# Patient Record
Sex: FEMALE | Race: WHITE | NOT HISPANIC OR LATINO | Employment: UNEMPLOYED | URBAN - METROPOLITAN AREA
[De-identification: names, ages, dates, MRNs, and addresses within clinical notes are randomized per-mention and may not be internally consistent; named-entity substitution may affect disease eponyms.]

---

## 2017-03-03 PROBLEM — R40.4 UNRESPONSIVE EPISODE: Status: ACTIVE | Noted: 2017-03-03

## 2017-03-03 PROBLEM — R40.4 UNRESPONSIVE EPISODE: Status: RESOLVED | Noted: 2017-03-03 | Resolved: 2017-03-03

## 2023-07-16 DIAGNOSIS — J45.909 ASTHMA: ICD-10-CM

## 2023-07-16 RX ORDER — FLUTICASONE PROPIONATE AND SALMETEROL XINAFOATE 45; 21 UG/1; UG/1
AEROSOL, METERED RESPIRATORY (INHALATION)
OUTPATIENT
Start: 2023-07-16

## 2023-07-20 DIAGNOSIS — J45.20 INTERMITTENT ASTHMA WITHOUT COMPLICATION, UNSPECIFIED ASTHMA SEVERITY: ICD-10-CM

## 2023-07-24 RX ORDER — ALBUTEROL SULFATE 90 UG/1
AEROSOL, METERED RESPIRATORY (INHALATION)
Qty: 18 G | Refills: 2 | Status: SHIPPED | OUTPATIENT
Start: 2023-07-24 | End: 2023-09-18 | Stop reason: SDUPTHER

## 2023-07-28 DIAGNOSIS — E63.9 POOR NUTRITION: ICD-10-CM

## 2023-07-28 DIAGNOSIS — F31.2 BIPOLAR I DISORDER, MOST RECENT EPISODE MANIC, SEVERE WITH PSYCHOTIC FEATURES (HCC): Chronic | ICD-10-CM

## 2023-07-29 ENCOUNTER — HOSPITAL ENCOUNTER (EMERGENCY)
Facility: HOSPITAL | Age: 38
Discharge: HOME | End: 2023-07-29
Attending: EMERGENCY MEDICINE
Payer: COMMERCIAL

## 2023-07-29 ENCOUNTER — APPOINTMENT (EMERGENCY)
Dept: CT IMAGING | Facility: HOSPITAL | Age: 38
End: 2023-07-29
Payer: COMMERCIAL

## 2023-07-29 VITALS
RESPIRATION RATE: 16 BRPM | DIASTOLIC BLOOD PRESSURE: 54 MMHG | WEIGHT: 160.5 LBS | BODY MASS INDEX: 29.35 KG/M2 | SYSTOLIC BLOOD PRESSURE: 92 MMHG | TEMPERATURE: 98 F | OXYGEN SATURATION: 98 % | HEART RATE: 74 BPM

## 2023-07-29 DIAGNOSIS — R10.9 ABDOMINAL PAIN: Primary | ICD-10-CM

## 2023-07-29 DIAGNOSIS — K59.00 CONSTIPATION: ICD-10-CM

## 2023-07-29 LAB
ALBUMIN SERPL BCP-MCNC: 4.1 G/DL (ref 3.5–5)
ALP SERPL-CCNC: 50 U/L (ref 34–104)
ALT SERPL W P-5'-P-CCNC: 8 U/L (ref 7–52)
ANION GAP SERPL CALCULATED.3IONS-SCNC: 6 MMOL/L
AST SERPL W P-5'-P-CCNC: 17 U/L (ref 13–39)
BASOPHILS # BLD AUTO: 0.05 THOUSANDS/ÂΜL (ref 0–0.1)
BASOPHILS NFR BLD AUTO: 1 % (ref 0–1)
BILIRUB SERPL-MCNC: 0.22 MG/DL (ref 0.2–1)
BILIRUB UR QL STRIP: NEGATIVE
BUN SERPL-MCNC: 8 MG/DL (ref 5–25)
CALCIUM SERPL-MCNC: 8.8 MG/DL (ref 8.4–10.2)
CHLORIDE SERPL-SCNC: 106 MMOL/L (ref 96–108)
CLARITY UR: CLEAR
CO2 SERPL-SCNC: 24 MMOL/L (ref 21–32)
COLOR UR: YELLOW
CREAT SERPL-MCNC: 0.72 MG/DL (ref 0.6–1.3)
EOSINOPHIL # BLD AUTO: 0.36 THOUSAND/ÂΜL (ref 0–0.61)
EOSINOPHIL NFR BLD AUTO: 4 % (ref 0–6)
ERYTHROCYTE [DISTWIDTH] IN BLOOD BY AUTOMATED COUNT: 11.4 % (ref 11.6–15.1)
EXT PREGNANCY TEST URINE: NEGATIVE
EXT. CONTROL: NORMAL
GFR SERPL CREATININE-BSD FRML MDRD: 108 ML/MIN/1.73SQ M
GLUCOSE SERPL-MCNC: 90 MG/DL (ref 65–140)
GLUCOSE UR STRIP-MCNC: NEGATIVE MG/DL
HCT VFR BLD AUTO: 43.9 % (ref 34.8–46.1)
HGB BLD-MCNC: 14.5 G/DL (ref 11.5–15.4)
HGB UR QL STRIP.AUTO: NEGATIVE
IMM GRANULOCYTES # BLD AUTO: 0.02 THOUSAND/UL (ref 0–0.2)
IMM GRANULOCYTES NFR BLD AUTO: 0 % (ref 0–2)
KETONES UR STRIP-MCNC: NEGATIVE MG/DL
LEUKOCYTE ESTERASE UR QL STRIP: NEGATIVE
LIPASE SERPL-CCNC: 34 U/L (ref 11–82)
LYMPHOCYTES # BLD AUTO: 3.26 THOUSANDS/ÂΜL (ref 0.6–4.47)
LYMPHOCYTES NFR BLD AUTO: 35 % (ref 14–44)
MCH RBC QN AUTO: 30.8 PG (ref 26.8–34.3)
MCHC RBC AUTO-ENTMCNC: 33 G/DL (ref 31.4–37.4)
MCV RBC AUTO: 93 FL (ref 82–98)
MONOCYTES # BLD AUTO: 0.95 THOUSAND/ÂΜL (ref 0.17–1.22)
MONOCYTES NFR BLD AUTO: 10 % (ref 4–12)
NEUTROPHILS # BLD AUTO: 4.66 THOUSANDS/ÂΜL (ref 1.85–7.62)
NEUTS SEG NFR BLD AUTO: 50 % (ref 43–75)
NITRITE UR QL STRIP: NEGATIVE
NRBC BLD AUTO-RTO: 0 /100 WBCS
PH UR STRIP.AUTO: 5.5 [PH] (ref 4.5–8)
PLATELET # BLD AUTO: 189 THOUSANDS/UL (ref 149–390)
PMV BLD AUTO: 10.8 FL (ref 8.9–12.7)
POTASSIUM SERPL-SCNC: 4.2 MMOL/L (ref 3.5–5.3)
PROT SERPL-MCNC: 7 G/DL (ref 6.4–8.4)
PROT UR STRIP-MCNC: NEGATIVE MG/DL
RBC # BLD AUTO: 4.71 MILLION/UL (ref 3.81–5.12)
SODIUM SERPL-SCNC: 136 MMOL/L (ref 135–147)
SP GR UR STRIP.AUTO: 1.01 (ref 1–1.03)
UROBILINOGEN UR QL STRIP.AUTO: 0.2 E.U./DL
WBC # BLD AUTO: 9.3 THOUSAND/UL (ref 4.31–10.16)

## 2023-07-29 PROCEDURE — 80053 COMPREHEN METABOLIC PANEL: CPT | Performed by: EMERGENCY MEDICINE

## 2023-07-29 PROCEDURE — 81003 URINALYSIS AUTO W/O SCOPE: CPT

## 2023-07-29 PROCEDURE — 85025 COMPLETE CBC W/AUTO DIFF WBC: CPT | Performed by: EMERGENCY MEDICINE

## 2023-07-29 PROCEDURE — 81025 URINE PREGNANCY TEST: CPT

## 2023-07-29 PROCEDURE — 96374 THER/PROPH/DIAG INJ IV PUSH: CPT

## 2023-07-29 PROCEDURE — 83690 ASSAY OF LIPASE: CPT | Performed by: EMERGENCY MEDICINE

## 2023-07-29 PROCEDURE — 99284 EMERGENCY DEPT VISIT MOD MDM: CPT | Performed by: EMERGENCY MEDICINE

## 2023-07-29 PROCEDURE — 99284 EMERGENCY DEPT VISIT MOD MDM: CPT

## 2023-07-29 PROCEDURE — 36415 COLL VENOUS BLD VENIPUNCTURE: CPT | Performed by: EMERGENCY MEDICINE

## 2023-07-29 PROCEDURE — 96361 HYDRATE IV INFUSION ADD-ON: CPT

## 2023-07-29 RX ORDER — ONDANSETRON 2 MG/ML
4 INJECTION INTRAMUSCULAR; INTRAVENOUS ONCE
Status: COMPLETED | OUTPATIENT
Start: 2023-07-29 | End: 2023-07-29

## 2023-07-29 RX ORDER — FAMOTIDINE 20 MG/1
20 TABLET, FILM COATED ORAL ONCE
Status: DISCONTINUED | OUTPATIENT
Start: 2023-07-29 | End: 2023-07-30 | Stop reason: HOSPADM

## 2023-07-29 RX ADMIN — ONDANSETRON 4 MG: 2 INJECTION INTRAMUSCULAR; INTRAVENOUS at 22:00

## 2023-07-29 RX ADMIN — SODIUM CHLORIDE 1000 ML: 0.9 INJECTION, SOLUTION INTRAVENOUS at 21:05

## 2023-07-30 NOTE — ED NOTES
Provider made aware patient refused pepcid. Pt educated can not have toradol due to NSAID allergy and narcotics will make constipation worse. Pt and family member educated numerous times on plan of care.      Sean Lew RN  07/29/23 [facility], RN  07/29/23 [id number]       Sean Lew RN  07/30/23 [id number]

## 2023-07-30 NOTE — ED PROVIDER NOTES
Pt Name: Adria Garcia  MRN: [id number]  [address] 1985  Age/Sex: 30 y.o. female  Date of evaluation: 7/29/2023  PCP: Barbra Ritter MD    [address]    Chief Complaint   Patient presents with   • Abdominal Pain     Patient reports generalized abdominal pain for 2 weeks. Last BM 15 days ago. Complaining of feeling hot, sweats. Fever for 2 days. Tylenol at 1100   • Possible UTI     Burning with urination, radiation of burning into abdomen. HPI    Clark Baer presents to the Emergency Department complaining of abdominal pain and dysuria. She has known constipation and has not been able to have a bowel movement despite taking her bowel regimen. She has been having dysuria and is concerned that she may have a UTI. Also- she has been taken off her lithium and since that time she feels hot and tingling at times.         HPI      Past Medical and Surgical History    Past Medical History:   Diagnosis Date   • Abnormal Pap smear of cervix    • ADHD (attention deficit hyperactivity disorder)    • Ankle fracture    • Anxiety    • Arthritis     back   • Asthma    • Bipolar disorder ([address])    • Cellulitis     right side fac in 2014   • Chronic pain disorder    • Diverticulitis    • Flank pain 8/16/2016   • Hepatitis C    • Hip disease 2006    reports she had fluid removed from right hip and received treatment with antibiotic    • History of abnormal cervical Pap smear    • History of multiple miscarriages    • IBS (irritable bowel syndrome)    • Infantile idiopathic scoliosis    • Joint pain    • Kidney stone    • Lactose intolerance    • Low back pain    • Myofascial pain syndrome    • Poor dentition    • PTSD (post-traumatic stress disorder)    • Pyelonephritis affecting pregnancy    • Right hand paresthesia    • Right ovarian cyst    • Scoliosis    • Seizures ([address])    • Self-injurious behavior    • Sleep difficulties    • Slow transit constipation    • Substance abuse ([address])    • Suicide attempt [facility]        Past Surgical History:   Procedure Laterality Date   •  SECTION  2016   •  SECTION  2014   • HIP SURGERY     • ORTHOPEDIC SURGERY     • DE  DELIVERY ONLY N/A 2016    Procedure:  SECTION () REPEAT;  Surgeon: Wendy Zimmer MD;  Location: [facility];  Service: Obstetrics   • DE LIG/TRNSXJ FLP TUBE ABDL/VAG APPR UNI/BI Bilateral 2016    Procedure: LIGATION/COAGULATION TUBAL;  Surgeon: Wendy Zimmer MD;  Location: [facility];  Service: Obstetrics       Family History   Problem Relation Age of Onset   • Heart disease Maternal Aunt    • Cancer Maternal Aunt    • Diabetes Paternal Aunt    • No Known Problems Mother    • No Known Problems Father    • No Known Problems Sister        Social History     Tobacco Use   • Smoking status: Every Day     Packs/day: 0.25     Years: 15.00     Total pack years: 3.75     Types: Cigarettes     Passive exposure: Never   • Smokeless tobacco: Never   • Tobacco comments:     pt refused smoking cessation teaching. Vaping Use   • Vaping Use: Former   Substance Use Topics   • Alcohol use: Never   • Drug use: Not Currently     Types: Marijuana, Cocaine, "Crack" cocaine     Comment: on occasion          . Allergies    Allergies   Allergen Reactions   • Aspirin      Pt given enteric coated 81 mg, when ask pt denies any allergy, listed under allergies on paperwork provided by berny   • Chocolate - Food Allergy Itching   • Naproxen    • Toradol [Ketorolac Tromethamine] GI Bleeding   • Azithromycin Rash   • Latex Hives and Rash   • Neurontin [Gabapentin] Rash and GI Bleeding   • Ppd [Tuberculin Purified Protein Derivative] Rash       Home Medications    Prior to Admission medications    Medication Sig Start Date End Date Taking?  Authorizing Provider   Advair HFA [id number] MCG/ACT inhaler INHALE 2 PUFFS 2 (TWO) TIMES A DAY 23   Madonna Patel MD   Antacid Regular Strength 200-200-20 MG/5ML suspension TAKE 30 ML BY MOUTH EVERY 4 (FOUR) HOURS AS NEEDED FOR INDIGESTION OR HEARTBURN (DYSPEPSIA) 2/24/23   Vincent Mcfarland MD   benztropine (COGENTIN) 0.5 mg tablet Take 1 tablet (0.5 mg total) by mouth 2 (two) times a day 3/9/23   Dalia Kidd MD   benztropine (COGENTIN) 1 mg tablet TAKE 1 TABLET (1 MG TOTAL) BY MOUTH EVERY 4 (FOUR) HOURS AS NEEDED FOR TREMORS (MILD MUSCLE STIFFNESS, DYSTONIC REACTION, OR RIGIDITY IF ANTIPSYCHOTICS GIVEN IN THE PAST 24 HOURS) 6/18/23   Vincent Mcfarland MD   cetirizine (ZyrTEC) 10 mg tablet TAKE 1 TABLET (10 MG TOTAL) BY MOUTH DAILY 6/21/23   Vincent Mcfarland MD   dicyclomine (BENTYL) 20 mg tablet Take 1 tablet (20 mg total) by mouth every 6 (six) hours as-needed for abdominal cramping. 5/24/23   Carline Natarajan PA-C   diphenhydrAMINE (BENADRYL) 50 MG tablet Take 1 tablet (50 mg total) by mouth daily at bedtime  Patient not taking: Reported on 5/17/2023 3/9/23   Dalia Kidd MD   famotidine (PEPCID) 20 mg tablet Take 1 tablet (20 mg total) by mouth 2 (two) times a day as needed for indigestion or heartburn 5/17/23   Simeon Melo MD   hydrOXYzine HCL (ATARAX) 25 mg tablet TAKE 1 TABLET (25 MG TOTAL) BY MOUTH EVERY 6 (SIX) HOURS AS NEEDED FOR ITCHING (MILD ANXIETY) 6/18/23   Vincent Mcfarland MD   hyoscyamine (ANASPAZ,LEVSIN) 0.125 MG tablet Take 1 tablet (0.125 mg total) by mouth every 4 (four) hours as needed for cramping 5/17/23   Simeon Melo MD   lithium carbonate (LITHOBID) 300 mg CR tablet Take 4 tablets (1,200 mg total) by mouth daily at bedtime 3/9/23   Dalia Kidd MD   magnesium Oxide (MAG-OX) 400 mg TABS TAKE 1 TABLET (400 MG TOTAL) BY MOUTH 2 (TWO) TIMES A DAY 6/19/23   Vincent Mcfarland MD   nicotine (NICODERM CQ) 7 mg/24hr TD 24 hr patch Place 1 patch on the skin over 24 hours daily as needed (Minimal smoking hx - use if needed)  Patient not taking: Reported on 4/12/2023 3/9/23   Dalia Kidd MD   omeprazole ([address] [facility]) 40 MG capsule Take 1 capsule (40 mg total) by mouth daily 5/17/23   Malvin Bacon MD   paliperidone (INVEGA) 6 MG 24 hr tablet Take 2 tablets (12 mg total) by mouth daily at bedtime 3/9/23   Annetta Wynn MD   polyethylene glycol [facility]) 17 GM/SCOOP TAKE 17 G BY MOUTH DAILY 7/17/23   Jaime Barreto DO   propranolol (INDERAL) 40 mg tablet TAKE 1 TABLET (40 MG TOTAL) BY MOUTH 2 (TWO) TIMES A DAY AS NEEDED (AKATHISIA/RESTLESSNESS) 7/5/23   Antonio Hopper MD   tiZANidine (ZANAFLEX) 4 mg tablet TAKE 1 TABLET (4 MG TOTAL) BY MOUTH DAILY AT BEDTIME 6/19/23   Antonio Hopper MD   Ventolin  (90 Base) MCG/ACT inhaler INHALE 2 PUFFS EVERY 6 (SIX) HOURS AS NEEDED FOR WHEEZING OR SHORTNESS OF BREATH 7/24/23   Keshav Chang MD   cyclobenzaprine (FLEXERIL) 10 mg tablet Take 1 tablet (10 mg total) by mouth 2 (two) times a day as needed for muscle spasms 8/27/22 8/27/22  Ayo Workman MD           Review of Systems    Review of Systems   Constitutional: Negative for chills and fever. HENT: Negative for ear pain and sore throat. Eyes: Negative for pain and visual disturbance. Respiratory: Negative for cough and shortness of breath. Cardiovascular: Negative for chest pain and palpitations. Gastrointestinal: Positive for abdominal distention, abdominal pain, constipation, nausea and vomiting. Genitourinary: Positive for dysuria. Negative for hematuria. Musculoskeletal: Negative for arthralgias and back pain. Skin: Negative for color change and rash. Neurological: Negative for seizures and syncope. All other systems reviewed and are negative.           Physical Exam      ED Triage Vitals [07/29/23 2005]   Temperature Pulse Respirations Blood Pressure SpO2   98 °F (36.7 °C) 73 20 130/72 98 %      Temp Source Heart Rate Source Patient Position - Orthostatic VS BP Location FiO2 (%)   Oral Monitor Sitting Right arm --      Pain Score       10 - Worst Possible Pain Physical Exam  Vitals and nursing note reviewed. Constitutional:       General: She is not in acute distress. Appearance: She is well-developed. HENT:      Head: Normocephalic and atraumatic. Eyes:      Conjunctiva/sclera: Conjunctivae normal.   Cardiovascular:      Rate and Rhythm: Normal rate and regular rhythm. Heart sounds: No murmur heard. Pulmonary:      Effort: Pulmonary effort is normal. No respiratory distress. Breath sounds: Normal breath sounds. Abdominal:      Palpations: Abdomen is soft. Tenderness: There is no abdominal tenderness. Musculoskeletal:         General: No swelling. Cervical back: Neck supple. Skin:     General: Skin is warm and dry. Capillary Refill: Capillary refill takes less than 2 seconds. Neurological:      Mental Status: She is alert. Psychiatric:         Mood and Affect: Mood normal.       Assessment and Plan    Anurag Montelongo is a 30 y.o. female who presents with abdominal pain promarily. Physical examination remarkable for mild abdominal distension. Plan will be to perform diagnostic testing and treat symptomatically.       MDM    Diagnostic Results        Labs:    Results for orders placed or performed during the hospital encounter of 07/29/23   CBC and differential   Result Value Ref Range    WBC 9.30 4.31 - 10.16 Thousand/uL    RBC 4.71 3.81 - 5.12 Million/uL    Hemoglobin 14.5 11.5 - 15.4 g/dL    Hematocrit 43.9 34.8 - 46.1 %    MCV 93 82 - 98 fL    MCH 30.8 26.8 - 34.3 pg    MCHC 33.0 31.4 - 37.4 g/dL    RDW 11.4 (L) 11.6 - 15.1 %    MPV 10.8 8.9 - 12.7 fL    Platelets 794 171 - 675 Thousands/uL    nRBC 0 /100 WBCs    Neutrophils Relative 50 43 - 75 %    Immat GRANS % 0 0 - 2 %    Lymphocytes Relative 35 14 - 44 %    Monocytes Relative 10 4 - 12 %    Eosinophils Relative 4 0 - 6 %    Basophils Relative 1 0 - 1 %    Neutrophils Absolute 4.66 1.85 - 7.62 Thousands/µL    Immature Grans Absolute 0.02 0.00 - 0.20 Thousand/uL    Lymphocytes Absolute 3.26 0.60 - 4.47 Thousands/µL    Monocytes Absolute 0.95 0.17 - 1.22 Thousand/µL    Eosinophils Absolute 0.36 0.00 - 0.61 Thousand/µL    Basophils Absolute 0.05 0.00 - 0.10 Thousands/µL   Comprehensive metabolic panel   Result Value Ref Range    Sodium 136 135 - 147 mmol/L    Potassium 4.2 3.5 - 5.3 mmol/L    Chloride 106 96 - 108 mmol/L    CO2 24 21 - 32 mmol/L    ANION GAP 6 mmol/L    BUN 8 5 - 25 mg/dL    Creatinine 0.72 0.60 - 1.30 mg/dL    Glucose 90 65 - 140 mg/dL    Calcium 8.8 8.4 - 10.2 mg/dL    AST 17 13 - 39 U/L    ALT 8 7 - 52 U/L    Alkaline Phosphatase 50 34 - 104 U/L    Total Protein 7.0 6.4 - 8.4 g/dL    Albumin 4.1 3.5 - 5.0 g/dL    Total Bilirubin 0.22 0.20 - 1.00 mg/dL    eGFR 108 ml/min/1.73sq m   Lipase   Result Value Ref Range    Lipase 34 11 - 82 u/L   POCT pregnancy, urine   Result Value Ref Range    EXT Preg Test, Ur Negative     Control Valid    Urine Macroscopic, POC   Result Value Ref Range    Color, UA Yellow     Clarity, UA Clear     pH, UA 5.5 4.5 - 8.0    Leukocytes, UA Negative Negative    Nitrite, UA Negative Negative    Protein, UA Negative Negative mg/dl    Glucose, UA Negative Negative mg/dl    Ketones, UA Negative Negative mg/dl    Urobilinogen, UA 0.2 0.2, 1.0 E.U./dl E.U./dl    Bilirubin, UA Negative Negative    Occult Blood, UA Negative Negative    Specific Gravity, UA 1.010 1.003 - 1.030       All labs reviewed and utilized in the medical decision making process    Radiology:    CT abdomen pelvis with contrast    (Results Pending)       All radiology studies independently viewed by me and interpreted by the radiologist.    Procedure    Procedures      ED Course of Care and Re-Assessments    Patient declined CT and when I went to re-evaluate her she requested that her IV be taken out and she wanted to go home. She was frustrated that her fluids had not been running for some of the time.       Her labs were hemolyzed and needed to be re-drawn and this caused a delay in CT. She reports an allergy to NSAIDs, she had taken tylenol and I explained that stronger pain medications would cause worsening of her constipation. Medications   famotidine (PEPCID) tablet 20 mg (0 mg Oral Hold 7/29/23 2222)   sodium chloride 0.9 % bolus 1,000 mL (1,000 mL Intravenous New Bag 7/29/23 2105)   ondansetron (ZOFRAN) injection 4 mg (4 mg Intravenous Given 7/29/23 2200)           FINAL IMPRESSION    Final diagnoses:   Abdominal pain   Constipation         DISPOSITION/PLAN    Time reflects when diagnosis was documented in both MDM as applicable and the Disposition within this note     Time User Action Codes Description Comment    7/29/2023 10:41 PM Delfino Nageotte L Add [R10.9] Abdominal pain     7/29/2023 10:41 PM Delfino Nageotte Add [K59.00] Constipation       ED Disposition     ED Disposition   Left from Room after Provider Exam    Condition   --    Date/Time   Sat Jul 29, 2023 10:41 PM    Comment   --         Follow-up Information    None           PATIENT REFERRED TO:    No follow-up provider specified. DISCHARGE MEDICATIONS:    Patient's Medications   Discharge Prescriptions    No medications on file       No discharge procedures on file.          Jonathan Nina, [address] DO  07/29/23 [id number]

## 2023-07-30 NOTE — ED NOTES
Pt requesting pain medication. Pt reporting 10/10 pain. Dr. Ritu Guevara made aware.        Analy Borden RN  07/29/23 [id number]

## 2023-07-30 NOTE — ED NOTES
Late entry due to patient care - Family member at the bedside upset that IV fluids not running. IV flushed without difficulty and IV. Pt family member continually asking if IV fluids are running. Pt educated need to keep arm straight. Pt family member concerned about radiation related to CT scan. This RN attempted to educated family member on CT scan, but family member started talking on phone and ignoring RN. Pt family member then asked, "how many CC is the IV fluid order for." Family member made aware pt to received 1000ml of fluid. This RN started asking pt requesting pain medications for abd pain 10/10 Dr. Ritu Guevara made aware.      Analy Borden RN  07/29/23 [place], RN  07/29/23 [place], RN  07/29/23 Jose Alfredo, TORRIE  07/30/23 [id number]

## 2023-07-30 NOTE — ED NOTES
Provider exited room and reported pt wanting to leave. IV removed by Dr. Berenice Marquez, and pt ambulated out of dept without difficulty.       Willam Ferrera RN  07/29/23 1997

## 2023-07-31 RX ORDER — BENZTROPINE MESYLATE 0.5 MG/1
0.5 TABLET ORAL 2 TIMES DAILY
Qty: 60 TABLET | Refills: 1 | OUTPATIENT
Start: 2023-07-31

## 2023-07-31 NOTE — TELEPHONE ENCOUNTER
Adrianna Javier was treated by this writer in the hospital in March 2023. I have not seen her since then and she is no longer under my care.     Lisa Lopez MD

## 2023-08-01 RX ORDER — HYDROXYZINE HYDROCHLORIDE 25 MG/1
25 TABLET, FILM COATED ORAL EVERY 6 HOURS PRN
Qty: 10 TABLET | Refills: 0 | Status: SHIPPED | OUTPATIENT
Start: 2023-08-01

## 2023-08-01 RX ORDER — HYDROXYZINE 50 MG/1
100 TABLET, FILM COATED ORAL EVERY 6 HOURS PRN
Qty: 30 TABLET | Refills: 0 | OUTPATIENT
Start: 2023-08-01

## 2023-08-01 RX ORDER — BENZTROPINE MESYLATE 1 MG/1
1 TABLET ORAL EVERY 4 HOURS PRN
Qty: 30 TABLET | Refills: 0 | Status: SHIPPED | OUTPATIENT
Start: 2023-08-01

## 2023-08-06 DIAGNOSIS — M79.18 MYOFASCIAL PAIN SYNDROME: ICD-10-CM

## 2023-08-06 DIAGNOSIS — K21.9 GERD (GASTROESOPHAGEAL REFLUX DISEASE): ICD-10-CM

## 2023-08-08 RX ORDER — LANOLIN ALCOHOL/MO/W.PET/CERES
400 CREAM (GRAM) TOPICAL 2 TIMES DAILY
Qty: 60 TABLET | Refills: 1 | Status: SHIPPED | OUTPATIENT
Start: 2023-08-08

## 2023-08-08 RX ORDER — TIZANIDINE 4 MG/1
4 TABLET ORAL
Qty: 30 TABLET | Refills: 1 | Status: SHIPPED | OUTPATIENT
Start: 2023-08-08

## 2023-08-10 DIAGNOSIS — G43.009 MIGRAINE WITHOUT AURA AND WITHOUT STATUS MIGRAINOSUS, NOT INTRACTABLE: ICD-10-CM

## 2023-08-11 RX ORDER — PROPRANOLOL HYDROCHLORIDE 40 MG/1
40 TABLET ORAL 2 TIMES DAILY PRN
Qty: 90 TABLET | Refills: 1 | OUTPATIENT
Start: 2023-08-11

## 2023-08-20 DIAGNOSIS — J45.20 INTERMITTENT ASTHMA WITHOUT COMPLICATION, UNSPECIFIED ASTHMA SEVERITY: ICD-10-CM

## 2023-08-22 RX ORDER — ALBUTEROL SULFATE 90 UG/1
AEROSOL, METERED RESPIRATORY (INHALATION)
Qty: 18 G | Refills: 2 | OUTPATIENT
Start: 2023-08-22

## 2023-08-31 ENCOUNTER — HOSPITAL ENCOUNTER (EMERGENCY)
Facility: HOSPITAL | Age: 38
Discharge: HOME | End: 2023-08-31
Attending: EMERGENCY MEDICINE
Payer: COMMERCIAL

## 2023-08-31 VITALS
TEMPERATURE: 97.8 F | SYSTOLIC BLOOD PRESSURE: 138 MMHG | OXYGEN SATURATION: 100 % | WEIGHT: 158.73 LBS | DIASTOLIC BLOOD PRESSURE: 63 MMHG | RESPIRATION RATE: 16 BRPM | HEART RATE: 90 BPM | BODY MASS INDEX: 29.03 KG/M2

## 2023-08-31 DIAGNOSIS — L25.9 CONTACT DERMATITIS: Primary | ICD-10-CM

## 2023-08-31 PROCEDURE — 99284 EMERGENCY DEPT VISIT MOD MDM: CPT

## 2023-08-31 PROCEDURE — 99282 EMERGENCY DEPT VISIT SF MDM: CPT

## 2023-08-31 RX ORDER — PREDNISONE 20 MG/1
40 TABLET ORAL ONCE
Status: COMPLETED | OUTPATIENT
Start: 2023-08-31 | End: 2023-08-31

## 2023-08-31 RX ORDER — DIPHENHYDRAMINE HCL 25 MG
25 TABLET ORAL ONCE
Status: COMPLETED | OUTPATIENT
Start: 2023-08-31 | End: 2023-08-31

## 2023-08-31 RX ORDER — FAMOTIDINE 20 MG/1
20 TABLET, FILM COATED ORAL ONCE
Status: COMPLETED | OUTPATIENT
Start: 2023-08-31 | End: 2023-08-31

## 2023-08-31 RX ORDER — DIPHENHYDRAMINE HCL 25 MG
25 TABLET ORAL EVERY 6 HOURS
Qty: 20 TABLET | Refills: 0 | Status: SHIPPED | OUTPATIENT
Start: 2023-08-31

## 2023-08-31 RX ORDER — PREDNISONE 20 MG/1
40 TABLET ORAL DAILY
Qty: 8 TABLET | Refills: 0 | Status: SHIPPED | OUTPATIENT
Start: 2023-08-31 | End: 2023-09-04

## 2023-08-31 RX ADMIN — DIPHENHYDRAMINE HCL 25 MG: 25 TABLET, COATED ORAL at 11:52

## 2023-08-31 RX ADMIN — FAMOTIDINE 20 MG: 20 TABLET ORAL at 11:52

## 2023-08-31 RX ADMIN — PREDNISONE 40 MG: 20 TABLET ORAL at 11:52

## 2023-08-31 NOTE — ED PROVIDER NOTES
History  Chief Complaint   Patient presents with   • Rash     Rash along abdomen and back from wearing wrap for around waist. Reports rash is very itchy and feels like pins and needles. 30 YOF presents with rash to her abdomen and back from wearing a wait slimmer. Pt reports she started wearing it on Tuesday and developed a rash shortly after. Has not worn it since. Tried calamine lotion without relief. Prior to Admission Medications   Prescriptions Last Dose Informant Patient Reported? Taking? Advair HFA 45-21 MCG/ACT inhaler   No No   Sig: INHALE 2 PUFFS 2 (TWO) TIMES A DAY   Antacid Regular Strength 200-200-20 MG/5ML suspension   No No   Sig: TAKE 30 ML BY MOUTH EVERY 4 (FOUR) HOURS AS NEEDED FOR INDIGESTION OR HEARTBURN (DYSPEPSIA)   Ventolin  (90 Base) MCG/ACT inhaler   No No   Sig: INHALE 2 PUFFS EVERY 6 (SIX) HOURS AS NEEDED FOR WHEEZING OR SHORTNESS OF BREATH   benztropine (COGENTIN) 0.5 mg tablet   No No   Sig: Take 1 tablet (0.5 mg total) by mouth 2 (two) times a day   benztropine (COGENTIN) 1 mg tablet   No No   Sig: TAKE 1 TABLET (1 MG TOTAL) BY MOUTH EVERY 4 (FOUR) HOURS AS NEEDED FOR TREMORS (MILD MUSCLE STIFFNESS, DYSTONIC REACTION, OR RIGIDITY IF ANTIPSYCHOTICS GIVEN IN THE PAST 24 HOURS)   cetirizine (ZyrTEC) 10 mg tablet   No No   Sig: TAKE 1 TABLET (10 MG TOTAL) BY MOUTH DAILY   dicyclomine (BENTYL) 20 mg tablet   No No   Sig: Take 1 tablet (20 mg total) by mouth every 6 (six) hours as-needed for abdominal cramping.    famotidine (PEPCID) 20 mg tablet   No No   Sig: Take 1 tablet (20 mg total) by mouth 2 (two) times a day as needed for indigestion or heartburn   hydrOXYzine HCL (ATARAX) 25 mg tablet   No No   Sig: TAKE 1 TABLET (25 MG TOTAL) BY MOUTH EVERY 6 (SIX) HOURS AS NEEDED FOR ITCHING (MILD ANXIETY)   hyoscyamine (ANASPAZ,LEVSIN) 0.125 MG tablet   No No   Sig: Take 1 tablet (0.125 mg total) by mouth every 4 (four) hours as needed for cramping   lithium carbonate (LITHOBID) 300 mg CR tablet   No No   Sig: Take 4 tablets (1,200 mg total) by mouth daily at bedtime   magnesium Oxide (MAG-OX) 400 mg TABS   No No   Sig: TAKE 1 TABLET (400 MG TOTAL) BY MOUTH 2 (TWO) TIMES A DAY   nicotine (NICODERM CQ) 7 mg/24hr TD 24 hr patch   No No   Sig: Place 1 patch on the skin over 24 hours daily as needed (Minimal smoking hx - use if needed)   Patient not taking: Reported on 4/12/2023   omeprazole (PriLOSEC) 40 MG capsule   No No   Sig: Take 1 capsule (40 mg total) by mouth daily   paliperidone (INVEGA) 6 MG 24 hr tablet   No No   Sig: Take 2 tablets (12 mg total) by mouth daily at bedtime   polyethylene glycol (GLYCOLAX) 17 GM/SCOOP   No No   Sig: TAKE 17 G BY MOUTH DAILY   propranolol (INDERAL) 40 mg tablet   No No   Sig: TAKE 1 TABLET (40 MG TOTAL) BY MOUTH 2 (TWO) TIMES A DAY AS NEEDED (AKATHISIA/RESTLESSNESS)   tiZANidine (ZANAFLEX) 4 mg tablet   No No   Sig: TAKE 1 TABLET (4 MG TOTAL) BY MOUTH DAILY AT BEDTIME      Facility-Administered Medications: None       Past Medical History:   Diagnosis Date   • Abnormal Pap smear of cervix    • ADHD (attention deficit hyperactivity disorder)    • Ankle fracture    • Anxiety    • Arthritis     back   • Asthma    • Bipolar disorder (HCC)    • Cellulitis     right side fac in 2014   • Chronic pain disorder    • Diverticulitis    • Flank pain 8/16/2016   • Hepatitis C    • Hip disease 2006    reports she had fluid removed from right hip and received treatment with antibiotic    • History of abnormal cervical Pap smear    • History of multiple miscarriages    • IBS (irritable bowel syndrome)    • Infantile idiopathic scoliosis    • Joint pain    • Kidney stone    • Lactose intolerance    • Low back pain    • Myofascial pain syndrome    • Poor dentition    • PTSD (post-traumatic stress disorder)    • Pyelonephritis affecting pregnancy    • Right hand paresthesia    • Right ovarian cyst    • Scoliosis    • Seizures (HCC)    • Self-injurious behavior • Sleep difficulties    • Slow transit constipation    • Substance abuse ([address] )    • Suicide attempt [facility])        Past Surgical History:   Procedure Laterality Date   •  SECTION  2016   •  SECTION  2014   • HIP SURGERY     • ORTHOPEDIC SURGERY     • MS  DELIVERY ONLY N/A 2016    Procedure:  SECTION () REPEAT;  Surgeon: Edmund Majano MD;  Location: [facility];  Service: Obstetrics   • MS LIG/TRNSXJ FLP TUBE ABDL/VAG APPR UNI/BI Bilateral 2016    Procedure: LIGATION/COAGULATION TUBAL;  Surgeon: Edmund Majano MD;  Location: [facility];  Service: Obstetrics       Family History   Problem Relation Age of Onset   • Heart disease Maternal Aunt    • Cancer Maternal Aunt    • Diabetes Paternal Aunt    • No Known Problems Mother    • No Known Problems Father    • No Known Problems Sister      I have reviewed and agree with the history as documented. E-Cigarette/Vaping   • E-Cigarette Use Former User      E-Cigarette/Vaping Substances   • Nicotine No    • THC No    • CBD No    • Flavoring No    • Other No    • Unknown No      Social History     Tobacco Use   • Smoking status: Every Day     Packs/day: 0.25     Years: 15.00     Total pack years: 3.75     Types: Cigarettes     Passive exposure: Never   • Smokeless tobacco: Never   • Tobacco comments:     pt refused smoking cessation teaching. Vaping Use   • Vaping Use: Former   Substance Use Topics   • Alcohol use: Never   • Drug use: Not Currently     Types: Marijuana, Cocaine, "Crack" cocaine     Comment: on occasion        Review of Systems   Skin: Positive for rash. All other systems reviewed and are negative. Physical Exam  Physical Exam  Vitals and nursing note reviewed. Constitutional:       General: She is not in acute distress. Appearance: Normal appearance. She is well-developed. She is not ill-appearing. HENT:      Head: Normocephalic and atraumatic.    Eyes: Conjunctiva/sclera: Conjunctivae normal.   Cardiovascular:      Rate and Rhythm: Normal rate. Pulmonary:      Effort: Pulmonary effort is normal.   Musculoskeletal:         General: Normal range of motion. Cervical back: Normal range of motion and neck supple. Skin:     General: Skin is warm and dry. Findings: Rash present. Comments: Erythematous macular papular rash on abdomen. No drainage. Neurological:      Mental Status: She is alert. Psychiatric:         Mood and Affect: Mood normal.         Behavior: Behavior normal.         Vital Signs  ED Triage Vitals [08/31/23 1038]   Temperature Pulse Respirations Blood Pressure SpO2   97.8 °F (36.6 °C) 90 16 138/63 100 %      Temp Source Heart Rate Source Patient Position - Orthostatic VS BP Location FiO2 (%)   Oral Monitor Sitting Right arm --      Pain Score       No Pain           Vitals:    08/31/23 1038   BP: 138/63   Pulse: 90   Patient Position - Orthostatic VS: Sitting         Visual Acuity      ED Medications  Medications   predniSONE tablet 40 mg (40 mg Oral Given 8/31/23 1152)   diphenhydrAMINE (BENADRYL) tablet 25 mg (25 mg Oral Given 8/31/23 1152)   famotidine (PEPCID) tablet 20 mg (20 mg Oral Given 8/31/23 1152)       Diagnostic Studies  Results Reviewed     None                 No orders to display              Procedures  Procedures         ED Course                               SBIRT 20yo+    Flowsheet Row Most Recent Value   Initial Alcohol Screen: US AUDIT-C     1. How often do you have a drink containing alcohol? 0 Filed at: 08/31/2023 1143   2. How many drinks containing alcohol do you have on a typical day you are drinking? 0 Filed at: 08/31/2023 1143   3a. Male UNDER 65: How often do you have five or more drinks on one occasion? 0 Filed at: 08/31/2023 1143   3b. FEMALE Any Age, or MALE 65+: How often do you have 4 or more drinks on one occassion?  0 Filed at: 08/31/2023 1143   Audit-C Score 0 Filed at: 08/31/2023 1143 JAMEY: How many times in the past year have you. .. Used an illegal drug or used a prescription medication for non-medical reasons? Never Filed at: 08/31/2023 1143                    Medical Decision Making  30 YOF presents with rash to the abdomen and back after wearing new waist slimmer on Tuesday. On physical exam she does have a rash consistent with contact dermatitis. Will give prednisone, benadryl and pepcid here. Recommend few day course of prednisone in addition to benadryl. I have discussed the plan to discharge pt from ED. The patient was discharged in stable condition.  Patient ambulated off the department.  Extensive return to emergency department precautions were discussed.  Follow up with appropriate providers including primary care physician was discussed.  Patient and/or their  primary decision maker expressed understanding. Maggie Dewitt remained stable during entire emergency department stay. Contact dermatitis: acute illness or injury  Risk  OTC drugs. Prescription drug management. Disposition  Final diagnoses:   Contact dermatitis     Time reflects when diagnosis was documented in both MDM as applicable and the Disposition within this note     Time User Action Codes Description Comment    8/31/2023 11:40 AM Jackie Reno Add [L25.9] Contact dermatitis       ED Disposition     ED Disposition   Discharge    Condition   Stable    Date/Time   Thu Aug 31, 2023 11:40 AM    Comment   Ranjit Keating discharge to home/self care.                Follow-up Information    None         Discharge Medication List as of 8/31/2023 12:01 PM      START taking these medications    Details   diphenhydrAMINE (BENADRYL) 25 mg tablet Take 1 tablet (25 mg total) by mouth every 6 (six) hours, Starting Thu 8/31/2023, Normal      predniSONE 20 mg tablet Take 2 tablets (40 mg total) by mouth daily for 4 days, Starting Thu 8/31/2023, Until Mon 9/4/2023, Normal         CONTINUE these medications which have NOT CHANGED    Details   Advair HFA 45-21 MCG/ACT inhaler INHALE 2 PUFFS 2 (TWO) TIMES A DAY, Normal      Antacid Regular Strength 200-200-20 MG/5ML suspension TAKE 30 ML BY MOUTH EVERY 4 (FOUR) HOURS AS NEEDED FOR INDIGESTION OR HEARTBURN (DYSPEPSIA), Normal      !! benztropine (COGENTIN) 0.5 mg tablet Take 1 tablet (0.5 mg total) by mouth 2 (two) times a day, Starting Thu 3/9/2023, Normal      !! benztropine (COGENTIN) 1 mg tablet TAKE 1 TABLET (1 MG TOTAL) BY MOUTH EVERY 4 (FOUR) HOURS AS NEEDED FOR TREMORS (MILD MUSCLE STIFFNESS, DYSTONIC REACTION, OR RIGIDITY IF ANTIPSYCHOTICS GIVEN IN THE PAST 24 HOURS), Starting Tue 8/1/2023, Normal      cetirizine (ZyrTEC) 10 mg tablet TAKE 1 TABLET (10 MG TOTAL) BY MOUTH DAILY, Starting Wed 6/21/2023, Normal      dicyclomine (BENTYL) 20 mg tablet Take 1 tablet (20 mg total) by mouth every 6 (six) hours as-needed for abdominal cramping., Starting Wed 5/24/2023, Normal      famotidine (PEPCID) 20 mg tablet Take 1 tablet (20 mg total) by mouth 2 (two) times a day as needed for indigestion or heartburn, Starting Wed 5/17/2023, Normal      hydrOXYzine HCL (ATARAX) 25 mg tablet TAKE 1 TABLET (25 MG TOTAL) BY MOUTH EVERY 6 (SIX) HOURS AS NEEDED FOR ITCHING (MILD ANXIETY), Starting Tue 8/1/2023, Normal      hyoscyamine (ANASPAZ,LEVSIN) 0.125 MG tablet Take 1 tablet (0.125 mg total) by mouth every 4 (four) hours as needed for cramping, Starting Wed 5/17/2023, Normal      lithium carbonate (LITHOBID) 300 mg CR tablet Take 4 tablets (1,200 mg total) by mouth daily at bedtime, Starting Thu 3/9/2023, Normal      magnesium Oxide (MAG-OX) 400 mg TABS TAKE 1 TABLET (400 MG TOTAL) BY MOUTH 2 (TWO) TIMES A DAY, Starting Tue 8/8/2023, Normal      nicotine (NICODERM CQ) 7 mg/24hr TD 24 hr patch Place 1 patch on the skin over 24 hours daily as needed (Minimal smoking hx - use if needed), Starting Thu 3/9/2023, Normal      omeprazole (PriLOSEC) 40 MG capsule Take 1 capsule (40 mg total) by mouth daily, Starting Wed 5/17/2023, Normal      paliperidone (INVEGA) 6 MG 24 hr tablet Take 2 tablets (12 mg total) by mouth daily at bedtime, Starting Thu 3/9/2023, Normal      polyethylene glycol (GLYCOLAX) 17 GM/SCOOP TAKE 17 G BY MOUTH DAILY, Normal      propranolol (INDERAL) 40 mg tablet TAKE 1 TABLET (40 MG TOTAL) BY MOUTH 2 (TWO) TIMES A DAY AS NEEDED (AKATHISIA/RESTLESSNESS), Starting Wed 7/5/2023, Normal      tiZANidine (ZANAFLEX) 4 mg tablet TAKE 1 TABLET (4 MG TOTAL) BY MOUTH DAILY AT BEDTIME, Starting Tue 8/8/2023, Normal      Ventolin  (90 Base) MCG/ACT inhaler INHALE 2 PUFFS EVERY 6 (SIX) HOURS AS NEEDED FOR WHEEZING OR SHORTNESS OF BREATH, Normal       !! - Potential duplicate medications found. Please discuss with provider. No discharge procedures on file.     PDMP Review       Value Time User    PDMP Reviewed  Yes 12/27/2022  4:33 PM Dejon Mcguire DO          ED Provider  Electronically Signed by           Latasha aHwk PA-C  08/31/23 0744

## 2023-09-12 ENCOUNTER — TELEPHONE (OUTPATIENT)
Dept: GASTROENTEROLOGY | Facility: CLINIC | Age: 38
End: 2023-09-12

## 2023-09-12 NOTE — TELEPHONE ENCOUNTER
Spoke with patient to inform of 12/27 cancellation. Patient agreeable to 1/16/24 at [facility] office.

## 2023-09-18 ENCOUNTER — OFFICE VISIT (OUTPATIENT)
Dept: FAMILY MEDICINE CLINIC | Facility: CLINIC | Age: 38
End: 2023-09-18

## 2023-09-18 VITALS
SYSTOLIC BLOOD PRESSURE: 120 MMHG | HEART RATE: 98 BPM | BODY MASS INDEX: 29.26 KG/M2 | OXYGEN SATURATION: 99 % | WEIGHT: 159 LBS | DIASTOLIC BLOOD PRESSURE: 80 MMHG | TEMPERATURE: 98.4 F | HEIGHT: 62 IN | RESPIRATION RATE: 16 BRPM

## 2023-09-18 DIAGNOSIS — K59.00 CONSTIPATION, UNSPECIFIED CONSTIPATION TYPE: ICD-10-CM

## 2023-09-18 DIAGNOSIS — J45.909 ASTHMA: ICD-10-CM

## 2023-09-18 DIAGNOSIS — R10.30 LOWER ABDOMINAL PAIN: ICD-10-CM

## 2023-09-18 DIAGNOSIS — J45.20 INTERMITTENT ASTHMA WITHOUT COMPLICATION, UNSPECIFIED ASTHMA SEVERITY: ICD-10-CM

## 2023-09-18 DIAGNOSIS — G43.909 MIGRAINE HEADACHE: ICD-10-CM

## 2023-09-18 PROCEDURE — 99213 OFFICE O/P EST LOW 20 MIN: CPT | Performed by: FAMILY MEDICINE

## 2023-09-18 RX ORDER — CYCLOBENZAPRINE HCL 10 MG
10 TABLET ORAL 2 TIMES DAILY PRN
Qty: 20 TABLET | Refills: 0 | Status: SHIPPED | OUTPATIENT
Start: 2023-09-18

## 2023-09-18 RX ORDER — ALBUTEROL SULFATE 90 UG/1
2 AEROSOL, METERED RESPIRATORY (INHALATION) EVERY 4 HOURS PRN
Qty: 18 G | Refills: 2 | Status: SHIPPED | OUTPATIENT
Start: 2023-09-18

## 2023-09-18 RX ORDER — FLUTICASONE PROPIONATE AND SALMETEROL XINAFOATE 45; 21 UG/1; UG/1
2 AEROSOL, METERED RESPIRATORY (INHALATION) 2 TIMES DAILY
Qty: 12 G | Refills: 1 | Status: SHIPPED | OUTPATIENT
Start: 2023-09-18

## 2023-09-18 RX ORDER — TOPIRAMATE 15 MG/1
15 CAPSULE, COATED PELLETS ORAL 2 TIMES DAILY
Qty: 30 CAPSULE | Refills: 0 | Status: SHIPPED | OUTPATIENT
Start: 2023-09-18

## 2023-09-18 NOTE — ASSESSMENT & PLAN NOTE
Chronic problem. Symptoms are not linked to menstrual cycle as they happen sporadically throughout the month. Patient with unilateral, migrating headache. Symptoms are associated with photophobia, phonophobia, nausea. Unsuccessfully treated with over-the-counter NSAIDs and Acetaminophen. No reported focal neurologic deficits associated with migraine. Not currently on oral contraceptive pills.     - As no improvement with Propranolol, will prescribe Topamax and follow up in 1 month in office   - hydration as tolerated, sleep hygiene discussed   - headache diary recommended to keep track with HA, associated symptoms and possible triggers

## 2023-09-18 NOTE — ASSESSMENT & PLAN NOTE
Well controlled.    Home medication Advair and Albuterol.     - Continue home medication Advair 2 puffs BID and Albuterol 2 puffs q4h prn   - ED precautions discussed   - Smoking cessation counseling

## 2023-09-18 NOTE — PROGRESS NOTES
Name: Elaine Valdez      : 1985      MRN: [id number]  Encounter Provider: Helena Alexander MD  Encounter Date: 2023   Encounter department: [address] [facility]     1. Asthma  Assessment & Plan:  Well controlled. Home medication Advair and Albuterol.     - Continue home medication Advair 2 puffs BID and Albuterol 2 puffs q4h prn   - ED precautions discussed   - Smoking cessation counseling     Orders:  -     fluticasone-salmeterol (Advair HFA) 45-21 MCG/ACT inhaler; Inhale 2 puffs 2 (two) times a day Rinse mouth after use. 2. Migraine headache  -     topiramate (TOPAMAX) 15 mg capsule; Take 1 capsule (15 mg total) by mouth 2 (two) times a day  -     cyclobenzaprine (FLEXERIL) 10 mg tablet; Take 1 tablet (10 mg total) by mouth 2 (two) times a day as needed for muscle spasms    3. Intermittent asthma without complication, unspecified asthma severity  Assessment & Plan:  Well controlled. Home medication Advair and Albuterol.     - Continue home medication Advair 2 puffs BID and Albuterol 2 puffs q4h prn   - ED precautions discussed   - Smoking cessation counseling     Orders:  -     Ventolin  (90 Base) MCG/ACT inhaler; Inhale 2 puffs every 4 (four) hours as needed for wheezing    4. Lower abdominal pain    5. Constipation, unspecified constipation type         Subjective      This is a very pleasant 30 y.o. female who presents to the clinic for management of their chronic medical conditions. Patient's medical conditions are stable unless noted otherwise above. Patient has not had any recent hospitalizations, or medical emergencies since last visit. Patient has no further complaints other than what is mentioned in the ROS. Denies fever, chills, headache, blurry vision, soar throat, chest pain, SOB, wheezing, n/v/c/d, abdominal pain or urinary symptoms. Review of Systems   Constitutional: Negative for chills and fever. HENT: Negative for ear pain and sore throat. Eyes: Negative for pain and visual disturbance. Respiratory: Negative for cough and shortness of breath. Cardiovascular: Negative for chest pain and palpitations. Gastrointestinal: Negative for abdominal pain and vomiting. Genitourinary: Negative for dysuria and hematuria. Musculoskeletal: Negative for arthralgias and back pain. Skin: Negative for color change and rash. Neurological: Positive for headaches. Negative for seizures and syncope. All other systems reviewed and are negative. Current Outpatient Medications on File Prior to Visit   Medication Sig   • cetirizine (ZyrTEC) 10 mg tablet TAKE 1 TABLET (10 MG TOTAL) BY MOUTH DAILY   • dicyclomine (BENTYL) 20 mg tablet Take 1 tablet (20 mg total) by mouth every 6 (six) hours as-needed for abdominal cramping.    • famotidine (PEPCID) 20 mg tablet Take 1 tablet (20 mg total) by mouth 2 (two) times a day as needed for indigestion or heartburn   • magnesium Oxide (MAG-OX) 400 mg TABS TAKE 1 TABLET (400 MG TOTAL) BY MOUTH 2 (TWO) TIMES A DAY   • omeprazole (PriLOSEC) 40 MG capsule Take 1 capsule (40 mg total) by mouth daily   • paliperidone (INVEGA) 6 MG 24 hr tablet Take 2 tablets (12 mg total) by mouth daily at bedtime   • polyethylene glycol (GLYCOLAX) 17 GM/SCOOP TAKE 17 G BY MOUTH DAILY   • tiZANidine (ZANAFLEX) 4 mg tablet TAKE 1 TABLET (4 MG TOTAL) BY MOUTH DAILY AT BEDTIME   • [DISCONTINUED] cyclobenzaprine (FLEXERIL) 10 mg tablet Take 1 tablet (10 mg total) by mouth 2 (two) times a day as needed for muscle spasms   • [DISCONTINUED] Ventolin  (90 Base) MCG/ACT inhaler INHALE 2 PUFFS EVERY 6 (SIX) HOURS AS NEEDED FOR WHEEZING OR SHORTNESS OF BREATH   • [DISCONTINUED] Advair HFA 45-21 MCG/ACT inhaler INHALE 2 PUFFS 2 (TWO) TIMES A DAY   • [DISCONTINUED] Antacid Regular Strength 200-200-20 MG/5ML suspension TAKE 30 ML BY MOUTH EVERY 4 (FOUR) HOURS AS NEEDED FOR INDIGESTION OR HEARTBURN (DYSPEPSIA)   • [DISCONTINUED] benztropine (COGENTIN) 0.5 mg tablet Take 1 tablet (0.5 mg total) by mouth 2 (two) times a day   • [DISCONTINUED] benztropine (COGENTIN) 1 mg tablet TAKE 1 TABLET (1 MG TOTAL) BY MOUTH EVERY 4 (FOUR) HOURS AS NEEDED FOR TREMORS (MILD MUSCLE STIFFNESS, DYSTONIC REACTION, OR RIGIDITY IF ANTIPSYCHOTICS GIVEN IN THE PAST 24 HOURS)   • [DISCONTINUED] diphenhydrAMINE (BENADRYL) 25 mg tablet Take 1 tablet (25 mg total) by mouth every 6 (six) hours   • [DISCONTINUED] hydrOXYzine HCL (ATARAX) 25 mg tablet TAKE 1 TABLET (25 MG TOTAL) BY MOUTH EVERY 6 (SIX) HOURS AS NEEDED FOR ITCHING (MILD ANXIETY)   • [DISCONTINUED] hyoscyamine (ANASPAZ,LEVSIN) 0.125 MG tablet Take 1 tablet (0.125 mg total) by mouth every 4 (four) hours as needed for cramping   • [DISCONTINUED] lithium carbonate (LITHOBID) 300 mg CR tablet Take 4 tablets (1,200 mg total) by mouth daily at bedtime   • [DISCONTINUED] nicotine (NICODERM CQ) 7 mg/24hr TD 24 hr patch Place 1 patch on the skin over 24 hours daily as needed (Minimal smoking hx - use if needed) (Patient not taking: Reported on 4/12/2023)   • [DISCONTINUED] propranolol (INDERAL) 40 mg tablet TAKE 1 TABLET (40 MG TOTAL) BY MOUTH 2 (TWO) TIMES A DAY AS NEEDED (AKATHISIA/RESTLESSNESS)       Objective     /80 (BP Location: Right arm, Patient Position: Sitting, Cuff Size: Standard)   Pulse 98   Temp 98.4 °F (36.9 °C) (Temporal)   Resp 16   Ht 5' 2" (1.575 m)   Wt 72.1 kg (159 lb)   LMP 08/14/2023 (Approximate)   SpO2 99%   BMI 29.08 kg/m²     Physical Exam  Vitals and nursing note reviewed. Constitutional:       General: She is not in acute distress. Appearance: Normal appearance. She is not toxic-appearing. HENT:      Head: Normocephalic and atraumatic. Right Ear: External ear normal.      Left Ear: External ear normal.      Nose: Nose normal. No congestion or rhinorrhea.       Mouth/Throat:      Mouth: Mucous membranes are moist.      Pharynx: Oropharynx is clear. Eyes:      Extraocular Movements: Extraocular movements intact. Pupils: Pupils are equal, round, and reactive to light. Neck:      Vascular: No carotid bruit. Cardiovascular:      Rate and Rhythm: Normal rate and regular rhythm. Pulses: Normal pulses. Heart sounds: Normal heart sounds. No murmur heard. No gallop. Pulmonary:      Effort: Pulmonary effort is normal. No respiratory distress. Breath sounds: Normal breath sounds. No stridor. No wheezing. Abdominal:      General: Abdomen is flat. Bowel sounds are normal. There is no distension. Palpations: Abdomen is soft. Tenderness: There is no abdominal tenderness. Hernia: No hernia is present. Musculoskeletal:         General: Normal range of motion. Cervical back: Normal range of motion and neck supple. Right lower leg: No edema. Left lower leg: No edema. Lymphadenopathy:      Cervical: No cervical adenopathy. Skin:     General: Skin is warm. Coloration: Skin is not jaundiced. Findings: No erythema or rash. Neurological:      General: No focal deficit present. Mental Status: She is alert and oriented to person, place, and time.        Ai Watts MD

## 2023-10-02 DIAGNOSIS — R10.30 LOWER ABDOMINAL PAIN: ICD-10-CM

## 2023-10-02 DIAGNOSIS — K21.9 GASTROESOPHAGEAL REFLUX DISEASE WITHOUT ESOPHAGITIS: ICD-10-CM

## 2023-10-02 RX ORDER — OMEPRAZOLE 40 MG/1
40 CAPSULE, DELAYED RELEASE ORAL DAILY
Qty: 30 CAPSULE | Refills: 0 | Status: SHIPPED | OUTPATIENT
Start: 2023-10-02

## 2023-10-02 RX ORDER — DICYCLOMINE HCL 20 MG
20 TABLET ORAL EVERY 6 HOURS
Qty: 120 TABLET | Refills: 0 | Status: SHIPPED | OUTPATIENT
Start: 2023-10-02

## 2023-10-02 RX ORDER — FAMOTIDINE 20 MG/1
20 TABLET, FILM COATED ORAL 2 TIMES DAILY PRN
Qty: 60 TABLET | Refills: 0 | Status: SHIPPED | OUTPATIENT
Start: 2023-10-02

## 2023-10-05 ENCOUNTER — HOSPITAL ENCOUNTER (EMERGENCY)
Facility: HOSPITAL | Age: 38
Discharge: HOME | End: 2023-10-05
Attending: EMERGENCY MEDICINE
Payer: COMMERCIAL

## 2023-10-05 ENCOUNTER — HOSPITAL ENCOUNTER (EMERGENCY)
Facility: HOSPITAL | Age: 38
End: 2023-10-06
Attending: EMERGENCY MEDICINE
Payer: COMMERCIAL

## 2023-10-05 VITALS
RESPIRATION RATE: 18 BRPM | WEIGHT: 158.29 LBS | BODY MASS INDEX: 28.95 KG/M2 | HEART RATE: 112 BPM | OXYGEN SATURATION: 96 % | SYSTOLIC BLOOD PRESSURE: 141 MMHG | DIASTOLIC BLOOD PRESSURE: 93 MMHG | TEMPERATURE: 98.3 F

## 2023-10-05 DIAGNOSIS — F22 PARANOIA (HCC): Primary | ICD-10-CM

## 2023-10-05 DIAGNOSIS — R45.7 EMOTIONAL STRESS: Primary | ICD-10-CM

## 2023-10-05 LAB
ALBUMIN SERPL BCP-MCNC: 4.5 G/DL (ref 3.5–5)
ALP SERPL-CCNC: 64 U/L (ref 34–104)
ALT SERPL W P-5'-P-CCNC: 7 U/L (ref 7–52)
AMPHETAMINES SERPL QL SCN: NEGATIVE
ANION GAP SERPL CALCULATED.3IONS-SCNC: 10 MMOL/L
AST SERPL W P-5'-P-CCNC: 15 U/L (ref 13–39)
BARBITURATES UR QL: NEGATIVE
BASOPHILS # BLD AUTO: 0.04 THOUSANDS/ÂΜL (ref 0–0.1)
BASOPHILS NFR BLD AUTO: 0 % (ref 0–1)
BENZODIAZ UR QL: POSITIVE
BILIRUB SERPL-MCNC: 0.55 MG/DL (ref 0.2–1)
BUN SERPL-MCNC: 11 MG/DL (ref 5–25)
CALCIUM SERPL-MCNC: 9 MG/DL (ref 8.4–10.2)
CHLORIDE SERPL-SCNC: 106 MMOL/L (ref 96–108)
CO2 SERPL-SCNC: 20 MMOL/L (ref 21–32)
COCAINE UR QL: NEGATIVE
CREAT SERPL-MCNC: 0.78 MG/DL (ref 0.6–1.3)
EOSINOPHIL # BLD AUTO: 0.16 THOUSAND/ÂΜL (ref 0–0.61)
EOSINOPHIL NFR BLD AUTO: 1 % (ref 0–6)
ERYTHROCYTE [DISTWIDTH] IN BLOOD BY AUTOMATED COUNT: 12.2 % (ref 11.6–15.1)
ETHANOL SERPL-MCNC: <10 MG/DL
GFR SERPL CREATININE-BSD FRML MDRD: 98 ML/MIN/1.73SQ M
GLUCOSE SERPL-MCNC: 90 MG/DL (ref 65–140)
HCG SERPL QL: NEGATIVE
HCT VFR BLD AUTO: 42.7 % (ref 34.8–46.1)
HGB BLD-MCNC: 14.5 G/DL (ref 11.5–15.4)
IMM GRANULOCYTES # BLD AUTO: 0.09 THOUSAND/UL (ref 0–0.2)
IMM GRANULOCYTES NFR BLD AUTO: 1 % (ref 0–2)
LYMPHOCYTES # BLD AUTO: 2.86 THOUSANDS/ÂΜL (ref 0.6–4.47)
LYMPHOCYTES NFR BLD AUTO: 24 % (ref 14–44)
MAGNESIUM SERPL-MCNC: 1.9 MG/DL (ref 1.9–2.7)
MCH RBC QN AUTO: 31.3 PG (ref 26.8–34.3)
MCHC RBC AUTO-ENTMCNC: 34 G/DL (ref 31.4–37.4)
MCV RBC AUTO: 92 FL (ref 82–98)
METHADONE UR QL: NEGATIVE
MONOCYTES # BLD AUTO: 1.32 THOUSAND/ÂΜL (ref 0.17–1.22)
MONOCYTES NFR BLD AUTO: 11 % (ref 4–12)
NEUTROPHILS # BLD AUTO: 7.58 THOUSANDS/ÂΜL (ref 1.85–7.62)
NEUTS SEG NFR BLD AUTO: 63 % (ref 43–75)
NRBC BLD AUTO-RTO: 0 /100 WBCS
OPIATES UR QL SCN: NEGATIVE
OXYCODONE+OXYMORPHONE UR QL SCN: NEGATIVE
PCP UR QL: NEGATIVE
PLATELET # BLD AUTO: 194 THOUSANDS/UL (ref 149–390)
PMV BLD AUTO: 10.6 FL (ref 8.9–12.7)
POTASSIUM SERPL-SCNC: 3.2 MMOL/L (ref 3.5–5.3)
PROT SERPL-MCNC: 7 G/DL (ref 6.4–8.4)
RBC # BLD AUTO: 4.64 MILLION/UL (ref 3.81–5.12)
SODIUM SERPL-SCNC: 136 MMOL/L (ref 135–147)
THC UR QL: POSITIVE
WBC # BLD AUTO: 12.05 THOUSAND/UL (ref 4.31–10.16)

## 2023-10-05 PROCEDURE — 83735 ASSAY OF MAGNESIUM: CPT | Performed by: EMERGENCY MEDICINE

## 2023-10-05 PROCEDURE — 96372 THER/PROPH/DIAG INJ SC/IM: CPT

## 2023-10-05 PROCEDURE — 85025 COMPLETE CBC W/AUTO DIFF WBC: CPT | Performed by: EMERGENCY MEDICINE

## 2023-10-05 PROCEDURE — 84703 CHORIONIC GONADOTROPIN ASSAY: CPT | Performed by: EMERGENCY MEDICINE

## 2023-10-05 PROCEDURE — 99283 EMERGENCY DEPT VISIT LOW MDM: CPT

## 2023-10-05 PROCEDURE — 99284 EMERGENCY DEPT VISIT MOD MDM: CPT

## 2023-10-05 PROCEDURE — 36415 COLL VENOUS BLD VENIPUNCTURE: CPT | Performed by: EMERGENCY MEDICINE

## 2023-10-05 PROCEDURE — 99285 EMERGENCY DEPT VISIT HI MDM: CPT | Performed by: EMERGENCY MEDICINE

## 2023-10-05 PROCEDURE — 94640 AIRWAY INHALATION TREATMENT: CPT

## 2023-10-05 PROCEDURE — 80307 DRUG TEST PRSMV CHEM ANLYZR: CPT | Performed by: EMERGENCY MEDICINE

## 2023-10-05 PROCEDURE — 82077 ASSAY SPEC XCP UR&BREATH IA: CPT | Performed by: EMERGENCY MEDICINE

## 2023-10-05 PROCEDURE — 80053 COMPREHEN METABOLIC PANEL: CPT | Performed by: EMERGENCY MEDICINE

## 2023-10-05 RX ORDER — HYDROXYZINE HYDROCHLORIDE 25 MG/1
25 TABLET, FILM COATED ORAL ONCE
Status: COMPLETED | OUTPATIENT
Start: 2023-10-05 | End: 2023-10-05

## 2023-10-05 RX ORDER — LORAZEPAM 2 MG/ML
2 INJECTION INTRAMUSCULAR ONCE
Status: COMPLETED | OUTPATIENT
Start: 2023-10-05 | End: 2023-10-05

## 2023-10-05 RX ORDER — BENZTROPINE MESYLATE 1 MG/ML
2 INJECTION INTRAMUSCULAR; INTRAVENOUS ONCE
Status: COMPLETED | OUTPATIENT
Start: 2023-10-05 | End: 2023-10-05

## 2023-10-05 RX ORDER — IPRATROPIUM BROMIDE AND ALBUTEROL SULFATE 2.5; .5 MG/3ML; MG/3ML
3 SOLUTION RESPIRATORY (INHALATION)
Status: DISCONTINUED | OUTPATIENT
Start: 2023-10-05 | End: 2023-10-06 | Stop reason: HOSPADM

## 2023-10-05 RX ORDER — HALOPERIDOL 5 MG/ML
5 INJECTION INTRAMUSCULAR ONCE
Status: COMPLETED | OUTPATIENT
Start: 2023-10-05 | End: 2023-10-05

## 2023-10-05 RX ORDER — MIDAZOLAM HYDROCHLORIDE 2 MG/2ML
2 INJECTION, SOLUTION INTRAMUSCULAR; INTRAVENOUS ONCE
Status: COMPLETED | OUTPATIENT
Start: 2023-10-05 | End: 2023-10-05

## 2023-10-05 RX ORDER — HALOPERIDOL 5 MG/ML
10 INJECTION INTRAMUSCULAR ONCE
Status: COMPLETED | OUTPATIENT
Start: 2023-10-05 | End: 2023-10-05

## 2023-10-05 RX ORDER — LORAZEPAM 0.5 MG/1
0.5 TABLET ORAL ONCE
Status: DISCONTINUED | OUTPATIENT
Start: 2023-10-05 | End: 2023-10-05

## 2023-10-05 RX ADMIN — HYDROXYZINE HYDROCHLORIDE 25 MG: 25 TABLET ORAL at 18:17

## 2023-10-05 RX ADMIN — LORAZEPAM 2 MG: 2 INJECTION INTRAMUSCULAR; INTRAVENOUS at 21:35

## 2023-10-05 RX ADMIN — MIDAZOLAM 2 MG: 1 INJECTION INTRAMUSCULAR; INTRAVENOUS at 08:52

## 2023-10-05 RX ADMIN — HALOPERIDOL LACTATE 10 MG: 5 INJECTION, SOLUTION INTRAMUSCULAR at 08:27

## 2023-10-05 RX ADMIN — IPRATROPIUM BROMIDE AND ALBUTEROL SULFATE 3 ML: 2.5; .5 SOLUTION RESPIRATORY (INHALATION) at 08:33

## 2023-10-05 RX ADMIN — BENZTROPINE MESYLATE 2 MG: 1 INJECTION INTRAMUSCULAR; INTRAVENOUS at 08:27

## 2023-10-05 RX ADMIN — LORAZEPAM 2 MG: 2 INJECTION INTRAMUSCULAR; INTRAVENOUS at 08:27

## 2023-10-05 RX ADMIN — HALOPERIDOL LACTATE 5 MG: 5 INJECTION, SOLUTION INTRAMUSCULAR at 21:35

## 2023-10-05 NOTE — RESTRAINT FACE TO FACE
Restraint Face to Face   Mely Gonzalez 30 y.o. female MRN: [id number]  Unit/Bed#: ED 08 Encounter: [id number]      Physical Evaluation: Patient with wheezing, also paranoid thoughts and aggression   Purpose for Restraints/ Seclusion High risk for self harm, High risk for causing significant disruption of treatment environment , High risk for harm to others and high risk for flight  Patient's reaction to the intervention: non cooperative   Patient's medical condition: Patient with psychiatric disturbance with paranoia and delusions   Patient's Behavioral condition: Same as above   Restraints to be Continued

## 2023-10-05 NOTE — ED NOTES
Spoke to the pt  and (POA) he called requesting information regarding her care. Updated him that we are currently looking for a bed placement for her, added that placement does take a long time to arrange and that we will do our best to update him. Reminded him that he is not permitted to visit but is welcome to call for updates . During call he was cooperative.       Pedro Luis Riley RN  10/05/23 [address] [facility] Jourdan Lindquist  10/05/23 5036
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
## 2023-10-05 NOTE — ED NOTES
Assumed care of patient - patient sleeping comfortably in stretcher with no signs of distress at this time - 1:1 continues for patient safety      Cynthia Jain RN  10/05/23 1928
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
## 2023-10-05 NOTE — ED NOTES
Patient is awake, resting comfortably, 1:1 observation continues.       Emerita Alonzo  10/05/23 1848
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
## 2023-10-05 NOTE — ED NOTES
Pt requesting to come out of restraints. Pt agreed to remain calm and cooperative. L arm and R leg restraints removed. Pt given safety lunch tray.       Emerita Alonzo  10/05/23 8178

## 2023-10-05 NOTE — ED NOTES
[place]. Recipient #[id number]   [id number] [facility] managed by [facility].

## 2023-10-05 NOTE — ED NOTES
Pt was asked if she would like anything else for dinner, given crackers and a cola but did not want to eat. Pt also given warm blankets, and made comfortable. No other needs at this time.       Enedina Boone  10/05/23 9279
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
## 2023-10-05 NOTE — ED NOTES
Pt came out of room, and was staring at staff, stating that she does not want to stay here any longer and needs to leave. Pt could not be redirected, remained by nursing station, began responding to self, and began talking talking into her hand: " I need to report [facility], I am not safe at home. I don't want to be here". Pt punched wall and kept saying that some people make her blind.   Pt taken to room and placed on restraints       Laura Villarreal, RN  10/05/23 9347

## 2023-10-05 NOTE — ED NOTES
Per ED triage nurse, pts  is in waiting room, pt did not want  in room with her, and when triage nurse asked  to step out,  states "I have the right to know everything about her and I have the papers that state I have the right".      Stu Luke RN  10/05/23 0157

## 2023-10-05 NOTE — ED NOTES
Pt did not want dinner tray, threw her cup on the floor and stated "this food is garbage". Pt otherwise cooperative.          Araceli Hills  10/05/23 9399
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
## 2023-10-05 NOTE — ED NOTES
Went in to the room because pt was yelling about the bp cuff. When in the room, pt ripped off pulse ox, was yelling and swearing at this GN. Pt was calmly asked to stop yelling and encouraged to try and rest. Pt continued to throw herself around on the bed.       Thomasene Lighter  10/05/23 1915

## 2023-10-05 NOTE — ED NOTES
Patient remains with marginal cooperation and psychosis. She has been consistently paranoid, both toward care staff and toward her significant other. Significant other has presented to registration twice since arrival, insisting on seeing the patient, then insisting on seeing the physician. He stated he was going to contact his . Security was present to reiterate that he is not permitted to enter. He eventually left. Patient is not able amenable to a 201, very paranoid toward staff. A 302 was initiated by Adah Phoenix, MD, the ED attending. The petition was supported by Bria Barahona MD, the examining physician. The document was sent to [email or web address]. Mateo Pastor, confirmed it was received.

## 2023-10-06 VITALS
RESPIRATION RATE: 16 BRPM | BODY MASS INDEX: 28.9 KG/M2 | HEART RATE: 85 BPM | SYSTOLIC BLOOD PRESSURE: 111 MMHG | TEMPERATURE: 97.5 F | DIASTOLIC BLOOD PRESSURE: 75 MMHG | OXYGEN SATURATION: 100 % | WEIGHT: 158 LBS

## 2023-10-06 RX ORDER — TOPIRAMATE 25 MG/1
25 TABLET ORAL ONCE
Status: DISCONTINUED | OUTPATIENT
Start: 2023-10-06 | End: 2023-10-06 | Stop reason: HOSPADM

## 2023-10-06 RX ORDER — ACETAMINOPHEN 325 MG/1
975 TABLET ORAL ONCE
Status: COMPLETED | OUTPATIENT
Start: 2023-10-06 | End: 2023-10-06

## 2023-10-06 RX ORDER — LORAZEPAM 1 MG/1
2 TABLET ORAL ONCE
Status: COMPLETED | OUTPATIENT
Start: 2023-10-06 | End: 2023-10-06

## 2023-10-06 RX ORDER — LANOLIN ALCOHOL/MO/W.PET/CERES
400 CREAM (GRAM) TOPICAL ONCE
Status: DISCONTINUED | OUTPATIENT
Start: 2023-10-06 | End: 2023-10-06 | Stop reason: HOSPADM

## 2023-10-06 RX ADMIN — ACETAMINOPHEN 975 MG: 325 TABLET ORAL at 04:41

## 2023-10-06 RX ADMIN — LORAZEPAM 2 MG: 1 TABLET ORAL at 06:05

## 2023-10-06 NOTE — ED NOTES
Patient is accepted at [facility]. Patient is accepted by Dr. Scarlet Hoffman per Ary Nieto. Transportation is arranged with [facility]. Transportation is scheduled for TBD. Patient may go to the floor at D.

## 2023-10-06 NOTE — ED NOTES
Insurance Authorization for admission:   Phone call placed to [facility]. Phone number: [phone]. Spoke to [facility].     4 days approved. Level of care: Acute Inpatient Psych. Review on TBD. Authorization # [id number].

## 2023-10-06 NOTE — ED NOTES
Patient requesting medication for trouble breathing - Duo neb setup and given to patient - patient ripped neb tubing apart and threw on floor then charged towards door trying to leave. Security obtained patient and placed her back into bed - Patient restrained at this time.       Snow Mahan RN  10/05/23 0078

## 2023-10-06 NOTE — RESTRAINT FACE TO FACE
Restraint Face to Face   Abbey Valverde 30 y.o. female MRN: [id number]  Unit/Bed#: ED 08 Encounter: [id number]      Physical Evaluation patient awake and alert, normal respiratory effort, agitated  Purpose for Restraints/ Seclusion High risk for self harm, High risk for harm to others and high risk for flight  Patient's reaction to the intervention placed in restraints and given medication, patient calming down.   Patient's medical condition stable  Patient's Behavioral condition psychotic  Restraints to be Continued

## 2023-10-06 NOTE — ED NOTES
Report received from previous nurse; patient is currently sleeping; respirations are visible and non-labored; 1:1 observation continues     Laxmi Sherman, TORRIE  10/06/23 0457

## 2023-10-06 NOTE — ED NOTES
Pt will be transported to [facility] at 0800 via [address] [facility] admissions made aware of ETA.

## 2023-10-06 NOTE — EMTALA/ACUTE CARE TRANSFER
[facility] EMERGENCY DEPARTMENT  [address] [facility] [address]  [phone]  Dept: [address] TRANSFER CONSENT    NAME Hollie PADILLA 1985                              MRN [id number]    I have been informed of my rights regarding examination, treatment, and transfer   by Dr. Shreyas Smith MD    Benefits: Continuity of care    Risks: Potential for delay in receiving treatment      Consent for Transfer:  I acknowledge that my medical condition has been evaluated and explained to me by the emergency department physician or other qualified medical person and/or my attending physician, who has recommended that I be transferred to the service of  Accepting Physician: Dr. Neena Uribe at [address] [place] [address] [address] Name, [address] [place] [address] : [place], [place]. The above potential benefits of such transfer, the potential risks associated with such transfer, and the probable risks of not being transferred have been explained to me, and I fully understand them. The doctor has explained that, in my case, the benefits of transfer outweigh the risks. I agree to be transferred. I authorize the performance of emergency medical procedures and treatments upon me in both transit and upon arrival at the receiving facility. Additionally, I authorize the release of any and all medical records to the receiving facility and request they be transported with me, if possible. I understand that the safest mode of transportation during a medical emergency is an ambulance and that the Hospital advocates the use of this mode of transport. Risks of traveling to the receiving facility by car, including absence of medical control, life sustaining equipment, such as oxygen, and medical personnel has been explained to me and I fully understand them.     (JEMIMA CORRECT BOX BELOW)  [  ]  I consent to the stated transfer and to be transported by ambulance/helicopter. [  ]  I consent to the stated transfer, but refuse transportation by ambulance and accept full responsibility for my transportation by car. I understand the risks of non-ambulance transfers and I exonerate the Hospital and its staff from any deterioration in my condition that results from this refusal.    X___________________________________________    DATE  10/06/23  TIME________  Signature of patient or legally responsible individual signing on patient behalf           RELATIONSHIP TO PATIENT_________________________          Provider Certification    NAME Saintclair Olmsted                                         1985                              MRN [id number]    A medical screening exam was performed on the above named patient. Based on the examination:    Condition Necessitating Transfer The encounter diagnosis was Paranoia ([address]). Patient Condition: The patient has been stabilized such that within reasonable medical probability, no material deterioration of the patient condition or the condition of the unborn child(sin) is likely to result from the transfer    Reason for Transfer: No bed available at level of patient's needs    Transfer Requirements: [address] [place] [address]   · Space available and qualified personnel available for treatment as acknowledged by Loetta Holter.; [phone]  · Agreed to accept transfer and to provide appropriate medical treatment as acknowledged by       Dr. Bonnie Tejeda  · Appropriate medical records of the examination and treatment of the patient are provided at the time of transfer   [address] _______  · Transfer will be performed by qualified personnel from [facility]  and appropriate transfer equipment as required, including the use of necessary and appropriate life support measures.     Provider Certification: I have examined the patient and explained the following risks and benefits of being transferred/refusing transfer to the patient/family:  The patient is stable for psychiatric transfer because they are medically stable, and is protected from harming him/herself or others during transport      Based on these reasonable risks and benefits to the patient and/or the unborn child(sin), and based upon the information available at the time of the patient’s examination, I certify that the medical benefits reasonably to be expected from the provision of appropriate medical treatments at another medical facility outweigh the increasing risks, if any, to the individual’s medical condition, and in the case of labor to the unborn child, from effecting the transfer.     X____________________________________________ DATE 10/06/23        TIME_______      ORIGINAL - SEND TO MEDICAL RECORDS   COPY - SEND WITH PATIENT DURING TRANSFER

## 2023-10-06 NOTE — ED PROVIDER NOTES
Patient currently 302 hold. Patient tried to elope out of ED through ambulance doors, had to be restrained by security. Patient became aggressive, yelling at staff with inappropriate language. Patient restraints, will give medication for agitation.      Emili Gonzalez MD  10/05/23 7363

## 2023-10-06 NOTE — ED NOTES
Patient requesting to speak with her nurse; Patient reports to this nurse that she needs denture cream. Patient says she has top and bottom dentures. Denture cream and denture container given to patient with 1:1 staff member observing. Patient says "Whatever, I keep asking for things and I don't get them" When asked what else I could get for her at this time, patient does not reply.       Sally Bonilla, RN  10/06/23 9418

## 2023-10-11 DIAGNOSIS — R10.30 LOWER ABDOMINAL PAIN: ICD-10-CM

## 2023-10-11 DIAGNOSIS — K59.00 CONSTIPATION, UNSPECIFIED CONSTIPATION TYPE: ICD-10-CM

## 2023-10-11 RX ORDER — POLYETHYLENE GLYCOL 3350 17 G/17G
POWDER ORAL
Qty: 510 G | Refills: 1 | Status: SHIPPED | OUTPATIENT
Start: 2023-10-11

## 2023-10-21 DIAGNOSIS — J45.20 INTERMITTENT ASTHMA WITHOUT COMPLICATION, UNSPECIFIED ASTHMA SEVERITY: ICD-10-CM

## 2023-10-24 RX ORDER — ALBUTEROL SULFATE 90 UG/1
2 AEROSOL, METERED RESPIRATORY (INHALATION) EVERY 4 HOURS PRN
Qty: 18 G | Refills: 3 | Status: SHIPPED | OUTPATIENT
Start: 2023-10-24

## 2023-10-27 ENCOUNTER — HOSPITAL ENCOUNTER (EMERGENCY)
Facility: HOSPITAL | Age: 38
Discharge: HOME | End: 2023-10-27
Attending: EMERGENCY MEDICINE
Payer: COMMERCIAL

## 2023-10-27 VITALS
DIASTOLIC BLOOD PRESSURE: 80 MMHG | HEART RATE: 104 BPM | RESPIRATION RATE: 18 BRPM | OXYGEN SATURATION: 97 % | SYSTOLIC BLOOD PRESSURE: 149 MMHG | TEMPERATURE: 98.3 F

## 2023-10-27 DIAGNOSIS — G43.909 MIGRAINE: Primary | ICD-10-CM

## 2023-10-27 PROCEDURE — 99284 EMERGENCY DEPT VISIT MOD MDM: CPT | Performed by: EMERGENCY MEDICINE

## 2023-10-27 PROCEDURE — 96374 THER/PROPH/DIAG INJ IV PUSH: CPT

## 2023-10-27 PROCEDURE — 96375 TX/PRO/DX INJ NEW DRUG ADDON: CPT

## 2023-10-27 PROCEDURE — 93005 ELECTROCARDIOGRAM TRACING: CPT

## 2023-10-27 PROCEDURE — 96361 HYDRATE IV INFUSION ADD-ON: CPT

## 2023-10-27 PROCEDURE — 99283 EMERGENCY DEPT VISIT LOW MDM: CPT

## 2023-10-27 RX ORDER — METOCLOPRAMIDE HYDROCHLORIDE 5 MG/ML
10 INJECTION INTRAMUSCULAR; INTRAVENOUS EVERY 8 HOURS SCHEDULED
Status: DISCONTINUED | OUTPATIENT
Start: 2023-10-27 | End: 2023-10-28 | Stop reason: HOSPADM

## 2023-10-27 RX ORDER — MAGNESIUM SULFATE HEPTAHYDRATE 40 MG/ML
2 INJECTION, SOLUTION INTRAVENOUS
Status: DISCONTINUED | OUTPATIENT
Start: 2023-10-28 | End: 2023-10-28 | Stop reason: HOSPADM

## 2023-10-27 RX ORDER — DIPHENHYDRAMINE HYDROCHLORIDE 50 MG/ML
25 INJECTION INTRAMUSCULAR; INTRAVENOUS EVERY 8 HOURS SCHEDULED
Status: DISCONTINUED | OUTPATIENT
Start: 2023-10-27 | End: 2023-10-28 | Stop reason: HOSPADM

## 2023-10-27 RX ORDER — KETOROLAC TROMETHAMINE 30 MG/ML
30 INJECTION, SOLUTION INTRAMUSCULAR; INTRAVENOUS EVERY 8 HOURS SCHEDULED
Status: DISCONTINUED | OUTPATIENT
Start: 2023-10-27 | End: 2023-10-28 | Stop reason: HOSPADM

## 2023-10-27 RX ORDER — ACETAMINOPHEN 325 MG/1
650 TABLET ORAL ONCE
Status: COMPLETED | OUTPATIENT
Start: 2023-10-27 | End: 2023-10-27

## 2023-10-27 RX ADMIN — SODIUM CHLORIDE 1000 ML: 0.9 INJECTION, SOLUTION INTRAVENOUS at 23:01

## 2023-10-27 RX ADMIN — DIPHENHYDRAMINE HYDROCHLORIDE 25 MG: 50 INJECTION, SOLUTION INTRAMUSCULAR; INTRAVENOUS at 23:03

## 2023-10-27 RX ADMIN — METOCLOPRAMIDE HYDROCHLORIDE 10 MG: 5 INJECTION INTRAMUSCULAR; INTRAVENOUS at 23:04

## 2023-10-27 RX ADMIN — ACETAMINOPHEN 650 MG: 325 TABLET, FILM COATED ORAL at 23:22

## 2023-10-28 ENCOUNTER — HOSPITAL ENCOUNTER (EMERGENCY)
Facility: HOSPITAL | Age: 38
Discharge: HOME | End: 2023-10-28
Attending: EMERGENCY MEDICINE | Admitting: EMERGENCY MEDICINE
Payer: COMMERCIAL

## 2023-10-28 VITALS
WEIGHT: 167 LBS | BODY MASS INDEX: 30.54 KG/M2 | TEMPERATURE: 96.9 F | DIASTOLIC BLOOD PRESSURE: 64 MMHG | OXYGEN SATURATION: 97 % | SYSTOLIC BLOOD PRESSURE: 134 MMHG | HEART RATE: 109 BPM | RESPIRATION RATE: 18 BRPM

## 2023-10-28 DIAGNOSIS — F41.9 ANXIETY: Primary | ICD-10-CM

## 2023-10-28 LAB
AMPHETAMINES SERPL QL SCN: NEGATIVE
ATRIAL RATE: 108 BPM
BARBITURATES UR QL: NEGATIVE
BENZODIAZ UR QL: NEGATIVE
COCAINE UR QL: NEGATIVE
ETHANOL EXG-MCNC: 0 MG/DL
EXT PREGNANCY TEST URINE: NEGATIVE
EXT. CONTROL: NORMAL
METHADONE UR QL: NEGATIVE
OPIATES UR QL SCN: NEGATIVE
OXYCODONE+OXYMORPHONE UR QL SCN: NEGATIVE
P AXIS: 65 DEGREES
PCP UR QL: NEGATIVE
PR INTERVAL: 136 MS
QRS AXIS: 84 DEGREES
QRSD INTERVAL: 82 MS
QT INTERVAL: 340 MS
QTC INTERVAL: 455 MS
T WAVE AXIS: 58 DEGREES
THC UR QL: NEGATIVE
VENTRICULAR RATE: 108 BPM

## 2023-10-28 PROCEDURE — 82075 ASSAY OF BREATH ETHANOL: CPT | Performed by: EMERGENCY MEDICINE

## 2023-10-28 PROCEDURE — 80307 DRUG TEST PRSMV CHEM ANLYZR: CPT | Performed by: EMERGENCY MEDICINE

## 2023-10-28 PROCEDURE — 99283 EMERGENCY DEPT VISIT LOW MDM: CPT

## 2023-10-28 PROCEDURE — 93010 ELECTROCARDIOGRAM REPORT: CPT | Performed by: INTERNAL MEDICINE

## 2023-10-28 PROCEDURE — 81025 URINE PREGNANCY TEST: CPT | Performed by: EMERGENCY MEDICINE

## 2023-10-28 PROCEDURE — 99284 EMERGENCY DEPT VISIT MOD MDM: CPT | Performed by: EMERGENCY MEDICINE

## 2023-10-28 RX ORDER — DICYCLOMINE HCL 20 MG
20 TABLET ORAL ONCE
Status: COMPLETED | OUTPATIENT
Start: 2023-10-28 | End: 2023-10-28

## 2023-10-28 RX ADMIN — DICYCLOMINE HYDROCHLORIDE 20 MG: 20 TABLET ORAL at 09:15

## 2023-10-28 NOTE — ED PROVIDER NOTES
History  Chief Complaint   Patient presents with    Anxiety     Pt reports increased anxiety and PTSD flare since discharge from [address] [facility] earlier, no SI no HI     Patient is a 56-year-old female with significant psych history of bipolar disorder, ADHD, anxiety, PTSD, and psychosis presenting with anxiety. Patient states that she was at her group home when EMS was called. She reports that she was woke seeing a lot of familiar faces which was causing her pain ". Patient was calm and cooperative and talkative to the nurse and reported that she was struggling with anxiety/PTSD. Upon my evaluation, patient was less forward with giving information, but she strongly denies SI or HI. She just stated that she was having difficulty where she was living, but felt safe. Patient denies headache, lightheadedness, dizziness, chest pain, shortness of breath, abdominal pain, nausea, vomiting, numbness, tingling, or weakness. Prior to Admission Medications   Prescriptions Last Dose Informant Patient Reported? Taking? Ventolin  (90 Base) MCG/ACT inhaler   No No   Sig: INHALE 2 PUFFS EVERY 4 (FOUR) HOURS AS NEEDED FOR WHEEZING   cetirizine (ZyrTEC) 10 mg tablet   No No   Sig: TAKE 1 TABLET (10 MG TOTAL) BY MOUTH DAILY   cyclobenzaprine (FLEXERIL) 10 mg tablet   No No   Sig: Take 1 tablet (10 mg total) by mouth 2 (two) times a day as needed for muscle spasms   dicyclomine (BENTYL) 20 mg tablet   No No   Sig: Take 1 tablet (20 mg total) by mouth every 6 (six) hours as-needed for abdominal cramping.    famotidine (PEPCID) 20 mg tablet   No No   Sig: Take 1 tablet (20 mg total) by mouth 2 (two) times a day as needed for indigestion or heartburn   fluticasone-salmeterol (Advair HFA) 45-21 MCG/ACT inhaler   No No   Sig: Inhale 2 puffs 2 (two) times a day Rinse mouth after use.   magnesium Oxide (MAG-OX) 400 mg TABS   No No   Sig: TAKE 1 TABLET (400 MG TOTAL) BY MOUTH 2 (TWO) TIMES A DAY   omeprazole (PriLOSEC) 40 MG capsule   No No   Sig: Take 1 capsule (40 mg total) by mouth daily   paliperidone (INVEGA) 6 MG 24 hr tablet   No No   Sig: Take 2 tablets (12 mg total) by mouth daily at bedtime   polyethylene glycol (GLYCOLAX) 17 GM/SCOOP   No No   Sig: TAKE 17 G BY MOUTH DAILY 30   tiZANidine (ZANAFLEX) 4 mg tablet   No No   Sig: TAKE 1 TABLET (4 MG TOTAL) BY MOUTH DAILY AT BEDTIME   topiramate (TOPAMAX) 15 mg capsule   No No   Sig: Take 1 capsule (15 mg total) by mouth 2 (two) times a day      Facility-Administered Medications: None       Past Medical History:   Diagnosis Date    Abnormal Pap smear of cervix     ADHD (attention deficit hyperactivity disorder)     Ankle fracture     Anxiety     Arthritis     back    Asthma     Bipolar disorder (HCC)     Cellulitis     right side fac in     Chronic pain disorder     Depression     Diverticulitis     Flank pain 2016    Hallucination     Hepatitis C     Hip disease 2006    reports she had fluid removed from right hip and received treatment with antibiotic     History of abnormal cervical Pap smear     History of multiple miscarriages     IBS (irritable bowel syndrome)     Infantile idiopathic scoliosis     Joint pain     Kidney stone     Lactose intolerance     Low back pain     Myofascial pain syndrome     Poor dentition     Psychiatric illness     Psychosis ([address])     PTSD (post-traumatic stress disorder)     Pyelonephritis affecting pregnancy     Right hand paresthesia     Right ovarian cyst     Scoliosis     Seizures ([address])     Self-injurious behavior     Sleep difficulties     Slow transit constipation     Substance abuse ([address])     Suicide attempt [facility])        Past Surgical History:   Procedure Laterality Date     SECTION  2016     SECTION  2014    HIP SURGERY      ORTHOPEDIC SURGERY      [facility] N[facility]A [facility]    Procedure:  SECTION () REPEAT;  Surgeon: Victorino Osorio MD;  Location: [facility];  Service: Obstetrics    HI LIG/TRNSXJ FLP TUBE ABDL/VAG APPR UNI/BI Bilateral 11/29/2016    Procedure: LIGATION/COAGULATION TUBAL;  Surgeon: Tyesha Julien MD;  Location: [facility];  Service: Obstetrics       Family History   Problem Relation Age of Onset    Heart disease Maternal Aunt     Cancer Maternal Aunt     Diabetes Paternal Aunt     No Known Problems Mother     No Known Problems Father     No Known Problems Sister      I have reviewed and agree with the history as documented. E-Cigarette/Vaping    E-Cigarette Use Former User      E-Cigarette/Vaping Substances    Nicotine No     THC No     CBD No     Flavoring No     Other No     Unknown No      Social History     Tobacco Use    Smoking status: Every Day     Packs/day: 0.25     Years: 15.00     Total pack years: 3.75     Types: Cigarettes     Passive exposure: Never    Smokeless tobacco: Never    Tobacco comments:     pt refused smoking cessation teaching. Vaping Use    Vaping Use: Former   Substance Use Topics    Alcohol use: Never    Drug use: Not Currently     Types: Marijuana, Cocaine, "Crack" cocaine     Comment: on occasion         Review of Systems    Physical Exam  ED Triage Vitals   Temperature Pulse Respirations Blood Pressure SpO2   10/28/23 0817 10/28/23 0748 10/28/23 0748 10/28/23 0748 10/28/23 0748   (!) 96.9 °F (36.1 °C) (!) 109 18 134/64 97 %      Temp Source Heart Rate Source Patient Position - Orthostatic VS BP Location FiO2 (%)   10/28/23 0817 10/28/23 0748 10/28/23 0748 10/28/23 0748 --   Tympanic Monitor Sitting Left arm       Pain Score       --                    Orthostatic Vital Signs  Vitals:    10/28/23 0748   BP: 134/64   Pulse: (!) 109   Patient Position - Orthostatic VS: Sitting       Physical Exam  Constitutional:       Appearance: Normal appearance. HENT:      Head: Normocephalic and atraumatic.       Mouth/Throat:      Mouth: Mucous membranes are moist.   Eyes:      Conjunctiva/sclera: Conjunctivae normal.      Pupils: Pupils are equal, round, and reactive to light. Cardiovascular:      Rate and Rhythm: Tachycardia present. Pulmonary:      Effort: Pulmonary effort is normal. No respiratory distress. Musculoskeletal:         General: Normal range of motion. Skin:     General: Skin is warm and dry. Neurological:      General: No focal deficit present. Mental Status: She is alert and oriented to person, place, and time. Psychiatric:         Mood and Affect: Mood is anxious. Behavior: Behavior is cooperative. Comments: Patient was guarded         ED Medications  Medications   dicyclomine (BENTYL) tablet 20 mg (20 mg Oral Given 10/28/23 0915)       Diagnostic Studies  Results Reviewed       Procedure Component Value Units Date/Time    Rapid drug screen, urine [[id number]]  (Normal) Collected: 10/28/23 0758    Lab Status: Final result Specimen: Urine, Clean Catch Updated: 10/28/23 0907     Amph/Meth UR Negative     Barbiturate Ur Negative     Benzodiazepine Urine Negative     Cocaine Urine Negative     Methadone Urine Negative     Opiate Urine Negative     PCP Ur Negative     THC Urine Negative     Oxycodone Urine Negative    Narrative:      FOR MEDICAL PURPOSES ONLY. IF CONFIRMATION NEEDED PLEASE CONTACT THE LAB WITHIN 5 DAYS.     Drug Screen Cutoff Levels:  AMPHETAMINE/METHAMPHETAMINES  1000 ng/mL  BARBITURATES     200 ng/mL  BENZODIAZEPINES     200 ng/mL  COCAINE      300 ng/mL  METHADONE      300 ng/mL  OPIATES      300 ng/mL  PHENCYCLIDINE     25 ng/mL  THC       50 ng/mL  OXYCODONE      100 ng/mL    POCT pregnancy, urine [[id number]]  (Normal) Resulted: 10/28/23 0804    Lab Status: Final result Specimen: Urine Updated: 10/28/23 0804     EXT Preg Test, Ur Negative     Control Valid    POCT alcohol breath test [[id number]]  (Normal) Resulted: 10/28/23 0754    Lab Status: Final result Updated: 10/28/23 0754     EXTBreath Alcohol 0.000                   No orders to display         Procedures  Procedures      ED Course                                       Medical Decision Making  Patient is a 45-year-old female presenting with anxiety symptoms. Differential diagnosis includes anxiety attack, PTSD, depression, alcohol/substance intoxication. Breath alcohol, UDS, urine pregnancy ordered. Alcohol and UDS were negative. Patient requested some food, a shower, and Bentyl. Patient was given these items and felt better afterwards. Patient consistently denies SI or HI. Patient was cleared for discharge with return precautions and PCP follow-up. Amount and/or Complexity of Data Reviewed  Labs: ordered. Risk  Prescription drug management. Disposition  Final diagnoses:   Anxiety     Time reflects when diagnosis was documented in both MDM as applicable and the Disposition within this note       Time User Action Codes Description Comment    10/28/2023  9:19 AM Chloe Augustin [F41.9] Anxiety           ED Disposition       ED Disposition   Discharge    Condition   Stable    Date/Time   Sat Oct 28, 2023  9:19 AM    Comment   Ranjit Keating discharge to home/self care.                    Follow-up Information       Follow up With Specialties Details Why [address] [facility] Emergency Department Emergency Medicine   [address] [address]  [facility] Emergency Department, [address] [place] [address]            Discharge Medication List as of 10/28/2023  9:20 AM        CONTINUE these medications which have NOT CHANGED    Details   cetirizine (ZyrTEC) 10 mg tablet TAKE 1 TABLET (10 MG TOTAL) BY MOUTH DAILY, Starting Wed 6/21/2023, Normal      cyclobenzaprine (FLEXERIL) 10 mg tablet Take 1 tablet (10 mg total) by mouth 2 (two) times a day as needed for muscle spasms, Starting Mon 9/18/2023, Normal      dicyclomine (BENTYL) 20 mg tablet Take 1 tablet (20 mg total) by mouth every 6 (six) hours as-needed for abdominal cramping., Starting Mon 10/2/2023, Normal      famotidine (PEPCID) 20 mg tablet Take 1 tablet (20 mg total) by mouth 2 (two) times a day as needed for indigestion or heartburn, Starting Mon 10/2/2023, Normal      fluticasone-salmeterol (Advair HFA) 45-21 MCG/ACT inhaler Inhale 2 puffs 2 (two) times a day Rinse mouth after use., Starting Mon 9/18/2023, Normal      magnesium Oxide (MAG-OX) 400 mg TABS TAKE 1 TABLET (400 MG TOTAL) BY MOUTH 2 (TWO) TIMES A DAY, Starting Tue 8/8/2023, Normal      omeprazole (PriLOSEC) 40 MG capsule Take 1 capsule (40 mg total) by mouth daily, Starting Mon 10/2/2023, Normal      paliperidone (INVEGA) 6 MG 24 hr tablet Take 2 tablets (12 mg total) by mouth daily at bedtime, Starting u 3/9/2023, Normal      polyethylene glycol (GLYCOLAX) 17 GM/SCOOP TAKE 17 G BY MOUTH DAILY 30, Normal      tiZANidine (ZANAFLEX) 4 mg tablet TAKE 1 TABLET (4 MG TOTAL) BY MOUTH DAILY AT BEDTIME, Starting Tue 8/8/2023, Normal      topiramate (TOPAMAX) 15 mg capsule Take 1 capsule (15 mg total) by mouth 2 (two) times a day, Starting Mon 9/18/2023, Normal      Ventolin  (90 Base) MCG/ACT inhaler INHALE 2 PUFFS EVERY 4 (FOUR) HOURS AS NEEDED FOR WHEEZING, Starting Tue 10/24/2023, Normal           No discharge procedures on file. PDMP Review         Value Time User    PDMP Reviewed  Yes 12/27/2022  4:33 PM Daysi Beyer DO             ED Provider  Attending physically available and evaluated Nena Maldonado. I managed the patient along with the ED Attending.     Electronically Signed by           Mars Mota MD  10/28/23 1742

## 2023-10-28 NOTE — ED NOTES
Pt reports she is going to return to [address] [place] [address],[address], RN  10/28/23 4724

## 2023-10-28 NOTE — ED NOTES
Pt stating she would like to leave.   IV removed at this time      Deb Lamb, [address] [place] [address]  10/27/23 6975

## 2023-10-28 NOTE — ED NOTES
Pt requesting to shower, given towels and body wash escorted to [address] [facility] Jason, TOR[place]E  10/28/23 0509

## 2023-10-28 NOTE — ED ATTENDING ATTESTATION
10/28/2023  IJames, DO, saw and evaluated the patient. I have discussed the patient with the resident/non-physician practitioner and agree with the resident's/non-physician practitioner's findings, Plan of Care, and MDM as documented in the resident's/non-physician practitioner's note, except where noted. All available labs and Radiology studies were reviewed. I was present for key portions of any procedure(s) performed by the resident/non-physician practitioner and I was immediately available to provide assistance. At this point I agree with the current assessment done in the Emergency Department. I have conducted an independent evaluation of this patient a history and physical is as follows:    Patient is a 29-year-old female with history of bipolar, PTSD, IBS, brought in by EMS. EMS indicates that they were called from the patient's group [facility], because the patient was upset. The patient herself when I talk with her says she is not sure why the ambulance was called, she has no thoughts of harming herself, she is requesting a dose of her Bentyl which she normally takes. She denies any headache, denies chest pain or denies shortness of breath. She had been hospitalized for paranoia, discharged from [facility] recently. To my assessment she had talked with the ED resident who indicated that she was feeling anxious because she was seeing faces that scared her and reminded her of previous emotional trauma. General:  Patient is well-appearing  Head:  Atraumatic  Eyes:  Conjunctiva pink  ENT:  Mucous membranes are moist  Neck:  Supple  Cardiac:  S1-S2, without murmurs  Lungs:  Clear to auscultation bilaterally  Abdomen:  Soft, nontender, normal bowel sounds, no CVA tenderness, no tympany, no rigidity, no guarding  Extremities:  Normal range of motion  Neurologic:  Awake, fluent speech, normal comprehension. AAOx3.    Skin:  Pink warm and dry  Psychiatric:  Appearance: Dressed in paper scrubs; Speech: Pressured; Mood: Slightly unhappy; Affect: Mood congruent; Thought Process: Normal, goal-directed; Thought Content: Denies SI, denies HI, denies AVH          ED Course     Labs Reviewed   RAPID DRUG SCREEN, URINE - Normal       Result Value Ref Range Status    Amph/Meth UR Negative  Negative Final    Barbiturate Ur Negative  Negative Final    Benzodiazepine Urine Negative  Negative Final    Cocaine Urine Negative  Negative Final    Methadone Urine Negative  Negative Final    Opiate Urine Negative  Negative Final    PCP Ur Negative  Negative Final    THC Urine Negative  Negative Final    Oxycodone Urine Negative  Negative Final    Narrative:     FOR MEDICAL PURPOSES ONLY. IF CONFIRMATION NEEDED PLEASE CONTACT THE LAB WITHIN 5 DAYS. Drug Screen Cutoff Levels:  AMPHETAMINE/METHAMPHETAMINES  1000 ng/mL  BARBITURATES     200 ng/mL  BENZODIAZEPINES     200 ng/mL  COCAINE      300 ng/mL  METHADONE      300 ng/mL  OPIATES      300 ng/mL  PHENCYCLIDINE     25 ng/mL  THC       50 ng/mL  OXYCODONE      100 ng/mL   POCT ALCOHOL BREATH TEST - Normal    EXTBreath Alcohol 0.000   Final   POCT PREGNANCY, URINE - Normal    EXT Preg Test, Ur Negative   Final    Control Valid   Final       Patient overall well-appearing, may have had some mild mood disorder earlier, no SI or HI or indication for involuntary hospitalization. She is currently in a group home and is returned to a safe environment. MEDICAL DECISION MAKING CODING      COLLECTION AND INTERPRETATION OF DATA  Additional history obtained from: EMS  I reviewed prior external notes, including September 18, 2023 PCP office visit    I ordered each unique test  Tests reviewed personally by me:  Labs: See above      RISK  All of the patient's current prescription medications should be continued.       Social Determinants of Health:  Presentation to ED outside of business hours or on night shift      Critical Care Time  Procedures

## 2023-10-29 NOTE — ED ATTENDING ATTESTATION
10/27/2023  I, Tee Mcbride DO, saw and evaluated the patient. I have discussed the patient with the resident/non-physician practitioner and agree with the resident's/non-physician practitioner's findings, Plan of Care, and MDM as documented in the resident's/non-physician practitioner's note, except where noted. All available labs and Radiology studies were reviewed. I was present for key portions of any procedure(s) performed by the resident/non-physician practitioner and I was immediately available to provide assistance. At this point I agree with the current assessment done in the Emergency Department. I have conducted an independent evaluation of this patient a history and physical is as follows:    Chief Complaint   Patient presents with    Medical Problem     States she was released from [facility] today without medications. States she needs migraine and anxiety medication and benadryl because she can't get them till tomorrow. Abscess     States an abscess in her right underarm began while she was in the hospital. C/o burning and stinging. 53-year-old female just released from [facility] today presents for refills of her anxiety and migraine medication. Also complains of right axillary abscess.   Patient saw the resident but wanted to leave prior to my evaluation, did not examine the patient      ED Course         Critical Care Time  Procedures

## 2023-10-31 DIAGNOSIS — J45.909 ASTHMA: ICD-10-CM

## 2023-11-01 RX ORDER — FLUTICASONE PROPIONATE AND SALMETEROL XINAFOATE 45; 21 UG/1; UG/1
AEROSOL, METERED RESPIRATORY (INHALATION)
Qty: 2 G | Refills: 3 | Status: SHIPPED | OUTPATIENT
Start: 2023-11-01

## 2023-11-03 DIAGNOSIS — R10.30 LOWER ABDOMINAL PAIN: ICD-10-CM

## 2023-11-03 RX ORDER — DICYCLOMINE HCL 20 MG
20 TABLET ORAL EVERY 6 HOURS
Qty: 120 TABLET | Refills: 0 | Status: SHIPPED | OUTPATIENT
Start: 2023-11-03

## 2023-11-07 DIAGNOSIS — J45.909 ASTHMA: ICD-10-CM

## 2023-11-07 RX ORDER — FLUTICASONE PROPIONATE AND SALMETEROL XINAFOATE 45; 21 UG/1; UG/1
AEROSOL, METERED RESPIRATORY (INHALATION)
OUTPATIENT
Start: 2023-11-07

## 2023-12-27 DIAGNOSIS — J45.20 INTERMITTENT ASTHMA WITHOUT COMPLICATION, UNSPECIFIED ASTHMA SEVERITY: ICD-10-CM

## 2024-01-02 ENCOUNTER — OFFICE VISIT (OUTPATIENT)
Dept: LAB | Facility: HOSPITAL | Age: 39
End: 2024-01-02
Payer: COMMERCIAL

## 2024-01-02 ENCOUNTER — APPOINTMENT (OUTPATIENT)
Dept: LAB | Facility: HOSPITAL | Age: 39
End: 2024-01-02
Payer: COMMERCIAL

## 2024-01-02 DIAGNOSIS — Z79.899 ENCOUNTER FOR LONG-TERM (CURRENT) USE OF OTHER MEDICATIONS: ICD-10-CM

## 2024-01-02 LAB
ALBUMIN SERPL BCP-MCNC: 4 G/DL (ref 3.5–5)
ALP SERPL-CCNC: 64 U/L (ref 34–104)
ALT SERPL W P-5'-P-CCNC: 15 U/L (ref 7–52)
ANION GAP SERPL CALCULATED.3IONS-SCNC: 9 MMOL/L
AST SERPL W P-5'-P-CCNC: 17 U/L (ref 13–39)
ATRIAL RATE: 87 BPM
BASOPHILS # BLD AUTO: 0.05 THOUSANDS/ÂΜL (ref 0–0.1)
BASOPHILS NFR BLD AUTO: 1 % (ref 0–1)
BILIRUB SERPL-MCNC: 0.25 MG/DL (ref 0.2–1)
BUN SERPL-MCNC: 9 MG/DL (ref 5–25)
CALCIUM SERPL-MCNC: 9 MG/DL (ref 8.4–10.2)
CHLORIDE SERPL-SCNC: 109 MMOL/L (ref 96–108)
CO2 SERPL-SCNC: 19 MMOL/L (ref 21–32)
CREAT SERPL-MCNC: 0.68 MG/DL (ref 0.6–1.3)
EOSINOPHIL # BLD AUTO: 0.46 THOUSAND/ÂΜL (ref 0–0.61)
EOSINOPHIL NFR BLD AUTO: 5 % (ref 0–6)
ERYTHROCYTE [DISTWIDTH] IN BLOOD BY AUTOMATED COUNT: 12.4 % (ref 11.6–15.1)
GFR SERPL CREATININE-BSD FRML MDRD: 112 ML/MIN/1.73SQ M
GLUCOSE P FAST SERPL-MCNC: 72 MG/DL (ref 65–99)
HCT VFR BLD AUTO: 39.1 % (ref 34.8–46.1)
HGB BLD-MCNC: 12.5 G/DL (ref 11.5–15.4)
IMM GRANULOCYTES # BLD AUTO: 0.05 THOUSAND/UL (ref 0–0.2)
IMM GRANULOCYTES NFR BLD AUTO: 1 % (ref 0–2)
LITHIUM SERPL-SCNC: 1.2 MMOL/L (ref 0.6–1.2)
LYMPHOCYTES # BLD AUTO: 2.08 THOUSANDS/ÂΜL (ref 0.6–4.47)
LYMPHOCYTES NFR BLD AUTO: 22 % (ref 14–44)
MCH RBC QN AUTO: 31.1 PG (ref 26.8–34.3)
MCHC RBC AUTO-ENTMCNC: 32 G/DL (ref 31.4–37.4)
MCV RBC AUTO: 97 FL (ref 82–98)
MONOCYTES # BLD AUTO: 0.84 THOUSAND/ÂΜL (ref 0.17–1.22)
MONOCYTES NFR BLD AUTO: 9 % (ref 4–12)
NEUTROPHILS # BLD AUTO: 5.98 THOUSANDS/ÂΜL (ref 1.85–7.62)
NEUTS SEG NFR BLD AUTO: 62 % (ref 43–75)
NRBC BLD AUTO-RTO: 0 /100 WBCS
P AXIS: 49 DEGREES
PLATELET # BLD AUTO: 201 THOUSANDS/UL (ref 149–390)
PMV BLD AUTO: 10.6 FL (ref 8.9–12.7)
POTASSIUM SERPL-SCNC: 3.6 MMOL/L (ref 3.5–5.3)
PR INTERVAL: 136 MS
PROT SERPL-MCNC: 6.5 G/DL (ref 6.4–8.4)
QRS AXIS: 69 DEGREES
QRSD INTERVAL: 80 MS
QT INTERVAL: 378 MS
QTC INTERVAL: 454 MS
RBC # BLD AUTO: 4.02 MILLION/UL (ref 3.81–5.12)
SODIUM SERPL-SCNC: 137 MMOL/L (ref 135–147)
T WAVE AXIS: 33 DEGREES
TSH SERPL DL<=0.05 MIU/L-ACNC: 4.17 UIU/ML (ref 0.45–4.5)
VENTRICULAR RATE: 87 BPM
WBC # BLD AUTO: 9.46 THOUSAND/UL (ref 4.31–10.16)

## 2024-01-02 PROCEDURE — 36415 COLL VENOUS BLD VENIPUNCTURE: CPT

## 2024-01-02 PROCEDURE — 80053 COMPREHEN METABOLIC PANEL: CPT

## 2024-01-02 PROCEDURE — 80178 ASSAY OF LITHIUM: CPT

## 2024-01-02 PROCEDURE — 93005 ELECTROCARDIOGRAM TRACING: CPT

## 2024-01-02 PROCEDURE — 85025 COMPLETE CBC W/AUTO DIFF WBC: CPT

## 2024-01-02 PROCEDURE — 84443 ASSAY THYROID STIM HORMONE: CPT

## 2024-01-03 RX ORDER — ALBUTEROL SULFATE 90 UG/1
2 AEROSOL, METERED RESPIRATORY (INHALATION) EVERY 4 HOURS PRN
Qty: 18 G | Refills: 3 | Status: SHIPPED | OUTPATIENT
Start: 2024-01-03

## 2024-01-10 ENCOUNTER — TELEPHONE (OUTPATIENT)
Age: 39
End: 2024-01-10

## 2024-01-10 ENCOUNTER — NURSE TRIAGE (OUTPATIENT)
Age: 39
End: 2024-01-10

## 2024-01-10 DIAGNOSIS — R10.30 LOWER ABDOMINAL PAIN: ICD-10-CM

## 2024-01-10 NOTE — TELEPHONE ENCOUNTER
Patients GI provider:  Dr. Barnard    Number to return call: [phone]    Reason for call: Pt calling to ask if there is any interaction between the pepcid and prilosec she is on and new med prescribed. Transferred to Tomasa in triage for further assistance.    Scheduled procedure/appointment date if applicable: N/A

## 2024-01-10 NOTE — TELEPHONE ENCOUNTER
Reason for call:   [x] Refill   [] Prior Auth  [] Other:     Office:   [] PCP/Provider -   [x] Specialty/Provider - Gastro    Medication:   Dicyclomine 20mg tablet- take 1 tablet by mouth every 6 hours as needed for abdominal cramping    Pharmacy: [facility]    Does the patient have enough for 3 days?   [x] Yes   [] No - Send as HP to POD

## 2024-01-10 NOTE — TELEPHONE ENCOUNTER
"Pt. Is currently taking Prilosec and Pepcid and has been started on lithium, with a moderate drug interaction between lithium and Pepcid should pt. Continue taking Pepcid while on lithium therapy ,  unknown cardiac history that would increase her risk of arrhythmia, please advise        Reason for Disposition   Caller has URGENT medicine question about med that PCP or specialist prescribed and triager unable to answer question    Answer Assessment - Initial Assessment Questions  1. NAME of MEDICATION: \"What medicine are you calling about?\"       Pt. Is currently taking Prilosec and Pepcid and has been started on lithium, with a moderate drug interaction between lithium and Pepcid should pt. Continue taking Pepcid while on lithium therapy ,  unknown cardiac history that would increase her risk of arrhythmia, please advise    Protocols used: Medication Question Call-ADULT-[place]    "

## 2024-01-11 RX ORDER — DICYCLOMINE HCL 20 MG
20 TABLET ORAL EVERY 6 HOURS
Qty: 120 TABLET | Refills: 3 | Status: SHIPPED | OUTPATIENT
Start: 2024-01-11

## 2024-02-01 ENCOUNTER — APPOINTMENT (EMERGENCY)
Dept: CT IMAGING | Facility: HOSPITAL | Age: 39
End: 2024-02-01
Payer: COMMERCIAL

## 2024-02-01 ENCOUNTER — HOSPITAL ENCOUNTER (EMERGENCY)
Facility: HOSPITAL | Age: 39
Discharge: HOME | End: 2024-02-01
Attending: EMERGENCY MEDICINE
Payer: COMMERCIAL

## 2024-02-01 VITALS
DIASTOLIC BLOOD PRESSURE: 73 MMHG | RESPIRATION RATE: 17 BRPM | BODY MASS INDEX: 29.23 KG/M2 | WEIGHT: 159.83 LBS | OXYGEN SATURATION: 100 % | TEMPERATURE: 98.3 F | HEART RATE: 84 BPM | SYSTOLIC BLOOD PRESSURE: 104 MMHG

## 2024-02-01 DIAGNOSIS — N39.0 UTI (URINARY TRACT INFECTION): Primary | ICD-10-CM

## 2024-02-01 LAB
ALBUMIN SERPL BCP-MCNC: 4.2 G/DL (ref 3.5–5)
ALP SERPL-CCNC: 71 U/L (ref 34–104)
ALT SERPL W P-5'-P-CCNC: 4 U/L (ref 7–52)
AMORPH URATE CRY URNS QL MICRO: ABNORMAL
ANION GAP SERPL CALCULATED.3IONS-SCNC: 7 MMOL/L
AST SERPL W P-5'-P-CCNC: 11 U/L (ref 13–39)
BACTERIA UR QL AUTO: ABNORMAL /HPF
BASOPHILS # BLD AUTO: 0.07 THOUSANDS/ÂΜL (ref 0–0.1)
BASOPHILS NFR BLD AUTO: 1 % (ref 0–1)
BILIRUB SERPL-MCNC: 0.21 MG/DL (ref 0.2–1)
BILIRUB UR QL STRIP: NEGATIVE
BUN SERPL-MCNC: 9 MG/DL (ref 5–25)
CALCIUM SERPL-MCNC: 9.1 MG/DL (ref 8.4–10.2)
CHLORIDE SERPL-SCNC: 110 MMOL/L (ref 96–108)
CLARITY UR: ABNORMAL
CO2 SERPL-SCNC: 20 MMOL/L (ref 21–32)
COLOR UR: YELLOW
CREAT SERPL-MCNC: 0.79 MG/DL (ref 0.6–1.3)
EOSINOPHIL # BLD AUTO: 0.54 THOUSAND/ÂΜL (ref 0–0.61)
EOSINOPHIL NFR BLD AUTO: 4 % (ref 0–6)
ERYTHROCYTE [DISTWIDTH] IN BLOOD BY AUTOMATED COUNT: 12.6 % (ref 11.6–15.1)
EXT PREGNANCY TEST URINE: NEGATIVE
EXT. CONTROL: NORMAL
GFR SERPL CREATININE-BSD FRML MDRD: 96 ML/MIN/1.73SQ M
GLUCOSE SERPL-MCNC: 86 MG/DL (ref 65–140)
GLUCOSE UR STRIP-MCNC: NEGATIVE MG/DL
HCT VFR BLD AUTO: 42 % (ref 34.8–46.1)
HGB BLD-MCNC: 13.8 G/DL (ref 11.5–15.4)
HGB UR QL STRIP.AUTO: ABNORMAL
IMM GRANULOCYTES # BLD AUTO: 0.07 THOUSAND/UL (ref 0–0.2)
IMM GRANULOCYTES NFR BLD AUTO: 1 % (ref 0–2)
KETONES UR STRIP-MCNC: NEGATIVE MG/DL
LEUKOCYTE ESTERASE UR QL STRIP: ABNORMAL
LITHIUM SERPL-SCNC: 0.63 MMOL/L (ref 0.6–1.2)
LYMPHOCYTES # BLD AUTO: 3.51 THOUSANDS/ÂΜL (ref 0.6–4.47)
LYMPHOCYTES NFR BLD AUTO: 23 % (ref 14–44)
MCH RBC QN AUTO: 30.9 PG (ref 26.8–34.3)
MCHC RBC AUTO-ENTMCNC: 32.9 G/DL (ref 31.4–37.4)
MCV RBC AUTO: 94 FL (ref 82–98)
MONOCYTES # BLD AUTO: 1.28 THOUSAND/ÂΜL (ref 0.17–1.22)
MONOCYTES NFR BLD AUTO: 9 % (ref 4–12)
MUCOUS THREADS UR QL AUTO: ABNORMAL
NEUTROPHILS # BLD AUTO: 9.54 THOUSANDS/ÂΜL (ref 1.85–7.62)
NEUTS SEG NFR BLD AUTO: 62 % (ref 43–75)
NITRITE UR QL STRIP: NEGATIVE
NON-SQ EPI CELLS URNS QL MICRO: ABNORMAL /HPF
NRBC BLD AUTO-RTO: 0 /100 WBCS
PH UR STRIP.AUTO: 6 [PH] (ref 4.5–8)
PLATELET # BLD AUTO: 196 THOUSANDS/UL (ref 149–390)
PMV BLD AUTO: 10.9 FL (ref 8.9–12.7)
POTASSIUM SERPL-SCNC: 3.6 MMOL/L (ref 3.5–5.3)
PROT SERPL-MCNC: 7.1 G/DL (ref 6.4–8.4)
PROT UR STRIP-MCNC: ABNORMAL MG/DL
RBC # BLD AUTO: 4.46 MILLION/UL (ref 3.81–5.12)
RBC #/AREA URNS AUTO: ABNORMAL /HPF
SODIUM SERPL-SCNC: 137 MMOL/L (ref 135–147)
SP GR UR STRIP.AUTO: 1.02 (ref 1–1.03)
UROBILINOGEN UR QL STRIP.AUTO: 1 E.U./DL
WBC # BLD AUTO: 15.01 THOUSAND/UL (ref 4.31–10.16)
WBC #/AREA URNS AUTO: ABNORMAL /HPF
WBC CLUMPS # UR AUTO: PRESENT /UL

## 2024-02-01 PROCEDURE — 87086 URINE CULTURE/COLONY COUNT: CPT

## 2024-02-01 PROCEDURE — 87186 SC STD MICRODIL/AGAR DIL: CPT

## 2024-02-01 PROCEDURE — G1004 CDSM NDSC: HCPCS

## 2024-02-01 PROCEDURE — 80053 COMPREHEN METABOLIC PANEL: CPT | Performed by: EMERGENCY MEDICINE

## 2024-02-01 PROCEDURE — 36415 COLL VENOUS BLD VENIPUNCTURE: CPT | Performed by: EMERGENCY MEDICINE

## 2024-02-01 PROCEDURE — 85025 COMPLETE CBC W/AUTO DIFF WBC: CPT | Performed by: EMERGENCY MEDICINE

## 2024-02-01 PROCEDURE — 74176 CT ABD & PELVIS W/O CONTRAST: CPT

## 2024-02-01 PROCEDURE — 81001 URINALYSIS AUTO W/SCOPE: CPT

## 2024-02-01 PROCEDURE — 87077 CULTURE AEROBIC IDENTIFY: CPT

## 2024-02-01 PROCEDURE — 99284 EMERGENCY DEPT VISIT MOD MDM: CPT | Performed by: EMERGENCY MEDICINE

## 2024-02-01 PROCEDURE — 80178 ASSAY OF LITHIUM: CPT | Performed by: EMERGENCY MEDICINE

## 2024-02-01 PROCEDURE — 81025 URINE PREGNANCY TEST: CPT | Performed by: EMERGENCY MEDICINE

## 2024-02-01 PROCEDURE — 99283 EMERGENCY DEPT VISIT LOW MDM: CPT

## 2024-02-01 RX ORDER — CEPHALEXIN 500 MG/1
500 CAPSULE ORAL EVERY 6 HOURS SCHEDULED
Qty: 40 CAPSULE | Refills: 0 | Status: SHIPPED | OUTPATIENT
Start: 2024-02-01 | End: 2024-02-11

## 2024-02-01 RX ORDER — CEPHALEXIN 250 MG/1
500 CAPSULE ORAL ONCE
Status: COMPLETED | OUTPATIENT
Start: 2024-02-01 | End: 2024-02-01

## 2024-02-01 RX ORDER — PHENAZOPYRIDINE HYDROCHLORIDE 200 MG/1
200 TABLET, FILM COATED ORAL 3 TIMES DAILY
Qty: 6 TABLET | Refills: 0 | Status: SHIPPED | OUTPATIENT
Start: 2024-02-01

## 2024-02-01 RX ORDER — PHENAZOPYRIDINE HYDROCHLORIDE 100 MG/1
100 TABLET, FILM COATED ORAL ONCE
Status: COMPLETED | OUTPATIENT
Start: 2024-02-01 | End: 2024-02-01

## 2024-02-01 RX ORDER — CEFAZOLIN SODIUM 2 G/50ML
2000 SOLUTION INTRAVENOUS ONCE
Status: DISCONTINUED | OUTPATIENT
Start: 2024-02-01 | End: 2024-02-01

## 2024-02-01 RX ORDER — OXYCODONE HYDROCHLORIDE 10 MG/1
10 TABLET ORAL ONCE
Status: COMPLETED | OUTPATIENT
Start: 2024-02-01 | End: 2024-02-01

## 2024-02-01 RX ADMIN — PHENAZOPYRIDINE 100 MG: 100 TABLET ORAL at 21:38

## 2024-02-01 RX ADMIN — CEPHALEXIN 500 MG: 250 CAPSULE ORAL at 22:30

## 2024-02-01 RX ADMIN — OXYCODONE HYDROCHLORIDE 10 MG: 10 TABLET ORAL at 22:41

## 2024-02-02 NOTE — ED PROVIDER NOTES
History  Chief Complaint   Patient presents with    Back Pain    Painful Urination     Pt with back pain and painful urination.  Pt reports symptoms started 4 days ago.  Pt taking tylenol for the pain     Is a 38-year-old female with a history of pyelonephritis and kidney stones that presents for 4 days of dysuria, urinary frequency, suprapubic discomfort and now back pain.  Denies any fevers, chills, nausea or vomiting associated with this.  States that it does feel like her prior episode of kidney stones.  Took Tylenol with minimal relief.      History provided by:  Patient and spouse   used: No        Prior to Admission Medications   Prescriptions Last Dose Informant Patient Reported? Taking?   Advair HFA 45-21 MCG/ACT inhaler   No No   Sig: INHALE 2 PUFFS 2 (TWO) TIMES A DAY RINSE MOUTH AFTER USE.   Ventolin  (90 Base) MCG/ACT inhaler   No No   Sig: INHALE 2 PUFFS EVERY 4 (FOUR) HOURS AS NEEDED FOR WHEEZING   cetirizine (ZyrTEC) 10 mg tablet   No No   Sig: TAKE 1 TABLET (10 MG TOTAL) BY MOUTH DAILY   cyclobenzaprine (FLEXERIL) 10 mg tablet   No No   Sig: Take 1 tablet (10 mg total) by mouth 2 (two) times a day as needed for muscle spasms   dicyclomine (BENTYL) 20 mg tablet   No No   Sig: Take 1 tablet (20 mg total) by mouth every 6 (six) hours as-needed for abdominal cramping.   famotidine (PEPCID) 20 mg tablet   No No   Sig: Take 1 tablet (20 mg total) by mouth 2 (two) times a day as needed for indigestion or heartburn   magnesium Oxide (MAG-OX) 400 mg TABS   No No   Sig: TAKE 1 TABLET (400 MG TOTAL) BY MOUTH 2 (TWO) TIMES A DAY   omeprazole (PriLOSEC) 40 MG capsule   No No   Sig: Take 1 capsule (40 mg total) by mouth daily   paliperidone (INVEGA) 6 MG 24 hr tablet   No No   Sig: Take 2 tablets (12 mg total) by mouth daily at bedtime   polyethylene glycol (GLYCOLAX) 17 GM/SCOOP   No No   Sig: TAKE 17 G BY MOUTH DAILY 30   tiZANidine (ZANAFLEX) 4 mg tablet   No No   Sig: TAKE 1 TABLET  (4 MG TOTAL) BY MOUTH DAILY AT BEDTIME   topiramate (TOPAMAX) 15 mg capsule   No No   Sig: Take 1 capsule (15 mg total) by mouth 2 (two) times a day      Facility-Administered Medications: None       Past Medical History:   Diagnosis Date    Abnormal Pap smear of cervix     ADHD (attention deficit hyperactivity disorder)     Ankle fracture     Anxiety     Arthritis     back    Asthma     Bipolar disorder (HCC)     Cellulitis     right side fac in     Chronic pain disorder     Depression     Diverticulitis     Flank pain 2016    Hallucination     Hepatitis C     Hip disease 2006    reports she had fluid removed from right hip and received treatment with antibiotic     History of abnormal cervical Pap smear     History of multiple miscarriages     IBS (irritable bowel syndrome)     Infantile idiopathic scoliosis     Joint pain     Kidney stone     Lactose intolerance     Low back pain     Myofascial pain syndrome     Poor dentition     Psychiatric illness     Psychosis (HCC)     PTSD (post-traumatic stress disorder)     Pyelonephritis affecting pregnancy     Right hand paresthesia     Right ovarian cyst     Scoliosis     Seizures (HCC)     Self-injurious behavior     Sleep difficulties     Slow transit constipation     Substance abuse (HCC)     Suicide attempt (HCC)        Past Surgical History:   Procedure Laterality Date     SECTION  2016     SECTION  2014    HIP SURGERY      ORTHOPEDIC SURGERY      [place]  DELIVERY ONLY N/A 2016    Procedure:  SECTION () REPEAT;  Surgeon: Kurt Connor MD;  Location: [facility] ;  Service: Obstetrics    [place] LIG/TRNSXJ FLP TUBE ABDL/VAG APPR UNI/BI Bilateral 2016    Procedure: LIGATION/COAGULATION TUBAL;  Surgeon: Kurt Connor MD;  Location: [facility];  Service: Obstetrics       Family History   Problem Relation Age of Onset    Heart disease Maternal Aunt     Cancer Maternal Aunt     Diabetes Paternal  "Aunt     No Known Problems Mother     No Known Problems Father     No Known Problems Sister      I have reviewed and agree with the history as documented.    E-Cigarette/Vaping    E-Cigarette Use Former User      E-Cigarette/Vaping Substances    Nicotine No     THC No     CBD No     Flavoring No     Other No     Unknown No      Social History     Tobacco Use    Smoking status: Every Day     Current packs/day: 0.25     Average packs/day: 0.3 packs/day for 15.0 years (3.8 ttl pk-yrs)     Types: Cigarettes     Passive exposure: Never    Smokeless tobacco: Never    Tobacco comments:     pt refused smoking cessation teaching.    Vaping Use    Vaping status: Former   Substance Use Topics    Alcohol use: Never    Drug use: Not Currently     Types: Marijuana, Cocaine, \"Crack\" cocaine     Comment: on occasion        Review of Systems   Constitutional:  Negative for chills, diaphoresis and fever.   Respiratory:  Negative for cough, chest tightness and shortness of breath.    Cardiovascular:  Negative for chest pain.   Gastrointestinal:  Positive for abdominal pain. Negative for blood in stool, constipation, diarrhea, nausea and vomiting.   Genitourinary:  Positive for dysuria, flank pain, frequency and urgency. Negative for difficulty urinating.   Skin:  Negative for color change, pallor, rash and wound.   Allergic/Immunologic: Negative for immunocompromised state.   All other systems reviewed and are negative.      Physical Exam  Physical Exam  Vitals and nursing note reviewed.   Constitutional:       General: She is not in acute distress.     Appearance: She is well-developed. She is not ill-appearing, toxic-appearing or diaphoretic.   HENT:      Head: Normocephalic and atraumatic.      Right Ear: External ear normal.      Left Ear: External ear normal.      Nose: Nose normal. No congestion or rhinorrhea.   Eyes:      General: No scleral icterus.        Right eye: No discharge.         Left eye: No discharge.      " Conjunctiva/sclera: Conjunctivae normal.   Neck:      Trachea: No tracheal deviation.   Cardiovascular:      Rate and Rhythm: Normal rate.      Pulses: Normal pulses.   Pulmonary:      Effort: Pulmonary effort is normal. No tachypnea, accessory muscle usage or respiratory distress.      Breath sounds: No stridor.   Abdominal:      General: There is no distension.      Tenderness: There is no abdominal tenderness. There is no right CVA tenderness, left CVA tenderness, guarding or rebound. Negative signs include Herndon's sign and McBurney's sign.   Skin:     General: Skin is warm and dry.      Findings: No rash.   Neurological:      General: No focal deficit present.      Mental Status: She is alert. She is not disoriented.      Gait: Gait normal.   Psychiatric:         Speech: Speech normal.         Behavior: Behavior normal.         Vital Signs  ED Triage Vitals   Temperature Pulse Respirations Blood Pressure SpO2   02/01/24 2008 02/01/24 2008 02/01/24 2008 02/01/24 2008 02/01/24 2008   98.3 °F (36.8 °C) 96 16 122/67 99 %      Temp src Heart Rate Source Patient Position - Orthostatic VS BP Location FiO2 (%)   -- 02/01/24 2231 02/01/24 2231 02/01/24 2231 --    Monitor Lying Right arm       Pain Score       02/01/24 2008       10 - Worst Possible Pain           Vitals:    02/01/24 2008 02/01/24 2231   BP: 122/67 104/73   Pulse: 96 84   Patient Position - Orthostatic VS:  Lying         Visual Acuity      ED Medications  Medications   phenazopyridine (PYRIDIUM) tablet 100 mg (100 mg Oral Given 2/1/24 2138)   cephalexin (KEFLEX) capsule 500 mg (500 mg Oral Given 2/1/24 2230)   oxyCODONE (ROXICODONE) immediate release tablet 10 mg (10 mg Oral Given 2/1/24 2241)       Diagnostic Studies  Results Reviewed       Procedure Component Value Units Date/Time    Urine Microscopic [[id number]]  (Abnormal) Collected: 02/01/24 2133    Lab Status: Final result Specimen: Urine, Clean Catch Updated: 02/01/24 2213     RBC, UA 10-20 /hpf       WBC, UA Innumerable /hpf      Epithelial Cells Occasional /hpf      Bacteria, UA Innumerable /hpf      MUCUS THREADS Occasional     Amorphous Crystals, UA Occasional     WBC Clumps Present    Urine culture [[id number]] Collected: 02/01/24 2133    Lab Status: In process Specimen: Urine, Clean Catch Updated: 02/01/24 2213    Comprehensive metabolic panel [[id number]]  (Abnormal) Collected: 02/01/24 2136    Lab Status: Final result Specimen: Blood from Arm, Right Updated: 02/01/24 2206     Sodium 137 mmol/L      Potassium 3.6 mmol/L      Chloride 110 mmol/L      CO2 20 mmol/L      ANION GAP 7 mmol/L      BUN 9 mg/dL      Creatinine 0.79 mg/dL      Glucose 86 mg/dL      Calcium 9.1 mg/dL      AST 11 U/L      ALT 4 U/L      Alkaline Phosphatase 71 U/L      Total Protein 7.1 g/dL      Albumin 4.2 g/dL      Total Bilirubin 0.21 mg/dL      eGFR 96 ml/min/1.73sq m     Narrative:      National Kidney Disease Foundation guidelines for Chronic Kidney Disease (CKD):     Stage 1 with normal or high GFR (GFR > 90 mL/min/1.73 square meters)    Stage 2 Mild CKD (GFR = 60-89 mL/min/1.73 square meters)    Stage 3A Moderate CKD (GFR = 45-59 mL/min/1.73 square meters)    Stage 3B Moderate CKD (GFR = 30-44 mL/min/1.73 square meters)    Stage 4 Severe CKD (GFR = 15-29 mL/min/1.73 square meters)    Stage 5 End Stage CKD (GFR <15 mL/min/1.73 square meters)  Note: GFR calculation is accurate only with a steady state creatinine    Lithium level [[id number]]  (Normal) Collected: 02/01/24 2136    Lab Status: Final result Specimen: Blood from Arm, Right Updated: 02/01/24 2204     Lithium Lvl 0.63 mmol/L     CBC and differential [[id number]]  (Abnormal) Collected: 02/01/24 2136    Lab Status: Final result Specimen: Blood from Arm, Right Updated: 02/01/24 2146     WBC 15.01 Thousand/uL      RBC 4.46 Million/uL      Hemoglobin 13.8 g/dL      Hematocrit 42.0 %      MCV 94 fL      MCH 30.9 pg      MCHC 32.9 g/dL      RDW 12.6 %      MPV 10.9 fL       Platelets 196 Thousands/uL      nRBC 0 /100 WBCs      Neutrophils Relative 62 %      Immat GRANS % 1 %      Lymphocytes Relative 23 %      Monocytes Relative 9 %      Eosinophils Relative 4 %      Basophils Relative 1 %      Neutrophils Absolute 9.54 Thousands/µL      Immature Grans Absolute 0.07 Thousand/uL      Lymphocytes Absolute 3.51 Thousands/µL      Monocytes Absolute 1.28 Thousand/µL      Eosinophils Absolute 0.54 Thousand/µL      Basophils Absolute 0.07 Thousands/µL     POCT pregnancy, urine [[id number]]  (Normal) Resulted: 02/01/24 2138    Lab Status: Final result Updated: 02/01/24 2138     EXT Preg Test, Ur Negative     Control Valid    Urine Macroscopic, POC [[id number]]  (Abnormal) Collected: 02/01/24 2133    Lab Status: Final result Specimen: Urine Updated: 02/01/24 2135     Color, UA Yellow     Clarity, UA Cloudy     pH, UA 6.0     Leukocytes, UA Small     Nitrite, UA Negative     Protein, UA 30 (1+) mg/dl      Glucose, UA Negative mg/dl      Ketones, UA Negative mg/dl      Urobilinogen, UA 1.0 E.U./dl      Bilirubin, UA Negative     Occult Blood, UA Trace     Specific Gravity, UA 1.020    Narrative:      CLINITEK RESULT                   CT renal stone study abdomen pelvis without contrast   Final Result by Rosalinda Wilder MD (02/01 2249)      No evidence of stone or hydronephrosis         Workstation performed: [id number]                    Procedures  Procedures         ED Course  ED Course as of 02/01/24 2304   Thu Feb 01, 2024   2220 CBC and differential(!)  Leukocytosis.  Given her presentation, this is concerning for infection   2221 Urine Microscopic(!)  Does appear positive for infection   2221 Comprehensive metabolic panel(!)  No major electrolyte dysfunction                                             Medical Decision Making  Patient's symptoms are concerning for possible pyelonephritis.  Given her history of kidney stones however, I will obtain a CT scan for possible infected stone along with  checking labs and giving Pyridium for pain control.  Patient states that she gets GI bleeding with NSAIDs so I will avoid Toradol.  Took Tylenol just prior to arrival.    10:21 PM  Labs show a leukocytosis but otherwise normal.  Urinalysis does appear concerning for infection.  CT is pending but will start with Ancef at this time for underlying infection treatment.    10:40 PM  Patient does not have an IV.  She requested not to have 1.  Will switch Ancef to Keflex.  She is still having pain at this time.  Will give p.o. oxycodone.  She does have a history of substance abuse but she does not have any recent prescriptions on PDMP.    11:04 PM  CT is negative.  Will treat as a UTI with Keflex for the next 10 days and have her follow-up with her PCP if symptoms are persisting with return to ER precautions.    Amount and/or Complexity of Data Reviewed  Labs: ordered. Decision-making details documented in ED Course.  Radiology: ordered.    Risk  Prescription drug management.             Disposition  Final diagnoses:   UTI (urinary tract infection)     Time reflects when diagnosis was documented in both MDM as applicable and the Disposition within this note       Time User Action Codes Description Comment    2/1/2024 11:02 PM Kevin Vera Add [N39.0] UTI (urinary tract infection)           ED Disposition       ED Disposition   Discharge    Condition   Stable    Date/Time   Thu Feb 1, 2024 11:02 PM    Comment   Antionette Downing discharge to home/self care.                   Follow-up Information       Follow up With Specialties Details Why Contact Info Additional Information    Nadia Chanel MD Family Medicine Go in 1 week If symptoms persist, To schedule an appointment as soon as you can [address] [place] [id number]  [phone]       [facility] Emergency Medicine Go to  If symptoms worsen [address] [facility]  [id number]  [phone] [facility] Emergency Department, [address], [place], [address]            Patient's Medications   Discharge Prescriptions    CEPHALEXIN (KEFLEX) 500 MG CAPSULE    Take 1 capsule (500 mg total) by mouth every 6 (six) hours for 10 days       Start Date: 2/1/2024  End Date: 2/11/2024       Order Dose: 500 mg       Quantity: 40 capsule    Refills: 0    PHENAZOPYRIDINE (PYRIDIUM) 200 MG TABLET    Take 1 tablet (200 mg total) by mouth 3 (three) times a day       Start Date: 2/1/2024  End Date: --       Order Dose: 200 mg       Quantity: 6 tablet    Refills: 0       No discharge procedures on file.    PDMP Review         Value Time User    PDMP Reviewed  Yes 2/1/2024 10:40 PM Kevin Vera Jr., DO            ED Provider  Electronically Signed by             Kevin Vera Jr., DO  02/01/24 5528

## 2024-02-02 NOTE — ED NOTES
Pt discharged by Ed provider.  This RN not at the bedside.      Zahraa Montoya, RN  02/01/24 9115

## 2024-02-04 LAB — BACTERIA UR CULT: ABNORMAL

## 2024-03-14 DIAGNOSIS — J45.20 INTERMITTENT ASTHMA WITHOUT COMPLICATION, UNSPECIFIED ASTHMA SEVERITY: ICD-10-CM

## 2024-03-14 RX ORDER — CETIRIZINE HYDROCHLORIDE 10 MG/1
10 TABLET ORAL DAILY
Qty: 30 TABLET | Refills: 5 | Status: SHIPPED | OUTPATIENT
Start: 2024-03-14

## 2024-03-26 ENCOUNTER — APPOINTMENT (EMERGENCY)
Dept: RADIOLOGY | Facility: HOSPITAL | Age: 39
End: 2024-03-26
Payer: COMMERCIAL

## 2024-03-26 ENCOUNTER — HOSPITAL ENCOUNTER (EMERGENCY)
Facility: HOSPITAL | Age: 39
Discharge: HOME | End: 2024-03-26
Attending: EMERGENCY MEDICINE
Payer: COMMERCIAL

## 2024-03-26 VITALS
RESPIRATION RATE: 18 BRPM | HEART RATE: 88 BPM | OXYGEN SATURATION: 100 % | TEMPERATURE: 97.3 F | DIASTOLIC BLOOD PRESSURE: 74 MMHG | SYSTOLIC BLOOD PRESSURE: 143 MMHG

## 2024-03-26 DIAGNOSIS — G62.9 PERIPHERAL NEUROPATHY: ICD-10-CM

## 2024-03-26 DIAGNOSIS — R10.9 RIGHT FLANK PAIN: Primary | ICD-10-CM

## 2024-03-26 DIAGNOSIS — R30.0 DYSURIA: ICD-10-CM

## 2024-03-26 LAB
ANION GAP SERPL CALCULATED.3IONS-SCNC: 9 MMOL/L (ref 4–13)
BACTERIA UR QL AUTO: ABNORMAL /HPF
BASOPHILS # BLD AUTO: 0.03 THOUSANDS/ÂΜL (ref 0–0.1)
BASOPHILS NFR BLD AUTO: 0 % (ref 0–1)
BILIRUB UR QL STRIP: NEGATIVE
BUN SERPL-MCNC: 8 MG/DL (ref 5–25)
CALCIUM SERPL-MCNC: 8.9 MG/DL (ref 8.4–10.2)
CHLORIDE SERPL-SCNC: 110 MMOL/L (ref 96–108)
CLARITY UR: CLEAR
CO2 SERPL-SCNC: 18 MMOL/L (ref 21–32)
COLOR UR: YELLOW
CREAT SERPL-MCNC: 0.8 MG/DL (ref 0.6–1.3)
EOSINOPHIL # BLD AUTO: 0.07 THOUSAND/ÂΜL (ref 0–0.61)
EOSINOPHIL NFR BLD AUTO: 1 % (ref 0–6)
ERYTHROCYTE [DISTWIDTH] IN BLOOD BY AUTOMATED COUNT: 12 % (ref 11.6–15.1)
EXT PREGNANCY TEST URINE: NEGATIVE
EXT. CONTROL: NORMAL
GFR SERPL CREATININE-BSD FRML MDRD: 95 ML/MIN/1.73SQ M
GLUCOSE SERPL-MCNC: 93 MG/DL (ref 65–140)
GLUCOSE UR STRIP-MCNC: NEGATIVE MG/DL
HCT VFR BLD AUTO: 42.1 % (ref 34.8–46.1)
HGB BLD-MCNC: 13.9 G/DL (ref 11.5–15.4)
HGB UR QL STRIP.AUTO: ABNORMAL
IMM GRANULOCYTES # BLD AUTO: 0.05 THOUSAND/UL (ref 0–0.2)
IMM GRANULOCYTES NFR BLD AUTO: 1 % (ref 0–2)
KETONES UR STRIP-MCNC: NEGATIVE MG/DL
LEUKOCYTE ESTERASE UR QL STRIP: NEGATIVE
LYMPHOCYTES # BLD AUTO: 3.16 THOUSANDS/ÂΜL (ref 0.6–4.47)
LYMPHOCYTES NFR BLD AUTO: 30 % (ref 14–44)
MCH RBC QN AUTO: 30.8 PG (ref 26.8–34.3)
MCHC RBC AUTO-ENTMCNC: 33 G/DL (ref 31.4–37.4)
MCV RBC AUTO: 93 FL (ref 82–98)
MONOCYTES # BLD AUTO: 0.71 THOUSAND/ÂΜL (ref 0.17–1.22)
MONOCYTES NFR BLD AUTO: 7 % (ref 4–12)
MUCOUS THREADS UR QL AUTO: ABNORMAL
NEUTROPHILS # BLD AUTO: 6.46 THOUSANDS/ÂΜL (ref 1.85–7.62)
NEUTS SEG NFR BLD AUTO: 61 % (ref 43–75)
NITRITE UR QL STRIP: NEGATIVE
NON-SQ EPI CELLS URNS QL MICRO: ABNORMAL /HPF
NRBC BLD AUTO-RTO: 0 /100 WBCS
PH UR STRIP.AUTO: 6 [PH] (ref 4.5–8)
PLATELET # BLD AUTO: 195 THOUSANDS/UL (ref 149–390)
PMV BLD AUTO: 11.4 FL (ref 8.9–12.7)
POTASSIUM SERPL-SCNC: 3.4 MMOL/L (ref 3.5–5.3)
PROT UR STRIP-MCNC: NEGATIVE MG/DL
RBC # BLD AUTO: 4.52 MILLION/UL (ref 3.81–5.12)
RBC #/AREA URNS AUTO: ABNORMAL /HPF
SODIUM SERPL-SCNC: 137 MMOL/L (ref 135–147)
SP GR UR STRIP.AUTO: 1.02 (ref 1–1.03)
TSH SERPL DL<=0.05 MIU/L-ACNC: 1.19 UIU/ML (ref 0.45–4.5)
UROBILINOGEN UR QL STRIP.AUTO: 0.2 E.U./DL
WBC # BLD AUTO: 10.48 THOUSAND/UL (ref 4.31–10.16)
WBC #/AREA URNS AUTO: ABNORMAL /HPF

## 2024-03-26 PROCEDURE — 87591 N.GONORRHOEAE DNA AMP PROB: CPT

## 2024-03-26 PROCEDURE — 96374 THER/PROPH/DIAG INJ IV PUSH: CPT

## 2024-03-26 PROCEDURE — 87086 URINE CULTURE/COLONY COUNT: CPT

## 2024-03-26 PROCEDURE — 80048 BASIC METABOLIC PNL TOTAL CA: CPT

## 2024-03-26 PROCEDURE — 81025 URINE PREGNANCY TEST: CPT | Performed by: EMERGENCY MEDICINE

## 2024-03-26 PROCEDURE — 36415 COLL VENOUS BLD VENIPUNCTURE: CPT

## 2024-03-26 PROCEDURE — 87186 SC STD MICRODIL/AGAR DIL: CPT

## 2024-03-26 PROCEDURE — 84443 ASSAY THYROID STIM HORMONE: CPT

## 2024-03-26 PROCEDURE — 99284 EMERGENCY DEPT VISIT MOD MDM: CPT | Performed by: EMERGENCY MEDICINE

## 2024-03-26 PROCEDURE — 81001 URINALYSIS AUTO W/SCOPE: CPT

## 2024-03-26 PROCEDURE — 74176 CT ABD & PELVIS W/O CONTRAST: CPT

## 2024-03-26 PROCEDURE — 87491 CHLMYD TRACH DNA AMP PROBE: CPT

## 2024-03-26 PROCEDURE — 87077 CULTURE AEROBIC IDENTIFY: CPT

## 2024-03-26 PROCEDURE — 85025 COMPLETE CBC W/AUTO DIFF WBC: CPT

## 2024-03-26 PROCEDURE — 99284 EMERGENCY DEPT VISIT MOD MDM: CPT

## 2024-03-26 RX ORDER — HYDROMORPHONE HCL/PF 1 MG/ML
0.5 SYRINGE (ML) INJECTION ONCE
Status: COMPLETED | OUTPATIENT
Start: 2024-03-26 | End: 2024-03-26

## 2024-03-26 RX ORDER — KETOROLAC TROMETHAMINE 30 MG/ML
15 INJECTION, SOLUTION INTRAMUSCULAR; INTRAVENOUS ONCE
Status: DISCONTINUED | OUTPATIENT
Start: 2024-03-26 | End: 2024-03-26

## 2024-03-26 RX ADMIN — HYDROMORPHONE HYDROCHLORIDE 0.5 MG: 1 INJECTION, SOLUTION INTRAMUSCULAR; INTRAVENOUS; SUBCUTANEOUS at 22:03

## 2024-03-27 LAB
C TRACH DNA SPEC QL NAA+PROBE: NEGATIVE
N GONORRHOEA DNA SPEC QL NAA+PROBE: NEGATIVE
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
## 2024-03-27 NOTE — DISCHARGE INSTRUCTIONS
Schedule an appointment with neurology for follow up.    Your urine culture is pending -- if positive we will call you to start antibiotics.     If you develop new or worsening symptoms, please return to the Emergency Department for further evaluation.

## 2024-03-28 ENCOUNTER — HOSPITAL ENCOUNTER (EMERGENCY)
Facility: HOSPITAL | Age: 39
Discharge: HOME | End: 2024-03-28
Attending: EMERGENCY MEDICINE
Payer: COMMERCIAL

## 2024-03-28 ENCOUNTER — TELEPHONE (OUTPATIENT)
Dept: PSYCHIATRY | Facility: CLINIC | Age: 39
End: 2024-03-28

## 2024-03-28 ENCOUNTER — APPOINTMENT (EMERGENCY)
Dept: RADIOLOGY | Facility: HOSPITAL | Age: 39
End: 2024-03-28
Payer: COMMERCIAL

## 2024-03-28 VITALS
DIASTOLIC BLOOD PRESSURE: 71 MMHG | RESPIRATION RATE: 18 BRPM | HEART RATE: 108 BPM | SYSTOLIC BLOOD PRESSURE: 132 MMHG | TEMPERATURE: 98 F | OXYGEN SATURATION: 97 %

## 2024-03-28 DIAGNOSIS — N39.0 UTI (URINARY TRACT INFECTION): Primary | ICD-10-CM

## 2024-03-28 DIAGNOSIS — G62.9 PERIPHERAL NEUROPATHY: ICD-10-CM

## 2024-03-28 DIAGNOSIS — F31.2 BIPOLAR I DISORDER, MOST RECENT EPISODE MANIC, SEVERE WITH PSYCHOTIC FEATURES (HCC): Chronic | ICD-10-CM

## 2024-03-28 LAB
AMORPH URATE CRY URNS QL MICRO: ABNORMAL
ANA SER QL IA: NEGATIVE
ANION GAP SERPL CALCULATED.3IONS-SCNC: 8 MMOL/L (ref 4–13)
BACTERIA UR QL AUTO: ABNORMAL /HPF
BASOPHILS # BLD AUTO: 0.03 THOUSANDS/ÂΜL (ref 0–0.1)
BASOPHILS NFR BLD AUTO: 0 % (ref 0–1)
BILIRUB UR QL STRIP: NEGATIVE
BUN SERPL-MCNC: 9 MG/DL (ref 5–25)
CALCIUM SERPL-MCNC: 9 MG/DL (ref 8.4–10.2)
CHLORIDE SERPL-SCNC: 110 MMOL/L (ref 96–108)
CLARITY UR: ABNORMAL
CO2 SERPL-SCNC: 22 MMOL/L (ref 21–32)
COLOR UR: ABNORMAL
CREAT SERPL-MCNC: 0.75 MG/DL (ref 0.6–1.3)
EOSINOPHIL # BLD AUTO: 0.04 THOUSAND/ÂΜL (ref 0–0.61)
EOSINOPHIL NFR BLD AUTO: 1 % (ref 0–6)
ERYTHROCYTE [DISTWIDTH] IN BLOOD BY AUTOMATED COUNT: 11.9 % (ref 11.6–15.1)
ERYTHROCYTE [SEDIMENTATION RATE] IN BLOOD: 14 MM/HOUR (ref 0–19)
EST. AVERAGE GLUCOSE BLD GHB EST-MCNC: 105 MG/DL
EXT PREGNANCY TEST URINE: NEGATIVE
EXT. CONTROL: NORMAL
FOLATE SERPL-MCNC: 12.6 NG/ML
GFR SERPL CREATININE-BSD FRML MDRD: 102 ML/MIN/1.73SQ M
GLUCOSE SERPL-MCNC: 87 MG/DL (ref 65–140)
GLUCOSE UR STRIP-MCNC: NEGATIVE MG/DL
HBA1C MFR BLD: 5.3 %
HCT VFR BLD AUTO: 45.4 % (ref 34.8–46.1)
HGB BLD-MCNC: 14.9 G/DL (ref 11.5–15.4)
HGB UR QL STRIP.AUTO: ABNORMAL
IMM GRANULOCYTES # BLD AUTO: 0.05 THOUSAND/UL (ref 0–0.2)
IMM GRANULOCYTES NFR BLD AUTO: 1 % (ref 0–2)
KETONES UR STRIP-MCNC: ABNORMAL MG/DL
LEUKOCYTE ESTERASE UR QL STRIP: ABNORMAL
LYMPHOCYTES # BLD AUTO: 1.99 THOUSANDS/ÂΜL (ref 0.6–4.47)
LYMPHOCYTES NFR BLD AUTO: 25 % (ref 14–44)
MCH RBC QN AUTO: 30.8 PG (ref 26.8–34.3)
MCHC RBC AUTO-ENTMCNC: 32.8 G/DL (ref 31.4–37.4)
MCV RBC AUTO: 94 FL (ref 82–98)
MONOCYTES # BLD AUTO: 0.56 THOUSAND/ÂΜL (ref 0.17–1.22)
MONOCYTES NFR BLD AUTO: 7 % (ref 4–12)
MUCOUS THREADS UR QL AUTO: ABNORMAL
NEUTROPHILS # BLD AUTO: 5.3 THOUSANDS/ÂΜL (ref 1.85–7.62)
NEUTS SEG NFR BLD AUTO: 66 % (ref 43–75)
NITRITE UR QL STRIP: POSITIVE
NON-SQ EPI CELLS URNS QL MICRO: ABNORMAL /HPF
NRBC BLD AUTO-RTO: 0 /100 WBCS
PH UR STRIP.AUTO: 5.5 [PH]
PLATELET # BLD AUTO: 217 THOUSANDS/UL (ref 149–390)
PMV BLD AUTO: 11.3 FL (ref 8.9–12.7)
POTASSIUM SERPL-SCNC: 3.6 MMOL/L (ref 3.5–5.3)
PROT UR STRIP-MCNC: ABNORMAL MG/DL
RBC # BLD AUTO: 4.84 MILLION/UL (ref 3.81–5.12)
RBC #/AREA URNS AUTO: ABNORMAL /HPF
SODIUM SERPL-SCNC: 140 MMOL/L (ref 135–147)
SP GR UR STRIP.AUTO: 1.02 (ref 1–1.03)
TREPONEMA PALLIDUM IGG+IGM AB [PRESENCE] IN SERUM OR PLASMA BY IMMUNOASSAY: NORMAL
TSH SERPL DL<=0.05 MIU/L-ACNC: 0.82 UIU/ML (ref 0.45–4.5)
URATE CRY URNS QL MICRO: ABNORMAL /HPF
UROBILINOGEN UR STRIP-ACNC: <2 MG/DL
VIT B12 SERPL-MCNC: 217 PG/ML (ref 180–914)
WBC # BLD AUTO: 7.97 THOUSAND/UL (ref 4.31–10.16)
WBC #/AREA URNS AUTO: ABNORMAL /HPF

## 2024-03-28 PROCEDURE — 81025 URINE PREGNANCY TEST: CPT

## 2024-03-28 PROCEDURE — 84425 ASSAY OF VITAMIN B-1: CPT

## 2024-03-28 PROCEDURE — 76942 ECHO GUIDE FOR BIOPSY: CPT | Performed by: EMERGENCY MEDICINE

## 2024-03-28 PROCEDURE — 82746 ASSAY OF FOLIC ACID SERUM: CPT

## 2024-03-28 PROCEDURE — 99245 OFF/OP CONSLTJ NEW/EST HI 55: CPT | Performed by: STUDENT IN AN ORGANIZED HEALTH CARE EDUCATION/TRAINING PROGRAM

## 2024-03-28 PROCEDURE — 74177 CT ABD & PELVIS W/CONTRAST: CPT

## 2024-03-28 PROCEDURE — 99285 EMERGENCY DEPT VISIT HI MDM: CPT | Performed by: EMERGENCY MEDICINE

## 2024-03-28 PROCEDURE — 86780 TREPONEMA PALLIDUM: CPT

## 2024-03-28 PROCEDURE — 82607 VITAMIN B-12: CPT

## 2024-03-28 PROCEDURE — 96374 THER/PROPH/DIAG INJ IV PUSH: CPT

## 2024-03-28 PROCEDURE — 99244 OFF/OP CNSLTJ NEW/EST MOD 40: CPT | Performed by: PSYCHIATRY & NEUROLOGY

## 2024-03-28 PROCEDURE — 80048 BASIC METABOLIC PNL TOTAL CA: CPT

## 2024-03-28 PROCEDURE — 36415 COLL VENOUS BLD VENIPUNCTURE: CPT

## 2024-03-28 PROCEDURE — 83036 HEMOGLOBIN GLYCOSYLATED A1C: CPT

## 2024-03-28 PROCEDURE — 81001 URINALYSIS AUTO W/SCOPE: CPT

## 2024-03-28 PROCEDURE — 96375 TX/PRO/DX INJ NEW DRUG ADDON: CPT

## 2024-03-28 PROCEDURE — 85652 RBC SED RATE AUTOMATED: CPT

## 2024-03-28 PROCEDURE — 99285 EMERGENCY DEPT VISIT HI MDM: CPT

## 2024-03-28 PROCEDURE — 85025 COMPLETE CBC W/AUTO DIFF WBC: CPT

## 2024-03-28 PROCEDURE — 84443 ASSAY THYROID STIM HORMONE: CPT

## 2024-03-28 PROCEDURE — 86038 ANTINUCLEAR ANTIBODIES: CPT

## 2024-03-28 PROCEDURE — 93005 ELECTROCARDIOGRAM TRACING: CPT

## 2024-03-28 PROCEDURE — 36000 PLACE NEEDLE IN VEIN: CPT | Performed by: EMERGENCY MEDICINE

## 2024-03-28 PROCEDURE — 84166 PROTEIN E-PHORESIS/URINE/CSF: CPT

## 2024-03-28 RX ORDER — HYDROMORPHONE HCL IN WATER/PF 6 MG/30 ML
0.2 PATIENT CONTROLLED ANALGESIA SYRINGE INTRAVENOUS ONCE
Status: COMPLETED | OUTPATIENT
Start: 2024-03-28 | End: 2024-03-28

## 2024-03-28 RX ORDER — ONDANSETRON 2 MG/ML
4 INJECTION INTRAMUSCULAR; INTRAVENOUS ONCE
Status: COMPLETED | OUTPATIENT
Start: 2024-03-28 | End: 2024-03-28

## 2024-03-28 RX ORDER — CEPHALEXIN 500 MG/1
500 CAPSULE ORAL EVERY 6 HOURS SCHEDULED
Qty: 20 CAPSULE | Refills: 0 | Status: SHIPPED | OUTPATIENT
Start: 2024-03-28 | End: 2024-04-02

## 2024-03-28 RX ORDER — CEPHALEXIN 500 MG/1
500 CAPSULE ORAL ONCE
Status: COMPLETED | OUTPATIENT
Start: 2024-03-28 | End: 2024-03-28

## 2024-03-28 RX ADMIN — ONDANSETRON 4 MG: 2 INJECTION INTRAMUSCULAR; INTRAVENOUS at 16:05

## 2024-03-28 RX ADMIN — CEPHALEXIN 500 MG: 500 CAPSULE ORAL at 17:14

## 2024-03-28 RX ADMIN — IOHEXOL 100 ML: 350 INJECTION, SOLUTION INTRAVENOUS at 18:17

## 2024-03-28 RX ADMIN — HYDROMORPHONE HYDROCHLORIDE 0.2 MG: 0.2 INJECTION, SOLUTION INTRAMUSCULAR; INTRAVENOUS; SUBCUTANEOUS at 16:05

## 2024-03-28 NOTE — DISCHARGE INSTRUCTIONS
You have a urinary tract infection.  You should take the antibiotics as prescribed until the bottle is finished.  You should also make sure you follow-up as scheduled with your psychiatrist.  You should return to the emergency room if you develop fevers, if you have blood in your urine, or if you have severe pain that is uncontrolled by medication.

## 2024-03-28 NOTE — ED PROCEDURE NOTE
Procedure  US Guided Peripheral IV    Date/Time: 3/28/2024 6:02 PM    Performed by: Tal Powers DO  Authorized by: Tal Powers DO    Patient location:  ED  Performed by:  Resident  Indications:     Indications: difficulty obtaining IV access      Image availability:  Images available in PACS  Procedure details:     Patient evaluated for contraindications to access (i.e. fistula, thrombosis, etc): Yes      Standard clean technique used for ultrasound access: Yes      Location:  Left antecubital    Catheter size:  18 gauge    Number of attempts:  1    Successful placement: yes    Post-procedure details:     Post-procedure:  Dressing applied    Assessment: free fluid flow and no signs of infiltration      Post-procedure complications: none      Patient tolerance of procedure:  Tolerated well, no immediate complications                   Tal Powers DO  03/28/24 5756

## 2024-03-28 NOTE — TELEPHONE ENCOUNTER
Consult placed on [facility] queue at 1556 to be seen by an [facility] psychiatrist.

## 2024-03-28 NOTE — CONSULTS
NEUROLOGY RESIDENCY CONSULT NOTE     Name: Antionette Downing   Age & Sex: 38 y.o. female   MRN: [id number]  Unit/Bed#: ED 26   Encounter: [id number]  Length of Stay: 0    Patient has an outpatient follow up scheduled with Neurology.      ASSESSMENT & PLAN     Peripheral neuropathy  Assessment & Plan  38 year old female with a 6 month history of peripheral neuropathy in bilateral lower extremities. The patient reported the neuropathy has been getting worse. We discussed collecting more data in the form of labs and outpatient EMG. The patient has an upcoming follow up with her neurologist.     Assessment: Peripheral neuropathy.    Plan:  - Outpatient EMG 1 upper and 1 lower limb  - B12, Folate, Thiamine, HbA1c, Lyme total AB w/ reflex, SPEP, UPEP, ESR, TSH, ONEIL w/reflex ordered and pending  - Follow up outpatient with Neurology.        SUBJECTIVE     Reason for Consult / Principal Problem: peripheral neuropathy  Hx and PE limited by: nothing    HPI: Antionette Downing is a 38 y.o. female who presents with migraines and pain in the legs. The patient states she has been suffering from pain in the legs for 6 months. The patient has presented for similar symptoms recently but states it is becoming worse. The patient describes her lower extremity pain as sometimes burning, tingling, and stabbing. She states she has tried gabapentin but it caused her GI issues. The patient also states she has tried Cymbalta but was not able to tolerate it as it caused a mental breakdown. The patient has not tried Lyrica in the past.   The patient has had Neurology follow up in the past and reports she has an upcoming appointment in April. We discussed the work up for neuropathy and whether to initiate medications in the outpatient setting vs inpatient. We decided that we will gather more data in the form of labs and possibly an outpatient EMG for one upper and one lower extremity. The patient will also follow up with her  neurologist at her upcoming appointment. The patient will be started on the appropriate medications in the outpatient setting.      Consult to neurology  Consult performed by: Josh Martinez  Consult ordered by: Alysia Farias MD        Historical Information   Past Medical History:   Diagnosis Date    Abnormal Pap smear of cervix     ADHD (attention deficit hyperactivity disorder)     Ankle fracture     Anxiety     Arthritis     back    Asthma     Bipolar disorder (HCC)     Cellulitis     right side fac in     Chronic pain disorder     Depression     Diverticulitis     Flank pain 2016    Hallucination     Hepatitis C     Hip disease     reports she had fluid removed from right hip and received treatment with antibiotic     History of abnormal cervical Pap smear     History of multiple miscarriages     IBS (irritable bowel syndrome)     Infantile idiopathic scoliosis     Joint pain     Kidney stone     Lactose intolerance     Low back pain     Myofascial pain syndrome     Peripheral neuropathy 3/28/2024    Poor dentition     Psychiatric illness     Psychosis (HCC)     PTSD (post-traumatic stress disorder)     Pyelonephritis affecting pregnancy     Right hand paresthesia     Right ovarian cyst     Scoliosis     Seizures (HCC)     Self-injurious behavior     Sleep difficulties     Slow transit constipation     Substance abuse (HCC)     Suicide attempt (HCC)      Past Surgical History:   Procedure Laterality Date     SECTION  2016     SECTION  2014    HIP SURGERY      ORTHOPEDIC SURGERY      MI  DELIVERY ONLY N/A 2016    Procedure:  SECTION () REPEAT;  Surgeon: Kurt Connor MD;  Location: [facility] ;  Service: Obstetrics    MI LIG/TRNSXJ FLP TUBE ABDL/VAG APPR UNI/BI Bilateral 2016    Procedure: LIGATION/COAGULATION TUBAL;  Surgeon: Kurt Connor MD;  Location: [facility] ;  Service: Obstetrics     Social History   Social History  "    Substance and Sexual Activity   Alcohol Use Never     Social History     Substance and Sexual Activity   Drug Use Not Currently    Types: Marijuana, Cocaine, \"Crack\" cocaine    Comment: on occasion      E-Cigarette/Vaping    E-Cigarette Use Former User      E-Cigarette/Vaping Substances    Nicotine No     THC No     CBD No     Flavoring No     Other No     Unknown No      Social History     Tobacco Use   Smoking Status Every Day    Current packs/day: 0.25    Average packs/day: 0.3 packs/day for 15.0 years (3.8 ttl pk-yrs)    Types: Cigarettes    Passive exposure: Never   Smokeless Tobacco Never   Tobacco Comments    pt refused smoking cessation teaching.      Family History: non-contributory  Meds/Allergies   all current active meds have been reviewed  Allergies   Allergen Reactions    Aspirin      Pt given enteric coated 81 mg, when ask pt denies any allergy, listed under allergies on paperwork provided by berny Hernández - Food Allergy Itching    Naproxen     Toradol [Ketorolac Tromethamine] GI Bleeding    Azithromycin Rash    Latex Hives and Rash    Neurontin [Gabapentin] Rash and GI Bleeding    Ppd [Tuberculin Purified Protein Derivative] Rash       Review of previous medical records was completed.     OBJECTIVE     Patient ID: Antionette Downing is a 38 y.o. female.    Vitals:   Vitals:    24 1505   BP: 132/71   BP Location: Left arm   Pulse: (!) 108   Resp: 18   Temp: 98 °F (36.7 °C)   TempSrc: Oral   SpO2: 97%      There is no height or weight on file to calculate BMI.   No intake or output data in the 24 hours ending 24 1651    Temperature:   Temp (24hrs), Av °F (36.7 °C), Min:98 °F (36.7 °C), Max:98 °F (36.7 °C)    Temperature: 98 °F (36.7 °C)    Invasive Devices:   Invasive Devices       Peripheral Intravenous Line  Duration             Peripheral IV 24 Right Antecubital <1 day                    Physical Exam  Eyes:      Pupils: Pupils are equal, round, and reactive to " light.      Funduscopic exam:     Right eye: No papilledema.         Left eye: No papilledema.      Comments: Mild dysconjugate gaze without any visual field deficits   Neurological:      Mental Status: She is oriented to person, place, and time.      Cranial Nerves: Cranial nerves 2-12 are intact.      Motor: Motor strength is normal.     Coordination: Finger-Nose-Finger Test normal.      Deep Tendon Reflexes:      Reflex Scores:       Bicep reflexes are 2+ on the right side and 2+ on the left side.       Brachioradialis reflexes are 2+ on the right side and 2+ on the left side.       Patellar reflexes are 2+ on the right side and 2+ on the left side.       Achilles reflexes are 2+ on the right side and 2+ on the left side.       Neurologic Exam     Mental Status   Oriented to person, place, and time.     Cranial Nerves   Cranial nerves II through XII intact.     CN III, IV, VI   Pupils are equal, round, and reactive to light.    Motor Exam   Muscle bulk: normal  Overall muscle tone: normal    Strength   Strength 5/5 throughout.     Sensory Exam   Light touch normal.   Right arm pinprick: normal  Left arm pinprick: normal  Right leg pinprick: decreased from ankle  Left leg pinprick: decreased from ankle    Gait, Coordination, and Reflexes     Coordination   Finger to nose coordination: normal    Reflexes   Right brachioradialis: 2+  Left brachioradialis: 2+  Right biceps: 2+  Left biceps: 2+  Right patellar: 2+  Left patellar: 2+  Right achilles: 2+  Left achilles: 2+  Right plantar: normal  Left plantar: normal         LABORATORY DATA     Labs: I have personally reviewed pertinent reports.    Results from last 7 days   Lab Units 03/28/24  1606 03/26/24  2119   WBC Thousand/uL 7.97 10.48*   HEMOGLOBIN g/dL 14.9 13.9   HEMATOCRIT % 45.4 42.1   PLATELETS Thousands/uL 217 195   NEUTROS PCT % 66 61   MONOS PCT % 7 7   EOS PCT % 1 1      Results from last 7 days   Lab Units 03/26/24  2119   POTASSIUM mmol/L 3.4*    CHLORIDE mmol/L 110*   CO2 mmol/L 18*   BUN mg/dL 8   CREATININE mg/dL 0.80   CALCIUM mg/dL 8.9                            IMAGING & DIAGNOSTIC TESTING     Radiology Results: I have personally reviewed pertinent reports.    CT abdomen pelvis with contrast    (Results Pending)       Other Diagnostic Testing: I have personally reviewed pertinent reports.      ACTIVE MEDICATIONS     No current facility-administered medications for this encounter.       Prior to Admission medications    Medication Sig Start Date End Date Taking? Authorizing Provider   Advair HFA 45-21 MCG/ACT inhaler INHALE 2 PUFFS 2 (TWO) TIMES A DAY RINSE MOUTH AFTER USE. 11/1/23   Jake Kruse MD   cetirizine (ZyrTEC) 10 mg tablet Take 1 tablet (10 mg total) by mouth daily 3/14/24   Claire Hawthorne MD   cyclobenzaprine (FLEXERIL) 10 mg tablet Take 1 tablet (10 mg total) by mouth 2 (two) times a day as needed for muscle spasms 9/18/23   Jake Kruse MD   dicyclomine (BENTYL) 20 mg tablet Take 1 tablet (20 mg total) by mouth every 6 (six) hours as-needed for abdominal cramping. 1/11/24   Erika Barnard MD   famotidine (PEPCID) 20 mg tablet Take 1 tablet (20 mg total) by mouth 2 (two) times a day as needed for indigestion or heartburn 10/2/23   Erika Barnard MD   magnesium Oxide (MAG-OX) 400 mg TABS TAKE 1 TABLET (400 MG TOTAL) BY MOUTH 2 (TWO) TIMES A DAY 8/8/23   Mitesh Ochoa MD   omeprazole (PriLOSEC) 40 MG capsule Take 1 capsule (40 mg total) by mouth daily 10/2/23   Erika Barnard MD   paliperidone (INVEGA) 6 MG 24 hr tablet Take 2 tablets (12 mg total) by mouth daily at bedtime 3/9/23   Kenny Hines MD   phenazopyridine (PYRIDIUM) 200 mg tablet Take 1 tablet (200 mg total) by mouth 3 (three) times a day 2/1/24   Kevin Vera Jr.,    polyethylene glycol (GLYCOLAX) 17 GM/SCOOP TAKE 17 G BY MOUTH DAILY 30 10/11/23   Radha Barreto DO   tiZANidine (ZANAFLEX) 4 mg tablet TAKE 1 TABLET (4 MG  TOTAL) BY MOUTH DAILY AT BEDTIME 8/8/23   Mitesh Ochoa MD   topiramate (TOPAMAX) 15 mg capsule Take 1 capsule (15 mg total) by mouth 2 (two) times a day 9/18/23   Jake Kruse MD   Ventolin  (90 Base) MCG/ACT inhaler INHALE 2 PUFFS EVERY 4 (FOUR) HOURS AS NEEDED FOR WHEEZING 1/3/24   Jake Kruse MD       CODE STATUS & ADVANCED DIRECTIVES     Code Status: Prior  Advance Directive and Living Will:      Power of : Yes  POLST:        VTE Pharmacologic Prophylaxis:   VTE Mechanical Prophylaxis:     ======    I have discussed the patient's history, physical exam findings, assessment, and plan in detail with attending, Dr. Angulo    Thank you for allowing me to participate in the care of your patient, Antionette Downing.    Josh Martinez  [facility]'s Neurology Residency, PGY-2

## 2024-03-28 NOTE — ED PROVIDER NOTES
History  Chief Complaint   Patient presents with    Vomiting     Vomiting that started 2 days ago. Also has neuropathy pain in hands and feet. Was just here on Tuesday for same thing. Pt fell from pain yesterday     HPI  38-year-old female with history of bipolar 1 presents to the emergency room with concerns of worsening peripheral neuropathy as well as nausea and vomiting.  She states that she had 2 episodes of nonbilious, nonbloody vomiting last night and additional to this morning.  She also states that she feels her neuropathy is getting worse and is now in her hands as well to the point that she says she cannot tell with her feet whether water is hot or cold.  Additionally she continues to complain of the right flank/right abdominal pain that she had 2 days ago when she presented for abdominal pain.  At that time, she had an extensive workup including GC and Chlamydia testing, UA, TSH, BMP, CBC, urine pregnancy, CT renal stone study which was all unremarkable.  Patient denies fevers, chills, chest pain, shortness of breath, diarrhea, hematuria, decreased urine output.    Prior to Admission Medications   Prescriptions Last Dose Informant Patient Reported? Taking?   Advair HFA 45-21 MCG/ACT inhaler   No No   Sig: INHALE 2 PUFFS 2 (TWO) TIMES A DAY RINSE MOUTH AFTER USE.   Ventolin  (90 Base) MCG/ACT inhaler   No No   Sig: INHALE 2 PUFFS EVERY 4 (FOUR) HOURS AS NEEDED FOR WHEEZING   cetirizine (ZyrTEC) 10 mg tablet   No No   Sig: Take 1 tablet (10 mg total) by mouth daily   cyclobenzaprine (FLEXERIL) 10 mg tablet   No No   Sig: Take 1 tablet (10 mg total) by mouth 2 (two) times a day as needed for muscle spasms   dicyclomine (BENTYL) 20 mg tablet   No No   Sig: Take 1 tablet (20 mg total) by mouth every 6 (six) hours as-needed for abdominal cramping.   famotidine (PEPCID) 20 mg tablet   No No   Sig: Take 1 tablet (20 mg total) by mouth 2 (two) times a day as needed for indigestion or heartburn   magnesium  Oxide (MAG-OX) 400 mg TABS   No No   Sig: TAKE 1 TABLET (400 MG TOTAL) BY MOUTH 2 (TWO) TIMES A DAY   omeprazole (PriLOSEC) 40 MG capsule   No No   Sig: Take 1 capsule (40 mg total) by mouth daily   paliperidone (INVEGA) 6 MG 24 hr tablet   No No   Sig: Take 2 tablets (12 mg total) by mouth daily at bedtime   phenazopyridine (PYRIDIUM) 200 mg tablet   No No   Sig: Take 1 tablet (200 mg total) by mouth 3 (three) times a day   polyethylene glycol (GLYCOLAX) 17 GM/SCOOP   No No   Sig: TAKE 17 G BY MOUTH DAILY 30   tiZANidine (ZANAFLEX) 4 mg tablet   No No   Sig: TAKE 1 TABLET (4 MG TOTAL) BY MOUTH DAILY AT BEDTIME   topiramate (TOPAMAX) 15 mg capsule   No No   Sig: Take 1 capsule (15 mg total) by mouth 2 (two) times a day      Facility-Administered Medications: None       Past Medical History:   Diagnosis Date    Abnormal Pap smear of cervix     ADHD (attention deficit hyperactivity disorder)     Ankle fracture     Anxiety     Arthritis     back    Asthma     Bipolar disorder (HCC)     Cellulitis     right side fac in 2014    Chronic pain disorder     Depression     Diverticulitis     Flank pain 08/16/2016    Hallucination     Hepatitis C     Hip disease 2006    reports she had fluid removed from right hip and received treatment with antibiotic     History of abnormal cervical Pap smear     History of multiple miscarriages     IBS (irritable bowel syndrome)     Infantile idiopathic scoliosis     Joint pain     Kidney stone     Lactose intolerance     Low back pain     Myofascial pain syndrome     Peripheral neuropathy 3/28/2024    Poor dentition     Psychiatric illness     Psychosis (HCC)     PTSD (post-traumatic stress disorder)     Pyelonephritis affecting pregnancy     Right hand paresthesia     Right ovarian cyst     Scoliosis     Seizures (HCC)     Self-injurious behavior     Sleep difficulties     Slow transit constipation     Substance abuse (HCC)     Suicide attempt ([facility])        Past Surgical History:  "  Procedure Laterality Date     SECTION  2016     SECTION  2014    HIP SURGERY      ORTHOPEDIC SURGERY      MD  DELIVERY ONLY N/A 2016    Procedure:  SECTION () REPEAT;  Surgeon: Kurt Connor MD;  [facility]: [facility];  Service: Obstetrics    MD LIG/TRNSXJ FLP TUBE ABDL/VAG APPR UNI/BI Bilateral 2016    Procedure: LIGATION/COAGULATION TUBAL;  Surgeon: Kurt Connor MD;  Location: [facility];  Service: Obstetrics       Family History   Problem Relation Age of Onset    Heart disease Maternal Aunt     Cancer Maternal Aunt     Diabetes Paternal Aunt     No Known Problems Mother     No Known Problems Father     No Known Problems Sister      I have reviewed and agree with the history as documented.    E-Cigarette/Vaping    E-Cigarette Use Former User      E-Cigarette/Vaping Substances    Nicotine No     THC No     CBD No     Flavoring No     Other No     Unknown No      Social History     Tobacco Use    Smoking status: Every Day     Current packs/day: 0.25     Average packs/day: 0.3 packs/day for 15.0 years (3.8 ttl pk-yrs)     Types: Cigarettes     Passive exposure: Never    Smokeless tobacco: Never    Tobacco comments:     pt refused smoking cessation teaching.    Vaping Use    Vaping status: Former   Substance Use Topics    Alcohol use: Never    Drug use: Not Currently     Types: Marijuana, Cocaine, \"Crack\" cocaine     Comment: on occasion         Review of Systems  See HPI    Physical Exam  ED Triage Vitals   Temperature Pulse Respirations Blood Pressure SpO2   24 1505 24 1505 24 1505 24 1505 24 1505   98 °F (36.7 °C) (!) 108 18 132/71 97 %      Temp Source Heart Rate Source Patient Position - Orthostatic VS BP Location FiO2 (%)   24 1505 -- 24 1505 24 1505 --   Oral  Lying Left arm       Pain Score       24 1508       10 - Worst Possible Pain             Orthostatic Vital Signs  Vitals:    " 03/28/24 1505   BP: 132/71   Pulse: (!) 108   Patient Position - Orthostatic VS: Lying       Physical Exam  Constitutional:       Appearance: Normal appearance.   HENT:      Head: Normocephalic and atraumatic.      Mouth/Throat:      Mouth: Mucous membranes are moist.      Pharynx: Oropharynx is clear.   Eyes:      Extraocular Movements: Extraocular movements intact.      Conjunctiva/sclera: Conjunctivae normal.   Cardiovascular:      Rate and Rhythm: Normal rate and regular rhythm.      Pulses: Normal pulses.      Heart sounds: Normal heart sounds.   Pulmonary:      Effort: Pulmonary effort is normal.      Breath sounds: Normal breath sounds.   Abdominal:      General: There is no distension.      Palpations: Abdomen is soft.      Tenderness: There is abdominal tenderness (Left lower quadrant).   Musculoskeletal:      Cervical back: Normal range of motion and neck supple.      Right lower leg: No edema.      Left lower leg: No edema.   Skin:     General: Skin is warm and dry.      Capillary Refill: Capillary refill takes less than 2 seconds.   Neurological:      General: No focal deficit present.      Mental Status: She is alert and oriented to person, place, and time.   Psychiatric:         Mood and Affect: Mood normal.         Behavior: Behavior normal.         Thought Content: Thought content normal.         ED Medications  Medications   ondansetron (ZOFRAN) injection 4 mg (4 mg Intravenous Given 3/28/24 1605)   HYDROmorphone HCl (DILAUDID) injection 0.2 mg (0.2 mg Intravenous Given 3/28/24 1605)   cephalexin (KEFLEX) capsule 500 mg (500 mg Oral Given 3/28/24 1714)   iohexol (OMNIPAQUE) 350 MG/ML injection (MULTI-DOSE) 100 mL (100 mL Intravenous Given 3/28/24 1817)       Diagnostic Studies  Results Reviewed       Procedure Component Value Units Date/Time    TSH, 3rd generation with Free T4 reflex [[id number]] Collected: 03/28/24 1723    Lab Status: In process Specimen: Blood from Hand, Left Updated: 03/28/24 1936     Folate [[id number]] Collected: 03/28/24 1723    Lab Status: In process Specimen: Blood from Hand, Left Updated: 03/28/24 1936    Basic metabolic panel [[id number]]  (Abnormal) Collected: 03/28/24 1723    Lab Status: Final result Specimen: Blood from Hand, Left Updated: 03/28/24 1804     Sodium 140 mmol/L      Potassium 3.6 mmol/L      Chloride 110 mmol/L      CO2 22 mmol/L      ANION GAP 8 mmol/L      BUN 9 mg/dL      Creatinine 0.75 mg/dL      Glucose 87 mg/dL      Calcium 9.0 mg/dL      eGFR 102 ml/min/1.73sq m     Narrative:      National Kidney Disease Foundation guidelines for Chronic Kidney Disease (CKD):     Stage 1 with normal or high GFR (GFR > 90 mL/min/1.73 square meters)    Stage 2 Mild CKD (GFR = 60-89 mL/min/1.73 square meters)    Stage 3A Moderate CKD (GFR = 45-59 mL/min/1.73 square meters)    Stage 3B Moderate CKD (GFR = 30-44 mL/min/1.73 square meters)    Stage 4 Severe CKD (GFR = 15-29 mL/min/1.73 square meters)    Stage 5 End Stage CKD (GFR <15 mL/min/1.73 square meters)  Note: GFR calculation is accurate only with a steady state creatinine    Protein electrophoresis, urine [[id number]] Collected: 03/28/24 1606    Lab Status: In process Specimen: Urine, Other Updated: 03/28/24 1746    Urine Microscopic [[id number]]  (Abnormal) Collected: 03/28/24 1606    Lab Status: Final result Specimen: Urine, Other Updated: 03/28/24 1723     RBC, UA None Seen /hpf      WBC, UA 4-10 /hpf      Epithelial Cells Occasional /hpf      Bacteria, UA Occasional /hpf      MUCUS THREADS Innumerable     Amorphous Crystals, UA Moderate     Uric Acid Ángela, UA Occasional /hpf     Vitamin B12 [[id number]]  (Normal) Collected: 03/28/24 1606    Lab Status: Final result Specimen: Blood from Arm, Right Updated: 03/28/24 1707     Vitamin B-12 217 pg/mL     Sedimentation rate, automated [[id number]]  (Normal) Collected: 03/28/24 1606    Lab Status: Final result Specimen: Blood from Arm, Right Updated: 03/28/24 1706     Sed Rate 14  mm/hour     Vitamin B1, whole blood [[id number]] Collected: 03/28/24 1643    Lab Status: In process Specimen: Blood from Arm, Right Updated: 03/28/24 1647    ONEIL Screen w/ Reflex to Titer/Pattern [[id number]] Collected: 03/28/24 1643    Lab Status: In process Specimen: Blood from Arm, Right Updated: 03/28/24 1647    UA w Reflex to Microscopic w Reflex to Culture [[id number]]  (Abnormal) Collected: 03/28/24 1606    Lab Status: Final result Specimen: Urine, Other Updated: 03/28/24 1642     Color, UA Dark Yellow     Clarity, UA Hazy     Specific Gravity, UA 1.025     pH, UA 5.5     Leukocytes, UA Trace     Nitrite, UA Positive     Protein, UA 30 (1+) mg/dl      Glucose, UA Negative mg/dl      Ketones, UA 10 (1+) mg/dl      Urobilinogen, UA <2.0 mg/dl      Bilirubin, UA Negative     Occult Blood, UA Trace    POCT pregnancy, urine [[id number]]  (Normal) Resulted: 03/28/24 1633    Lab Status: Final result Updated: 03/28/24 1633     EXT Preg Test, Ur Negative     Control Valid    CBC and differential [[id number]] Collected: 03/28/24 1606    Lab Status: Final result Specimen: Blood from Arm, Right Updated: 03/28/24 1627     WBC 7.97 Thousand/uL      RBC 4.84 Million/uL      Hemoglobin 14.9 g/dL      Hematocrit 45.4 %      MCV 94 fL      MCH 30.8 pg      MCHC 32.8 g/dL      RDW 11.9 %      MPV 11.3 fL      Platelets 217 Thousands/uL      nRBC 0 /100 WBCs      Neutrophils Relative 66 %      Immature Grans % 1 %      Lymphocytes Relative 25 %      Monocytes Relative 7 %      Eosinophils Relative 1 %      Basophils Relative 0 %      Neutrophils Absolute 5.30 Thousands/µL      Absolute Immature Grans 0.05 Thousand/uL      Absolute Lymphocytes 1.99 Thousands/µL      Absolute Monocytes 0.56 Thousand/µL      Eosinophils Absolute 0.04 Thousand/µL      Basophils Absolute 0.03 Thousands/µL     Protein electrophoresis, serum [[id number]]     Lab Status: No result Specimen: Blood     Hemoglobin A1C [[id number]]     Lab Status: No result  Specimen: Blood     Lyme Total AB W Reflex to IGM/IGG [[id number]]     Lab Status: No result Specimen: Blood     RPR-Syphilis Screening (Total Syphilis IGG/IGM) [[id number]] Collected: 03/28/24 1606    Lab Status: In process Specimen: Blood from Arm, Right Updated: 03/28/24 1616                   CT abdomen pelvis with contrast   Final Result by Gerardo Rice DO (03/28 1916)   Small corpus luteum left ovary.         Workstation performed: [id number]         US bedside procedure   Final Result by Interface, Externalimages (03/28 1801)            Procedures  Procedures      ED Course  ED Course as of 03/28/24 1956   Thu Mar 28, 2024   1811 UA w Reflex to Microscopic w Reflex to Culture(!)  Positive for infection, will treat   1835 Vitamin B-12: 217   1952 Discussed case with psychiatry who did see patient and do not recommend any medication changes at this time and states that patient is appropriate for outpatient follow-up and patient does have sooner appointment.  I also discussed case with neurology who added on additional lab work and ordered an EMG and states that patient is also appropriate for outpatient follow-up.   1954 I discussed workup and diagnosis of UTI with patient and plan for antibiotics.  Patient is feeling somewhat better after medications.  I also discussed psychiatry and neurology recommendations. Patient voiced understanding of the plan and all questions were answered. Strict return precautions given. Patient is hemodynamically stable and safe for discharge at this time.                                       Medical Decision Making  38-year-old female with nausea and vomiting and persistent right-sided abdominal pain as well as worsening of peripheral neuropathy.  Denies fevers, chills, chest pain, shortness of breath, diarrhea, hematuria, decreased urine output. Patient with normal VS on presentation. Appears well and NAD. HEENT exam unremarkable with moist mucous membranes. Lungs CTA with  good air movement. Heart sounds normal with RRR. Abdominal exam with left lower quadrant tenderness but otherwise benign. Normal cap refill and equal pulses. No LE edema.  Concern for UTI, viral gastritis, diverticulitis, ruptured ovarian cyst, B12 deficiency, syphilis.  I will get CBC, BMP, B12 level, RPR, UA, CT abdomen pelvis with contrast.  I will also speak with neurology and psychiatry.    Amount and/or Complexity of Data Reviewed  Labs: ordered. Decision-making details documented in ED Course.  Radiology: ordered.    Risk  Prescription drug management.        Disposition  Final diagnoses:   Bipolar I disorder, most recent episode manic, severe with psychotic features (HCC)   Peripheral neuropathy   UTI (urinary tract infection)     Time reflects when diagnosis was documented in both MDM as applicable and the Disposition within this note       Time User Action Codes Description Comment    3/28/2024  3:34 PM Chris Velásquez Add [F31.2] Bipolar I disorder, most recent episode manic, severe with psychotic features (HCC)     3/28/2024  4:29 PM Alysia Farias Add [G62.9] Peripheral neuropathy     3/28/2024  5:01 PM Palladino, Annette Add [N39.0] UTI (urinary tract infection)     3/28/2024  7:34 PM Chris Velásquez Modify [F31.2] Bipolar I disorder, most recent episode manic, severe with psychotic features (HCC)     3/28/2024  7:34 PM Chris Velásquez Modify [N39.0] UTI (urinary tract infection)           ED Disposition       ED Disposition   Discharge    Condition   Stable    Date/Time   Thu Mar 28, 2024  7:34 PM    Comment   Antionette Downing discharge to home/self care.                   Follow-up Information    None         Patient's Medications   Discharge Prescriptions    CEPHALEXIN (KEFLEX) 500 MG CAPSULE    Take 1 capsule (500 mg total) by mouth every 6 (six) hours for 5 days       Start Date: 3/28/2024 End Date: 4/2/2024       Order Dose: 500 mg       Quantity: 20 capsule    Refills: 0      Outpatient Discharge Orders   EMG 1 Upper/1 Lower Neuropathy   Standing Status: Future Standing Exp. Date: 03/28/25       PDMP Review         Value Time User    PDMP Reviewed  Yes 2/1/2024 10:40 PM Kevin Vera Jr.,              ED Provider  Attending physically available and evaluated Antionette Downing. I managed the patient along with the ED Attending.    Electronically Signed by           Chris Velásquez DO  03/28/24 1957

## 2024-03-28 NOTE — ED ATTENDING ATTESTATION
3/28/2024  I, Alysia Farias MD, saw and evaluated the patient. I have discussed the patient with the resident/non-physician practitioner and agree with the resident's/non-physician practitioner's findings, Plan of Care, and MDM as documented in the resident's/non-physician practitioner's note, except where noted. All available labs and Radiology studies were reviewed.  I was present for key portions of any procedure(s) performed by the resident/non-physician practitioner and I was immediately available to provide assistance.       At this point I agree with the current assessment done in the Emergency Department.  I have conducted an independent evaluation of this patient a history and physical is as follows:  This is a 38-year-old woman who presents to the emergency department with worsening peripheral neuropathy as well as some nausea, vomiting, abdominal discomfort, back discomfort, and intermittent headaches.  Patient was seen 2 days ago for same.  Patient states that she has had 2 episodes of nonbloody nonbilious vomiting last night and this morning.  The patient has had neuropathy for some time, many months, but it is getting worse and now is involving her hands as well as her feet.  The patient states that when she takes a hot shower, she cannot feel the temperature of the water on her feet.  The patient has ill-defined abdominal pain that she also states has been chronic and longstanding.  The patient's neuropathy started after being stopped from her lithium from her bipolar disorder.  Patient has not had fevers, but does endorse chills.  She states her pain is mostly in her left lower abdomen, comes and goes.  Patient's review of systems is diffusely positive in 12 systems were reviewed.  On exam vital signs were reviewed.  Patient is awake, alert, interactive.  The patient's pupils are equally round reactive to light.  Oropharynx is clear with moist mucous membranes.  Neck is supple and nontender  with no adenopathy or JVD.  Heart is regular with no murmurs, rubs, or gallops.  Lungs are clear and equal with no wheezes, rales, or rhonchi.  Abdomen is soft and nontender with no masses, rebound, or guarding. There is no CVA tenderness.  The patient was completely exposed.  There is no skin breakdown.  There are no rashes or skin changes.  Extremities are warm and well perfused with good pulses. The patient has normal strength, sensation, and cranial nerves.MEDICAL DECISION MAKING    Number and Complexity of Problems  Differential diagnosis: Diverticulitis, intra-abdominal surgical process, doubt electrolyte abnormality, concern for peripheral neuropathy which may be secondary to syphilis, B12 deficiency,    Medical Decision Making Data  External documents reviewed: Patient's ED visit from 2 days ago reviewed, CT at that time which was Noncon did not show any evidence of renal stones or intra-abdominal pathology  My EKG interpretation: Sinus with no ischemia or ectopy  My CT interpretation: Negative  My X-ray interpretation:   My ultrasound interpretation:     CT abdomen pelvis with contrast   Final Result   Small corpus luteum left ovary.         Workstation performed: [id number]         US bedside procedure   Final Result          Labs Reviewed   UA W REFLEX TO MICROSCOPIC WITH REFLEX TO CULTURE - Abnormal       Result Value Ref Range Status    Color, UA Dark Yellow   Final    Clarity, UA Hazy   Final    Specific Gravity, UA 1.025  1.003 - 1.030 Final    pH, UA 5.5  4.5, 5.0, 5.5, 6.0, 6.5, 7.0, 7.5, 8.0 Final    Leukocytes, UA Trace (*) Negative Final    Nitrite, UA Positive (*) Negative Final    Protein, UA 30 (1+) (*) Negative mg/dl Final    Glucose, UA Negative  Negative mg/dl Final    Ketones, UA 10 (1+) (*) Negative mg/dl Final    Urobilinogen, UA <2.0  <2.0 mg/dl mg/dl Final    Bilirubin, UA Negative  Negative Final    Occult Blood, UA Trace (*) Negative Final   BASIC METABOLIC PANEL - Abnormal    Sodium  140  135 - 147 mmol/L Final    Potassium 3.6  3.5 - 5.3 mmol/L Final    Chloride 110 (*) 96 - 108 mmol/L Final    CO2 22  21 - 32 mmol/L Final    ANION GAP 8  4 - 13 mmol/L Final    BUN 9  5 - 25 mg/dL Final    Creatinine 0.75  0.60 - 1.30 mg/dL Final    Comment: Standardized to IDMS reference method    Glucose 87  65 - 140 mg/dL Final    Comment: If the patient is fasting, the ADA then defines impaired fasting glucose as > 100 mg/dL and diabetes as > or equal to 123 mg/dL.    Calcium 9.0  8.4 - 10.2 mg/dL Final    eGFR 102  ml/min/1.73sq m Final    Narrative:     National Kidney Disease Foundation guidelines for Chronic Kidney Disease (CKD):     Stage 1 with normal or high GFR (GFR > 90 mL/min/1.73 square meters)    Stage 2 Mild CKD (GFR = 60-89 mL/min/1.73 square meters)    Stage 3A Moderate CKD (GFR = 45-59 mL/min/1.73 square meters)    Stage 3B Moderate CKD (GFR = 30-44 mL/min/1.73 square meters)    Stage 4 Severe CKD (GFR = 15-29 mL/min/1.73 square meters)    Stage 5 End Stage CKD (GFR <15 mL/min/1.73 square meters)  Note: GFR calculation is accurate only with a steady state creatinine   URINE MICROSCOPIC - Abnormal    RBC, UA None Seen  None Seen, 1-2 /hpf Final    WBC, UA 4-10 (*) None Seen, 1-2 /hpf Final    Epithelial Cells Occasional  None Seen, Occasional /hpf Final    Bacteria, UA Occasional  None Seen, Occasional /hpf Final    MUCUS THREADS Innumerable (*) None Seen Final    Amorphous Crystals, UA Moderate   Final    Uric Acid Ángela, UA Occasional  /hpf Final   VITAMIN B12 - Normal    Vitamin B-12 217  180 - 914 pg/mL Final   SEDIMENTATION RATE - Normal    Sed Rate 14  0 - 19 mm/hour Final   POCT PREGNANCY, URINE - Normal    EXT Preg Test, Ur Negative   Final    Control Valid   Final   CBC AND DIFFERENTIAL    WBC 7.97  4.31 - 10.16 Thousand/uL Final    RBC 4.84  3.81 - 5.12 Million/uL Final    Hemoglobin 14.9  11.5 - 15.4 g/dL Final    Hematocrit 45.4  34.8 - 46.1 % Final    MCV 94  82 - 98 fL Final     MCH 30.8  26.8 - 34.3 pg Final    MCHC 32.8  31.4 - 37.4 g/dL Final    RDW 11.9  11.6 - 15.1 % Final    MPV 11.3  8.9 - 12.7 fL Final    Platelets 217  149 - 390 Thousands/uL Final    nRBC 0  /100 WBCs Final    Neutrophils Relative 66  43 - 75 % Final    Immature Grans % 1  0 - 2 % Final    Lymphocytes Relative 25  14 - 44 % Final    Monocytes Relative 7  4 - 12 % Final    Eosinophils Relative 1  0 - 6 % Final    Basophils Relative 0  0 - 1 % Final    Neutrophils Absolute 5.30  1.85 - 7.62 Thousands/µL Final    Absolute Immature Grans 0.05  0.00 - 0.20 Thousand/uL Final    Absolute Lymphocytes 1.99  0.60 - 4.47 Thousands/µL Final    Absolute Monocytes 0.56  0.17 - 1.22 Thousand/µL Final    Eosinophils Absolute 0.04  0.00 - 0.61 Thousand/µL Final    Basophils Absolute 0.03  0.00 - 0.10 Thousands/µL Final   VITAMIN B1, WHOLE BLOOD       Labs reviewed by me are significant for: Unremarkable    Clinical decision rules/scores are significant for:     Discussed case with:   Considered admission for:     Treatment and Disposition  ED course: Seen and examined, patient's prior CT reviewed.  Will plan to do contrasted to CT to rule out surgical disease.  Will additionally consult neurology regarding peripheral neuropathy diagnosis 1 lower abdominal pain, 2 back pain, 3 peripheral neuropathy,  Shared decision making:   Code status:     ED Course         Critical Care Time  Procedures

## 2024-03-28 NOTE — ASSESSMENT & PLAN NOTE
38 year old female with a 6 month history of peripheral neuropathy in bilateral lower extremities. The patient reported the neuropathy has been getting worse. We discussed collecting more data in the form of labs and outpatient EMG. The patient has an upcoming follow up with her neurologist.     Assessment: Peripheral neuropathy.    Plan:  - Outpatient EMG 1 upper and 1 lower limb  - B12, Folate, Thiamine, HbA1c, Lyme total AB w/ reflex, SPEP, UPEP, ESR, TSH, ONEIL w/reflex ordered and pending  - Follow up outpatient with Neurology.
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
## 2024-03-29 LAB
ALBUMIN UR ELPH-MCNC: 100 %
ALPHA1 GLOB MFR UR ELPH: 0 %
ALPHA2 GLOB MFR UR ELPH: 0 %
ATRIAL RATE: 86 BPM
B-GLOBULIN MFR UR ELPH: 0 %
BACTERIA UR CULT: ABNORMAL
BACTERIA UR CULT: ABNORMAL
GAMMA GLOB MFR UR ELPH: 0 %
P AXIS: 65 DEGREES
PR INTERVAL: 140 MS
PROT PATTERN UR ELPH-IMP: NORMAL
PROT UR-MCNC: 23 MG/DL
QRS AXIS: 82 DEGREES
QRSD INTERVAL: 82 MS
QT INTERVAL: 366 MS
QTC INTERVAL: 437 MS
T WAVE AXIS: 52 DEGREES
VENTRICULAR RATE: 86 BPM

## 2024-03-29 PROCEDURE — 84166 PROTEIN E-PHORESIS/URINE/CSF: CPT | Performed by: PATHOLOGY

## 2024-03-29 PROCEDURE — 93010 ELECTROCARDIOGRAM REPORT: CPT | Performed by: INTERNAL MEDICINE

## 2024-04-03 LAB — VIT B1 BLD-SCNC: 96.3 NMOL/L (ref 66.5–200)

## 2024-04-15 ENCOUNTER — OFFICE VISIT (OUTPATIENT)
Dept: NEUROLOGY | Facility: CLINIC | Age: 39
End: 2024-04-15
Payer: COMMERCIAL

## 2024-04-15 VITALS
DIASTOLIC BLOOD PRESSURE: 60 MMHG | HEART RATE: 94 BPM | WEIGHT: 145 LBS | HEIGHT: 62 IN | BODY MASS INDEX: 26.68 KG/M2 | SYSTOLIC BLOOD PRESSURE: 131 MMHG | TEMPERATURE: 98.2 F

## 2024-04-15 DIAGNOSIS — M54.2 CERVICALGIA: ICD-10-CM

## 2024-04-15 DIAGNOSIS — R20.2 PARESTHESIAS: ICD-10-CM

## 2024-04-15 DIAGNOSIS — K21.9 GERD (GASTROESOPHAGEAL REFLUX DISEASE): ICD-10-CM

## 2024-04-15 DIAGNOSIS — E53.8 B12 DEFICIENCY: ICD-10-CM

## 2024-04-15 DIAGNOSIS — E55.9 VITAMIN D DEFICIENCY: ICD-10-CM

## 2024-04-15 DIAGNOSIS — G43.909 MIGRAINE HEADACHE: ICD-10-CM

## 2024-04-15 DIAGNOSIS — F44.5 PSYCHOGENIC NONEPILEPTIC SEIZURE: Primary | ICD-10-CM

## 2024-04-15 PROCEDURE — 99215 OFFICE O/P EST HI 40 MIN: CPT | Performed by: NURSE PRACTITIONER

## 2024-04-15 RX ORDER — ARIPIPRAZOLE 10 MG/1
TABLET ORAL
Status: ON HOLD | COMMUNITY
Start: 2024-04-10

## 2024-04-15 RX ORDER — OXCARBAZEPINE 300 MG/1
TABLET, FILM COATED ORAL
Status: ON HOLD | COMMUNITY
Start: 2024-04-14

## 2024-04-15 RX ORDER — TIZANIDINE 2 MG/1
2 TABLET ORAL
Qty: 30 TABLET | Refills: 5 | Status: ON HOLD | OUTPATIENT
Start: 2024-04-15

## 2024-04-15 RX ORDER — RIBOFLAVIN (VITAMIN B2) 400 MG
400 TABLET ORAL DAILY
Qty: 90 TABLET | Refills: 3 | Status: ON HOLD | OUTPATIENT
Start: 2024-04-15

## 2024-04-15 RX ORDER — LANOLIN ALCOHOL/MO/W.PET/CERES
400 CREAM (GRAM) TOPICAL 2 TIMES DAILY
Qty: 60 TABLET | Refills: 1 | Status: SHIPPED | OUTPATIENT
Start: 2024-04-15 | End: 2024-04-22

## 2024-04-15 RX ORDER — HYDROXYZINE 50 MG/1
TABLET, FILM COATED ORAL
Status: ON HOLD | COMMUNITY
Start: 2024-04-10

## 2024-04-15 NOTE — PATIENT INSTRUCTIONS
- Let us know if there are any episodes concerning for seizures  - Start taking B12 500 mcg daily - sent to your pharmacy   - Low B12 can make headaches worse as well as cause numbness and tingling  - Have labs done - we are going to check vitamin D as well  - Have the EMG completed - this is the test for your nerve fibers (test for neuropathy) so we know how to best treat the pins and needles  - For headaches - start taking magnesium oxide 400 mg twice per day - if there are loose stools, take 400 mg just once per day at night time   - start taking vitamin B2 400 mg once per day  - Restart tizanidine 2 mg at night time (you were on 4 mg in the past) - in a few weeks, if you do not feel like to 2 mg is helpful we can go up to 4 mg HS.   - Avoid taking tylenol every day - should not be taken more than 3 times per week. If you are taking this daily, it is likely making your headaches worse  - Drink at least 64 oz of water per day  - Work on sleep  - Call the office with worsening headaches or pins and needles  - Follow up in 3-4 months with Dr Jimenez
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
## 2024-04-15 NOTE — PROGRESS NOTES
Review of Systems   Constitutional:  Negative for appetite change, fatigue and fever.   HENT: Negative.  Negative for hearing loss, tinnitus, trouble swallowing and voice change.    Eyes: Negative.  Negative for photophobia, pain and visual disturbance.   Respiratory: Negative.  Negative for shortness of breath.    Cardiovascular: Negative.  Negative for palpitations.   Gastrointestinal: Negative.  Negative for nausea and vomiting.   Endocrine: Negative.  Negative for cold intolerance.   Genitourinary: Negative.  Negative for dysuria, frequency and urgency.   Musculoskeletal:  Negative for back pain, gait problem, myalgias, neck pain and neck stiffness.   Skin: Negative.  Negative for rash.   Allergic/Immunologic: Negative.    Neurological:  Positive for weakness (Pins and needles both feet and hands) and headaches (Daily, severe). Negative for dizziness, tremors, seizures, syncope, facial asymmetry, speech difficulty, light-headedness and numbness.   Hematological: Negative.  Does not bruise/bleed easily.   Psychiatric/Behavioral: Negative.  Negative for confusion, hallucinations and sleep disturbance.    All other systems reviewed and are negative.

## 2024-04-16 NOTE — ASSESSMENT & PLAN NOTE
Recent ER visit and neurology evaluation for numbness of feet. She actually has numbness of bilateral hands and feet. The numbness in her hands is intermittent (topiramate may contribute but this is being prescribed by psychiatry). The numbness and pain in her feet is persistent. She has had chronic decreased sensation in right upper and lower extremities for many years.     Her B12 was significantly low when most recently checked. Recommended replacement with 500 mcg daily as this may be contributing to her headaches and paresthesias. Recent TSH, folate, and A1C were normal. Will check methylmalonic acid and SPEP which were not checked in the ER by neurology. They also placed an order for an EMG of one upper and one lower extremity despite being examined for bilateral lower extremity paresthesias. Since she is having symptoms in all extremities, recommended an EMG be completed in all extremities.  It is important to note there is not appreciated weakness in any extremity. DTRs are 1+ throughout. The sensory change distrubution in patchy but mostly peripheral in all extremities. Right sided symptoms have been present for many years, not acute, no need for MRI at this time.     If her symptoms worsen prior to EMG/follow up she will call the office. If she does have neuropathy based on EMG, could consider lyrica given she did not tolerate other options in the past and did follow with pain management in the past whom she is not interested in seeing in the future.
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
## 2024-04-20 DIAGNOSIS — K21.9 GERD (GASTROESOPHAGEAL REFLUX DISEASE): ICD-10-CM

## 2024-04-22 RX ORDER — LANOLIN ALCOHOL/MO/W.PET/CERES
400 CREAM (GRAM) TOPICAL 2 TIMES DAILY
Qty: 60 TABLET | Refills: 1 | Status: ON HOLD | OUTPATIENT
Start: 2024-04-22 | End: 2024-05-06

## 2024-04-23 ENCOUNTER — HOSPITAL ENCOUNTER (EMERGENCY)
Facility: HOSPITAL | Age: 39
Discharge: HOME | End: 2024-04-23
Attending: EMERGENCY MEDICINE
Payer: COMMERCIAL

## 2024-04-23 ENCOUNTER — APPOINTMENT (EMERGENCY)
Dept: CT IMAGING | Facility: HOSPITAL | Age: 39
End: 2024-04-23
Payer: COMMERCIAL

## 2024-04-23 VITALS
BODY MASS INDEX: 25.48 KG/M2 | OXYGEN SATURATION: 96 % | TEMPERATURE: 98.3 F | WEIGHT: 139.33 LBS | DIASTOLIC BLOOD PRESSURE: 68 MMHG | SYSTOLIC BLOOD PRESSURE: 122 MMHG | RESPIRATION RATE: 16 BRPM | HEART RATE: 80 BPM

## 2024-04-23 DIAGNOSIS — M54.2 NECK PAIN: ICD-10-CM
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
## 2024-04-23 DIAGNOSIS — Z20.2 POSSIBLE EXPOSURE TO STD: ICD-10-CM
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
## 2024-04-23 DIAGNOSIS — B37.9 CANDIDA INFECTION: ICD-10-CM

## 2024-04-23 LAB
ALBUMIN SERPL BCP-MCNC: 4.4 G/DL (ref 3.5–5)
ALP SERPL-CCNC: 74 U/L (ref 34–104)
ALT SERPL W P-5'-P-CCNC: 10 U/L (ref 7–52)
AMPHETAMINES SERPL QL SCN: NEGATIVE
ANION GAP SERPL CALCULATED.3IONS-SCNC: 9 MMOL/L (ref 4–13)
APAP SERPL-MCNC: <2 UG/ML (ref 10–20)
AST SERPL W P-5'-P-CCNC: 12 U/L (ref 13–39)
BACTERIA UR QL AUTO: ABNORMAL /HPF
BARBITURATES UR QL: NEGATIVE
BASOPHILS # BLD AUTO: 0.03 THOUSANDS/ÂΜL (ref 0–0.1)
BASOPHILS NFR BLD AUTO: 0 % (ref 0–1)
BENZODIAZ UR QL: NEGATIVE
BILIRUB SERPL-MCNC: 0.37 MG/DL (ref 0.2–1)
BILIRUB UR QL STRIP: ABNORMAL
BUN SERPL-MCNC: 4 MG/DL (ref 5–25)
CALCIUM SERPL-MCNC: 9.1 MG/DL (ref 8.4–10.2)
CHLORIDE SERPL-SCNC: 106 MMOL/L (ref 96–108)
CLARITY UR: ABNORMAL
CO2 SERPL-SCNC: 21 MMOL/L (ref 21–32)
COCAINE UR QL: NEGATIVE
COLOR UR: ABNORMAL
CREAT SERPL-MCNC: 0.75 MG/DL (ref 0.6–1.3)
EOSINOPHIL # BLD AUTO: 0.06 THOUSAND/ÂΜL (ref 0–0.61)
EOSINOPHIL NFR BLD AUTO: 1 % (ref 0–6)
ERYTHROCYTE [DISTWIDTH] IN BLOOD BY AUTOMATED COUNT: 12.2 % (ref 11.6–15.1)
ETHANOL SERPL-MCNC: <10 MG/DL
FENTANYL UR QL SCN: NEGATIVE
GFR SERPL CREATININE-BSD FRML MDRD: 102 ML/MIN/1.73SQ M
GLUCOSE SERPL-MCNC: 103 MG/DL (ref 65–140)
GLUCOSE UR STRIP-MCNC: NEGATIVE MG/DL
HCG SERPL QL: NEGATIVE
HCT VFR BLD AUTO: 39.9 % (ref 34.8–46.1)
HGB BLD-MCNC: 14.2 G/DL (ref 11.5–15.4)
HGB UR QL STRIP.AUTO: ABNORMAL
HYALINE CASTS #/AREA URNS LPF: ABNORMAL /LPF
HYDROCODONE UR QL SCN: NEGATIVE
IMM GRANULOCYTES # BLD AUTO: 0.02 THOUSAND/UL (ref 0–0.2)
IMM GRANULOCYTES NFR BLD AUTO: 0 % (ref 0–2)
KETONES UR STRIP-MCNC: ABNORMAL MG/DL
LEUKOCYTE ESTERASE UR QL STRIP: NEGATIVE
LYMPHOCYTES # BLD AUTO: 2.55 THOUSANDS/ÂΜL (ref 0.6–4.47)
LYMPHOCYTES NFR BLD AUTO: 33 % (ref 14–44)
MCH RBC QN AUTO: 31.1 PG (ref 26.8–34.3)
MCHC RBC AUTO-ENTMCNC: 35.6 G/DL (ref 31.4–37.4)
MCV RBC AUTO: 87 FL (ref 82–98)
METHADONE UR QL: NEGATIVE
MONOCYTES # BLD AUTO: 0.78 THOUSAND/ÂΜL (ref 0.17–1.22)
MONOCYTES NFR BLD AUTO: 10 % (ref 4–12)
MUCOUS THREADS UR QL AUTO: ABNORMAL
NEUTROPHILS # BLD AUTO: 4.32 THOUSANDS/ÂΜL (ref 1.85–7.62)
NEUTS SEG NFR BLD AUTO: 56 % (ref 43–75)
NITRITE UR QL STRIP: NEGATIVE
NON-SQ EPI CELLS URNS QL MICRO: ABNORMAL /HPF
NRBC BLD AUTO-RTO: 0 /100 WBCS
OPIATES UR QL SCN: NEGATIVE
OXYCODONE+OXYMORPHONE UR QL SCN: NEGATIVE
PCP UR QL: NEGATIVE
PH UR STRIP.AUTO: 6 [PH] (ref 4.5–8)
PLATELET # BLD AUTO: 214 THOUSANDS/UL (ref 149–390)
PMV BLD AUTO: 10.3 FL (ref 8.9–12.7)
POTASSIUM SERPL-SCNC: 3.1 MMOL/L (ref 3.5–5.3)
PROT SERPL-MCNC: 6.9 G/DL (ref 6.4–8.4)
PROT UR STRIP-MCNC: ABNORMAL MG/DL
RBC # BLD AUTO: 4.57 MILLION/UL (ref 3.81–5.12)
RBC #/AREA URNS AUTO: ABNORMAL /HPF
SALICYLATES SERPL-MCNC: <5 MG/DL (ref 3–20)
SODIUM SERPL-SCNC: 136 MMOL/L (ref 135–147)
SP GR UR STRIP.AUTO: >=1.03 (ref 1–1.03)
THC UR QL: NEGATIVE
UROBILINOGEN UR QL STRIP.AUTO: 0.2 E.U./DL
WBC # BLD AUTO: 7.76 THOUSAND/UL (ref 4.31–10.16)
WBC #/AREA URNS AUTO: ABNORMAL /HPF

## 2024-04-23 PROCEDURE — 80179 DRUG ASSAY SALICYLATE: CPT

## 2024-04-23 PROCEDURE — 87086 URINE CULTURE/COLONY COUNT: CPT

## 2024-04-23 PROCEDURE — 80053 COMPREHEN METABOLIC PANEL: CPT

## 2024-04-23 PROCEDURE — 87591 N.GONORRHOEAE DNA AMP PROB: CPT

## 2024-04-23 PROCEDURE — 81514 NFCT DS BV&VAGINITIS DNA ALG: CPT

## 2024-04-23 PROCEDURE — 80143 DRUG ASSAY ACETAMINOPHEN: CPT

## 2024-04-23 PROCEDURE — 36415 COLL VENOUS BLD VENIPUNCTURE: CPT

## 2024-04-23 PROCEDURE — 80307 DRUG TEST PRSMV CHEM ANLYZR: CPT

## 2024-04-23 PROCEDURE — 99284 EMERGENCY DEPT VISIT MOD MDM: CPT

## 2024-04-23 PROCEDURE — 84703 CHORIONIC GONADOTROPIN ASSAY: CPT

## 2024-04-23 PROCEDURE — 82077 ASSAY SPEC XCP UR&BREATH IA: CPT

## 2024-04-23 PROCEDURE — 72125 CT NECK SPINE W/O DYE: CPT

## 2024-04-23 PROCEDURE — 85025 COMPLETE CBC W/AUTO DIFF WBC: CPT

## 2024-04-23 PROCEDURE — 70450 CT HEAD/BRAIN W/O DYE: CPT

## 2024-04-23 PROCEDURE — 87491 CHLMYD TRACH DNA AMP PROBE: CPT

## 2024-04-23 PROCEDURE — 81001 URINALYSIS AUTO W/SCOPE: CPT

## 2024-04-24 LAB
BACTERIA UR CULT: NORMAL
C GLABRATA DNA VAG QL NAA+PROBE: NEGATIVE
C KRUSEI DNA VAG QL NAA+PROBE: NEGATIVE
C TRACH DNA SPEC QL NAA+PROBE: NEGATIVE
CANDIDA SP 6 PNL VAG NAA+PROBE: POSITIVE
N GONORRHOEA DNA SPEC QL NAA+PROBE: NEGATIVE
T VAGINALIS DNA VAG QL NAA+PROBE: NEGATIVE
VAGINOSIS/ITIS DNA PNL VAG PROBE+SIG AMP: NEGATIVE

## 2024-04-24 RX ORDER — FLUCONAZOLE 150 MG/1
150 TABLET ORAL ONCE
Qty: 1 TABLET | Refills: 0 | Status: SHIPPED | OUTPATIENT
Start: 2024-04-24 | End: 2024-04-24
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
## 2024-04-24 NOTE — ED NOTES
EMMA Quinteros requested [facility] to touch base with patient. Patient denies suicidal ideation, homicidal ideation and auditory/visual/tactile hallucinations. Patient reports she has an outpatient treatment team that she is active with. Patient has no psychiatric complaints at this time.    Kenny Garland  Crisis Intervention Specialist II  04/23/24
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
## 2024-04-26 ENCOUNTER — HOSPITAL ENCOUNTER (INPATIENT)
Facility: HOSPITAL | Age: 39
LOS: 11 days | Discharge: HOME | DRG: 753 | End: 2024-05-07
Attending: STUDENT IN AN ORGANIZED HEALTH CARE EDUCATION/TRAINING PROGRAM | Admitting: STUDENT IN AN ORGANIZED HEALTH CARE EDUCATION/TRAINING PROGRAM
Payer: COMMERCIAL

## 2024-04-26 ENCOUNTER — HOSPITAL ENCOUNTER (EMERGENCY)
Facility: HOSPITAL | Age: 39
End: 2024-04-26
Attending: EMERGENCY MEDICINE
Payer: COMMERCIAL

## 2024-04-26 VITALS
DIASTOLIC BLOOD PRESSURE: 62 MMHG | SYSTOLIC BLOOD PRESSURE: 120 MMHG | BODY MASS INDEX: 25.32 KG/M2 | HEART RATE: 100 BPM | WEIGHT: 138.45 LBS | RESPIRATION RATE: 22 BRPM | OXYGEN SATURATION: 100 % | TEMPERATURE: 98.3 F

## 2024-04-26 DIAGNOSIS — E87.6 HYPOKALEMIA: ICD-10-CM

## 2024-04-26 DIAGNOSIS — Z00.8 MEDICAL CLEARANCE FOR PSYCHIATRIC ADMISSION: ICD-10-CM

## 2024-04-26 DIAGNOSIS — J45.20 INTERMITTENT ASTHMA WITHOUT COMPLICATION, UNSPECIFIED ASTHMA SEVERITY: ICD-10-CM

## 2024-04-26 DIAGNOSIS — F31.2 BIPOLAR I DISORDER, MOST RECENT EPISODE MANIC, SEVERE WITH PSYCHOTIC FEATURES (HCC): Primary | Chronic | ICD-10-CM

## 2024-04-26 DIAGNOSIS — Z72.0 TOBACCO ABUSE: ICD-10-CM

## 2024-04-26 DIAGNOSIS — K21.9 GERD (GASTROESOPHAGEAL REFLUX DISEASE): ICD-10-CM

## 2024-04-26 DIAGNOSIS — Z76.89 ENCOUNTER FOR PSYCHIATRIC ASSESSMENT: Primary | ICD-10-CM

## 2024-04-26 DIAGNOSIS — Z87.42 HISTORY OF ABNORMAL CERVICAL PAP SMEAR: ICD-10-CM

## 2024-04-26 DIAGNOSIS — E53.8 B12 DEFICIENCY: ICD-10-CM

## 2024-04-26 DIAGNOSIS — M54.2 CERVICALGIA: ICD-10-CM

## 2024-04-26 DIAGNOSIS — J45.909 ASTHMA: ICD-10-CM

## 2024-04-26 DIAGNOSIS — G43.909 MIGRAINE HEADACHE: ICD-10-CM

## 2024-04-26 LAB
ALBUMIN SERPL BCP-MCNC: 4.6 G/DL (ref 3.5–5)
ALP SERPL-CCNC: 77 U/L (ref 34–104)
ALT SERPL W P-5'-P-CCNC: 11 U/L (ref 7–52)
ANION GAP SERPL CALCULATED.3IONS-SCNC: 12 MMOL/L (ref 4–13)
AST SERPL W P-5'-P-CCNC: 13 U/L (ref 13–39)
BASOPHILS # BLD AUTO: 0.03 THOUSANDS/ÂΜL (ref 0–0.1)
BASOPHILS NFR BLD AUTO: 0 % (ref 0–1)
BILIRUB SERPL-MCNC: 0.59 MG/DL (ref 0.2–1)
BUN SERPL-MCNC: 9 MG/DL (ref 5–25)
CALCIUM SERPL-MCNC: 9.3 MG/DL (ref 8.4–10.2)
CHLORIDE SERPL-SCNC: 106 MMOL/L (ref 96–108)
CO2 SERPL-SCNC: 22 MMOL/L (ref 21–32)
CREAT SERPL-MCNC: 0.79 MG/DL (ref 0.6–1.3)
EOSINOPHIL # BLD AUTO: 0.22 THOUSAND/ÂΜL (ref 0–0.61)
EOSINOPHIL NFR BLD AUTO: 3 % (ref 0–6)
ERYTHROCYTE [DISTWIDTH] IN BLOOD BY AUTOMATED COUNT: 12.2 % (ref 11.6–15.1)
ETHANOL SERPL-MCNC: <10 MG/DL
FLUAV RNA RESP QL NAA+PROBE: NEGATIVE
FLUBV RNA RESP QL NAA+PROBE: NEGATIVE
GFR SERPL CREATININE-BSD FRML MDRD: 96 ML/MIN/1.73SQ M
GLUCOSE SERPL-MCNC: 105 MG/DL (ref 65–140)
HCG SERPL QL: NEGATIVE
HCT VFR BLD AUTO: 42.5 % (ref 34.8–46.1)
HGB BLD-MCNC: 14.7 G/DL (ref 11.5–15.4)
IMM GRANULOCYTES # BLD AUTO: 0.01 THOUSAND/UL (ref 0–0.2)
IMM GRANULOCYTES NFR BLD AUTO: 0 % (ref 0–2)
LYMPHOCYTES # BLD AUTO: 2.15 THOUSANDS/ÂΜL (ref 0.6–4.47)
LYMPHOCYTES NFR BLD AUTO: 31 % (ref 14–44)
MCH RBC QN AUTO: 30.9 PG (ref 26.8–34.3)
MCHC RBC AUTO-ENTMCNC: 34.6 G/DL (ref 31.4–37.4)
MCV RBC AUTO: 90 FL (ref 82–98)
MONOCYTES # BLD AUTO: 0.84 THOUSAND/ÂΜL (ref 0.17–1.22)
MONOCYTES NFR BLD AUTO: 12 % (ref 4–12)
NEUTROPHILS # BLD AUTO: 3.78 THOUSANDS/ÂΜL (ref 1.85–7.62)
NEUTS SEG NFR BLD AUTO: 54 % (ref 43–75)
NRBC BLD AUTO-RTO: 0 /100 WBCS
PLATELET # BLD AUTO: 207 THOUSANDS/UL (ref 149–390)
PMV BLD AUTO: 10 FL (ref 8.9–12.7)
POTASSIUM SERPL-SCNC: 3 MMOL/L (ref 3.5–5.3)
PROT SERPL-MCNC: 7.1 G/DL (ref 6.4–8.4)
RBC # BLD AUTO: 4.75 MILLION/UL (ref 3.81–5.12)
RSV RNA RESP QL NAA+PROBE: NEGATIVE
SARS-COV-2 RNA RESP QL NAA+PROBE: NEGATIVE
SODIUM SERPL-SCNC: 140 MMOL/L (ref 135–147)
TSH SERPL DL<=0.05 MIU/L-ACNC: 0.56 UIU/ML (ref 0.45–4.5)
WBC # BLD AUTO: 7.03 THOUSAND/UL (ref 4.31–10.16)

## 2024-04-26 PROCEDURE — 96372 THER/PROPH/DIAG INJ SC/IM: CPT

## 2024-04-26 PROCEDURE — 82077 ASSAY SPEC XCP UR&BREATH IA: CPT | Performed by: PHYSICIAN ASSISTANT

## 2024-04-26 PROCEDURE — 84443 ASSAY THYROID STIM HORMONE: CPT | Performed by: PSYCHIATRY & NEUROLOGY

## 2024-04-26 PROCEDURE — 84703 CHORIONIC GONADOTROPIN ASSAY: CPT | Performed by: PHYSICIAN ASSISTANT

## 2024-04-26 PROCEDURE — 99291 CRITICAL CARE FIRST HOUR: CPT | Performed by: PHYSICIAN ASSISTANT

## 2024-04-26 PROCEDURE — 82306 VITAMIN D 25 HYDROXY: CPT | Performed by: PSYCHIATRY & NEUROLOGY

## 2024-04-26 PROCEDURE — 0241U HB NFCT DS VIR RESP RNA 4 TRGT: CPT | Performed by: PHYSICIAN ASSISTANT

## 2024-04-26 PROCEDURE — 80053 COMPREHEN METABOLIC PANEL: CPT | Performed by: PHYSICIAN ASSISTANT

## 2024-04-26 PROCEDURE — 85025 COMPLETE CBC W/AUTO DIFF WBC: CPT | Performed by: PHYSICIAN ASSISTANT

## 2024-04-26 PROCEDURE — 99284 EMERGENCY DEPT VISIT MOD MDM: CPT

## 2024-04-26 PROCEDURE — 36415 COLL VENOUS BLD VENIPUNCTURE: CPT | Performed by: PHYSICIAN ASSISTANT

## 2024-04-26 RX ORDER — AMOXICILLIN 250 MG
1 CAPSULE ORAL DAILY PRN
Status: DISCONTINUED | OUTPATIENT
Start: 2024-04-26 | End: 2024-05-07 | Stop reason: HOSPADM

## 2024-04-26 RX ORDER — ACETAMINOPHEN 325 MG/1
975 TABLET ORAL EVERY 6 HOURS PRN
Status: CANCELLED | OUTPATIENT
Start: 2024-04-26

## 2024-04-26 RX ORDER — OLANZAPINE 10 MG/2ML
10 INJECTION, POWDER, FOR SOLUTION INTRAMUSCULAR ONCE
Status: COMPLETED | OUTPATIENT
Start: 2024-04-26 | End: 2024-04-26

## 2024-04-26 RX ORDER — LORAZEPAM 2 MG/ML
1 INJECTION INTRAMUSCULAR ONCE
Status: DISCONTINUED | OUTPATIENT
Start: 2024-04-26 | End: 2024-04-26

## 2024-04-26 RX ORDER — HYDROXYZINE HYDROCHLORIDE 25 MG/1
25 TABLET, FILM COATED ORAL
Status: DISCONTINUED | OUTPATIENT
Start: 2024-04-26 | End: 2024-05-07 | Stop reason: HOSPADM

## 2024-04-26 RX ORDER — PROPRANOLOL HYDROCHLORIDE 20 MG/1
10 TABLET ORAL EVERY 8 HOURS PRN
Status: CANCELLED | OUTPATIENT
Start: 2024-04-26

## 2024-04-26 RX ORDER — OLANZAPINE 10 MG/2ML
10 INJECTION, POWDER, FOR SOLUTION INTRAMUSCULAR
Status: DISCONTINUED | OUTPATIENT
Start: 2024-04-26 | End: 2024-05-07 | Stop reason: HOSPADM

## 2024-04-26 RX ORDER — OLANZAPINE 10 MG/2ML
5 INJECTION, POWDER, FOR SOLUTION INTRAMUSCULAR
Status: DISCONTINUED | OUTPATIENT
Start: 2024-04-26 | End: 2024-05-07 | Stop reason: HOSPADM

## 2024-04-26 RX ORDER — POLYETHYLENE GLYCOL 3350 17 G/17G
17 POWDER, FOR SOLUTION ORAL DAILY PRN
Status: CANCELLED | OUTPATIENT
Start: 2024-04-26

## 2024-04-26 RX ORDER — OLANZAPINE 10 MG/2ML
10 INJECTION, POWDER, FOR SOLUTION INTRAMUSCULAR
Status: CANCELLED | OUTPATIENT
Start: 2024-04-26

## 2024-04-26 RX ORDER — BENZTROPINE MESYLATE 1 MG/1
1 TABLET ORAL
Status: DISCONTINUED | OUTPATIENT
Start: 2024-04-26 | End: 2024-05-07 | Stop reason: HOSPADM

## 2024-04-26 RX ORDER — ACETAMINOPHEN 325 MG/1
650 TABLET ORAL EVERY 4 HOURS PRN
Status: CANCELLED | OUTPATIENT
Start: 2024-04-26

## 2024-04-26 RX ORDER — PROPRANOLOL HYDROCHLORIDE 10 MG/1
10 TABLET ORAL EVERY 8 HOURS PRN
Status: DISCONTINUED | OUTPATIENT
Start: 2024-04-26 | End: 2024-05-07 | Stop reason: HOSPADM

## 2024-04-26 RX ORDER — BENZTROPINE MESYLATE 1 MG/ML
1 INJECTION INTRAMUSCULAR; INTRAVENOUS
Status: CANCELLED | OUTPATIENT
Start: 2024-04-26

## 2024-04-26 RX ORDER — BENZTROPINE MESYLATE 1 MG/1
1 TABLET ORAL
Status: CANCELLED | OUTPATIENT
Start: 2024-04-26

## 2024-04-26 RX ORDER — POLYETHYLENE GLYCOL 3350 17 G/17G
17 POWDER, FOR SOLUTION ORAL DAILY PRN
Status: DISCONTINUED | OUTPATIENT
Start: 2024-04-26 | End: 2024-05-07 | Stop reason: HOSPADM

## 2024-04-26 RX ORDER — HYDROXYZINE 50 MG/1
50 TABLET, FILM COATED ORAL
Status: DISCONTINUED | OUTPATIENT
Start: 2024-04-26 | End: 2024-05-07 | Stop reason: HOSPADM

## 2024-04-26 RX ORDER — DIPHENHYDRAMINE HYDROCHLORIDE 50 MG/ML
50 INJECTION INTRAMUSCULAR; INTRAVENOUS EVERY 6 HOURS PRN
Status: CANCELLED | OUTPATIENT
Start: 2024-04-26

## 2024-04-26 RX ORDER — HYDROXYZINE HYDROCHLORIDE 25 MG/1
50 TABLET, FILM COATED ORAL
Status: CANCELLED | OUTPATIENT
Start: 2024-04-26

## 2024-04-26 RX ORDER — OLANZAPINE 10 MG/2ML
5 INJECTION, POWDER, FOR SOLUTION INTRAMUSCULAR
Status: CANCELLED | OUTPATIENT
Start: 2024-04-26

## 2024-04-26 RX ORDER — BISACODYL 10 MG
10 SUPPOSITORY, RECTAL RECTAL DAILY PRN
Status: DISCONTINUED | OUTPATIENT
Start: 2024-04-26 | End: 2024-05-07 | Stop reason: HOSPADM

## 2024-04-26 RX ORDER — OLANZAPINE 5 MG/1
5 TABLET ORAL
Status: CANCELLED | OUTPATIENT
Start: 2024-04-26

## 2024-04-26 RX ORDER — ONDANSETRON 4 MG/1
4 TABLET, ORALLY DISINTEGRATING ORAL ONCE
Status: COMPLETED | OUTPATIENT
Start: 2024-04-26 | End: 2024-04-26

## 2024-04-26 RX ORDER — DIPHENHYDRAMINE HYDROCHLORIDE 50 MG/ML
50 INJECTION INTRAMUSCULAR; INTRAVENOUS EVERY 6 HOURS PRN
Status: DISCONTINUED | OUTPATIENT
Start: 2024-04-26 | End: 2024-05-07 | Stop reason: HOSPADM

## 2024-04-26 RX ORDER — HALOPERIDOL 5 MG/ML
5 INJECTION INTRAMUSCULAR ONCE
Status: DISCONTINUED | OUTPATIENT
Start: 2024-04-26 | End: 2024-04-26

## 2024-04-26 RX ORDER — OLANZAPINE 5 MG/1
2.5 TABLET ORAL
Status: CANCELLED | OUTPATIENT
Start: 2024-04-26

## 2024-04-26 RX ORDER — LORAZEPAM 2 MG/ML
1 INJECTION INTRAMUSCULAR ONCE
Status: COMPLETED | OUTPATIENT
Start: 2024-04-26 | End: 2024-04-26

## 2024-04-26 RX ORDER — OLANZAPINE 2.5 MG/1
2.5 TABLET, FILM COATED ORAL
Status: DISCONTINUED | OUTPATIENT
Start: 2024-04-26 | End: 2024-05-07 | Stop reason: HOSPADM

## 2024-04-26 RX ORDER — MAGNESIUM HYDROXIDE/ALUMINUM HYDROXICE/SIMETHICONE 120; 1200; 1200 MG/30ML; MG/30ML; MG/30ML
30 SUSPENSION ORAL EVERY 4 HOURS PRN
Status: DISCONTINUED | OUTPATIENT
Start: 2024-04-26 | End: 2024-05-07 | Stop reason: HOSPADM

## 2024-04-26 RX ORDER — ACETAMINOPHEN 325 MG/1
650 TABLET ORAL EVERY 6 HOURS PRN
Status: CANCELLED | OUTPATIENT
Start: 2024-04-26

## 2024-04-26 RX ORDER — OLANZAPINE 5 MG/1
10 TABLET ORAL
Status: CANCELLED | OUTPATIENT
Start: 2024-04-26

## 2024-04-26 RX ORDER — OLANZAPINE 5 MG/1
5 TABLET ORAL
Status: DISCONTINUED | OUTPATIENT
Start: 2024-04-26 | End: 2024-05-07 | Stop reason: HOSPADM

## 2024-04-26 RX ORDER — POTASSIUM CHLORIDE 20 MEQ/1
40 TABLET, EXTENDED RELEASE ORAL ONCE
Status: DISCONTINUED | OUTPATIENT
Start: 2024-04-26 | End: 2024-04-26 | Stop reason: HOSPADM

## 2024-04-26 RX ORDER — TRAZODONE HYDROCHLORIDE 50 MG/1
50 TABLET ORAL
Status: CANCELLED | OUTPATIENT
Start: 2024-04-26

## 2024-04-26 RX ORDER — LORAZEPAM 2 MG/ML
2 INJECTION INTRAMUSCULAR EVERY 6 HOURS PRN
Status: CANCELLED | OUTPATIENT
Start: 2024-04-26

## 2024-04-26 RX ORDER — LORAZEPAM 2 MG/ML
2 INJECTION INTRAMUSCULAR EVERY 6 HOURS PRN
Status: DISCONTINUED | OUTPATIENT
Start: 2024-04-26 | End: 2024-05-07 | Stop reason: HOSPADM

## 2024-04-26 RX ORDER — HYDROXYZINE HYDROCHLORIDE 25 MG/1
100 TABLET, FILM COATED ORAL
Status: CANCELLED | OUTPATIENT
Start: 2024-04-26

## 2024-04-26 RX ORDER — AMOXICILLIN 250 MG
1 CAPSULE ORAL DAILY PRN
Status: CANCELLED | OUTPATIENT
Start: 2024-04-26

## 2024-04-26 RX ORDER — MAGNESIUM HYDROXIDE/ALUMINUM HYDROXICE/SIMETHICONE 120; 1200; 1200 MG/30ML; MG/30ML; MG/30ML
30 SUSPENSION ORAL EVERY 4 HOURS PRN
Status: CANCELLED | OUTPATIENT
Start: 2024-04-26

## 2024-04-26 RX ORDER — BISACODYL 10 MG
10 SUPPOSITORY, RECTAL RECTAL DAILY PRN
Status: CANCELLED | OUTPATIENT
Start: 2024-04-26

## 2024-04-26 RX ORDER — TRAZODONE HYDROCHLORIDE 50 MG/1
50 TABLET ORAL
Status: DISCONTINUED | OUTPATIENT
Start: 2024-04-26 | End: 2024-05-07 | Stop reason: HOSPADM

## 2024-04-26 RX ORDER — ACETAMINOPHEN 325 MG/1
650 TABLET ORAL EVERY 6 HOURS PRN
Status: DISCONTINUED | OUTPATIENT
Start: 2024-04-26 | End: 2024-05-07 | Stop reason: HOSPADM

## 2024-04-26 RX ORDER — ACETAMINOPHEN 325 MG/1
650 TABLET ORAL EVERY 4 HOURS PRN
Status: DISCONTINUED | OUTPATIENT
Start: 2024-04-26 | End: 2024-05-07 | Stop reason: HOSPADM

## 2024-04-26 RX ORDER — HYDROXYZINE HYDROCHLORIDE 25 MG/1
25 TABLET, FILM COATED ORAL
Status: CANCELLED | OUTPATIENT
Start: 2024-04-26

## 2024-04-26 RX ORDER — OLANZAPINE 10 MG/1
10 TABLET ORAL
Status: DISCONTINUED | OUTPATIENT
Start: 2024-04-26 | End: 2024-05-07 | Stop reason: HOSPADM

## 2024-04-26 RX ORDER — BENZTROPINE MESYLATE 1 MG/ML
1 INJECTION INTRAMUSCULAR; INTRAVENOUS
Status: DISCONTINUED | OUTPATIENT
Start: 2024-04-26 | End: 2024-05-07 | Stop reason: HOSPADM

## 2024-04-26 RX ORDER — HYDROXYZINE 50 MG/1
100 TABLET, FILM COATED ORAL
Status: DISCONTINUED | OUTPATIENT
Start: 2024-04-26 | End: 2024-05-07 | Stop reason: HOSPADM

## 2024-04-26 RX ORDER — ACETAMINOPHEN 325 MG/1
975 TABLET ORAL EVERY 6 HOURS PRN
Status: DISCONTINUED | OUTPATIENT
Start: 2024-04-26 | End: 2024-05-07 | Stop reason: HOSPADM

## 2024-04-26 RX ADMIN — HYDROXYZINE HYDROCHLORIDE 100 MG: 50 TABLET, FILM COATED ORAL at 16:53

## 2024-04-26 RX ADMIN — OLANZAPINE 10 MG: 10 INJECTION, POWDER, FOR SOLUTION INTRAMUSCULAR at 13:30

## 2024-04-26 RX ADMIN — ONDANSETRON 4 MG: 4 TABLET, ORALLY DISINTEGRATING ORAL at 07:35

## 2024-04-26 RX ADMIN — OLANZAPINE 10 MG: 10 INJECTION, POWDER, FOR SOLUTION INTRAMUSCULAR at 09:37

## 2024-04-26 RX ADMIN — NICOTINE POLACRILEX 2 MG: 4 GUM, CHEWING BUCCAL at 17:29

## 2024-04-26 RX ADMIN — OLANZAPINE 10 MG: 10 TABLET, FILM COATED ORAL at 16:54

## 2024-04-26 RX ADMIN — LORAZEPAM 1 MG: 2 INJECTION INTRAMUSCULAR; INTRAVENOUS at 13:17

## 2024-04-26 NOTE — ED NOTES
Patient is accepted at [facility]  Patient is accepted by [facility]    Transportation is arranged with [facility]  Transportation is scheduled for TBD    Nurse report is to be called to [phone]   prior to patient transfer.

## 2024-04-26 NOTE — EMTALA/ACUTE CARE TRANSFER
Counts include 234 beds at the [facility] EMERGENCY DEPARTMENT  [address] [address]  Dept: [phone]      EMTALA TRANSFER CONSENT    NAME Antionette Downing                                         1985                              MRN [id number]    I have been informed of my rights regarding examination, treatment, and transfer   by Dr. Cindy Riddle DO    Benefits: Specialized equipment and/or services available at the receiving facility (Include comment)________________________ (Psychiatric Facility)    Risks: Potential for delay in receiving treatment, Potential deterioration of medical condition, Loss of IV, Increased discomfort during transfer, Possible worsening of condition or death during transfer      Consent for Transfer:  I acknowledge that my medical condition has been evaluated and explained to me by the emergency department physician or other qualified medical person and/or my attending physician, who has recommended that I be transferred to the service of  Accepting Physician: Antionette Galindo PA-C at Accepting Facility Name, City & State : [facility] ([place], [place]). The above potential benefits of such transfer, the potential risks associated with such transfer, and the probable risks of not being transferred have been explained to me, and I fully understand them.  The doctor has explained that, in my case, the benefits of transfer outweigh the risks.  I agree to be transferred.    I authorize the performance of emergency medical procedures and treatments upon me in both transit and upon arrival at the receiving facility.  Additionally, I authorize the release of any and all medical records to the receiving facility and request they be transported with me, if possible.  I understand that the safest mode of transportation during a medical emergency is an ambulance and that the Hospital advocates the use of this mode of transport. Risks of traveling to the receiving  facility by car, including absence of medical control, life sustaining equipment, such as oxygen, and medical personnel has been explained to me and I fully understand them.    (JEMIMA CORRECT BOX BELOW)  [  ]  I consent to the stated transfer and to be transported by ambulance/helicopter.  [  ]  I consent to the stated transfer, but refuse transportation by ambulance and accept full responsibility for my transportation by car.  I understand the risks of non-ambulance transfers and I exonerate the Hospital and its staff from any deterioration in my condition that results from this refusal.    X___________________________________________    DATE  24  TIME________  Signature of patient or legally responsible individual signing on patient behalf           RELATIONSHIP TO PATIENT_________________________          Provider Certification    NAME Antionette Castañeda Chang                                        [facility] 1985                              MRN [id number]    A medical screening exam was performed on the above named patient.  Based on the examination:    Condition Necessitating Transfer The primary encounter diagnosis was Encounter for psychiatric assessment. Diagnoses of Hypokalemia and Medical clearance for psychiatric admission were also pertinent to this visit.    Patient Condition: The patient has been stabilized such that within reasonable medical probability, no material deterioration of the patient condition or the condition of the unborn child(sin) is likely to result from the transfer    Reason for Transfer: Level of Care needed not available at this facility    Transfer Requirements: Facility [facility] ([facility])   Space available and qualified personnel available for treatment as acknowledged by stacie  Agreed to accept transfer and to provide appropriate medical treatment as acknowledged by       Antionette Galindo PA-C  Appropriate medical records of the examination and treatment of  the patient are provided at the time of transfer   STAFF INITIAL WHEN COMPLETED _______  Transfer will be performed by qualified personnel from [facility]  and appropriate transfer equipment as required, including the use of necessary and appropriate life support measures.    Provider Certification: I have examined the patient and explained the following risks and benefits of being transferred/refusing transfer to the patient/family:  Consent was not obtained as patient is committed to psychiatric facility and transfer is mandated      Based on these reasonable risks and benefits to the patient and/or the unborn child(sin), and based upon the information available at the time of the patient’s examination, I certify that the medical benefits reasonably to be expected from the provision of appropriate medical treatments at another medical facility outweigh the increasing risks, if any, to the individual’s medical condition, and in the case of labor to the unborn child, from effecting the transfer.    X____________________________________________ DATE 04/26/24        TIME_______      ORIGINAL - SEND TO MEDICAL RECORDS   COPY - SEND WITH PATIENT DURING TRANSFER
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
## 2024-04-26 NOTE — ED NOTES
VS deferred at this time due to patient sleeping. VS will be taken when patient is awake and alert. Respiration non-labored and pt showing no signs of distress. Provider made aware.     Sondra Blank RN  04/26/24 5157

## 2024-04-26 NOTE — ED NOTES
"Patient started screaming in the room stating \"I am not a whore, it was not me.\" This RN entered room and patient continued to shout. Patient then stood up on bed and continued shouting. Security called at this time.       Sondra Blank RN  04/26/24 8006    "
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
## 2024-04-26 NOTE — ED NOTES
"Patient called for nurse and this RN entered patient's room. Patient asked how long she will in the ED because she wants to go home. This RN explained provider is still figuring out the best plan of care for patient. This RN explained since patient does not feel safe at home and patient's multiple reports of not feeling okay, it may be best for the patient to receive treatment in the hospital. This RN explained process of voluntarily signing into hospital for treatment and explained to patient she would undergo the treatment at a different hospital. Patient explained again that she is feeling better and \"I do not have that disgusting taste in my mouth anymore.\" Pt reports not wanting to stay because \"everyone thinks I am a cum guzzler and a lesbian.\" This RN explained that no one said that to patient and staff is here to help the patient feel better. Pt began staring at wall and stopped talking to RN.     Sondra Blank RN  04/26/24 2329    "

## 2024-04-26 NOTE — ED NOTES
Patient ambulated to bathroom with assistance. Patient not able to provide urine sample at this time.     Sondra Blank RN  04/26/24 0997

## 2024-04-26 NOTE — ED NOTES
"This RN entered the patient's room to give patient soda. When walking into the room, patient reports \"I am looking at these 2 sickos in the corner.\" This RN asked what patient is seeing and patient reports \"One is a latex condom and the other one is [place].\" This RN asked patient to elaborate, but patient changed the subject of conversation before elaborating.     Sondra Blank RN  04/26/24 1001    "
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
## 2024-04-26 NOTE — NURSING NOTE
Bin  - [employer] black nydia leggings  - black puffer jacket  - black wired earbuds  - cell phone (with no case and scratched upon arrival)  - black Nike sneakers (with laces)      Bedside  - black jeans  - dark gray long sleeve  - burgundy v-neck long sleeve  - blue grippy socks  - black socks  - black bra      No wallet or ID upon arrival

## 2024-04-26 NOTE — ED NOTES
RN was in a different room when pt started to scream and yelled to get out of my room, stop bothering me. RN when to check on pt and notice that pt was very agitated screaming to the wall. RN contacted security, crisis and provider.      Cecilia Escobedo RN  04/26/24 6871

## 2024-04-26 NOTE — NURSING NOTE
"Patient pacing hallways visibly distressed with facial grimace. Informed RN, \"it feels like they're sticking needles in me\". @16:54 RN administered Zyprexa 10 mg PO for agitation and paranoia and Atarax 100 mg PO for severe anxiety.  "
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
## 2024-04-26 NOTE — ED NOTES
Insurance Authorization:   Phone call placed to [facility]  Phone number: [phone]     Spoke to: Nini Doe approved- 4  Level of care: Inpatient treatment  Review on 4/29/24  Authorization # Facility to call on arrival      EVS (Eligibility Verification System) called [phone]/Promise  Automated system indicates: [facility]
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
## 2024-04-27 PROBLEM — R51.9 HEADACHE: Status: ACTIVE | Noted: 2024-04-27

## 2024-04-27 PROBLEM — Z00.8 MEDICAL CLEARANCE FOR PSYCHIATRIC ADMISSION: Status: ACTIVE | Noted: 2023-06-22

## 2024-04-27 LAB
25(OH)D3 SERPL-MCNC: 18.2 NG/ML (ref 30–100)
AMPHETAMINES SERPL QL SCN: NEGATIVE
BARBITURATES UR QL: NEGATIVE
BENZODIAZ UR QL: NEGATIVE
CHOLEST SERPL-MCNC: 157 MG/DL
COCAINE UR QL: NEGATIVE
FENTANYL UR QL SCN: NEGATIVE
HDLC SERPL-MCNC: 41 MG/DL
HYDROCODONE UR QL SCN: NEGATIVE
LDLC SERPL CALC-MCNC: 90 MG/DL (ref 0–100)
METHADONE UR QL: NEGATIVE
NONHDLC SERPL-MCNC: 116 MG/DL
OPIATES UR QL SCN: NEGATIVE
OXYCODONE+OXYMORPHONE UR QL SCN: NEGATIVE
PCP UR QL: NEGATIVE
THC UR QL: NEGATIVE
TRIGL SERPL-MCNC: 129 MG/DL

## 2024-04-27 PROCEDURE — 80061 LIPID PANEL: CPT | Performed by: PSYCHIATRY & NEUROLOGY

## 2024-04-27 PROCEDURE — 99223 1ST HOSP IP/OBS HIGH 75: CPT | Performed by: PSYCHIATRY & NEUROLOGY

## 2024-04-27 PROCEDURE — 93005 ELECTROCARDIOGRAM TRACING: CPT

## 2024-04-27 PROCEDURE — 99221 1ST HOSP IP/OBS SF/LOW 40: CPT | Performed by: PHYSICIAN ASSISTANT

## 2024-04-27 PROCEDURE — 80307 DRUG TEST PRSMV CHEM ANLYZR: CPT | Performed by: PSYCHIATRY & NEUROLOGY

## 2024-04-27 RX ORDER — OLANZAPINE 10 MG/1
10 TABLET ORAL
Status: DISCONTINUED | OUTPATIENT
Start: 2024-04-27 | End: 2024-04-30

## 2024-04-27 RX ORDER — NICOTINE 21 MG/24HR
21 PATCH, TRANSDERMAL 24 HOURS TRANSDERMAL DAILY
Status: DISCONTINUED | OUTPATIENT
Start: 2024-04-27 | End: 2024-05-07 | Stop reason: HOSPADM

## 2024-04-27 RX ORDER — TIZANIDINE 2 MG/1
2 TABLET ORAL
Status: DISCONTINUED | OUTPATIENT
Start: 2024-04-27 | End: 2024-05-07 | Stop reason: HOSPADM

## 2024-04-27 RX ORDER — POTASSIUM CHLORIDE 20 MEQ/1
40 TABLET, EXTENDED RELEASE ORAL ONCE
Status: DISCONTINUED | OUTPATIENT
Start: 2024-04-27 | End: 2024-05-07 | Stop reason: HOSPADM

## 2024-04-27 RX ORDER — LORAZEPAM 1 MG/1
1 TABLET ORAL EVERY 6 HOURS PRN
Status: DISCONTINUED | OUTPATIENT
Start: 2024-04-27 | End: 2024-05-07 | Stop reason: HOSPADM

## 2024-04-27 RX ORDER — FLUTICASONE PROPIONATE AND SALMETEROL 250; 50 UG/1; UG/1
2 POWDER RESPIRATORY (INHALATION) EVERY 12 HOURS SCHEDULED
Status: DISCONTINUED | OUTPATIENT
Start: 2024-04-27 | End: 2024-04-27

## 2024-04-27 RX ORDER — BUDESONIDE AND FORMOTEROL FUMARATE DIHYDRATE 80; 4.5 UG/1; UG/1
2 AEROSOL RESPIRATORY (INHALATION) 2 TIMES DAILY
Status: DISCONTINUED | OUTPATIENT
Start: 2024-04-27 | End: 2024-05-07 | Stop reason: HOSPADM

## 2024-04-27 RX ORDER — LANOLIN ALCOHOL/MO/W.PET/CERES
400 CREAM (GRAM) TOPICAL 2 TIMES DAILY
Status: DISCONTINUED | OUTPATIENT
Start: 2024-04-27 | End: 2024-05-07 | Stop reason: HOSPADM

## 2024-04-27 RX ORDER — ALBUTEROL SULFATE 90 UG/1
2 AEROSOL, METERED RESPIRATORY (INHALATION) EVERY 4 HOURS PRN
Status: DISCONTINUED | OUTPATIENT
Start: 2024-04-27 | End: 2024-05-07 | Stop reason: HOSPADM

## 2024-04-27 RX ADMIN — NICOTINE POLACRILEX 2 MG: 4 GUM, CHEWING BUCCAL at 21:54

## 2024-04-27 RX ADMIN — LORAZEPAM 2 MG: 2 INJECTION INTRAMUSCULAR; INTRAVENOUS at 18:15

## 2024-04-27 RX ADMIN — TIZANIDINE 2 MG: 2 TABLET ORAL at 21:14

## 2024-04-27 RX ADMIN — LORAZEPAM 2 MG: 2 INJECTION INTRAMUSCULAR; INTRAVENOUS at 06:47

## 2024-04-27 RX ADMIN — Medication 400 MG: at 17:16

## 2024-04-27 RX ADMIN — NICOTINE POLACRILEX 2 MG: 4 GUM, CHEWING BUCCAL at 06:53

## 2024-04-27 NOTE — TREATMENT PLAN
TREATMENT PLAN REVIEW - Behavioral Health Antionette Downing 38 y.o. 1985 female MRN: [id number]    [facility] IP BEHAVIORAL HLTH Room / Bed: [facility] 204/[facility] 204-02 Encounter: [id number]          Admit Date/Time:  4/26/2024  4:13 PM    Treatment Team:   MD Joyce Mckeon LPN Precious Oparaugo Mackenzie Durner Victoria Bresnan, TORRIE Renee, TORRIE Douglas, TORRIE Carlisle  Phoebe Dina    Diagnosis: Principal Problem:    Bipolar I disorder, most recent episode manic, severe with psychotic features ([facility])  Active Problems:    Psychogenic nonepileptic seizure    Cocaine abuse in remission ([facility])    Cannabis abuse, in remission    Post-traumatic stress disorder, chronic      Patient Strengths/Assets: average or above intelligence, negotiates basic needs, patient is on a voluntary commitment    Patient Barriers/Limitations: difficulty adapting, limited support system, noncompliant with medication, noncompliant with treatment, resistance to treatment, uncooperative    Short Term Goals: decrease in paranoid thoughts, decrease in psychotic symptoms, decrease in level of agitation, ability to stay safe on the unit, ability to stay free of restraints, improvement in insight, improvement in reality testing, improvement in reasoning ability, increase in socialization with peers on the unit, acceptance of need for psychiatric treatment, acceptance of psychiatric medications    Long Term Goals: stabilization of mood, free of suicidal thoughts, free of homicidal thoughts, resolution of psychotic symptoms, acceptance of need for psychiatric follow up after discharge, appropriate interaction with peers, appropriate interaction with family, stable living arrangements upon discharge    Progress Towards Goals: attends to more to ADLs, reality testing remains poor, still has psychotic symptoms    Recommended Treatment: medication management,  patient medication education, group therapy, milieu therapy, continued Behavioral Health psychiatric evaluation/assessment process    Treatment Frequency: daily medication monitoring, group and milieu therapy daily, monitoring through interdisciplinary rounds, monitoring through weekly patient care conferences    Expected Discharge Date:  10 days    Discharge Plan: referral for outpatient medication management with a psychiatrist, referral for outpatient psychotherapy, referrals as indicated    Treatment Plan Created/Updated By: Ramírez Delaney DO

## 2024-04-27 NOTE — NURSING NOTE
"Refused Vitamin D, Potassium, and Nicotine Patch. Irritable and stated, \"I'm not taking anything from you guys\" ! Patient then hid underneath her blanket.  "
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
## 2024-04-27 NOTE — ASSESSMENT & PLAN NOTE
History limited as patient became increasingly agitated and then withdrawn    Admission labs reviewed, CBC, CMP, RPR, HbA1c, lipid panel, TSH, UA acceptable  Low potassium and low vitamin D noted.  Repletion ordered  Vitals stable   UDS negative  COVID-19 negative  No ECG for review  Medically stable for continued inpatient psychiatric treatment based on available results  Please contact [facility] with any questions or concerns
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
## 2024-04-27 NOTE — H&P
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 No acute intracranial abnormality. Workstation performed: [id number]     CT abdomen pelvis with contrast    Result Date: 3/28/2024  Narrative: CT ABDOMEN AND PELVIS WITH IV CONTRAST INDICATION: LLQ abdominal pain. COMPARISON: Recent prior exam dated March 26, 2024 TECHNIQUE: CT examination of the abdomen and pelvis was performed. Multiplanar 2D reformatted images were created from the source data. This examination, like all CT scans performed in the [facility], was performed utilizing techniques to minimize radiation dose exposure, including the use of iterative reconstruction and automated exposure control. Radiation dose length product (DLP) for this visit: 312.48 mGy-cm IV Contrast: 100 mL of iohexol (OMNIPAQUE) Enteric Contrast: Not administered. FINDINGS: ABDOMEN LOWER CHEST: No clinically significant abnormality in the visualized lower chest. LIVER/BILIARY TREE: Subcentimeter hypoattenuating lesion(s), too small to characterize but statistically likely benign, which do not require follow-up (ACR White Paper 2017). No suspicious mass. Normal hepatic contours. No biliary dilation.  GALLBLADDER: No calcified gallstones. No pericholecystic inflammatory change. SPLEEN: Unremarkable. PANCREAS: Unremarkable. ADRENAL GLANDS: Unremarkable. KIDNEYS/URETERS: Unremarkable. No hydronephrosis. STOMACH AND BOWEL: Limited evaluation of GI tract without oral contrast. Moderate hiatal hernia. Stomach partially collapsed. Small bowel average caliber. Colonic diverticulosis without evidence of colitis or diverticulitis. APPENDIX: No findings to suggest appendicitis. ABDOMINOPELVIC CAVITY: No ascites. No pneumoperitoneum. No lymphadenopathy. VESSELS: Unremarkable for patient's age. PELVIS REPRODUCTIVE ORGANS: Retroflexed uterus. Small left-sided corpus luteum measuring 1.9 cm. URINARY BLADDER: Unremarkable. ABDOMINAL WALL/INGUINAL REGIONS: Tiny fat-containing umbilical hernia. BONES: No acute fracture or suspicious osseous lesion.     Impression: Small corpus luteum left ovary. Workstation performed: [id number]     US bedside procedure    Result Date: 3/28/2024  Narrative: [id number]      Code Status: Level 1 - Full Code  Advance Directive and Living Will: <no information>    Inpatient Psychiatric Certification:     Certification: Based upon physical, mental and social evaluations, I certify that inpatient psychiatric services are medically necessary for this patient for a duration of  >2  midnights for the treatment of Bipolar I disorder, most recent episode manic, severe with psychotic features (HCC)  Available alternative community resources do not meet the patient's mental health care needs.  I further attest that an established written individualized plan of care has been implemented and is outlined in the patient's medical records.    Ramírez Delaney DO 04/27/24    This note was completed in part utilizing [employer] Software. Grammatical, translation, syntax errors, random word insertions, spelling mistakes, and incomplete sentences may be an occasional  consequence of this system secondary to software limitations with voice recognition, ambient noise, and hardware issues. If you have any questions or concerns about the content, text, or information contained within the body of this dictation, please contact the provider for clarification.     This note was not shared with the patient due to reasonable likelihood of causing patient harm

## 2024-04-27 NOTE — NURSING NOTE
Guarded, disorganized, and delusional. Withdrawn to room napping. Endorses ongoing anxiety and presents with irritable edge. Denies SI/HI/AVH but has delayed response when engaged in verbal conversation and appears to be RIS. RN assisted patient with bed linen change. RN also gave patient one sandwich and juice as patient did not consume lunch. Throughout shift, patient frequently spit into trash can and toilet. Did not make progress towards treatment goals as evidenced by absent group attendance and absent peer interaction. Plan of care ongoing. Denies unmet needs.
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
## 2024-04-27 NOTE — NURSING NOTE
Pt received IM Ativan this morning. Pt remained disorganized and irritable, yelling and demanding items following administration. Upon 1 hour reassessment, pt is resting in bed, appears to be asleep.

## 2024-04-27 NOTE — CONSULTS
formerly [facility]  Consult  Name: Antionette Downing 38 y.o. female I MRN: [id number]  Unit/Bed#: -02 I Date of Admission: 4/26/2024   Date of Service: 4/27/2024 I Hospital Day: 1    Inpatient consult for Medical Clearance for  patient  Consult performed by: Kenny Arnold PA-C  Consult ordered by: Antionette Galindo PA-C          Assessment/Plan   * Medical clearance for psychiatric admission  Assessment & Plan  History limited as patient became increasingly agitated and then withdrawn    Admission labs reviewed, CBC, CMP, RPR, HbA1c, lipid panel, TSH, UA acceptable  Low potassium and low vitamin D noted.  Repletion ordered  Vitals stable   UDS negative  COVID-19 negative  No ECG for review  Medically stable for continued inpatient psychiatric treatment based on available results  Please contact [facility] with any questions or concerns      Headache  Assessment & Plan  Chronic headaches, follows with neuro  Continue magnesium 400 mg twice daily and riboflavin  Tinazidine PRN    Post-traumatic stress disorder, chronic  Assessment & Plan  Management per psychiatry services    Asthma  Assessment & Plan  Continue Advair daily + Albuterol PRN    Smoker  Assessment & Plan  Nicotine patch provided    Psychogenic nonepileptic seizure  Assessment & Plan  Follows with neuro    Bipolar I disorder, most recent episode manic, severe with psychotic features (HCC)  Assessment & Plan  Agitated, yelling, then withdraws (on my exam)  Management per psychiatry           Recommendations for Discharge:  [facility] will continue to be available for any questions or concerns.  Follow up with PCP upon discharge.     Counseling / Coordination of Care Time: 30 minutes.  Greater than 50% of total time spent on patient counseling and coordination of care.    Collaboration of Care: Were Recommendations Directly Discussed with Primary Treatment Team? - Yes     History of Present Illness:    Antionette Downing  is a 38 y.o. female who is originally admitted to the psychiatric service due to manic symptoms, signs of acute psychosis, delusional thoughts, paranoid ideation, disorganized behavior, severe agitation, and aggressive behavior. We are consulted for medical clearance for psychiatric hospitalization and medical management.  Patient has a past medical history of psychogenic nonepileptic seizures, bipolar disorder, asthma, and headaches.  She follows with neurology outpatient.  She is prescribed Advair and albuterol for her asthma.  She is prescribed magnesium and riboflavin as well as tinazidine in PRN for headaches.  Prior to my evaluation, patient was becoming increasingly agitated with psychiatry, when I walked in afterwards, she was very withdrawn and therefore history was limited.  Reviewed labs, ECG, vital signs.  Medically stable for psychiatric admission.    Review of Systems:    Review of Systems   Constitutional:  Negative for appetite change, chills, fatigue and fever.   HENT:  Negative for ear pain, sore throat and trouble swallowing.    Eyes:  Negative for visual disturbance.   Respiratory:  Negative for cough, chest tightness, shortness of breath and wheezing.    Cardiovascular:  Negative for chest pain, palpitations and leg swelling.   Gastrointestinal:  Negative for abdominal distention, abdominal pain, diarrhea, nausea and vomiting.   Endocrine: Negative.    Genitourinary:  Negative for dysuria, flank pain and hematuria.   Musculoskeletal:  Negative for arthralgias, gait problem and myalgias.   Skin:  Negative for pallor.   Allergic/Immunologic: Negative for immunocompromised state.   Neurological:  Negative for dizziness, syncope, light-headedness, numbness and headaches.       Past Medical and Surgical History:     Past Medical History:   Diagnosis Date    Abnormal Pap smear of cervix     ADHD (attention deficit hyperactivity disorder)     Ankle fracture     Anxiety     Arthritis     back    Asthma      Bipolar disorder (HCC)     Cellulitis     right side fac in 2014    Chronic pain disorder     Depression     Diverticulitis     Flank pain 2016    Hallucination     Hepatitis C     Hip disease 2006    reports she had fluid removed from right hip and received treatment with antibiotic     History of abnormal cervical Pap smear     History of multiple miscarriages     IBS (irritable bowel syndrome)     Infantile idiopathic scoliosis     Joint pain     Kidney stone     Lactose intolerance     Low back pain     Myofascial pain syndrome     Peripheral neuropathy 3/28/2024    Poor dentition     Psychiatric illness     Psychosis (HCC)     PTSD (post-traumatic stress disorder)     Pyelonephritis affecting pregnancy     Right hand paresthesia     Right ovarian cyst     Scoliosis     Seizures (HCC)     Self-injurious behavior     Sleep difficulties     Slow transit constipation     Substance abuse (HCC)     Suicide attempt ([facility])        Past Surgical History:   Procedure Laterality Date     SECTION  2016     SECTION  2014    HIP SURGERY      ORTHOPEDIC SURGERY      ME  DELIVERY ONLY N/A 2016    Procedure:  SECTION () REPEAT;  Surgeon: Kurt Connor MD;  Location: [facility] ;  Service: Obstetrics    ME LIG/TRNSXJ FLP TUBE ABDL/VAG APPR UNI/BI Bilateral 2016    Procedure: LIGATION/COAGULATION TUBAL;  Surgeon: Kurt Connor MD;  Location: [facility] ;  Service: Obstetrics       Meds/Allergies:    all medications and allergies reviewed    Allergies:   Allergies   Allergen Reactions    Aspirin      Pt given enteric coated 81 mg, when ask pt denies any allergy, listed under allergies on paperwork provided by berny Hernández - Food Allergy Itching    Naproxen     Toradol [Ketorolac Tromethamine] GI Bleeding    Azithromycin Rash    Latex Hives and Rash    Neurontin [Gabapentin] Rash and GI Bleeding    Ppd [Tuberculin Purified Protein Derivative] Rash  "      Social History:     Marital Status: /Civil Union    Substance Use History:   Social History     Substance and Sexual Activity   Alcohol Use Never     Social History     Tobacco Use   Smoking Status Every Day    Current packs/day: 0.25    Average packs/day: 0.3 packs/day for 15.0 years (3.8 ttl pk-yrs)    Types: Cigarettes    Passive exposure: Never   Smokeless Tobacco Never   Tobacco Comments    pt refused smoking cessation teaching.      Social History     Substance and Sexual Activity   Drug Use Not Currently    Types: Marijuana, Cocaine, \"Crack\" cocaine    Comment: on occasion        Family History:    non-contributory    Physical Exam:     Vitals:   Blood Pressure: 118/62 (04/26/24 1631)  Pulse: 91 (04/26/24 1631)  Temperature: 98.4 °F (36.9 °C) (04/26/24 1631)  Temp Source: Tympanic (04/26/24 1631)  Respirations: 18 (04/26/24 1631)  Height: 5' 2\" (157.5 cm) (04/26/24 1631)  Weight - Scale: 60.6 kg (133 lb 9.6 oz) (04/26/24 1631)  SpO2: 100 % (04/26/24 1631)    Physical Exam  Constitutional:       General: She is not in acute distress.  HENT:      Head: Normocephalic and atraumatic.   Eyes:      Extraocular Movements: Extraocular movements intact.      Pupils: Pupils are equal, round, and reactive to light.   Cardiovascular:      Rate and Rhythm: Normal rate and regular rhythm.      Pulses: Normal pulses.      Heart sounds: Normal heart sounds. No murmur heard.     No friction rub. No gallop.   Pulmonary:      Effort: Pulmonary effort is normal. No respiratory distress.      Breath sounds: Normal breath sounds.   Abdominal:      General: Abdomen is flat. There is no distension.      Palpations: Abdomen is soft.      Tenderness: There is no abdominal tenderness.   Musculoskeletal:         General: Normal range of motion.      Cervical back: Normal range of motion.   Skin:     General: Skin is warm and dry.   Neurological:      General: No focal deficit present.      Mental Status: She is alert.      " Comments: CN II-XII intact  Ambulated without difficulty/ataxia   Psychiatric:         Mood and Affect: Affect is labile.         Behavior: Behavior is agitated and withdrawn.      Comments: Initially increasingly agitated, yelling.  Then became withdrawn.         Additional Data:     Lab Results:     Results from last 7 days   Lab Units 04/26/24  0745   WBC Thousand/uL 7.03   HEMOGLOBIN g/dL 14.7   HEMATOCRIT % 42.5   PLATELETS Thousands/uL 207   SEGS PCT % 54   LYMPHO PCT % 31   MONO PCT % 12   EOS PCT % 3     Results from last 7 days   Lab Units 04/26/24  0745   SODIUM mmol/L 140   POTASSIUM mmol/L 3.0*   CHLORIDE mmol/L 106   CO2 mmol/L 22   BUN mg/dL 9   CREATININE mg/dL 0.79   ANION GAP mmol/L 12   CALCIUM mg/dL 9.3   ALBUMIN g/dL 4.6   TOTAL BILIRUBIN mg/dL 0.59   ALK PHOS U/L 77   ALT U/L 11   AST U/L 13   GLUCOSE RANDOM mg/dL 105             Lab Results   Component Value Date/Time    HGBA1C 5.3 03/28/2024 04:06 PM    HGBA1C 4.7 02/02/2023 06:26 AM    HGBA1C 4.9 09/01/2021 01:21 PM               Imaging: I have personally reviewed pertinent reports.      No orders to display       EKG, Pathology, and Other Studies Reviewed on Admission:   EKG: see above    ** Please Note: This note has been constructed using a voice recognition system. **

## 2024-04-27 NOTE — ASSESSMENT & PLAN NOTE
Chronic headaches, follows with neuro  Continue magnesium 400 mg twice daily and riboflavin  Tinazidine PRN
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
## 2024-04-28 LAB
ATRIAL RATE: 89 BPM
P AXIS: 52 DEGREES
PR INTERVAL: 130 MS
QRS AXIS: 83 DEGREES
QRSD INTERVAL: 78 MS
QT INTERVAL: 368 MS
QTC INTERVAL: 447 MS
T WAVE AXIS: 63 DEGREES
VENTRICULAR RATE: 89 BPM

## 2024-04-28 PROCEDURE — 93010 ELECTROCARDIOGRAM REPORT: CPT | Performed by: INTERNAL MEDICINE

## 2024-04-28 PROCEDURE — 99232 SBSQ HOSP IP/OBS MODERATE 35: CPT | Performed by: PSYCHIATRY & NEUROLOGY

## 2024-04-28 RX ORDER — OXCARBAZEPINE 300 MG/1
300 TABLET, FILM COATED ORAL EVERY 12 HOURS SCHEDULED
Status: DISCONTINUED | OUTPATIENT
Start: 2024-04-28 | End: 2024-05-07 | Stop reason: HOSPADM

## 2024-04-28 RX ADMIN — OXCARBAZEPINE 300 MG: 300 TABLET, FILM COATED ORAL at 10:45

## 2024-04-28 RX ADMIN — Medication 400 MG: at 17:00

## 2024-04-28 RX ADMIN — CYANOCOBALAMIN TAB 500 MCG 500 MCG: 500 TAB at 08:42

## 2024-04-28 RX ADMIN — OLANZAPINE 10 MG: 10 TABLET, FILM COATED ORAL at 21:21

## 2024-04-28 RX ADMIN — NICOTINE POLACRILEX 2 MG: 4 GUM, CHEWING BUCCAL at 06:12

## 2024-04-28 RX ADMIN — Medication 1000 UNITS: at 08:42

## 2024-04-28 RX ADMIN — OXCARBAZEPINE 300 MG: 300 TABLET, FILM COATED ORAL at 21:21

## 2024-04-28 RX ADMIN — NICOTINE 21 MG: 21 PATCH, EXTENDED RELEASE TRANSDERMAL at 08:42

## 2024-04-28 RX ADMIN — OLANZAPINE 10 MG: 10 TABLET, FILM COATED ORAL at 07:28

## 2024-04-28 RX ADMIN — TIZANIDINE 2 MG: 2 TABLET ORAL at 21:21

## 2024-04-28 RX ADMIN — LORAZEPAM 1 MG: 1 TABLET ORAL at 07:28

## 2024-04-28 RX ADMIN — Medication 400 MG: at 08:42

## 2024-04-28 RX ADMIN — LORAZEPAM 1 MG: 1 TABLET ORAL at 16:59

## 2024-04-28 RX ADMIN — NICOTINE POLACRILEX 2 MG: 4 GUM, CHEWING BUCCAL at 19:20

## 2024-04-28 RX ADMIN — OLANZAPINE 5 MG: 5 TABLET, FILM COATED ORAL at 16:58
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
## 2024-04-28 NOTE — NURSING NOTE
"Belongings dropped off 4/28    1 purple bag   10 pairs socks   14 pairs underwear   9 T-shirts red, brown, grey, cream, solid white, white/grey, green, teal, solid white  1 black jacket (says I'm his queen)  1 camo jennifer pants   1 purple/black leggings  1 solid black leggings   1 pair jeans  1 pair navy sweat pants (white stripes)  1 light grey sweat pants white stripe  1 off white sweat pants   1 black with grey stripe leggings  1 black \"dave\" long sleeve   1 black/white stripe long sleeve \"dave shirt  1 red long sleeve \"dave\" shirt  1 black shirt with skull   1 yellow black T-shirt (batman)  1 looney tunes long sleeve grey T-shirt  4 sports bras (navy, red, white, pink)  1 pair grey sweat pants     BEDSIDE  1 solid teal t shirt  1 solid green shirt   2 socks solid (black)  5 underwear solid (1 black 2 white, 1 pink, 1 orange)  2 sports bra (pink, navy) shweta [employer] brand  1 light grey sweat pants (dark grey stripe on side)      "

## 2024-04-28 NOTE — PROGRESS NOTES
Psychiatric Progress Note - Department of Behavioral Health   nAtionette Downing 38 y.o. female MRN: [id number]  Unit/Bed#: [facility] 204-02 Encounter: [id number]      ASSESSMENT & PLAN     Principal Problem:    Bipolar I disorder, most recent episode manic, severe with psychotic features (HCC)  Active Problems:    Psychogenic nonepileptic seizure    Smoker    Asthma    Cocaine abuse in remission (HCC)    Cannabis abuse, in remission    Post-traumatic stress disorder, chronic    Medical clearance for psychiatric admission    Headache      Recommended Treatment:   Will restart Trileptal as per patient request, sodium within normal limits however potassium was previously mildly low, patient refused potassium supplement but has been eating somewhat  Continue Zyprexa 10 mg at bedtime for now as patient continues to deny any other medications.  Will order BMP for tomorrow morning to monitor potassium, continue to encourage patient to eat and stay hydrated.    Continue medical management by medical team.  Continue to assess for adverse medication side effects.  Continue with pharmacotherapy, and promote patient participation in groups, therapeutic milieu, and occupational therapy.  Encourage Antionette Downing to participate in nonverbal forms of therapy including journaling and art/music therapy.  Continue frequent safety checks every 7 minutes and vitals per unit protocol.    Discharge disposition:  Case discussed with treatment team. 201 commitment status.    SUBJECTIVE     All documentation including nursing notes, medication history to ensure medication adherence on the unit, labs, and vitals were reviewed during discussion with treatment team.  Over the past 24 hours, staff noted the patient has been only somewhat cooperative on the unit and not compliant with medications.  Nursing reported patient continues to appear to respond to internal stimuli, patient continues to spit into a trash can and toilet  "complaining of a bad taste in her mouth, did not go to groups, denied interact with peers, delayed in responses, and at 6 PM became agitated and demanding making threats, tearful, paranoid, delusional and received as needed Ativan 2 mg IM at 1815, refused bedtime Zyprexa 10 mg, in the morning was anxious and irritable and internally preoccupied and at 7:28 AM accepted Zyprexa 10 mg p.o. and Ativan 1 mg p.o. for agitation and anxiety which appeared effective.    Antionette was evaluated this morning for continuity of care and no acute distress noted throughout the evaluation. Today on exam, the patient was seen in her room with door open outside of nursing station and reports feeling \"okay.\"  Patient was demanding her close and discharge stating that her significant other is not picking up the phone, \"n----is just riding around town,\" she continues to use profanity, irritable edge, delusional and paranoid, reported \" it tastes like semen\" when asked why she was distressed and anxious.  Patient denied pain and denied symptoms upon direct inquiry but does appear to be responding to internal stimuli.  Patient reported she wants to restart her Trileptal only, stating \" I do not know why I was on Abilify\".  Patient was provided psychoeducation regarding Zyprexa again and encouraged to take it in the evening or request as needed medications from the nurse when needed. Today, Antionette denies suicidal and homicidal ideations and is lon for safety on the unit.    PSYCHIATRIC REVIEW OF SYSTEMS     Behavior over the last 24 hours: improved minimally  Sleep: improved  Appetite: improving  Medication side effects: No    REVIEW OF SYSTEMS     Review of systems: no complaints    OBJECTIVE     Vital Signs in Past 24 Hours:  Temp:  [97 °F (36.1 °C)-97.4 °F (36.3 °C)] 97.4 °F (36.3 °C)  HR:  [85-92] 92  Resp:  [18] 18  BP: (113-114)/(73-77) 113/77    Intake/Output in Past 24 hours:  No intake/output data recorded.  No " "intake/output data recorded.        Laboratory Results:  I have personally reviewed all pertinent laboratory/tests results.    Mental Status Evaluation:    Appearance:  age appropriate, casually dressed, marginal hygiene, looks stated age   Behavior:  agitated, angry, bizarre, demanding, guarded, slightly cooperative, no eye contact, psychomotor agitation, restless and fidgety, responds better to redirection   Speech:  pressured, hypertalkative, increased latency of response, loud   Mood:  \"Okay\"   Affect:  labile, increased in intensity, mood-incongruent   Language naming objects and repeating phrases   Thought Process:  disorganized, racing of thoughts, perseverative, concrete   Associations: tangential associations   Thought Content:  persecutory and somatic delusions, negative, paranoid thoughts, sexual preoccupation, negative thinking, ruminating thoughts   Perceptual Disturbances: Denies auditory or visual hallucinations, Appears to be responding to internal stimuli, and Appears to be internally preoccupied   Risk Potential: Suicidal ideation - None at present  Homicidal ideation - None at present  Potential for aggression - Yes, due to acute psychosis   Sensorium:  oriented to person, place, and time/date, still does not recall reason for admission   Memory/Cognition:  recent memory grossly intact, remote memory grossly intact   Consciousness:  alert and awake   Attention/Concentration: decreased concentration and decreased attention span   Intellect Average   Insight:  poor   Judgment: poor   Gait/Station: normal gait/station, normal balance   Motor Activity: no abnormal movements     Recommended Treatment:   See above for assessment and plan.    Behavioral Health Medications: all current active meds have been reviewed and continue current psychiatric medications.    Current Facility-Administered Medications   Medication Dose Route Frequency Provider Last Rate    acetaminophen  650 mg Oral Q6H PRN Antionette " Razia Stives, PA-C      acetaminophen  650 mg Oral Q4H PRN Antionette Razia Stives, PA-C      acetaminophen  975 mg Oral Q6H PRN Antionette Razia Stives, PA-C      albuterol  2 puff Inhalation Q4H PRN Kenny Arnold, PA-C      aluminum-magnesium hydroxide-simethicone  30 mL Oral Q4H PRN Antionette Randle Stives, PA-C      Artificial Tears  1 drop Both Eyes Q3H PRN Antionette Randle Stives, PA-C      benztropine  1 mg Intramuscular Q4H PRN Max 6/day Antionettekelley Randle Stives, PA-C      benztropine  1 mg Oral Q4H PRN Max 6/day Antionettekelley Randle Stives, PA-C      bisacodyl  10 mg Rectal Daily PRN Antionette Randle Stives, PA-C      budesonide-formoterol  2 puff Inhalation BID Kenny Arnold, EMMA      Cholecalciferol  1,000 Units Oral Daily Kenny Arnold, PA-C      cyanocobalamin  500 mcg Oral Daily Kenny Arnold, PA-C      hydrOXYzine HCL  50 mg Oral Q6H PRN Max 4/day Antionette Randle Stives, EMMA      Or    diphenhydrAMINE  50 mg Intramuscular Q6H PRN Antionette Randle Stives, PA-DOMINGA      hydrOXYzine HCL  100 mg Oral Q6H PRN Max 4/day Antionette Randle Stives, EMMA      Or    LORazepam  2 mg Intramuscular Q6H PRN Antionette Randle Stives, PA-C      hydrOXYzine HCL  25 mg Oral Q6H PRN Max 4/day Antionette Randle Stives, PA-C      LORazepam  1 mg Oral Q6H PRN Ramírez Delaney DO      magnesium Oxide  400 mg Oral BID Kenny Arnold, EMMA      nicotine  21 mg Transdermal Daily Kenny Arnold, EMMA      nicotine polacrilex  2 mg Oral Q2H PRN Antionette Randle Stives, PA-C      OLANZapine  10 mg Oral Q3H PRN Max 3/day Antionette Randle Stives, EMMA      Or    OLANZapine  10 mg Intramuscular Q3H PRN Max 3/day Antionette Randle Stives, PA-C      OLANZapine  5 mg Oral Q3H PRN Max 6/day Antionette Galindo PA-C      Or    OLANZapine  5 mg Intramuscular Q3H PRN Max 6/day Antionette Galindo PA-C      OLANZapine  10 mg Oral  Ramírez Delaney,       OLANZapine  2.5 mg Oral Q3H PRN Max 8/day Antionette Galindo PA-C      OXcarbazepine  300 mg Oral Q12H [facility] Ramírez Delaney,  DO      polyethylene glycol  17 g Oral Daily PRN Antionette Galindo PA-C      potassium chloride  40 mEq Oral Once Kenny Arnold PA-C      propranolol  10 mg Oral Q8H PRN Antionette Galindo PA-C      senna-docusate sodium  1 tablet Oral Daily PRN Antionette Galindo PA-C      tiZANidine  2 mg Oral HS Kenny Arnold PA-C      traZODone  50 mg Oral HS PRN Antionette Galindo PA-C         Risks, benefits and possible side effects of Medications:   Risks, benefits, and possible side effects of medications explained to patient and patient verbalizes understanding.      Inpatient Psychiatric Certification: Based upon physical, mental and social evaluations, I certify that inpatient psychiatric services are medically necessary for this patient for a duration of >2 midnights for the treatment of Bipolar I disorder, most recent episode manic, severe with psychotic features (HCC)  Available alternative community resources do not meet the patient's mental health care needs.  I further attest that an established written individualized plan of care has been implemented and is outlined in the patient's medical records.    Counseling/Coordination of Care    Total unit time spent today was greater than 15 minutes. Greater than 50% of total time was spent with the patient and/or patient's relatives and/or coordination of patient's care.   Patient's rights, confidentiality, exceptions to confidentiality, use of electronic medical record including appropriate staff access to medical record regarding behavioral health services and consent to treatment were reviewed.    Ramírez Delaney DO    This note was completed in part utilizing [employer]. Grammatical, translation, syntax errors, random word insertions, spelling mistakes, and incomplete sentences may be an occasional consequence of this system secondary to software limitations with voice recognition, ambient noise, and hardware issues.  If you have any questions or concerns about the content, text, or information contained within the body of this dictation, please contact the provider for clarification.     Note Share:   This note was not shared with the patient due to reasonable likelihood of causing patient harm.

## 2024-04-28 NOTE — NURSING NOTE
Patient refused bedtime dose Zyprexa 10 mg PO. Patient is calm and cooperative at present time, observed to be laying in bed with no s/sx distress noted. 7 minute rounding maintained.

## 2024-04-28 NOTE — TREATMENT TEAM
04/28/24 0752   Team Meeting   Meeting Type Daily Rounds   Team Members Present   Team Members Present Physician;Nurse   Physician Team Member Jeane Delaney   Nursing Team Member Sue Douglas   Patient/Family Present   Patient Present No   Patient's Family Present No     Daily Rounds: Pt denies SI.  Required control team d/t increased agitation.  Demanding snacks and clothes.  Posturing toward staff, spitting.  Received Ativan 2mg IM.    This morning pt continues to have irritable edge but improved from yesterday.  Requested meds for anxiety, received Ativan and zyprexa PO.

## 2024-04-28 NOTE — NURSING NOTE
Patient pacing halls visibly anxious as evidenced by facial grimace, muscle tension, and staring into RN station. Patient speaking with irritable tone and appears internally preoccupied. @07:28 RN administered Zyprexa 10 mg PO for agitation and Ativan 1 mg PO for severe anxiety (Loo= 29). Medication effective, patient appears calmer and is currently consuming breakfast.
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
## 2024-04-29 LAB
ANION GAP SERPL CALCULATED.3IONS-SCNC: 6 MMOL/L (ref 4–13)
BUN SERPL-MCNC: 11 MG/DL (ref 5–25)
CALCIUM SERPL-MCNC: 9 MG/DL (ref 8.4–10.2)
CHLORIDE SERPL-SCNC: 107 MMOL/L (ref 96–108)
CO2 SERPL-SCNC: 26 MMOL/L (ref 21–32)
CREAT SERPL-MCNC: 0.72 MG/DL (ref 0.6–1.3)
GFR SERPL CREATININE-BSD FRML MDRD: 108 ML/MIN/1.73SQ M
GLUCOSE P FAST SERPL-MCNC: 92 MG/DL (ref 65–99)
GLUCOSE SERPL-MCNC: 92 MG/DL (ref 65–140)
POTASSIUM SERPL-SCNC: 4.4 MMOL/L (ref 3.5–5.3)
SODIUM SERPL-SCNC: 139 MMOL/L (ref 135–147)

## 2024-04-29 PROCEDURE — 99232 SBSQ HOSP IP/OBS MODERATE 35: CPT | Performed by: STUDENT IN AN ORGANIZED HEALTH CARE EDUCATION/TRAINING PROGRAM

## 2024-04-29 PROCEDURE — 80048 BASIC METABOLIC PNL TOTAL CA: CPT | Performed by: PSYCHIATRY & NEUROLOGY

## 2024-04-29 RX ADMIN — TIZANIDINE 2 MG: 2 TABLET ORAL at 21:31

## 2024-04-29 RX ADMIN — LORAZEPAM 1 MG: 1 TABLET ORAL at 23:31

## 2024-04-29 RX ADMIN — Medication 1000 UNITS: at 08:06

## 2024-04-29 RX ADMIN — Medication 400 MG: at 17:38

## 2024-04-29 RX ADMIN — LORAZEPAM 1 MG: 1 TABLET ORAL at 06:42

## 2024-04-29 RX ADMIN — NICOTINE 21 MG: 21 PATCH, EXTENDED RELEASE TRANSDERMAL at 08:07

## 2024-04-29 RX ADMIN — Medication 400 MG: at 08:05

## 2024-04-29 RX ADMIN — ALBUTEROL SULFATE 2 PUFF: 90 AEROSOL, METERED RESPIRATORY (INHALATION) at 08:05

## 2024-04-29 RX ADMIN — OXCARBAZEPINE 300 MG: 300 TABLET, FILM COATED ORAL at 20:46

## 2024-04-29 RX ADMIN — ALBUTEROL SULFATE 2 PUFF: 90 AEROSOL, METERED RESPIRATORY (INHALATION) at 17:41

## 2024-04-29 RX ADMIN — OLANZAPINE 10 MG: 10 TABLET, FILM COATED ORAL at 20:46

## 2024-04-29 RX ADMIN — CYANOCOBALAMIN TAB 500 MCG 500 MCG: 500 TAB at 08:05

## 2024-04-29 RX ADMIN — OXCARBAZEPINE 300 MG: 300 TABLET, FILM COATED ORAL at 08:05

## 2024-04-29 RX ADMIN — BUDESONIDE AND FORMOTEROL FUMARATE DIHYDRATE 2 PUFF: 80; 4.5 AEROSOL RESPIRATORY (INHALATION) at 18:51

## 2024-04-29 RX ADMIN — LORAZEPAM 1 MG: 1 TABLET ORAL at 13:48

## 2024-04-29 NOTE — NURSING NOTE
"Meghan is frequently walking the halls and stopping at the nurses station for multiple requests. List of belongings dropped off yesterday was printed and reviewed with her at her request. She states, \"I really wasn't myself this weekend. I don't like to be pushed that far.\" She verbalizes hope that \"today will be better.\" Currently denies SI/HI and hallucinations. Encouraged to attend group. She declines stating, \" I have bad social anxiety and it makes me feel worse.\"   "

## 2024-04-29 NOTE — DISCHARGE INSTR - APPOINTMENTS
You have confirmed and will be discharged to address [address] [place] [id number].  You have confirmed and will be contacted at phone number [phone].  Your  while you were at [facility] was Greg TERRELL who can be reached at phone number [phone] and fax number [phone].    Marlyn, or Stephanie, our Behavioral Health Nurse Navigators, will be calling you after your discharge, on the phone number that you provided.  They will be available as an additional support, if needed.   If you wish to speak with one of them, you may contact Marlyn at [phone] or Stephanie at [phone].

## 2024-04-29 NOTE — NURSING NOTE
Meghan requested medication for increased anxiety. Loo 26. Ativan 1mg PO given. On f/u she is sleeping and appears comfortable.

## 2024-04-29 NOTE — NURSING NOTE
"Pt is withdrawn to room however approaches staff for needs. Flat affect and poor eye contact. Frequent requests for food. Pt was encouraged to go to group, however asked if she can stay in her room because she has \"social anxiety.\" Denies SI and HI. When asked about hallucinations she denies. Then, she was asked about sleep and pt said \"I can't sleep because people keep talking to me all night.\" Pt c/o drowsiness today d/t poor sleep. Pt counseled and encouraged to approach RN as needed.   "

## 2024-04-29 NOTE — PLAN OF CARE
Pt has been provided with information about opportunities for therapeutic programming    Problem: Ineffective Coping  Goal: Participates in unit activities  Description: Interventions:  - Provide therapeutic environment   - Provide required programming   - Redirect inappropriate behaviors   Outcome: Progressing
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
## 2024-04-29 NOTE — PROGRESS NOTES
04/29/24 0900   Team Meeting   Meeting Type Tx Team Meeting   Initial Conference Date 04/29/24   Next Conference Date 05/29/24   Team Members Present   Team Members Present Physician;Nurse;   Physician Team Member Dr. Michelle   Nursing Team Member Ramos   Care Management Team Member Abby   Patient/Family Present   Patient Present No  (Pt declined to meet with treatment team.)   Patient's Family Present No       Reviewed diagnosis of bipolar I disorder, most recent episode manic, severe with psychotic features.  Discussed short term goals of decrease in paranoid thoughts, decrease in psychotic symptoms, decrease in level of agitation, ability to stay safe on the unit, ability to stay free of restraints, improvement in insight, improvement in reality testing, improvement in reasoning ability, increase in socialization with peers on the unit, acceptance of need for psychiatric treatment, acceptance of psychiatric medications.  No discharge date set at this time.  All parties are in agreement and treatment plan was signed.

## 2024-04-29 NOTE — DISCHARGE INSTR - OTHER ORDERS
[facility] CRISIS INFORMATION     Warmline is a confidential 7 days/week telephone support service manned by trained mental health consumers.  Warmline operates daily but is not able to accept calls between 2AM-6AM.   Warmline provides support, a listening ear and can provide information about available services. Warmline specializes in the concerns of mental health consumers, their families and friends.  However, we are also here for anyone who has a mental health concern, is confused about or just doesn't know anything about mental health or where to get information. To reach [facility], call [phone] accepts calls between 6:00 AM to 10:00 AM and from 4:00 PM to 12:00 AM.     Text CONNECT to 626753 from anywhere in the [place], anytime, about any type of crisis.  A live, trained Crisis Counselor receives the text and lets you know that they are here to listen.  The volunteer Crisis Counselor will help you move from a hot moment to a cool moment.    [facility] Crisis Intervention - licensed telephone and mobile crisis services that provide mental health assessments to all age groups regardless of income or insurance.  Crisis Intervention operates 24-hour/7 days a week. [facility] Crisis Intervention assists consumer who are experiencing a mental health emergency and lack the resources to assist themselves.  Immediate intervention for suicidal and depressed individuals with home visits/outreach being top priority. Crisis can be contacted at [phone].     The National [place] on Mental Illness (CELESTE) offers various education & support groups for you & your family.  For more information visit their website at   http://www.celeste-lv.org/.  Dial 2-1-1 to get connected/get help.  Free, confidential information & referral available 24/7: Aging Services, Child & Youth Services, Counseling, Education/Training, Food/Shelter/Clothing, Health Services, Parenting, Substance Abuse, Support Groups, Volunteer  Opportunities, & much more.  Phone: 2-1-1 or 064-203-5534, Web: www.pl521ovws.org, Email: 211@[facility].org    [facility] Drug and Alcohol Administration Resources   (211) 538-3167  For additional information, contact the Department of Human Services Information and Referral Unit at  (536) 339-9438 between the hours of 8:30 a.m. and 4:30 p.m., Monday through Friday.    An assessment is the first step in the process for [facility] residents to get the assistance they need.  Any of the providers listed below are able to complete an assessment.  After contacting a provider, an appointment for the assessment is scheduled.  During the appointment an  will gather information to determine the level of treatment that is needed.  In addition, assistance will be provided in completing the necessary paperwork to access treatment including a liability determination, release(s) of information, and may also include an application to the Department of Public Welfare for Medical Assistance.  The assessment process may take up to 2 hours to complete.  Upon completion of the paperwork and assessment process, the  will determine the recommended level of care needed and provide assistance with the referral process.     American Organization - provides substance abuse assessment services for adults.  [address] [place] [address]  Phone: [phone] ext. [phone]  Fax: [phone]  Walk in hours Mondays - Fridays 8AM-3PM     [facility]) - provides screening and assessment, outpatient and intensive outpatient services for both adolescents and adults.  Day and evening services available.  [address] [place] [address]  Phone: [phone]  Fax: [phone]     [facility]. [facility] - is a dual diagnosis outpatient program that provides assessment / treatment recommendations, individual, group and  family therapy, intensive outpatient therapy, outpatient therapy, professional consultations, educational presentations and workshops, and urine screens for drugs of abuse.    [facility]  [address]  [place] [address]  Phone: [phone]  Fax: [phone]  Walk in hours Mondays 9AM-5PM and Tuesdays - Fridays 9AM-2PM     Treatment [employer] [facility] - provides intensive outpatient and outpatient treatment services to adolescents, adults and families experiencing drug and/or alcohol problems.  Treatment modalities include individual, group and family counseling.  Weekend DUI classes are available.  [employer] is a division of [employer], [facility].  [facility] [place] [address]  Phone: [phone]  Fax: [phone]  Walk in hours Mondays - Fridays 8:30AM-3:30PM      Primary Care Doctor Services    When your insurance becomes active Medical Assistance will send you paperwork to choose your primary care doctor.    If you should need services before that below are options that work with uninsured patients.     [facility] provides comprehensive well and sick primary care, OB/GYN, dental and pediatric health services to the medically underserved, including the uninsured and underinsured. [facility] provides services in a community-based setting with experienced and compassionate physicians and other providers. It supports the health and wellness goals of local residents and specializes in providing improved access, coordinated care and enhanced patient / family involvement.  [address]  [address]  [phone]  Monday - Friday: 8 am - 5 pm  We provide care in a variety of areas, including: routine physical exams, wellness visits for infants through adults, including children's immunizations. In addition, our services include blood tests and lab  studies; women's health care, including Pap smears; care and management of diabetes, asthma and high blood pressure.    [facility]  [facility]  [address]  [address]  [place] [address]  [phone]  Monday through Friday: 8 am - 5 pm  Saturday: 8 am - 1 pm  Health screenings  Preventive care  Care for acute illnesses and minor problems  Care of chronic illnesses  Physical examinations, including gynecological exams and Pap smears  Patient education  Immunizations  We provide care in a variety of areas, including: routine physical exams, wellness visits for infants through adults, including children's immunizations. In addition, our services include blood tests and lab studies; women's health care, including Pap smears; care and management of diabetes, asthma and high blood pressure.    [address]  [place]  [address]  [phone]  Monday - Friday: 8 am - 5 pm  Health screenings  Preventive care  Care for acute illnesses and minor problems  Care of chronic illnesses  Physical examinations  Patient education  Immunizations  We offer assistance in accessing various resources through our , Financial Counselor and Outpatient Care Manager. In addition, we have a Certified  on site to help facilitate optimal communication with patients, care and management of diabetes and other chronic illnesses, including transitioning from hospital to home through specialized office visits. We can perform many point-of-care tests at the time of your visit including, EKG's, Hemoglobin A1C, blood glucose fingerstick, diabetic eye exams, urine dip, spirometry and hearing/vision tests.    [facility]   LOCATION:  [facility]  (located inside [facility])  [address] [address]  PHONE:  [phone]  HOURS:  Monday: 8 am - 8 pm  Tuesday: 8 am - 8 pm  Wednesday: 8 am - 4:30 pm  Thursday: 8 am - 8 pm  Friday: 8 am - 6 pm  Saturday: Closed  Sunday: Closed    [facility]  LOCATION:  [address] [place] [address]  PHONE: [phone]  HOURS:  Monday: 8 am - 4:30 pm  Tuesday: 8 am - 4:30 pm  Wednesday: 8 am - 4:30 pm  Thursday: 8 am - 4:30 pm  Friday: 8 am - 4:30 pm  Saturday: Closed  Sunday: Closed    [facility]  (inside [facility])  [address] [place] [address]  PHONE: [phone]  HOURS:  Monday: 8 am - 8 pm  Tuesday: 8 am - 4:30 pm  Wednesday: 8 am - 4:30 pm  Thursday: 8 am - 8 pm  Friday: 8 am - 4:30 pm  Saturday: Closed  Sunday: Closed    [facility]  LOCATION:  [facility]  [address] [place] [address]  PHONE: [phone]  HOURS:  Please note that our hours have changed.  Monday: 8 am - 8 pm  Tuesday: 8 am - 8 pm  Wednesday: 8 am - 4:30 pm  Thursday: 8 am - 8 pm  Friday: 8 am - 6 pm  Saturday: Closed  Sunday: Closed    [place] MEDICINE RESOURCE GUIDE     Brooks is the main contact from [facility] and her direct number is [phone], her email contact is [email or web address].     [facility]  Season runs November 1, 2021 through April 30, 2022. From November 1 through November 30   shelter will only be operating when it is 32 degrees or below. Visit website for status update.  Winter shelter for single men and single women. Registration 7:00pm to 9:00pm (Only employed   guests with written proof of employment may enter station later). First come, first served. Lights   out at 10:30pm. Lights on at 5:45am. Meals will be served Monday through Friday for clients   staying there only. Clients are required to wear a mask with the exception of eating and sleeping.  Address:  [address] [place] [address]  [place]  [place] Ruiz [address]  Eligibility: Homeless single men and women 18 years and older who have no other shelter   options; Unable to serve families Must have no open criminal warrants or sexual offender status   found on background check through [employer],  and [address].  Hours: Monday through Sunday, 7:00pm to 7:00am  Phone: [phone]      [facility] 2-1-1:   Call: 211 or 326-591-1684 Website: http://www.[employer]/  This is a toll free, confidential; 24-hour-a-day service which connects you to a community  in your area who can help you find services and resources that are available to you locally and provide critical services that can improve and save lives.     National Suicide Prevention Hotline  1-293.647.9540    National de Prevencion del Suicidio  8-720-112-2831    [place] Drug & Alcohol Helpline  1-811.706.6238    Crisis Text Line is free, 24/7 support for those in crisis. Text HOME to 391554 from anywhere in the [place] to text with a trained Crisis Counselor
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
## 2024-04-29 NOTE — QUICK NOTE
Belongings dropped off:    Empty contact case  Opti-free contact solotion 10fl oz  Acuvue osays contact lenses R/L x2 sets
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
## 2024-04-30 PROCEDURE — 99232 SBSQ HOSP IP/OBS MODERATE 35: CPT | Performed by: STUDENT IN AN ORGANIZED HEALTH CARE EDUCATION/TRAINING PROGRAM

## 2024-04-30 RX ORDER — FLUCONAZOLE 100 MG/1
150 TABLET ORAL ONCE
Status: COMPLETED | OUTPATIENT
Start: 2024-04-30 | End: 2024-04-30

## 2024-04-30 RX ADMIN — TIZANIDINE 2 MG: 2 TABLET ORAL at 21:28

## 2024-04-30 RX ADMIN — OLANZAPINE 15 MG: 10 TABLET, FILM COATED ORAL at 21:28

## 2024-04-30 RX ADMIN — FLUCONAZOLE 150 MG: 100 TABLET ORAL at 13:17

## 2024-04-30 RX ADMIN — BUDESONIDE AND FORMOTEROL FUMARATE DIHYDRATE 2 PUFF: 80; 4.5 AEROSOL RESPIRATORY (INHALATION) at 08:10

## 2024-04-30 RX ADMIN — LORAZEPAM 1 MG: 1 TABLET ORAL at 07:06

## 2024-04-30 RX ADMIN — LORAZEPAM 1 MG: 1 TABLET ORAL at 13:20

## 2024-04-30 RX ADMIN — Medication 1000 UNITS: at 08:09

## 2024-04-30 RX ADMIN — Medication 400 MG: at 08:09

## 2024-04-30 RX ADMIN — NICOTINE 21 MG: 21 PATCH, EXTENDED RELEASE TRANSDERMAL at 08:10

## 2024-04-30 RX ADMIN — LORAZEPAM 1 MG: 1 TABLET ORAL at 22:44

## 2024-04-30 RX ADMIN — Medication 400 MG: at 17:22

## 2024-04-30 RX ADMIN — BUDESONIDE AND FORMOTEROL FUMARATE DIHYDRATE 2 PUFF: 80; 4.5 AEROSOL RESPIRATORY (INHALATION) at 17:22

## 2024-04-30 RX ADMIN — OXCARBAZEPINE 300 MG: 300 TABLET, FILM COATED ORAL at 21:28

## 2024-04-30 RX ADMIN — OXCARBAZEPINE 300 MG: 300 TABLET, FILM COATED ORAL at 08:09

## 2024-04-30 RX ADMIN — CYANOCOBALAMIN TAB 500 MCG 500 MCG: 500 TAB at 08:09

## 2024-04-30 NOTE — NURSING NOTE
"Pt is irritable during interaction. Pt focused on washing laundry that is in her locked bin. Pt was told by multiple staff that her laundry will be washed closer to discharge. Pt also focused on PRN ativan stating \"I need to sleep, it works for me.\" Pt denies SI/HI. Pt would not disclose a response for AVH to this writer. When asked pt responded with \"fuck off\"  "

## 2024-04-30 NOTE — PROGRESS NOTES
04/30/24 0830   Team Meeting   Meeting Type Daily Rounds   Team Members Present   Team Members Present Physician;Nurse;;Other (Discipline and Name)   Physician Team Member Dr. Gutierres / Dr. Michelle / EMMA Bro / [place] Student   Nursing Team Member Ramos / Christos   Care Management Team Member Abby / Amie / Magali / Murali   Other (Discipline and Name) Joyce (Group Facilitator) / Amelia (Pharmacist)   Patient/Family Present   Patient Present No   Patient's Family Present No       D/C: No discharge date.

## 2024-04-30 NOTE — PLAN OF CARE
Problem: SELF HARM/SUICIDALITY  Goal: Will have no self-injury during hospital stay  Description: INTERVENTIONS:  - Q 15 MINUTES: Routine safety checks  - Q WAKING SHIFT & PRN: Assess risk to determine if routine checks are adequate to maintain patient safety  - Encourage patient to participate actively in care by formulating a plan to combat response to suicidal ideation, identify supports and resources  Outcome: Progressing     Problem: DEPRESSION  Goal: Will be euthymic at discharge  Description: INTERVENTIONS:  - Administer medication as ordered  - Provide emotional support via 1:1 interaction with staff  - Encourage involvement in milieu/groups/activities  - Monitor for social isolation  Outcome: Progressing     Problem: DEAN  Goal: Will exhibit normal sleep and speech and no impulsivity  Description: INTERVENTIONS:  - Administer medication as ordered  - Set limits on impulsive behavior  - Make attempts to decrease external stimuli as possible  Outcome: Progressing     Problem: PSYCHOSIS  Goal: Will report no hallucinations or delusions  Description: Interventions:  - Administer medication as  ordered  - Every waking shifts and PRN assess for the presence of hallucinations and or delusions  - Assist with reality testing to support increasing orientation  - Assess if patient's hallucinations or delusions are encouraging self-harm or harm to others and intervene as appropriate  Outcome: Progressing     Problem: BEHAVIOR  Goal: Pt/Family maintain appropriate behavior and adhere to behavioral management agreement, if implemented  Description: INTERVENTIONS:  - Assess the family dynamic   - Encourage verbalization of thoughts and concerns in a socially appropriate manner  - Assess patient/family's coping skills and non-compliant behavior (including use of illegal substances).  - Utilize positive, consistent limit setting strategies supporting safety of patient, staff and others  - Initiate consult with Case  Management, Spiritual Care or other ancillary services as appropriate  - If a patient's/visitor's behavior jeopardizes the safety of the patient, staff, or others, refer to organization procedure.   - Notify Security of behavior or suspected illegal substances which indicate the need for search of the patient and/or belongings  - Encourage participation in the decision making process about a behavioral management agreement; implement if patient meets criteria  Outcome: Progressing     Problem: SLEEP DISTURBANCE  Goal: Will exhibit normal sleeping pattern  Description: Interventions:  -  Assess the patients sleep pattern, noting recent changes  - Administer medication as ordered  - Decrease environmental stimuli, including noise, as appropriate during the night  - Encourage the patient to actively participate in unit groups and or exercise during the day to enhance ability to achieve adequate sleep at night  - Assess the patient, in the morning, encouraging a description of sleep experience  Outcome: Progressing     Problem: SELF CARE DEFICIT  Goal: Return ADL status to a safe level of function  Description: INTERVENTIONS:  - Administer medication as ordered  - Assess ADL deficits and provide assistive devices as needed  - Obtain PT/OT consults as needed  - Assist and instruct patient to increase activity and self care as tolerated  Outcome: Progressing

## 2024-04-30 NOTE — NURSING NOTE
"Pt approached nurses station c/o severe anxiety d/t paranoia. Pt stated \"I feel like people are lurking again.\" Pt received PRN ativan at 2331. Ham = 26. Following up, pt appeared to be sleeping. PRN effective.  "
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
## 2024-04-30 NOTE — NURSING NOTE
F/U ativan 1 mg PO. Meghan requested ativan for increased anxiety. Loo 27. On reassessment she is sleeping and appears comfortable.

## 2024-04-30 NOTE — NURSING NOTE
"Meghan is irritable when approached. She is pacing the halls and withdrawn to her room. She is not seen attending groups or interacting with peers today. She is frequently at the nurses station asking for her room to be \"sanitized\" and different belongings. Denies SI/HI and hallucinations but appears preoccupied when walking in the halls. Encouraged to come to staff with questions or concerns.   "

## 2024-05-01 PROCEDURE — 99232 SBSQ HOSP IP/OBS MODERATE 35: CPT | Performed by: STUDENT IN AN ORGANIZED HEALTH CARE EDUCATION/TRAINING PROGRAM

## 2024-05-01 RX ORDER — BENZOCAINE/MENTHOL 6 MG-10 MG
LOZENGE MUCOUS MEMBRANE 4 TIMES DAILY PRN
Status: DISCONTINUED | OUTPATIENT
Start: 2024-05-01 | End: 2024-05-07 | Stop reason: HOSPADM

## 2024-05-01 RX ORDER — HYDROCORTISONE 25 MG/G
CREAM TOPICAL 4 TIMES DAILY PRN
Status: DISCONTINUED | OUTPATIENT
Start: 2024-05-01 | End: 2024-05-07 | Stop reason: HOSPADM

## 2024-05-01 RX ORDER — DICYCLOMINE HCL 20 MG
20 TABLET ORAL
Status: DISCONTINUED | OUTPATIENT
Start: 2024-05-01 | End: 2024-05-07 | Stop reason: HOSPADM

## 2024-05-01 RX ORDER — LORATADINE 10 MG/1
10 TABLET ORAL DAILY
Status: DISCONTINUED | OUTPATIENT
Start: 2024-05-01 | End: 2024-05-07 | Stop reason: HOSPADM

## 2024-05-01 RX ADMIN — Medication 400 MG: at 08:48

## 2024-05-01 RX ADMIN — DICYCLOMINE HYDROCHLORIDE 20 MG: 20 TABLET ORAL at 16:22

## 2024-05-01 RX ADMIN — HYDROXYZINE HYDROCHLORIDE 50 MG: 50 TABLET, FILM COATED ORAL at 06:21

## 2024-05-01 RX ADMIN — HYDROCORTISONE: 1 CREAM TOPICAL at 18:22

## 2024-05-01 RX ADMIN — DICYCLOMINE HYDROCHLORIDE 20 MG: 20 TABLET ORAL at 08:49

## 2024-05-01 RX ADMIN — Medication 400 MG: at 17:01

## 2024-05-01 RX ADMIN — DICYCLOMINE HYDROCHLORIDE 20 MG: 20 TABLET ORAL at 21:00

## 2024-05-01 RX ADMIN — DICYCLOMINE HYDROCHLORIDE 20 MG: 20 TABLET ORAL at 11:07

## 2024-05-01 RX ADMIN — OLANZAPINE 15 MG: 10 TABLET, FILM COATED ORAL at 20:56

## 2024-05-01 RX ADMIN — Medication 1000 UNITS: at 08:48

## 2024-05-01 RX ADMIN — TIZANIDINE 2 MG: 2 TABLET ORAL at 21:00

## 2024-05-01 RX ADMIN — LORATADINE 10 MG: 10 TABLET ORAL at 08:48

## 2024-05-01 RX ADMIN — CYANOCOBALAMIN TAB 500 MCG 500 MCG: 500 TAB at 08:49

## 2024-05-01 RX ADMIN — NICOTINE 21 MG: 21 PATCH, EXTENDED RELEASE TRANSDERMAL at 08:49

## 2024-05-01 RX ADMIN — OXCARBAZEPINE 300 MG: 300 TABLET, FILM COATED ORAL at 20:56

## 2024-05-01 RX ADMIN — BUDESONIDE AND FORMOTEROL FUMARATE DIHYDRATE 2 PUFF: 80; 4.5 AEROSOL RESPIRATORY (INHALATION) at 08:51

## 2024-05-01 RX ADMIN — BUDESONIDE AND FORMOTEROL FUMARATE DIHYDRATE 2 PUFF: 80; 4.5 AEROSOL RESPIRATORY (INHALATION) at 17:01

## 2024-05-01 RX ADMIN — HYDROCORTISONE: 25 CREAM TOPICAL at 13:35

## 2024-05-01 RX ADMIN — HYDROXYZINE HYDROCHLORIDE 100 MG: 50 TABLET, FILM COATED ORAL at 13:30

## 2024-05-01 RX ADMIN — OXCARBAZEPINE 300 MG: 300 TABLET, FILM COATED ORAL at 08:49
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
## 2024-05-01 NOTE — NURSING NOTE
Pt pacing in/out of room, appears visibly anxious. Pt requested medication and received PRN Atarax 100mg po at 1130, Loo: 25.

## 2024-05-01 NOTE — NURSING NOTE
Pt is awake, visible on unit, labile mood. Pt focused on exchanging clothing in storage bin. When asked to wait until appropriate time, pt verbalized frustration with yelling in halls. Pt denies SI, HI, AVH. Remains paranoid and focused on  tampering with things at home and blames  for pt not sleeping well prior to admission. Pt reports sleeping at night. Eyes appear puffy, pt started on Claritin this morning. Encouraged good hand hygiene.

## 2024-05-01 NOTE — NURSING NOTE
Pt received 4 cans of Sprite soda  1 bottle of sprite 2 candy bars pt drank 1 sprite and ate a candy bar, the soda in pt bin

## 2024-05-01 NOTE — NURSING NOTE
This writer was rounding unit pt came up to this writer and apologized to writer for there behavior over the weekend. This writer told pt it was  appreciated. This writer also said thank you pt did express she feels her medication is working.  This writer told pt's floor Nurse about the writers conversation with pt.

## 2024-05-01 NOTE — PROGRESS NOTES
05/01/24 0830   Team Meeting   Meeting Type Daily Rounds   Team Members Present   Team Members Present Physician;Nurse;;Other (Discipline and Name)   Physician Team Member Dr. Gutierres / Dr. Michelle / EMMA Bro / PA Student   Nursing Team Member Ramos / Stella   Care Management Team Member Abby / Amie / Magali   Other (Discipline and Name) Joyce (Group Facilitator)   Patient/Family Present   Patient Present No   Patient's Family Present No       D/C: No discharge date.

## 2024-05-01 NOTE — PLAN OF CARE
Problem: SELF HARM/SUICIDALITY  Goal: Will have no self-injury during hospital stay  Description: INTERVENTIONS:  - Q 15 MINUTES: Routine safety checks  - Q WAKING SHIFT & PRN: Assess risk to determine if routine checks are adequate to maintain patient safety  - Encourage patient to participate actively in care by formulating a plan to combat response to suicidal ideation, identify supports and resources  Outcome: Progressing     Problem: DEPRESSION  Goal: Will be euthymic at discharge  Description: INTERVENTIONS:  - Administer medication as ordered  - Provide emotional support via 1:1 interaction with staff  - Encourage involvement in milieu/groups/activities  - Monitor for social isolation  Outcome: Progressing     Problem: DEAN  Goal: Will exhibit normal sleep and speech and no impulsivity  Description: INTERVENTIONS:  - Administer medication as ordered  - Set limits on impulsive behavior  - Make attempts to decrease external stimuli as possible  Outcome: Progressing     Problem: PSYCHOSIS  Goal: Will report no hallucinations or delusions  Description: Interventions:  - Administer medication as  ordered  - Every waking shifts and PRN assess for the presence of hallucinations and or delusions  - Assist with reality testing to support increasing orientation  - Assess if patient's hallucinations or delusions are encouraging self-harm or harm to others and intervene as appropriate  Outcome: Progressing     Problem: BEHAVIOR  Goal: Pt/Family maintain appropriate behavior and adhere to behavioral management agreement, if implemented  Description: INTERVENTIONS:  - Assess the family dynamic   - Encourage verbalization of thoughts and concerns in a socially appropriate manner  - Assess patient/family's coping skills and non-compliant behavior (including use of illegal substances).  - Utilize positive, consistent limit setting strategies supporting safety of patient, staff and others  - Initiate consult with Case  Management, Spiritual Care or other ancillary services as appropriate  - If a patient's/visitor's behavior jeopardizes the safety of the patient, staff, or others, refer to organization procedure.   - Notify Security of behavior or suspected illegal substances which indicate the need for search of the patient and/or belongings  - Encourage participation in the decision making process about a behavioral management agreement; implement if patient meets criteria  Outcome: Progressing     Problem: ANXIETY  Goal: Will report anxiety at manageable levels  Description: INTERVENTIONS:  - Administer medication as ordered  - Teach and encourage coping skills  - Provide emotional support  - Assess patient/family for anxiety and ability to cope  Outcome: Progressing  Goal: By discharge: Patient will verbalize 2 strategies to deal with anxiety  Description: Interventions:  - Identify any obvious source/trigger to anxiety  - Staff will assist patient in applying identified coping technique/skills  - Encourage attendance of scheduled groups and activities  Outcome: Progressing     Problem: SLEEP DISTURBANCE  Goal: Will exhibit normal sleeping pattern  Description: Interventions:  -  Assess the patients sleep pattern, noting recent changes  - Administer medication as ordered  - Decrease environmental stimuli, including noise, as appropriate during the night  - Encourage the patient to actively participate in unit groups and or exercise during the day to enhance ability to achieve adequate sleep at night  - Assess the patient, in the morning, encouraging a description of sleep experience  Outcome: Progressing     Problem: SELF CARE DEFICIT  Goal: Return ADL status to a safe level of function  Description: INTERVENTIONS:  - Administer medication as ordered  - Assess ADL deficits and provide assistive devices as needed  - Obtain PT/OT consults as needed  - Assist and instruct patient to increase activity and self care as  tolerated  Outcome: Progressing     Problem: Ineffective Coping  Goal: Participates in unit activities  Description: Interventions:  - Provide therapeutic environment   - Provide required programming   - Redirect inappropriate behaviors   Outcome: Progressing

## 2024-05-01 NOTE — NURSING NOTE
After receiving PRN Atarax, pt showered and paced unit briefly. At approximately 1-hour, pt was observed resting in bed. Appears that medication has been effective for elevated anxiety.

## 2024-05-01 NOTE — NURSING NOTE
Pt approached this writer complaining of swelling in right wrist. Pt appears to have rash with mild swelling. Pt currently utilizing icepack.

## 2024-05-01 NOTE — NURSING NOTE
"Pt is OOB for dinner this evening and then returned to room. Pt reports feeling \"down\" today. Denies SI, HI, AVH. Appears internally preoccupied when awake. Pt denies questions or concerns at this time.   "

## 2024-05-02 PROCEDURE — 99232 SBSQ HOSP IP/OBS MODERATE 35: CPT | Performed by: STUDENT IN AN ORGANIZED HEALTH CARE EDUCATION/TRAINING PROGRAM

## 2024-05-02 RX ORDER — OLANZAPINE 10 MG/1
20 TABLET ORAL
Status: DISCONTINUED | OUTPATIENT
Start: 2024-05-02 | End: 2024-05-07 | Stop reason: HOSPADM

## 2024-05-02 RX ADMIN — TIZANIDINE 2 MG: 2 TABLET ORAL at 21:16

## 2024-05-02 RX ADMIN — Medication 400 MG: at 17:05

## 2024-05-02 RX ADMIN — DICYCLOMINE HYDROCHLORIDE 20 MG: 20 TABLET ORAL at 16:32

## 2024-05-02 RX ADMIN — OXCARBAZEPINE 300 MG: 300 TABLET, FILM COATED ORAL at 08:26

## 2024-05-02 RX ADMIN — LORATADINE 10 MG: 10 TABLET ORAL at 08:26

## 2024-05-02 RX ADMIN — BUDESONIDE AND FORMOTEROL FUMARATE DIHYDRATE 2 PUFF: 80; 4.5 AEROSOL RESPIRATORY (INHALATION) at 17:05

## 2024-05-02 RX ADMIN — DICYCLOMINE HYDROCHLORIDE 20 MG: 20 TABLET ORAL at 11:21

## 2024-05-02 RX ADMIN — DICYCLOMINE HYDROCHLORIDE 20 MG: 20 TABLET ORAL at 06:25

## 2024-05-02 RX ADMIN — OXCARBAZEPINE 300 MG: 300 TABLET, FILM COATED ORAL at 21:16

## 2024-05-02 RX ADMIN — DICYCLOMINE HYDROCHLORIDE 20 MG: 20 TABLET ORAL at 21:16

## 2024-05-02 RX ADMIN — NICOTINE 21 MG: 21 PATCH, EXTENDED RELEASE TRANSDERMAL at 08:27

## 2024-05-02 RX ADMIN — Medication 1000 UNITS: at 08:26

## 2024-05-02 RX ADMIN — BUDESONIDE AND FORMOTEROL FUMARATE DIHYDRATE 2 PUFF: 80; 4.5 AEROSOL RESPIRATORY (INHALATION) at 08:25

## 2024-05-02 RX ADMIN — LORAZEPAM 1 MG: 1 TABLET ORAL at 19:49

## 2024-05-02 RX ADMIN — CYANOCOBALAMIN TAB 500 MCG 500 MCG: 500 TAB at 08:26

## 2024-05-02 RX ADMIN — HYDROCORTISONE 1 APPLICATION: 25 CREAM TOPICAL at 11:21

## 2024-05-02 RX ADMIN — OLANZAPINE 20 MG: 10 TABLET, FILM COATED ORAL at 21:16

## 2024-05-02 RX ADMIN — Medication 400 MG: at 08:26

## 2024-05-02 NOTE — PROGRESS NOTES
"Progress Note - Behavioral Health     Antionette Downing 38 y.o. female MRN: [id number]   Unit/Bed#: U 204-02 Encounter: [id number]    Behavior over the last 24 hours: unchanged.     Antionette is a 38 year old female with bipolar 1 disorder presenting for psychiatric care. Today, she appears more tearful and sad, stating that she wants to \"sleep all the pain away\" and is looking to be discharged to home \"sooner rather than later\" due to upcoming events and a court date on May 14th. She says she feels frustrated and overwhelmed with all of her stressful thoughts lately. She has been able to sleep well, though has been sleeping a lot more than usual. Describes appetite as regular, but quickly devolves into illogical thought processes regarding gustatory hallucinations of \"semen in mouth.\" She admits to hearing voices recently at home - voices of her mother and other people from past relationships telling her to leave her  / get out of the house. She denies hearing these voices over the past few days. Remains somatic, now endorsing vague complaints of \"weird body sensations\" but able to elaborate. Her abdominal pain and allergies have improved since yesterday. She has been having normal bowel movements.  Thoughts remain disorganized and illogical, though she responds better to redirection.  She remains distractible, but again, responds when re-prompted. Denies SI/HI. Discussed possible medication adjustments including restarting lithium (had been on a dose of 1200 mg nightly in 2023) but patient is reluctant to restart due to side effects. Similar discussion regarding Depakote. Discussed considering Lamictal in the future, patient has not been on this in the past.     Sleep: hypersomnia  Appetite: normal  Medication side effects: Yes - fatigue    ROS: reports abdominal discomfort and less prominent today, all other systems are negative    Mental Status Evaluation:    Appearance:  age appropriate, casually " dressed, adequate grooming   Behavior:  cooperative, calm   Speech:  normal rate, soft, decreased volume, perseverative   Mood:  dysphoric   Affect:  tearful, mood-congruent   Thought Process:  normal rate of thoughts, disorganized, illogical, perseverative   Associations: loose associations, perseverative   Thought Content:  paranoid and somatic delusions, somatic and sexual preoccupation   Perceptual Disturbances: no visual hallucinations, denies auditory hallucinations when asked, appears preoccupied, tactile hallucinations   Risk Potential: Suicidal ideation - None at present, contracts for safety on the unit  Homicidal ideation - None  Potential for aggression - No   Sensorium:  oriented to person, place, and time/date   Memory:  recent and remote memory grossly intact   Consciousness:  alert and awake   Attention/Concentration: attention span and concentration appear shorter than expected for age   Insight:  limited   Judgment: poor   Gait/Station: normal gait/station   Motor Activity: no abnormal movements     Vital signs in last 24 hours:    Temp:  [97.6 °F (36.4 °C)-97.8 °F (36.6 °C)] 97.6 °F (36.4 °C)  HR:  [80] 80  Resp:  [16-18] 18  BP: (101-109)/(64-69) 109/69    Laboratory results: I have personally reviewed all pertinent laboratory/tests results    Results from the past 24 hours: No results found for this or any previous visit (from the past 24 hour(s)).    Progress Toward Goals: mood is stabilizing slowly, less irritable    Assessment/Plan   Principal Problem:    Bipolar I disorder, most recent episode manic, severe with psychotic features (HCC)  Active Problems:    Psychogenic nonepileptic seizure    Smoker    Asthma    Cocaine abuse in remission (HCC)    Cannabis abuse, in remission    Post-traumatic stress disorder, chronic    Medical clearance for psychiatric admission    Headache      Recommended Treatment:     Planned medication and treatment changes:    All current active medications have been  reviewed  Encourage group therapy, milieu therapy and occupational therapy  Behavioral Health checks every 7 minutes  Increase Zyprexa to 20 mg nightly for continued psychotic symptoms.    Discussed adjunctive treatment with a mood stabilizer (lithium vs VPA vs Lamictal); patient unwilling to consider at this time.    Current Facility-Administered Medications   Medication Dose Route Frequency Provider Last Rate    acetaminophen  650 mg Oral Q6H PRN Antionette Galindo, EMMA      acetaminophen  650 mg Oral Q4H PRN Antionette Galindo, EMMA      acetaminophen  975 mg Oral Q6H PRN Antionette Galindo, EMMA      albuterol  2 puff Inhalation Q4H PRN Kenny Arnold PA-C      aluminum-magnesium hydroxide-simethicone  30 mL Oral Q4H PRN Antionette Galindo PA-C      Artificial Tears  1 drop Both Eyes Q3H PRN Antionette Galindo PA-C      benztropine  1 mg Intramuscular Q4H PRN Max 6/day Antionette Galindo PA-C      benztropine  1 mg Oral Q4H PRN Max 6/day Antionette Galindo PA-C      bisacodyl  10 mg Rectal Daily PRN Antionette Galindo PA-C      budesonide-formoterol  2 puff Inhalation BID Kenny Arnold PA-C      Cholecalciferol  1,000 Units Oral Daily Kenny Arnold PA-C      cyanocobalamin  500 mcg Oral Daily Kenny Arnold PA-C      dicyclomine  20 mg Oral 4x Daily (AC & HS) Lowell Bro PA-C      hydrOXYzine HCL  50 mg Oral Q6H PRN Max 4/day Antionette Galindo PA-C      Or    diphenhydrAMINE  50 mg Intramuscular Q6H PRN Antionette Galindo PA-C      hydrocortisone   Topical 4x Daily PRN Kenny Arnold PA-C      hydrocortisone   Topical 4x Daily PRN Kenny Arnold PA-C      hydrOXYzine HCL  100 mg Oral Q6H PRN Max 4/day Antionette Galindo PA-C      Or    LORazepam  2 mg Intramuscular Q6H PRN Antionette Galindo PA-C      hydrOXYzine HCL  25 mg Oral Q6H PRN Max 4/day Antionette Galindo PA-C      loratadine  10 mg Oral Daily Lowell Bro PA-C      LORazepam  1 mg Oral  Q6H PRN Ramírez Rhett, DO      magnesium Oxide  400 mg Oral BID Kenny Arnold, EMMA      nicotine  21 mg Transdermal Daily Kenny Arnold, EMMA      nicotine polacrilex  2 mg Oral Q2H PRN Antionette Galindo, EMMA      OLANZapine  10 mg Oral Q3H PRN Max 3/day Antionette Randle Stiluanne, PA-DOMINGA      Or    OLANZapine  10 mg Intramuscular Q3H PRN Max 3/day Antionette Galindo, PA-C      OLANZapine  5 mg Oral Q3H PRN Max 6/day Antionette Galindo, PA-DOMINGA      Or    OLANZapine  5 mg Intramuscular Q3H PRN Max 6/day Antionette Galindo, EMMA      OLANZapine  2.5 mg Oral Q3H PRN Max 8/day Antionette Galindo, EMMA      OLANZapine  20 mg Oral HS Lowell Bro PA-C      OXcarbazepine  300 mg Oral Q12H OSIEL Ramírez Delaney, DO      polyethylene glycol  17 g Oral Daily PRN Antionette Galindo, EMMA      potassium chloride  40 mEq Oral Once Kenny Arnold PA-C      propranolol  10 mg Oral Q8H PRN Antionette Galindo, EMMA      senna-docusate sodium  1 tablet Oral Daily PRN Antionette Galindo, EMMA      tiZANidine  2 mg Oral HS Kenny Arnold, EMMA      traZODone  50 mg Oral HS PRN Antionette Galindo, EMMA       Risks / Benefits of Treatment:    Risks, benefits, and possible side effects of medications explained to patient and patient verbalizes understanding and agreement for treatment.    Counseling / Coordination of Care:    Patient's progress discussed with staff in treatment team meeting.  Patient's progress reviewed with case management staff.    Lowell Bro PA-C 05/02/24

## 2024-05-02 NOTE — CASE MANAGEMENT
CM met with Pt to ask if she was willing to sign an CALLIE for her . Pt agreeable to sign CALLIE for him. CM stated that they will give him a call.

## 2024-05-02 NOTE — NURSING NOTE
Pt intermittently walking the halls. Limit set in the morning with belongings and food. Pt more redirected and understanding throughout the day. No irritability noticed. Denies SI/HI or hallucination. Observed mumbling to self at times. Reports waking up from lucid dreams about being on the unit. Pt bizarre at times. Smiling during interaction. Hopeful to discharge next week.

## 2024-05-02 NOTE — QUICK NOTE
Belongings dropped off:    Sprite - 6 cans  Bag of Kit Patsy minis  Bag of butterfinger minis  Jenna snack size bars - 5 pack  Snickers fun size bars - 5 pack

## 2024-05-02 NOTE — PLAN OF CARE
Pt has attended 2 groups in the past 3 days    Problem: Ineffective Coping  Goal: Participates in unit activities  Description: Interventions:  - Provide therapeutic environment   - Provide required programming   - Redirect inappropriate behaviors   Outcome: Progressing

## 2024-05-02 NOTE — PROGRESS NOTES
05/02/24 0750   Team Meeting   Meeting Type Daily Rounds   Team Members Present   Team Members Present Physician;Nurse;;Other (Discipline and Name)   Physician Team Member Dr. Gutierres / Dr. Michelle / EMMA Bro / PA Student   Nursing Team Member Ramos / Stella   Care Management Team Member Abby / Amie / Magali   Other (Discipline and Name) Joyce (Group Facilitator)   Patient/Family Present   Patient Present No   Patient's Family Present No       D/C: No discharge date.

## 2024-05-02 NOTE — NURSING NOTE
This writer set limits with pt about clothing. Switched two outfits. Informed pt that will be the only time outfits will be exchanged today. Pt agreeable. Asking for sprite and chocolate. Reminded on limits. Pt returned to room. Withdrawn, resting in bed. Denies SI/HI. Mumbling to self at times but denies hallucinations.

## 2024-05-02 NOTE — PLAN OF CARE
Problem: SELF HARM/SUICIDALITY  Goal: Will have no self-injury during hospital stay  Description: INTERVENTIONS:  - Q 15 MINUTES: Routine safety checks  - Q WAKING SHIFT & PRN: Assess risk to determine if routine checks are adequate to maintain patient safety  - Encourage patient to participate actively in care by formulating a plan to combat response to suicidal ideation, identify supports and resources  Outcome: Progressing     Problem: DEPRESSION  Goal: Will be euthymic at discharge  Description: INTERVENTIONS:  - Administer medication as ordered  - Provide emotional support via 1:1 interaction with staff  - Encourage involvement in milieu/groups/activities  - Monitor for social isolation  Outcome: Progressing     Problem: DEAN  Goal: Will exhibit normal sleep and speech and no impulsivity  Description: INTERVENTIONS:  - Administer medication as ordered  - Set limits on impulsive behavior  - Make attempts to decrease external stimuli as possible  Outcome: Progressing     Problem: PSYCHOSIS  Goal: Will report no hallucinations or delusions  Description: Interventions:  - Administer medication as  ordered  - Every waking shifts and PRN assess for the presence of hallucinations and or delusions  - Assist with reality testing to support increasing orientation  - Assess if patient's hallucinations or delusions are encouraging self-harm or harm to others and intervene as appropriate  Outcome: Progressing     Problem: BEHAVIOR  Goal: Pt/Family maintain appropriate behavior and adhere to behavioral management agreement, if implemented  Description: INTERVENTIONS:  - Assess the family dynamic   - Encourage verbalization of thoughts and concerns in a socially appropriate manner  - Assess patient/family's coping skills and non-compliant behavior (including use of illegal substances).  - Utilize positive, consistent limit setting strategies supporting safety of patient, staff and others  - Initiate consult with Case  Management, Spiritual Care or other ancillary services as appropriate  - If a patient's/visitor's behavior jeopardizes the safety of the patient, staff, or others, refer to organization procedure.   - Notify Security of behavior or suspected illegal substances which indicate the need for search of the patient and/or belongings  - Encourage participation in the decision making process about a behavioral management agreement; implement if patient meets criteria  Outcome: Progressing     Problem: ANXIETY  Goal: Will report anxiety at manageable levels  Description: INTERVENTIONS:  - Administer medication as ordered  - Teach and encourage coping skills  - Provide emotional support  - Assess patient/family for anxiety and ability to cope  Outcome: Progressing  Goal: By discharge: Patient will verbalize 2 strategies to deal with anxiety  Description: Interventions:  - Identify any obvious source/trigger to anxiety  - Staff will assist patient in applying identified coping technique/skills  - Encourage attendance of scheduled groups and activities  Outcome: Progressing     Problem: SLEEP DISTURBANCE  Goal: Will exhibit normal sleeping pattern  Description: Interventions:  -  Assess the patients sleep pattern, noting recent changes  - Administer medication as ordered  - Decrease environmental stimuli, including noise, as appropriate during the night  - Encourage the patient to actively participate in unit groups and or exercise during the day to enhance ability to achieve adequate sleep at night  - Assess the patient, in the morning, encouraging a description of sleep experience  Outcome: Progressing     Problem: SELF CARE DEFICIT  Goal: Return ADL status to a safe level of function  Description: INTERVENTIONS:  - Administer medication as ordered  - Assess ADL deficits and provide assistive devices as needed  - Obtain PT/OT consults as needed  - Assist and instruct patient to increase activity and self care as  tolerated  Outcome: Progressing

## 2024-05-03 PROCEDURE — 99232 SBSQ HOSP IP/OBS MODERATE 35: CPT | Performed by: STUDENT IN AN ORGANIZED HEALTH CARE EDUCATION/TRAINING PROGRAM

## 2024-05-03 RX ADMIN — NICOTINE 21 MG: 21 PATCH, EXTENDED RELEASE TRANSDERMAL at 08:52

## 2024-05-03 RX ADMIN — TIZANIDINE 2 MG: 2 TABLET ORAL at 21:13

## 2024-05-03 RX ADMIN — DICYCLOMINE HYDROCHLORIDE 20 MG: 20 TABLET ORAL at 21:13

## 2024-05-03 RX ADMIN — OXCARBAZEPINE 300 MG: 300 TABLET, FILM COATED ORAL at 21:13

## 2024-05-03 RX ADMIN — CYANOCOBALAMIN TAB 500 MCG 500 MCG: 500 TAB at 08:51

## 2024-05-03 RX ADMIN — OXCARBAZEPINE 300 MG: 300 TABLET, FILM COATED ORAL at 08:50

## 2024-05-03 RX ADMIN — DICYCLOMINE HYDROCHLORIDE 20 MG: 20 TABLET ORAL at 11:28

## 2024-05-03 RX ADMIN — DICYCLOMINE HYDROCHLORIDE 20 MG: 20 TABLET ORAL at 16:36

## 2024-05-03 RX ADMIN — LORATADINE 10 MG: 10 TABLET ORAL at 08:51

## 2024-05-03 RX ADMIN — OLANZAPINE 20 MG: 10 TABLET, FILM COATED ORAL at 21:13

## 2024-05-03 RX ADMIN — Medication 1000 UNITS: at 08:50

## 2024-05-03 RX ADMIN — Medication 400 MG: at 08:50

## 2024-05-03 RX ADMIN — DICYCLOMINE HYDROCHLORIDE 20 MG: 20 TABLET ORAL at 06:12

## 2024-05-03 RX ADMIN — BUDESONIDE AND FORMOTEROL FUMARATE DIHYDRATE 2 PUFF: 80; 4.5 AEROSOL RESPIRATORY (INHALATION) at 17:31

## 2024-05-03 RX ADMIN — BUDESONIDE AND FORMOTEROL FUMARATE DIHYDRATE 2 PUFF: 80; 4.5 AEROSOL RESPIRATORY (INHALATION) at 08:52

## 2024-05-03 RX ADMIN — Medication 400 MG: at 17:31

## 2024-05-03 NOTE — NURSING NOTE
Pt has been withdrawn to self and room. Requested chocolate from personal stash. Pt reports feeling tired, was heard coughing but pt feels this is from being a smoker. Pt denies SI, HI, AVH. No observed responding, pt has been napping throughout the morning. Pt reports a fall down the stairs prior to admission and has pain to Right-sided ribs. Offered heat, ice, pain meds and pt declined.

## 2024-05-03 NOTE — NURSING NOTE
"Patient was heard banging feet against the side of her bed. When approached, patient said she \"Didn't want anyone talking about her.\" Patient seen posturing towards staff and other patients. Patient offered Ativan 1 mg PO for severe agitation (Broset=3). Upon follow up, patient seen resting in bed and reports \"Feeling much better\"; PRN appears effective.  "

## 2024-05-03 NOTE — CASE MANAGEMENT
CM called Pt's  Ap @[phone] to provide a status and discuss discharge planning. CM introduced self and discussed role. CM asked if he has spoken to the Pt and how he feels she is doing/sounds. He stated that they speak everyday and is sounding better now than she was before. He stated that the Pt was off her medication for a few days and was going through medication change which caused this episode. He stated that this usually happens when she has a medication change. He stated that he is very supportive of her due to her having a bad childhood and not having any family. He stated that when she is not well all she will do is yell and argue with him. He stated that she gets very paranoid as well. He stated that he has been in touch with [facility] about the Pt being inpatient. CM stated that they are looking at discharge sometime next week and will keep him posted and make sure to contact [facility] to schedule follow up.

## 2024-05-03 NOTE — NURSING NOTE
"Pt seclusive to self and room. Good mood this afternoon. Laughing and joking with staff. Continues to eat meals in room. Denies SI, HI, AVH. Talks about  and then brings up the \"bad taste\" in pt's mouth and \"the things my  was doing.\" Pt appears to remain with paranoid and delusional statements regarding .   "

## 2024-05-03 NOTE — PROGRESS NOTES
"Progress Note - Behavioral Health     Antionette Downing 38 y.o. female MRN: [id number]   Unit/Bed#: [facility] 204-02 Encounter: [id number]    Behavior over the last 24 hours: unchanged.     Antionette is a 38 year old female with bipolar 1 disorder with psychotic features presenting for psychiatric care. Today she appears more tired and withdrawn, stating \"I want to go home, there are too many people here, I don't like talking to people.\" She states that her mood is okay. She denies auditory, visual, or gustatory hallucinations today. She does report increased abdominal discomfort from a fall that she doesn't remember, says \"I fell from really high\" but a long time ago. She reports no adverse effects from increasing the Zyprexa last night to 20 mg. She slept well last night and denies any major events yesterday. Denies SI/HI.     Sleep: hypersomnia  Appetite: normal  Medication side effects: Yes - fatigue    ROS: reports abdominal discomfort, all other systems are negative    Mental Status Evaluation:    Appearance:  age appropriate, casually dressed, adequate grooming   Behavior:  cooperative, calm, guarded   Speech:  normal rate, scant, soft, decreased volume   Mood:  dysphoric   Affect:  constricted, mood-congruent   Thought Process:  normal rate of thoughts, illogical   Associations: loose associations, perseverative   Thought Content:  paranoid and somatic delusions, somatic preoccupation   Perceptual Disturbances: no visual hallucinations, denies auditory hallucinations when asked, appears preoccupied, tactile hallucinations   Risk Potential: Suicidal ideation - None at present  Homicidal ideation - None  Potential for aggression - No   Sensorium:  oriented to person, place, and time/date   Memory:  recent and remote memory grossly intact   Consciousness:  alert and awake   Attention/Concentration: attention span and concentration appear shorter than expected for age   Insight:  limited   Judgment: poor "   Gait/Station: normal gait/station   Motor Activity: no abnormal movements     Vital signs in last 24 hours:    Temp:  [97 °F (36.1 °C)-98 °F (36.7 °C)] 97 °F (36.1 °C)  HR:  [75-79] 75  Resp:  [16] 16  BP: (109-130)/(68-72) 109/68    Laboratory results: I have personally reviewed all pertinent laboratory/tests results    Results from the past 24 hours: No results found for this or any previous visit (from the past 24 hour(s)).    Progress Toward Goals: insight remains poor    Assessment/Plan   Principal Problem:    Bipolar I disorder, most recent episode manic, severe with psychotic features (HCC)  Active Problems:    Psychogenic nonepileptic seizure    Smoker    Asthma    Cocaine abuse in remission (HCC)    Cannabis abuse, in remission    Post-traumatic stress disorder, chronic    Medical clearance for psychiatric admission    Headache      Recommended Treatment:     Planned medication and treatment changes:    All current active medications have been reviewed  Encourage group therapy, milieu therapy and occupational therapy  Behavioral Health checks every 7 minutes    Continue Zyprexa 20 mg.  Overall, the patient has become less irritable and more cooperative with interviews.  However, her thoughts do remain disorganized with ruminations on delusional material involving her .  She had an episode of psychotic agitation yesterday which responded to PRN medications.    We have discussed possibly resuming a mood stabilizer such as lithium with the patient, though she remains very reluctant due to prior adverse effects.  Plan will be to reevaluate mental status after the weekend now that she is on an optimized dose of Zyprexa.  If she continues to demonstrate minimal improvement with ongoing clinical stigmata of psychosis and labile behaviors, then we will push for addition of a mood stabilizer, given her lengthy and complex psychiatric history involving multiple discrete manic episodes.    Current  Facility-Administered Medications   Medication Dose Route Frequency Provider Last Rate    acetaminophen  650 mg Oral Q6H PRN Antionette Galindo, EMMA      acetaminophen  650 mg Oral Q4H PRN Antionette Randle Stiluanne, EMMA      acetaminophen  975 mg Oral Q6H PRN Antionette Galindo, EMMA      albuterol  2 puff Inhalation Q4H PRN Kenny Arnold, EMMA      aluminum-magnesium hydroxide-simethicone  30 mL Oral Q4H PRN Antionette Galindo, EMMA      Artificial Tears  1 drop Both Eyes Q3H PRN Antionette Galindo, EMMA      benztropine  1 mg Intramuscular Q4H PRN Max 6/day Antionette Galindo, EMMA      benztropine  1 mg Oral Q4H PRN Max 6/day Antionette Galindo, EMMA      bisacodyl  10 mg Rectal Daily PRN Antionette Galindo, EMMA      budesonide-formoterol  2 puff Inhalation BID Kenny Arnold, EMMA      Cholecalciferol  1,000 Units Oral Daily Kenny Arnold, EMMA      cyanocobalamin  500 mcg Oral Daily Kenny Arnold, EMMA      dicyclomine  20 mg Oral 4x Daily (AC & HS) Lowell Bro PA-C      hydrOXYzine HCL  50 mg Oral Q6H PRN Max 4/day Antionette Galindo PA-C      Or    diphenhydrAMINE  50 mg Intramuscular Q6H PRN Antionette Galindo, EMMA      hydrocortisone   Topical 4x Daily PRN Kenny Arnold, EMMA      hydrocortisone   Topical 4x Daily PRN Kenny Arnold PA-C      hydrOXYzine HCL  100 mg Oral Q6H PRN Max 4/day Antionette Galindo PA-C      Or    LORazepam  2 mg Intramuscular Q6H PRN Antionette Galindo, EMMA      hydrOXYzine HCL  25 mg Oral Q6H PRN Max 4/day Antionette Galindo, EMMA      loratadine  10 mg Oral Daily Lowell Bro, EMMA      LORazepam  1 mg Oral Q6H PRN Ramírez Delaney DO      magnesium Oxide  400 mg Oral BID Kenny Arnold PA-C      nicotine  21 mg Transdermal Daily Kenny Arnold PA-C      nicotine polacrilex  2 mg Oral Q2H PRN Antionette Galindo PA-C      OLANZapine  10 mg Oral Q3H PRN Max 3/day Antionette Galindo PA-C      Or    OLANZapine  10 mg  Intramuscular Q3H PRN Max 3/day Antionette Galindo PA-C      OLANZapine  5 mg Oral Q3H PRN Max 6/day Antionette Galindo PA-C      Or    OLANZapine  5 mg Intramuscular Q3H PRN Max 6/day Antionette Galindo PA-C      OLANZapine  2.5 mg Oral Q3H PRN Max 8/day Antionette Galindo PA-C      OLANZapine  20 mg Oral HS Lowell Bro PA-C      OXcarbazepine  300 mg Oral Q12H OSIEL Delaney DO      polyethylene glycol  17 g Oral Daily PRN Antionette Galindo PA-C      potassium chloride  40 mEq Oral Once Kenny Arnold PA-C      propranolol  10 mg Oral Q8H PRN Antionette Galindo PA-C      senna-docusate sodium  1 tablet Oral Daily PRN Antionette Galindo PA-C      tiZANidine  2 mg Oral HS Kenny Arnold PA-C      traZODone  50 mg Oral HS PRN Antionette Galindo PA-C       Risks / Benefits of Treatment:    Risks, benefits, and possible side effects of medications explained to patient and patient verbalizes understanding and agreement for treatment.    Counseling / Coordination of Care:    Patient's progress discussed with staff in treatment team meeting.  Medications, treatment progress and treatment plan reviewed with patient.    Lowell Bro PA-C 05/03/24

## 2024-05-03 NOTE — QUICK NOTE
Spoke with patient's , Ap, over the phone. Provided clinical updates on patient's status. Ap says he has been speaking to the patient and feels she is improved. He feels she is at her behavioral baseline and would happily take her back home when treatment team feels she is stable enough for discharge.  is supportive, talking about desire to bring patient to follow ups and help her continue with medications. Explained the patient will likely discharge sometime next week, barring any behavioral setbacks over the weekend.

## 2024-05-03 NOTE — PROGRESS NOTES
05/03/24 0750   Team Meeting   Meeting Type Daily Rounds   Team Members Present   Team Members Present Physician;Nurse;;Other (Discipline and Name)   Physician Team Member Dr. Gutierres / Dr. Michelle / EMMA Bro / PA Student   Nursing Team Member Christos / Stella   Care Management Team Member Abby / Amie   Other (Discipline and Name) Joyce (Group Facilitator)   Patient/Family Present   Patient Present No   Patient's Family Present No       D/C: No discharge date.

## 2024-05-04 PROCEDURE — 99232 SBSQ HOSP IP/OBS MODERATE 35: CPT | Performed by: PSYCHIATRY & NEUROLOGY

## 2024-05-04 RX ADMIN — NICOTINE 21 MG: 21 PATCH, EXTENDED RELEASE TRANSDERMAL at 08:19

## 2024-05-04 RX ADMIN — DICYCLOMINE HYDROCHLORIDE 20 MG: 20 TABLET ORAL at 11:10

## 2024-05-04 RX ADMIN — BUDESONIDE AND FORMOTEROL FUMARATE DIHYDRATE 2 PUFF: 80; 4.5 AEROSOL RESPIRATORY (INHALATION) at 08:41

## 2024-05-04 RX ADMIN — Medication 400 MG: at 08:20

## 2024-05-04 RX ADMIN — DICYCLOMINE HYDROCHLORIDE 20 MG: 20 TABLET ORAL at 06:26

## 2024-05-04 RX ADMIN — OXCARBAZEPINE 300 MG: 300 TABLET, FILM COATED ORAL at 08:20

## 2024-05-04 RX ADMIN — DICYCLOMINE HYDROCHLORIDE 20 MG: 20 TABLET ORAL at 15:54

## 2024-05-04 RX ADMIN — CYANOCOBALAMIN TAB 500 MCG 500 MCG: 500 TAB at 08:20

## 2024-05-04 RX ADMIN — LORATADINE 10 MG: 10 TABLET ORAL at 08:20

## 2024-05-04 RX ADMIN — NICOTINE POLACRILEX 2 MG: 4 GUM, CHEWING BUCCAL at 09:49

## 2024-05-04 RX ADMIN — Medication 400 MG: at 17:46

## 2024-05-04 RX ADMIN — OLANZAPINE 20 MG: 10 TABLET, FILM COATED ORAL at 21:19

## 2024-05-04 RX ADMIN — DICYCLOMINE HYDROCHLORIDE 20 MG: 20 TABLET ORAL at 21:19

## 2024-05-04 RX ADMIN — BUDESONIDE AND FORMOTEROL FUMARATE DIHYDRATE 2 PUFF: 80; 4.5 AEROSOL RESPIRATORY (INHALATION) at 17:48

## 2024-05-04 RX ADMIN — TIZANIDINE 2 MG: 2 TABLET ORAL at 21:19

## 2024-05-04 RX ADMIN — Medication 1000 UNITS: at 08:20

## 2024-05-04 RX ADMIN — OXCARBAZEPINE 300 MG: 300 TABLET, FILM COATED ORAL at 21:19

## 2024-05-04 NOTE — NURSING NOTE
"Pt was calm and pleasant. She was in her room laying down upon approach. Denied current SI/H/AVH. Medication compliant. Pt did not attend groups and stated \" it's just not my thing, I don't like groups and being social.\" Eating and sleeping well. Two coping skills she utilizes are singing and drawling. Denied unmet needs at this time.  "

## 2024-05-04 NOTE — PROGRESS NOTES
"Progress Note - Behavioral Health   Antionette Downing 38 y.o. female MRN: [id number]  Unit/Bed#: [facility] 204-02 Encounter: [id number]    Assessment/Plan   Principal Problem:    Bipolar I disorder, most recent episode manic, severe with psychotic features (HCC)  Active Problems:    Psychogenic nonepileptic seizure    Smoker    Asthma    Cocaine abuse in remission (HCC)    Cannabis abuse, in remission    Post-traumatic stress disorder, chronic    Medical clearance for psychiatric admission    Headache      Continue medications as noted below.  Continue to promote patient participation in group therapy, milieu therapy, occupational therapy  Continue medical management by primary team.  Discharge disposition: Pending.  Tentative discharge symptoms next week    Subjective:  The patient was evaluated this morning for continuity of care and no acute distress noted throughout the evaluation. Over the past 24 hours staff noted that Antionette has been denies suicidal ideation, homicidal ideation.  Continue to make negative statements about her  when assessed by nursing staff.  Reportedly slept well overnight. Patient has been medication adherent.    Today on evaluation, Antionette appeared somewhat guarded, disinterested in conversation and somewhat withdrawn.  However, she was seen in the hallway interacting with other patients, and at one point singing with others.  On interaction, Antionette states that she feels \"fine\" in terms of her mood, and says that nothing is bothering her at this time.  Denies suicidal ideation, intent, plan.  Denies homicidal ideation, intent, plan.  Denies perceptual disturbances.  There is some paranoia.    Psychiatric Review of Systems:  Behavior over the last 24 hours:  unchanged  Sleep: slept better  Appetite: normal  Medication side effects: No   ROS: reports no complaints, all other systems are negative    Current Medications:  Current Facility-Administered Medications   Medication Dose " Route Frequency Provider Last Rate    acetaminophen  650 mg Oral Q6H PRN Antionette Randle Stiluanne, EMMA      acetaminophen  650 mg Oral Q4H PRN Antionette Randle Stives, EMMA      acetaminophen  975 mg Oral Q6H PRN Antionette Randle Stiluanne, EMMA      albuterol  2 puff Inhalation Q4H PRN Kenny Arnold, EMMA      aluminum-magnesium hydroxide-simethicone  30 mL Oral Q4H PRN Antionette Galindo, EMMA      Artificial Tears  1 drop Both Eyes Q3H PRN Antionette Galindo, EMMA      benztropine  1 mg Intramuscular Q4H PRN Max 6/day Antionette Randle Stiluanne, EMMA      benztropine  1 mg Oral Q4H PRN Max 6/day Antionette Galindo, EMMA      bisacodyl  10 mg Rectal Daily PRN Antionette Galindo, EMMA      budesonide-formoterol  2 puff Inhalation BID Kenny Arnold, EMMA      Cholecalciferol  1,000 Units Oral Daily Kenny Arnold, EMMA      cyanocobalamin  500 mcg Oral Daily Kenny Arnold PA-C      dicyclomine  20 mg Oral 4x Daily (AC & HS) Lowell Bro PA-C      hydrOXYzine HCL  50 mg Oral Q6H PRN Max 4/day Antionette Galindo PA-C      Or    diphenhydrAMINE  50 mg Intramuscular Q6H PRN Antionette Galindo PA-C      hydrocortisone   Topical 4x Daily PRN Kenny Arnold PA-C      hydrocortisone   Topical 4x Daily PRN Kenny Arnold PA-C      hydrOXYzine HCL  100 mg Oral Q6H PRN Max 4/day Antionette Galindo PA-C      Or    LORazepam  2 mg Intramuscular Q6H PRN Antionette Galindo PA-C      hydrOXYzine HCL  25 mg Oral Q6H PRN Max 4/day Antionette Galindo, EMMA      loratadine  10 mg Oral Daily Lowell Bro PA-C      LORazepam  1 mg Oral Q6H PRN Ramírez Delaney DO      magnesium Oxide  400 mg Oral BID Kenny Arnold, EMMA      nicotine  21 mg Transdermal Daily Kenny Arnold PA-C      nicotine polacrilex  2 mg Oral Q2H PRN Antionette Galindo PA-C      OLANZapine  10 mg Oral Q3H PRN Max 3/day Antionette Galindo PA-C      Or    OLANZapine  10 mg Intramuscular Q3H PRN Max 3/day Antionette Galindo,  PA-C      OLANZapine  5 mg Oral Q3H PRN Max 6/day Antionette Galindo, PA-C      Or    OLANZapine  5 mg Intramuscular Q3H PRN Max 6/day Antionette Galindo, PA-C      OLANZapine  2.5 mg Oral Q3H PRN Max 8/day Antionette Galindo, PA-C      OLANZapine  20 mg Oral HS Lowell Bro, PA-C      OXcarbazepine  300 mg Oral Q12H OSIEL Delaney DO      polyethylene glycol  17 g Oral Daily PRN Antionette Galindo, PA-C      potassium chloride  40 mEq Oral Once Kenny Arnold, PA-C      propranolol  10 mg Oral Q8H PRN Antionette Galindo, PA-C      senna-docusate sodium  1 tablet Oral Daily PRN Antionette Galindo, PA-C      tiZANidine  2 mg Oral HS Kenny Arnold, PA-C      traZODone  50 mg Oral HS PRN Antionette Galindo, PA-DOMINGA         Behavioral Health Medications: all current active meds have been reviewed and continue current psychiatric medications.    Vital signs in last 24 hours:  Temp:  [97.1 °F (36.2 °C)-97.3 °F (36.3 °C)] 97.3 °F (36.3 °C)  HR:  [74-92] 74  Resp:  [15-16] 15  BP: (103-110)/(56-69) 110/56    Laboratory results:  I have personally reviewed all pertinent laboratory/tests results.  Most Recent Labs:   Lab Results   Component Value Date    WBC 7.03 04/26/2024    RBC 4.75 04/26/2024    HGB 14.7 04/26/2024    HCT 42.5 04/26/2024     04/26/2024    RDW 12.2 04/26/2024    TOTANEUTABS 6.86 07/30/2016    NEUTROABS 3.78 04/26/2024    SODIUM 139 04/29/2024    K 4.4 04/29/2024     04/29/2024    CO2 26 04/29/2024    BUN 11 04/29/2024    CREATININE 0.72 04/29/2024    GLUC 92 04/29/2024    GLUF 92 04/29/2024    CALCIUM 9.0 04/29/2024    AST 13 04/26/2024    ALT 11 04/26/2024    ALKPHOS 77 04/26/2024    TP 7.1 04/26/2024    ALB 4.6 04/26/2024    TBILI 0.59 04/26/2024    CHOLESTEROL 157 04/27/2024    HDL 41 (L) 04/27/2024    TRIG 129 04/27/2024    LDLCALC 90 04/27/2024    NONHDLC 116 04/27/2024    VALPROICTOT <10.0 (L) 02/12/2023    LITHIUM 0.63 02/01/2024    AMMONIA 17 11/12/2019     "[id number] 0.558 04/26/2024    FREET4 1.24 02/01/2023    PREGUR NEGATIVE 10/15/2022    PREGSERUM Negative 04/26/2024    HCGQUANT <3 12/05/2019    RPR Non-Reactive 02/02/2023    HGBA1C 5.3 03/28/2024     03/28/2024     Mental Status Evaluation:    Appearance:  age appropriate, casually dressed   Behavior:  calm, guarded   Speech:  normal rate and volume   Mood:  Fine\"   Affect:  constricted, somewhat irritable   Thought Process:  goal directed   Associations: intact associations   Thought Content:  paranoia   Perceptual Disturbances: denies auditory or visual hallucinations when asked, but appears preoccupied   Risk Potential: Suicidal ideation - None at present  Homicidal ideation - None  Potential for aggression - No   Sensorium:  oriented to person, place, and time/date   Memory:  recent and remote memory grossly intact   Consciousness:  alert and awake   Attention/Concentration: attention span and concentration appear shorter than expected for age   Insight:  limited   Judgment: limited   Gait/Station: normal gait/station   Motor Activity: no abnormal movements     Progress Toward Goals: progressing. Patient is not at goal. They are not yet ready for discharge. The patient's condition currently requires active psychopharmacological medication management, interdisciplinary coordination with case management, and the utilization of adjunctive milieu and group therapy to augment psychopharmacological efficacy. The patient's risk of morbidity, and progression or decompensation of psychiatric disease, is higher without this current treatment.     Recommended Treatment:   See above for assessment and plan.     Risks, benefits and possible side effects of Medications:   Risks, benefits, and possible side effects of medications explained to patient and patient verbalizes understanding.      Treatment discussed with nursing staff.    This note has been constructed using a voice recognition system.  There may be " translation, syntax, or grammatical errors. If you have any questions, please contact the dictating provider.    Nuha Espino MD

## 2024-05-04 NOTE — TREATMENT TEAM
05/04/24 1000   Team Meeting   Meeting Type Daily Rounds   Team Members Present   Team Members Present Physician;Nurse   Physician Team Member Gala/Srinivas   Nursing Team Member Christos   Patient/Family Present   Patient Present No   Patient's Family Present No       AM rounds- denies SI/HI, continues to make negative statements about , slept well.

## 2024-05-04 NOTE — NURSING NOTE
"Pt hyperverbal and rambling. At nurses station with various request. Reporting back/kidney pain. Requesting muscle relaxer. Zanaflex ordered at bedtime. Pt encourage to speak with provider about dose times. Pt denies pain radiating and denies difficulty urinating. Pt changed attire four times before breakfast. Loud in the hallway with peers. Pt remains delusional. Referring to  frequently. States \"Is that BJ?\", when pt phone started ringing. Denies SI/HI. Pt able to express needs. Utilizing drawing as a coping skill.   "

## 2024-05-04 NOTE — NURSING NOTE
Pt loudly singing and laughing in the halls. Needs frequent redirection to lower voice and to slow down in the halls.

## 2024-05-05 PROCEDURE — 99232 SBSQ HOSP IP/OBS MODERATE 35: CPT | Performed by: PSYCHIATRY & NEUROLOGY

## 2024-05-05 RX ADMIN — Medication 1000 UNITS: at 08:16

## 2024-05-05 RX ADMIN — BUDESONIDE AND FORMOTEROL FUMARATE DIHYDRATE 2 PUFF: 80; 4.5 AEROSOL RESPIRATORY (INHALATION) at 19:03

## 2024-05-05 RX ADMIN — Medication 400 MG: at 08:16

## 2024-05-05 RX ADMIN — DICYCLOMINE HYDROCHLORIDE 20 MG: 20 TABLET ORAL at 13:01

## 2024-05-05 RX ADMIN — LORATADINE 10 MG: 10 TABLET ORAL at 08:16

## 2024-05-05 RX ADMIN — BUDESONIDE AND FORMOTEROL FUMARATE DIHYDRATE 2 PUFF: 80; 4.5 AEROSOL RESPIRATORY (INHALATION) at 08:18

## 2024-05-05 RX ADMIN — OXCARBAZEPINE 300 MG: 300 TABLET, FILM COATED ORAL at 08:16

## 2024-05-05 RX ADMIN — NICOTINE 21 MG: 21 PATCH, EXTENDED RELEASE TRANSDERMAL at 08:15

## 2024-05-05 RX ADMIN — DICYCLOMINE HYDROCHLORIDE 20 MG: 20 TABLET ORAL at 22:13

## 2024-05-05 RX ADMIN — CYANOCOBALAMIN TAB 500 MCG 500 MCG: 500 TAB at 08:16

## 2024-05-05 RX ADMIN — OXCARBAZEPINE 300 MG: 300 TABLET, FILM COATED ORAL at 22:13

## 2024-05-05 RX ADMIN — DICYCLOMINE HYDROCHLORIDE 20 MG: 20 TABLET ORAL at 06:38

## 2024-05-05 RX ADMIN — OLANZAPINE 20 MG: 10 TABLET, FILM COATED ORAL at 22:13

## 2024-05-05 RX ADMIN — TIZANIDINE 2 MG: 2 TABLET ORAL at 22:13

## 2024-05-05 RX ADMIN — DICYCLOMINE HYDROCHLORIDE 20 MG: 20 TABLET ORAL at 16:44

## 2024-05-05 NOTE — TREATMENT TEAM
05/05/24 1300   Team Meeting   Meeting Type Daily Rounds   Team Members Present   Team Members Present Physician;Nurse   Physician Team Member Gala/Srinivas   Nursing Team Member Christos   Patient/Family Present   Patient Present No   Patient's Family Present No       AM rounds- changing outfits frequently, singing and talking on unit loud. Disruptive in groups. Declined to speak to  on phone. Slept however woke early.

## 2024-05-05 NOTE — PROGRESS NOTES
Progress Note - Behavioral Health   Antionette Downing 38 y.o. female MRN: [id number]  Unit/Bed#: [facility] 204-02 Encounter: [id number]    Assessment/Plan   Principal Problem:    Bipolar I disorder, most recent episode manic, severe with psychotic features (HCC)  Active Problems:    Psychogenic nonepileptic seizure    Smoker    Asthma    Cocaine abuse in remission (HCC)    Cannabis abuse, in remission    Post-traumatic stress disorder, chronic    Medical clearance for psychiatric admission    Headache      Continue medications as noted below.  Continue to promote patient participation in group therapy, milieu therapy, occupational therapy  Continue medical management by primary team.  Discharge disposition:  pending.     Subjective:  The patient was evaluated this morning for continuity of care and no acute distress noted throughout the evaluation. Over the past 24 hours staff noted that Antionette has been calm, pleasant. Minimal group attendance per staff. Denied SI. Loud on unit, singing loudly. Needing redirection. Patient has been medication adherent.    Today on evaluation, Antionette states that she has been feeling on and off anxiety, but denies any acute issues at this time.  Increased rate and volume of speech during interaction, but mostly appropriate.  She states that she is unsure when she will be discharged, but hopeful symptoms next week.  She says that she has been in contact with her , and says that he thinks that she is doing a bit better.  Reports tolerating medications and denies any adverse effects at this time.  She denies suicidal ideation, homicidal ideation.  Denies perceptual disturbances.      Psychiatric Review of Systems:  Behavior over the last 24 hours:  unchanged  Sleep: normal  Appetite: normal  Medication side effects: No   ROS: reports no complaints, all other systems are negative    Current Medications:  Current Facility-Administered Medications   Medication Dose Route Frequency  Provider Last Rate    acetaminophen  650 mg Oral Q6H PRN Antionette Randle Stiluanne, EMMA      acetaminophen  650 mg Oral Q4H PRN Antionette Randle Stives, EMMA      acetaminophen  975 mg Oral Q6H PRN Antionette Randle Stiluanne, EMMA      albuterol  2 puff Inhalation Q4H PRN Kenny Arnold, EMMA      aluminum-magnesium hydroxide-simethicone  30 mL Oral Q4H PRN Antionette Galindo, EMMA      Artificial Tears  1 drop Both Eyes Q3H PRN Antionette Galindo, EMMA      benztropine  1 mg Intramuscular Q4H PRN Max 6/day Antionette Randle Stiluanne, EMMA      benztropine  1 mg Oral Q4H PRN Max 6/day Antionette Randle Stiluanne, EMMA      bisacodyl  10 mg Rectal Daily PRN Antionette Galindo, EMMA      budesonide-formoterol  2 puff Inhalation BID Kenny Arnold, EMMA      Cholecalciferol  1,000 Units Oral Daily Kenny Arnold, EMMA      cyanocobalamin  500 mcg Oral Daily Kenny Arnold PA-C      dicyclomine  20 mg Oral 4x Daily (AC & HS) Lowell Bro PA-C      hydrOXYzine HCL  50 mg Oral Q6H PRN Max 4/day Antionette Galindo PA-C      Or    diphenhydrAMINE  50 mg Intramuscular Q6H PRN Antionette Galindo, EMMA      hydrocortisone   Topical 4x Daily PRN Kenny Arnold PA-C      hydrocortisone   Topical 4x Daily PRN Kenny Arnold PA-C      hydrOXYzine HCL  100 mg Oral Q6H PRN Max 4/day Antionette Galindo PA-C      Or    LORazepam  2 mg Intramuscular Q6H PRN Antionette Galindo, EMMA      hydrOXYzine HCL  25 mg Oral Q6H PRN Max 4/day Antionette Galindo, EMMA      loratadine  10 mg Oral Daily Lowell Bro PA-C      LORazepam  1 mg Oral Q6H PRN Ramírez Delaney DO      magnesium Oxide  400 mg Oral BID eKnny ArnoldEMMA      nicotine  21 mg Transdermal Daily Kenny Arnold PA-C      nicotine polacrilex  2 mg Oral Q2H PRN Antionette Galindo PA-C      OLANZapine  10 mg Oral Q3H PRN Max 3/day Antionette Galindo PA-C      Or    OLANZapine  10 mg Intramuscular Q3H PRN Max 3/day Antionette Galindo PA-C       OLANZapine  5 mg Oral Q3H PRN Max 6/day Antionette Galindo PA-C      Or    OLANZapine  5 mg Intramuscular Q3H PRN Max 6/day Antionette Galindo PA-C      OLANZapine  2.5 mg Oral Q3H PRN Max 8/day Antionette Galindo PA-C      OLANZapine  20 mg Oral HS Lowell Bro PA-C      OXcarbazepine  300 mg Oral Q12H OSIEL Delaney DO      polyethylene glycol  17 g Oral Daily PRN Antionette Galindo PA-C      potassium chloride  40 mEq Oral Once Kenny Arnold PA-C      propranolol  10 mg Oral Q8H PRN Antionette Galindo PA-C      senna-docusate sodium  1 tablet Oral Daily PRN Antionette Galindo PA-C      tiZANidine  2 mg Oral HS Kenny Arnold, EMMA      traZODone  50 mg Oral HS PRN Antionette Galindo PA-C         Behavioral Health Medications: all current active meds have been reviewed and continue current psychiatric medications.    Vital signs in last 24 hours:  Temp:  [96.6 °F (35.9 °C)-97.3 °F (36.3 °C)] 96.6 °F (35.9 °C)  HR:  [74-80] 80  Resp:  [15-16] 16  BP: (110-118)/(56-73) 118/73    Laboratory results:  I have personally reviewed all pertinent laboratory/tests results.  Most Recent Labs:   Lab Results   Component Value Date    WBC 7.03 04/26/2024    RBC 4.75 04/26/2024    HGB 14.7 04/26/2024    HCT 42.5 04/26/2024     04/26/2024    RDW 12.2 04/26/2024    TOTANEUTABS 6.86 07/30/2016    NEUTROABS 3.78 04/26/2024    SODIUM 139 04/29/2024    K 4.4 04/29/2024     04/29/2024    CO2 26 04/29/2024    BUN 11 04/29/2024    CREATININE 0.72 04/29/2024    GLUC 92 04/29/2024    GLUF 92 04/29/2024    CALCIUM 9.0 04/29/2024    AST 13 04/26/2024    ALT 11 04/26/2024    ALKPHOS 77 04/26/2024    TP 7.1 04/26/2024    ALB 4.6 04/26/2024    TBILI 0.59 04/26/2024    CHOLESTEROL 157 04/27/2024    HDL 41 (L) 04/27/2024    TRIG 129 04/27/2024    LDLCALC 90 04/27/2024    NONHDLC 116 04/27/2024    VALPROICTOT <10.0 (L) 02/12/2023    LITHIUM 0.63 02/01/2024    AMMONIA 17 11/12/2019    QKJ1XYVHXKGY  "0.558 04/26/2024    FREET4 1.24 02/01/2023    PREGUR NEGATIVE 10/15/2022    PREGSERUM Negative 04/26/2024    HCGQUANT <3 12/05/2019    RPR Non-Reactive 02/02/2023    HGBA1C 5.3 03/28/2024     03/28/2024         Mental Status Evaluation:    Appearance:  age appropriate, casually dressed   Behavior:  cooperative, guarded   Speech:  increased rate, increased volume   Mood:  \"Alright\"   Affect:  Constricted   Thought Process:  goal directed   Associations: intact associations   Thought Content:  Improving paranoia   Perceptual Disturbances: denies auditory or visual hallucinations when asked, but appears preoccupied   Risk Potential: Suicidal ideation - None at present  Homicidal ideation - None at present  Potential for aggression - Not at present   Sensorium:  oriented to person, place, and time/date   Memory:  recent and remote memory grossly intact   Consciousness:  alert and awake   Attention/Concentration: attention span and concentration appear shorter than expected for age   Insight:  limited   Judgment: limited   Gait/Station: normal gait/station   Motor Activity: no abnormal movements     Progress Toward Goals: Slow progression. Patient is not at goal. They are not yet ready for discharge. The patient's condition currently requires active psychopharmacological medication management, interdisciplinary coordination with case management, and the utilization of adjunctive milieu and group therapy to augment psychopharmacological efficacy. The patient's risk of morbidity, and progression or decompensation of psychiatric disease, is higher without this current treatment.     Recommended Treatment:   See above for assessment and plan.     Risks, benefits and possible side effects of Medications:   Risks, benefits, and possible side effects of medications explained to patient and patient verbalizes understanding.      Treatment discussed with nursing staff.    This note has been constructed using a voice " recognition system.  There may be translation, syntax, or grammatical errors. If you have any questions, please contact the dictating provider.    Nuha Espino MD

## 2024-05-05 NOTE — NURSING NOTE
Pt presented with irritable edge. States her rooms smells due to roommate not showering. She requested spray for her room. Denied SI/HI/AVH and thoughts of self harm. Did not attend groups and was minimally interactive with peers. Medication compliant. Eating and sleeping well. Denied unmet needs at this time.

## 2024-05-05 NOTE — PLAN OF CARE
Problem: SELF HARM/SUICIDALITY  Goal: Will have no self-injury during hospital stay  Description: INTERVENTIONS:  - Q 15 MINUTES: Routine safety checks  - Q WAKING SHIFT & PRN: Assess risk to determine if routine checks are adequate to maintain patient safety  - Encourage patient to participate actively in care by formulating a plan to combat response to suicidal ideation, identify supports and resources  Outcome: Progressing     Problem: DEPRESSION  Goal: Will be euthymic at discharge  Description: INTERVENTIONS:  - Administer medication as ordered  - Provide emotional support via 1:1 interaction with staff  - Encourage involvement in milieu/groups/activities  - Monitor for social isolation  Outcome: Progressing     Problem: DEAN  Goal: Will exhibit normal sleep and speech and no impulsivity  Description: INTERVENTIONS:  - Administer medication as ordered  - Set limits on impulsive behavior  - Make attempts to decrease external stimuli as possible  Outcome: Progressing     Problem: PSYCHOSIS  Goal: Will report no hallucinations or delusions  Description: Interventions:  - Administer medication as  ordered  - Every waking shifts and PRN assess for the presence of hallucinations and or delusions  - Assist with reality testing to support increasing orientation  - Assess if patient's hallucinations or delusions are encouraging self-harm or harm to others and intervene as appropriate  Outcome: Progressing     Problem: BEHAVIOR  Goal: Pt/Family maintain appropriate behavior and adhere to behavioral management agreement, if implemented  Description: INTERVENTIONS:  - Assess the family dynamic   - Encourage verbalization of thoughts and concerns in a socially appropriate manner  - Assess patient/family's coping skills and non-compliant behavior (including use of illegal substances).  - Utilize positive, consistent limit setting strategies supporting safety of patient, staff and others  - Initiate consult with Case  Management, Spiritual Care or other ancillary services as appropriate  - If a patient's/visitor's behavior jeopardizes the safety of the patient, staff, or others, refer to organization procedure.   - Notify Security of behavior or suspected illegal substances which indicate the need for search of the patient and/or belongings  - Encourage participation in the decision making process about a behavioral management agreement; implement if patient meets criteria  Outcome: Progressing     Problem: ANXIETY  Goal: Will report anxiety at manageable levels  Description: INTERVENTIONS:  - Administer medication as ordered  - Teach and encourage coping skills  - Provide emotional support  - Assess patient/family for anxiety and ability to cope  Outcome: Progressing  Goal: By discharge: Patient will verbalize 2 strategies to deal with anxiety  Description: Interventions:  - Identify any obvious source/trigger to anxiety  - Staff will assist patient in applying identified coping technique/skills  - Encourage attendance of scheduled groups and activities  Outcome: Progressing     Problem: SLEEP DISTURBANCE  Goal: Will exhibit normal sleeping pattern  Description: Interventions:  -  Assess the patients sleep pattern, noting recent changes  - Administer medication as ordered  - Decrease environmental stimuli, including noise, as appropriate during the night  - Encourage the patient to actively participate in unit groups and or exercise during the day to enhance ability to achieve adequate sleep at night  - Assess the patient, in the morning, encouraging a description of sleep experience  Outcome: Progressing     Problem: SELF CARE DEFICIT  Goal: Return ADL status to a safe level of function  Description: INTERVENTIONS:  - Administer medication as ordered  - Assess ADL deficits and provide assistive devices as needed  - Obtain PT/OT consults as needed  - Assist and instruct patient to increase activity and self care as  tolerated  Outcome: Progressing     Problem: DISCHARGE PLANNING  Goal: Discharge to home or other facility with appropriate resources  Description: INTERVENTIONS:  - Identify barriers to discharge w/patient and caregiver  - Arrange for needed discharge resources and transportation as appropriate  - Identify discharge learning needs (meds, wound care, etc.)  - Arrange for interpretive services to assist at discharge as needed  - Refer to Case Management Department for coordinating discharge planning if the patient needs post-hospital services based on physician/advanced practitioner order or complex needs related to functional status, cognitive ability, or social support system  Outcome: Progressing

## 2024-05-05 NOTE — NURSING NOTE
"Pt walking the halls with female peer, needed frequent reminders to lower voice. Pt bizarre during interaction. States \"You know Ap went to the pet store yesterday and then my room smelt like animals\". Pt laughing and walking away. Denies SI/HI or hallucination. Pt asking this writer \"How many snakes does it take for the doctor to go upstairs?\". Pt states \"None, I solved the riddle years ago\". Pt laughing to self.   "

## 2024-05-06 PROBLEM — R51.9 HEADACHE: Status: RESOLVED | Noted: 2024-04-27 | Resolved: 2024-05-06

## 2024-05-06 PROBLEM — Z00.8 MEDICAL CLEARANCE FOR PSYCHIATRIC ADMISSION: Status: RESOLVED | Noted: 2023-06-22 | Resolved: 2024-05-06

## 2024-05-06 PROCEDURE — 99232 SBSQ HOSP IP/OBS MODERATE 35: CPT | Performed by: STUDENT IN AN ORGANIZED HEALTH CARE EDUCATION/TRAINING PROGRAM

## 2024-05-06 RX ADMIN — Medication 400 MG: at 17:19

## 2024-05-06 RX ADMIN — DICYCLOMINE HYDROCHLORIDE 20 MG: 20 TABLET ORAL at 06:15

## 2024-05-06 RX ADMIN — OLANZAPINE 20 MG: 10 TABLET, FILM COATED ORAL at 21:09

## 2024-05-06 RX ADMIN — DICYCLOMINE HYDROCHLORIDE 20 MG: 20 TABLET ORAL at 11:16

## 2024-05-06 RX ADMIN — DICYCLOMINE HYDROCHLORIDE 20 MG: 20 TABLET ORAL at 21:10

## 2024-05-06 RX ADMIN — TIZANIDINE 2 MG: 2 TABLET ORAL at 21:10

## 2024-05-06 RX ADMIN — NICOTINE 21 MG: 21 PATCH, EXTENDED RELEASE TRANSDERMAL at 08:09

## 2024-05-06 RX ADMIN — BUDESONIDE AND FORMOTEROL FUMARATE DIHYDRATE 2 PUFF: 80; 4.5 AEROSOL RESPIRATORY (INHALATION) at 17:19

## 2024-05-06 RX ADMIN — OXCARBAZEPINE 300 MG: 300 TABLET, FILM COATED ORAL at 21:10

## 2024-05-06 RX ADMIN — OXCARBAZEPINE 300 MG: 300 TABLET, FILM COATED ORAL at 08:07

## 2024-05-06 RX ADMIN — Medication 400 MG: at 08:07

## 2024-05-06 RX ADMIN — CYANOCOBALAMIN TAB 500 MCG 500 MCG: 500 TAB at 08:07

## 2024-05-06 RX ADMIN — Medication 1000 UNITS: at 08:07

## 2024-05-06 RX ADMIN — LORATADINE 10 MG: 10 TABLET ORAL at 08:07

## 2024-05-06 RX ADMIN — DICYCLOMINE HYDROCHLORIDE 20 MG: 20 TABLET ORAL at 16:41

## 2024-05-06 NOTE — PLAN OF CARE
Problem: DISCHARGE PLANNING  Goal: Discharge to home or other facility with appropriate resources  Description: INTERVENTIONS:  - Identify barriers to discharge w/patient and caregiver  - Arrange for needed discharge resources and transportation as appropriate  - Identify discharge learning needs (meds, wound care, etc.)  - Arrange for interpretive services to assist at discharge as needed  - Refer to Case Management Department for coordinating discharge planning if the patient needs post-hospital services based on physician/advanced practitioner order or complex needs related to functional status, cognitive ability, or social support system  Outcome: Adequate for Discharge  Note: Pt is scheduled to discharge on 5/7/2024 to return to her residence. Pt is connected with [facility] for [facility].

## 2024-05-06 NOTE — PROGRESS NOTES
05/06/24 0750   Team Meeting   Meeting Type Daily Rounds   Team Members Present   Team Members Present Physician;Nurse;;Other (Discipline and Name)   Physician Team Member Dr. Gutierres / Dr. Michelle / EMMA Bro / PA Student   Nursing Team Member Christos / Ramos   Care Management Team Member Abby / Amie / Murali   Other (Discipline and Name) Joyce (Group Facilitator)   Patient/Family Present   Patient Present No   Patient's Family Present No       D/C: Pt is scheduled to discharge tomorrow.

## 2024-05-06 NOTE — CASE MANAGEMENT
CM met with Pt to discuss discharge planning. CM stated that the Pt can discharge tomorrow and they will call her  to confirm he can get her. She stated that she spoke to him and he can get her. CM stated that they will contact [facility] to schedule. She stated that she believes he has done that already as well. CM asked if the Pt was still interested in ICM services. She stated no due to her previous ICM not being good and not helping much.     CM called Pt's  Ap @[phone] to confirm  time for tomorrow. He stated that he can be there at 10am. CM asked if he knew of the Pt's appointments with [facility]. He stated that he can go to their office and asking since he is right down the street. He stated he will call the CM back.     CM received a call from the Pt's  Ap. He stated that she is scheduled with [facility] for Wednesday 5/8/2024 @9:45am with the therapist and Thursday 5/9/2024 @1:45pm.
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
## 2024-05-06 NOTE — PROGRESS NOTES
Progress Note - Behavioral Health     Antionette Downing 38 y.o. female MRN: [id number]   Unit/Bed#: [facility] 204-02 Encounter: [id number]    Behavior over the last 24 hours: improving.     Antionette is a 38 year old female with bipolar I disorder presenting for psychiatric care. Today, she is eager to be discharged. She appears brighter in conversation and is more reality-based. Her thoughts are more organized today and she denies current auditory, gustatory, or tactile hallucinations. Still some perseveration about her and her 's interpersonal difficulties but paranoid delusions seem to have resolved. Reports that she felt overstimulated this weekend going to groups and socializing but overall no major events. She has been sleeping well and is no longer feeling fatigued. Her appetite is good. She denies SI/HI. She is looking forward to going home, seeing her kids every Tuesday, and going about her normal routine. Mentions that she occasionally works as a  in a [employer], enjoys listening to music. She feels safe about going home. She feels that her mental health has improved since first arriving here. She is agreeable to continuing her current medication regimen and following through with outpatient care. She sees her therapist virtually once a week and plans to resume this.     Sleep: normal  Appetite: normal  Medication side effects: No   ROS: no complaints, all other systems are negative    Mental Status Evaluation:    Appearance:  age appropriate, dressed appropriately, adequate grooming   Behavior:  normal, cooperative, calm   Speech:  normal rate and volume, clear, coherent   Mood:  improved, euthymic   Affect:  normal range and intensity, slightly brighter, mood-congruent   Thought Process:  logical, goal directed, improved   Associations: circumstantial associations   Thought Content:  mild paranoia but no overt delusional material   Perceptual Disturbances: denies auditory hallucinations when  asked, does not appear responding to internal stimuli, no visual hallucinations, tactile and gustatory hallucinations resolved   Risk Potential: Suicidal ideation - None  Homicidal ideation - None  Potential for aggression - No   Sensorium:  oriented to person, place, and time/date   Memory:  recent and remote memory grossly intact   Consciousness:  alert and awake   Attention/Concentration: attention span and concentration are improving   Insight:  fair and improving   Judgment: fair and improving   Gait/Station: normal gait/station   Motor Activity: no abnormal movements     Vital signs in last 24 hours:    Temp:  [97.3 °F (36.3 °C)-98.6 °F (37 °C)] 97.3 °F (36.3 °C)  HR:  [75-84] 75  Resp:  [16-17] 16  BP: (122)/(68-97) 122/97    Laboratory results: I have personally reviewed all pertinent laboratory/tests results    Results from the past 24 hours: No results found for this or any previous visit (from the past 24 hour(s)).    Progress Toward Goals: improving, mood is stabilizing, less disorganized, less paranoid    Assessment/Plan   Principal Problem:    Bipolar I disorder, most recent episode manic, severe with psychotic features (HCC)  Active Problems:    Psychogenic nonepileptic seizure    Smoker    Asthma    Cocaine abuse in remission (HCC)    Cannabis abuse, in remission    Post-traumatic stress disorder, chronic    Medical clearance for psychiatric admission    Headache      Recommended Treatment:     Planned medication and treatment changes:    All current active medications have been reviewed  Encourage group therapy, milieu therapy and occupational therapy  Behavioral Health checks every 7 minutes    Continue Zyprexa 20 mg and plan for discharge to home tomorrow. Patient agrees to continue medication regimen and follow through with outpatient care.     Current Facility-Administered Medications   Medication Dose Route Frequency Provider Last Rate    acetaminophen  650 mg Oral Q6H PRN Antionette Galindo,  EMMA      acetaminophen  650 mg Oral Q4H PRN Antionette Randle Stives, PA-DOMINGA      acetaminophen  975 mg Oral Q6H PRN Antionette Randle Stives, EMMA      albuterol  2 puff Inhalation Q4H PRN Kenny Arnold, EMMA      aluminum-magnesium hydroxide-simethicone  30 mL Oral Q4H PRN Antionette Randle Stiluanne, EMMA      Artificial Tears  1 drop Both Eyes Q3H PRN Antionette Randle Stiluanne, EMMA      benztropine  1 mg Intramuscular Q4H PRN Max 6/day Antionette Randle Stives, EMMA      benztropine  1 mg Oral Q4H PRN Max 6/day Antionette Randle Stives, EMMA      bisacodyl  10 mg Rectal Daily PRN Antionette Galindo, EMMA      budesonide-formoterol  2 puff Inhalation BID Kenny Arnold, EMMA      Cholecalciferol  1,000 Units Oral Daily Kenny Arnold, EMMA      cyanocobalamin  500 mcg Oral Daily Kenny Arnold, EMMA      dicyclomine  20 mg Oral 4x Daily (AC & HS) Lowell Bro, EMMA      hydrOXYzine HCL  50 mg Oral Q6H PRN Max 4/day Antionette Galindo PA-C      Or    diphenhydrAMINE  50 mg Intramuscular Q6H PRN Antionette Galindo, EMMA      hydrocortisone   Topical 4x Daily PRN Kenny Arnold, EMMA      hydrocortisone   Topical 4x Daily PRN Kenny Arnold, EMMA      hydrOXYzine HCL  100 mg Oral Q6H PRN Max 4/day Antionette Galindo, EMMA      Or    LORazepam  2 mg Intramuscular Q6H PRN Antionette Galindo, EMMA      hydrOXYzine HCL  25 mg Oral Q6H PRN Max 4/day Antionette Galindo, EMMA      loratadine  10 mg Oral Daily Lowell Bro, EMMA      LORazepam  1 mg Oral Q6H PRN Ramírez Delaney DO      magnesium Oxide  400 mg Oral BID Kenny Arnold PA-C      nicotine  21 mg Transdermal Daily Kenny Arnold, EMMA      nicotine polacrilex  2 mg Oral Q2H PRN Antionette Galindo PA-C      OLANZapine  10 mg Oral Q3H PRN Max 3/day Antionette Galindo PA-C      Or    OLANZapine  10 mg Intramuscular Q3H PRN Max 3/day Antionette Galindo PA-C      OLANZapine  5 mg Oral Q3H PRN Max 6/day Antionette Galindo PA-C      Or     OLANZapine  5 mg Intramuscular Q3H PRN Max 6/day Antionette Galindo PA-C      OLANZapine  2.5 mg Oral Q3H PRN Max 8/day Antionette Galindo PA-C      OLANZapine  20 mg Oral HS Lowell Bro PA-C      OXcarbazepine  300 mg Oral Q12H OSIEL Delaney DO      polyethylene glycol  17 g Oral Daily PRN Antionette Galindo PA-C      potassium chloride  40 mEq Oral Once Kenny Arnold PA-C      propranolol  10 mg Oral Q8H PRN Antionette Galindo PA-C      senna-docusate sodium  1 tablet Oral Daily PRN Antionette Galindo PA-C      tiZANidine  2 mg Oral HS Kenny Arnold PA-C      traZODone  50 mg Oral HS PRN Antionette Galindo PA-C       Risks / Benefits of Treatment:    Risks, benefits, and possible side effects of medications explained to patient and patient verbalizes understanding and agreement for treatment.    Counseling / Coordination of Care:    Patient's progress discussed with staff in treatment team meeting.  Medications, treatment progress and treatment plan reviewed with patient.    Lowell Bro PA-C 05/06/24

## 2024-05-06 NOTE — NURSING NOTE
"Pt calm on approach, flat affect. Reports her mood is \"good.\" Pt states, \"I am going home tomorrow.\" Pt completed hygiene and did her laundry. Denies SI, HI and hallucinations. As per report, she continues to ask odd questions at times this evening, such as \"is Symbicort recalled?\" However she is redirectable and pleasant overall.   "

## 2024-05-06 NOTE — NURSING NOTE
Pt  calling nurses station frequently.  reports CYS/police showed up to house looking for whereabout of pt.  requests for CYS not to be able to speak with pt and feels it would make her upset.

## 2024-05-06 NOTE — NURSING NOTE
"Pt walking the halls with peers and attending group. During medication pass, pt requesting to be called \"[place] the friendly ghost\". Pt bizarre at times. Asking maintenance to fix locks at pt's house. Was redirected to leave maintenance alone on the unit. Pt states \"I'll just ask Ap to do it\". Pt denies SI/HI or hallucination. Reports wanting to discharge. States \"I don't need to be here\". Encouraged to speak with provider.  "

## 2024-05-06 NOTE — BH TRANSITION RECORD
Contact Information: If you have any questions, concerns, pended studies, tests and/or procedures, or emergencies regarding your inpatient behavioral health visit. Please contact [place] [facility] [place] [phone] and ask to speak to a , nurse or physician. A contact is available 24 hours/ 7 days a week at this number.     Summary of Procedures Performed During your Stay:  Below is a list of major procedures performed during your hospital stay and a summary of results:  - Cardiac Procedures/Studies: EKG - NSR.    Pending Studies (From admission, onward)      None          Please follow up on the above pending studies with your PCP and/or referring provider.

## 2024-05-06 NOTE — PLAN OF CARE
Pt has attended 3 groups in the past 4 days    Problem: Ineffective Coping  Goal: Participates in unit activities  Description: Interventions:  - Provide therapeutic environment   - Provide required programming   - Redirect inappropriate behaviors   Outcome: Progressing

## 2024-05-07 ENCOUNTER — APPOINTMENT (OUTPATIENT)
Dept: LAB | Facility: HOSPITAL | Age: 39
End: 2024-05-07
Payer: COMMERCIAL

## 2024-05-07 VITALS
HEART RATE: 80 BPM | BODY MASS INDEX: 24.73 KG/M2 | WEIGHT: 134.38 LBS | DIASTOLIC BLOOD PRESSURE: 68 MMHG | HEIGHT: 62 IN | SYSTOLIC BLOOD PRESSURE: 136 MMHG | OXYGEN SATURATION: 100 % | RESPIRATION RATE: 18 BRPM | TEMPERATURE: 97.6 F

## 2024-05-07 DIAGNOSIS — R20.2 PARESTHESIAS: ICD-10-CM

## 2024-05-07 DIAGNOSIS — E55.9 VITAMIN D DEFICIENCY: ICD-10-CM

## 2024-05-07 DIAGNOSIS — E53.8 B12 DEFICIENCY: ICD-10-CM

## 2024-05-07 LAB — 25(OH)D3 SERPL-MCNC: 16.7 NG/ML (ref 30–100)

## 2024-05-07 PROCEDURE — 83918 ORGANIC ACIDS TOTAL QUANT: CPT

## 2024-05-07 PROCEDURE — 82306 VITAMIN D 25 HYDROXY: CPT

## 2024-05-07 PROCEDURE — 84165 PROTEIN E-PHORESIS SERUM: CPT

## 2024-05-07 PROCEDURE — 99239 HOSP IP/OBS DSCHRG MGMT >30: CPT | Performed by: PHYSICIAN ASSISTANT

## 2024-05-07 PROCEDURE — 36415 COLL VENOUS BLD VENIPUNCTURE: CPT

## 2024-05-07 RX ORDER — ALBUTEROL SULFATE 90 UG/1
2 AEROSOL, METERED RESPIRATORY (INHALATION) EVERY 4 HOURS PRN
Qty: 18 G | Refills: 0 | Status: SHIPPED | OUTPATIENT
Start: 2024-05-07

## 2024-05-07 RX ORDER — LANOLIN ALCOHOL/MO/W.PET/CERES
400 CREAM (GRAM) TOPICAL 2 TIMES DAILY
Qty: 60 TABLET | Refills: 0 | Status: SHIPPED | OUTPATIENT
Start: 2024-05-07 | End: 2024-06-06

## 2024-05-07 RX ORDER — LORATADINE 10 MG/1
10 TABLET ORAL DAILY
Qty: 30 TABLET | Refills: 0 | Status: SHIPPED | OUTPATIENT
Start: 2024-05-07 | End: 2024-06-06

## 2024-05-07 RX ORDER — BUDESONIDE AND FORMOTEROL FUMARATE DIHYDRATE 80; 4.5 UG/1; UG/1
2 AEROSOL RESPIRATORY (INHALATION) 2 TIMES DAILY
Qty: 10.2 G | Refills: 0 | Status: SHIPPED | OUTPATIENT
Start: 2024-05-07

## 2024-05-07 RX ORDER — OLANZAPINE 20 MG/1
20 TABLET ORAL
Qty: 30 TABLET | Refills: 0 | Status: SHIPPED | OUTPATIENT
Start: 2024-05-07 | End: 2024-06-06

## 2024-05-07 RX ORDER — TIZANIDINE 2 MG/1
2 TABLET ORAL
Qty: 30 TABLET | Refills: 0 | Status: SHIPPED | OUTPATIENT
Start: 2024-05-07 | End: 2024-06-06

## 2024-05-07 RX ORDER — DICYCLOMINE HCL 20 MG
20 TABLET ORAL
Qty: 120 TABLET | Refills: 0 | Status: SHIPPED | OUTPATIENT
Start: 2024-05-07 | End: 2024-06-06

## 2024-05-07 RX ORDER — OXCARBAZEPINE 300 MG/1
300 TABLET, FILM COATED ORAL EVERY 12 HOURS SCHEDULED
Qty: 60 TABLET | Refills: 0 | Status: SHIPPED | OUTPATIENT
Start: 2024-05-07 | End: 2024-06-06

## 2024-05-07 RX ADMIN — CYANOCOBALAMIN TAB 500 MCG 500 MCG: 500 TAB at 08:56

## 2024-05-07 RX ADMIN — Medication 400 MG: at 08:56

## 2024-05-07 RX ADMIN — Medication 1000 UNITS: at 08:56

## 2024-05-07 RX ADMIN — OXCARBAZEPINE 300 MG: 300 TABLET, FILM COATED ORAL at 08:56

## 2024-05-07 RX ADMIN — LORATADINE 10 MG: 10 TABLET ORAL at 08:56

## 2024-05-07 RX ADMIN — DICYCLOMINE HYDROCHLORIDE 20 MG: 20 TABLET ORAL at 06:59

## 2024-05-07 NOTE — PROGRESS NOTES
Pt declined relapse prevention plan; pt received information about resources for relapse prevention and management.

## 2024-05-07 NOTE — PROGRESS NOTES
05/07/24 0750   Team Meeting   Meeting Type Daily Rounds   Team Members Present   Team Members Present Physician;Nurse;;Other (Discipline and Name)   Physician Team Member Dr. Gutierres / Dr. Michelle / EMMA Bro / PA Student   Nursing Team Member Ramos   Care Management Team Member Abby / Amie / Murali / Magali   Other (Discipline and Name) Joyce (Group Facilitator) / Amelia (Pharmacist)   Patient/Family Present   Patient Present No   Patient's Family Present No       D/C: Pt is scheduled to discharge on today 5/7/2024 to return to her residence. Pt is connected with [facility] for [facility].

## 2024-05-07 NOTE — CASE MANAGEMENT
CM met with Pt to confirm discharge planning. She stated that her  will be getting her at 10am. CM stated that there a scheduled appointments with [facility] for her. Pt verbalized understanding and stated she is ready for discharge.

## 2024-05-07 NOTE — NURSING NOTE
Patient expressed readiness for discharge. AVS reviewed, no questions or concerns.Encouraged compliance with medications and OP appointments. Patient met with spouse in lobby for transport home.

## 2024-05-08 LAB
ALBUMIN SERPL ELPH-MCNC: 4.36 G/DL (ref 3.2–5.1)
ALBUMIN SERPL ELPH-MCNC: 65.1 % (ref 48–70)
ALPHA1 GLOB SERPL ELPH-MCNC: 0.25 G/DL (ref 0.15–0.47)
ALPHA1 GLOB SERPL ELPH-MCNC: 3.8 % (ref 1.8–7)
ALPHA2 GLOB SERPL ELPH-MCNC: 0.55 G/DL (ref 0.42–1.04)
ALPHA2 GLOB SERPL ELPH-MCNC: 8.2 % (ref 5.9–14.9)
BETA GLOB ABNORMAL SERPL ELPH-MCNC: 0.45 G/DL (ref 0.31–0.57)
BETA1 GLOB SERPL ELPH-MCNC: 6.7 % (ref 4.7–7.7)
BETA2 GLOB SERPL ELPH-MCNC: 5 % (ref 3.1–7.9)
BETA2+GAMMA GLOB SERPL ELPH-MCNC: 0.34 G/DL (ref 0.2–0.58)
GAMMA GLOB ABNORMAL SERPL ELPH-MCNC: 0.75 G/DL (ref 0.4–1.66)
GAMMA GLOB SERPL ELPH-MCNC: 11.2 % (ref 6.9–22.3)
IGG/ALB SER: 1.87 {RATIO} (ref 1.1–1.8)
PROT PATTERN SERPL ELPH-IMP: ABNORMAL
PROT SERPL-MCNC: 6.7 G/DL (ref 6.4–8.2)

## 2024-05-08 PROCEDURE — 84165 PROTEIN E-PHORESIS SERUM: CPT | Performed by: PATHOLOGY

## 2024-05-10 ENCOUNTER — HOSPITAL ENCOUNTER (OUTPATIENT)
Dept: NEUROLOGY | Facility: CLINIC | Age: 39
End: 2024-05-10
Payer: COMMERCIAL

## 2024-05-10 DIAGNOSIS — R20.2 PARESTHESIAS: ICD-10-CM

## 2024-05-10 PROCEDURE — 95911 NRV CNDJ TEST 9-10 STUDIES: CPT | Performed by: PSYCHIATRY & NEUROLOGY

## 2024-05-10 PROCEDURE — 95886 MUSC TEST DONE W/N TEST COMP: CPT | Performed by: PSYCHIATRY & NEUROLOGY

## 2024-05-13 LAB — METHYLMALONATE SERPL-SCNC: 277 NMOL/L (ref 0–378)

## 2024-05-14 ENCOUNTER — HOSPITAL ENCOUNTER (EMERGENCY)
Facility: HOSPITAL | Age: 39
Discharge: HOME | End: 2024-05-14
Attending: EMERGENCY MEDICINE | Admitting: EMERGENCY MEDICINE
Payer: COMMERCIAL

## 2024-05-14 ENCOUNTER — TELEPHONE (OUTPATIENT)
Dept: NEUROLOGY | Facility: CLINIC | Age: 39
End: 2024-05-14

## 2024-05-14 VITALS
WEIGHT: 151.01 LBS | HEART RATE: 98 BPM | SYSTOLIC BLOOD PRESSURE: 145 MMHG | OXYGEN SATURATION: 100 % | BODY MASS INDEX: 27.62 KG/M2 | DIASTOLIC BLOOD PRESSURE: 65 MMHG | TEMPERATURE: 98.8 F | RESPIRATION RATE: 17 BRPM

## 2024-05-14 DIAGNOSIS — M54.2 NECK PAIN: Primary | ICD-10-CM

## 2024-05-14 LAB
BILIRUB UR QL STRIP: NEGATIVE
CLARITY UR: CLEAR
COLOR UR: NORMAL
EXT PREGNANCY TEST URINE: NEGATIVE
EXT. CONTROL: NORMAL
GLUCOSE UR STRIP-MCNC: NEGATIVE MG/DL
HGB UR QL STRIP.AUTO: NEGATIVE
KETONES UR STRIP-MCNC: NEGATIVE MG/DL
LEUKOCYTE ESTERASE UR QL STRIP: NEGATIVE
NITRITE UR QL STRIP: NEGATIVE
PH UR STRIP.AUTO: 6.5 [PH]
PROT UR STRIP-MCNC: NEGATIVE MG/DL
SP GR UR STRIP.AUTO: 1.01 (ref 1–1.03)
UROBILINOGEN UR STRIP-ACNC: <2 MG/DL

## 2024-05-14 PROCEDURE — 99283 EMERGENCY DEPT VISIT LOW MDM: CPT

## 2024-05-14 PROCEDURE — 99284 EMERGENCY DEPT VISIT MOD MDM: CPT | Performed by: EMERGENCY MEDICINE

## 2024-05-14 PROCEDURE — 81025 URINE PREGNANCY TEST: CPT

## 2024-05-14 PROCEDURE — 81003 URINALYSIS AUTO W/O SCOPE: CPT

## 2024-05-14 NOTE — DISCHARGE INSTRUCTIONS
If you have ongoing neck pain then please see your primary doctor and schedule/discuss MRI.  It is okay to take tylenol in moderation for your pain.

## 2024-05-14 NOTE — ED ATTENDING ATTESTATION
5/14/2024  ICindy DO, saw and evaluated the patient. I have discussed the patient with the resident/non-physician practitioner and agree with the resident's/non-physician practitioner's findings, Plan of Care, and MDM as documented in the resident's/non-physician practitioner's note, except where noted. All available labs and Radiology studies were reviewed.  I was present for key portions of any procedure(s) performed by the resident/non-physician practitioner and I was immediately available to provide assistance.       At this point I agree with the current assessment done in the Emergency Department.  I have conducted an independent evaluation of this patient a history and physical is as follows:      A 38-year-old female with past medical history of ADHD, asthma, bipolar disorder, hepatitis C & PTSD; presents for reevaluation following a fall down steps.  Patient reports falling down an entire flight of steps a few weeks ago, she was initially seen in the ED undergoing imaging.  Patient states since then she has had persistent right knee and neck pain.  Neck pain does not radiate down the extremities.  Patient also denies weakness and paresthesias.  Pt otherwise denies fever, chills, chest pain, SOB, abd pain, N/V/D, peripheral edema and rashes.  Patient does report ongoing dysuria.       Review of Records  ED evaluation on 4/23 following fall down steps  CT Head and CT cervical spine negative  Inpatient behavioral health admission, 4/26-5/7 - medications adjusted    Physical Exam  General Appearance: alert and oriented, nad, non toxic appearing  Skin:  Warm, dry, intact  HEENT: atraumatic, normocephalic  Neck: Supple, trachea midline.  No midline cervical spine tenderness.  Right-sided cervical paraspinal muscle tenderness.  Cardiac: RRR; no murmurs, rub, gallops  Pulmonary: lungs CTAB; no wheezes, rales, rhonchi  Gastrointestinal: abdomen soft, nontender, nondistended; no guarding or rebound  tenderness; good bowel sounds, no mass or bruits  Extremities: Bilateral lower extremities nontender with full range of motion.  No pedal edema, 2+ pulses; no calf tenderness, no clubbing, no cyanosis  Neuro:  no focal motor or sensory deficits, CN 2-12 grossly intact  Psych:  Normal mood and affect, normal judgement and insight    Assessment and Plan:  1.) Neck pain s/p fall down steps a few weeks ago.  Pt denies radicular symptoms, she is neurologically intact.  Reviewed recent imaging.  Recommend continued symptomatic treatment, PCP follow up for MRI if symptoms persist  2.) Dysuria, will check urine for infection       ED Course         Critical Care Time  Procedures

## 2024-05-14 NOTE — TELEPHONE ENCOUNTER
----- Message from JREEMIAH Santamaria sent at 5/13/2024  3:17 PM EDT -----  EMG normal.     Recent SPEP unrevealing, no monoclonal bands. Recommend starting vitamin D 2000 units daily OTC due to low level. Continue B12 as recommended at last visit.     Thanks    ----- Message -----  From: Chris Pitts MD  Sent: 5/10/2024  12:11 PM EDT  To: JEREMIAH Santamaria

## 2024-05-15 ENCOUNTER — TELEPHONE (OUTPATIENT)
Dept: NEUROLOGY | Facility: CLINIC | Age: 39
End: 2024-05-15

## 2024-05-15 NOTE — TELEPHONE ENCOUNTER
Spoke to pt to schedule a consult visit w/ Tomasa Headley in  or [place]. Pt requested [place]. Offered her 5/17/24 and pt accepted.

## 2024-05-16 ENCOUNTER — HOSPITAL ENCOUNTER (EMERGENCY)
Facility: HOSPITAL | Age: 39
Discharge: HOME | End: 2024-05-16
Attending: EMERGENCY MEDICINE
Payer: COMMERCIAL

## 2024-05-16 VITALS
HEART RATE: 94 BPM | TEMPERATURE: 98.7 F | RESPIRATION RATE: 18 BRPM | OXYGEN SATURATION: 100 % | DIASTOLIC BLOOD PRESSURE: 57 MMHG | WEIGHT: 147.71 LBS | SYSTOLIC BLOOD PRESSURE: 117 MMHG

## 2024-05-16 DIAGNOSIS — R56.9 SEIZURE-LIKE ACTIVITY (HCC): ICD-10-CM

## 2024-05-16 DIAGNOSIS — F44.5 PSYCHOGENIC NONEPILEPTIC SEIZURE: Primary | ICD-10-CM

## 2024-05-16 LAB
ALBUMIN SERPL BCP-MCNC: 3.9 G/DL (ref 3.5–5)
ALP SERPL-CCNC: 48 U/L (ref 34–104)
ALT SERPL W P-5'-P-CCNC: 8 U/L (ref 7–52)
ANION GAP SERPL CALCULATED.3IONS-SCNC: 9 MMOL/L (ref 4–13)
AST SERPL W P-5'-P-CCNC: 21 U/L (ref 13–39)
BASOPHILS # BLD AUTO: 0.06 THOUSANDS/ÂΜL (ref 0–0.1)
BASOPHILS NFR BLD AUTO: 0 % (ref 0–1)
BILIRUB SERPL-MCNC: 0.32 MG/DL (ref 0.2–1)
BUN SERPL-MCNC: 9 MG/DL (ref 5–25)
CALCIUM SERPL-MCNC: 8 MG/DL (ref 8.4–10.2)
CHLORIDE SERPL-SCNC: 103 MMOL/L (ref 96–108)
CO2 SERPL-SCNC: 20 MMOL/L (ref 21–32)
CREAT SERPL-MCNC: 0.75 MG/DL (ref 0.6–1.3)
EOSINOPHIL # BLD AUTO: 0.08 THOUSAND/ÂΜL (ref 0–0.61)
EOSINOPHIL NFR BLD AUTO: 1 % (ref 0–6)
ERYTHROCYTE [DISTWIDTH] IN BLOOD BY AUTOMATED COUNT: 12.5 % (ref 11.6–15.1)
GFR SERPL CREATININE-BSD FRML MDRD: 102 ML/MIN/1.73SQ M
GLUCOSE SERPL-MCNC: 95 MG/DL (ref 65–140)
HCG SERPL QL: NEGATIVE
HCT VFR BLD AUTO: 40.1 % (ref 34.8–46.1)
HGB BLD-MCNC: 13.3 G/DL (ref 11.5–15.4)
IMM GRANULOCYTES # BLD AUTO: 0.1 THOUSAND/UL (ref 0–0.2)
IMM GRANULOCYTES NFR BLD AUTO: 1 % (ref 0–2)
LYMPHOCYTES # BLD AUTO: 2.47 THOUSANDS/ÂΜL (ref 0.6–4.47)
LYMPHOCYTES NFR BLD AUTO: 15 % (ref 14–44)
MCH RBC QN AUTO: 31.4 PG (ref 26.8–34.3)
MCHC RBC AUTO-ENTMCNC: 33.2 G/DL (ref 31.4–37.4)
MCV RBC AUTO: 95 FL (ref 82–98)
MONOCYTES # BLD AUTO: 0.96 THOUSAND/ÂΜL (ref 0.17–1.22)
MONOCYTES NFR BLD AUTO: 6 % (ref 4–12)
NEUTROPHILS # BLD AUTO: 12.67 THOUSANDS/ÂΜL (ref 1.85–7.62)
NEUTS SEG NFR BLD AUTO: 77 % (ref 43–75)
NRBC BLD AUTO-RTO: 0 /100 WBCS
PLATELET # BLD AUTO: 265 THOUSANDS/UL (ref 149–390)
PMV BLD AUTO: 10.1 FL (ref 8.9–12.7)
POTASSIUM SERPL-SCNC: 4.8 MMOL/L (ref 3.5–5.3)
PROT SERPL-MCNC: 6.2 G/DL (ref 6.4–8.4)
RBC # BLD AUTO: 4.23 MILLION/UL (ref 3.81–5.12)
SODIUM SERPL-SCNC: 132 MMOL/L (ref 135–147)
WBC # BLD AUTO: 16.34 THOUSAND/UL (ref 4.31–10.16)

## 2024-05-16 PROCEDURE — 99285 EMERGENCY DEPT VISIT HI MDM: CPT | Performed by: EMERGENCY MEDICINE

## 2024-05-16 PROCEDURE — 80053 COMPREHEN METABOLIC PANEL: CPT

## 2024-05-16 PROCEDURE — 93005 ELECTROCARDIOGRAM TRACING: CPT

## 2024-05-16 PROCEDURE — 96374 THER/PROPH/DIAG INJ IV PUSH: CPT

## 2024-05-16 PROCEDURE — 85025 COMPLETE CBC W/AUTO DIFF WBC: CPT

## 2024-05-16 PROCEDURE — 82948 REAGENT STRIP/BLOOD GLUCOSE: CPT

## 2024-05-16 PROCEDURE — 99285 EMERGENCY DEPT VISIT HI MDM: CPT

## 2024-05-16 PROCEDURE — 36415 COLL VENOUS BLD VENIPUNCTURE: CPT

## 2024-05-16 PROCEDURE — 84703 CHORIONIC GONADOTROPIN ASSAY: CPT

## 2024-05-16 RX ORDER — LORAZEPAM 2 MG/ML
INJECTION INTRAMUSCULAR
Status: COMPLETED
Start: 2024-05-16 | End: 2024-05-16

## 2024-05-16 RX ORDER — LORAZEPAM 2 MG/ML
2 INJECTION INTRAMUSCULAR ONCE
Status: COMPLETED | OUTPATIENT
Start: 2024-05-16 | End: 2024-05-16

## 2024-05-16 RX ADMIN — LORAZEPAM 2 MG: 2 INJECTION INTRAMUSCULAR; INTRAVENOUS at 19:05

## 2024-05-16 RX ADMIN — DICLOFENAC SODIUM 2 G: 10 GEL TOPICAL at 19:23

## 2024-05-16 RX ADMIN — LORAZEPAM 2 MG: 2 INJECTION INTRAMUSCULAR at 19:05

## 2024-05-16 NOTE — ED NOTES
Dr. Almanzar at bedside upon patient's arrival to ER      Socorro Diamond, JANINA  05/16/24 0267

## 2024-05-16 NOTE — ED NOTES
2mg intranasal ativan given at this time by Jayashree BARNARD RN per verbal order from Dr Jenelle Diamond, JANINA  05/16/24 8571

## 2024-05-16 NOTE — ED PROVIDER NOTES
History  Chief Complaint   Patient presents with    Altered Mental Status     Came in via waiting room minimally responsive, arching and rigid upon arrival.      HPI    Patient arrives found by staff outside the ER unresponsive, brought back to room for evaluation.  At the time patient was felt to be seizing due to leftward and upward eye gaze, tonic-clonic activity.  Patient stable vital signs during initial assessment with normal blood glucose.  Poor IV access initially, 2 mg intranasal Ativan given with an additional 2 mg given after IV access obtained.  Patient then sat up and started coughing and appeared to be protecting airway.  Was able to speak with the  after the initial resuscitation.   states patient has history of nonepileptic seizures, psychiatric disorder.    None       No past medical history on file.    No past surgical history on file.    No family history on file.  I have reviewed and agree with the history as documented.    No existing history information found.  No existing history information found.        Review of Systems   Unable to perform ROS: Acuity of condition       Physical Exam  ED Triage Vitals   Temperature Pulse Respirations Blood Pressure SpO2   05/16/24 1820 05/16/24 1746 05/16/24 1746 05/16/24 1746 05/16/24 1746   98.7 °F (37.1 °C) (!) 114 20 (!) 171/74 95 %      Temp Source Heart Rate Source Patient Position - Orthostatic VS BP Location FiO2 (%)   05/16/24 1820 05/16/24 1746 05/16/24 1746 05/16/24 1746 --   Rectal Monitor Lying Right arm       Pain Score       --                    Orthostatic Vital Signs  Vitals:    05/16/24 1746 05/16/24 1830 05/16/24 1926   BP: (!) 171/74 130/60 117/57   Pulse: (!) 114 94 94   Patient Position - Orthostatic VS: Lying Lying Lying       Physical Exam  HENT:      Head: Normocephalic and atraumatic.   Eyes:      Pupils: Pupils are equal, round, and reactive to light.      Comments: B/l eyes deviated to the left upper quadrant,  twitching, no rotary nystagmus    Cardiovascular:      Rate and Rhythm: Normal rate.      Heart sounds: Normal heart sounds.   Pulmonary:      Effort: Pulmonary effort is normal. No respiratory distress.      Breath sounds: Normal breath sounds.   Abdominal:      General: There is no distension.      Palpations: Abdomen is soft.   Neurological:      Cranial Nerves: No cranial nerve deficit.      Comments: Initial exam pt appeared to be having active seizure with low gcs. On re-eval pt is gcs15         ED Medications  Medications   LORazepam (ATIVAN) injection 2 mg (2 mg Intravenous Given 5/16/24 1905)   LORazepam (ATIVAN) injection 2 mg (2 mg Intravenous Given 5/16/24 1905)   Diclofenac Sodium (VOLTAREN) 1 % topical gel 2 g (2 g Topical Given 5/16/24 1923)       Diagnostic Studies  Results Reviewed       Procedure Component Value Units Date/Time    hCG, qualitative pregnancy [[id number]]  (Normal) Collected: 05/16/24 1810    Lab Status: Final result Specimen: Blood from Hand, Right Updated: 05/16/24 1857     Preg, Serum Negative    Comprehensive metabolic panel [[id number]]  (Abnormal) Collected: 05/16/24 1810    Lab Status: Final result Specimen: Blood from Hand, Right Updated: 05/16/24 1853     Sodium 132 mmol/L      Potassium 4.8 mmol/L      Chloride 103 mmol/L      CO2 20 mmol/L      ANION GAP 9 mmol/L      BUN 9 mg/dL      Creatinine 0.75 mg/dL      Glucose 95 mg/dL      Calcium 8.0 mg/dL      AST 21 U/L      ALT 8 U/L      Alkaline Phosphatase 48 U/L      Total Protein 6.2 g/dL      Albumin 3.9 g/dL      Total Bilirubin 0.32 mg/dL      eGFR 102 ml/min/1.73sq m     Narrative:      National Kidney Disease Foundation guidelines for Chronic Kidney Disease (CKD):     Stage 1 with normal or high GFR (GFR > 90 mL/min/1.73 square meters)    Stage 2 Mild CKD (GFR = 60-89 mL/min/1.73 square meters)    Stage 3A Moderate CKD (GFR = 45-59 mL/min/1.73 square meters)    Stage 3B Moderate CKD (GFR = 30-44 mL/min/1.73 square  meters)    Stage 4 Severe CKD (GFR = 15-29 mL/min/1.73 square meters)    Stage 5 End Stage CKD (GFR <15 mL/min/1.73 square meters)  Note: GFR calculation is accurate only with a steady state creatinine    CBC and differential [[id number]]  (Abnormal) Collected: 05/16/24 1810    Lab Status: Final result Specimen: Blood from Hand, Right Updated: 05/16/24 1826     WBC 16.34 Thousand/uL      RBC 4.23 Million/uL      Hemoglobin 13.3 g/dL      Hematocrit 40.1 %      MCV 95 fL      MCH 31.4 pg      MCHC 33.2 g/dL      RDW 12.5 %      MPV 10.1 fL      Platelets 265 Thousands/uL      nRBC 0 /100 WBCs      Segmented % 77 %      Immature Grans % 1 %      Lymphocytes % 15 %      Monocytes % 6 %      Eosinophils Relative 1 %      Basophils Relative 0 %      Absolute Neutrophils 12.67 Thousands/µL      Absolute Immature Grans 0.10 Thousand/uL      Absolute Lymphocytes 2.47 Thousands/µL      Absolute Monocytes 0.96 Thousand/µL      Eosinophils Absolute 0.08 Thousand/µL      Basophils Absolute 0.06 Thousands/µL                    No orders to display         Procedures  Procedures      ED Course  ED Course as of 05/16/24 2353   Thu May 16, 2024   1825 Pt now awake, answering most questions, quiet                                        Medical Decision Making  39 y/o F presenting with seizure like episode. VSS. Ativan given for concern of acute seizure. Additional history later provided by  concerning that this event more like pseudoseizure. Prolactin sent to help differentiate. Pt quickly returned to baseline favoring more likely non-epileptic event. No significant lab abnormality. Given prior hx of same, do not feel additional w/u appropriate at this time. Pt states she has neurology appointment tomorrow. Pts  comfortable taking patient home with strict RTED precautions.     Amount and/or Complexity of Data Reviewed  Labs: ordered.    Risk  Prescription drug management.          Disposition  Final diagnoses:    Seizure-like activity (HCC)   Psychogenic nonepileptic seizure     Time reflects when diagnosis was documented in both MDM as applicable and the Disposition within this note       Time User Action Codes Description Comment    5/16/2024  7:28 PM Mick Horton [R56.9] Seizure-like activity (HCC)     5/16/2024  9:01 PM Alejandrina Almanzar Add [F44.5] Psychogenic nonepileptic seizure     5/16/2024  9:01 PM Alejandrina Almanzar Modify [R56.9] Seizure-like activity (HCC)     5/16/2024  9:01 PM Alejandrina Almanzar Modify [F44.5] Psychogenic nonepileptic seizure           ED Disposition       ED Disposition   Discharge    Condition   Stable    Date/Time   Thu May 16, 2024  7:18 PM    Comment   Socorro Christian discharge to home/self care.                   Follow-up Information       Follow up With Specialties Details Why Contact Info Additional Information    [facility] Emergency Department Emergency Medicine  If symptoms worsen [address] [place] [id number]  [phone] [facility] Emergency Department, [address], [place], [address]            There are no discharge medications for this patient.    No discharge procedures on file.    PDMP Review       None             ED Provider  Attending physically available and evaluated Socorro Christian. I managed the patient along with the ED Attending.    Electronically Signed by           Mick Horton MD  05/16/24 6797

## 2024-05-16 NOTE — DISCHARGE INSTRUCTIONS
Follow up with your primary team for further evaluation. Use the voltaren for leg pain up to 4 times daily.     If you develop new or worsening symptoms, please return to the Emergency Department for further evaluation.

## 2024-05-16 NOTE — ED NOTES
Additional 2mg of ativan given IVP at this time by JANINA Sneed at this time per verbal order from Dr. pOal Diamond RN  05/16/24 6031

## 2024-05-16 NOTE — ED ATTENDING ATTESTATION
"5/16/2024  I, Alejandrina Almanzar, , saw and evaluated the patient. I have discussed the patient with the resident/non-physician practitioner and agree with the resident's/non-physician practitioner's findings, Plan of Care, and MDM as documented in the resident's/non-physician practitioner's note, except where noted. All available labs and Radiology studies were reviewed.  I was present for key portions of any procedure(s) performed by the resident/non-physician practitioner and I was immediately available to provide assistance.       At this point I agree with the current assessment done in the Emergency Department.  I have conducted an independent evaluation of this patient a history and physical is as follows:32ish appearing female dropped off by \"good [employer]oodoo\" after being found unresponsive outside. No ID on patient.  On initial exam, pt w/ head deviated to the left, eyelids closed, eyes up and deviated to the left, intermittent rigidity to extremities, appeared at one point to be slightly arched on the bed.  POC blood sugar 109, difficulty obtaining IV, given 2mg IN lorazepam followed w/ 2mg IV lorazepam once IV obtained.      After approx 15 mintues in the ED,  arrived and reported pt w/ long hx of psychogenic seizures and that she had been walking to the ED telling him \"I'm dying inside, nobody loves me\".  He states she was discharged from [facility] around the 22nd of last month, had medication changes and  states she has been compliant with her medications as far as he knows.    Initial work up initiated before  arrived looking for any metabolic abnl, cbc abnl    ED Course     Labs Reviewed   CBC AND DIFFERENTIAL - Abnormal       Result Value Ref Range Status    WBC 16.34 (*) 4.31 - 10.16 Thousand/uL Final    RBC 4.23  3.81 - 5.12 Million/uL Final    Hemoglobin 13.3  11.5 - 15.4 g/dL Final    Hematocrit 40.1  34.8 - 46.1 % Final    MCV 95  82 - 98 fL Final    MCH 31.4  26.8 - 34.3 pg " Final    MCHC 33.2  31.4 - 37.4 g/dL Final    RDW 12.5  11.6 - 15.1 % Final    MPV 10.1  8.9 - 12.7 fL Final    Platelets 265  149 - 390 Thousands/uL Final    nRBC 0  /100 WBCs Final    Segmented % 77 (*) 43 - 75 % Final    Immature Grans % 1  0 - 2 % Final    Lymphocytes % 15  14 - 44 % Final    Monocytes % 6  4 - 12 % Final    Eosinophils Relative 1  0 - 6 % Final    Basophils Relative 0  0 - 1 % Final    Absolute Neutrophils 12.67 (*) 1.85 - 7.62 Thousands/µL Final    Absolute Immature Grans 0.10  0.00 - 0.20 Thousand/uL Final    Absolute Lymphocytes 2.47  0.60 - 4.47 Thousands/µL Final    Absolute Monocytes 0.96  0.17 - 1.22 Thousand/µL Final    Eosinophils Absolute 0.08  0.00 - 0.61 Thousand/µL Final    Basophils Absolute 0.06  0.00 - 0.10 Thousands/µL Final   COMPREHENSIVE METABOLIC PANEL - Abnormal    Sodium 132 (*) 135 - 147 mmol/L Final    Potassium 4.8  3.5 - 5.3 mmol/L Final    Comment: Moderately Hemolyzed:Results may be affected.    Chloride 103  96 - 108 mmol/L Final    CO2 20 (*) 21 - 32 mmol/L Final    ANION GAP 9  4 - 13 mmol/L Final    BUN 9  5 - 25 mg/dL Final    Creatinine 0.75  0.60 - 1.30 mg/dL Final    Comment: Standardized to IDMS reference method    Glucose 95  65 - 140 mg/dL Final    Comment: If the patient is fasting, the ADA then defines impaired fasting glucose as > 100 mg/dL and diabetes as > or equal to 123 mg/dL.    Calcium 8.0 (*) 8.4 - 10.2 mg/dL Final    AST 21  13 - 39 U/L Final    Comment: Moderately Hemolyzed:Results may be affected.    ALT 8  7 - 52 U/L Final    Comment: Specimen collection should occur prior to Sulfasalazine administration due to the potential for falsely depressed results.     Alkaline Phosphatase 48  34 - 104 U/L Final    Total Protein 6.2 (*) 6.4 - 8.4 g/dL Final    Comment: Moderately Hemolyzed:Results may be affected.    Albumin 3.9  3.5 - 5.0 g/dL Final    Comment: Moderately Hemolyzed:Results may be affected.    Total Bilirubin 0.32  0.20 - 1.00 mg/dL  Final    Comment: Use of this assay is not recommended for patients undergoing treatment with eltrombopag due to the potential for falsely elevated results.  N-acetyl-p-benzoquinone imine (metabolite of Acetaminophen) will generate erroneously low results in samples for patients that have taken an overdose of Acetaminophen.    eGFR 102  ml/min/1.73sq m Final    Narrative:     National Kidney Disease Foundation guidelines for Chronic Kidney Disease (CKD):     Stage 1 with normal or high GFR (GFR > 90 mL/min/1.73 square meters)    Stage 2 Mild CKD (GFR = 60-89 mL/min/1.73 square meters)    Stage 3A Moderate CKD (GFR = 45-59 mL/min/1.73 square meters)    Stage 3B Moderate CKD (GFR = 30-44 mL/min/1.73 square meters)    Stage 4 Severe CKD (GFR = 15-29 mL/min/1.73 square meters)    Stage 5 End Stage CKD (GFR <15 mL/min/1.73 square meters)  Note: GFR calculation is accurate only with a steady state creatinine   PREGNANCY TEST (HCG QUALITATIVE) - Normal    Preg, Serum Negative  Negative Final   PROLACTIN     No orders to display     WBC likely stress response. Afebrile w/ no infectious symptoms. Pt back to baseline per     Critical Care Time  Procedures    1. Psychogenic nonepileptic seizure        2. Seizure-like activity (HCC)          Time reflects when diagnosis was documented in both MDM as applicable and the Disposition within this note       Time User Action Codes Description Comment    5/16/2024  7:28 PM Mick Horton Add [R56.9] Seizure-like activity (HCC)     5/16/2024  9:01 PM Alejandrina Almanzar Add [F44.5] Psychogenic nonepileptic seizure     5/16/2024  9:01 PM Alejandrina Almanzar Modify [R56.9] Seizure-like activity (HCC)     5/16/2024  9:01 PM Alejandrina Almanzar Modify [F44.5] Psychogenic nonepileptic seizure           ED Disposition       ED Disposition   Discharge    Condition   Stable    Date/Time   Thu May 16, 2024  7:18 PM    Comment   [facility] discharge to home/self care.                    Follow-up Information       Follow up With Specialties Details Why Contact Info Additional Information    [facility] Emergency Department Emergency Medicine  If symptoms worsen [address] [address] [phone] [facility] Emergency Department, [address], [place], [address]

## 2024-05-17 ENCOUNTER — CONSULT (OUTPATIENT)
Dept: NEUROLOGY | Facility: CLINIC | Age: 39
End: 2024-05-17
Payer: COMMERCIAL

## 2024-05-17 VITALS
DIASTOLIC BLOOD PRESSURE: 71 MMHG | HEIGHT: 62 IN | BODY MASS INDEX: 28.08 KG/M2 | TEMPERATURE: 98.2 F | WEIGHT: 152.6 LBS | SYSTOLIC BLOOD PRESSURE: 122 MMHG | OXYGEN SATURATION: 97 % | HEART RATE: 98 BPM

## 2024-05-17 DIAGNOSIS — G62.9 PERIPHERAL NEUROPATHY: ICD-10-CM

## 2024-05-17 PROCEDURE — 99214 OFFICE O/P EST MOD 30 MIN: CPT | Performed by: NURSE PRACTITIONER

## 2024-05-17 RX ORDER — HYDROXYZINE 50 MG/1
50 TABLET, FILM COATED ORAL AS NEEDED
COMMUNITY
Start: 2024-05-09

## 2024-05-17 RX ORDER — LANOLIN ALCOHOL/MO/W.PET/CERES
1000 CREAM (GRAM) TOPICAL DAILY
Qty: 90 TABLET | Refills: 2 | Status: SHIPPED | OUTPATIENT
Start: 2024-05-17

## 2024-05-17 NOTE — PATIENT INSTRUCTIONS
Critical Lab:  Ph = 7.06,  Bicarb = 10, Notified Dr. Gonzales   Will send out for transdermal compounded cream if no improvement or side effects with this contract office would consider lyrica    Obtain labs     Start b12 and vitamin d supplement    Consider skin biopsy if needed/worsening symptoms

## 2024-05-17 NOTE — PROGRESS NOTES
Ambulatory Visit  Name: Antionette Downing      : 1985      MRN: [id number]  Encounter Provider: JEREMIAH Rosado  Encounter Date: 2024   Encounter department: [facility] [place]    Assessment & Plan   1. Peripheral neuropathy  -     Ambulatory Referral to Neurology      History of Present Illness   {Disappearing Hyperlinks I Encounters * My Last Note * Since Last Visit * History :26937}  Antionette Downing is a 38 y.o. female who presents ***    Review of Systems   Constitutional:  Negative for appetite change, fatigue and fever.   HENT: Negative.  Negative for hearing loss, tinnitus, trouble swallowing and voice change.    Eyes: Negative.  Negative for photophobia, pain and visual disturbance.   Respiratory: Negative.  Negative for shortness of breath.    Cardiovascular: Negative.  Negative for palpitations.   Gastrointestinal: Negative.  Negative for nausea and vomiting.   Endocrine: Negative.  Negative for cold intolerance.   Genitourinary: Negative.  Negative for dysuria, frequency and urgency.   Musculoskeletal:  Negative for back pain, gait problem, myalgias, neck pain and neck stiffness.   Skin: Negative.  Negative for rash.   Allergic/Immunologic: Negative.    Neurological:  Positive for numbness. Negative for dizziness, tremors, seizures, syncope, facial asymmetry, speech difficulty, weakness, light-headedness and headaches.   Hematological: Negative.  Does not bruise/bleed easily.   Psychiatric/Behavioral: Negative.  Negative for confusion, hallucinations and sleep disturbance.    All other systems reviewed and are negative.    {Select to Display [facility] (Optional):93211}  Objective   {Disappearing Hyperlinks   Review Vitals * Enter New Vitals * Results Review * Labs * Imaging * Cardiology * Procedures * Lung Cancer Screening :53260}  LMP 2024 (Exact Date)     Physical Exam  Administrative Statements {Disappearing Hyperlinks I  Level of Service *  PCM/PCSP:91075}  {Time Spent Statement (Optional):03804}

## 2024-05-17 NOTE — PROGRESS NOTES
Patient ID: Antionette Downing is a 38 y.o. female.    Assessment/Plan:    Peripheral neuropathy  Patient with symptoms of numbness and tingling in the hands and feet, worse in the feet for the past 7-8 months. No muscle weakness noted. Mild sensory changes in the feet, otherwise normal reflex.    Reviewed work up-normal EMG, labs did show low b12 and vitamin d. Supplementation recommended.  She was also on topiramate which has a side effect of paresthesias, however this medication was recently stopped with no improvement in symptoms. Discussed possible of small fiber neuropathy given stocking-glove distribution of symptoms. Skin biopsy discussed, however given her low b12 would see if this improves symptoms prior to pursuing. She understands skin biopsy would not likely .     She is mainly bothered by pain. She did not tolerate duloxetine in the past. Given her psych history would be hesitant for TCA. She has rash, GI side effects (reports GI bleed?) with gabapentin. Discussed potential of lyrica, she may or may not have similar side effects to gabapentin. Discussed transdermal compounded cream which I will send out for. She does understand some of these formulations include gabapentin and if she develops rash she should stop medication.     Will plan to follow up with epilepsy team as scheduled in 5 months. Can follow up with neuromuscular team if needed. To contact the office sooner with any concerns or worsening symptoms.       Diagnoses and all orders for this visit:    Peripheral neuropathy  -     Ambulatory Referral to Neurology  -     Sjogren's Antibodies; Future  -     vitamin B-12 (VITAMIN B-12) 1,000 mcg tablet; Take 1 tablet (1,000 mcg total) by mouth daily  -     Cholecalciferol (VITAMIN D3) 1,000 units tablet; Take 2 tablets (2,000 Units total) by mouth daily  -     Vitamin B6; Future    Other orders  -     hydrOXYzine HCL (ATARAX) 50 mg tablet; Take 50 mg by mouth as needed            Subjective:    HPI    Patient is a 37 yo female with PMH of bipolar 1, hep C, asthma, functional neurologic disorder, paresthesias, ADHD, myofascial pain syndrome, seizure disorder, migraine who presents for evaluation for numbness and tingling.      She does currently follow with epilepsy team for history of PNES and migraines.    She reports history of pins-and-needles sensation burning type pain that started roughly 7 months ago.  This occurs in her hands and feet but is worse in her feet, comes in waves, in intermittent.     She has noted the right great toe is constantly numb this started in 2021 since callous was removed.     She does feel her knees can be weak at times. No other muscle weakness. She has some imbalance at times.   Her last fall was 3 weeks ago in which she fell down the steps, cannot recall what exactly occurred.     She has chronic neck pain, pain can shot down her arms at times mainly the left.   No back pain.     Patient is not diabetic.    She did not tolerate Cymbalta in the past.  She had side effects with gabapentin.  She sees pain management for low back pain. She was previously on topiramate however stopped this 2-3 weeks ago when she was hospitalized in inpatient psych. No change in numbness and tingling since stopping.   She did try diclofenac in the ER yesterday didn't help.   Has tried topical lidocaine, patches with no improvement.       Her paternal aunt has neuropathy, borderline DM. No other family hx of neuropathy or DM.     prior neuropathy work up:  EMG lower extremities 4/2024: normal     labs 5/2024 vit d 16.7, spep no monoclonal bands,   3/2024 ONEIL, b1 sed rate normal, b12 217  a1c 5.3  tsh normal    The following portions of the patient's history were reviewed and updated as appropriate: allergies, current medications, past family history, past medical history, past social history, past surgical history and problem list.          Objective:    Blood pressure  "122/71, pulse 98, temperature 98.2 °F (36.8 °C), temperature source Temporal, height 5' 2\" (1.575 m), weight 69.2 kg (152 lb 9.6 oz), last menstrual period 04/26/2024, SpO2 97%, not currently breastfeeding.    Physical Exam  Constitutional:       General: She is awake.   HENT:      Right Ear: Hearing normal.      Left Ear: Hearing normal.   Eyes:      General: Lids are normal.      Extraocular Movements: Extraocular movements intact.   Neurological:      Mental Status: She is alert.      Deep Tendon Reflexes:      Reflex Scores:       Bicep reflexes are 1+ on the right side and 1+ on the left side.       Brachioradialis reflexes are 1+ on the right side and 1+ on the left side.  Psychiatric:         Speech: Speech normal.         Neurological Exam  Mental Status  Awake and alert. Speech is normal. Language is fluent with no aphasia.    Cranial Nerves  CN III, IV, VI: Extraocular movements intact bilaterally. Normal lids and orbits bilaterally.  CN V:  Right: Facial sensation is normal.  Left: Facial sensation is normal on the left.  CN VII:  Right: There is no facial weakness.  Left: There is no facial weakness.  CN VIII:  Right: Hearing is normal.  Left: Hearing is normal.  CN IX, X: Palate elevates symmetrically  CN XI:  Right: Trapezius strength is normal.  Left: Trapezius strength is normal.  CN XII: Tongue midline without atrophy or fasciculations.    Motor  Normal muscle bulk throughout. No fasciculations present.                                               Right                     Left  Elbow flexion                         5                          5  Elbow extension                    5                          5  Wrist flexion                           5                          5  Wrist extension                      5                          5  Finger flexion                         5                          5  Finger abduction                    5                          5  Hip flexion            "                   5                          5  Knee flexion                           5-                          5-  Knee extension                      5                          5  Ankle inversion                      5-                          5-  Ankle eversion                       5-                          5-  Plantarflexion                         5                          5  Dorsiflexion                            5                          5  Poor effort with LE testing.    Sensory  Light touch is normal in upper and lower extremities. Pinprick abnormality: Normal in the UE. Diminished to the ankles in the LE. Temperature abnormality: Diminished to the wrists in the UE, to the ankles in the LE. Vibration is normal in upper and lower extremities. Proprioception is normal in upper and lower extremities.     Reflexes                                            Right                      Left  Brachioradialis                    1+                         1+  Biceps                                 1+                         1+  Patellar                                Tr                         Tr  Achilles                                Tr                         Tr  Right Plantar: downgoing  Left Plantar: downgoing    Right pathological reflexes: Maria R's absent.  Left pathological reflexes: Maria R's absent.    Coordination  Right: Finger-to-nose normal.Left: Finger-to-nose normal.    Gait  Casual gait is normal including stance, stride, and arm swing.      I have personally reviewed the ROS performed by the MA.    ROS:    Review of Systems  Review of Systems   Constitutional:  Negative for appetite change, fatigue and fever.   HENT: Negative.  Negative for hearing loss, tinnitus, trouble swallowing and voice change.    Eyes: Negative.  Negative for photophobia, pain and visual disturbance.   Respiratory: Negative.  Negative for shortness of breath.    Cardiovascular: Negative.  Negative for  palpitations.   Gastrointestinal: Negative.  Negative for nausea and vomiting.   Endocrine: Negative.  Negative for cold intolerance.   Genitourinary: Negative.  Negative for dysuria, frequency and urgency.   Musculoskeletal:  Negative for back pain, gait problem, myalgias, neck pain and neck stiffness.   Skin: Negative.  Negative for rash.   Allergic/Immunologic: Negative.    Neurological:  Positive for numbness. Negative for dizziness, tremors, seizures, syncope, facial asymmetry, speech difficulty, weakness, light-headedness and headaches.   Hematological: Negative.  Does not bruise/bleed easily.   Psychiatric/Behavioral: Negative.  Negative for confusion, hallucinations and sleep disturbance.    All other systems reviewed and are negativ

## 2024-05-17 NOTE — ED PROVIDER NOTES
History  Chief Complaint   Patient presents with    Rash     Pt reports rash on back of b/l knees and left hand.     Neck Pain     Pt reports falling down a full flight of stairs last week, has neck pain, abd pain, headache. -thinners     This is a 38-year-old female who fell several weeks ago down her stairs at home and since then has been having ongoing mild to moderate pain in her knees and her neck.  Patient was evaluated in this emergency department just after her fall and was imaged in all areas of pain at that time.  No acute traumatic fractures or other injuries were discovered on that imaging series.  However patient continues to have pain in the right sided paraspinal area of her neck near the trapezius ridge as well as the posterior side of her bilateral knees with right worse than left.  She states she has no difficulty with ambulation, however does experience some pain especially after exerting herself through the day.  She also points to an area on her posterior right knee about the size of a ila which is round and scaly.  This has been present for more than the past 3 weeks, and is eczematous in appearance.  She states it is not itchy, not especially painful, but she notices that every time she shaves.  On review of systems, patient does note that she has had urinary frequency and some dysuria over the past several days which is consistent with prior urinary tract infections though patient states it is less severe than what she has experienced before.  Patient has not been taking any Tylenol or Motrin for her symptoms.  She denies any difficulty with hand , weakness, numbness, or tingling in her upper or lower extremities.  She denies any abdominal or back pain or any chest pain or shortness of breath after her fall several weeks ago.        Prior to Admission Medications   Prescriptions Last Dose Informant Patient Reported? Taking?   OLANZapine (ZyPREXA) 20 MG tablet   No No   Sig: Take 1  tablet (20 mg total) by mouth daily at bedtime   OXcarbazepine (TRILEPTAL) 300 mg tablet   No No   Sig: Take 1 tablet (300 mg total) by mouth every 12 (twelve) hours   Ventolin  (90 Base) MCG/ACT inhaler   No No   Sig: Inhale 2 puffs every 4 (four) hours as needed for wheezing   budesonide-formoterol (SYMBICORT) 80-4.5 MCG/ACT inhaler   No No   Sig: Inhale 2 puffs 2 (two) times a day Rinse mouth after use.   cyanocobalamin (VITAMIN B-12) 500 MCG tablet   No No   Sig: Take 1 tablet (500 mcg total) by mouth daily   dicyclomine (BENTYL) 20 mg tablet   No No   Sig: Take 1 tablet (20 mg total) by mouth 4 (four) times a day (before meals and at bedtime)   loratadine (CLARITIN) 10 mg tablet   No No   Sig: Take 1 tablet (10 mg total) by mouth daily   magnesium Oxide (MAG-OX) 400 mg TABS   No No   Sig: Take 1 tablet (400 mg total) by mouth 2 (two) times a day   tiZANidine (ZANAFLEX) 2 mg tablet   No No   Sig: Take 1 tablet (2 mg total) by mouth daily at bedtime      Facility-Administered Medications: None       Past Medical History:   Diagnosis Date    Abnormal Pap smear of cervix     ADHD (attention deficit hyperactivity disorder)     Ankle fracture     Anxiety     Arthritis     back    Asthma     Bipolar disorder (HCC)     Cellulitis     right side fac in 2014    Chronic pain disorder     Depression     Diverticulitis     Flank pain 08/16/2016    Hallucination     Hepatitis C     Hip disease 2006    reports she had fluid removed from right hip and received treatment with antibiotic     History of abnormal cervical Pap smear     History of multiple miscarriages     IBS (irritable bowel syndrome)     Infantile idiopathic scoliosis     Joint pain     Kidney stone     Lactose intolerance     Low back pain     Myofascial pain syndrome     Peripheral neuropathy 3/28/2024    Poor dentition     Psychiatric illness     Psychosis (HCC)     PTSD (post-traumatic stress disorder)     Pyelonephritis affecting pregnancy     Right  "hand paresthesia     Right ovarian cyst     Scoliosis     Seizures (HCC)     Self-injurious behavior     Sleep difficulties     Slow transit constipation     Substance abuse (HCC)     Suicide attempt (HCC)        Past Surgical History:   Procedure Laterality Date     SECTION  2016     SECTION  2014    HIP SURGERY      ORTHOPEDIC SURGERY      MT  DELIVERY ONLY N/A 2016    Procedure:  SECTION () REPEAT;  Surgeon: Kurt Connor MD;  Location: [facility] ;  Service: Obstetrics    MT LIG/TRNSXJ FLP TUBE ABDL/VAG APPR UNI/BI Bilateral 2016    Procedure: LIGATION/COAGULATION TUBAL;  Surgeon: Kurt Connor MD;  Location: [facility] ;  Service: Obstetrics       Family History   Problem Relation Age of Onset    Heart disease Maternal Aunt     Cancer Maternal Aunt     Diabetes Paternal Aunt     No Known Problems Mother     No Known Problems Father     No Known Problems Sister      I have reviewed and agree with the history as documented.    E-Cigarette/Vaping    E-Cigarette Use Former User      E-Cigarette/Vaping Substances    Nicotine No     THC No     CBD No     Flavoring No     Other No     Unknown No      Social History     Tobacco Use    Smoking status: Every Day     Current packs/day: 0.25     Average packs/day: 0.3 packs/day for 15.0 years (3.8 ttl pk-yrs)     Types: Cigarettes     Passive exposure: Never    Smokeless tobacco: Never    Tobacco comments:     pt refused smoking cessation teaching.    Vaping Use    Vaping status: Former   Substance Use Topics    Alcohol use: Never    Drug use: Not Currently     Types: Marijuana, Cocaine, \"Crack\" cocaine     Comment: on occasion         Review of Systems   Constitutional:  Negative for chills and fever.   HENT:  Negative for ear pain and sore throat.    Eyes:  Negative for pain and visual disturbance.   Respiratory:  Negative for cough and shortness of breath.    Cardiovascular:  Negative for chest pain " and palpitations.   Gastrointestinal:  Negative for abdominal pain and vomiting.   Genitourinary:  Positive for dysuria and frequency. Negative for hematuria.   Musculoskeletal:  Positive for arthralgias and neck pain. Negative for back pain and gait problem.   Skin:  Negative for color change and rash.   Neurological:  Negative for seizures and syncope.   All other systems reviewed and are negative.      Physical Exam  ED Triage Vitals [05/14/24 1203]   Temperature Pulse Respirations Blood Pressure SpO2   98.8 °F (37.1 °C) 98 17 145/65 100 %      Temp Source Heart Rate Source Patient Position - Orthostatic VS BP Location FiO2 (%)   Oral Monitor Sitting Right arm --      Pain Score       --             Orthostatic Vital Signs  Vitals:    05/14/24 1203   BP: 145/65   Pulse: 98   Patient Position - Orthostatic VS: Sitting       Physical Exam  Vitals and nursing note reviewed.   Constitutional:       General: She is not in acute distress.     Appearance: She is well-developed. She is not ill-appearing.   HENT:      Head: Normocephalic and atraumatic.   Eyes:      Conjunctiva/sclera: Conjunctivae normal.   Cardiovascular:      Rate and Rhythm: Normal rate and regular rhythm.      Heart sounds: No murmur heard.  Pulmonary:      Effort: Pulmonary effort is normal. No respiratory distress.      Breath sounds: Normal breath sounds.   Abdominal:      Palpations: Abdomen is soft.      Tenderness: There is no abdominal tenderness.   Musculoskeletal:         General: Tenderness (Mild, posterior fossa bilateral knees with deep palpation only, not in the deep venous distribution areas, no leg swelling noted, normal range of motion, no underlying fluctuance or erythema.) present. No swelling.      Cervical back: Normal range of motion and neck supple. Tenderness (Right-sided paraspinal near trapezius ridge) present. No rigidity.   Lymphadenopathy:      Cervical: No cervical adenopathy.   Skin:     General: Skin is warm and dry.       Capillary Refill: Capillary refill takes less than 2 seconds.      Findings: Rash (Small, ila sized, round lesion on her lateral posterior right knee which is eczematous in appearance, scaly, no excoriation, no erythema, well-defined borders.) present.   Neurological:      General: No focal deficit present.      Mental Status: She is alert.   Psychiatric:         Mood and Affect: Mood normal.         ED Medications  Medications - No data to display    Diagnostic Studies  Results Reviewed       Procedure Component Value Units Date/Time    UA w Reflex to Microscopic w Reflex to Culture [[id number]] Collected: 05/14/24 1247    Lab Status: Final result Specimen: Urine, Clean Catch Updated: 05/14/24 1337     Color, UA Light Yellow     Clarity, UA Clear     Specific Gravity, UA 1.006     pH, UA 6.5     Leukocytes, UA Negative     Nitrite, UA Negative     Protein, UA Negative mg/dl      Glucose, UA Negative mg/dl      Ketones, UA Negative mg/dl      Urobilinogen, UA <2.0 mg/dl      Bilirubin, UA Negative     Occult Blood, UA Negative    POCT pregnancy, urine [[id number]]  (Normal) Resulted: 05/14/24 1249    Lab Status: Final result Updated: 05/14/24 1249     EXT Preg Test, Ur Negative     Control Valid                   No orders to display         Procedures  Procedures      ED Course                             SBIRT 20yo+      Flowsheet Row Most Recent Value   Initial Alcohol Screen: US AUDIT-C     1. How often do you have a drink containing alcohol? 0 Filed at: 05/14/2024 1204   2. How many drinks containing alcohol do you have on a typical day you are drinking?  0 Filed at: 05/14/2024 1204   3b. FEMALE Any Age, or MALE 65+: How often do you have 4 or more drinks on one occassion? 0 Filed at: 05/14/2024 1204   Audit-C Score 0 Filed at: 05/14/2024 1204   JAMEY: How many times in the past year have you...    Used an illegal drug or used a prescription medication for non-medical reasons? Never Filed at: 05/14/2024 5438                   Medical Decision Making  Complexity: Patient presenting due to concern for ongoing symptoms after falling down the stairs.  Has undergone trauma evaluation which was very thorough in the past.  Without revealing any acute fractures.  I did rereview these images to ensure that there was no fracture or other significant osseous trauma.  At this time, patient's pain distribution is mostly and soft tissue and musculoskeletal areas.  I do suspect that she may have strained muscle especially near her trapezius ridge.  She is denying any neurologic symptoms more concerning for entrapment or significant danger of atrophy/radiculopathy.  Therefore patient should follow-up with her primary doctor regarding the symptoms and may eventually need to undergo imaging such as an MRI if symptoms are persistent for a while.  She will otherwise continue to treat with Tylenol Motrin as needed.  I did also screen her urine as she was having urinary tract infection possible symptoms.  Her urine was very clean and at this time we decided together not to treat empirically as it did not feel completely like her prior UTIs.  She will monitor symptoms closely at home, discussed with her primary care doctor if she has persistent symptoms, and will come back to the emergency department she has severe worsening symptoms especially if she has fevers, flank pain, or other new severe symptoms such as numbness, weakness, dropping objects frequently with her upper extremities, or severe worsening of her neck pain or leg pain.    Amount and/or Complexity of Data Reviewed  Labs: ordered.          Disposition  Final diagnoses:   Neck pain     Time reflects when diagnosis was documented in both MDM as applicable and the Disposition within this note       Time User Action Codes Description Comment    5/14/2024  1:04 PM Sumanth Joseph Add [M54.2] Neck pain           ED Disposition       ED Disposition   Discharge    Condition   Stable     Date/Time   Tue May 14, 2024 1304    Comment   Antionette Downing discharge to home/self care.                   Follow-up Information       Follow up With Specialties Details Why Contact Info Additional Information    [facility] Emergency Department Emergency Medicine Go to  If symptoms worsen, As needed [address] [facility] [address] [phone] [facility] Emergency Department, [address], [place], [address]    Nadia Chanel MD Family Medicine   [address]  [address]  [facility] [address]  [phone]               Discharge Medication List as of 5/14/2024  1:43 PM        CONTINUE these medications which have NOT CHANGED    Details   budesonide-formoterol (SYMBICORT) 80-4.5 MCG/ACT inhaler Inhale 2 puffs 2 (two) times a day Rinse mouth after use., Starting Tue 5/7/2024, Normal      cyanocobalamin (VITAMIN B-12) 500 MCG tablet Take 1 tablet (500 mcg total) by mouth daily, Starting Tue 5/7/2024, Until Thu 6/6/2024, Normal      dicyclomine (BENTYL) 20 mg tablet Take 1 tablet (20 mg total) by mouth 4 (four) times a day (before meals and at bedtime), Starting Tue 5/7/2024, Until Thu 6/6/2024, Normal      loratadine (CLARITIN) 10 mg tablet Take 1 tablet (10 mg total) by mouth daily, Starting Tue 5/7/2024, Until Thu 6/6/2024, Normal      magnesium Oxide (MAG-OX) 400 mg TABS Take 1 tablet (400 mg total) by mouth 2 (two) times a day, Starting Tue 5/7/2024, Until Thu 6/6/2024, Normal      OLANZapine (ZyPREXA) 20 MG tablet Take 1 tablet (20 mg total) by mouth daily at bedtime, Starting Tue 5/7/2024, Until Thu 6/6/2024, Normal      OXcarbazepine (TRILEPTAL) 300 mg tablet Take 1 tablet (300 mg total) by mouth every 12 (twelve) hours, Starting Tue 5/7/2024, Until Thu 6/6/2024, Normal      tiZANidine (ZANAFLEX) 2 mg tablet Take 1 tablet (2 mg total) by mouth daily at bedtime, Starting Tue 5/7/2024, Until Thu 6/6/2024, Normal      Ventolin  (90  Base) MCG/ACT inhaler Inhale 2 puffs every 4 (four) hours as needed for wheezing, Starting Tue 5/7/2024, Normal           No discharge procedures on file.    PDMP Review         Value Time User    PDMP Reviewed  Yes 2/1/2024 10:40 PM Kevin Vera Jr., DO             ED Provider  Attending physically available and evaluated Antionette Downing. I managed the patient along with the ED Attending.    Electronically Signed by           Sumanth Joseph MD  05/16/24 4474

## 2024-05-17 NOTE — ASSESSMENT & PLAN NOTE
Patient with symptoms of numbness and tingling in the hands and feet, worse in the feet for the past 7-8 months. No muscle weakness noted. Mild sensory changes in the feet, otherwise normal reflex.    Reviewed work up-normal EMG, labs did show low b12 and vitamin d. Supplementation recommended.  She was also on topiramate which has a side effect of paresthesias, however this medication was recently stopped with no improvement in symptoms. Discussed possible of small fiber neuropathy given stocking-glove distribution of symptoms. Skin biopsy discussed, however given her low b12 would see if this improves symptoms prior to pursuing. She understands skin biopsy would not likely .     She is mainly bothered by pain. She did not tolerate duloxetine in the past. Given her psych history would be hesitant for TCA. She has rash, GI side effects (reports GI bleed?) with gabapentin. Discussed potential of lyrica, she may or may not have similar side effects to gabapentin. Discussed transdermal compounded cream which I will send out for. She does understand some of these formulations include gabapentin and if she develops rash she should stop medication.     Will plan to follow up with epilepsy team as scheduled in 5 months. Can follow up with neuromuscular team if needed. To contact the office sooner with any concerns or worsening symptoms.

## 2024-05-20 ENCOUNTER — TELEPHONE (OUTPATIENT)
Dept: GASTROENTEROLOGY | Facility: MEDICAL CENTER | Age: 39
End: 2024-05-20

## 2024-05-20 ENCOUNTER — TELEPHONE (OUTPATIENT)
Dept: NEUROLOGY | Facility: CLINIC | Age: 39
End: 2024-05-20

## 2024-05-20 DIAGNOSIS — Z87.42 HISTORY OF ABNORMAL CERVICAL PAP SMEAR: ICD-10-CM

## 2024-05-20 LAB
ATRIAL RATE: 101 BPM
P AXIS: 65 DEGREES
PR INTERVAL: 130 MS
QRS AXIS: 86 DEGREES
QRSD INTERVAL: 84 MS
QT INTERVAL: 364 MS
QTC INTERVAL: 471 MS
T WAVE AXIS: 64 DEGREES
VENTRICULAR RATE: 101 BPM

## 2024-05-20 PROCEDURE — 93010 ELECTROCARDIOGRAM REPORT: CPT | Performed by: INTERNAL MEDICINE

## 2024-05-20 NOTE — TELEPHONE ENCOUNTER
Patient called in today and stated that the cream that was prescribed to her in the ED is not working for her pain of pins and needles sensation in her hands and feet.  I could not locate it in the chart of which cream she used.     Also she has been getting pain in her shines.    Patient wanted to know if Citlaly Wright can increase the dosage of medication for tiZANidine (ZANAFLEX) 2 mg tablet.  Patient is requesting a call back for medication advise for her pain in shines, feet and hands.   Phone # [phone]

## 2024-05-20 NOTE — TELEPHONE ENCOUNTER
Due to all these medication changes in her symptoms, I think patient needs an appointment with any provider before all of this is ordered.  Thanks.  It can be any provider.  Thanks

## 2024-05-20 NOTE — TELEPHONE ENCOUNTER
received vm from 5/20 at 11:09am-jack salcedo [phone]. I am calling about getting a different prescription for the pins and needles in my feet the doctor told me to call back after I use the cream. Thank you and have a nice day.

## 2024-05-20 NOTE — TELEPHONE ENCOUNTER
Patient called refill line stating that she is no longer on the lithium and would like to have the famotidine call into the [facility] (not on current active med list). Patient also asked if there was a stronger dose for her dicyclomine due to have muscle spasms in her stomach. Patient also stated that maybe adding famotidine again will help with that issue. Please review and call patient to discuss her options.

## 2024-05-21 ENCOUNTER — TELEPHONE (OUTPATIENT)
Dept: NEUROLOGY | Facility: CLINIC | Age: 39
End: 2024-05-21

## 2024-05-21 ENCOUNTER — APPOINTMENT (OUTPATIENT)
Dept: LAB | Facility: HOSPITAL | Age: 39
End: 2024-05-21
Payer: COMMERCIAL

## 2024-05-21 DIAGNOSIS — G62.9 PERIPHERAL NEUROPATHY: ICD-10-CM

## 2024-05-21 LAB — GLUCOSE SERPL-MCNC: 109 MG/DL (ref 65–140)

## 2024-05-21 PROCEDURE — 36415 COLL VENOUS BLD VENIPUNCTURE: CPT

## 2024-05-21 PROCEDURE — 86235 NUCLEAR ANTIGEN ANTIBODY: CPT

## 2024-05-21 PROCEDURE — 84207 ASSAY OF VITAMIN B-6: CPT

## 2024-05-21 NOTE — TELEPHONE ENCOUNTER
Patient called regarding status of her request. Advised patient per chart message that an appt is needed in order for the provider to discuss these changes with the patient. Patient states she will call the scheduling line for an appt

## 2024-05-22 ENCOUNTER — OFFICE VISIT (OUTPATIENT)
Dept: GASTROENTEROLOGY | Facility: MEDICAL CENTER | Age: 39
End: 2024-05-22
Payer: COMMERCIAL

## 2024-05-22 VITALS
DIASTOLIC BLOOD PRESSURE: 68 MMHG | WEIGHT: 150.2 LBS | OXYGEN SATURATION: 96 % | HEIGHT: 63 IN | SYSTOLIC BLOOD PRESSURE: 126 MMHG | TEMPERATURE: 98.7 F | BODY MASS INDEX: 26.61 KG/M2 | HEART RATE: 95 BPM

## 2024-05-22 DIAGNOSIS — K58.2 IRRITABLE BOWEL SYNDROME WITH BOTH CONSTIPATION AND DIARRHEA: ICD-10-CM

## 2024-05-22 DIAGNOSIS — R10.13 EPIGASTRIC PAIN: ICD-10-CM

## 2024-05-22 DIAGNOSIS — K21.9 GASTROESOPHAGEAL REFLUX DISEASE WITHOUT ESOPHAGITIS: Primary | ICD-10-CM

## 2024-05-22 LAB
ENA SS-A AB SER-ACNC: <0.2 AI (ref 0–0.9)
ENA SS-B AB SER-ACNC: <0.2 AI (ref 0–0.9)

## 2024-05-22 PROCEDURE — 99214 OFFICE O/P EST MOD 30 MIN: CPT | Performed by: STUDENT IN AN ORGANIZED HEALTH CARE EDUCATION/TRAINING PROGRAM

## 2024-05-22 RX ORDER — DICYCLOMINE HCL 20 MG
20 TABLET ORAL EVERY 6 HOURS PRN
Qty: 120 TABLET | Refills: 11 | Status: SHIPPED | OUTPATIENT
Start: 2024-05-22

## 2024-05-22 RX ORDER — FAMOTIDINE 20 MG/1
20 TABLET, FILM COATED ORAL 2 TIMES DAILY PRN
Qty: 60 TABLET | Refills: 11 | Status: SHIPPED | OUTPATIENT
Start: 2024-05-22

## 2024-05-22 RX ORDER — LANOLIN ALCOHOL/MO/W.PET/CERES
400 CREAM (GRAM) TOPICAL 2 TIMES DAILY
Qty: 60 TABLET | Refills: 11 | Status: SHIPPED | OUTPATIENT
Start: 2024-05-22

## 2024-05-22 NOTE — PROGRESS NOTES
[facility] - Outpatient Follow-up Note  Antionette Downing 38 y.o. female MRN: [id number]  Encounter: [id number]          ASSESSMENT AND PLAN:    38-year-old female with bipolar and asthma here for follow-up of abdominal pain, alternating bowel habits and reflux.  Symptoms continue to be incompletely controlled.  She is currently off all acid reflux medications but reports she only gets intermittent symptoms related to dietary indiscretion show will restart H2 blocker as needed.  If this is not adequate, I have recommended she call the office so that we can resume PPI.  1. Gastroesophageal reflux disease without esophagitis  2. Epigastric pain  - famotidine (PEPCID) 20 mg tablet; Take 1 tablet (20 mg total) by mouth 2 (two) times a day as needed for heartburn  Dispense: 60 tablet; Refill: 11  - Continue to avoid trigger foods    3. Irritable bowel syndrome with both constipation and diarrhea  - dicyclomine (BENTYL) 20 mg tablet; Take 1 tablet (20 mg total) by mouth every 6 (six) hours as needed (abdominal pain)  Dispense: 120 tablet; Refill: 11  - psyllium (METAMUCIL) 58.6 % packet; Take 1 packet by mouth daily  Dispense: 30 packet; Refill: 11  - magnesium Oxide (MAG-OX) 400 mg TABS; Take 1 tablet (400 mg total) by mouth 2 (two) times a day  Dispense: 60 tablet; Refill: 11      ______________________________________________________________________    SUBJECTIVE:    Stomach is about the same.  Still getting a lot of pain with eating chicken or milk.    Still getting alternating bowel habits.  Having intermittnet rectal bleeding.  Trying to avoid soda and drink more water.  Taking dicyclomine four times a day. Sometimes thinks it helps but still get sharp pain in lower abdomen.    Stopped lithium 4-5 months ago. Had stopped famotidine due to med interaction.  Thinks famotidine used to help her. Not taking omeprazole because she ran out.      Seen 5/17/2023 for GERD and IBS.  Was  prescribed omeprazole and famotidine    Colonoscopy 2022 with mild left-sided diverticulosis.  Random colon biopsies were unremarkable.  EGD normal with normal gastric and duodenal biopsies.    2024 TSH, CMP, CBC unremarkable.    REVIEW OF SYSTEMS IS OTHERWISE NEGATIVE.      Historical Information   Past Medical History:   Diagnosis Date    Abnormal Pap smear of cervix     ADHD (attention deficit hyperactivity disorder)     Ankle fracture     Anxiety     Arthritis     back    Asthma     Bipolar disorder (HCC)     Cellulitis     right side fac in     Chronic pain disorder     Depression     Diverticulitis     Flank pain 2016    Hallucination     Hepatitis C     Hip disease 2006    reports she had fluid removed from right hip and received treatment with antibiotic     History of abnormal cervical Pap smear     History of multiple miscarriages     IBS (irritable bowel syndrome)     Infantile idiopathic scoliosis     Joint pain     Kidney stone     Lactose intolerance     Low back pain     Myofascial pain syndrome     Peripheral neuropathy 3/28/2024    Poor dentition     Psychiatric illness     Psychosis (HCC)     PTSD (post-traumatic stress disorder)     Pyelonephritis affecting pregnancy     Right hand paresthesia     Right ovarian cyst     Scoliosis     Seizures (HCC)     Self-injurious behavior     Sleep difficulties     Slow transit constipation     Substance abuse (HCC)     Suicide attempt (HCC)      Past Surgical History:   Procedure Laterality Date     SECTION  2016     SECTION  2014    HIP SURGERY      ORTHOPEDIC SURGERY      OR  DELIVERY ONLY N/A 2016    Procedure:  SECTION () REPEAT;  Surgeon: Kurt Connor MD;  Location: [facility] ;  Service: Obstetrics    OR LIG/TRNSXJ FLP TUBE ABDL/VAG APPR UNI/BI Bilateral 2016    Procedure: LIGATION/COAGULATION TUBAL;  Surgeon: Kurt Connor MD;  Location: [facility];  Service:  "Obstetrics     Social History   Social History     Substance and Sexual Activity   Alcohol Use Never     Social History     Substance and Sexual Activity   Drug Use Not Currently    Types: Marijuana, Cocaine, \"Crack\" cocaine    Comment: on occasion      Social History     Tobacco Use   Smoking Status Every Day    Current packs/day: 0.25    Average packs/day: 0.3 packs/day for 15.0 years (3.8 ttl pk-yrs)    Types: Cigarettes    Passive exposure: Never   Smokeless Tobacco Never   Tobacco Comments    pt refused smoking cessation teaching.      Family History   Problem Relation Age of Onset    Heart disease Maternal Aunt     Cancer Maternal Aunt     Diabetes Paternal Aunt     No Known Problems Mother     No Known Problems Father     No Known Problems Sister        Meds/Allergies       Current Outpatient Medications:     budesonide-formoterol (SYMBICORT) 80-4.5 MCG/ACT inhaler    Cholecalciferol (VITAMIN D3) 1,000 units tablet    dicyclomine (BENTYL) 20 mg tablet    hydrOXYzine HCL (ATARAX) 50 mg tablet    loratadine (CLARITIN) 10 mg tablet    magnesium Oxide (MAG-OX) 400 mg TABS    OLANZapine (ZyPREXA) 20 MG tablet    OXcarbazepine (TRILEPTAL) 300 mg tablet    tiZANidine (ZANAFLEX) 2 mg tablet    Ventolin  (90 Base) MCG/ACT inhaler    vitamin B-12 (VITAMIN B-12) 1,000 mcg tablet    Allergies   Allergen Reactions    Aspirin      Pt given enteric coated 81 mg, when ask pt denies any allergy, listed under allergies on paperwork provided by berny Hernández - Food Allergy Itching    Naproxen     Toradol [Ketorolac Tromethamine] GI Bleeding    Azithromycin Rash    Latex Hives and Rash    Neurontin [Gabapentin] Rash and GI Bleeding    Ppd [Tuberculin Purified Protein Derivative] Rash           Objective     Blood pressure 126/68, pulse 95, temperature 98.7 °F (37.1 °C), height 5' 3\" (1.6 m), weight 68.1 kg (150 lb 3.2 oz), last menstrual period 04/26/2024, SpO2 96%, not currently breastfeeding. Body mass index is " 26.61 kg/m².      PHYSICAL EXAM:      General Appearance:   Alert, cooperative, no distress   HEENT:   Normocephalic, atraumatic, anicteric.     Neck:  Supple, symmetrical, trachea midline   Lungs:   Clear to auscultation bilaterally; no rales, rhonchi or wheezing; respirations unlabored    Heart::   Regular rate and rhythm; no murmur, rub, or gallop.   Abdomen:   Soft, mild diffuse TTP, non-distended; normal bowel sounds; no masses, no organomegaly    Genitalia:   Deferred    Rectal:   Deferred    Extremities:  No cyanosis, clubbing or edema    Pulses:  2+ and symmetric    Skin:  No jaundice, rashes, or lesions    Lymph nodes:  No palpable cervical lymphadenopathy        Lab Results:   No visits with results within 1 Day(s) from this visit.   Latest known visit with results is:   Admission on 05/14/2024, Discharged on 05/14/2024   Component Date Value    Color, UA 05/14/2024 Light Yellow     Clarity, UA 05/14/2024 Clear     Specific Gravity, UA 05/14/2024 1.006     pH, UA 05/14/2024 6.5     Leukocytes, UA 05/14/2024 Negative     Nitrite, UA 05/14/2024 Negative     Protein, UA 05/14/2024 Negative     Glucose, UA 05/14/2024 Negative     Ketones, UA 05/14/2024 Negative     Urobilinogen, UA 05/14/2024 <2.0     Bilirubin, UA 05/14/2024 Negative     Occult Blood, UA 05/14/2024 Negative     EXT Preg Test, Ur 05/14/2024 Negative     Control 05/14/2024 Valid          Radiology Results:   CT spine cervical without contrast    Result Date: 4/23/2024  Narrative: CT CERVICAL SPINE - WITHOUT CONTRAST INDICATION:   trauma. COMPARISON: CT cervical spine dated 1/25/2023 TECHNIQUE:  CT examination of the cervical spine was performed without intravenous contrast.  Contiguous axial images were obtained. Multiplanar 2D reformatted images were created from the source data. Radiation dose length product (DLP) for this visit:  468 mGy-cm .  This examination, like all CT scans performed in the [facility], was performed  utilizing techniques to minimize radiation dose exposure, including the use of iterative reconstruction and automated exposure control. IMAGE QUALITY:  Diagnostic. FINDINGS: ALIGNMENT:  There is straightening of normal cervical lordosis.  No subluxation or compression deformity. VERTEBRAE:  No fracture. DEGENERATIVE CHANGES:  No significant cervical degenerative changes are noted. PREVERTEBRAL AND PARASPINAL SOFT TISSUES: Unremarkable THORACIC INLET:  Normal.     Impression: No cervical spine fracture or traumatic malalignment. Workstation performed: [id number]     CT head without contrast    Result Date: 4/23/2024  Narrative: CT BRAIN - WITHOUT CONTRAST INDICATION:   trauma. COMPARISON: CT head dated 2/5/2023 TECHNIQUE:  CT examination of the brain was performed.  Multiplanar 2D reformatted images were created from the source data. Radiation dose length product (DLP) for this visit:  1580 mGy-cm .  This examination, like all CT scans performed in the [facility], was performed utilizing techniques to minimize radiation dose exposure, including the use of iterative reconstruction and automated exposure control. IMAGE QUALITY:  Diagnostic. FINDINGS: PARENCHYMA:  No intracranial mass, mass effect or midline shift. No CT signs of acute infarction.  No acute parenchymal hemorrhage. VENTRICLES AND EXTRA-AXIAL SPACES:  Normal for the patient's age. VISUALIZED ORBITS: Normal visualized orbits. PARANASAL SINUSES: Normal visualized paranasal sinuses. CALVARIUM AND EXTRACRANIAL SOFT TISSUES:  Normal.     Impression: No acute intracranial abnormality. Workstation performed: [id number]

## 2024-05-26 ENCOUNTER — HOSPITAL ENCOUNTER (EMERGENCY)
Facility: HOSPITAL | Age: 39
Discharge: HOME | End: 2024-05-26
Attending: EMERGENCY MEDICINE
Payer: COMMERCIAL

## 2024-05-26 VITALS
HEART RATE: 112 BPM | TEMPERATURE: 99.3 F | RESPIRATION RATE: 18 BRPM | DIASTOLIC BLOOD PRESSURE: 92 MMHG | SYSTOLIC BLOOD PRESSURE: 134 MMHG | OXYGEN SATURATION: 96 %

## 2024-05-26 DIAGNOSIS — K52.9 GASTROENTERITIS: Primary | ICD-10-CM

## 2024-05-26 LAB
ALBUMIN SERPL BCP-MCNC: 4.4 G/DL (ref 3.5–5)
ALP SERPL-CCNC: 68 U/L (ref 34–104)
ALT SERPL W P-5'-P-CCNC: 8 U/L (ref 7–52)
ANION GAP SERPL CALCULATED.3IONS-SCNC: 9 MMOL/L (ref 4–13)
AST SERPL W P-5'-P-CCNC: 13 U/L (ref 13–39)
BASOPHILS # BLD AUTO: 0.03 THOUSANDS/ÂΜL (ref 0–0.1)
BASOPHILS NFR BLD AUTO: 0 % (ref 0–1)
BILIRUB SERPL-MCNC: 0.43 MG/DL (ref 0.2–1)
BILIRUB UR QL STRIP: NEGATIVE
BUN SERPL-MCNC: 7 MG/DL (ref 5–25)
CALCIUM SERPL-MCNC: 9.2 MG/DL (ref 8.4–10.2)
CHLORIDE SERPL-SCNC: 106 MMOL/L (ref 96–108)
CLARITY UR: NORMAL
CO2 SERPL-SCNC: 23 MMOL/L (ref 21–32)
COLOR UR: YELLOW
CREAT SERPL-MCNC: 0.78 MG/DL (ref 0.6–1.3)
EOSINOPHIL # BLD AUTO: 0.12 THOUSAND/ÂΜL (ref 0–0.61)
EOSINOPHIL NFR BLD AUTO: 2 % (ref 0–6)
ERYTHROCYTE [DISTWIDTH] IN BLOOD BY AUTOMATED COUNT: 12.6 % (ref 11.6–15.1)
EXT PREGNANCY TEST URINE: NEGATIVE
EXT. CONTROL: NORMAL
GFR SERPL CREATININE-BSD FRML MDRD: 98 ML/MIN/1.73SQ M
GLUCOSE SERPL-MCNC: 101 MG/DL (ref 65–140)
GLUCOSE UR STRIP-MCNC: NEGATIVE MG/DL
HCT VFR BLD AUTO: 41.4 % (ref 34.8–46.1)
HGB BLD-MCNC: 13.7 G/DL (ref 11.5–15.4)
HGB UR QL STRIP.AUTO: NEGATIVE
IMM GRANULOCYTES # BLD AUTO: 0.03 THOUSAND/UL (ref 0–0.2)
IMM GRANULOCYTES NFR BLD AUTO: 0 % (ref 0–2)
KETONES UR STRIP-MCNC: NEGATIVE MG/DL
LEUKOCYTE ESTERASE UR QL STRIP: NEGATIVE
LIPASE SERPL-CCNC: 21 U/L (ref 11–82)
LYMPHOCYTES # BLD AUTO: 1.6 THOUSANDS/ÂΜL (ref 0.6–4.47)
LYMPHOCYTES NFR BLD AUTO: 21 % (ref 14–44)
MCH RBC QN AUTO: 31.3 PG (ref 26.8–34.3)
MCHC RBC AUTO-ENTMCNC: 33.1 G/DL (ref 31.4–37.4)
MCV RBC AUTO: 95 FL (ref 82–98)
MONOCYTES # BLD AUTO: 0.68 THOUSAND/ÂΜL (ref 0.17–1.22)
MONOCYTES NFR BLD AUTO: 9 % (ref 4–12)
NEUTROPHILS # BLD AUTO: 5.15 THOUSANDS/ÂΜL (ref 1.85–7.62)
NEUTS SEG NFR BLD AUTO: 68 % (ref 43–75)
NITRITE UR QL STRIP: NEGATIVE
NRBC BLD AUTO-RTO: 0 /100 WBCS
PH UR STRIP.AUTO: 7 [PH]
PLATELET # BLD AUTO: 210 THOUSANDS/UL (ref 149–390)
PMV BLD AUTO: 10 FL (ref 8.9–12.7)
POTASSIUM SERPL-SCNC: 3.7 MMOL/L (ref 3.5–5.3)
PROT SERPL-MCNC: 6.8 G/DL (ref 6.4–8.4)
PROT UR STRIP-MCNC: NEGATIVE MG/DL
RBC # BLD AUTO: 4.38 MILLION/UL (ref 3.81–5.12)
SODIUM SERPL-SCNC: 138 MMOL/L (ref 135–147)
SP GR UR STRIP.AUTO: 1.01 (ref 1–1.03)
UROBILINOGEN UR STRIP-ACNC: <2 MG/DL
VIT B6 SERPL-MCNC: 5.8 UG/L (ref 3.4–65.2)
WBC # BLD AUTO: 7.61 THOUSAND/UL (ref 4.31–10.16)

## 2024-05-26 PROCEDURE — 96376 TX/PRO/DX INJ SAME DRUG ADON: CPT

## 2024-05-26 PROCEDURE — 83690 ASSAY OF LIPASE: CPT

## 2024-05-26 PROCEDURE — 99284 EMERGENCY DEPT VISIT MOD MDM: CPT | Performed by: EMERGENCY MEDICINE

## 2024-05-26 PROCEDURE — 93005 ELECTROCARDIOGRAM TRACING: CPT

## 2024-05-26 PROCEDURE — 99284 EMERGENCY DEPT VISIT MOD MDM: CPT

## 2024-05-26 PROCEDURE — 36415 COLL VENOUS BLD VENIPUNCTURE: CPT

## 2024-05-26 PROCEDURE — 85025 COMPLETE CBC W/AUTO DIFF WBC: CPT

## 2024-05-26 PROCEDURE — 96361 HYDRATE IV INFUSION ADD-ON: CPT

## 2024-05-26 PROCEDURE — 96374 THER/PROPH/DIAG INJ IV PUSH: CPT

## 2024-05-26 PROCEDURE — 81025 URINE PREGNANCY TEST: CPT

## 2024-05-26 PROCEDURE — 81003 URINALYSIS AUTO W/O SCOPE: CPT

## 2024-05-26 PROCEDURE — 80053 COMPREHEN METABOLIC PANEL: CPT

## 2024-05-26 RX ORDER — DROPERIDOL 2.5 MG/ML
1.25 INJECTION, SOLUTION INTRAMUSCULAR; INTRAVENOUS ONCE
Status: COMPLETED | OUTPATIENT
Start: 2024-05-26 | End: 2024-05-26

## 2024-05-26 RX ADMIN — DROPERIDOL 1.25 MG: 2.5 INJECTION, SOLUTION INTRAMUSCULAR; INTRAVENOUS at 13:28

## 2024-05-26 RX ADMIN — DROPERIDOL 1.25 MG: 2.5 INJECTION, SOLUTION INTRAMUSCULAR; INTRAVENOUS at 12:41

## 2024-05-26 RX ADMIN — SODIUM CHLORIDE 1000 ML: 0.9 INJECTION, SOLUTION INTRAVENOUS at 12:42

## 2024-05-26 NOTE — ED NOTES
Pt discharged and IV removed by provider. Pt ambulatory off unit independently.      Mitzi Farfan RN  05/26/24 [id number]

## 2024-05-26 NOTE — ED ATTENDING ATTESTATION
5/26/2024  I, Ty Gasca DO, saw and evaluated the patient. I have discussed the patient with the resident/non-physician practitioner and agree with the resident's/non-physician practitioner's findings, Plan of Care, and MDM as documented in the resident's/non-physician practitioner's note, except where noted. All available labs and Radiology studies were reviewed.  I was present for key portions of any procedure(s) performed by the resident/non-physician practitioner and I was immediately available to provide assistance.       At this point I agree with the current assessment done in the Emergency Department.  I have conducted an independent evaluation of this patient a history and physical is as follows:    39 yo female c/o L flank pain, NBNB emesis. No fever, vag dc or bleeding, CP/SOB, HA/neck pain, cough. No other c/o at this time.    Abd snd mild L CVAT. No abd ttp. No r/g bs+      Imp: n/v, flank pain. Ddx MHS, pyelo, ureterolithiasis. plan: abd labs, UPT, IV fluids, antiemetics, reassess      ED Course         Critical Care Time  Procedures

## 2024-05-26 NOTE — DISCHARGE INSTRUCTIONS
Your workup here was not concerning for anything dangerous. Therefore there is no need for you to stay at the hospital for further testing. We feel safe to send you home.    You can use Tylenol for management of your symptoms.    You should follow up with your PCP to assess for resolution of your symptoms and to determine if there is any further evaluation that needs to be performed.    Return to the emergency department if you have any worsening symptoms.    Thank you for choosing [facility] for your care!

## 2024-05-27 LAB
ATRIAL RATE: 99 BPM
P AXIS: 113 DEGREES
PR INTERVAL: 120 MS
QRS AXIS: 84 DEGREES
QRSD INTERVAL: 82 MS
QT INTERVAL: 368 MS
QTC INTERVAL: 472 MS
T WAVE AXIS: 51 DEGREES
VENTRICULAR RATE: 99 BPM

## 2024-05-27 PROCEDURE — 93010 ELECTROCARDIOGRAM REPORT: CPT | Performed by: INTERNAL MEDICINE

## 2024-06-02 PROBLEM — E83.42 HYPOMAGNESEMIA: Status: RESOLVED | Noted: 2019-12-12 | Resolved: 2024-06-02

## 2024-06-02 PROBLEM — R74.01 TRANSAMINITIS: Status: RESOLVED | Noted: 2023-03-20 | Resolved: 2024-06-02

## 2024-06-03 ENCOUNTER — TELEPHONE (OUTPATIENT)
Dept: FAMILY MEDICINE CLINIC | Facility: CLINIC | Age: 39
End: 2024-06-03

## 2024-06-03 NOTE — TELEPHONE ENCOUNTER
"I received a call from the  informing me that the patient and  would like to speak with a manager.   I introduced myself by name and title.   began saying, \"I've never been disrespected in such a manner; i want that lady written up.\" i requested that the patient provide more details. Who? What? Where?  He directed me to the checkout window and said, \"ask her who was helping her, and she'll tell you.\" the  told me. He continued to speak loudly, so i invited them to my office to discuss.    reported that he was talking to his wife about his appointment the previous time he was here. He said, \"i was just telling her that it took an hour from start to finish when she (mauri) came and told me that there was no need for my comment, saying that I shouldn't talk to her like that because she is a trainee and you are making her nervous. I told her to shut up because i wasn't talking to her.\" she began to speak louder and act ghetto. \"I am a customer. we are not in the streets. Afterwards, the gentleman arrived, took her away, and finished assisting me\"  I apologized for their experience and told them i will speak with the medical receptionists.  They said thank you. The conversation has concluded.    "
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
## 2024-07-16 ENCOUNTER — APPOINTMENT (EMERGENCY)
Dept: CT IMAGING | Facility: HOSPITAL | Age: 39
End: 2024-07-16
Payer: COMMERCIAL

## 2024-07-16 ENCOUNTER — HOSPITAL ENCOUNTER (EMERGENCY)
Facility: HOSPITAL | Age: 39
Discharge: HOME | End: 2024-07-16
Attending: EMERGENCY MEDICINE
Payer: COMMERCIAL

## 2024-07-16 VITALS
DIASTOLIC BLOOD PRESSURE: 59 MMHG | SYSTOLIC BLOOD PRESSURE: 111 MMHG | WEIGHT: 142.42 LBS | BODY MASS INDEX: 25.23 KG/M2 | RESPIRATION RATE: 18 BRPM | HEART RATE: 80 BPM | OXYGEN SATURATION: 97 % | HEIGHT: 63 IN | TEMPERATURE: 98.2 F

## 2024-07-16 DIAGNOSIS — R91.8 GROUND GLASS OPACITY PRESENT ON IMAGING OF LUNG: ICD-10-CM

## 2024-07-16 DIAGNOSIS — R10.9 ACUTE ABDOMINAL PAIN: Primary | ICD-10-CM

## 2024-07-16 DIAGNOSIS — E86.0 DEHYDRATION: ICD-10-CM

## 2024-07-16 DIAGNOSIS — R11.2 NAUSEA VOMITING AND DIARRHEA: ICD-10-CM

## 2024-07-16 DIAGNOSIS — K52.9 COLITIS: ICD-10-CM

## 2024-07-16 DIAGNOSIS — K57.90 DIVERTICULOSIS: ICD-10-CM

## 2024-07-16 DIAGNOSIS — R19.7 NAUSEA VOMITING AND DIARRHEA: ICD-10-CM

## 2024-07-16 LAB
ALBUMIN SERPL BCG-MCNC: 4.2 G/DL (ref 3.5–5)
ALP SERPL-CCNC: 60 U/L (ref 34–104)
ALT SERPL W P-5'-P-CCNC: 9 U/L (ref 7–52)
ANION GAP SERPL CALCULATED.3IONS-SCNC: 7 MMOL/L (ref 4–13)
AST SERPL W P-5'-P-CCNC: 25 U/L (ref 13–39)
BACTERIA UR QL AUTO: NORMAL /HPF
BASOPHILS # BLD AUTO: 0.04 THOUSANDS/ÂΜL (ref 0–0.1)
BASOPHILS NFR BLD AUTO: 0 % (ref 0–1)
BILIRUB SERPL-MCNC: 0.3 MG/DL (ref 0.2–1)
BILIRUB UR QL STRIP: NEGATIVE
BUN SERPL-MCNC: 6 MG/DL (ref 5–25)
CALCIUM SERPL-MCNC: 8.9 MG/DL (ref 8.4–10.2)
CHLORIDE SERPL-SCNC: 103 MMOL/L (ref 96–108)
CLARITY UR: ABNORMAL
CO2 SERPL-SCNC: 22 MMOL/L (ref 21–32)
COLOR UR: YELLOW
CREAT SERPL-MCNC: 0.78 MG/DL (ref 0.6–1.3)
EOSINOPHIL # BLD AUTO: 0.06 THOUSAND/ÂΜL (ref 0–0.61)
EOSINOPHIL NFR BLD AUTO: 1 % (ref 0–6)
ERYTHROCYTE [DISTWIDTH] IN BLOOD BY AUTOMATED COUNT: 12.1 % (ref 11.6–15.1)
EXT PREGNANCY TEST URINE: NEGATIVE
EXT. CONTROL: NORMAL
GFR SERPL CREATININE-BSD FRML MDRD: 98 ML/MIN/1.73SQ M
GLUCOSE SERPL-MCNC: 108 MG/DL (ref 65–140)
GLUCOSE UR STRIP-MCNC: NEGATIVE MG/DL
HCT VFR BLD AUTO: 40.2 % (ref 34.8–46.1)
HGB BLD-MCNC: 14 G/DL (ref 11.5–15.4)
HGB UR QL STRIP.AUTO: ABNORMAL
IMM GRANULOCYTES # BLD AUTO: 0.03 THOUSAND/UL (ref 0–0.2)
IMM GRANULOCYTES NFR BLD AUTO: 0 % (ref 0–2)
KETONES UR STRIP-MCNC: NEGATIVE MG/DL
LEUKOCYTE ESTERASE UR QL STRIP: NEGATIVE
LIPASE SERPL-CCNC: 20 U/L (ref 11–82)
LYMPHOCYTES # BLD AUTO: 2.8 THOUSANDS/ÂΜL (ref 0.6–4.47)
LYMPHOCYTES NFR BLD AUTO: 25 % (ref 14–44)
MCH RBC QN AUTO: 31.8 PG (ref 26.8–34.3)
MCHC RBC AUTO-ENTMCNC: 34.8 G/DL (ref 31.4–37.4)
MCV RBC AUTO: 91 FL (ref 82–98)
MONOCYTES # BLD AUTO: 0.84 THOUSAND/ÂΜL (ref 0.17–1.22)
MONOCYTES NFR BLD AUTO: 7 % (ref 4–12)
NEUTROPHILS # BLD AUTO: 7.64 THOUSANDS/ÂΜL (ref 1.85–7.62)
NEUTS SEG NFR BLD AUTO: 67 % (ref 43–75)
NITRITE UR QL STRIP: NEGATIVE
NON-SQ EPI CELLS URNS QL MICRO: NORMAL /HPF
NRBC BLD AUTO-RTO: 0 /100 WBCS
PH UR STRIP.AUTO: 6.5 [PH] (ref 4.5–8)
PLATELET # BLD AUTO: 222 THOUSANDS/UL (ref 149–390)
PMV BLD AUTO: 10.1 FL (ref 8.9–12.7)
POTASSIUM SERPL-SCNC: 4.1 MMOL/L (ref 3.5–5.3)
PROT SERPL-MCNC: 7 G/DL (ref 6.4–8.4)
PROT UR STRIP-MCNC: NEGATIVE MG/DL
RBC # BLD AUTO: 4.4 MILLION/UL (ref 3.81–5.12)
RBC #/AREA URNS AUTO: NORMAL /HPF
SODIUM SERPL-SCNC: 132 MMOL/L (ref 135–147)
SP GR UR STRIP.AUTO: <=1.005 (ref 1–1.03)
UROBILINOGEN UR QL STRIP.AUTO: 0.2 E.U./DL
WBC # BLD AUTO: 11.41 THOUSAND/UL (ref 4.31–10.16)
WBC #/AREA URNS AUTO: NORMAL /HPF

## 2024-07-16 PROCEDURE — 74177 CT ABD & PELVIS W/CONTRAST: CPT

## 2024-07-16 PROCEDURE — 96375 TX/PRO/DX INJ NEW DRUG ADDON: CPT

## 2024-07-16 PROCEDURE — 80053 COMPREHEN METABOLIC PANEL: CPT | Performed by: EMERGENCY MEDICINE

## 2024-07-16 PROCEDURE — 99284 EMERGENCY DEPT VISIT MOD MDM: CPT

## 2024-07-16 PROCEDURE — 99285 EMERGENCY DEPT VISIT HI MDM: CPT | Performed by: EMERGENCY MEDICINE

## 2024-07-16 PROCEDURE — 83690 ASSAY OF LIPASE: CPT | Performed by: EMERGENCY MEDICINE

## 2024-07-16 PROCEDURE — 81025 URINE PREGNANCY TEST: CPT | Performed by: EMERGENCY MEDICINE

## 2024-07-16 PROCEDURE — 96374 THER/PROPH/DIAG INJ IV PUSH: CPT

## 2024-07-16 PROCEDURE — 36415 COLL VENOUS BLD VENIPUNCTURE: CPT | Performed by: EMERGENCY MEDICINE

## 2024-07-16 PROCEDURE — 85025 COMPLETE CBC W/AUTO DIFF WBC: CPT | Performed by: EMERGENCY MEDICINE

## 2024-07-16 PROCEDURE — 96361 HYDRATE IV INFUSION ADD-ON: CPT

## 2024-07-16 PROCEDURE — 81001 URINALYSIS AUTO W/SCOPE: CPT

## 2024-07-16 RX ORDER — FENTANYL CITRATE 50 UG/ML
50 INJECTION, SOLUTION INTRAMUSCULAR; INTRAVENOUS ONCE
Status: COMPLETED | OUTPATIENT
Start: 2024-07-16 | End: 2024-07-16

## 2024-07-16 RX ORDER — HYOSCYAMINE SULFATE 0.125 MG
0.12 TABLET ORAL EVERY 4 HOURS PRN
Qty: 30 TABLET | Refills: 0 | Status: SHIPPED | OUTPATIENT
Start: 2024-07-16

## 2024-07-16 RX ORDER — ONDANSETRON 4 MG/1
4 TABLET, FILM COATED ORAL EVERY 8 HOURS PRN
Qty: 7 TABLET | Refills: 0 | Status: SHIPPED | OUTPATIENT
Start: 2024-07-16 | End: 2024-07-25 | Stop reason: ALTCHOICE

## 2024-07-16 RX ORDER — ONDANSETRON 2 MG/ML
4 INJECTION INTRAMUSCULAR; INTRAVENOUS ONCE
Status: COMPLETED | OUTPATIENT
Start: 2024-07-16 | End: 2024-07-16

## 2024-07-16 RX ADMIN — SODIUM CHLORIDE 1000 ML: 0.9 INJECTION, SOLUTION INTRAVENOUS at 20:02

## 2024-07-16 RX ADMIN — IOHEXOL 100 ML: 350 INJECTION, SOLUTION INTRAVENOUS at 21:08

## 2024-07-16 RX ADMIN — ONDANSETRON 4 MG: 2 INJECTION INTRAMUSCULAR; INTRAVENOUS at 20:36

## 2024-07-16 RX ADMIN — FENTANYL CITRATE 50 MCG: 50 INJECTION, SOLUTION INTRAMUSCULAR; INTRAVENOUS at 20:37

## 2024-07-17 ENCOUNTER — PATIENT OUTREACH (OUTPATIENT)
Dept: CASE MANAGEMENT | Facility: OTHER | Age: 39
End: 2024-07-17

## 2024-07-17 DIAGNOSIS — Z71.89 ENCOUNTER FOR COORDINATION OF COMPLEX CARE: Primary | ICD-10-CM

## 2024-07-17 NOTE — PROGRESS NOTES
Referral received via email HRR Medicaid Report. Referred to [employer] Utilizer Care Plan Committee on 7/17/24 to be reviewed for careplan.  Will await determination of case review.     RU episode opened and and I added myself to care team.  Chart review completed.  Admission or ED visit risk score 22;  9 ED visits  in past 6 months.  Patient has an appointment scheduled with Dr. Jordana Valera on 7/25/24 to re-establish care.  Patient no-showed to previous appointments scheduled on 6/3/24 and 6/21/24.     Initial outreach call scheduled.

## 2024-07-17 NOTE — ED PROVIDER NOTES
"History  Chief Complaint   Patient presents with    Abdominal Pain     Pt reports generalized abd pain for a couple days, started to have \"severe diarrhea\" and vomiting last night.        History provided by:  Patient and significant other  Abdominal Pain  Pain location:  RUQ, RLQ, LLQ and suprapubic  Pain quality: sharp and stabbing    Pain radiates to:  Does not radiate  Pain severity:  Severe  Onset quality:  Gradual  Duration: several days.  Timing:  Constant  Progression:  Worsening  Relieved by:  Nothing  Worsened by:  Nothing  Associated symptoms: diarrhea (w/.o blood/mucus), nausea and vomiting (nbnb)    Associated symptoms: no anorexia, no chest pain, no constipation, no dysuria, no fatigue, no fever, no hematemesis, no hematochezia, no hematuria, no shortness of breath and no sore throat        Prior to Admission Medications   Prescriptions Last Dose Informant Patient Reported? Taking?   Cholecalciferol (VITAMIN D3) 1,000 units tablet   No No   Sig: Take 2 tablets (2,000 Units total) by mouth daily   OLANZapine (ZyPREXA) 20 MG tablet  Self No No   Sig: Take 1 tablet (20 mg total) by mouth daily at bedtime   OXcarbazepine (TRILEPTAL) 300 mg tablet  Self No No   Sig: Take 1 tablet (300 mg total) by mouth every 12 (twelve) hours   Ventolin  (90 Base) MCG/ACT inhaler  Self No No   Sig: Inhale 2 puffs every 4 (four) hours as needed for wheezing   budesonide-formoterol (SYMBICORT) 80-4.5 MCG/ACT inhaler  Self No No   Sig: Inhale 2 puffs 2 (two) times a day Rinse mouth after use.   dicyclomine (BENTYL) 20 mg tablet   No No   Sig: Take 1 tablet (20 mg total) by mouth every 6 (six) hours as needed (abdominal pain)   famotidine (PEPCID) 20 mg tablet   No No   Sig: Take 1 tablet (20 mg total) by mouth 2 (two) times a day as needed for heartburn   hydrOXYzine HCL (ATARAX) 50 mg tablet  Self Yes No   Sig: Take 50 mg by mouth as needed   loratadine (CLARITIN) 10 mg tablet  Self No No   Sig: Take 1 tablet (10 mg " total) by mouth daily   magnesium Oxide (MAG-OX) 400 mg TABS   No No   Sig: Take 1 tablet (400 mg total) by mouth 2 (two) times a day   psyllium (METAMUCIL) 58.6 % packet   No No   Sig: Take 1 packet by mouth daily   tiZANidine (ZANAFLEX) 2 mg tablet  Self No No   Sig: Take 1 tablet (2 mg total) by mouth daily at bedtime   vitamin B-12 (VITAMIN B-12) 1,000 mcg tablet   No No   Sig: Take 1 tablet (1,000 mcg total) by mouth daily      Facility-Administered Medications: None       Past Medical History:   Diagnosis Date    Abnormal Pap smear of cervix     ADHD (attention deficit hyperactivity disorder)     Ankle fracture     Anxiety     Arthritis     back    Asthma     Bipolar disorder (HCC)     Cellulitis     right side fac in     Chronic pain disorder     Depression     Diverticulitis     Flank pain 2016    Hallucination     Hepatitis C     Hip disease 2006    reports she had fluid removed from right hip and received treatment with antibiotic     History of abnormal cervical Pap smear     History of multiple miscarriages     IBS (irritable bowel syndrome)     Infantile idiopathic scoliosis     Joint pain     Kidney stone     Lactose intolerance     Low back pain     Myofascial pain syndrome     Peripheral neuropathy 3/28/2024    Poor dentition     Psychiatric illness     Psychosis (HCC)     PTSD (post-traumatic stress disorder)     Pyelonephritis affecting pregnancy     Right hand paresthesia     Right ovarian cyst     Scoliosis     Seizures (HCC)     Self-injurious behavior     Sleep difficulties     Slow transit constipation     Substance abuse (HCC)     Suicide attempt (HCC)        Past Surgical History:   Procedure Laterality Date     SECTION  2016     SECTION  2014    HIP SURGERY      ORTHOPEDIC SURGERY      [place]  DELIVERY ONLY N/A 2016    Procedure:  SECTION () REPEAT;  Surgeon: Kurt Connor MD;  Location: [facility];  Service: Obstetrics     "KS LIG/TRNSXJ FLP TUBE ABDL/VAG APPR UNI/BI Bilateral 11/29/2016    Procedure: LIGATION/COAGULATION TUBAL;  Surgeon: Kurt Connor MD;  Location: [facility];  Service: Obstetrics       Family History   Problem Relation Age of Onset    Heart disease Maternal Aunt     Cancer Maternal Aunt     Diabetes Paternal Aunt     No Known Problems Mother     No Known Problems Father     No Known Problems Sister      I have reviewed and agree with the history as documented.    E-Cigarette/Vaping    E-Cigarette Use Former User      E-Cigarette/Vaping Substances    Nicotine No     THC No     CBD No     Flavoring No     Other No     Unknown No      Social History     Tobacco Use    Smoking status: Every Day     Current packs/day: 0.25     Average packs/day: 0.3 packs/day for 15.0 years (3.8 ttl pk-yrs)     Types: Cigarettes     Passive exposure: Never    Smokeless tobacco: Never    Tobacco comments:     pt refused smoking cessation teaching.    Vaping Use    Vaping status: Former   Substance Use Topics    Alcohol use: Never    Drug use: Not Currently     Types: Marijuana, Cocaine, \"Crack\" cocaine     Comment: on occasion        Review of Systems   Constitutional:  Negative for activity change, appetite change, fatigue and fever.   HENT:  Negative for congestion, dental problem, ear pain, rhinorrhea and sore throat.    Eyes:  Negative for pain and redness.   Respiratory:  Negative for chest tightness, shortness of breath and wheezing.    Cardiovascular:  Negative for chest pain and palpitations.   Gastrointestinal:  Positive for abdominal pain, diarrhea (w/.o blood/mucus), nausea and vomiting (nbnb). Negative for anorexia, blood in stool, constipation, hematemesis and hematochezia.   Endocrine: Negative for cold intolerance and heat intolerance.   Genitourinary:  Negative for dysuria, frequency and hematuria.   Musculoskeletal:  Negative for arthralgias and myalgias.   Skin:  Negative for color change, pallor and rash. "   Neurological:  Negative for weakness and numbness.   Hematological:  Does not bruise/bleed easily.   Psychiatric/Behavioral:  Negative for agitation, hallucinations and suicidal ideas.        Physical Exam  Physical Exam  Constitutional:       Appearance: She is well-developed.   HENT:      Head: Normocephalic and atraumatic.   Eyes:      Extraocular Movements: EOM normal.      Pupils: Pupils are equal, round, and reactive to light.   Neck:      Vascular: No JVD.      Trachea: No tracheal deviation.   Cardiovascular:      Rate and Rhythm: Normal rate and regular rhythm.   Pulmonary:      Effort: Pulmonary effort is normal. No tachypnea, accessory muscle usage or respiratory distress.      Breath sounds: Normal breath sounds.   Abdominal:      General: There is no distension.      Tenderness: There is abdominal tenderness in the right lower quadrant, suprapubic area and left lower quadrant. There is guarding. There is no right CVA tenderness, left CVA tenderness or rebound.   Musculoskeletal:      Right lower leg: Normal.      Left lower leg: Normal.   Skin:     General: Skin is warm.      Capillary Refill: Capillary refill takes less than 2 seconds.   Neurological:      Mental Status: She is alert and oriented to person, place, and time.   Psychiatric:         Mood and Affect: Mood and affect normal.         Behavior: Behavior normal.         Vital Signs  ED Triage Vitals [07/16/24 1923]   Temperature Pulse Respirations Blood Pressure SpO2   98.2 °F (36.8 °C) (!) 110 18 130/69 97 %      Temp Source Heart Rate Source Patient Position - Orthostatic VS BP Location FiO2 (%)   Oral Monitor Sitting Right arm --      Pain Score       10 - Worst Possible Pain           Vitals:    07/16/24 1923 07/16/24 2039   BP: 130/69 114/69   Pulse: (!) 110 87   Patient Position - Orthostatic VS: Sitting Lying         Visual Acuity      ED Medications  Medications   sodium chloride 0.9 % bolus 1,000 mL (0 mL Intravenous Stopped 7/16/24  2230)   fentaNYL injection 50 mcg (50 mcg Intravenous Given 7/16/24 2037)   ondansetron (ZOFRAN) injection 4 mg (4 mg Intravenous Given 7/16/24 2036)   iohexol (OMNIPAQUE) 350 MG/ML injection (MULTI-DOSE) 100 mL (100 mL Intravenous Given 7/16/24 2108)       Diagnostic Studies  Results Reviewed       Procedure Component Value Units Date/Time    Urine Microscopic [[id number]]  (Normal) Collected: 07/16/24 2152    Lab Status: Final result Specimen: Urine, Clean Catch Updated: 07/16/24 2206     RBC, UA 1-2 /hpf      WBC, UA 1-2 /hpf      Epithelial Cells Occasional /hpf      Bacteria, UA None Seen /hpf     POCT pregnancy, urine [[id number]]  (Normal) Resulted: 07/16/24 2155    Lab Status: Final result Updated: 07/16/24 2155     EXT Preg Test, Ur Negative     Control Valid    Urine Macroscopic, POC [[id number]]  (Abnormal) Collected: 07/16/24 2152    Lab Status: Final result Specimen: Urine Updated: 07/16/24 2153     Color, UA Yellow     Clarity, UA Slightly Cloudy     pH, UA 6.5     Leukocytes, UA Negative     Nitrite, UA Negative     Protein, UA Negative mg/dl      Glucose, UA Negative mg/dl      Ketones, UA Negative mg/dl      Urobilinogen, UA 0.2 E.U./dl      Bilirubin, UA Negative     Occult Blood, UA Trace     Specific Gravity, UA <=1.005    Narrative:      CLINITEK RESULT    Comprehensive metabolic panel [[id number]]  (Abnormal) Collected: 07/16/24 1952    Lab Status: Final result Specimen: Blood from Arm, Right Updated: 07/16/24 2023     Sodium 132 mmol/L      Potassium 4.1 mmol/L      Chloride 103 mmol/L      CO2 22 mmol/L      ANION GAP 7 mmol/L      BUN 6 mg/dL      Creatinine 0.78 mg/dL      Glucose 108 mg/dL      Calcium 8.9 mg/dL      AST 25 U/L      ALT 9 U/L      Alkaline Phosphatase 60 U/L      Total Protein 7.0 g/dL      Albumin 4.2 g/dL      Total Bilirubin 0.30 mg/dL      eGFR 98 ml/min/1.73sq m     Narrative:      National Kid[employer] guidelines for Chronic Kidney Disease (CKD):     Stage 1  with normal or high GFR (GFR > 90 mL/min/1.73 square meters)    Stage 2 Mild CKD (GFR = 60-89 mL/min/1.73 square meters)    Stage 3A Moderate CKD (GFR = 45-59 mL/min/1.73 square meters)    Stage 3B Moderate CKD (GFR = 30-44 mL/min/1.73 square meters)    Stage 4 Severe CKD (GFR = 15-29 mL/min/1.73 square meters)    Stage 5 End Stage CKD (GFR <15 mL/min/1.73 square meters)  Note: GFR calculation is accurate only with a steady state creatinine    Lipase [[id number]]  (Normal) Collected: 07/16/24 1952    Lab Status: Final result Specimen: Blood from Arm, Right Updated: 07/16/24 2023     Lipase 20 u/L     CBC and differential [[id number]]  (Abnormal) Collected: 07/16/24 1957    Lab Status: Final result Specimen: Blood from Hand, Left Updated: 07/16/24 2009     WBC 11.41 Thousand/uL      RBC 4.40 Million/uL      Hemoglobin 14.0 g/dL      Hematocrit 40.2 %      MCV 91 fL      MCH 31.8 pg      MCHC 34.8 g/dL      RDW 12.1 %      MPV 10.1 fL      Platelets 222 Thousands/uL      nRBC 0 /100 WBCs      Segmented % 67 %      Immature Grans % 0 %      Lymphocytes % 25 %      Monocytes % 7 %      Eosinophils Relative 1 %      Basophils Relative 0 %      Absolute Neutrophils 7.64 Thousands/µL      Absolute Immature Grans 0.03 Thousand/uL      Absolute Lymphocytes 2.80 Thousands/µL      Absolute Monocytes 0.84 Thousand/µL      Eosinophils Absolute 0.06 Thousand/µL      Basophils Absolute 0.04 Thousands/µL                    CT abdomen pelvis with contrast   Final Result by Balbir Portillo MD (07/16 2147)      1. Diffuse mild colonic wall thickening, suggesting colitis which be inflammatory or infectious.   2. Colonic diverticulosis without evidence of diverticulitis. No bowel obstruction.   3. Mild groundglass opacity within the anterior aspect of the left lower lobe which could either be inflammatory or related to a low-grade infectious process            Workstation performed: [id number]                    Procedures  Procedures          ED Course                                               Medical Decision Making  Acute abdominal pain nausea, vomiting diarrhea-will do abdominal labs with evidence of weakness and acute kidney injury, urine dip for UTI, urine pregnancy to rule pregnancy complication, CT abdomen pelvis for acute surgical pathology such as but not limited to acute appendicitis, perforated viscus, diverticulitis, IV fluids rehydration,.  Pain meds.    Amount and/or Complexity of Data Reviewed  Labs: ordered.  Radiology: ordered.    Risk  Prescription drug management.                 Disposition  Final diagnoses:   Acute abdominal pain   Nausea vomiting and diarrhea   Dehydration   Colitis   Diverticulosis   Ground glass opacity present on imaging of lung     Time reflects when diagnosis was documented in both MDM as applicable and the Disposition within this note       Time User Action Codes Description Comment    7/16/2024 10:37 PM [place] Add [R10.9] Acute abdominal pain     7/16/2024 10:37 PM [facility] [place] J Add [R11.2,  R19.7] Nausea vomiting and diarrhea     7/16/2024 10:38 PM [facility] [place] J Add [E86.0] Dehydration     7/16/2024 10:38 PM [facility] [place] J Add [K52.9] Colitis     7/16/2024 10:38 PM Hosak[place]ree J Add [K57.90] Diverticulosis     7/16/2024 10:38 PM [place] Add [R91.8] Ground glass opacity present on imaging of lung           ED Disposition       ED Disposition   Discharge    Condition   Stable    Date/Time   Tue Jul 16, 2024 10:37 PM    Comment   Antionettekelley Downing discharge to home/self care.                   Follow-up Information       Follow up With Specialties Details Why Contact Info Additional Information    [facility] Schedule an appointment as soon as possible for a visit in 2 days  [address] [place] [id number] [phone] [facility], [address] [place] [address]  [place], [address] [phone]            Patient's Medications   Discharge Prescriptions    HYOSCYAMINE (ANASPAZ,LEVSIN) 0.125 MG TABLET    Take 1 tablet (0.125 mg total) by mouth every 4 (four) hours as needed for cramping       Start Date: 7/16/2024 End Date: --       Order Dose: 0.125 mg       Quantity: 30 tablet    Refills: 0    ONDANSETRON (ZOFRAN) 4 MG TABLET    Take 1 tablet (4 mg total) by mouth every 8 (eight) hours as needed for nausea or vomiting for up to 7 doses       Start Date: 7/16/2024 End Date: --       Order Dose: 4 mg       Quantity: 7 tablet    Refills: 0       No discharge procedures on file.    PDMP Review         Value Time User    PDMP Reviewed  Yes 2/1/2024 10:40 PM Kevin Vera Jr.,             ED Provider  Electronically Signed by             Kyree Elizondo MD  07/16/24 3558

## 2024-07-24 ENCOUNTER — APPOINTMENT (EMERGENCY)
Dept: RADIOLOGY | Facility: HOSPITAL | Age: 39
End: 2024-07-24
Payer: COMMERCIAL

## 2024-07-24 ENCOUNTER — HOSPITAL ENCOUNTER (EMERGENCY)
Facility: HOSPITAL | Age: 39
Discharge: HOME | End: 2024-07-24
Attending: EMERGENCY MEDICINE
Payer: COMMERCIAL

## 2024-07-24 VITALS
HEART RATE: 96 BPM | BODY MASS INDEX: 24.56 KG/M2 | WEIGHT: 138.67 LBS | RESPIRATION RATE: 16 BRPM | DIASTOLIC BLOOD PRESSURE: 95 MMHG | TEMPERATURE: 98.4 F | SYSTOLIC BLOOD PRESSURE: 143 MMHG | OXYGEN SATURATION: 98 %

## 2024-07-24 DIAGNOSIS — S49.91XA ARM INJURY, RIGHT, INITIAL ENCOUNTER: Primary | ICD-10-CM

## 2024-07-24 PROCEDURE — 73060 X-RAY EXAM OF HUMERUS: CPT

## 2024-07-24 PROCEDURE — 73110 X-RAY EXAM OF WRIST: CPT

## 2024-07-24 PROCEDURE — 99284 EMERGENCY DEPT VISIT MOD MDM: CPT

## 2024-07-24 PROCEDURE — 99283 EMERGENCY DEPT VISIT LOW MDM: CPT

## 2024-07-24 PROCEDURE — 73090 X-RAY EXAM OF FOREARM: CPT

## 2024-07-24 PROCEDURE — 73080 X-RAY EXAM OF ELBOW: CPT

## 2024-07-24 RX ORDER — ACETAMINOPHEN 325 MG/1
975 TABLET ORAL ONCE
Status: COMPLETED | OUTPATIENT
Start: 2024-07-24 | End: 2024-07-24

## 2024-07-24 RX ADMIN — ACETAMINOPHEN 975 MG: 325 TABLET, FILM COATED ORAL at 20:35
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
## 2024-07-25 ENCOUNTER — APPOINTMENT (OUTPATIENT)
Dept: LAB | Facility: HOSPITAL | Age: 39
End: 2024-07-25
Payer: COMMERCIAL

## 2024-07-25 ENCOUNTER — OFFICE VISIT (OUTPATIENT)
Dept: FAMILY MEDICINE CLINIC | Facility: CLINIC | Age: 39
End: 2024-07-25

## 2024-07-25 VITALS
HEIGHT: 63 IN | WEIGHT: 137.5 LBS | RESPIRATION RATE: 18 BRPM | BODY MASS INDEX: 24.36 KG/M2 | OXYGEN SATURATION: 97 % | SYSTOLIC BLOOD PRESSURE: 118 MMHG | HEART RATE: 101 BPM | TEMPERATURE: 98.3 F | DIASTOLIC BLOOD PRESSURE: 80 MMHG

## 2024-07-25 DIAGNOSIS — Z86.19 HISTORY OF HEPATITIS C: ICD-10-CM

## 2024-07-25 DIAGNOSIS — G62.9 PERIPHERAL NEUROPATHY: ICD-10-CM

## 2024-07-25 DIAGNOSIS — Z20.2 POSSIBLE EXPOSURE TO STD: Primary | ICD-10-CM

## 2024-07-25 DIAGNOSIS — G43.909 MIGRAINE HEADACHE: ICD-10-CM

## 2024-07-25 DIAGNOSIS — Z23 ENCOUNTER FOR IMMUNIZATION: ICD-10-CM

## 2024-07-25 DIAGNOSIS — J45.20 INTERMITTENT ASTHMA WITHOUT COMPLICATION, UNSPECIFIED ASTHMA SEVERITY: ICD-10-CM

## 2024-07-25 LAB
HIV 1+2 AB+HIV1 P24 AG SERPL QL IA: NORMAL
HIV 2 AB SERPL QL IA: NORMAL
HIV1 AB SERPL QL IA: NORMAL
HIV1 P24 AG SERPL QL IA: NORMAL
TREPONEMA PALLIDUM IGG+IGM AB [PRESENCE] IN SERUM OR PLASMA BY IMMUNOASSAY: NORMAL

## 2024-07-25 PROCEDURE — 90472 IMMUNIZATION ADMIN EACH ADD: CPT | Performed by: INTERNAL MEDICINE

## 2024-07-25 PROCEDURE — 90471 IMMUNIZATION ADMIN: CPT | Performed by: INTERNAL MEDICINE

## 2024-07-25 PROCEDURE — 36415 COLL VENOUS BLD VENIPUNCTURE: CPT

## 2024-07-25 PROCEDURE — 87389 HIV-1 AG W/HIV-1&-2 AB AG IA: CPT

## 2024-07-25 PROCEDURE — 87491 CHLMYD TRACH DNA AMP PROBE: CPT

## 2024-07-25 PROCEDURE — 90677 PCV20 VACCINE IM: CPT | Performed by: INTERNAL MEDICINE

## 2024-07-25 PROCEDURE — 99213 OFFICE O/P EST LOW 20 MIN: CPT | Performed by: INTERNAL MEDICINE

## 2024-07-25 PROCEDURE — 86780 TREPONEMA PALLIDUM: CPT

## 2024-07-25 PROCEDURE — 87591 N.GONORRHOEAE DNA AMP PROB: CPT

## 2024-07-25 PROCEDURE — 90632 HEPA VACCINE ADULT IM: CPT | Performed by: INTERNAL MEDICINE

## 2024-07-25 PROCEDURE — 80074 ACUTE HEPATITIS PANEL: CPT

## 2024-07-25 RX ORDER — ALBUTEROL SULFATE 90 UG/1
2 AEROSOL, METERED RESPIRATORY (INHALATION) EVERY 4 HOURS PRN
Qty: 18 G | Refills: 0 | Status: SHIPPED | OUTPATIENT
Start: 2024-07-25 | End: 2024-07-30 | Stop reason: SDUPTHER

## 2024-07-25 RX ORDER — LANOLIN ALCOHOL/MO/W.PET/CERES
1000 CREAM (GRAM) TOPICAL DAILY
Qty: 90 TABLET | Refills: 2 | Status: SHIPPED | OUTPATIENT
Start: 2024-07-25 | End: 2024-07-30 | Stop reason: SDUPTHER

## 2024-07-25 RX ORDER — LORATADINE 10 MG/1
10 TABLET ORAL DAILY
Qty: 30 TABLET | Refills: 0 | Status: SHIPPED | OUTPATIENT
Start: 2024-07-25 | End: 2024-07-30 | Stop reason: SDUPTHER

## 2024-07-25 RX ORDER — BUDESONIDE AND FORMOTEROL FUMARATE DIHYDRATE 80; 4.5 UG/1; UG/1
2 AEROSOL RESPIRATORY (INHALATION) 2 TIMES DAILY
Qty: 10.2 G | Refills: 0 | Status: SHIPPED | OUTPATIENT
Start: 2024-07-25 | End: 2024-07-30 | Stop reason: SDUPTHER

## 2024-07-25 RX ORDER — TIZANIDINE 2 MG/1
2 TABLET ORAL
Qty: 30 TABLET | Refills: 0 | Status: SHIPPED | OUTPATIENT
Start: 2024-07-25 | End: 2024-07-30 | Stop reason: SDUPTHER
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
## 2024-07-25 NOTE — ASSESSMENT & PLAN NOTE
Denies recent exacerbations.  Stable on current regimen.    Plan:  Continue Symbicort inhaler and Ventolin inhaler.
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
## 2024-07-25 NOTE — DISCHARGE INSTRUCTIONS
Patient advised to follow-up PCP for today's ED visit.  Patient was advised to return to the ED with any worsening symptoms that were explained on discharge including but not limited to chest pain, shortness of breath, irretractable vomiting or diarrhea, vision loss, loss of function, loss of sensation, syncope, hemoptysis, hematochezia, hematemesis, melena, decreased oral intake, feeling ill.     Ambulatory referral to orthopedics made.

## 2024-07-25 NOTE — PATIENT INSTRUCTIONS
If you would like to be added to the wait list for services within [facility], you will need to contact them directly at [facility] - [phone]    Outpatient Mental Health Resources     [place] Suicide Prevention Lifeline: Dial #775  If you prefer to text, you can reach the Crisis Text Line by texting “[place]” to [phone]    ¿Te encuentras en crisis? Envía un mensaje de texto con la palabra AYUDA al [phone] para comunicarte de manera gratuita con un Consejero de Crisis  Apoyo gratuito las 24 horas del día, los 7 días de la semana, al alcance de tu mano.    [facility] CRISIS for mental health emergencies and/or please go to your local Emergency Department Call [phone] if you or a loved one are in a mental health crisis.  You can Visit https://www.[facility].[place].gov/Services/Mental-Health-In-[place]/Documents/Suicide_Prevention_Hotlines.pdf to find 24/7 crisis phone line for other [facility].    [facility] Mental Health  If you have NO insurance for outpatient Mental Health services you will need to have a liability appointment with [facility] to assess you to qualify for funding. [facility] does NOT provide funding for Medications.  Please call [phone] or [phone] to schedule an intake assessment    [facility]   ? Location 1: [facility] [place] [address] [phone]   ? Location 2: [facility] [address] [place] [address] [phone]        ? English only* Serves ages 4+          ? Accepts Medicare and some commercial plans    [facility]   ? [address] [place] [address] [phone]; [phone]  ? Bilingual English/Estonian Serves ages 5+   ? Accepts Medical Assistance     [facility]   ? [address] [place] [address] [phone]   ? Bilingual English/E[place]ian Serves ages 14+   ? Accepts Medical Assistance, (Not currently accepting Medicare), & Major Commercial Insurances     [facility]   ? [address]  [place] [address] [phone]        ? Bilingual English/Mexican Serves ages 6+   ? Accepts Medical Assistance & Commercial Insurance     [facility]   ? Previous [address] location is closed  ? [address] [facility], [address] [phone]   ? Bilingual English/Mexican Serves ages 3+   ? Accepts Medical Assistance & Some Commercial Insurance     [facility]   ? [address] [place] [address] [phone]   ? Bilingual English/Mexican Serves ages 5+   ? Accepts Medical Assistance     [facility]   ? [address] [place] [address] [phone]  ? Bilingual English/Mexican Serves ages 3+   ? Accepts Medical Assistance & Some Commercial Insurance     [facility]   ? Location 1: [address] [place] [address] [phone]        ? Location 2: [address] [place] [address]   ? Bilingual English/Mexican Serves ages 18+  ? Accepts Medical Assistance    [facility], [facility]  ? [address], [place] [address] [phone]  ? Bilingual English/Mexican  ? Accepts Aetna, Cigna, [facility]um/[facility], [facility], [employer], Medicare, [employer], [facility]. No Medical Assistance    [facility] ([phone])  Medicare/Medicare Advantage, Medical Assistance/HealthChoices, Commercial, and self-pay as payment.    [facility]         ? [address], at [facility], [place] [address]; [phone]         ? Bilingual English/Mexican Serves ages 6+         ? Accepts Medical Assistance     TEEN HELP LINE  ? 7-280-113-TALK    CRIME VICTIMS [place]       ? [phone]       ? 24/7 [facility] Hotline    [facility]       ? [phone]       ? 24/7 [facility] Hotline    www.psychologytoday.[facility] is a resource to find psychotherapy providers, patients can filter therapist search list based on a number of criteria including language, specialty, gender, insurance, etc.  Individuals seeking will need to reach out to perspective providers through information in the directory. You are encouraged to contact multiple providers to given that many providers have a significant wait list for services as well as to find a provider is a good fit for you!    [facility] is an organization of families, friends and individuals whose lives have been affected by mental illness. Together, we advocate for better lives for those individuals who have a m)ental illness. Visit: [email or web address].org to learn more or to search for local support resources including qualified mental health providers.

## 2024-07-26 ENCOUNTER — PATIENT OUTREACH (OUTPATIENT)
Dept: FAMILY MEDICINE CLINIC | Facility: CLINIC | Age: 39
End: 2024-07-26

## 2024-07-26 ENCOUNTER — TELEPHONE (OUTPATIENT)
Dept: FAMILY MEDICINE CLINIC | Facility: CLINIC | Age: 39
End: 2024-07-26

## 2024-07-26 DIAGNOSIS — Z71.89 ENCOUNTER FOR COORDINATION OF COMPLEX CARE: Primary | ICD-10-CM

## 2024-07-26 LAB
C TRACH DNA SPEC QL NAA+PROBE: NEGATIVE
HAV IGM SER QL: ABNORMAL
HBV CORE IGM SER QL: ABNORMAL
HBV SURFACE AG SER QL: ABNORMAL
HCV AB SER QL: REACTIVE
N GONORRHOEA DNA SPEC QL NAA+PROBE: NEGATIVE

## 2024-07-26 NOTE — PROGRESS NOTES
Email received from High Util[employer] Committee on 7/25/24.  Committee reviewed and determined a care plan is not appropriate at this time.  Recommendation from committee is case to be referred to [facility] for review.     Referral made to OP SW CM and order entered. IB message sent to JAEL at [facility] for case communication.

## 2024-07-26 NOTE — TELEPHONE ENCOUNTER
Patient call stating that her medication refill from yesterday visit was not fill, insurance do not cover Due to the  Who sent the prescription     Please advise     budesonide-formoterol (SYMBICORT) 80-4.5 MCG/ACT inhaler     Cholecalciferol (VITAMIN D3) 1,000 units tablet     loratadine (CLARITIN) 10 mg tablet [     tiZANidine (ZANAFLEX) 2 mg tablet     Ventolin  (90 Base) MCG/ACT inhaler     vitamin B-12 (VITAMIN B-12) 1,000 mcg tablet

## 2024-07-28 ENCOUNTER — TELEPHONE (OUTPATIENT)
Dept: FAMILY MEDICINE CLINIC | Facility: CLINIC | Age: 39
End: 2024-07-28

## 2024-07-28 DIAGNOSIS — Z86.19 HISTORY OF HEPATITIS C: Primary | ICD-10-CM

## 2024-07-28 NOTE — TELEPHONE ENCOUNTER
Three attempts to contact patient regarding results.  Hepatitis panel positive for Hep C Ab.  Given her hx of chronic hep C with unknown treatment, will order Hep C PCR to determine viral load and treatment if necessary.

## 2024-07-29 NOTE — TELEPHONE ENCOUNTER
Called pt and made her aware of lab results, pt is asking if blood work has been ordered and if not can an order be placed.     Please advise, thank you !

## 2024-07-30 DIAGNOSIS — G62.9 PERIPHERAL NEUROPATHY: ICD-10-CM

## 2024-07-30 DIAGNOSIS — J45.20 INTERMITTENT ASTHMA WITHOUT COMPLICATION, UNSPECIFIED ASTHMA SEVERITY: ICD-10-CM

## 2024-07-30 DIAGNOSIS — G43.909 MIGRAINE HEADACHE: ICD-10-CM

## 2024-07-30 RX ORDER — LANOLIN ALCOHOL/MO/W.PET/CERES
1000 CREAM (GRAM) TOPICAL DAILY
Qty: 90 TABLET | Refills: 2 | Status: SHIPPED | OUTPATIENT
Start: 2024-07-30 | End: 2024-08-01 | Stop reason: SDUPTHER

## 2024-07-30 RX ORDER — LORATADINE 10 MG/1
10 TABLET ORAL DAILY
Qty: 30 TABLET | Refills: 0 | Status: SHIPPED | OUTPATIENT
Start: 2024-07-30 | End: 2024-08-23

## 2024-07-30 RX ORDER — ALBUTEROL SULFATE 90 UG/1
2 AEROSOL, METERED RESPIRATORY (INHALATION) EVERY 4 HOURS PRN
Qty: 18 G | Refills: 0 | Status: SHIPPED | OUTPATIENT
Start: 2024-07-30 | End: 2024-08-01 | Stop reason: SDUPTHER

## 2024-07-30 RX ORDER — TIZANIDINE 2 MG/1
2 TABLET ORAL
Qty: 30 TABLET | Refills: 0 | Status: SHIPPED | OUTPATIENT
Start: 2024-07-30 | End: 2024-08-01 | Stop reason: SDUPTHER

## 2024-07-30 RX ORDER — BUDESONIDE AND FORMOTEROL FUMARATE DIHYDRATE 80; 4.5 UG/1; UG/1
2 AEROSOL RESPIRATORY (INHALATION) 2 TIMES DAILY
Qty: 10.2 G | Refills: 0 | Status: SHIPPED | OUTPATIENT
Start: 2024-07-30 | End: 2024-08-01 | Stop reason: SDUPTHER

## 2024-07-31 ENCOUNTER — TELEPHONE (OUTPATIENT)
Dept: FAMILY MEDICINE CLINIC | Facility: CLINIC | Age: 39
End: 2024-07-31

## 2024-07-31 NOTE — TELEPHONE ENCOUNTER
Received call from parent/patient: patient requesting all her medications be resent to the updated pharmacy on file, [facility]. Any questions please call [phone] (home) .

## 2024-08-01 DIAGNOSIS — G62.9 PERIPHERAL NEUROPATHY: ICD-10-CM

## 2024-08-01 DIAGNOSIS — G43.909 MIGRAINE HEADACHE: ICD-10-CM

## 2024-08-01 DIAGNOSIS — J45.20 INTERMITTENT ASTHMA WITHOUT COMPLICATION, UNSPECIFIED ASTHMA SEVERITY: ICD-10-CM

## 2024-08-01 RX ORDER — TIZANIDINE 2 MG/1
2 TABLET ORAL
Qty: 30 TABLET | Refills: 0 | Status: SHIPPED | OUTPATIENT
Start: 2024-08-01 | End: 2024-08-31

## 2024-08-01 RX ORDER — BUDESONIDE AND FORMOTEROL FUMARATE DIHYDRATE 80; 4.5 UG/1; UG/1
2 AEROSOL RESPIRATORY (INHALATION) 2 TIMES DAILY
Qty: 10.2 G | Refills: 0 | Status: SHIPPED | OUTPATIENT
Start: 2024-08-01

## 2024-08-01 RX ORDER — LANOLIN ALCOHOL/MO/W.PET/CERES
1000 CREAM (GRAM) TOPICAL DAILY
Qty: 90 TABLET | Refills: 2 | Status: SHIPPED | OUTPATIENT
Start: 2024-08-01

## 2024-08-01 RX ORDER — ALBUTEROL SULFATE 90 UG/1
2 AEROSOL, METERED RESPIRATORY (INHALATION) EVERY 4 HOURS PRN
Qty: 18 G | Refills: 0 | Status: SHIPPED | OUTPATIENT
Start: 2024-08-01

## 2024-08-06 ENCOUNTER — APPOINTMENT (EMERGENCY)
Dept: CT IMAGING | Facility: HOSPITAL | Age: 39
End: 2024-08-06
Payer: COMMERCIAL

## 2024-08-06 ENCOUNTER — HOSPITAL ENCOUNTER (EMERGENCY)
Facility: HOSPITAL | Age: 39
Discharge: HOME | End: 2024-08-06
Attending: EMERGENCY MEDICINE | Admitting: EMERGENCY MEDICINE
Payer: COMMERCIAL

## 2024-08-06 ENCOUNTER — TELEPHONE (OUTPATIENT)
Age: 39
End: 2024-08-06

## 2024-08-06 VITALS
OXYGEN SATURATION: 100 % | HEIGHT: 63 IN | TEMPERATURE: 98.8 F | SYSTOLIC BLOOD PRESSURE: 118 MMHG | BODY MASS INDEX: 24.65 KG/M2 | WEIGHT: 139.11 LBS | DIASTOLIC BLOOD PRESSURE: 63 MMHG | RESPIRATION RATE: 16 BRPM | HEART RATE: 83 BPM

## 2024-08-06 DIAGNOSIS — R11.2 NAUSEA AND VOMITING: ICD-10-CM

## 2024-08-06 DIAGNOSIS — R19.7 DIARRHEA: ICD-10-CM

## 2024-08-06 DIAGNOSIS — R10.9 ABDOMINAL PAIN: Primary | ICD-10-CM

## 2024-08-06 LAB
ALBUMIN SERPL BCG-MCNC: 4.2 G/DL (ref 3.5–5)
ALP SERPL-CCNC: 57 U/L (ref 34–104)
ALT SERPL W P-5'-P-CCNC: 7 U/L (ref 7–52)
AMPHETAMINES SERPL QL SCN: NEGATIVE
ANION GAP SERPL CALCULATED.3IONS-SCNC: 9 MMOL/L (ref 4–13)
APTT PPP: 27 SECONDS (ref 23–34)
AST SERPL W P-5'-P-CCNC: 12 U/L (ref 13–39)
BARBITURATES UR QL: NEGATIVE
BASOPHILS # BLD AUTO: 0.03 THOUSANDS/ÂΜL (ref 0–0.1)
BASOPHILS NFR BLD AUTO: 0 % (ref 0–1)
BENZODIAZ UR QL: NEGATIVE
BILIRUB SERPL-MCNC: 0.34 MG/DL (ref 0.2–1)
BILIRUB UR QL STRIP: NEGATIVE
BUN SERPL-MCNC: 7 MG/DL (ref 5–25)
CALCIUM SERPL-MCNC: 9.1 MG/DL (ref 8.4–10.2)
CHLORIDE SERPL-SCNC: 103 MMOL/L (ref 96–108)
CLARITY UR: CLEAR
CO2 SERPL-SCNC: 23 MMOL/L (ref 21–32)
COCAINE UR QL: NEGATIVE
COLOR UR: YELLOW
CREAT SERPL-MCNC: 0.72 MG/DL (ref 0.6–1.3)
EOSINOPHIL # BLD AUTO: 0.09 THOUSAND/ÂΜL (ref 0–0.61)
EOSINOPHIL NFR BLD AUTO: 1 % (ref 0–6)
ERYTHROCYTE [DISTWIDTH] IN BLOOD BY AUTOMATED COUNT: 11.9 % (ref 11.6–15.1)
EXT PREGNANCY TEST URINE: NEGATIVE
EXT. CONTROL: NORMAL
FENTANYL UR QL SCN: NEGATIVE
GFR SERPL CREATININE-BSD FRML MDRD: 108 ML/MIN/1.73SQ M
GLUCOSE SERPL-MCNC: 90 MG/DL (ref 65–140)
GLUCOSE UR STRIP-MCNC: NEGATIVE MG/DL
HCT VFR BLD AUTO: 44.5 % (ref 34.8–46.1)
HGB BLD-MCNC: 15.3 G/DL (ref 11.5–15.4)
HGB UR QL STRIP.AUTO: NEGATIVE
HYDROCODONE UR QL SCN: NEGATIVE
IMM GRANULOCYTES # BLD AUTO: 0.01 THOUSAND/UL (ref 0–0.2)
IMM GRANULOCYTES NFR BLD AUTO: 0 % (ref 0–2)
INR PPP: 1.01 (ref 0.85–1.19)
KETONES UR STRIP-MCNC: NEGATIVE MG/DL
LACTATE SERPL-SCNC: 0.8 MMOL/L (ref 0.5–2)
LEUKOCYTE ESTERASE UR QL STRIP: NEGATIVE
LIPASE SERPL-CCNC: 24 U/L (ref 11–82)
LYMPHOCYTES # BLD AUTO: 2.19 THOUSANDS/ÂΜL (ref 0.6–4.47)
LYMPHOCYTES NFR BLD AUTO: 31 % (ref 14–44)
MAGNESIUM SERPL-MCNC: 1.9 MG/DL (ref 1.9–2.7)
MCH RBC QN AUTO: 31.1 PG (ref 26.8–34.3)
MCHC RBC AUTO-ENTMCNC: 34.4 G/DL (ref 31.4–37.4)
MCV RBC AUTO: 90 FL (ref 82–98)
METHADONE UR QL: NEGATIVE
MONOCYTES # BLD AUTO: 0.57 THOUSAND/ÂΜL (ref 0.17–1.22)
MONOCYTES NFR BLD AUTO: 8 % (ref 4–12)
NEUTROPHILS # BLD AUTO: 4.22 THOUSANDS/ÂΜL (ref 1.85–7.62)
NEUTS SEG NFR BLD AUTO: 60 % (ref 43–75)
NITRITE UR QL STRIP: NEGATIVE
NRBC BLD AUTO-RTO: 0 /100 WBCS
OPIATES UR QL SCN: NEGATIVE
OXYCODONE+OXYMORPHONE UR QL SCN: NEGATIVE
PCP UR QL: NEGATIVE
PH UR STRIP.AUTO: 7 [PH] (ref 4.5–8)
PLATELET # BLD AUTO: 172 THOUSANDS/UL (ref 149–390)
PMV BLD AUTO: 10.9 FL (ref 8.9–12.7)
POTASSIUM SERPL-SCNC: 3.7 MMOL/L (ref 3.5–5.3)
PROT SERPL-MCNC: 6.9 G/DL (ref 6.4–8.4)
PROT UR STRIP-MCNC: NEGATIVE MG/DL
PROTHROMBIN TIME: 13.6 SECONDS (ref 12.3–15)
RBC # BLD AUTO: 4.92 MILLION/UL (ref 3.81–5.12)
SODIUM SERPL-SCNC: 135 MMOL/L (ref 135–147)
SP GR UR STRIP.AUTO: 1.02 (ref 1–1.03)
THC UR QL: POSITIVE
UROBILINOGEN UR QL STRIP.AUTO: 0.2 E.U./DL
WBC # BLD AUTO: 7.11 THOUSAND/UL (ref 4.31–10.16)

## 2024-08-06 PROCEDURE — 74177 CT ABD & PELVIS W/CONTRAST: CPT

## 2024-08-06 PROCEDURE — 96374 THER/PROPH/DIAG INJ IV PUSH: CPT

## 2024-08-06 PROCEDURE — 81025 URINE PREGNANCY TEST: CPT | Performed by: EMERGENCY MEDICINE

## 2024-08-06 PROCEDURE — 80053 COMPREHEN METABOLIC PANEL: CPT | Performed by: EMERGENCY MEDICINE

## 2024-08-06 PROCEDURE — 36415 COLL VENOUS BLD VENIPUNCTURE: CPT | Performed by: EMERGENCY MEDICINE

## 2024-08-06 PROCEDURE — 99285 EMERGENCY DEPT VISIT HI MDM: CPT | Performed by: EMERGENCY MEDICINE

## 2024-08-06 PROCEDURE — 81003 URINALYSIS AUTO W/O SCOPE: CPT

## 2024-08-06 PROCEDURE — 85025 COMPLETE CBC W/AUTO DIFF WBC: CPT | Performed by: EMERGENCY MEDICINE

## 2024-08-06 PROCEDURE — 80307 DRUG TEST PRSMV CHEM ANLYZR: CPT | Performed by: EMERGENCY MEDICINE

## 2024-08-06 PROCEDURE — 96375 TX/PRO/DX INJ NEW DRUG ADDON: CPT

## 2024-08-06 PROCEDURE — 83690 ASSAY OF LIPASE: CPT | Performed by: EMERGENCY MEDICINE

## 2024-08-06 PROCEDURE — 83605 ASSAY OF LACTIC ACID: CPT | Performed by: EMERGENCY MEDICINE

## 2024-08-06 PROCEDURE — 99284 EMERGENCY DEPT VISIT MOD MDM: CPT

## 2024-08-06 PROCEDURE — 83735 ASSAY OF MAGNESIUM: CPT | Performed by: EMERGENCY MEDICINE

## 2024-08-06 PROCEDURE — 85610 PROTHROMBIN TIME: CPT | Performed by: EMERGENCY MEDICINE

## 2024-08-06 PROCEDURE — 85730 THROMBOPLASTIN TIME PARTIAL: CPT | Performed by: EMERGENCY MEDICINE

## 2024-08-06 PROCEDURE — 96361 HYDRATE IV INFUSION ADD-ON: CPT

## 2024-08-06 RX ORDER — LORAZEPAM 2 MG/ML
1 INJECTION INTRAMUSCULAR ONCE
Status: COMPLETED | OUTPATIENT
Start: 2024-08-06 | End: 2024-08-06

## 2024-08-06 RX ORDER — FENTANYL CITRATE 50 UG/ML
50 INJECTION, SOLUTION INTRAMUSCULAR; INTRAVENOUS ONCE
Status: COMPLETED | OUTPATIENT
Start: 2024-08-06 | End: 2024-08-06

## 2024-08-06 RX ORDER — HALOPERIDOL 5 MG/ML
2 INJECTION INTRAMUSCULAR ONCE
Status: DISCONTINUED | OUTPATIENT
Start: 2024-08-06 | End: 2024-08-06

## 2024-08-06 RX ORDER — DEXTROSE MONOHYDRATE 25 G/50ML
25 INJECTION, SOLUTION INTRAVENOUS ONCE
Status: COMPLETED | OUTPATIENT
Start: 2024-08-06 | End: 2024-08-06

## 2024-08-06 RX ORDER — ONDANSETRON 2 MG/ML
4 INJECTION INTRAMUSCULAR; INTRAVENOUS ONCE
Status: COMPLETED | OUTPATIENT
Start: 2024-08-06 | End: 2024-08-06

## 2024-08-06 RX ORDER — DIPHENHYDRAMINE HYDROCHLORIDE 50 MG/ML
25 INJECTION INTRAMUSCULAR; INTRAVENOUS ONCE
Status: DISCONTINUED | OUTPATIENT
Start: 2024-08-06 | End: 2024-08-06 | Stop reason: HOSPADM

## 2024-08-06 RX ADMIN — LORAZEPAM 1 MG: 2 INJECTION INTRAMUSCULAR; INTRAVENOUS at 19:23

## 2024-08-06 RX ADMIN — FENTANYL CITRATE 50 MCG: 50 INJECTION INTRAMUSCULAR; INTRAVENOUS at 20:19

## 2024-08-06 RX ADMIN — DEXTROSE MONOHYDRATE 25 ML: 25 INJECTION, SOLUTION INTRAVENOUS at 18:18

## 2024-08-06 RX ADMIN — ONDANSETRON 4 MG: 2 INJECTION INTRAMUSCULAR; INTRAVENOUS at 18:19

## 2024-08-06 RX ADMIN — IOHEXOL 100 ML: 350 INJECTION, SOLUTION INTRAVENOUS at 20:34

## 2024-08-06 RX ADMIN — SODIUM CHLORIDE 2000 ML: 0.9 INJECTION, SOLUTION INTRAVENOUS at 18:20

## 2024-08-06 NOTE — ED PROVIDER NOTES
History  Chief Complaint   Patient presents with    Abdominal Pain     Pt c/o abdominal pain, diarrhea, and vomiting for 4 days.     Patient is a 38-year-old female coming in today with onset of abdominal pain, nausea, vomiting and diarrhea for 4 days.  Patient states that something similar in the past and has never been told what it was.  She has a history of psychogenic nonepileptic seizures, bipolar disorder, diverticulosis, IBS and psychiatric disorder.  Patient states that she has no recent travel or sick contacts.  She has no recent antibiotic use.  She states that she took without any relief.  She has no melena or bright red blood per rectum.  She has no hematemesis or coffee-ground emesis.  Patient describes the pain as diffuse.  She has no vaginal bleeding, spotting or discharge.  She has no dysuria, urinary frequency urgency.      History provided by:  Medical records and patient  Abdominal Cramping  Pain location:  Generalized  Pain quality: cramping    Pain radiates to:  Does not radiate  Pain severity:  Mild  Onset quality:  Gradual  Duration:  4 days (4)  Timing:  Intermittent  Progression:  Waxing and waning  Chronicity:  Recurrent  Context: not alcohol use, not awakening from sleep, not diet changes, not eating, not laxative use, not medication withdrawal, not previous surgeries, not recent illness, not recent sexual activity, not recent travel, not retching, not sick contacts, not suspicious food intake and not trauma    Relieved by:  Nothing  Worsened by:  Belching  Ineffective treatments:  OTC medications and NSAIDs  Associated symptoms: anorexia, diarrhea, nausea and vomiting    Associated symptoms: no belching, no chest pain, no chills, no constipation, no cough, no dysuria, no fatigue, no fever, no flatus, no hematemesis, no hematochezia, no hematuria, no melena, no shortness of breath, no sore throat, no vaginal bleeding and no vaginal discharge    Risk factors: no alcohol abuse, no aspirin  use, not elderly, has not had multiple surgeries, no NSAID use, not obese and no recent hospitalization        Prior to Admission Medications   Prescriptions Last Dose Informant Patient Reported? Taking?   Cholecalciferol (VITAMIN D3) 1,000 units tablet   No No   Sig: Take 2 tablets (2,000 Units total) by mouth daily   OLANZapine (ZyPREXA) 20 MG tablet  Self No No   Sig: Take 1 tablet (20 mg total) by mouth daily at bedtime   OXcarbazepine (TRILEPTAL) 300 mg tablet  Self No No   Sig: Take 1 tablet (300 mg total) by mouth every 12 (twelve) hours   Ventolin  (90 Base) MCG/ACT inhaler   No No   Sig: Inhale 2 puffs every 4 (four) hours as needed for wheezing   budesonide-formoterol (SYMBICORT) 80-4.5 MCG/ACT inhaler   No No   Sig: Inhale 2 puffs 2 (two) times a day Rinse mouth after use.   dicyclomine (BENTYL) 20 mg tablet   No No   Sig: Take 1 tablet (20 mg total) by mouth every 6 (six) hours as needed (abdominal pain)   famotidine (PEPCID) 20 mg tablet   No No   Sig: Take 1 tablet (20 mg total) by mouth 2 (two) times a day as needed for heartburn   hydrOXYzine HCL (ATARAX) 50 mg tablet  Self Yes No   Sig: Take 50 mg by mouth as needed   hyoscyamine (ANASPAZ,LEVSIN) 0.125 MG tablet   No No   Sig: Take 1 tablet (0.125 mg total) by mouth every 4 (four) hours as needed for cramping   loratadine (CLARITIN) 10 mg tablet   No No   Sig: Take 1 tablet (10 mg total) by mouth daily   magnesium Oxide (MAG-OX) 400 mg TABS   No No   Sig: Take 1 tablet (400 mg total) by mouth 2 (two) times a day   tiZANidine (ZANAFLEX) 2 mg tablet   No No   Sig: Take 1 tablet (2 mg total) by mouth daily at bedtime   vitamin B-12 (VITAMIN B-12) 1,000 mcg tablet   No No   Sig: Take 1 tablet (1,000 mcg total) by mouth daily      Facility-Administered Medications: None       Past Medical History:   Diagnosis Date    Abnormal Pap smear of cervix     ADHD (attention deficit hyperactivity disorder)     Ankle fracture     Anxiety     Arthritis      back    Asthma     Bipolar disorder (HCC)     Cellulitis     right side fac in 2014    Chronic pain disorder     Depression     Diverticulitis     Flank pain 2016    Hallucination     Hepatitis C     Hip disease 2006    reports she had fluid removed from right hip and received treatment with antibiotic     History of abnormal cervical Pap smear     History of multiple miscarriages     IBS (irritable bowel syndrome)     Infantile idiopathic scoliosis     Joint pain     Kidney stone     Lactose intolerance     Low back pain     Myofascial pain syndrome     Peripheral neuropathy 3/28/2024    Poor dentition     Psychiatric illness     Psychosis (HCC)     PTSD (post-traumatic stress disorder)     Pyelonephritis affecting pregnancy     Right hand paresthesia     Right ovarian cyst     Scoliosis     Seizures (HCC)     Self-injurious behavior     Sleep difficulties     Slow transit constipation     Substance abuse (HCC)     Suicide attempt ([facility])        Past Surgical History:   Procedure Laterality Date     SECTION  2016     SECTION  2014    HIP SURGERY      ORTHOPEDIC SURGERY      CA  DELIVERY ONLY N/A 2016    Procedure:  SECTION () REPEAT;  Surgeon: Kurt Connor MD;  Location: [facility];  Service: Obstetrics    CA LIG/TRNSXJ FLP TUBE ABDL/VAG APPR UNI/BI Bilateral 2016    Procedure: LIGATION/COAGULATION TUBAL;  Surgeon: Kurt Connor MD;  Location: [facility];  Service: Obstetrics       Family History   Problem Relation Age of Onset    Heart disease Maternal Aunt     Cancer Maternal Aunt     Diabetes Paternal Aunt     No Known Problems Mother     No Known Problems Father     No Known Problems Sister      I have reviewed and agree with the history as documented.    E-Cigarette/Vaping    E-Cigarette Use Former User      E-Cigarette/Vaping Substances    Nicotine No     THC No     CBD No     Flavoring No     Other No     Unknown No      Social  "History     Tobacco Use    Smoking status: Every Day     Current packs/day: 0.25     Average packs/day: 0.3 packs/day for 15.0 years (3.8 ttl pk-yrs)     Types: Cigarettes     Passive exposure: Never    Smokeless tobacco: Never    Tobacco comments:     pt refused smoking cessation teaching.    Vaping Use    Vaping status: Former   Substance Use Topics    Alcohol use: Never    Drug use: Not Currently     Types: Marijuana, Cocaine, \"Crack\" cocaine     Comment: on occasion        Review of Systems   Constitutional: Negative.  Negative for chills, fatigue and fever.   HENT: Negative.  Negative for ear pain and sore throat.    Eyes: Negative.  Negative for pain and visual disturbance.   Respiratory: Negative.  Negative for cough and shortness of breath.    Cardiovascular: Negative.  Negative for chest pain and palpitations.   Gastrointestinal:  Positive for anorexia, diarrhea, nausea and vomiting. Negative for abdominal pain, constipation, flatus, hematemesis, hematochezia and melena.   Genitourinary: Negative.  Negative for dysuria, hematuria, vaginal bleeding and vaginal discharge.   Musculoskeletal: Negative.  Negative for arthralgias and back pain.   Skin:  Negative for color change and rash.   Neurological: Negative.  Negative for seizures and syncope.   Hematological: Negative.    Psychiatric/Behavioral: Negative.     All other systems reviewed and are negative.      Physical Exam  Physical Exam  Vitals and nursing note reviewed.   Constitutional:       General: She is awake. She is not in acute distress.     Appearance: Normal appearance. She is well-developed and normal weight.   HENT:      Head: Normocephalic and atraumatic.   Eyes:      General: Lids are normal. Gaze aligned appropriately.      Extraocular Movements: Extraocular movements intact.      Conjunctiva/sclera: Conjunctivae normal.      Pupils: Pupils are equal, round, and reactive to light.   Neck:      Trachea: Trachea and phonation normal. "   Cardiovascular:      Rate and Rhythm: Regular rhythm. Tachycardia present.      Pulses:           Radial pulses are 2+ on the right side and 2+ on the left side.        Dorsalis pedis pulses are 2+ on the right side and 2+ on the left side.      Heart sounds: Normal heart sounds, S1 normal and S2 normal. No murmur heard.  Pulmonary:      Effort: Pulmonary effort is normal. No respiratory distress.      Breath sounds: Normal breath sounds.   Abdominal:      General: Abdomen is flat. Bowel sounds are normal.      Palpations: Abdomen is soft.      Tenderness: There is generalized abdominal tenderness. There is no right CVA tenderness, left CVA tenderness, guarding or rebound. Negative signs include Herndon's sign and McBurney's sign.   Musculoskeletal:         General: No swelling.      Cervical back: Normal range of motion and neck supple.      Right lower leg: No edema.      Left lower leg: No edema.   Lymphadenopathy:      Cervical: No cervical adenopathy.   Skin:     General: Skin is warm and dry.      Capillary Refill: Capillary refill takes less than 2 seconds.   Neurological:      General: No focal deficit present.      Mental Status: She is alert and oriented to person, place, and time.      GCS: GCS eye subscore is 4. GCS verbal subscore is 5. GCS motor subscore is 6.      Cranial Nerves: Cranial nerves 2-12 are intact.      Sensory: Sensation is intact.      Motor: Motor function is intact.      Coordination: Coordination is intact.      Gait: Gait is intact.      Comments: Patient ambulated throughout the ER with a nonantalgic gait.    She has no slurred speech or facial asymmetry on my exam.  Patient can move bilateral upper extremities and lower extremity spontaneously each other and independently.  Negative pronator drift   Psychiatric:         Mood and Affect: Mood normal.         Behavior: Behavior is cooperative.         Vital Signs  ED Triage Vitals [08/06/24 1727]   Temperature Pulse Respirations  Blood Pressure SpO2   98.8 °F (37.1 °C) (!) 109 18 135/61 96 %      Temp Source Heart Rate Source Patient Position - Orthostatic VS BP Location FiO2 (%)   Temporal Monitor Sitting Right arm --      Pain Score       10 - Worst Possible Pain           Vitals:    08/06/24 1830 08/06/24 1900 08/06/24 1930 08/06/24 2108   BP: 125/81 117/59 113/68 118/63   Pulse: 85 85 77 83   Patient Position - Orthostatic VS: Lying Lying Lying Lying         Visual Acuity  Visual Acuity      Flowsheet Row Most Recent Value   L Pupil Size (mm) 4   R Pupil Size (mm) 4            ED Medications  Medications   diphenhydrAMINE (BENADRYL) injection 25 mg (25 mg Intravenous Not Given 8/6/24 1835)   sodium chloride 0.9 % bolus 2,000 mL (0 mL Intravenous Stopped 8/6/24 1926)   dextrose 50 % IV solution 25 mL (25 mL Intravenous Given 8/6/24 1818)   ondansetron (ZOFRAN) injection 4 mg (4 mg Intravenous Given 8/6/24 1819)   LORazepam (ATIVAN) injection 1 mg (1 mg Intravenous Given 8/6/24 1923)   fentaNYL injection 50 mcg (50 mcg Intravenous Given 8/6/24 2019)   iohexol (OMNIPAQUE) 350 MG/ML injection (MULTI-DOSE) 100 mL (100 mL Intravenous Given 8/6/24 2034)       Diagnostic Studies  Results Reviewed       Procedure Component Value Units Date/Time    Rapid drug screen, urine [[id number]]  (Abnormal) Collected: 08/06/24 1818    Lab Status: Final result Specimen: Urine, Clean Catch Updated: 08/06/24 1852     Amph/Meth UR Negative     Barbiturate Ur Negative     Benzodiazepine Urine Negative     Cocaine Urine Negative     Methadone Urine Negative     Opiate Urine Negative     PCP Ur Negative     THC Urine Positive     Oxycodone Urine Negative     Fentanyl Urine Negative     HYDROCODONE URINE Negative    Narrative:      Presumptive report. If requested, specimen will be sent to reference lab for confirmation.  FOR MEDICAL PURPOSES ONLY.   IF CONFIRMATION NEEDED PLEASE CONTACT THE LAB WITHIN 5 DAYS.    Drug Screen Cutoff  Levels:  AMPHETAMINE/METHAMPHETAMINES  1000 ng/mL  BARBITURATES     200 ng/mL  BENZODIAZEPINES     200 ng/mL  COCAINE      300 ng/mL  METHADONE      300 ng/mL  OPIATES      300 ng/mL  PHENCYCLIDINE     25 ng/mL  THC       50 ng/mL  OXYCODONE      100 ng/mL  FENTANYL      5 ng/mL  HYDROCODONE     300 ng/mL    Comprehensive metabolic panel [[id number]]  (Abnormal) Collected: 08/06/24 1813    Lab Status: Final result Specimen: Blood from Arm, Left Updated: 08/06/24 1851     Sodium 135 mmol/L      Potassium 3.7 mmol/L      Chloride 103 mmol/L      CO2 23 mmol/L      ANION GAP 9 mmol/L      BUN 7 mg/dL      Creatinine 0.72 mg/dL      Glucose 90 mg/dL      Calcium 9.1 mg/dL      AST 12 U/L      ALT 7 U/L      Alkaline Phosphatase 57 U/L      Total Protein 6.9 g/dL      Albumin 4.2 g/dL      Total Bilirubin 0.34 mg/dL      eGFR 108 ml/min/1.73sq m     Narrative:      National Kid[employer] Disease Foundation guidelines for Chronic Kidney Disease (CKD):     Stage 1 with normal or high GFR (GFR > 90 mL/min/1.73 square meters)    Stage 2 Mild CKD (GFR = 60-89 mL/min/1.73 square meters)    Stage 3A Moderate CKD (GFR = 45-59 mL/min/1.73 square meters)    Stage 3B Moderate CKD (GFR = 30-44 mL/min/1.73 square meters)    Stage 4 Severe CKD (GFR = 15-29 mL/min/1.73 square meters)    Stage 5 End Stage CKD (GFR <15 mL/min/1.73 square meters)  Note: GFR calculation is accurate only with a steady state creatinine    Magnesium [[id number]]  (Normal) Collected: 08/06/24 1813    Lab Status: Final result Specimen: Blood from Arm, Left Updated: 08/06/24 1851     Magnesium 1.9 mg/dL     Lipase [[id number]]  (Normal) Collected: 08/06/24 1813    Lab Status: Final result Specimen: Blood from Arm, Left Updated: 08/06/24 1851     Lipase 24 u/L     Protime-INR [[id number]]  (Normal) Collected: 08/06/24 1813    Lab Status: Final result Specimen: Blood from Arm, Left Updated: 08/06/24 1850     Protime 13.6 seconds      INR 1.01    Narrative:      INR  Therapeutic Range    Indication                                             INR Range      Atrial Fibrillation                                               2.0-3.0  Hypercoagulable State                                    2.0.2.3  Left Ventricular Asist Device                            2.0-3.0  Mechanical Heart Valve                                  -    Aortic(with afib, MI, embolism, HF, LA enlargement,    and/or coagulopathy)                                     2.0-3.0 (2.5-3.5)     Mitral                                                             2.5-3.5  Prosthetic/Bioprosthetic Heart Valve               2.0-3.0  Venous thromboembolism (VTE: VT, PE        2.0-3.0    APTT [[id number]]  (Normal) Collected: 08/06/24 1813    Lab Status: Final result Specimen: Blood from Arm, Left Updated: 08/06/24 1850     PTT 27 seconds     Lactic acid, plasma (w/reflex if result > 2.0) [[id number]]  (Normal) Collected: 08/06/24 1813    Lab Status: Final result Specimen: Blood from Arm, Left Updated: 08/06/24 1849     LACTIC ACID 0.8 mmol/L     Narrative:      Result may be elevated if tourniquet was used during collection.    CBC and differential [[id number]] Collected: 08/06/24 1813    Lab Status: Final result Specimen: Blood from Arm, Left Updated: 08/06/24 1832     WBC 7.11 Thousand/uL      RBC 4.92 Million/uL      Hemoglobin 15.3 g/dL      Hematocrit 44.5 %      MCV 90 fL      MCH 31.1 pg      MCHC 34.4 g/dL      RDW 11.9 %      MPV 10.9 fL      Platelets 172 Thousands/uL      nRBC 0 /100 WBCs      Segmented % 60 %      Immature Grans % 0 %      Lymphocytes % 31 %      Monocytes % 8 %      Eosinophils Relative 1 %      Basophils Relative 0 %      Absolute Neutrophils 4.22 Thousands/µL      Absolute Immature Grans 0.01 Thousand/uL      Absolute Lymphocytes 2.19 Thousands/µL      Absolute Monocytes 0.57 Thousand/µL      Eosinophils Absolute 0.09 Thousand/µL      Basophils Absolute 0.03 Thousands/µL     POCT pregnancy, urine  "[[id number]]  (Normal) Resulted: 08/06/24 1818    Lab Status: Final result Updated: 08/06/24 1818     EXT Preg Test, Ur Negative     Control Valid    Urine Macroscopic, POC [[id number]] Collected: 08/06/24 1815    Lab Status: Final result Specimen: Urine Updated: 08/06/24 1817     Color, UA Yellow     Clarity, UA Clear     pH, UA 7.0     Leukocytes, UA Negative     Nitrite, UA Negative     Protein, UA Negative mg/dl      Glucose, UA Negative mg/dl      Ketones, UA Negative mg/dl      Urobilinogen, UA 0.2 E.U./dl      Bilirubin, UA Negative     Occult Blood, UA Negative     Specific Gravity, UA 1.020    Narrative:      [facility] RESULT                   CT abdomen pelvis with contrast   Final Result by Kishan Alejandra MD (08/06 2101)      No evidence of acute intra-abdominal or pelvic process.         Workstation performed: [id number]                    Procedures  Procedures         ED Course  ED Course as of 08/06/24 2112   Tue Aug 06, 2024   1745   Patient is a 38-year-old female coming in today with abdominal pain associate with nausea vomiting and diarrhea over the past 4 days.  On exam she appears dry and tachycardic with diffuse abdominal pain but is nonperitoneal.  Will check labs, urine, urine pregnancy as well as give IV fluids as well as Reglan, Benadryl and dextrose  Disclosure: Voice to text software was used in the preparation of this document and could have resulted in translational errors.      Occasional wrong word or \"sound a like\" substitutions may have occurred due to the inherent limitations of voice recognition software.  Read the chart carefully and recognize, using context, where substitutions have occurred.       I have independently reviewed external records are available to me to the level of detail possible within the time constraints of my patient care responsibilities in the ED.         1907   Patient positive for THC and placed Haldol however patient declining stating this does not " help her and causes lockjaw.  Will give Ativan       2006   Patient resting in bed and updated on labs.  No evidence of endorgan damage or septicemia.  Patient states that she still has diffuse pain.  Will give dose of pain medications and wishes for CT.  Will continue with this plan and at bedside and updated on this as well       2103 Patient and  updated on CT scan.  Patient has no acute intra-abdominal pathology.  Reviewed labs with her as well.  Discussed with her follow-up with her PCP and call GI for close follow-up.  Offered Bentyl and Zofran for home however patient states that she has enough of these.  Strict return to ER instructions given as well.    Counseling: I had a detailed discussion with the patient and/or guardian regarding: the historical points, exam findings, and any diagnostic results supporting the discharge diagnosis, lab results, radiology results, discharge instructions reviewed with patient and/or family/caregiver and understanding was verbalized. Instructions given to return to the emergency department if symptoms worsen or persist, or if there are any questions or concerns that arise at home.     All imaging and/or lab testing discussed with patient, strict return to ED precautions discussed. Patient recommended to follow up promptly with appropriate outpatient provider. Patient and/or family members verbalizes understanding and agrees with plan. Patient and/or family members were given opportunity to ask questions, all questions were answered at this time. Patient is stable for discharge                                       SBIRT 20yo+      Flowsheet Row Most Recent Value   Initial Alcohol Screen: US AUDIT-C     1. How often do you have a drink containing alcohol? 0 Filed at: 08/06/2024 1836   2. How many drinks containing alcohol do you have on a typical day you are drinking?  0 Filed at: 08/06/2024 1836   3a. Male UNDER 65: How often do you have five or more drinks on one  occasion? 0 Filed at: 2024   3b. FEMALE Any Age, or MALE 65+: How often do you have 4 or more drinks on one occassion? 0 Filed at: 2024   Audit-C Score 0 Filed at: 2024   JAMEY: How many times in the past year have you...    Used an illegal drug or used a prescription medication for non-medical reasons? Never Filed at: 2024                      Medical Decision Making      Differential diagnosis includes but not limited to:  Appendicitis, viral syndrome, constipation, AMI, NSTEMI, pneumonia, pneuothorax, gerd, gastritis,  mesenteric ischemia, mesenteric adenitis, pancreatitis, cholecystitis, choledocholithiasis, hepatitis, bowel obstruction, ileus, gastroenteritis, colitis, malignancy, AAA, perforation, toxicologic poisoning, renal infarct, acute kidney injury, splenic infarct, splenic injury, nephrolithiasis, UTI, muscular strain, intra-abdominal hematoma, hernia, ovarian cyst, ovarian torsion, ectopic pregnancy, rectal prolapse, pain no rectal fistula, a no rectal fissures, hemorrhoids, perirectal abscess, threatened , PID, abruption, molar pregnancy, dislodged IUD, fibroid uterus, salpingitis, tubo-ovarian abscess, dysmenorrhea, cervicitis,      Based on history and physical concerning for gastritis versus viral pathology versus UTI versus pregnancy versus diverticulitis    Problems Addressed:  Abdominal pain: acute illness or injury  Diarrhea: acute illness or injury  Nausea and vomiting: acute illness or injury    Amount and/or Complexity of Data Reviewed  Independent Historian: spouse     Details:    at bedside      External Data Reviewed: notes.     Details: Chart review does show that patient has a history of psychogenic nonepileptic seizure follows closely with neurology.  She does have a history of bipolar disorder, hepatitis C, chronic pain who she states that she has had several episodes of similar symptoms.       Last EGD was in 2022  which showed a normal upper endoscopy.  On same day patient also had a colonoscopy which did show mild left-sided diverticulosis    Patient's last visit to GI was on May 22, 2024 with diagnosis of irritable bowel syndrome with constipation and diarrhea on Bentyl and Metamucil, and mag    Labs: ordered. Decision-making details documented in ED Course.     Details:   No anemia, thrombocytopenia or leukocytosis  No acute kidney injury or electrolyte dysfunction  Urine positive for THC.  Pregnancy negative  Lipase within normal range   coags within normal range  Lactic acid within normal range  .  UA without any evidence of infection      Radiology: ordered. Decision-making details documented in ED Course.     Details:   CT abdomen pelvis interpreted by radiologist as no acute finding        Risk  Prescription drug management.          Disposition  Final diagnoses:   Abdominal pain   Nausea and vomiting   Diarrhea     Time reflects when diagnosis was documented in both MDM as applicable and the Disposition within this note       Time User Action Codes Description Comment    8/6/2024  8:09 PM BendNa shaikh L Add [R10.9] Abdominal pain     8/6/2024  8:09 PM Bendock Na L Add [R11.2] Nausea and vomiting     8/6/2024  8:09 PM Bendock Na L Add [R19.7] Diarrhea           ED Disposition       ED Disposition   Discharge    Condition   Stable    Date/Time   Tue Aug 6, 2024 2109    Comment   Antionette Downing discharge to home/self care.                   Follow-up Information       Follow up With Specialties Details Why Contact Info Additional Information    [facility] Schedule an appointment as soon as possible for a visit in 1 week  [address] [place] [id number] [phone] [facility], [address] [place] [address], [address] [phone]    [facility]  [facility] Schedule an appointment as soon as possible for a visit in 1 week  [address] [facility] [id number] [phone] [facility], [address].  [place], [place] [address] [phone]            Patient's Medications   Discharge Prescriptions    No medications on file       No discharge procedures on file.    PDMP Review         Value Time User    PDMP Reviewed  Yes 2/1/2024 10:40 PM Kevin Vera Jr.,             ED Provider  Electronically Signed by             Na Drew DO  08/06/24 9832

## 2024-08-07 NOTE — DISCHARGE INSTRUCTIONS
Please call your family doctor as well as gastroenterologist for close follow-up.      Please stick to a bland diet such as toast, rice, pasta and avoid any fried foods, greasy foods or spicy foods

## 2024-08-09 ENCOUNTER — HOSPITAL ENCOUNTER (EMERGENCY)
Facility: HOSPITAL | Age: 39
Discharge: HOME | End: 2024-08-09
Attending: INTERNAL MEDICINE
Payer: COMMERCIAL

## 2024-08-09 ENCOUNTER — APPOINTMENT (OUTPATIENT)
Dept: LAB | Facility: HOSPITAL | Age: 39
End: 2024-08-09
Payer: COMMERCIAL

## 2024-08-09 VITALS
WEIGHT: 137.57 LBS | DIASTOLIC BLOOD PRESSURE: 67 MMHG | BODY MASS INDEX: 24.37 KG/M2 | RESPIRATION RATE: 18 BRPM | HEART RATE: 113 BPM | SYSTOLIC BLOOD PRESSURE: 121 MMHG | OXYGEN SATURATION: 98 %

## 2024-08-09 DIAGNOSIS — Z86.19 HISTORY OF HEPATITIS C: ICD-10-CM

## 2024-08-09 DIAGNOSIS — R51.9 HEADACHE: Primary | ICD-10-CM

## 2024-08-09 DIAGNOSIS — R11.2 NAUSEA AND VOMITING: ICD-10-CM

## 2024-08-09 PROCEDURE — 96375 TX/PRO/DX INJ NEW DRUG ADDON: CPT

## 2024-08-09 PROCEDURE — 99284 EMERGENCY DEPT VISIT MOD MDM: CPT | Performed by: INTERNAL MEDICINE

## 2024-08-09 PROCEDURE — 99284 EMERGENCY DEPT VISIT MOD MDM: CPT

## 2024-08-09 PROCEDURE — 36415 COLL VENOUS BLD VENIPUNCTURE: CPT

## 2024-08-09 PROCEDURE — 96365 THER/PROPH/DIAG IV INF INIT: CPT

## 2024-08-09 PROCEDURE — 87521 HEPATITIS C PROBE&RVRS TRNSC: CPT

## 2024-08-09 PROCEDURE — 86696 HERPES SIMPLEX TYPE 2 TEST: CPT

## 2024-08-09 PROCEDURE — 86695 HERPES SIMPLEX TYPE 1 TEST: CPT

## 2024-08-09 PROCEDURE — 96372 THER/PROPH/DIAG INJ SC/IM: CPT

## 2024-08-09 PROCEDURE — 87522 HEPATITIS C REVRS TRNSCRPJ: CPT

## 2024-08-09 RX ORDER — ONDANSETRON 4 MG/1
4 TABLET, ORALLY DISINTEGRATING ORAL EVERY 6 HOURS PRN
Qty: 20 TABLET | Refills: 0 | Status: ON HOLD | OUTPATIENT
Start: 2024-08-09

## 2024-08-09 RX ORDER — SUMATRIPTAN 6 MG/.5ML
6 INJECTION, SOLUTION SUBCUTANEOUS ONCE
Status: COMPLETED | OUTPATIENT
Start: 2024-08-09 | End: 2024-08-09

## 2024-08-09 RX ORDER — SENNOSIDES 8.6 MG
650 CAPSULE ORAL EVERY 8 HOURS PRN
Qty: 30 TABLET | Refills: 0 | Status: SHIPPED | OUTPATIENT
Start: 2024-08-09 | End: 2024-08-15 | Stop reason: ALTCHOICE

## 2024-08-09 RX ORDER — DEXAMETHASONE SODIUM PHOSPHATE 10 MG/ML
10 INJECTION, SOLUTION INTRAMUSCULAR; INTRAVENOUS ONCE
Status: COMPLETED | OUTPATIENT
Start: 2024-08-09 | End: 2024-08-09

## 2024-08-09 RX ORDER — DICYCLOMINE HCL 20 MG
20 TABLET ORAL ONCE
Status: COMPLETED | OUTPATIENT
Start: 2024-08-09 | End: 2024-08-09

## 2024-08-09 RX ORDER — MAGNESIUM SULFATE HEPTAHYDRATE 40 MG/ML
2 INJECTION, SOLUTION INTRAVENOUS ONCE
Status: COMPLETED | OUTPATIENT
Start: 2024-08-09 | End: 2024-08-09

## 2024-08-09 RX ORDER — METOCLOPRAMIDE HYDROCHLORIDE 5 MG/ML
10 INJECTION INTRAMUSCULAR; INTRAVENOUS ONCE
Status: COMPLETED | OUTPATIENT
Start: 2024-08-09 | End: 2024-08-09

## 2024-08-09 RX ORDER — MAGNESIUM HYDROXIDE/ALUMINUM HYDROXICE/SIMETHICONE 120; 1200; 1200 MG/30ML; MG/30ML; MG/30ML
30 SUSPENSION ORAL ONCE
Status: COMPLETED | OUTPATIENT
Start: 2024-08-09 | End: 2024-08-09

## 2024-08-09 RX ADMIN — SUMATRIPTAN 6 MG: 6 INJECTION, SOLUTION SUBCUTANEOUS at 18:21

## 2024-08-09 RX ADMIN — DEXAMETHASONE SODIUM PHOSPHATE 10 MG: 10 INJECTION INTRAMUSCULAR; INTRAVENOUS at 18:23

## 2024-08-09 RX ADMIN — METOCLOPRAMIDE 10 MG: 5 INJECTION, SOLUTION INTRAMUSCULAR; INTRAVENOUS at 18:18

## 2024-08-09 RX ADMIN — DICYCLOMINE HYDROCHLORIDE 20 MG: 20 TABLET ORAL at 19:09

## 2024-08-09 RX ADMIN — SODIUM CHLORIDE 1000 ML: 0.9 INJECTION, SOLUTION INTRAVENOUS at 18:13

## 2024-08-09 RX ADMIN — ALUMINUM HYDROXIDE, MAGNESIUM HYDROXIDE, DIMETHICONE 30 ML: 400; 400; 40 SUSPENSION ORAL at 19:09

## 2024-08-09 RX ADMIN — MAGNESIUM SULFATE HEPTAHYDRATE 2 G: 40 INJECTION, SOLUTION INTRAVENOUS at 18:24

## 2024-08-09 NOTE — Clinical Note
Antionette Downing was seen and treated in our emergency department on 8/9/2024.                Diagnosis:     Antionette  .    She may return on this date: 08/12/2024         If you have any questions or concerns, please don't hesitate to call.      Yeimi Chen MD    ______________________________           _______________          _______________  Hospital Representative                              Date                                Time

## 2024-08-12 ENCOUNTER — APPOINTMENT (EMERGENCY)
Dept: CT IMAGING | Facility: HOSPITAL | Age: 39
End: 2024-08-12
Payer: COMMERCIAL

## 2024-08-12 ENCOUNTER — HOSPITAL ENCOUNTER (EMERGENCY)
Facility: HOSPITAL | Age: 39
Discharge: HOME | End: 2024-08-12
Attending: EMERGENCY MEDICINE
Payer: COMMERCIAL

## 2024-08-12 VITALS
BODY MASS INDEX: 23.98 KG/M2 | DIASTOLIC BLOOD PRESSURE: 70 MMHG | HEIGHT: 63 IN | HEART RATE: 110 BPM | WEIGHT: 135.36 LBS | OXYGEN SATURATION: 96 % | RESPIRATION RATE: 18 BRPM | SYSTOLIC BLOOD PRESSURE: 143 MMHG

## 2024-08-12 DIAGNOSIS — S09.90XA INJURY OF HEAD, INITIAL ENCOUNTER: Primary | ICD-10-CM

## 2024-08-12 LAB
ALBUMIN SERPL BCG-MCNC: 4.2 G/DL (ref 3.5–5)
ALP SERPL-CCNC: 65 U/L (ref 34–104)
ALT SERPL W P-5'-P-CCNC: 9 U/L (ref 7–52)
ANION GAP SERPL CALCULATED.3IONS-SCNC: 8 MMOL/L (ref 4–13)
AST SERPL W P-5'-P-CCNC: 13 U/L (ref 13–39)
BASOPHILS # BLD AUTO: 0.06 THOUSANDS/ÂΜL (ref 0–0.1)
BASOPHILS NFR BLD AUTO: 1 % (ref 0–1)
BILIRUB SERPL-MCNC: 0.33 MG/DL (ref 0.2–1)
BUN SERPL-MCNC: 9 MG/DL (ref 5–25)
CALCIUM SERPL-MCNC: 9.4 MG/DL (ref 8.4–10.2)
CHLORIDE SERPL-SCNC: 109 MMOL/L (ref 96–108)
CO2 SERPL-SCNC: 21 MMOL/L (ref 21–32)
CREAT SERPL-MCNC: 0.95 MG/DL (ref 0.6–1.3)
EOSINOPHIL # BLD AUTO: 0.03 THOUSAND/ÂΜL (ref 0–0.61)
EOSINOPHIL NFR BLD AUTO: 0 % (ref 0–6)
ERYTHROCYTE [DISTWIDTH] IN BLOOD BY AUTOMATED COUNT: 12.1 % (ref 11.6–15.1)
GFR SERPL CREATININE-BSD FRML MDRD: 77 ML/MIN/1.73SQ M
GLUCOSE SERPL-MCNC: 94 MG/DL (ref 65–140)
HCT VFR BLD AUTO: 43.1 % (ref 34.8–46.1)
HCV RNA SERPL NAA+PROBE-ACNC: NOT DETECTED K[IU]/ML
HGB BLD-MCNC: 15 G/DL (ref 11.5–15.4)
IMM GRANULOCYTES # BLD AUTO: 0.04 THOUSAND/UL (ref 0–0.2)
IMM GRANULOCYTES NFR BLD AUTO: 0 % (ref 0–2)
LIPASE SERPL-CCNC: 11 U/L (ref 11–82)
LYMPHOCYTES # BLD AUTO: 2.56 THOUSANDS/ÂΜL (ref 0.6–4.47)
LYMPHOCYTES NFR BLD AUTO: 22 % (ref 14–44)
MCH RBC QN AUTO: 32.3 PG (ref 26.8–34.3)
MCHC RBC AUTO-ENTMCNC: 34.8 G/DL (ref 31.4–37.4)
MCV RBC AUTO: 93 FL (ref 82–98)
MONOCYTES # BLD AUTO: 0.8 THOUSAND/ÂΜL (ref 0.17–1.22)
MONOCYTES NFR BLD AUTO: 7 % (ref 4–12)
NEUTROPHILS # BLD AUTO: 8.05 THOUSANDS/ÂΜL (ref 1.85–7.62)
NEUTS SEG NFR BLD AUTO: 70 % (ref 43–75)
NRBC BLD AUTO-RTO: 0 /100 WBCS
PLATELET # BLD AUTO: 182 THOUSANDS/UL (ref 149–390)
PMV BLD AUTO: 11 FL (ref 8.9–12.7)
POTASSIUM SERPL-SCNC: 3.9 MMOL/L (ref 3.5–5.3)
PROT SERPL-MCNC: 7 G/DL (ref 6.4–8.4)
RBC # BLD AUTO: 4.65 MILLION/UL (ref 3.81–5.12)
SODIUM SERPL-SCNC: 138 MMOL/L (ref 135–147)
WBC # BLD AUTO: 11.54 THOUSAND/UL (ref 4.31–10.16)

## 2024-08-12 PROCEDURE — 99284 EMERGENCY DEPT VISIT MOD MDM: CPT

## 2024-08-12 PROCEDURE — 85025 COMPLETE CBC W/AUTO DIFF WBC: CPT | Performed by: EMERGENCY MEDICINE

## 2024-08-12 PROCEDURE — 70450 CT HEAD/BRAIN W/O DYE: CPT

## 2024-08-12 PROCEDURE — 99284 EMERGENCY DEPT VISIT MOD MDM: CPT | Performed by: EMERGENCY MEDICINE

## 2024-08-12 PROCEDURE — 36415 COLL VENOUS BLD VENIPUNCTURE: CPT | Performed by: EMERGENCY MEDICINE

## 2024-08-12 PROCEDURE — 83690 ASSAY OF LIPASE: CPT | Performed by: EMERGENCY MEDICINE

## 2024-08-12 PROCEDURE — 80053 COMPREHEN METABOLIC PANEL: CPT | Performed by: EMERGENCY MEDICINE

## 2024-08-12 RX ORDER — ONDANSETRON 4 MG/1
4 TABLET, ORALLY DISINTEGRATING ORAL ONCE
Status: COMPLETED | OUTPATIENT
Start: 2024-08-12 | End: 2024-08-12

## 2024-08-12 RX ADMIN — ONDANSETRON 4 MG: 4 TABLET, ORALLY DISINTEGRATING ORAL at 15:52
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
## 2024-08-12 NOTE — ED NOTES
Pt asked to leave prior to seeing crisis. Pts  waiting in the waiting room for her. Pt asked if she was comfortable leaving with  In the waiting room and if she would like to be escorted out by security. Pt states she would be fine and does not wish to be escorted out.      Maryanne Juan  08/12/24 7822
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
## 2024-08-13 ENCOUNTER — HOSPITAL ENCOUNTER (EMERGENCY)
Facility: HOSPITAL | Age: 39
Discharge: HOME | End: 2024-08-13
Attending: EMERGENCY MEDICINE
Payer: COMMERCIAL

## 2024-08-13 ENCOUNTER — APPOINTMENT (EMERGENCY)
Dept: CT IMAGING | Facility: HOSPITAL | Age: 39
End: 2024-08-13
Attending: EMERGENCY MEDICINE
Payer: COMMERCIAL

## 2024-08-13 ENCOUNTER — APPOINTMENT (EMERGENCY)
Dept: CT IMAGING | Facility: HOSPITAL | Age: 39
End: 2024-08-13
Payer: COMMERCIAL

## 2024-08-13 VITALS
DIASTOLIC BLOOD PRESSURE: 72 MMHG | SYSTOLIC BLOOD PRESSURE: 124 MMHG | HEART RATE: 94 BPM | BODY MASS INDEX: 23.82 KG/M2 | OXYGEN SATURATION: 98 % | TEMPERATURE: 98.3 F | RESPIRATION RATE: 19 BRPM | WEIGHT: 134.48 LBS

## 2024-08-13 DIAGNOSIS — K57.92 DIVERTICULITIS: Primary | ICD-10-CM

## 2024-08-13 DIAGNOSIS — F31.9 BIPOLAR DISORDER (HCC): ICD-10-CM

## 2024-08-13 LAB
ALBUMIN SERPL BCG-MCNC: 4 G/DL (ref 3.5–5)
ALP SERPL-CCNC: 62 U/L (ref 34–104)
ALT SERPL W P-5'-P-CCNC: 8 U/L (ref 7–52)
ANION GAP SERPL CALCULATED.3IONS-SCNC: 7 MMOL/L (ref 4–13)
AST SERPL W P-5'-P-CCNC: 12 U/L (ref 13–39)
BASOPHILS # BLD AUTO: 0.04 THOUSANDS/ÂΜL (ref 0–0.1)
BASOPHILS NFR BLD AUTO: 0 % (ref 0–1)
BILIRUB SERPL-MCNC: 0.42 MG/DL (ref 0.2–1)
BUN SERPL-MCNC: 10 MG/DL (ref 5–25)
CALCIUM SERPL-MCNC: 9 MG/DL (ref 8.4–10.2)
CHLORIDE SERPL-SCNC: 108 MMOL/L (ref 96–108)
CO2 SERPL-SCNC: 24 MMOL/L (ref 21–32)
CREAT SERPL-MCNC: 0.86 MG/DL (ref 0.6–1.3)
EOSINOPHIL # BLD AUTO: 0.15 THOUSAND/ÂΜL (ref 0–0.61)
EOSINOPHIL NFR BLD AUTO: 1 % (ref 0–6)
ERYTHROCYTE [DISTWIDTH] IN BLOOD BY AUTOMATED COUNT: 12.1 % (ref 11.6–15.1)
GFR SERPL CREATININE-BSD FRML MDRD: 87 ML/MIN/1.73SQ M
GLUCOSE SERPL-MCNC: 86 MG/DL (ref 65–140)
HCT VFR BLD AUTO: 44.8 % (ref 34.8–46.1)
HGB BLD-MCNC: 15.4 G/DL (ref 11.5–15.4)
IMM GRANULOCYTES # BLD AUTO: 0.04 THOUSAND/UL (ref 0–0.2)
IMM GRANULOCYTES NFR BLD AUTO: 0 % (ref 0–2)
LIPASE SERPL-CCNC: 9 U/L (ref 11–82)
LYMPHOCYTES # BLD AUTO: 3.06 THOUSANDS/ÂΜL (ref 0.6–4.47)
LYMPHOCYTES NFR BLD AUTO: 23 % (ref 14–44)
MCH RBC QN AUTO: 31.2 PG (ref 26.8–34.3)
MCHC RBC AUTO-ENTMCNC: 34.4 G/DL (ref 31.4–37.4)
MCV RBC AUTO: 91 FL (ref 82–98)
MONOCYTES # BLD AUTO: 1.17 THOUSAND/ÂΜL (ref 0.17–1.22)
MONOCYTES NFR BLD AUTO: 9 % (ref 4–12)
NEUTROPHILS # BLD AUTO: 9.03 THOUSANDS/ÂΜL (ref 1.85–7.62)
NEUTS SEG NFR BLD AUTO: 67 % (ref 43–75)
NRBC BLD AUTO-RTO: 0 /100 WBCS
PLATELET # BLD AUTO: 176 THOUSANDS/UL (ref 149–390)
PMV BLD AUTO: 10.9 FL (ref 8.9–12.7)
POTASSIUM SERPL-SCNC: 3.7 MMOL/L (ref 3.5–5.3)
PROT SERPL-MCNC: 6.6 G/DL (ref 6.4–8.4)
RBC # BLD AUTO: 4.93 MILLION/UL (ref 3.81–5.12)
S PYO DNA THROAT QL NAA+PROBE: NOT DETECTED
SODIUM SERPL-SCNC: 139 MMOL/L (ref 135–147)
WBC # BLD AUTO: 13.49 THOUSAND/UL (ref 4.31–10.16)

## 2024-08-13 PROCEDURE — 70450 CT HEAD/BRAIN W/O DYE: CPT

## 2024-08-13 PROCEDURE — 83690 ASSAY OF LIPASE: CPT | Performed by: EMERGENCY MEDICINE

## 2024-08-13 PROCEDURE — 99284 EMERGENCY DEPT VISIT MOD MDM: CPT

## 2024-08-13 PROCEDURE — 36415 COLL VENOUS BLD VENIPUNCTURE: CPT | Performed by: EMERGENCY MEDICINE

## 2024-08-13 PROCEDURE — 85025 COMPLETE CBC W/AUTO DIFF WBC: CPT | Performed by: EMERGENCY MEDICINE

## 2024-08-13 PROCEDURE — 80053 COMPREHEN METABOLIC PANEL: CPT | Performed by: EMERGENCY MEDICINE

## 2024-08-13 PROCEDURE — 87651 STREP A DNA AMP PROBE: CPT | Performed by: EMERGENCY MEDICINE

## 2024-08-13 PROCEDURE — 96361 HYDRATE IV INFUSION ADD-ON: CPT

## 2024-08-13 PROCEDURE — 99285 EMERGENCY DEPT VISIT HI MDM: CPT | Performed by: EMERGENCY MEDICINE

## 2024-08-13 PROCEDURE — 72125 CT NECK SPINE W/O DYE: CPT

## 2024-08-13 PROCEDURE — 74177 CT ABD & PELVIS W/CONTRAST: CPT

## 2024-08-13 PROCEDURE — 96374 THER/PROPH/DIAG INJ IV PUSH: CPT

## 2024-08-13 RX ORDER — LORAZEPAM 2 MG/ML
INJECTION INTRAMUSCULAR
Status: DISCONTINUED
Start: 2024-08-13 | End: 2024-08-13 | Stop reason: WASHOUT

## 2024-08-13 RX ORDER — DIAZEPAM 10 MG/2ML
2.5 INJECTION, SOLUTION INTRAMUSCULAR; INTRAVENOUS ONCE
Status: COMPLETED | OUTPATIENT
Start: 2024-08-13 | End: 2024-08-13

## 2024-08-13 RX ORDER — ACETAMINOPHEN 325 MG/1
975 TABLET ORAL ONCE
Status: COMPLETED | OUTPATIENT
Start: 2024-08-13 | End: 2024-08-13

## 2024-08-13 RX ADMIN — SODIUM CHLORIDE 1000 ML: 0.9 INJECTION, SOLUTION INTRAVENOUS at 14:28

## 2024-08-13 RX ADMIN — DIAZEPAM 2.5 MG: 10 INJECTION, SOLUTION INTRAMUSCULAR; INTRAVENOUS at 14:32

## 2024-08-13 RX ADMIN — AMOXICILLIN AND CLAVULANATE POTASSIUM 1 TABLET: 875; 125 TABLET, FILM COATED ORAL at 18:11

## 2024-08-13 RX ADMIN — ACETAMINOPHEN 975 MG: 325 TABLET, FILM COATED ORAL at 18:11

## 2024-08-13 RX ADMIN — IOHEXOL 100 ML: 350 INJECTION, SOLUTION INTRAVENOUS at 17:25
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
## 2024-08-13 NOTE — ED NOTES
"Myself and TORRIE Lara called to patient's room. Pt found to be lying on floor in fetal position answering questions appropriately. Pupils PERRLA. Pt assisted back to bed. Pt then became rigid. Provider called to room. When Dr. Watson arrived pt immediately alert and oriented talking to provider about her pain being \"all over.\" Vitals taken, stable. Per provider pt has history of pseudo-seizures. Pt moved to ED 42 when she can be monitored more closely. Bed alarm placed and on.      Kasia Donis RN  08/13/24 1637       Kasia Donis RN  08/13/24 1637    " none

## 2024-08-13 NOTE — ED NOTES
"Registration team stating visitor (significant) requesting to go back. This RN spoke with patient regarding privacy status. Patient states she does not want any visitors back. Patient states \"he is lying to you\" when asked about her significant other attempting to come back. Patient informed we will not be allowing any visitors back for her safety and the team's safety. Patient agrees with this plan. This RN spoke with visitor in the waiting room who was attempting to obtain information on patient's status. Visitor informed no information will be released and we would not be allowing visitors at this time. Visitor left waiting area with no issue. Security was in sight.      Shannon Ortega RN  08/13/24 8368    "

## 2024-08-13 NOTE — DISCHARGE INSTRUCTIONS
This writer discussed the patients current presentation and recommended discharge plan with Dr. Riddle.  They agree with the patient being discharged at this time with the plan in place for the patient to attend the appointment with her established OP treatment team on 8/16/24.     The patient was Instructed to follow up with their PCP, Psychiatrist, Therapist.     This writer and the patient completed a safety plan.  The patient was provided with a copy of their safety plan with encouragement to utilize the plan following discharge.     In addition, the patient was instructed to call [facility] crisis, other crisis services, [facility] or to go to the nearest ER immediately if their situation changes at any time.     This writer discussed discharge plans with the patient, who agrees with and understands the discharge plans.         SAFETY PLAN  Warning Signs (thoughts, images, mood, behavior, situations) of a potential crisis: Increasing depression or anxiety, any thoughts to harm self or others, feeling unsafe in your space or situation      Coping Skills (what can I do to take my mind off the problem, or to keep myself safe): Take a walk, talk with friends, preferred activities      Outside Support (who can I reach out to for support and help): Therapist, psychiatrist, friends, family        [place] Suicide Prevention Hotline:  [address] [place] [address] [phone] - Crisis   [facility] [phone] - [facility] Crisis/Mobile Crisis   [phone] - [facility] Crisis   [facility]: [phone]  [facility]: [phone]   [facility] [phone] - Crisis   [facility] [phone] - Crisis     [facility] [phone] - Crisis   [facility] [phone] - Crisis   [phone] - Peer Support Talk Line (1-9pm daily)  [phone] - Teen Support Talk Line (1-9pm daily)  [phone] - [facility]   [facility] [phone]- Crisis    [facility] [phone] - Crisis   [facility] [phone] - Crisis     [facility] [phone] - [facility] Crisis
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
## 2024-08-14 ENCOUNTER — HOSPITAL ENCOUNTER (INPATIENT)
Facility: HOSPITAL | Age: 39
LOS: 8 days | Discharge: HOME | DRG: 750 | End: 2024-08-23
Attending: EMERGENCY MEDICINE | Admitting: PSYCHIATRY & NEUROLOGY
Payer: COMMERCIAL

## 2024-08-14 DIAGNOSIS — R10.9 ACUTE ABDOMINAL PAIN: ICD-10-CM

## 2024-08-14 DIAGNOSIS — G43.909 MIGRAINE HEADACHE: ICD-10-CM

## 2024-08-14 DIAGNOSIS — R10.13 EPIGASTRIC PAIN: ICD-10-CM

## 2024-08-14 DIAGNOSIS — F31.2 BIPOLAR I DISORDER, MOST RECENT EPISODE MANIC, SEVERE WITH PSYCHOTIC FEATURES (HCC): Chronic | ICD-10-CM

## 2024-08-14 DIAGNOSIS — Z00.8 MEDICAL CLEARANCE FOR PSYCHIATRIC ADMISSION: ICD-10-CM

## 2024-08-14 DIAGNOSIS — R45.850 HOMICIDAL IDEATIONS: ICD-10-CM

## 2024-08-14 DIAGNOSIS — F22 PARANOIA (HCC): ICD-10-CM

## 2024-08-14 DIAGNOSIS — Z00.8 EVALUATION BY PSYCHIATRIC SERVICE REQUIRED: ICD-10-CM

## 2024-08-14 DIAGNOSIS — K21.9 GASTROESOPHAGEAL REFLUX DISEASE WITHOUT ESOPHAGITIS: ICD-10-CM

## 2024-08-14 DIAGNOSIS — R11.2 NAUSEA AND VOMITING: ICD-10-CM

## 2024-08-14 DIAGNOSIS — J45.20 INTERMITTENT ASTHMA WITHOUT COMPLICATION, UNSPECIFIED ASTHMA SEVERITY: ICD-10-CM

## 2024-08-14 DIAGNOSIS — K58.2 IRRITABLE BOWEL SYNDROME WITH BOTH CONSTIPATION AND DIARRHEA: ICD-10-CM

## 2024-08-14 DIAGNOSIS — G62.9 PERIPHERAL NEUROPATHY: ICD-10-CM

## 2024-08-14 DIAGNOSIS — F25.0 SCHIZOAFFECTIVE DISORDER, BIPOLAR TYPE (HCC): Primary | ICD-10-CM

## 2024-08-14 LAB
AMPHETAMINES SERPL QL SCN: NEGATIVE
BARBITURATES UR QL: NEGATIVE
BENZODIAZ UR QL: POSITIVE
COCAINE UR QL: NEGATIVE
ETHANOL EXG-MCNC: 0 MG/DL
EXT PREGNANCY TEST URINE: NEGATIVE
EXT. CONTROL: NORMAL
FENTANYL UR QL SCN: NEGATIVE
HYDROCODONE UR QL SCN: NEGATIVE
METHADONE UR QL: NEGATIVE
OPIATES UR QL SCN: NEGATIVE
OXYCODONE+OXYMORPHONE UR QL SCN: NEGATIVE
PCP UR QL: NEGATIVE
THC UR QL: POSITIVE

## 2024-08-14 PROCEDURE — 99284 EMERGENCY DEPT VISIT MOD MDM: CPT

## 2024-08-14 PROCEDURE — 96372 THER/PROPH/DIAG INJ SC/IM: CPT

## 2024-08-14 PROCEDURE — 99245 OFF/OP CONSLTJ NEW/EST HI 55: CPT | Performed by: PSYCHIATRY & NEUROLOGY

## 2024-08-14 PROCEDURE — 80307 DRUG TEST PRSMV CHEM ANLYZR: CPT | Performed by: PHYSICIAN ASSISTANT

## 2024-08-14 PROCEDURE — 82075 ASSAY OF BREATH ETHANOL: CPT | Performed by: PHYSICIAN ASSISTANT

## 2024-08-14 PROCEDURE — 81025 URINE PREGNANCY TEST: CPT

## 2024-08-14 PROCEDURE — 99285 EMERGENCY DEPT VISIT HI MDM: CPT | Performed by: PHYSICIAN ASSISTANT

## 2024-08-14 PROCEDURE — 99255 IP/OBS CONSLTJ NEW/EST HI 80: CPT | Performed by: PSYCHIATRY & NEUROLOGY

## 2024-08-14 RX ORDER — OXCARBAZEPINE 300 MG/1
300 TABLET, FILM COATED ORAL EVERY 12 HOURS SCHEDULED
Status: DISCONTINUED | OUTPATIENT
Start: 2024-08-14 | End: 2024-08-20

## 2024-08-14 RX ORDER — OLANZAPINE 10 MG/2ML
10 INJECTION, POWDER, FOR SOLUTION INTRAMUSCULAR ONCE
Status: COMPLETED | OUTPATIENT
Start: 2024-08-14 | End: 2024-08-14

## 2024-08-14 RX ORDER — LORAZEPAM 2 MG/ML
2 INJECTION INTRAMUSCULAR ONCE
Status: COMPLETED | OUTPATIENT
Start: 2024-08-14 | End: 2024-08-14

## 2024-08-14 RX ORDER — MIDAZOLAM HYDROCHLORIDE 2 MG/2ML
4 INJECTION, SOLUTION INTRAMUSCULAR; INTRAVENOUS ONCE
Status: COMPLETED | OUTPATIENT
Start: 2024-08-14 | End: 2024-08-14

## 2024-08-14 RX ORDER — ACETAMINOPHEN 325 MG/1
650 TABLET ORAL EVERY 6 HOURS PRN
Status: DISCONTINUED | OUTPATIENT
Start: 2024-08-14 | End: 2024-08-15

## 2024-08-14 RX ORDER — LORAZEPAM 1 MG/1
2 TABLET ORAL ONCE
Status: DISCONTINUED | OUTPATIENT
Start: 2024-08-14 | End: 2024-08-15

## 2024-08-14 RX ADMIN — ACETAMINOPHEN 650 MG: 325 TABLET ORAL at 17:18

## 2024-08-14 RX ADMIN — OLANZAPINE 10 MG: 10 INJECTION, POWDER, LYOPHILIZED, FOR SOLUTION INTRAMUSCULAR at 10:50

## 2024-08-14 RX ADMIN — LORAZEPAM 2 MG: 2 INJECTION INTRAMUSCULAR; INTRAVENOUS at 16:33

## 2024-08-14 RX ADMIN — OXCARBAZEPINE 300 MG: 300 TABLET, FILM COATED ORAL at 21:02

## 2024-08-14 RX ADMIN — AMOXICILLIN AND CLAVULANATE POTASSIUM 1 TABLET: 875; 125 TABLET, FILM COATED ORAL at 21:02

## 2024-08-14 RX ADMIN — AMOXICILLIN AND CLAVULANATE POTASSIUM 1 TABLET: 875; 125 TABLET, FILM COATED ORAL at 13:53

## 2024-08-14 RX ADMIN — MIDAZOLAM HYDROCHLORIDE 4 MG: 1 INJECTION, SOLUTION INTRAMUSCULAR; INTRAVENOUS at 10:38

## 2024-08-14 NOTE — CONSULTS
PSYCHIATRY CONSULTATION NOTE    Antionette Downing 38 y.o. female MRN: [id number]  Unit/Bed#: FT 01 Encounter: [id number]     Assessment & Plan     Assessment     Antionette Downing is a 38 y.o. female, , lives at home with  and children, with history of bipolar 1 disorder with psychotic features, who initially presented to the [place] ED escorted by [place] [employer] for paranoia and bizarre and disorganized behavior.  Psychiatric consultation was requested by Dr. Sarmiento for management of psychiatric symptoms. On interview the patient is volatile, emotionally labile, and perseverative on apparently being tormented by her .  At times during interview she will deny suicidality and homicidality, but within within the minute will endorse feelings of not wanting to be alive and wanting to kill others. Patient unable to consent 201 and presents a danger both to herself and others, and is also unable to care for self.  Because of this a 302 was filed by police.  She is currently refusing p.o. medications, and has received IM injections of Versed 4 mg and Zyprexa 10 mg.  We agree with these choices of IM medications but caution that she should not exceed 30 mg of Zyprexa in a 24-hour period as this is the upper limit of the medication per the FDA.  Please reach out for any concerns or questions about management.  We will continue to follow.        Principal Psychiatric Problem:  Bipolar 1 disorder with psychotic features (HCC)  Differential includes but is not limited to: mood disorder, Schizoaffective Disorder, and PTSD    Active Problems:  There are no active Hospital Problems.      Plan     Treatment Recommendations     Discussed plan with primary team as follows:  Hospital labs reviewed.  Collaborate with collaterals for baseline assessment and disposition as indicated.  302 for involuntary admission has been filed, awaiting inpatient psychiatry placement  Recommend no changes  "to psychotropic medications at this time  Continue medical management per primary team.  Observation level: In-person 1:1 continual observation  We will continue to follow.  Please reach out via [employer] secure chat for any questions or concerns.  Please reach out to on-call Psychiatry team via [employer], or if after hours/Fri/Sat/Sunday contact on-call psychiatric service via [employer] ([phone]) with any questions or concerns.    Risks, benefits and possible side effects of Medications:   Patient does not verbalize understanding at this time and will require further explanation.    Unable to participate in interview     History of Present Illness      Provider Requesting Consult: Dr. Sarmiento  Reason for Consult / Principal Problem: Psychosis    Chief Complaint: \"My  killed me\"    Antionette Downing is a 38 y.o. female, with history of bipolar 1 disorder, PTSD, substance use disorder, functional neurological disorder, psychogenic nonepileptic seizures, who initially presented to the [place] ED via [place] [employer] for disorganized thoughts, paranoia, and bizarre behaviors.    Per provider note by Griselda Miranda on 8/14:         Patient arrived by [place] [employer]. The patient presented as paranoid with poor reality-testing. Since arrival, she has had periods in which she is nonverbal, alternating with periods of agitation, screaming profanities and combative behavior. She is unable to understand why she is here, and does not appear to acknowledge treatment is necessary. Police are willing to petition for an involuntary commitment. Antionette Stafford from [facility], [phone], is en route to collect their statement.          Symptoms preceding psychiatry consultation included combative behaviors, paranoia, bizarre behaviors, emotional lability and volatility.  In the ED she was reportedly bashing her head against the wall and fighting staff.  She apparently threw a mattress.      The " "patient is a poor historian at present due to her psychosis.  This writer contacted  for collateral (Maxwell Downing, [phone]) and chart review in addition to the patient interview for information contained below.    Per , onset of symptoms was abrupt starting \"a few months ago\" ago with slowly worsening course since that time.  Has been reports that for the past few months, the patient has been \"not sleeping, verbally combative, starting fights with neighbors\".  She has reportedly not been compliant with medications recently.   says that this has been going on for a while, where she will be on psychiatric medications from a mental health provider, and then reports some symptoms or side effects, and eventually discontinue the medication and decompensate.      The patient has multiple stressors in her life, including childhood trauma in which she was present for the death of her brother in a car accident.  Also, on August 5, her father passed away, and  described this as a major stressor.  She and her  have been together for 13 years, and  for 2.  He describes substance issues in the patient's father, who is no longer living, and \"mental issues\" and her mother, who is reportedly living undomiciled and [place].  Up until August 5 of this year, they have had 2 children living at home (1 reportedly from prior to marriage with current ).  Reportedly also on August 5 they lost custody of their children and their children are no longer living at home.     On initial evaluation, Antionette is labile and in physical restraints.  She alternates between outbursts of aggression, stating \"I want to kill everybody\", and tearfulness when talking about her  \"tormenting her\".  He \"breaks my body\", she says.  She also suspects him of being unfaithful stating \"he is cheating in front of my kids\".  She initially denies suicidality, but then reports \"I feel like dying\", \"I wish I " "were dead\". She then proceeds to say \"my  killed me in this hospital\".  She similarly will deny and then endorse homicidal ideation.  Patient denies current auditory hallucinations. She denies current visual hallucinations.  However, she does appear to be responding to internal stimuli during the interview.  She is extremely untrustworthy of hospital staff, believing that we are here to harm her. The interview was concluded by the patient, who screamed \"I do not want to talk to anyone anymore.\"    Per collateral the patient does not have access to firearms.    Medical Review Of Systems:  Pertinent items are noted in HPI.      Psychiatric Review of Systems     Sleep: Per collateral, decreased  Loss of Interest/Anhedonia: Unable to assess  Guilt/hopeless: Unable to assess  Low energy/anergy: Unable to assess  Poor Concentration: Unable to assess  Appetite changes: Unable to assess  Weight changes: Unable to assess  Somatic symptoms: Unable to assess  Anxiety/panic: Unable to assess  Katy: Unable to assess  Self injurious behavior/risky behavior: yes, per collateral patient has a history of cutting  Trauma: yes.  Multiple sources of trauma including mental illnesses and parents, witnessed fatality of brother in a car accident   If yes: Unable to assess for symptoms during this interview, but they are likely present      Historical Information      Past Psychiatric History:   Past Inpatient Psychiatric management:   Yes, collateral able to specify  Past Outpatient Psychiatric management:   Yes, per chart review appears to have started following [facility] outpatient psychiatry in March 2024  Past Medication trials:   Topiramate, paliperidone tablet, tizanidine, Trileptal, Abilify, Zyprexa  Past Suicide attempts:   Per collateral, attempted overdose 5 to 7 years ago  History of non-suicidal self injury:   Per collateral, has a long history of cutting behaviors  Past Violent behavior:   Per collateral, " "yes  Substance Abuse History:    Social History       Tobacco History       Smoking Status  Every Day Current Packs/Day  0.3 packs/day Average Packs/Day  0.3 packs/day for 15.0 years (3.8 ttl pk-yrs) Smoking Tobacco Type  Cigarettes   Pack Year History     Packs/Day From To Years    0.25   15.0      Passive Exposure  Never      Smokeless Tobacco Use  Never      Tobacco Comments  pt refused smoking cessation teaching.               Alcohol History       Alcohol Use Status  Never              Drug Use       Drug Use Status  Not Currently Types  \"Crack\" cocaine, Cocaine, Marijuana Comment  on occasion               Sexual Activity       Sexually Active  Yes Partners  Male Birth Control/Protection  Female Sterilization              Activities of Daily Living    Not Asked                 Additional Substance Use Detail       Questions Responses    Problems Due to Past Use of Alcohol? No    Problems Due to Past Use of Substances? No    Substance Use Assessment Substance use within the past 12 months    Alcohol Use Frequency Denies use in past 12 months    Cannabis frequency 1-2 times/week    Comment: Past abuse ->1-2 times/week on 12/8/2019     Cocaine frequency Past rare use    Comment:  Past occasional use on 4/26/2024 Past rare use on 4/26/2024     Crack Cocaine Frequency Denies use in past 12 months    Methamphetamine Frequency Denies use in past 12 months    Cocaine method Snort    Comment: Snort on 12/8/2019     Cocaine Longest Abstinence UDS positive for cocaine in the past    Narcotic Frequency Experimented    Benzodiazepine Frequency Denies use in past 12 months    Amphetamine frequency Denies use in past 12 months    Barbituate Frequency Denies use use in past 12 months    Inhalant frequency Never used    Comment:  Denies use in past 12 months -> Never used on 2/1/2023     Hallucinogen frequency Never used    Comment:  Denies use in past 12 months -> Never used on 2/1/2023     Ecstasy frequency Never used    " "Comment:  Denies use past 12 months -> Never used on 2/1/2023     Other drug frequency Never used    Comment:  Denies use in past 12 months -> Never used on 2/1/2023     Opiate frequency Denies use in past 12 months    Last reviewed by Renetta Carrion RN on 8/14/2024          I have assessed this patient for substance use within the past 12 months. Information obtained from collateral  Alcohol use: rarely  Recreational drug use:   Collaterals unsure of what drugs the patient used, however reports that she has been sober for 5 years.    History of Inpatient/Outpatient rehabilitation program: Collateral is unsure  Smoking history: 1ppd  Use of caffeine: Did not ask    Family Psychiatric History:   Psychiatric Illness: \"Mental issues\" in mother, who apparently is homeless and [place]  Substance Abuse: Fentanyl use and father  Suicide Attempts: None reported    Social History:  Education: 12th grade  Learning Disabilities: None  Marital history:   Children: 2, but the patient has more children from prior to this marriage per collateral.   and patient lost custody of the children on August 5.  They have a court date coming up on August 20 with child services.  Living Arrangement: Lives in a house in [place] with   Access to firearms: Denies, per collateral  Occupational History: Relies on  for income  Functioning Relationships: None besides .  Legal History: Has been in detention multiple times when younger for escamilla crimes   History: none reported  Other Pertinent History: Father passed away on August 5, 9 days ago      Traumatic History:   Abuse: Patient unable to respond to questions about trauma due to her mental state  Other Traumatic Events: none reported    Past Medical History:   Diagnosis Date    Abnormal Pap smear of cervix     ADHD (attention deficit hyperactivity disorder)     Ankle fracture     Anxiety     Arthritis     back    Asthma     Bipolar disorder " (HCC)     Cellulitis     right side fac in 2014    Chronic pain disorder     Depression     Diverticulitis     Flank pain 08/16/2016    Hallucination     Hepatitis C     Hip disease 2006    reports she had fluid removed from right hip and received treatment with antibiotic     History of abnormal cervical Pap smear     History of multiple miscarriages     IBS (irritable bowel syndrome)     Infantile idiopathic scoliosis     Joint pain     Kidney stone     Lactose intolerance     Low back pain     Myofascial pain syndrome     Peripheral neuropathy 3/28/2024    Poor dentition     Psychiatric illness     Psychosis ([facility])     PTSD (post-traumatic stress disorder)     Pyelonephritis affecting pregnancy     Right hand paresthesia     Right ovarian cyst     Scoliosis     Seizures ([facility])     Self-injurious behavior     Sleep difficulties     Slow transit constipation     Substance abuse ([facility])     Suicide attempt ([facility])      History of Seizures: Yes, per chart review nonepileptic  History of Head injury with loss of consciousness: no    Meds/Allergies     Allergies   Allergen Reactions    Aspirin      Pt given enteric coated 81 mg, when ask pt denies any allergy, listed under allergies on paperwork provided by berny Hernández - Food Allergy Itching    Naproxen     Toradol [Ketorolac Tromethamine] GI Bleeding    Azithromycin Rash    Latex Hives and Rash    Neurontin [Gabapentin] Rash and GI Bleeding    Ppd [Tuberculin Purified Protein Derivative] Rash         Objective      Vital signs in last 24 hours:  Temp:  [98.5 °F (36.9 °C)] 98.5 °F (36.9 °C)  HR:  [107] 107  Resp:  [22] 22  BP: (123)/(79) 123/79  No intake or output data in the 24 hours ending 08/14/24 1300    Mental Status Evaluation:    Appearance:  disheveled, looks older than stated age, in restraints   Behavior:  bizarre, uncooperative, restless and fidgety, does not want to talk   Speech:  loud, disorganized, perseverative   Mood:  Declines to answer   Affect:   labile   Language: unable to assess   Thought Process:  disorganized, flooding of thoughts, perseverative, impaired abstract reasoning   Associations: perseveration   Thought Content:  paranoid ideation, preoccupied   Perceptual Disturbances: denies auditory hallucinations when asked, appears responding to internal stimuli, denies visual hallucinations when asked   Risk Potential: Suicidal ideation - Yes, without plan  Homicidal ideation - Yes, without plan  Potential for aggression - Yes, due to agitation   Sensorium:  oriented to person and place   Memory:  recent and remote memory grossly intact   Consciousness:  alert and awake   Attention/Concentration: attention span and concentration appear shorter than expected for age   Intellect: average   Fund of Knowledge: Not assessed   Insight:  poor   Judgment: poor   Muscle Strength:   Muscle Tone: normal  normal   Gait/Station: normal gait/station   Motor Activity: no abnormal movements             Risk Assessment     Risk of Harm to Self:   The following ratings are based on assessment at the time of the interview  Demographic risk factors include:   Historical Risk Factors include: chronic psychiatric problems, chronic psychotic symptoms, history of suicide attempt, history of self-abusive behavior, history of substance use, victim of abuse, history of violence  Current Specific Risk Factors include: poor reasoning, lack of support, health problems, substance use, unable to contract for safety if discharged, ongoing psychotic symptoms  Protective Factors:   Weapons/Firearms: none and no firearms. The following steps have been taken to ensure weapons are properly secured: not applicable  Based on today's assessment, Antionette presents the following risk of harm to self: moderate    Risk of Harm to Others:  The following ratings are based on assessment at the time of the interview  Demographic Risk Factors include: under age 40.  Historical Risk  Factors include: history of violence, history of aggressive behavior, drug abuse, prior arrest, history of substance use.  Current Specific Risk Factors include: recent thoughts to harm others, chronic psychotic symptoms, recent history of violent behavior, current aggressive behavior, current agitation, does not respond to redirection, current homicidal ideation, current psychotic symptoms  Protective Factors: no current protective factors  Weapons/Firearms: none. The following steps have been taken to ensure weapons are properly secured: not applicable  Based on today's assessment, Antionette presents the following risk of harm to others: minimal         ACTIVE MEDICATIONS     Current Medications:      Behavioral Health Medications: I have personally reviewed all current active medications. Changes as in plan section above.      ADDITIONAL DATA     EKG Results: I have personally reviewed pertinent reports.  QMr=060 on 5/26/24  No results found for this visit on 08/14/24 (from the past 1000 hour(s)).    Radiology Results: I have personally reviewed pertinent reports. I have personally reviewed pertinent films in PACS.  No results found.  No Chest XR results available for this patient.     Laboratory Results: I have personally reviewed all pertinent laboratory/tests results.  Recent Results (from the past 48 hour(s))   CBC and differential    Collection Time: 08/12/24  3:53 PM   Result Value Ref Range    WBC 11.54 (H) 4.31 - 10.16 Thousand/uL    RBC 4.65 3.81 - 5.12 Million/uL    Hemoglobin 15.0 11.5 - 15.4 g/dL    Hematocrit 43.1 34.8 - 46.1 %    MCV 93 82 - 98 fL    MCH 32.3 26.8 - 34.3 pg    MCHC 34.8 31.4 - 37.4 g/dL    RDW 12.1 11.6 - 15.1 %    MPV 11.0 8.9 - 12.7 fL    Platelets 182 149 - 390 Thousands/uL    nRBC 0 /100 WBCs    Segmented % 70 43 - 75 %    Immature Grans % 0 0 - 2 %    Lymphocytes % 22 14 - 44 %    Monocytes % 7 4 - 12 %    Eosinophils Relative 0 0 - 6 %    Basophils Relative 1 0 - 1 %    Absolute  Neutrophils 8.05 (H) 1.85 - 7.62 Thousands/µL    Absolute Immature Grans 0.04 0.00 - 0.20 Thousand/uL    Absolute Lymphocytes 2.56 0.60 - 4.47 Thousands/µL    Absolute Monocytes 0.80 0.17 - 1.22 Thousand/µL    Eosinophils Absolute 0.03 0.00 - 0.61 Thousand/µL    Basophils Absolute 0.06 0.00 - 0.10 Thousands/µL   Comprehensive metabolic panel    Collection Time: 08/12/24  3:53 PM   Result Value Ref Range    Sodium 138 135 - 147 mmol/L    Potassium 3.9 3.5 - 5.3 mmol/L    Chloride 109 (H) 96 - 108 mmol/L    CO2 21 21 - 32 mmol/L    ANION GAP 8 4 - 13 mmol/L    BUN 9 5 - 25 mg/dL    Creatinine 0.95 0.60 - 1.30 mg/dL    Glucose 94 65 - 140 mg/dL    Calcium 9.4 8.4 - 10.2 mg/dL    AST 13 13 - 39 U/L    ALT 9 7 - 52 U/L    Alkaline Phosphatase 65 34 - 104 U/L    Total Protein 7.0 6.4 - 8.4 g/dL    Albumin 4.2 3.5 - 5.0 g/dL    Total Bilirubin 0.33 0.20 - 1.00 mg/dL    eGFR 77 ml/min/1.73sq m   Lipase    Collection Time: 08/12/24  3:53 PM   Result Value Ref Range    Lipase 11 11 - 82 u/L   CBC and differential    Collection Time: 08/13/24  2:29 PM   Result Value Ref Range    WBC 13.49 (H) 4.31 - 10.16 Thousand/uL    RBC 4.93 3.81 - 5.12 Million/uL    Hemoglobin 15.4 11.5 - 15.4 g/dL    Hematocrit 44.8 34.8 - 46.1 %    MCV 91 82 - 98 fL    MCH 31.2 26.8 - 34.3 pg    MCHC 34.4 31.4 - 37.4 g/dL    RDW 12.1 11.6 - 15.1 %    MPV 10.9 8.9 - 12.7 fL    Platelets 176 149 - 390 Thousands/uL    nRBC 0 /100 WBCs    Segmented % 67 43 - 75 %    Immature Grans % 0 0 - 2 %    Lymphocytes % 23 14 - 44 %    Monocytes % 9 4 - 12 %    Eosinophils Relative 1 0 - 6 %    Basophils Relative 0 0 - 1 %    Absolute Neutrophils 9.03 (H) 1.85 - 7.62 Thousands/µL    Absolute Immature Grans 0.04 0.00 - 0.20 Thousand/uL    Absolute Lymphocytes 3.06 0.60 - 4.47 Thousands/µL    Absolute Monocytes 1.17 0.17 - 1.22 Thousand/µL    Eosinophils Absolute 0.15 0.00 - 0.61 Thousand/µL    Basophils Absolute 0.04 0.00 - 0.10 Thousands/µL   Strep A PCR    Collection  Time: 08/13/24  2:29 PM    Specimen: Throat   Result Value Ref Range    STREP A PCR Not Detected Not Detected   Comprehensive metabolic panel    Collection Time: 08/13/24  3:33 PM   Result Value Ref Range    Sodium 139 135 - 147 mmol/L    Potassium 3.7 3.5 - 5.3 mmol/L    Chloride 108 96 - 108 mmol/L    CO2 24 21 - 32 mmol/L    ANION GAP 7 4 - 13 mmol/L    BUN 10 5 - 25 mg/dL    Creatinine 0.86 0.60 - 1.30 mg/dL    Glucose 86 65 - 140 mg/dL    Calcium 9.0 8.4 - 10.2 mg/dL    AST 12 (L) 13 - 39 U/L    ALT 8 7 - 52 U/L    Alkaline Phosphatase 62 34 - 104 U/L    Total Protein 6.6 6.4 - 8.4 g/dL    Albumin 4.0 3.5 - 5.0 g/dL    Total Bilirubin 0.42 0.20 - 1.00 mg/dL    eGFR 87 ml/min/1.73sq m   Lipase    Collection Time: 08/13/24  3:33 PM   Result Value Ref Range    Lipase 9 (L) 11 - 82 u/L        Code Status: Prior  Advance Directive and Living Will:      Power of : Yes  POLST:        This note was not shared with the patient due to reasonable likelihood of causing patient harm      This note has been constructed in part using a voice recognition system.   There may be translation, syntax,  or grammatical errors. If you have any questions, please contact the dictating provider.    Arvind Walters MD  Department of Psychiatry and Behavioral Health  [facility]

## 2024-08-14 NOTE — ED PROVIDER NOTES
"History  Chief Complaint   Patient presents with    Mental Health Problem     Pt brought in by [employer] after she herself called. During triage pt psychotic, fists shut tight, unable to maintain eye contact, fast breathing. Pt denies SI/HI/AH/VH.      38-year-old female, presenting via police for psychiatric evaluation, police report the patient called 911 for herself as she is concerned about her  injuring her and she reports this paranoia regarding her safety at home she denies suicidality.  She initially was hyperventilating and was screaming expletives and statements which did not logically make sense.  Police at bedside report that she was making homicidal statements and are willing to remain in the emergency department until [place] crisis arrives to pursue a 302 due to concerns for her own safety and the safety of others.  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  She reports she is not currently taking psychiatric medications that are prescribed to her.          Prior to Admission Medications   Prescriptions Last Dose Informant Patient Reported? Taking?   Cholecalciferol (VITAMIN D3) 1,000 units tablet   No No   Sig: Take 2 tablets (2,000 Units total) by mouth daily   OLANZapine (ZyPREXA) 20 MG tablet  Self No No   Sig: Take 1 tablet (20 mg total) by mouth daily at bedtime   OXcarbazepine (TRILEPTAL) 300 mg tablet  Self No No   Sig: Take 1 tablet (300 mg total) by mouth every 12 (twelve) hours   Ventolin  (90 Base) MCG/ACT inhaler   No No   Sig: Inhale 2 puffs every 4 (four) hours as needed for wheezing   acetaminophen (TYLENOL) 650 mg CR tablet   No No   Sig: Take 1 tablet (650 mg total) by mouth every 8 (eight) hours as needed for mild pain   amoxicillin-clavulanate (AUGMENTIN) 875-125 mg per tablet 8/13/2024  No Yes   Sig: Take 1 tablet by mouth every 12 (twelve) hours for 7 days "   budesonide-formoterol (SYMBICORT) 80-4.5 MCG/ACT inhaler   No No   Sig: Inhale 2 puffs 2 (two) times a day Rinse mouth after use.   dicyclomine (BENTYL) 20 mg tablet   No No   Sig: Take 1 tablet (20 mg total) by mouth every 6 (six) hours as needed (abdominal pain)   famotidine (PEPCID) 20 mg tablet   No No   Sig: Take 1 tablet (20 mg total) by mouth 2 (two) times a day as needed for heartburn   hydrOXYzine HCL (ATARAX) 50 mg tablet  Self Yes No   Sig: Take 50 mg by mouth as needed   hyoscyamine (ANASPAZ,LEVSIN) 0.125 MG tablet   No No   Sig: Take 1 tablet (0.125 mg total) by mouth every 4 (four) hours as needed for cramping   loratadine (CLARITIN) 10 mg tablet   No No   Sig: Take 1 tablet (10 mg total) by mouth daily   magnesium Oxide (MAG-OX) 400 mg TABS   No No   Sig: Take 1 tablet (400 mg total) by mouth 2 (two) times a day   ondansetron (ZOFRAN-ODT) 4 mg disintegrating tablet   No No   Sig: Take 1 tablet (4 mg total) by mouth every 6 (six) hours as needed for nausea or vomiting   tiZANidine (ZANAFLEX) 2 mg tablet   No No   Sig: Take 1 tablet (2 mg total) by mouth daily at bedtime   vitamin B-12 (VITAMIN B-12) 1,000 mcg tablet   No No   Sig: Take 1 tablet (1,000 mcg total) by mouth daily      Facility-Administered Medications: None       Past Medical History:   Diagnosis Date    Abnormal Pap smear of cervix     ADHD (attention deficit hyperactivity disorder)     Alcohol abuse     Ankle fracture     Anxiety     Arthritis     back    Asthma     Bipolar disorder (HCC)     Cellulitis     right side fac in 2014    Chronic pain disorder     Depression     Diverticulitis     Flank pain 08/16/2016    Hallucination     Hepatitis C     Hip disease 2006    reports she had fluid removed from right hip and received treatment with antibiotic     History of abnormal cervical Pap smear     History of multiple miscarriages     IBS (irritable bowel syndrome)     Infantile idiopathic scoliosis     Joint pain     Kidney stone      "Lactose intolerance     Low back pain     Myofascial pain syndrome     Peripheral neuropathy 2024    Poor dentition     Psychiatric illness     Psychosis (HCC)     PTSD (post-traumatic stress disorder)     Pyelonephritis affecting pregnancy     Right hand paresthesia     Right ovarian cyst     Scoliosis     Seizures (HCC)     Self-injurious behavior     Sleep difficulties     Slow transit constipation     Substance abuse (HCC)     Suicide attempt (HCC)        Past Surgical History:   Procedure Laterality Date     SECTION  2016     SECTION  2014    HIP SURGERY      ORTHOPEDIC SURGERY      PA  DELIVERY ONLY N/A 2016    Procedure:  SECTION () REPEAT;  Surgeon: Kurt Connor MD;  Location: [facility] ;  Service: Obstetrics    PA LIG/TRNSXJ FLP TUBE ABDL/VAG APPR UNI/BI Bilateral 2016    Procedure: LIGATION/COAGULATION TUBAL;  Surgeon: Kurt Connor MD;  Location: [facility];  Service: Obstetrics       Family History   Problem Relation Age of Onset    Heart disease Maternal Aunt     Cancer Maternal Aunt     Diabetes Paternal Aunt     No Known Problems Mother     No Known Problems Father     No Known Problems Sister      I have reviewed and agree with the history as documented.    E-Cigarette/Vaping    E-Cigarette Use Former User      E-Cigarette/Vaping Substances    Nicotine No     THC No     CBD No     Flavoring No     Other No     Unknown No      Social History     Tobacco Use    Smoking status: Every Day     Current packs/day: 0.25     Average packs/day: 0.3 packs/day for 15.0 years (3.8 ttl pk-yrs)     Types: Cigarettes     Passive exposure: Never    Smokeless tobacco: Never    Tobacco comments:     pt refused smoking cessation teaching.    Vaping Use    Vaping status: Former   Substance Use Topics    Alcohol use: Not Currently     Comment: Rare Use    Drug use: Not Currently     Types: Marijuana, Cocaine, \"Crack\" cocaine     Comment: on " occasion        Review of Systems   Reason unable to perform ROS: Patient is acting erratically and is unable to maintain concentration for full conversations and does not answer review of systems questions.   Psychiatric/Behavioral:  Positive for agitation, behavioral problems and dysphoric mood. The patient is hyperactive.        Physical Exam  Physical Exam  Vitals and nursing note reviewed.   Constitutional:       Appearance: She is well-developed.   HENT:      Head: Normocephalic.   Eyes:      General: No scleral icterus.  Cardiovascular:      Rate and Rhythm: Normal rate and regular rhythm.   Pulmonary:      Effort: Pulmonary effort is normal.      Breath sounds: Normal breath sounds. No stridor.   Abdominal:      General: There is no distension.      Palpations: Abdomen is soft.      Tenderness: There is no abdominal tenderness.   Musculoskeletal:         General: Normal range of motion.   Skin:     General: Skin is warm and dry.      Capillary Refill: Capillary refill takes less than 2 seconds.   Neurological:      Mental Status: She is alert and oriented to person, place, and time.   Psychiatric:         Mood and Affect: Mood and affect normal.      Comments: Agitated, hyperactive, aggressive, yelling,          Vital Signs  ED Triage Vitals [08/14/24 1013]   Temperature Pulse Respirations Blood Pressure SpO2   98.5 °F (36.9 °C) (!) 107 22 123/79 97 %      Temp Source Heart Rate Source Patient Position - Orthostatic VS BP Location FiO2 (%)   Tympanic Monitor Lying Left arm --      Pain Score       --           Vitals:    08/14/24 1013   BP: 123/79   Pulse: (!) 107   Patient Position - Orthostatic VS: Lying         Visual Acuity      ED Medications  Medications   amoxicillin-clavulanate (AUGMENTIN) 875-125 mg per tablet 1 tablet (1 tablet Oral Given 8/14/24 1353)   LORazepam (ATIVAN) tablet 2 mg (has no administration in time range)   OXcarbazepine (TRILEPTAL) tablet 300 mg (has no administration in time  range)   acetaminophen (TYLENOL) tablet 650 mg (has no administration in time range)   OLANZapine (ZyPREXA) IM injection 10 mg (10 mg Intramuscular Given 8/14/24 1050)   midazolam (VERSED) injection 4 mg (4 mg Intramuscular Given 8/14/24 1038)   LORazepam (ATIVAN) injection 2 mg (2 mg Intramuscular Given 8/14/24 1633)       Diagnostic Studies  Results Reviewed       Procedure Component Value Units Date/Time    Rapid drug screen, urine [[id number]]  (Abnormal) Collected: 08/14/24 1412    Lab Status: Final result Specimen: Urine, Catheter Updated: 08/14/24 1433     Amph/Meth UR Negative     Barbiturate Ur Negative     Benzodiazepine Urine Positive     Cocaine Urine Negative     Methadone Urine Negative     Opiate Urine Negative     PCP Ur Negative     THC Urine Positive     Oxycodone Urine Negative     Fentanyl Urine Negative     HYDROCODONE URINE Negative    Narrative:      Presumptive report. If requested, specimen will be sent to reference lab for confirmation.  FOR MEDICAL PURPOSES ONLY.   IF CONFIRMATION NEEDED PLEASE CONTACT THE LAB WITHIN 5 DAYS.    Drug Screen Cutoff Levels:  AMPHETAMINE/METHAMPHETAMINES  1000 ng/mL  BARBITURATES     200 ng/mL  BENZODIAZEPINES     200 ng/mL  COCAINE      300 ng/mL  METHADONE      300 ng/mL  OPIATES      300 ng/mL  PHENCYCLIDINE     25 ng/mL  THC       50 ng/mL  OXYCODONE      100 ng/mL  FENTANYL      5 ng/mL  HYDROCODONE     300 ng/mL    POCT alcohol breath test [[id number]]  (Normal) Resulted: 08/14/24 1410    Lab Status: Final result Updated: 08/14/24 1410     EXTBreath Alcohol 0.000                   No orders to display              Procedures  Procedures         ED Course  ED Course as of 08/14/24 1714   Wed Aug 14, 2024   1039 [facility] has arrived to discuss 302 with police were at bedside.   1627 Patient is experiencing agitation again - Ativan 2mg ordered                                 SBIRT 22yo+      Flowsheet Row Most Recent Value   Initial Alcohol Screen: US  "AUDIT-C     1. How often do you have a drink containing alcohol? 0 Filed at: 08/14/2024 1035   2. How many drinks containing alcohol do you have on a typical day you are drinking?  0 Filed at: 08/14/2024 1035   3b. FEMALE Any Age, or MALE 65+: How often do you have 4 or more drinks on one occassion? 0 Filed at: 08/14/2024 1035   Audit-C Score 0 Filed at: 08/14/2024 1035   JAMEY: How many times in the past year have you...    Used an illegal drug or used a prescription medication for non-medical reasons? Never Filed at: 08/14/2024 1035                      Medical Decision Making  Patient arrives with police for psychiatric evaluation and was reportedly making suicidal homicidal statements and arrives making confused statements, yelling, aggressive and unwilling to answer questions from this provider, crisis was consulted and [facility] arrived to discuss case with police.  During consultation with crisis counselor patient became aggressive and combative and attempted to leave prompting both physical and chemical restraint necessity.    Police pursued 302 with [facility], upheld by Dr. Sarmiento -patient awaiting psychiatric consultation.    Patient signed out to colleague pending psychiatric evaluation and disposition.     Portions of the record may have been created with voice recognition software. Occasional wrong word or \"sound a like\" substitutions may have occurred due to the inherent limitations of voice recognition software. Read the chart carefully and recognize, using context, where substitutions have occurred.    Amount and/or Complexity of Data Reviewed  Labs: ordered.    Risk  OTC drugs.  Prescription drug management.  Decision regarding hospitalization.                 Disposition  Final diagnoses:   Paranoia (HCC)   Homicidal ideations   Evaluation by psychiatric service required     Time reflects when diagnosis was documented in both MDM as applicable and the Disposition within this note       Time " User Action Codes Description Comment    8/14/2024 10:48 AM Suzette Javier [F22] Paranoia (HCC)     8/14/2024 10:48 AM Suzette Javier [R45.850] Homicidal ideations     8/14/2024 10:48 AM Suzette Javier [Z00.8] Evaluation by psychiatric service required           ED Disposition       ED Disposition   Transfer to Behavioral Health Condition   --    Date/Time   Wed Aug 14, 2024 1048    Comment   Antionette Downing should be transferred out to behavioral health and has been medically cleared.               MD Documentation      Flowsheet Row Most Recent Value   Sending MD Bladimir Ramos MD          Follow-up Information    None         Patient's Medications   Discharge Prescriptions    No medications on file       No discharge procedures on file.    PDMP Review         Value Time User    PDMP Reviewed  Yes 2/1/2024 10:40 PM Kevin Vera Jr., DO            ED Provider  Electronically Signed by             Suzette Javier PA-C  08/14/24 6041
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
## 2024-08-14 NOTE — RESTRAINT FACE TO FACE
Restraint Face to Face   Antionette Downing 38 y.o. female MRN: [id number]  Unit/Bed#: FT 01 Encounter: [id number]      Physical Evaluation - homicidal/paranoid - requiring 302 involuntary psychiatric evaluation / potential admission  Purpose for Restraints/ Seclusion High risk for self harm, High risk for causing significant disruption of treatment environment , High risk for harm to others, and high risk for flight  Patient's reaction to the intervention - patient aggressive, agitated, self harming / slamming head on bed, unable to verbally de-escalate - required chemical restraint as well  Patient's medical condition psychiatric evaluation - no additional medical concern  Patient's Behavioral condition - agitated, aggressive, verbally abusive to staff - attempting to self harm / slamming head.   Restraints to be Continued

## 2024-08-14 NOTE — ED NOTES
Pt started yelling, punching the wall and banging her head on the wall. [employer], security and nursing staff present at bedside. Pt not cooperative, restraints applied. Pt moved to a room.      Renetta Carrion RN  08/14/24 1038

## 2024-08-14 NOTE — LETTER
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 Her psychosis was somewhat improved, and she no longer believes her  is trying to kill her. She was able to voice understanding that she will be transferred to the inpatient unit. She wishes staff to know that she has a custody hearing on August 20th. Her 303 hearing is set for tomorrow at 8 am.    Principal Psychiatric Problem: Psychosis      Active Problems:  There are no active Hospital Problems.      Plan  Discussed plan with primary team as follows:  302 has been filed, 303 hearing set for 8 am 8/16/24.  Recommend no changes to psychiatric medications at this time, will defer to inpatient psychiatry management  Recommend behavioral health 15 minute checks for the patient's safety.  Please reach out to on-call Psychiatry team via [email or web address] Chat, or if after hours/Fri/Sat/Sunday contact on-call psychiatric service via Index ([phone]) with any questions or concerns.    ------------------------------------------------------------    Subjective:     Patient was seen and evaluated at bedside today for continuity of care. Patient was more cooperative with interview today. Antionette was somnolent but able to answer basic questions. Yesterday, she was convinced that her  wanted to kill her, but when asked if this was still true, she  "responds \"he doesn't love me\". This writer questioned the patient about her recent stressors this month but the patient was not interested in engaging on this.     She denied suicidal and homicidal ideation. She denies auditory and visual hallucinations. She does not feel that others are out to get her. She endorsed feelings of depression that are directly linked to her stressors. Patient did not respond when questioned about her mood.      Psychiatric Review of Systems:  Behavior over the last 24 hours: improved  Sleep: improving  Appetite: adequate  Medication side effects: none verbalized  ROS: Complete review of systems is negative except as noted above.    Vital signs in last 24 hours:  Temp:  [98.1 °F (36.7 °C)-98.6 °F (37 °C)] 98.1 °F (36.7 °C)  HR:  [72-88] 88  Resp:  [16-18] 16  BP: ()/(54-64) 102/64    Mental Status Exam:  Appearance:  drowsy, poor eye contact, appears older than stated age, casually dressed, and appropriate grooming and hygiene   Behavior:  calm, guarded, and laying in bed   Motor: no abnormal movements and normal gait and balance   Speech:  spontaneous, dysarthric, slow, and coherent   Mood:  Patient declined to answer   Affect:  irritable   Thought Process:  organized, goal directed   Thought Content: no verbalized delusions or overt paranoia   Perceptual disturbances: no reported hallucinations and does not appear to be responding to internal stimuli at this time   Risk Potential: No active or passive suicidal or homicidal ideation was verbalized during interview   Cognition: oriented to self and situation, appears to be of average intelligence, and cognition not formally tested   Insight:  Limited   Judgment: Limited     Current Medications:  All current medications have been reviewed.  Current Facility-Administered Medications   Medication Dose Route Frequency Provider Last Rate   • acetaminophen  650 mg Oral Q6H PRN Laura Lau DO     • acetaminophen  650 mg Oral Q4H " [facility]     • acetaminophen  975 mg Oral Q6H PRN [facility]     • albuterol  2 puff Inhalation Q4H PRN [facility]     • aluminum-magnesium hydroxide-simethicone  30 mL Oral Q4H PRN [facility]     • amoxicillin-clavulanate  1 tablet Oral Q12H OSIEL [facility]     • benztropine  1 mg Intramuscular BID PRN [facility]     • benztropine  1 mg Oral BID PRN [facility]     • budesonide-formoterol  2 puff Inhalation BID [facility]     • Cholecalciferol  2,000 Units Oral Daily [facility]     • vitamin B-12  1,000 mcg Oral Daily [facility] DO     • dicyclomine  20 mg Oral Q6H PRN [facility] DO     • hydrOXYzine HCL  50 mg Oral Q6H PRN Max 4/day [facility], DO      Or   • diphenhydrAMINE  50 mg Intramuscular Q6H PRN [facility] DO     • famotidine  20 mg Oral BID PRN [facility] DO     • hydrOXYzine HCL  100 mg Oral Q6H PRN Max 4/day [facility] DO      Or   • LORazepam  2 mg Intramuscular Q6H PRN [facility] DO     • hydrOXYzine HCL  25 mg Oral Q6H PRN Max 4/day [facility] DO     • hyoscyamine  0.125 mg Sublingual Q4H PRN [facility] DO     • loratadine  10 mg Oral Daily [facility] DO     • LORazepam  2 mg Intramuscular BID PRN [facility]     • magnesium Oxide  400 mg Oral BID [facility] DO     • melatonin  3 mg Oral HS PRN [facility]     • nicotine  1 patch Transdermal Daily [facility]     • OLANZapine  10 mg Oral Q3H PRN Max 3/day Laura Sid, DO      Or   • OLANZapine  10 mg Intramuscular Q3H PRN Max 3/day Laura Sid, DO     • OLANZapine  5 mg Oral Q3H PRN Max 6/day Laura Sid, DO      Or   • OLANZapine  5 mg Intramuscular Q3H PRN Max 6/day Laura Sid, DO     • OLANZapine  2.5 mg Oral Q3H PRN Max 8/day Laura Sid, DO      • ondansetron  4 mg Oral Q6H PRN Laura Sid, DO     • OXcarbazepine  300 mg Oral Q12H OSIEL Laura Sid, DO     • polyethylene glycol  17 g Oral Daily PRN Laura Sid, DO     • propranolol  10 mg Oral Q8H PRN Laura Sid, DO     • senna-docusate sodium  1 tablet Oral Daily PRN Laura Sid, DO     • tiZANidine  2 mg Oral HS Laura Sid, DO         Laboratory results:  I have personally reviewed all pertinent laboratory/tests results.  UDS: positive for benzodiazepine and THC    Arvind Walters MD 08/15/24  Psychiatry Residency, PGY-1  Attestation signed by Jordan C Holter, DO at 8/16/2024  6:22 AM:  RESIDENT ATTESTATION: I have reviewed all required components of this note as performed and documented by Dr. Walters. I personally interviewed and performed all the required portions of the psychiatric evaluation in addition to examining the patient on 08/15/24. I discussed the case with Dr. Walters and reviewed their note, prescribed medications and placed orders if applicable. I directly supervised Dr. Walters and was available for discussion. I agree with Dr. Walters's plan as it was presented to me. I attest that this information is true, accurate and comprehensive to the best of my knowledge with exception(s):     Patient evaluated for continuity of care alongside resident physician. In the interim, patient remains calm and cooperative, adherent to her medications less any acute adverse effects, although scant, sparse, superficial, denying any/all psychiatric complaints/concerns, suspicious, paranoid, possessing paranoid/persecutory delusions pertaining to his spouse, perseverating on discharge disposition, aware of involuntary 303 commitment hearing in the a.m.    Suicide/Homicide Risk Assessment:  Risk of Harm to Self:  The following ratings are based on assessment at the time of the interview and review of records  Based on today's assessment, Antionette presents the  following risk of harm to self:  elevated    Risk of Harm to Others:  The following ratings are based on assessment at the time of the interview and review of records  Based on today's assessment, Antionette presents the following risk of harm to others:  elevated    DSM-5 Diagnoses:   Mood disorder, unspecified; consideration for bipolar affective disorder, current episode manic with psychotic features versus schizoaffective disorder, bipolar type     Treatment Recommendations/Precautions:  Continue medical management per primary team.  Collaborate with collaterals for baseline assessment and disposition.  Continue one-to-one observation to assure patient and peer safety in addition to possible elopement risk  Continue previously prescribed psychotropic medications at present dosing  Presently, patient adheres to criteria for inpatient behavioral health admission by means of involuntary 302 commitment.  303 hearing in the a.m.  Discharge date per primary team.   Consultation appreciated. Psychiatry to follow. Please do not hesitate to call/contact our service with any additional comments/concerns. Thank you!    Vital Signs in the Past 24 hours:    Temp:  [98.1 °F (36.7 °C)-98.6 °F (37 °C)] 98.2 °F (36.8 °C)  HR:  [78-88] 78  Resp:  [16] 16  BP: ()/(51-64) 94/51    Laboratory Results: I have personally reviewed all pertinent laboratory/tests results    Most Recent Labs:   Lab Results   Component Value Date    WBC 13.49 (H) 08/13/2024    RBC 4.93 08/13/2024    HGB 15.4 08/13/2024    HCT 44.8 08/13/2024     08/13/2024    RDW 12.1 08/13/2024    NEUTROABS 9.03 (H) 08/13/2024    TOTANEUTABS 6.86 07/30/2016    SODIUM 139 08/13/2024    K 3.7 08/13/2024     08/13/2024    CO2 24 08/13/2024    BUN 10 08/13/2024    CREATININE 0.86 08/13/2024    GLUC 86 08/13/2024    CALCIUM 9.0 08/13/2024    AST 12 (L) 08/13/2024    ALT 8 08/13/2024    ALKPHOS 62 08/13/2024    TP 6.6 08/13/2024    ALB 4.0 08/13/2024    TBILI  0.42 08/13/2024    CHOLESTEROL 157 04/27/2024    HDL 41 (L) 04/27/2024    TRIG 129 04/27/2024    LDLCALC 90 04/27/2024    NONHDLC 116 04/27/2024    VALPROICTOT <10.0 (L) 02/12/2023    LITHIUM 0.63 02/01/2024    AMMONIA 17 11/12/2019    ZRR4ORSVNIQD 0.558 04/26/2024    FREET4 1.24 02/01/2023    PREGUR NEGATIVE 10/15/2022    PREGSERUM Negative 05/16/2024    HCGQUANT <3 12/05/2019    SYPHILISAB Non-reactive 07/25/2024    HGBA1C 5.3 03/28/2024     03/28/2024       Screenings:    Nutrition Screening: Nutrition Assessment (completed by Staff): Nutrition  Feeding: Able to feed self  Diet Type: Regular/House  Appetite: Poor    Pain Screening: Pain Screening: Pain Assessment  Pain Assessment Tool: 0-10  Pain Score: 9  Pain Location/Orientation: Location: [facility]  Hospital Pain Intervention(s): Medication (See MAR), Emotional support, Rest    Suicide Screening: C-SSRS Screening (Nursing Assessment - since last contact): [place] Suicide Severity Rating Scale (Since Last Contact Screener)  1. Wish to be Dead (Since Last Contact): No  2. Non-Specific Active Suicidal Thoughts (Since Last Contact): No  Calculated C-SSRS Risk Score (Since Last Contact): No Risk Indicated      Code Status: Level 1 - Full Code    Advance Directive and Living Will:        Power of : Yes    POLST:      This note has been constructed using a voice recognition system.    There may be translation, syntax,  or grammatical errors. If you have any questions, please contact the dictating provider.    Jordan Christopher Holter, DO

## 2024-08-14 NOTE — ED NOTES
PA PROMISe indicates:  Active.  Recipient #[id number]    managed by [facility].

## 2024-08-14 NOTE — ED NOTES
Patient arrived by [place] [employer]. The patient presented as paranoid with poor reality-testing. Since arrival, she has had periods in which she is nonverbal, alternating with periods of agitation, screaming profanities and combative behavior. She is unable to understand why she is here, and does not appear to acknowledge treatment is necessary. Police are willing to petition for an involuntary commitment. Antionette Stafford from [facility], [phone], is en route to collect their statement.

## 2024-08-15 PROCEDURE — 93005 ELECTROCARDIOGRAM TRACING: CPT

## 2024-08-15 PROCEDURE — 99215 OFFICE O/P EST HI 40 MIN: CPT | Performed by: PSYCHIATRY & NEUROLOGY

## 2024-08-15 RX ORDER — OLANZAPINE 10 MG/2ML
5 INJECTION, POWDER, FOR SOLUTION INTRAMUSCULAR
Status: DISCONTINUED | OUTPATIENT
Start: 2024-08-15 | End: 2024-08-23 | Stop reason: HOSPADM

## 2024-08-15 RX ORDER — LORAZEPAM 2 MG/ML
2 INJECTION INTRAMUSCULAR EVERY 6 HOURS PRN
Status: DISCONTINUED | OUTPATIENT
Start: 2024-08-15 | End: 2024-08-23 | Stop reason: HOSPADM

## 2024-08-15 RX ORDER — DIPHENHYDRAMINE HYDROCHLORIDE 50 MG/ML
50 INJECTION INTRAMUSCULAR; INTRAVENOUS EVERY 6 HOURS PRN
Status: DISCONTINUED | OUTPATIENT
Start: 2024-08-15 | End: 2024-08-23 | Stop reason: HOSPADM

## 2024-08-15 RX ORDER — OLANZAPINE 10 MG/2ML
10 INJECTION, POWDER, FOR SOLUTION INTRAMUSCULAR
Status: DISCONTINUED | OUTPATIENT
Start: 2024-08-15 | End: 2024-08-23 | Stop reason: HOSPADM

## 2024-08-15 RX ORDER — FAMOTIDINE 20 MG/1
20 TABLET, FILM COATED ORAL 2 TIMES DAILY PRN
Status: DISCONTINUED | OUTPATIENT
Start: 2024-08-15 | End: 2024-08-23 | Stop reason: HOSPADM

## 2024-08-15 RX ORDER — BENZTROPINE MESYLATE 1 MG/1
1 TABLET ORAL 2 TIMES DAILY PRN
Status: DISCONTINUED | OUTPATIENT
Start: 2024-08-15 | End: 2024-08-23 | Stop reason: HOSPADM

## 2024-08-15 RX ORDER — RISPERIDONE 1 MG/1
1 TABLET ORAL
Status: DISCONTINUED | OUTPATIENT
Start: 2024-08-15 | End: 2024-08-15

## 2024-08-15 RX ORDER — LORAZEPAM 2 MG/ML
2 INJECTION INTRAMUSCULAR 2 TIMES DAILY PRN
Status: DISCONTINUED | OUTPATIENT
Start: 2024-08-15 | End: 2024-08-23 | Stop reason: HOSPADM

## 2024-08-15 RX ORDER — AMOXICILLIN 250 MG
1 CAPSULE ORAL DAILY PRN
Status: DISCONTINUED | OUTPATIENT
Start: 2024-08-15 | End: 2024-08-23 | Stop reason: HOSPADM

## 2024-08-15 RX ORDER — ONDANSETRON 4 MG/1
4 TABLET, ORALLY DISINTEGRATING ORAL EVERY 6 HOURS PRN
Status: DISCONTINUED | OUTPATIENT
Start: 2024-08-15 | End: 2024-08-16

## 2024-08-15 RX ORDER — LORAZEPAM 2 MG/ML
1 INJECTION INTRAMUSCULAR
Status: DISCONTINUED | OUTPATIENT
Start: 2024-08-15 | End: 2024-08-15

## 2024-08-15 RX ORDER — BENZTROPINE MESYLATE 1 MG/ML
0.5 INJECTION, SOLUTION INTRAMUSCULAR; INTRAVENOUS
Status: DISCONTINUED | OUTPATIENT
Start: 2024-08-15 | End: 2024-08-15

## 2024-08-15 RX ORDER — ACETAMINOPHEN 325 MG/1
975 TABLET ORAL EVERY 6 HOURS PRN
Status: DISCONTINUED | OUTPATIENT
Start: 2024-08-15 | End: 2024-08-17

## 2024-08-15 RX ORDER — LORATADINE 10 MG/1
10 TABLET ORAL DAILY
Status: DISCONTINUED | OUTPATIENT
Start: 2024-08-15 | End: 2024-08-23 | Stop reason: HOSPADM

## 2024-08-15 RX ORDER — BENZTROPINE MESYLATE 1 MG/ML
1 INJECTION, SOLUTION INTRAMUSCULAR; INTRAVENOUS
Status: DISCONTINUED | OUTPATIENT
Start: 2024-08-15 | End: 2024-08-15

## 2024-08-15 RX ORDER — OLANZAPINE 10 MG/1
10 TABLET ORAL
Status: DISCONTINUED | OUTPATIENT
Start: 2024-08-15 | End: 2024-08-23 | Stop reason: HOSPADM

## 2024-08-15 RX ORDER — HYDROXYZINE HYDROCHLORIDE 50 MG/1
50 TABLET, FILM COATED ORAL
Status: DISCONTINUED | OUTPATIENT
Start: 2024-08-15 | End: 2024-08-23 | Stop reason: HOSPADM

## 2024-08-15 RX ORDER — LANOLIN ALCOHOL/MO/W.PET/CERES
3 CREAM (GRAM) TOPICAL
Status: DISCONTINUED | OUTPATIENT
Start: 2024-08-15 | End: 2024-08-23 | Stop reason: HOSPADM

## 2024-08-15 RX ORDER — OLANZAPINE 10 MG/1
20 TABLET ORAL
Status: DISCONTINUED | OUTPATIENT
Start: 2024-08-15 | End: 2024-08-15

## 2024-08-15 RX ORDER — HYDROXYZINE HYDROCHLORIDE 25 MG/1
25 TABLET, FILM COATED ORAL
Status: DISCONTINUED | OUTPATIENT
Start: 2024-08-15 | End: 2024-08-23 | Stop reason: HOSPADM

## 2024-08-15 RX ORDER — HALOPERIDOL 5 MG/ML
2.5 INJECTION INTRAMUSCULAR
Status: DISCONTINUED | OUTPATIENT
Start: 2024-08-15 | End: 2024-08-15

## 2024-08-15 RX ORDER — OLANZAPINE 5 MG/1
5 TABLET ORAL
Status: DISCONTINUED | OUTPATIENT
Start: 2024-08-15 | End: 2024-08-23 | Stop reason: HOSPADM

## 2024-08-15 RX ORDER — HYDROXYZINE HYDROCHLORIDE 50 MG/1
100 TABLET, FILM COATED ORAL
Status: DISCONTINUED | OUTPATIENT
Start: 2024-08-15 | End: 2024-08-23 | Stop reason: HOSPADM

## 2024-08-15 RX ORDER — MAGNESIUM HYDROXIDE/ALUMINUM HYDROXICE/SIMETHICONE 120; 1200; 1200 MG/30ML; MG/30ML; MG/30ML
30 SUSPENSION ORAL EVERY 4 HOURS PRN
Status: DISCONTINUED | OUTPATIENT
Start: 2024-08-15 | End: 2024-08-23 | Stop reason: HOSPADM

## 2024-08-15 RX ORDER — RISPERIDONE 0.5 MG/1
0.5 TABLET ORAL
Status: DISCONTINUED | OUTPATIENT
Start: 2024-08-15 | End: 2024-08-15

## 2024-08-15 RX ORDER — ACETAMINOPHEN 325 MG/1
650 TABLET ORAL EVERY 6 HOURS PRN
Status: DISCONTINUED | OUTPATIENT
Start: 2024-08-15 | End: 2024-08-23 | Stop reason: HOSPADM

## 2024-08-15 RX ORDER — LORAZEPAM 2 MG/ML
2 INJECTION INTRAMUSCULAR
Status: DISCONTINUED | OUTPATIENT
Start: 2024-08-15 | End: 2024-08-15

## 2024-08-15 RX ORDER — POLYETHYLENE GLYCOL 3350 17 G/17G
17 POWDER, FOR SOLUTION ORAL DAILY PRN
Status: DISCONTINUED | OUTPATIENT
Start: 2024-08-15 | End: 2024-08-23 | Stop reason: HOSPADM

## 2024-08-15 RX ORDER — BUDESONIDE AND FORMOTEROL FUMARATE DIHYDRATE 80; 4.5 UG/1; UG/1
2 AEROSOL RESPIRATORY (INHALATION) 2 TIMES DAILY
Status: DISCONTINUED | OUTPATIENT
Start: 2024-08-15 | End: 2024-08-23 | Stop reason: HOSPADM

## 2024-08-15 RX ORDER — ACETAMINOPHEN 325 MG/1
650 TABLET ORAL EVERY 4 HOURS PRN
Status: DISCONTINUED | OUTPATIENT
Start: 2024-08-15 | End: 2024-08-23 | Stop reason: HOSPADM

## 2024-08-15 RX ORDER — LANOLIN ALCOHOL/MO/W.PET/CERES
400 CREAM (GRAM) TOPICAL 2 TIMES DAILY
Status: DISCONTINUED | OUTPATIENT
Start: 2024-08-15 | End: 2024-08-23 | Stop reason: HOSPADM

## 2024-08-15 RX ORDER — BENZTROPINE MESYLATE 1 MG/ML
1 INJECTION, SOLUTION INTRAMUSCULAR; INTRAVENOUS 2 TIMES DAILY PRN
Status: DISCONTINUED | OUTPATIENT
Start: 2024-08-15 | End: 2024-08-23 | Stop reason: HOSPADM

## 2024-08-15 RX ORDER — RISPERIDONE 2 MG/1
2 TABLET ORAL
Status: DISCONTINUED | OUTPATIENT
Start: 2024-08-15 | End: 2024-08-15

## 2024-08-15 RX ORDER — PROPRANOLOL HYDROCHLORIDE 10 MG/1
10 TABLET ORAL EVERY 8 HOURS PRN
Status: DISCONTINUED | OUTPATIENT
Start: 2024-08-15 | End: 2024-08-23 | Stop reason: HOSPADM

## 2024-08-15 RX ORDER — HYOSCYAMINE SULFATE 0.125 MG
0.12 TABLET,DISINTEGRATING ORAL EVERY 4 HOURS PRN
Status: DISCONTINUED | OUTPATIENT
Start: 2024-08-15 | End: 2024-08-15 | Stop reason: SDUPTHER

## 2024-08-15 RX ORDER — HALOPERIDOL 5 MG/ML
5 INJECTION INTRAMUSCULAR
Status: DISCONTINUED | OUTPATIENT
Start: 2024-08-15 | End: 2024-08-15

## 2024-08-15 RX ORDER — DICYCLOMINE HCL 20 MG
20 TABLET ORAL EVERY 6 HOURS PRN
Status: DISCONTINUED | OUTPATIENT
Start: 2024-08-15 | End: 2024-08-23 | Stop reason: HOSPADM

## 2024-08-15 RX ORDER — OLANZAPINE 2.5 MG/1
2.5 TABLET, FILM COATED ORAL
Status: DISCONTINUED | OUTPATIENT
Start: 2024-08-15 | End: 2024-08-23 | Stop reason: HOSPADM

## 2024-08-15 RX ORDER — ALBUTEROL SULFATE 90 UG/1
2 AEROSOL, METERED RESPIRATORY (INHALATION) EVERY 4 HOURS PRN
Status: DISCONTINUED | OUTPATIENT
Start: 2024-08-15 | End: 2024-08-23 | Stop reason: HOSPADM

## 2024-08-15 RX ADMIN — AMOXICILLIN AND CLAVULANATE POTASSIUM 1 TABLET: 875; 125 TABLET, FILM COATED ORAL at 08:46

## 2024-08-15 RX ADMIN — CYANOCOBALAMIN TAB 1000 MCG 1000 MCG: 1000 TAB at 15:26

## 2024-08-15 RX ADMIN — DICYCLOMINE HYDROCHLORIDE 20 MG: 20 TABLET ORAL at 21:18

## 2024-08-15 RX ADMIN — LORATADINE 10 MG: 10 TABLET ORAL at 15:26

## 2024-08-15 RX ADMIN — OXCARBAZEPINE 300 MG: 300 TABLET, FILM COATED ORAL at 20:06

## 2024-08-15 RX ADMIN — HYOSCYAMINE SULFATE 0.12 MG: 0.12 TABLET SUBLINGUAL at 19:52

## 2024-08-15 RX ADMIN — DICYCLOMINE HYDROCHLORIDE 20 MG: 20 TABLET ORAL at 14:27

## 2024-08-15 RX ADMIN — NICOTINE 1 PATCH: 7 PATCH, EXTENDED RELEASE TRANSDERMAL at 15:27

## 2024-08-15 RX ADMIN — ONDANSETRON 4 MG: 4 TABLET, ORALLY DISINTEGRATING ORAL at 19:53

## 2024-08-15 RX ADMIN — ACETAMINOPHEN 975 MG: 325 TABLET ORAL at 21:31

## 2024-08-15 RX ADMIN — AMOXICILLIN AND CLAVULANATE POTASSIUM 1 TABLET: 875; 125 TABLET, FILM COATED ORAL at 20:07

## 2024-08-15 RX ADMIN — Medication 400 MG: at 17:05

## 2024-08-15 RX ADMIN — Medication 2000 UNITS: at 15:25

## 2024-08-15 RX ADMIN — TIZANIDINE 2 MG: 4 TABLET ORAL at 21:19

## 2024-08-15 RX ADMIN — Medication 3 MG: at 20:07

## 2024-08-15 RX ADMIN — OXCARBAZEPINE 300 MG: 300 TABLET, FILM COATED ORAL at 08:47

## 2024-08-15 NOTE — ED CARE HANDOFF
Emergency Department Sign Out Note        Sign out and transfer of care from Kelly Rosario PA-C. See Separate Emergency Department note.     The patient, Antionette Downing, is a 38 y.o. female with a past medical history of bipolar disorder, who initially presented to the department via [employer] after she called the police stating that her  was trying to kill her.  The patient then made homicidal threats toward her  in the police's presence.  Throughout her ED course patient had a labile mood, became aggressive toward staff, was responding to internal stimuli, and both denied and denied SI and HI.  Per the patient's  the patient has been off her medications and had two major life stressors in the last few weeks including her father dying and losing custody of her children.  302 was petitioned by the police and upheld by Dr. Sarmiento.      Workup Completed:  Results Reviewed       Procedure Component Value Units Date/Time    POCT pregnancy, urine [[id number]]  (Normal) Resulted: 08/14/24 2059    Lab Status: Final result Updated: 08/14/24 2059     EXT Preg Test, Ur Negative     Control Valid    Rapid drug screen, urine [[id number]]  (Abnormal) Collected: 08/14/24 1412    Lab Status: Final result Specimen: Urine, Catheter Updated: 08/14/24 1433     Amph/Meth UR Negative     Barbiturate Ur Negative     Benzodiazepine Urine Positive     Cocaine Urine Negative     Methadone Urine Negative     Opiate Urine Negative     PCP Ur Negative     THC Urine Positive     Oxycodone Urine Negative     Fentanyl Urine Negative     HYDROCODONE URINE Negative    Narrative:      Presumptive report. If requested, specimen will be sent to reference lab for confirmation.  FOR MEDICAL PURPOSES ONLY.   IF CONFIRMATION NEEDED PLEASE CONTACT THE LAB WITHIN 5 DAYS.    Drug Screen Cutoff Levels:  AMPHETAMINE/METHAMPHETAMINES  1000 ng/mL  BARBITURATES     200 ng/mL  BENZODIAZEPINES     200 ng/mL  COCAINE      300  ng/mL  METHADONE      300 ng/mL  OPIATES      300 ng/mL  PHENCYCLIDINE     25 ng/mL  THC       50 ng/mL  OXYCODONE      100 ng/mL  FENTANYL      5 ng/mL  HYDROCODONE     300 ng/mL    POCT alcohol breath test [[id number]]  (Normal) Resulted: 08/14/24 1410    Lab Status: Final result Updated: 08/14/24 1410     EXTBreath Alcohol 0.000              ED Course / Workup Pending (followup):  ED Course as of 08/15/24 0704   Wed Aug 14, 2024   3277 Signout taken from Kelly Rosario PA-C.  Patient is a 302 and is on continual 1:1 observation while bed search is in progress.  She received Zyprexa and Versed around 11 AM and was then given Ativan for agitation around 4:30 PM.  She did initially require physical restraints upon arrival, but is not currently in restraints.         Procedures  None      Medical Decision Making  Patient is a 302 for paranoia, homicidal ideations, and inability to care for herself.  Prior to my shift she required both chemical and physical restraints.  No acute events overnight.  Bed placement is pending and patient remains on continual 1:1 observation while in the ED.   Patient signed out to Dr. Joseph.    Problems Addressed:  Evaluation by psychiatric service required: acute illness or injury  Homicidal ideations: acute illness or injury  Paranoia (HCC): acute illness or injury    Amount and/or Complexity of Data Reviewed  Labs: ordered.    Risk  OTC drugs.  Prescription drug management.  Decision regarding hospitalization.            Disposition  Final diagnoses:   Paranoia (HCC)   Homicidal ideations   Evaluation by psychiatric service required     Time reflects when diagnosis was documented in both MDM as applicable and the Disposition within this note       Time User Action Codes Description Comment    8/14/2024 10:48 AM Suzette Javier [F22] Paranoia (HCC)     8/14/2024 10:48 AM Suzette Javier [R45.850] Homicidal ideations     8/14/2024 10:48 AM Suzette Javier [Z00.8]  Evaluation by psychiatric service required           ED Disposition       ED Disposition   Transfer to Behavioral Health Condition   --    Date/Time   Wed Aug 14, 2024 10:48 AM    Comment   Antionette Downing should be transferred out to behavioral [facility] and has been medically cleared.               MD Documentation      Flowsheet Row Most Recent Value   Sending MD Bladimir Ramos MD          Follow-up Information    None       Patient's Medications   Discharge Prescriptions    No medications on file     No discharge procedures on file.       ED Provider  Electronically Signed by     Velma Edward PA-C  08/15/24 0704

## 2024-08-15 NOTE — PLAN OF CARE
Problem: SAFETY, RESTRAINT - VIOLENT/SELF-DESTRUCTIVE  Goal: Remains free of harm/injury from restraints (Restraint for Violent/Self-Destructive Behavior)  Description: INTERVENTIONS:  - Instruct patient/family regarding restraint use   - Assess and monitor physiologic and psychological status   - Provide interventions and comfort measures to meet assessed patient needs   - Ensure continuous in person monitoring is provided   - Identify and implement measures to help patient regain control  - Assess readiness for release of restraint  Outcome: Progressing     Problem: SAFETY, RESTRAINT - VIOLENT/SELF-DESTRUCTIVE  Goal: Returns to optimal restraint-free functioning  Description: INTERVENTIONS:  - Assess the patient's behavior and symptoms that indicate continued need for restraint  - Identify and implement measures to help patient regain control  - Assess readiness for release of restraint   Outcome: Progressing

## 2024-08-15 NOTE — ED CARE HANDOFF
Emergency Department Sign Out Note        Sign out and transfer of care from Garett Javier PA-C. See Separate Emergency Department note.     The patient, Antionette Downing, was evaluated by the previous provider for homicidal ideation.    Workup Completed:  Labs Reviewed   RAPID DRUG SCREEN, URINE - Abnormal       Result Value Ref Range Status    Amph/Meth UR Negative  Negative Final    Barbiturate Ur Negative  Negative Final    Benzodiazepine Urine Positive (*) Negative Final    Cocaine Urine Negative  Negative Final    Methadone Urine Negative  Negative Final    Opiate Urine Negative  Negative Final    PCP Ur Negative  Negative Final    THC Urine Positive (*) Negative Final    Oxycodone Urine Negative  Negative Final    Fentanyl Urine Negative  Negative Final    HYDROCODONE URINE Negative  Negative Final    Narrative:     Presumptive report. If requested, specimen will be sent to reference lab for confirmation.  FOR MEDICAL PURPOSES ONLY.   IF CONFIRMATION NEEDED PLEASE CONTACT THE LAB WITHIN 5 DAYS.    Drug Screen Cutoff Levels:  AMPHETAMINE/METHAMPHETAMINES  1000 ng/mL  BARBITURATES     200 ng/mL  BENZODIAZEPINES     200 ng/mL  COCAINE      300 ng/mL  METHADONE      300 ng/mL  OPIATES      300 ng/mL  PHENCYCLIDINE     25 ng/mL  THC       50 ng/mL  OXYCODONE      100 ng/mL  FENTANYL      5 ng/mL  HYDROCODONE     300 ng/mL   POCT ALCOHOL BREATH TEST - Normal    EXTBreath Alcohol 0.000   Final   POCT PREGNANCY, URINE - Normal    EXT Preg Test, Ur Negative   Final    Control Valid   Final       ED Course / Workup Pending (followup):  -302. Placement pending.                                     Procedures  Medical Decision Making  Patient is a 38-year-old female signed out to me after a threat to self home due to homicidal ideation.  No events and she remained stable under my care in the ED.  Signed out to Velma Edward PA-C pending placement.    Amount and/or Complexity of Data Reviewed  Labs:  ordered.    Risk  OTC drugs.  Prescription drug management.  Decision regarding hospitalization.            Disposition  Final diagnoses:   Paranoia (HCC)   Homicidal ideations   Evaluation by psychiatric service required     Time reflects when diagnosis was documented in both MDM as applicable and the Disposition within this note       Time User Action Codes Description Comment    8/14/2024 10:48 AM Suzette Javier [F22] Paranoia (HCC)     8/14/2024 10:48 AM Suzette Javier [R45.850] Homicidal ideations     8/14/2024 10:48 AM Suzette Javier [Z00.8] Evaluation by psychiatric service required           ED Disposition       ED Disposition   Transfer to Behavioral Health    Condition   --    Date/Time   Wed Aug 14, 2024 10:48 AM    Comment   Antionette Downing should be transferred out to behavioral health and has been medically cleared.               MD Documentation      Flowsheet Row Most Recent Value   Sending MD Bladimir Ramos MD          Follow-up Information    None       Patient's Medications   Discharge Prescriptions    No medications on file     No discharge procedures on file.       ED Provider  Electronically Signed by     Kelly Rosario PA-C  08/14/24 7202

## 2024-08-15 NOTE — ED NOTES
The patient was reevaluated today by Arvind Walters DO. He determined the patient continues to require an inpatient mental health admission for safety and stabilization. A 303 application was submitted by Arvind Walters DO to [email or web address]. Belen from [facility], confirmed it was received. The hearing will be 8/16 at 0800.

## 2024-08-15 NOTE — PLAN OF CARE
Problem: SELF HARM/SUICIDALITY  Goal: Will have no self-injury during hospital stay  Description: INTERVENTIONS:  - Q 15 MINUTES: Routine safety checks  - Q WAKING SHIFT & PRN: Assess risk to determine if routine checks are adequate to maintain patient safety  - Encourage patient to participate actively in care by formulating a plan to combat response to suicidal ideation, identify supports and resources  Outcome: Progressing     Problem: DEAN  Goal: Will exhibit normal sleep and speech and no impulsivity  Description: INTERVENTIONS:  - Administer medication as ordered  - Set limits on impulsive behavior  - Make attempts to decrease external stimuli as possible  Outcome: Progressing     Problem: PSYCHOSIS  Goal: Will report no hallucinations or delusions  Description: Interventions:  - Administer medication as  ordered  - Every waking shifts and PRN assess for the presence of hallucinations and or delusions  - Assist with reality testing to support increasing orientation  - Assess if patient's hallucinations or delusions are encouraging self-harm or harm to others and intervene as appropriate  Outcome: Progressing     Problem: BEHAVIOR  Goal: Pt/Family maintain appropriate behavior and adhere to behavioral management agreement, if implemented  Description: INTERVENTIONS:  - Assess the family dynamic   - Encourage verbalization of thoughts and concerns in a socially appropriate manner  - Assess patient/family's coping skills and non-compliant behavior (including use of illegal substances).  - Utilize positive, consistent limit setting strategies supporting safety of patient, staff and others  - Initiate consult with Case Management, Spiritual Care or other ancillary services as appropriate  - If a patient's/visitor's behavior jeopardizes the safety of the patient, staff, or others, refer to organization procedure.   - Notify Security of behavior or suspected illegal substances which indicate the need for search of  the patient and/or belongings  - Encourage participation in the decision making process about a behavioral management agreement; implement if patient meets criteria  Outcome: Progressing     Problem: SUBSTANCE USE/ABUSE  Goal: By discharge, patient will have ongoing treatment plan addressing chemical dependency  Description: INTERVENTIONS:  - Assist patient with resources and/or appointments for ongoing recovery based living  Outcome: Progressing     Problem: INVOLUNTARY ADMIT  Goal: Will cooperate with staff recommendations and doctor's orders and will demonstrate appropriate behavior  Description: INTERVENTIONS:  - Treat underlying conditions and offer medication as ordered  - Educate regarding involuntary admission procedures and rules  - Utilize positive consistent limit setting strategies to support patient and staff safety  Outcome: Progressing

## 2024-08-15 NOTE — ED NOTES
Per ED RN, Pt has been provided snacks and a drink and will wake up at times to eat/drink. Pt has no further complaints at this time.

## 2024-08-15 NOTE — ED NOTES
Bed Search Initiated to Following Facilities:    [facility]: Spoke with Shame[facility], discharge bed available; chart to be faxed for review.  [facility]: Spoke with Janice, beds available; chart to be faxed for review.   [facility]: Spoke with Seamus, beds available; chart to be faxed for review.   [facility] Tello: Beds available; chart to be faxed for review.   Lilian: Spoke with Shaan, no beds available tonight - encouraged to call back in the morning, if a bed is still needed and 303 paperwork has been submitted.  Giuliana: Spoke with Lili, beds available; chart to be faxed if 302 upheld 130p or later.    Kaya Weiss, [facility]  08/14/24    [id number]
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
## 2024-08-15 NOTE — ED NOTES
Insurance Authorization for admission:   Phone call placed to Travon Jeffrey.  Phone number: [phone].     Spoke to Marylou VARGAS     5 days approved.  Level of care: IP Psych 302.  Review on TBD based on date of admission.   Authorization # upon arrival to treating facility.

## 2024-08-15 NOTE — ED NOTES
Updated on bed search status:    [facility]- denied due to no appropriate bed  [facility]- no appropriate bed tonight, but can represent clinical tomorrow if still looking for a bed  [facility]- no appropriate bed tonight, but can re-present clinical tomorrow if still looking for a bed  [facility]- requesting update on behaviors from 3rd shift around 8am, potentially will be able to be accepted if no acute changes overnight. AM crisis to follow up with [facility].     No other facilities reviewing Pt at this time.

## 2024-08-15 NOTE — ED NOTES
Patient has a court hearing on 8/20. She needs for this to be cancelled or a an arrangement for virtual participation will need to be arranged.

## 2024-08-15 NOTE — ED NOTES
Patient provided self care items, bath wipes, and new scrubs per patient request. Patient performed self care with 1:1 at bedside. She was also given drinks and snacks. Patient also cleaned own dentures. No other needs at this time, 1:1 at bedside for safety.       Princess Mueller RN  08/15/24 [id number]

## 2024-08-16 PROBLEM — K57.90 DIVERTICULOSIS: Status: ACTIVE | Noted: 2019-11-22

## 2024-08-16 PROBLEM — F25.0 SCHIZOAFFECTIVE DISORDER, BIPOLAR TYPE (HCC): Status: ACTIVE | Noted: 2024-08-16

## 2024-08-16 LAB
25(OH)D3 SERPL-MCNC: 30.6 NG/ML (ref 30–100)
ALBUMIN SERPL BCG-MCNC: 4.2 G/DL (ref 3.5–5)
ALP SERPL-CCNC: 54 U/L (ref 34–104)
ALT SERPL W P-5'-P-CCNC: 8 U/L (ref 7–52)
ANION GAP SERPL CALCULATED.3IONS-SCNC: 12 MMOL/L (ref 4–13)
AST SERPL W P-5'-P-CCNC: 15 U/L (ref 13–39)
ATRIAL RATE: 76 BPM
BACTERIA UR QL AUTO: ABNORMAL /HPF
BASOPHILS # BLD AUTO: 0.03 THOUSANDS/ÂΜL (ref 0–0.1)
BASOPHILS NFR BLD AUTO: 0 % (ref 0–1)
BILIRUB SERPL-MCNC: 0.32 MG/DL (ref 0.2–1)
BILIRUB UR QL STRIP: NEGATIVE
BUN SERPL-MCNC: 8 MG/DL (ref 5–25)
CALCIUM SERPL-MCNC: 9.5 MG/DL (ref 8.4–10.2)
CHLORIDE SERPL-SCNC: 101 MMOL/L (ref 96–108)
CLARITY UR: ABNORMAL
CO2 SERPL-SCNC: 22 MMOL/L (ref 21–32)
COLOR UR: YELLOW
CREAT SERPL-MCNC: 0.84 MG/DL (ref 0.6–1.3)
EOSINOPHIL # BLD AUTO: 0.12 THOUSAND/ÂΜL (ref 0–0.61)
EOSINOPHIL NFR BLD AUTO: 2 % (ref 0–6)
ERYTHROCYTE [DISTWIDTH] IN BLOOD BY AUTOMATED COUNT: 11.9 % (ref 11.6–15.1)
GFR SERPL CREATININE-BSD FRML MDRD: 89 ML/MIN/1.73SQ M
GLUCOSE SERPL-MCNC: 171 MG/DL (ref 65–140)
GLUCOSE UR STRIP-MCNC: NEGATIVE MG/DL
HCT VFR BLD AUTO: 40.6 % (ref 34.8–46.1)
HGB BLD-MCNC: 14.6 G/DL (ref 11.5–15.4)
HGB UR QL STRIP.AUTO: 10
IMM GRANULOCYTES # BLD AUTO: 0.03 THOUSAND/UL (ref 0–0.2)
IMM GRANULOCYTES NFR BLD AUTO: 0 % (ref 0–2)
KETONES UR STRIP-MCNC: NEGATIVE MG/DL
LEUKOCYTE ESTERASE UR QL STRIP: 25
LYMPHOCYTES # BLD AUTO: 1.75 THOUSANDS/ÂΜL (ref 0.6–4.47)
LYMPHOCYTES NFR BLD AUTO: 22 % (ref 14–44)
MCH RBC QN AUTO: 32.2 PG (ref 26.8–34.3)
MCHC RBC AUTO-ENTMCNC: 36 G/DL (ref 31.4–37.4)
MCV RBC AUTO: 89 FL (ref 82–98)
MONOCYTES # BLD AUTO: 0.45 THOUSAND/ÂΜL (ref 0.17–1.22)
MONOCYTES NFR BLD AUTO: 6 % (ref 4–12)
MUCOUS THREADS UR QL AUTO: ABNORMAL
NEUTROPHILS # BLD AUTO: 5.63 THOUSANDS/ÂΜL (ref 1.85–7.62)
NEUTS SEG NFR BLD AUTO: 70 % (ref 43–75)
NITRITE UR QL STRIP: NEGATIVE
NON-SQ EPI CELLS URNS QL MICRO: ABNORMAL /HPF
NRBC BLD AUTO-RTO: 0 /100 WBCS
P AXIS: 44 DEGREES
PH UR STRIP.AUTO: 6 [PH]
PLATELET # BLD AUTO: 161 THOUSANDS/UL (ref 149–390)
PMV BLD AUTO: 11.5 FL (ref 8.9–12.7)
POTASSIUM SERPL-SCNC: 3.8 MMOL/L (ref 3.5–5.3)
PR INTERVAL: 138 MS
PROT SERPL-MCNC: 7.1 G/DL (ref 6.4–8.4)
PROT UR STRIP-MCNC: ABNORMAL MG/DL
QRS AXIS: 78 DEGREES
QRSD INTERVAL: 82 MS
QT INTERVAL: 374 MS
QTC INTERVAL: 420 MS
RBC # BLD AUTO: 4.54 MILLION/UL (ref 3.81–5.12)
RBC #/AREA URNS AUTO: ABNORMAL /HPF
SODIUM SERPL-SCNC: 135 MMOL/L (ref 135–147)
SP GR UR STRIP.AUTO: 1.01 (ref 1–1.04)
T WAVE AXIS: 48 DEGREES
TSH SERPL DL<=0.05 MIU/L-ACNC: 1.21 UIU/ML (ref 0.45–4.5)
UROBILINOGEN UA: NEGATIVE MG/DL
VENTRICULAR RATE: 76 BPM
VIT B12 SERPL-MCNC: 704 PG/ML (ref 180–914)
WBC # BLD AUTO: 8.01 THOUSAND/UL (ref 4.31–10.16)
WBC #/AREA URNS AUTO: ABNORMAL /HPF

## 2024-08-16 PROCEDURE — 82306 VITAMIN D 25 HYDROXY: CPT | Performed by: PHYSICIAN ASSISTANT

## 2024-08-16 PROCEDURE — 99223 1ST HOSP IP/OBS HIGH 75: CPT | Performed by: PSYCHIATRY & NEUROLOGY

## 2024-08-16 PROCEDURE — 84443 ASSAY THYROID STIM HORMONE: CPT | Performed by: PHYSICIAN ASSISTANT

## 2024-08-16 PROCEDURE — 80053 COMPREHEN METABOLIC PANEL: CPT | Performed by: PHYSICIAN ASSISTANT

## 2024-08-16 PROCEDURE — 93010 ELECTROCARDIOGRAM REPORT: CPT | Performed by: INTERNAL MEDICINE

## 2024-08-16 PROCEDURE — 85025 COMPLETE CBC W/AUTO DIFF WBC: CPT | Performed by: PHYSICIAN ASSISTANT

## 2024-08-16 PROCEDURE — 99222 1ST HOSP IP/OBS MODERATE 55: CPT | Performed by: STUDENT IN AN ORGANIZED HEALTH CARE EDUCATION/TRAINING PROGRAM

## 2024-08-16 PROCEDURE — 82607 VITAMIN B-12: CPT | Performed by: PHYSICIAN ASSISTANT

## 2024-08-16 PROCEDURE — 81001 URINALYSIS AUTO W/SCOPE: CPT

## 2024-08-16 PROCEDURE — 81003 URINALYSIS AUTO W/O SCOPE: CPT

## 2024-08-16 RX ORDER — OLANZAPINE 5 MG/1
5 TABLET ORAL
Status: DISCONTINUED | OUTPATIENT
Start: 2024-08-18 | End: 2024-08-19

## 2024-08-16 RX ORDER — OLANZAPINE 5 MG/1
5 TABLET ORAL
Status: COMPLETED | OUTPATIENT
Start: 2024-08-16 | End: 2024-08-17

## 2024-08-16 RX ADMIN — AMOXICILLIN AND CLAVULANATE POTASSIUM 1 TABLET: 875; 125 TABLET, FILM COATED ORAL at 09:58

## 2024-08-16 RX ADMIN — DICYCLOMINE HYDROCHLORIDE 20 MG: 20 TABLET ORAL at 10:05

## 2024-08-16 RX ADMIN — Medication 400 MG: at 09:58

## 2024-08-16 RX ADMIN — ACETAMINOPHEN 975 MG: 325 TABLET ORAL at 14:49

## 2024-08-16 RX ADMIN — OLANZAPINE 5 MG: 5 TABLET, FILM COATED ORAL at 21:23

## 2024-08-16 RX ADMIN — Medication 3 MG: at 20:36

## 2024-08-16 RX ADMIN — SODIUM CHLORIDE 1000 ML: 0.9 INJECTION, SOLUTION INTRAVENOUS at 18:44

## 2024-08-16 RX ADMIN — HYDROXYZINE HYDROCHLORIDE 100 MG: 50 TABLET, FILM COATED ORAL at 20:00

## 2024-08-16 RX ADMIN — BUDESONIDE AND FORMOTEROL FUMARATE DIHYDRATE 2 PUFF: 80; 4.5 AEROSOL RESPIRATORY (INHALATION) at 09:58

## 2024-08-16 RX ADMIN — TIZANIDINE 2 MG: 4 TABLET ORAL at 21:23

## 2024-08-16 RX ADMIN — AMOXICILLIN AND CLAVULANATE POTASSIUM 1 TABLET: 875; 125 TABLET, FILM COATED ORAL at 20:36

## 2024-08-16 RX ADMIN — DICYCLOMINE HYDROCHLORIDE 20 MG: 20 TABLET ORAL at 20:40

## 2024-08-16 RX ADMIN — OXCARBAZEPINE 300 MG: 300 TABLET, FILM COATED ORAL at 20:36

## 2024-08-16 RX ADMIN — HYOSCYAMINE SULFATE 0.12 MG: 0.12 TABLET SUBLINGUAL at 17:17

## 2024-08-16 RX ADMIN — Medication 2000 UNITS: at 09:59

## 2024-08-16 RX ADMIN — OXCARBAZEPINE 300 MG: 300 TABLET, FILM COATED ORAL at 09:58

## 2024-08-16 RX ADMIN — TRIMETHOBENZAMIDE HYDROCHLORIDE 200 MG: 100 INJECTION INTRAMUSCULAR at 17:14

## 2024-08-16 RX ADMIN — LORATADINE 10 MG: 10 TABLET ORAL at 09:58

## 2024-08-16 RX ADMIN — ACETAMINOPHEN 975 MG: 325 TABLET ORAL at 19:57

## 2024-08-16 RX ADMIN — CYANOCOBALAMIN TAB 1000 MCG 1000 MCG: 1000 TAB at 09:58

## 2024-08-16 NOTE — QUICK NOTE
"Received message via epic chat from nurse stating patient had 3 episodes of NBNB emesis. Content of vomit was previous food eaten and cranberry juice. She has a history of diverticulitis and is currently on antibiotic therapy. Patient was seen and examined at bedside. She reports bad taste in food, nausea, and abdominal discomfort. Otherwise denies SOB, chest pain, constipation, diarrhea.     Visit Vitals  /72 (BP Location: Left arm)   Pulse 68   Temp (!) 97.4 °F (36.3 °C) (Temporal)   Resp 16   Ht 5' 2\" (1.575 m)   Wt 62.4 kg (137 lb 9.6 oz)   LMP 07/30/2024 (Approximate)   SpO2 100%   BMI 25.17 kg/m²   OB Status Unknown   Smoking Status Every Day   BSA 1.63 m²        Physical Exam  Vitals and nursing note reviewed.   Constitutional:       General: She is not in acute distress.     Appearance: She is well-developed.   HENT:      Head: Normocephalic and atraumatic.   Eyes:      Conjunctiva/sclera: Conjunctivae normal.   Cardiovascular:      Rate and Rhythm: Normal rate and regular rhythm.      Pulses: Normal pulses.      Heart sounds: Normal heart sounds. No murmur heard.     No gallop.   Pulmonary:      Effort: Pulmonary effort is normal. No respiratory distress.      Breath sounds: Normal breath sounds. No wheezing.   Abdominal:      General: Bowel sounds are normal. There is no distension.      Palpations: Abdomen is soft.      Tenderness: There is abdominal tenderness (diffuse). There is no guarding.   Musculoskeletal:         General: No swelling.      Cervical back: Neck supple.      Right lower leg: No edema.      Left lower leg: No edema.   Skin:     General: Skin is warm and dry.      Capillary Refill: Capillary refill takes less than 2 seconds.   Neurological:      Mental Status: She is alert and oriented to person, place, and time.   Psychiatric:         Mood and Affect: Mood normal.         Behavior: Behavior normal.       Plan:   -Trimethobenzamide IM 200mg q6h prn  -NS 100ml bolus IV  -Toast and " pradip staley     May Call or contact via Epic Secure Chat '[facility] Floor Call Intern' and/or '[facility] Admissions & Consults Senior' with any question/concerns regarding patient. Thank you.

## 2024-08-16 NOTE — NURSING NOTE
MHT alerted RN that pt vomited and a small amount of clear liquid seen in toilet. Pt in shower when RN went to assess but pt eventually stated continued GI cramping, nausea and abd pain and requested PRN medication and was given Levsin SL for cramps/pain and Zofran for nausea at 1952. Pt accepted melatonin for sleep at 2007 as she states she has been in bed all day and wont be able to sleep. Pt denies SI/HI/AVH at time of assessment and just wants to rest in bed. Safety checks ongoing.

## 2024-08-16 NOTE — ASSESSMENT & PLAN NOTE
CT abdomen 8/13: Acute cecal diverticulitis without perforation or abscess. Stable hiatal hernia.   ED visit on 8/13 for diverticulitis   Patient discharged on Augmentin 875-125 BID for 7 days     - Continue Augmentin to complete 7 day course   - Monitor Bowel movements, or any worsening of abdominal pain, hematochezia or melena  - Pain management with Tylenol 975 mg q8h prn

## 2024-08-16 NOTE — NURSING NOTE
Patient had 3 bouts of emesis, of cranberry juice colored consistent with having snack of cranberry juice. She has been holding her abdomen today and required various medications to control the pain. [facility] IM tygan ordered, as well as bolus of fluids.
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
## 2024-08-16 NOTE — TREATMENT PLAN
TREATMENT PLAN REVIEW - Behavioral Health Antionette Downing 38 y.o. 1985 female MRN: [id number]    [facility]B [facility] Room [facility] Bed: [facility] 341/[facility] 341-01 Encounter: [id number]        Admit Date/Time:  8/14/2024 10:15 AM    Treatment Team:   MD Ailyn Osborn, TORRIE Little    Diagnosis: Active Problems:  There are no active Hospital Problems.      Patient Strengths/Assets: cooperative, good past treatment response, patient is willing to work on problems      Patient Barriers/Limitations: chronic mental illness, family conflict, limited education, limited support system, marital conflict, no/few hobbies or interests, noncompliant with medication, noncompliant with treatment, patient is on an involuntary commitment    Short Term Goals: decrease in manic symptoms, decrease in paranoid thoughts, decrease in psychotic symptoms, decrease in suicidal thoughts, decrease in homicidal thoughts, decrease in level of agitation, ability to stay safe on the unit, ability to stay free from restraints, improvement in ability to express basic needs, improvement in insight, improvement in appetite, tolerate medications, mood stabilization, increase in group attendance, increase in socialization with peers on the unit, acceptance of need for psychiatric treatment, acceptance of psychiatric medications    Long Term Goals: resolution of manic symptoms, stabilization of mood, free of suicidal thoughts, free of homicidal thoughts, no self abusive behavior, resolution of psychotic symptoms, improved impulse control, improved insight, no agitation on the unit, no aggressive behavior on the unit, able to express basic needs, acceptance of need for psychiatric medications, acceptance of need for psychiatric treatment, acceptance of need for psychiatric follow up after discharge, acceptance of psychiatric  diagnosis, adequate self care, adequate sleep, adequate appetite, adequate oral intake, appropriate interaction with peers, appropriate interaction with family, stable living arrangements upon discharge    Progress Towards Goals: restarting psychiatric medications as prescribed, continue psychiatric medications as prescribed, attends groups, participates in milieu therapy, mood is stabilizing gradually, still has psychotic symptoms    Recommended Treatment: medication management, patient medication education, group therapy, milieu therapy, continued Behavioral Health psychiatric evaluation/assessment process     Treatment Frequency: daily medication monitoring, group and milieu therapy daily, monitoring through interdisciplinary rounds, monitoring through weekly patient care conferences    Expected Discharge Date: 10 days - 8/26/2024    Discharge Plan: referral for outpatient medication management with a psychiatrist, referral for outpatient psychotherapy, referrals as indicated    Treatment Plan Created/Updated By: Amaury Ndiaye DO

## 2024-08-16 NOTE — NURSING NOTE
Patient complaining of abdominal pain related to eating and IBS-mixed. Given PO PRN 20mg bentyl. Patient reports pain 6/10.     11:06: Patient reports abdomen pain decreased to 3/10 after receiving bentyl 20mg PO PRN. Medication effective.

## 2024-08-16 NOTE — DISCHARGE INSTR - OTHER ORDERS
CRISIS INFORMATION  If you are experiencing a mental health emergency, you may call the [facility] Crisis Intervention Office 24 hours a day, 7 days per week at [phone].    In [facility], call [phone].    [facility] is a confidential 24/7 telephone support service manned by trained mental health consumers.  [facility] provides support, a listening ear and can provide information about available services.[facility] specializes in the concerns of mental health consumers, their families and friends.  However, we are also here for anyone who has a mental health concern, is confused about or just doesn't know anything about mental health or where to get information.  To reach [facility], call [phone].    HOW TO GET SUBSTANCE ABUSE HELP:  If you or someone you know has a drug or alcohol problem, there is help:  [facility] Drug & Alcohol Abuse Services: [phone]  [facility] Drug & Alcohol Abuse Services: [phone]  An assessment is the first step.   In addition to those listed there are other programs available in the area but assessment is best to determine an appropriate level of care.  If you DO NOT have Medical Assistance (MA) or Private Insurance, an assessment can be scheduled at one of these providers:  [facility]  [address] [place] [address]  [phone]   [facility]  [address] [place] [address]  [phone]   [address] [place] [address]  [phone]   [facility]  [address] [place] [address]  [phone]   [facility]  [address] [place] [address]  [phone]   [facility]  [address] [place] [address]  [phone]   [facility]  [address], [place] [address]  [phone]     If you HAVE Medical Assistance, an assessment can be scheduled at one of these providers:  [facility]  [address] [place] [address]  [phone]   [facility]  [address] [place] [address]  [phone]   [facility]  [address], 1st [address], [place] [address]  [phone]  [address] [place] [address], [address], [place] [address] [phone]   [address] [address]  [phone]   [facility]  [address] [place] [address]  [phone]   [facility] ([facility])  [address] [facility] [place] [address]  [phone]   [facility]  [address] [facility] [address] [facility] [address] [place] [address]  [phone]   [facility]  [address] [place] [address]  [phone]   [facility]  [address] [place] [address]  [phone]   [facility]  [address], [place] [address]  [phone]     If you HAVE Private Insurance, an assessment can be scheduled at one of these providers.  Please contact these Providers to determine if they are in your network plan:  [facility]  [address], [place] [address]  [phone]   [address] [address]  [phone]   [facility]  [address] [place] [address]  [phone]   [facility] ([facility])  [address] [facility] [place] [address]  [phone]   [facility]  [address] [place] [address]  [phone]   [facility]  [address], [place] [address]  [phone]     From the [facility] website [place]guides[place]opioid-epidemic/#GetNaloxone    How do I get naloxone?  Family members and friends can access naloxone by:    Obtaining a prescription from their family doctor  Using the standing order issued by Acting Physician General Eileen Villavicencio. A standing order is a prescription written  for the general public, rather than specifically for an individual.  To use the standing order, print it and take it with you to the pharmacy or have the digital version on your phone. Download the standing order from the Department of [facility] (PDF).    If you are unable to print it or use the digital version, the standing order is kept on file at many pharmacies. If a pharmacy does not have it on file, they may have the ability to look it up.    Naloxone prescriptions can be filled at most pharmacies. Although the medication might not be available for same-day pickup, it often can be ordered and available within a day or two.

## 2024-08-16 NOTE — CONSULTS
Patient is a family medicine patient please see family medicine consult for medical clearance to the [facility] dated 8/16/2024 at 1327.

## 2024-08-16 NOTE — ASSESSMENT & PLAN NOTE
Patient with multiple concerns regarding possible exposure to STD, as unsure of partners behavior   Hep C RNA negative on 8/9   UA unremarkable 8/6  GC, Chlamydia, RPR and HIV negative on 7/25    - will add on HIV and RPR to labs and repeat UA with GC/Chlamydia
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
## 2024-08-16 NOTE — ASSESSMENT & PLAN NOTE
Neurology on 5/17/24: She does currently follow with epilepsy team for history of PNES and migraines  Will plan to follow up with epilepsy team as scheduled in 5 months. Can follow up with neuromuscular team if needed. To contact the office sooner with any concerns or worsening symptoms.   Neurology 8/16/24 - consult: Agree with plan to continue Trileptal 300 mg twice daily for mood stabilization and seizure prevention and start Zyprexa 5 mg at bedtime for psychosis  -Agree with plan to titrate Zyprexa to 10 mg at bedtime on 8/18/2024     - Continue Tripleptal 300 mg BID for mood stabilization and seizure prevention as above

## 2024-08-16 NOTE — NURSING NOTE
Pt resting quietly in bed with easy respirations. No further complaints of GI cramps or distress. Pt appears to be sleeping comfortably at this time. Rounding continues.
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
## 2024-08-16 NOTE — NURSING NOTE
Patient given IM tygan for nausea.       18:14: IM tygan for nausea moderately effective. Patient reports feeling some decreased nausea but it still persists, but less so.
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
## 2024-08-16 NOTE — DISCHARGE INSTR - APPOINTMENTS
Marlyn our Behavioral Health Nurse Navigator, will be calling you after your discharge, on the phone number that you provided.  She will be available as an additional support, if needed.   If you wish to speak with Marlyn, you may contact her at [phone].

## 2024-08-16 NOTE — NURSING NOTE
Pt resting quietly in bed on reassessment; appears comfortable and will allow to sleep at this time. PRN Levsin/Zofran/Melatonin appear effective. Will assess further at HS med pass.

## 2024-08-16 NOTE — NURSING NOTE
Patient given PRN sublingual .125 levsin for abdominal cramping.     18:17: .125 levsin PRN sublingual not effective for abdominal cramping. Pt clearly in pain, and cx of abdominal cramps.

## 2024-08-16 NOTE — NURSING NOTE
Nursing attempting to obtain IV access without success. Patient is difficult stick, and appears dehydrated. Nursing supervisor to retrieve ultrasound to utilize.

## 2024-08-16 NOTE — NURSING NOTE
"Pt c/o pain \"Everywhere\" on reassessment of abdominal pain and states she still has GI cramps and requests PRN bentyl, given 20mg at 2118. Pt also accepted PRN tylenol 975mg and has taken scheduled Zanaflex. No further episodes of emesis.   "

## 2024-08-17 PROCEDURE — 87389 HIV-1 AG W/HIV-1&-2 AB AG IA: CPT

## 2024-08-17 PROCEDURE — 86780 TREPONEMA PALLIDUM: CPT

## 2024-08-17 PROCEDURE — 99232 SBSQ HOSP IP/OBS MODERATE 35: CPT | Performed by: PSYCHIATRY & NEUROLOGY

## 2024-08-17 RX ORDER — ACETAMINOPHEN 325 MG/1
975 TABLET ORAL EVERY 8 HOURS SCHEDULED
Status: DISCONTINUED | OUTPATIENT
Start: 2024-08-17 | End: 2024-08-23 | Stop reason: HOSPADM

## 2024-08-17 RX ADMIN — LORATADINE 10 MG: 10 TABLET ORAL at 09:47

## 2024-08-17 RX ADMIN — OXCARBAZEPINE 300 MG: 300 TABLET, FILM COATED ORAL at 09:48

## 2024-08-17 RX ADMIN — DICYCLOMINE HYDROCHLORIDE 20 MG: 20 TABLET ORAL at 09:53

## 2024-08-17 RX ADMIN — ACETAMINOPHEN 975 MG: 325 TABLET ORAL at 21:28

## 2024-08-17 RX ADMIN — OXCARBAZEPINE 300 MG: 300 TABLET, FILM COATED ORAL at 21:36

## 2024-08-17 RX ADMIN — TIZANIDINE 2 MG: 4 TABLET ORAL at 21:28

## 2024-08-17 RX ADMIN — DICYCLOMINE HYDROCHLORIDE 20 MG: 20 TABLET ORAL at 17:33

## 2024-08-17 RX ADMIN — Medication 400 MG: at 17:33

## 2024-08-17 RX ADMIN — OLANZAPINE 5 MG: 5 TABLET, FILM COATED ORAL at 21:35

## 2024-08-17 RX ADMIN — AMOXICILLIN AND CLAVULANATE POTASSIUM 1 TABLET: 875; 125 TABLET, FILM COATED ORAL at 09:54

## 2024-08-17 RX ADMIN — Medication 3 MG: at 21:35

## 2024-08-17 RX ADMIN — AMOXICILLIN AND CLAVULANATE POTASSIUM 1 TABLET: 875; 125 TABLET, FILM COATED ORAL at 21:28

## 2024-08-17 RX ADMIN — ACETAMINOPHEN 975 MG: 325 TABLET ORAL at 13:48

## 2024-08-17 NOTE — PROGRESS NOTES
Progress Note - Behavioral Health   Antionette Downing 38 y.o. female MRN: [id number]  Unit/Bed#: [facility] 341-01 Encounter: [id number]    Assessment & Plan   Principal Problem:    Schizoaffective disorder, bipolar type (HCC) r/o bipolar with psychotic features  Active Problems:    Asthma    Diverticulitis    Seizure disorder (HCC)    Possible exposure to STD      Behavior over the last 24 hours:  unchanged  Sleep: normal  Appetite: normal  Medication side effects: No  ROS: no complaints        Mental Status Evaluation:  Appearance:  age appropriate and disheveled   Behavior:  guarded   Speech:  normal pitch and normal volume   Mood:  constricted   Affect:  constricted   Thought Process:  tangential   Associations: tangential associations   Thought Content:  delusions  persecutory   Perceptual Disturbances: None   Risk Potential: Suicidal Ideations none  Homicidal Ideations none  Potential for Aggression No   Sensorium:  person, place, and time/date   Memory:  recent and remote memory grossly intact   Consciousness:  alert and awake    Attention: attention span appeared shorter than expected for age   Insight:  limited   Judgment: limited   Gait/Station: normal gait/station and normal balance   Motor Activity: no abnormal movements     Progress Toward Goals: Patient remains compliant with medications and denies side effects. According to saff report she received bentyl for abdominal pain which was  moderately effective. Hx of diverticulitis and family medicine is following up.Patient stayed in bed, reports 6/10 pain relief from bentyl 20mg PO PRN.   Today she  denies AVH/SI/HI . She appears as guarded, and irritable, due to having abdominal pain and discomfort. Patient reports being able to eat breakfast, and tolerated it well.   No other concerns reported.  Agrees to continue current treatment.  Recommended Treatment: Continue with group therapy, milieu therapy and occupational therapy.      Risks, benefits and  possible side effects of Medications:   Risks, benefits, and possible side effects of medications explained to patient and patient verbalizes understanding.      Medications: all current active meds have been reviewed, continue current psychiatric medications, and current meds:   Current Facility-Administered Medications   Medication Dose Route Frequency    acetaminophen (TYLENOL) tablet 650 mg  650 mg Oral Q6H PRN    acetaminophen (TYLENOL) tablet 650 mg  650 mg Oral Q4H PRN    acetaminophen (TYLENOL) tablet 975 mg  975 mg Oral Q8H OSIEL    albuterol (PROVENTIL HFA,VENTOLIN HFA) inhaler 2 puff  2 puff Inhalation Q4H PRN    aluminum-magnesium hydroxide-simethicone (MAALOX) oral suspension 30 mL  30 mL Oral Q4H PRN    amoxicillin-clavulanate (AUGMENTIN) 875-125 mg per tablet 1 tablet  1 tablet Oral Q12H OSIEL    benztropine (COGENTIN) injection 1 mg  1 mg Intramuscular BID PRN    benztropine (COGENTIN) tablet 1 mg  1 mg Oral BID PRN    budesonide-formoterol (SYMBICORT) 80-4.5 MCG/ACT inhaler 2 puff  2 puff Inhalation BID    Cholecalciferol (VITAMIN D3) tablet 2,000 Units  2,000 Units Oral Daily    cyanocobalamin (VITAMIN B-12) tablet 1,000 mcg  1,000 mcg Oral Daily    dicyclomine (BENTYL) tablet 20 mg  20 mg Oral Q6H PRN    hydrOXYzine HCL (ATARAX) tablet 50 mg  50 mg Oral Q6H PRN Max 4/day    Or    diphenhydrAMINE (BENADRYL) injection 50 mg  50 mg Intramuscular Q6H PRN    famotidine (PEPCID) tablet 20 mg  20 mg Oral BID PRN    hydrOXYzine HCL (ATARAX) tablet 100 mg  100 mg Oral Q6H PRN Max 4/day    Or    LORazepam (ATIVAN) injection 2 mg  2 mg Intramuscular Q6H PRN    hydrOXYzine HCL (ATARAX) tablet 25 mg  25 mg Oral Q6H PRN Max 4/day    hyoscyamine (LEVSIN/SL) SL tablet 0.125 mg  0.125 mg Sublingual Q4H PRN    loratadine (CLARITIN) tablet 10 mg  10 mg Oral Daily    LORazepam (ATIVAN) injection 2 mg  2 mg Intramuscular BID PRN    magnesium Oxide (MAG-OX) tablet 400 mg  400 mg Oral BID    melatonin tablet 3 mg  3 mg Oral  HS PRN    nicotine (NICODERM CQ) 7 mg/24hr TD 24 hr patch 1 patch  1 patch Transdermal Daily    OLANZapine (ZyPREXA) tablet 10 mg  10 mg Oral Q3H PRN Max 3/day    Or    OLANZapine (ZyPREXA) IM injection 10 mg  10 mg Intramuscular Q3H PRN Max 3/day    OLANZapine (ZyPREXA) tablet 5 mg  5 mg Oral Q3H PRN Max 6/day    Or    OLANZapine (ZyPREXA) IM injection 5 mg  5 mg Intramuscular Q3H PRN Max 6/day    OLANZapine (ZyPREXA) tablet 2.5 mg  2.5 mg Oral Q3H PRN Max 8/day    OLANZapine (ZyPREXA) tablet 5 mg  5 mg Oral HS    [START ON 8/18/2024] OLANZapine (ZyPREXA) tablet 5 mg  5 mg Oral HS    OXcarbazepine (TRILEPTAL) tablet 300 mg  300 mg Oral Q12H OSIEL    polyethylene glycol (MIRALAX) packet 17 g  17 g Oral Daily PRN    propranolol (INDERAL) tablet 10 mg  10 mg Oral Q8H PRN    senna-docusate sodium (SENOKOT S) 8.6-50 mg per tablet 1 tablet  1 tablet Oral Daily PRN    tiZANidine (ZANAFLEX) tablet 2 mg  2 mg Oral HS    trimethobenzamide (TIGAN) IM injection 200 mg  200 mg Intramuscular Q6H PRN   .    Labs: I have personally reviewed all pertinent laboratory/tests results. Most Recent Labs:   Lab Results   Component Value Date    WBC 8.01 08/16/2024    RBC 4.54 08/16/2024    HGB 14.6 08/16/2024    HCT 40.6 08/16/2024     08/16/2024    RDW 11.9 08/16/2024    NEUTROABS 5.63 08/16/2024    SODIUM 135 08/16/2024    K 3.8 08/16/2024     08/16/2024    CO2 22 08/16/2024    BUN 8 08/16/2024    CREATININE 0.84 08/16/2024    GLUC 171 (H) 08/16/2024    GLUF 92 04/29/2024    CALCIUM 9.5 08/16/2024    AST 15 08/16/2024    ALT 8 08/16/2024    ALKPHOS 54 08/16/2024    TP 7.1 08/16/2024    ALB 4.2 08/16/2024    TBILI 0.32 08/16/2024    CHOLESTEROL 157 04/27/2024    HDL 41 (L) 04/27/2024    TRIG 129 04/27/2024    LDLCALC 90 04/27/2024    NONHDLC 116 04/27/2024    VALPROICTOT <10.0 (L) 02/12/2023    LITHIUM 0.63 02/01/2024    AMMONIA 17 11/12/2019    TED3VQFSWBWI 1.208 08/16/2024    FREET4 1.24 02/01/2023    PREGSERUM Negative  05/16/2024    HCGQUANT <3 12/05/2019    RPR Non-Reactive 02/02/2023    HGBA1C 5.3 03/28/2024     03/28/2024       Counseling / Coordination of Care  Total floor / unit time spent today n/a minutes. Greater than 50% of total time was spent with the patient and / or family counseling and / or coordination of care. A description of the counseling / coordination of care:

## 2024-08-17 NOTE — NURSING NOTE
IV access established via ultrasound ER RN, and this RN hung 1L fluid bolus @ 500ml/hr. Patient currently has PCA sitting at bedside observing while bolus runs. Patient uncomfortable in bed due to stomach and emesis. Patient began dry heaving after sipping ginger ale after finishing starting fluids. Patient stopped and was okay after. Denies needing further assistance.

## 2024-08-17 NOTE — NURSING NOTE
"At 1957, patient reported \"all over\" pain. Tylenol 975 mg po prn given. VSS.  At 2040, requested and received Bentyl 20 mg.  At 2055, IV bolus completed and removed per family medicine instruction.   At 2057, Tylenol 975 mg po prn effective.    At 2100, HS medication compliant. Patient asking if \"I am going to die.\" She stated she smells sauerkraut and tastes metal. No further nausea or vomiting noted. She did maintain clear liquid diet.     At 2140, patient observed resting in bed with her eyes closed. Bentyl appears effective.     "

## 2024-08-17 NOTE — NURSING NOTE
Patient reports bentyl moderately effective. Patient is in bed, reports 6/10 pain relief from bentyl 20mg PO PRN. She appears uncomfortable in bed. She denies AVH/SI/HI today. She appears as guarded, and irritable. Patient reports being able to eat breakfast, and hold her fluids and meal. She is cooperative. Abdominal presents with- hyperactive bowel sounds, guarding. In all quadrants with worsening pain in the belly button area. Abdomen is soft. . Assessment performed with female RN present. Patient in behavioral control and denies having any further needs.

## 2024-08-17 NOTE — NURSING NOTE
"Patient approached paranoid about ginger ale provided to her. She states she drank the previous cup of 480ml, and ate all of her lunch and breakfast. She gave the cup of ginger ale this writer provided just now \"This tastes funny. It tastes like antibiotics. Like something was done to it.\" Patient was offered to witness 480mls of ginger ale being poured due to paranoia and to ensure patient is drinking.   "

## 2024-08-17 NOTE — PLAN OF CARE
Problem: SELF HARM/SUICIDALITY  Goal: Will have no self-injury during hospital stay  Description: INTERVENTIONS:  - Q 15 MINUTES: Routine safety checks  - Q WAKING SHIFT & PRN: Assess risk to determine if routine checks are adequate to maintain patient safety  - Encourage patient to participate actively in care by formulating a plan to combat response to suicidal ideation, identify supports and resources  Outcome: Progressing     Problem: DEAN  Goal: Will exhibit normal sleep and speech and no impulsivity  Description: INTERVENTIONS:  - Administer medication as ordered  - Set limits on impulsive behavior  - Make attempts to decrease external stimuli as possible  Outcome: Progressing

## 2024-08-17 NOTE — NURSING NOTE
Patient denies Avh/SI/HI. She is paranoid, irritable, and suspicious but medication compliant. She is cooperative. She did not wish to engage much further with this writer, requesting and stating she did not feel well and wanted to rest.

## 2024-08-18 PROCEDURE — 99231 SBSQ HOSP IP/OBS SF/LOW 25: CPT | Performed by: STUDENT IN AN ORGANIZED HEALTH CARE EDUCATION/TRAINING PROGRAM

## 2024-08-18 RX ADMIN — AMOXICILLIN AND CLAVULANATE POTASSIUM 1 TABLET: 875; 125 TABLET, FILM COATED ORAL at 21:08

## 2024-08-18 RX ADMIN — AMOXICILLIN AND CLAVULANATE POTASSIUM 1 TABLET: 875; 125 TABLET, FILM COATED ORAL at 09:36

## 2024-08-18 RX ADMIN — OLANZAPINE 5 MG: 5 TABLET, FILM COATED ORAL at 21:08

## 2024-08-18 RX ADMIN — DICYCLOMINE HYDROCHLORIDE 20 MG: 20 TABLET ORAL at 21:08

## 2024-08-18 RX ADMIN — DICYCLOMINE HYDROCHLORIDE 20 MG: 20 TABLET ORAL at 09:36

## 2024-08-18 RX ADMIN — DICYCLOMINE HYDROCHLORIDE 20 MG: 20 TABLET ORAL at 15:13

## 2024-08-18 RX ADMIN — OXCARBAZEPINE 300 MG: 300 TABLET, FILM COATED ORAL at 09:36

## 2024-08-18 RX ADMIN — TIZANIDINE 2 MG: 4 TABLET ORAL at 21:08

## 2024-08-18 RX ADMIN — ACETAMINOPHEN 975 MG: 325 TABLET ORAL at 06:12

## 2024-08-18 RX ADMIN — OXCARBAZEPINE 300 MG: 300 TABLET, FILM COATED ORAL at 21:08

## 2024-08-18 RX ADMIN — ACETAMINOPHEN 650 MG: 325 TABLET ORAL at 19:36

## 2024-08-18 RX ADMIN — LORATADINE 10 MG: 10 TABLET ORAL at 09:36

## 2024-08-18 RX ADMIN — Medication 400 MG: at 17:13

## 2024-08-18 RX ADMIN — Medication 2000 UNITS: at 09:36

## 2024-08-18 NOTE — NURSING NOTE
Patient remained isolative to room and bed throughout the evening. Irritable upon approach. Denied any unmet needs. HS medication compliant.

## 2024-08-18 NOTE — PROGRESS NOTES
Progress Note - Behavioral Health   Antionette Downing 38 y.o. female MRN: [id number]  Unit/Bed#: [facility] 341-01 Encounter: [id number]    Assessment & Plan   Principal Problem:    Schizoaffective disorder, bipolar type (HCC) r/o bipolar with psychotic features  Active Problems:    Asthma    Diverticulitis    Seizure disorder (HCC)    Possible exposure to STD      Behavior over the last 24 hours:  unchanged  Sleep: normal  Appetite: normal  Medication side effects: No  ROS: no complaints  Patient was examined today at the bedside during morning rounds.  Patient continued to be isolative to herself and continued to require inpatient treatment as she is showing signs of symptoms of depression and psychosis.       Mental Status Evaluation:  Appearance:  age appropriate and disheveled   Behavior:  guarded   Speech:  normal pitch and normal volume   Mood:  constricted   Affect:  constricted   Thought Process:  tangential   Associations: tangential associations   Thought Content:  delusions  persecutory   Perceptual Disturbances: None   Risk Potential: Suicidal Ideations none  Homicidal Ideations none  Potential for Aggression No   Sensorium:  person, place, and time/date   Memory:  recent and remote memory grossly intact   Consciousness:  alert and awake    Attention: attention span appeared shorter than expected for age   Insight:  limited   Judgment: limited   Gait/Station: normal gait/station and normal balance   Motor Activity: no abnormal movements     Progress Toward Goals: No progress in treatment today    Recommended Treatment: Continue with group therapy, milieu therapy and occupational therapy.      Risks, benefits and possible side effects of Medications:   Risks, benefits, and possible side effects of medications explained to patient and patient verbalizes understanding.      Medications: all current active meds have been reviewed, continue current psychiatric medications, and current meds:   Current  Facility-Administered Medications   Medication Dose Route Frequency    acetaminophen (TYLENOL) tablet 650 mg  650 mg Oral Q6H PRN    acetaminophen (TYLENOL) tablet 650 mg  650 mg Oral Q4H PRN    acetaminophen (TYLENOL) tablet 975 mg  975 mg Oral Q8H OSIEL    albuterol (PROVENTIL HFA,VENTOLIN HFA) inhaler 2 puff  2 puff Inhalation Q4H PRN    aluminum-magnesium hydroxide-simethicone (MAALOX) oral suspension 30 mL  30 mL Oral Q4H PRN    amoxicillin-clavulanate (AUGMENTIN) 875-125 mg per tablet 1 tablet  1 tablet Oral Q12H OSIEL    benztropine (COGENTIN) injection 1 mg  1 mg Intramuscular BID PRN    benztropine (COGENTIN) tablet 1 mg  1 mg Oral BID PRN    budesonide-formoterol (SYMBICORT) 80-4.5 MCG/ACT inhaler 2 puff  2 puff Inhalation BID    Cholecalciferol (VITAMIN D3) tablet 2,000 Units  2,000 Units Oral Daily    cyanocobalamin (VITAMIN B-12) tablet 1,000 mcg  1,000 mcg Oral Daily    dicyclomine (BENTYL) tablet 20 mg  20 mg Oral Q6H PRN    hydrOXYzine HCL (ATARAX) tablet 50 mg  50 mg Oral Q6H PRN Max 4/day    Or    diphenhydrAMINE (BENADRYL) injection 50 mg  50 mg Intramuscular Q6H PRN    famotidine (PEPCID) tablet 20 mg  20 mg Oral BID PRN    hydrOXYzine HCL (ATARAX) tablet 100 mg  100 mg Oral Q6H PRN Max 4/day    Or    LORazepam (ATIVAN) injection 2 mg  2 mg Intramuscular Q6H PRN    hydrOXYzine HCL (ATARAX) tablet 25 mg  25 mg Oral Q6H PRN Max 4/day    hyoscyamine (LEVSIN/SL) SL tablet 0.125 mg  0.125 mg Sublingual Q4H PRN    loratadine (CLARITIN) tablet 10 mg  10 mg Oral Daily    LORazepam (ATIVAN) injection 2 mg  2 mg Intramuscular BID PRN    magnesium Oxide (MAG-OX) tablet 400 mg  400 mg Oral BID    melatonin tablet 3 mg  3 mg Oral HS PRN    nicotine (NICODERM CQ) 7 mg/24hr TD 24 hr patch 1 patch  1 patch Transdermal Daily    OLANZapine (ZyPREXA) tablet 10 mg  10 mg Oral Q3H PRN Max 3/day    Or    OLANZapine (ZyPREXA) IM injection 10 mg  10 mg Intramuscular Q3H PRN Max 3/day    OLANZapine (ZyPREXA) tablet 5 mg  5  mg Oral Q3H PRN Max 6/day    Or    OLANZapine (ZyPREXA) IM injection 5 mg  5 mg Intramuscular Q3H PRN Max 6/day    OLANZapine (ZyPREXA) tablet 2.5 mg  2.5 mg Oral Q3H PRN Max 8/day    OLANZapine (ZyPREXA) tablet 5 mg  5 mg Oral HS    OXcarbazepine (TRILEPTAL) tablet 300 mg  300 mg Oral Q12H OSIEL    polyethylene glycol (MIRALAX) packet 17 g  17 g Oral Daily PRN    propranolol (INDERAL) tablet 10 mg  10 mg Oral Q8H PRN    senna-docusate sodium (SENOKOT S) 8.6-50 mg per tablet 1 tablet  1 tablet Oral Daily PRN    tiZANidine (ZANAFLEX) tablet 2 mg  2 mg Oral HS    trimethobenzamide (TIGAN) IM injection 200 mg  200 mg Intramuscular Q6H PRN   .    Labs: I have personally reviewed all pertinent laboratory/tests results. Most Recent Labs:   Lab Results   Component Value Date    WBC 8.01 08/16/2024    RBC 4.54 08/16/2024    HGB 14.6 08/16/2024    HCT 40.6 08/16/2024     08/16/2024    RDW 11.9 08/16/2024    NEUTROABS 5.63 08/16/2024    SODIUM 135 08/16/2024    K 3.8 08/16/2024     08/16/2024    CO2 22 08/16/2024    BUN 8 08/16/2024    CREATININE 0.84 08/16/2024    GLUC 171 (H) 08/16/2024    GLUF 92 04/29/2024    CALCIUM 9.5 08/16/2024    AST 15 08/16/2024    ALT 8 08/16/2024    ALKPHOS 54 08/16/2024    TP 7.1 08/16/2024    ALB 4.2 08/16/2024    TBILI 0.32 08/16/2024    CHOLESTEROL 157 04/27/2024    HDL 41 (L) 04/27/2024    TRIG 129 04/27/2024    LDLCALC 90 04/27/2024    NONHDLC 116 04/27/2024    VALPROICTOT <10.0 (L) 02/12/2023    LITHIUM 0.63 02/01/2024    AMMONIA 17 11/12/2019    [id number] 1.208 08/16/2024    FREET4 1.24 02/01/2023    PREGSERUM Negative 05/16/2024    HCGQUANT <3 12/05/2019    RPR Non-Reactive 02/02/2023    HGBA1C 5.3 03/28/2024     03/28/2024       Counseling / Coordination of Care  Total floor / unit time spent today 15 minutes. Greater than 50% of total time was spent with the patient and / or family counseling and / or coordination of care. A description of the counseling /  coordination of care:

## 2024-08-18 NOTE — NURSING NOTE
At 2135, patient received melatonin 3 mg po prn and at 2235, melatonin 3 mg po prn appears effective when reassessed.

## 2024-08-18 NOTE — NURSING NOTE
Patient had belongings dropped off by . Patient pleasant, cooperative, making jokes. Patient was on phone today with  without issue.

## 2024-08-18 NOTE — NURSING NOTE
Patient c/o of stomach pain. Given bentyl PO PRN. See flowsheet for pain assessment. Patient reports stomach pain improved from yesterday. Patient was able to eat some of her breakfast (She reports it tasting funny and not good) ate 25% per her, and drank her 3 coffees. No nausea currently, or emesis overnight or currently.     10:36: Bentyl PO PRN 20mg patient reports was effective for abdominal pain. See flowsheet for pain reasessment.

## 2024-08-18 NOTE — NURSING NOTE
"Patient reports not having BM in 4+ days (prior to admission cannot recall when last had BM) patient's abdomen is soft, is nontender upon palpation, and bowel sounds are active, she reports feeling \"movement and gas down there\" currently, she is ambulating without pain or guarding compared to previous days, reports significant relief of pain, she is eating and drinking without nausea.     She reports this is consistent with her having ibs mixed- current phase constipation per her, and that it is normal for her. Nausea. She reports especially with a potential diverticulitis flare up while here, and bouts of emesis in previous days. Angelica Moore of Family medicine made aware at 18:40. Provider reported continue to monitor and perform enema tomorrow if no BM tomorrow.   " Never smoker

## 2024-08-18 NOTE — NURSING NOTE
Patient had no emesis overnight or today. She denies AVH/SI/HI. Slightly guarded on approach but brightens with conversation and is laughing with improved eye contact. She is cooperative with medications. She is drinking fine.

## 2024-08-19 PROCEDURE — 99232 SBSQ HOSP IP/OBS MODERATE 35: CPT | Performed by: PSYCHIATRY & NEUROLOGY

## 2024-08-19 PROCEDURE — 87491 CHLMYD TRACH DNA AMP PROBE: CPT

## 2024-08-19 PROCEDURE — 87591 N.GONORRHOEAE DNA AMP PROB: CPT

## 2024-08-19 RX ORDER — POLYETHYLENE GLYCOL 3350 17 G/17G
17 POWDER, FOR SOLUTION ORAL DAILY
Status: DISPENSED | OUTPATIENT
Start: 2024-08-19 | End: 2024-08-22

## 2024-08-19 RX ORDER — OLANZAPINE 5 MG/1
5 TABLET ORAL
Status: DISCONTINUED | OUTPATIENT
Start: 2024-08-19 | End: 2024-08-20

## 2024-08-19 RX ORDER — OLANZAPINE 10 MG/1
10 TABLET ORAL
Status: DISCONTINUED | OUTPATIENT
Start: 2024-08-19 | End: 2024-08-19

## 2024-08-19 RX ORDER — AMOXICILLIN 250 MG
2 CAPSULE ORAL 2 TIMES DAILY
Status: DISCONTINUED | OUTPATIENT
Start: 2024-08-19 | End: 2024-08-23 | Stop reason: HOSPADM

## 2024-08-19 RX ADMIN — SENNOSIDES AND DOCUSATE SODIUM 2 TABLET: 50; 8.6 TABLET ORAL at 11:28

## 2024-08-19 RX ADMIN — OXCARBAZEPINE 300 MG: 300 TABLET, FILM COATED ORAL at 21:05

## 2024-08-19 RX ADMIN — LORATADINE 10 MG: 10 TABLET ORAL at 08:04

## 2024-08-19 RX ADMIN — OXCARBAZEPINE 300 MG: 300 TABLET, FILM COATED ORAL at 08:04

## 2024-08-19 RX ADMIN — DICYCLOMINE HYDROCHLORIDE 20 MG: 20 TABLET ORAL at 08:23

## 2024-08-19 RX ADMIN — AMOXICILLIN AND CLAVULANATE POTASSIUM 1 TABLET: 875; 125 TABLET, FILM COATED ORAL at 08:04

## 2024-08-19 RX ADMIN — TIZANIDINE 2 MG: 4 TABLET ORAL at 21:18

## 2024-08-19 RX ADMIN — OLANZAPINE 5 MG: 5 TABLET, FILM COATED ORAL at 21:05

## 2024-08-19 RX ADMIN — HYOSCYAMINE SULFATE 0.12 MG: 0.12 TABLET SUBLINGUAL at 14:08

## 2024-08-19 RX ADMIN — Medication 2000 UNITS: at 08:04

## 2024-08-19 RX ADMIN — AMOXICILLIN AND CLAVULANATE POTASSIUM 1 TABLET: 875; 125 TABLET, FILM COATED ORAL at 21:06

## 2024-08-19 RX ADMIN — Medication 400 MG: at 08:04

## 2024-08-19 RX ADMIN — ALUMINUM HYDROXIDE, MAGNESIUM HYDROXIDE, DIMETHICONE 30 ML: 200; 200; 20 LIQUID ORAL at 09:05

## 2024-08-19 RX ADMIN — ACETAMINOPHEN 975 MG: 325 TABLET ORAL at 14:08

## 2024-08-19 RX ADMIN — ACETAMINOPHEN 975 MG: 325 TABLET ORAL at 21:05

## 2024-08-19 RX ADMIN — POLYETHYLENE GLYCOL 3350 17 G: 17 POWDER, FOR SOLUTION ORAL at 11:28

## 2024-08-19 RX ADMIN — Medication 3 MG: at 21:06

## 2024-08-19 RX ADMIN — ACETAMINOPHEN 975 MG: 325 TABLET ORAL at 06:12

## 2024-08-19 RX ADMIN — DICYCLOMINE HYDROCHLORIDE 20 MG: 20 TABLET ORAL at 21:19

## 2024-08-19 RX ADMIN — DICYCLOMINE HYDROCHLORIDE 20 MG: 20 TABLET ORAL at 14:24

## 2024-08-19 RX ADMIN — CYANOCOBALAMIN TAB 1000 MCG 1000 MCG: 1000 TAB at 08:04
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
## 2024-08-19 NOTE — PROGRESS NOTES
"   08/19/24 0826   Team Meeting   Meeting Type Daily Rounds   Team Members Present   Team Members Present Physician;;Nurse   Physician Team Member Lilo   Nursing Team Member Echotrena   Care Management Team Member Melodie   Patient/Family Present   Patient Present No   Patient's Family Present No     Pt denies SI/HI/AVH. Pt remains in behavioral control. Pt is medication and meal compliant. Pt was suspicious of ginger ale having something in it such as \"anti-biotics\" according to Pt. Pt complained of stomach pain throughout the weekend. Pt was given Bentyl through out the weekend. Pt's discharge is pending. Pt was placed on I & O's.   "

## 2024-08-19 NOTE — NURSING NOTE
At 1936, patient requested and received tylenol 650 mg po prn for moderate abdominal pain.   At 2036, patient reported tylenol effective.     Patient remained withdrawn to her room and bed, visible for needs only. Depressed mood with an irritable edge during engagement. Affect congruent. Held scheduled tylenol per patients request since she received a prn dose. Compliant with rest. Bentyl 20 mg po -prn given at 2108 for stomach cramps.    At 2138, patient reported Bentyl 20 mg po prn effective for stomach cramps.

## 2024-08-19 NOTE — NURSING NOTE
Patient bowel sounds active in all quadrants- normoactive. She does not display guarding this AM during assessment of abdomen. She does not report pain when pushing on abdomen. Her abdomen is soft and tender. She reports it feels better.

## 2024-08-19 NOTE — NURSING NOTE
Pt withdrawn to her room resting in bed. Out for meals and needs. Compliant with ordered miralax and senna. Pt received bentyl 20 mg for abdominal pain @ 0823 and reports it was effective. Denies SI/HI/AH/VH. Guarded with irritable edge at times. Denies any unmet needs or complaints.

## 2024-08-20 ENCOUNTER — PATIENT OUTREACH (OUTPATIENT)
Dept: FAMILY MEDICINE CLINIC | Facility: CLINIC | Age: 39
End: 2024-08-20

## 2024-08-20 LAB
C TRACH DNA SPEC QL NAA+PROBE: NEGATIVE
N GONORRHOEA DNA SPEC QL NAA+PROBE: NEGATIVE

## 2024-08-20 PROCEDURE — 99232 SBSQ HOSP IP/OBS MODERATE 35: CPT | Performed by: PSYCHIATRY & NEUROLOGY

## 2024-08-20 RX ORDER — OXCARBAZEPINE 300 MG/1
300 TABLET, FILM COATED ORAL EVERY 12 HOURS SCHEDULED
Status: DISCONTINUED | OUTPATIENT
Start: 2024-08-20 | End: 2024-08-23 | Stop reason: HOSPADM

## 2024-08-20 RX ORDER — OLANZAPINE 10 MG/1
10 TABLET ORAL
Status: DISCONTINUED | OUTPATIENT
Start: 2024-08-20 | End: 2024-08-23 | Stop reason: HOSPADM

## 2024-08-20 RX ADMIN — CYANOCOBALAMIN TAB 1000 MCG 1000 MCG: 1000 TAB at 08:06

## 2024-08-20 RX ADMIN — DICYCLOMINE HYDROCHLORIDE 20 MG: 20 TABLET ORAL at 18:56

## 2024-08-20 RX ADMIN — OLANZAPINE 10 MG: 10 TABLET, FILM COATED ORAL at 21:23

## 2024-08-20 RX ADMIN — AMOXICILLIN AND CLAVULANATE POTASSIUM 1 TABLET: 875; 125 TABLET, FILM COATED ORAL at 08:06

## 2024-08-20 RX ADMIN — AMOXICILLIN AND CLAVULANATE POTASSIUM 1 TABLET: 875; 125 TABLET, FILM COATED ORAL at 21:18

## 2024-08-20 RX ADMIN — LORATADINE 10 MG: 10 TABLET ORAL at 08:06

## 2024-08-20 RX ADMIN — OXCARBAZEPINE 300 MG: 300 TABLET, FILM COATED ORAL at 21:18

## 2024-08-20 RX ADMIN — Medication 400 MG: at 17:55

## 2024-08-20 RX ADMIN — ACETAMINOPHEN 975 MG: 325 TABLET ORAL at 06:26

## 2024-08-20 RX ADMIN — DICYCLOMINE HYDROCHLORIDE 20 MG: 20 TABLET ORAL at 12:32

## 2024-08-20 RX ADMIN — Medication 2000 UNITS: at 08:06

## 2024-08-20 RX ADMIN — ACETAMINOPHEN 975 MG: 325 TABLET ORAL at 13:34

## 2024-08-20 RX ADMIN — ACETAMINOPHEN 975 MG: 325 TABLET ORAL at 21:18

## 2024-08-20 RX ADMIN — DICYCLOMINE HYDROCHLORIDE 20 MG: 20 TABLET ORAL at 06:26

## 2024-08-20 RX ADMIN — OXCARBAZEPINE 300 MG: 300 TABLET, FILM COATED ORAL at 08:06

## 2024-08-20 RX ADMIN — Medication 400 MG: at 08:06

## 2024-08-20 RX ADMIN — TIZANIDINE 2 MG: 4 TABLET ORAL at 21:18
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
## 2024-08-20 NOTE — PROGRESS NOTES
08/20/24 0845   Team Meeting   Meeting Type Daily Rounds   Team Members Present   Team Members Present Physician;;Nurse   Physician Team Member Lilo   [facility]   Care Management Team Member Melodie   Patient/Family Present   Patient Present No   Patient's Family Present No     Pt is going to attend [place] this morning. Pt was observed being secluisve to her room. Pt is medication and meal compliant. Pt refused stool softener and Miralax, pt reported she had a BM.

## 2024-08-20 NOTE — NURSING NOTE
Patient reports that PRN of Bentyl 20 mg po has not been effective. Continues to report 3/10 abdominal pain/cramping.

## 2024-08-20 NOTE — NURSING NOTE
Patient isolative to room throughout the evening. Cooperative with routine. HS medication compliant. Requested and received Bentyl 20 mg po prn at 2119.  At 2219, patient reported bentyl effective.

## 2024-08-20 NOTE — PROGRESS NOTES
"JAEL SANFORD received in-basket message from Katy Meng, RN stating that pt has been referred to [facility] for review. JAEL SANFORD was requested to follow up with pt. JAEL SANFORD completed chart review and noted that pt is currently in-patient at this time. JAEL SANFORD noted in chart review that pt had mentioned that she did not want OP CM involved with her as \"she does not want too many people involved with her because it becomes over whelming.\"     JAEL SANFORD will follow up with pt after d/c to see if pt is open to services at that time. JAEL SANFORD will continue to be available for any additional needs as requested.    "

## 2024-08-20 NOTE — NURSING NOTE
Patient c/o 3/10 abdominal pain/cramping. Requested and given PRN of Bentyl 20 mg po. Will monitor effectiveness.

## 2024-08-20 NOTE — SOCIAL WORK
"Cm and Pt attended [place] court this morning for Pt's 9 year old son. Pt was appropriate through out the court hearing. Pt was asked to speak during the hearing and did. Pt reported she is currently hospitalized and plans on being discharged tomorrow or Thursday this week. Pt reported she will continue her regular OP through preventative measures. Pt reported to the court that she did not require any medication adjustments that the only reason she is in the hospital is because of the things her  put her through. Pt finished the court hearing.     Cm and Pt spoke regarding clarifying Pt's discharge date. Cm educated Pt that she does not have a discharge date yet. Pt understood. Cm encouraged Pt to get involved with OP case management and Pt refused. Pt reported she does not want \"too many people\" involved with her because it becomes over whelming. Cm offered [facility]. Pt declined all resources and reported \"I want to see where his head is at first\" referring to . Pt reported she wants to return home.     "

## 2024-08-20 NOTE — NURSING NOTE
Visible intermittently. Mainly seclusive to self. Guarded during interaction. No behavioral issues or complaints. Appears a bit preoccupied at times. Denying symptoms. Hopeful for discharge soon. Refused scheduled Miralax and Senokot. Said that LBM was last evening.

## 2024-08-21 PROCEDURE — 99232 SBSQ HOSP IP/OBS MODERATE 35: CPT | Performed by: PSYCHIATRY & NEUROLOGY

## 2024-08-21 RX ADMIN — CYANOCOBALAMIN TAB 1000 MCG 1000 MCG: 1000 TAB at 08:22

## 2024-08-21 RX ADMIN — OLANZAPINE 10 MG: 10 TABLET, FILM COATED ORAL at 21:11

## 2024-08-21 RX ADMIN — Medication 400 MG: at 08:22

## 2024-08-21 RX ADMIN — DICYCLOMINE HYDROCHLORIDE 20 MG: 20 TABLET ORAL at 15:45

## 2024-08-21 RX ADMIN — DICYCLOMINE HYDROCHLORIDE 20 MG: 20 TABLET ORAL at 02:43

## 2024-08-21 RX ADMIN — LORATADINE 10 MG: 10 TABLET ORAL at 08:22

## 2024-08-21 RX ADMIN — DICYCLOMINE HYDROCHLORIDE 20 MG: 20 TABLET ORAL at 08:48

## 2024-08-21 RX ADMIN — Medication 2000 UNITS: at 08:22

## 2024-08-21 RX ADMIN — TIZANIDINE 2 MG: 4 TABLET ORAL at 21:11

## 2024-08-21 RX ADMIN — DICYCLOMINE HYDROCHLORIDE 20 MG: 20 TABLET ORAL at 21:10

## 2024-08-21 RX ADMIN — OXCARBAZEPINE 300 MG: 300 TABLET, FILM COATED ORAL at 21:11

## 2024-08-21 RX ADMIN — Medication 400 MG: at 17:24

## 2024-08-21 RX ADMIN — OXCARBAZEPINE 300 MG: 300 TABLET, FILM COATED ORAL at 08:22

## 2024-08-21 NOTE — NURSING NOTE
About the unit at times and interacting with select peers. Denies symptoms. Slight irritable edge and demanding at times. Refused Miralax and Senokot stating that she doesn't need it. Reports LBM was last evening. Also refused Symbicort. Hopeful for discharge on Friday.

## 2024-08-21 NOTE — NURSING NOTE
Patient approached nursing station with complaint of abdominal cramps, requested/received PRN Bentyl 20mg PO at 0243 and saltine crackers and returned to room.       0343- PRN Bentyl 20mg PO effective. No further complaints reported.

## 2024-08-21 NOTE — PROGRESS NOTES
08/21/24 0865   Team Meeting   Meeting Type Daily Rounds   Team Members Present   Team Members Present Physician;;Nurse   Physician Team Member Lilo   [facility]ing [facility] Member Melodie   Patient/Family Present   Patient Present No   Patient's Family Present No     Pt's Zyprexa was increased last night. Pt has visibly improved. Pt denies SI/HI/AVH. Pt is planned for discharge Friday.

## 2024-08-21 NOTE — PROGRESS NOTES
"Progress Note - Behavioral Health   Antionette Downing 38 y.o. female MRN: [id number]  Unit/Bed#: [facility] 341-01 Encounter: [id number]    Assessment & Plan   Principal Problem:    Schizoaffective disorder, bipolar type (HCC) r/o bipolar with psychotic features  Active Problems:    Asthma    Diverticulitis    Seizure disorder (HCC)    Possible exposure to STD      Recommended Treatment:   Current medications:  Continue Trileptal 300 mg twice daily for mood stabilization  Continue Zyprexa 10 mg nightly for psychotic symptoms  Start MiraLAX once daily for 3 total doses due to constipation  Start Senokot-S 2 tablets twice daily for constipation    Continue with pharmacotherapy, group therapy, milieu therapy and occupational therapy.  Continue to assess for adverse medication side effects.  Encourage Antionette Downing to participate in nonverbal forms of therapy including journaling and art/music therapy.  Continue frequent safety checks and vitals per unit protocol.  Continue to engage CM/SW to assist with collateral, disposition planning, and the implementation of an individualized, patient-centered plan of care.  Continue medical management by medical team.  Case discussed with treatment team.    Legal Status: 303  ------------------------------------------------------------    Subjective: All documentation including nursing notes, medication history to ensure medication adherence on the unit, labs, and vitals were reviewed. Antionette was evaluated this morning for continuity of care and no acute distress noted throughout the evaluation. Over the past 24 hours per nursing report, Antionette has been cooperative on the unit and compliant with medications.      Today, Antionette is consenting for safety on the unit. Antionette reports feeling \" good.\" Antionette notes having normal sleep. Antionette states having a normal appetite. Antionette has been compliant taking the medications as prescribed and reporting no side " "effects.    Antionette denies suicidal ideations. Antionette denies homicidal ideations. Regarding hallucinations, Antionette is denying auditory visual hallucinations.    PRNs overnight: Bentyl  VS: Reviewed, within normal limits    Progress Toward Goals: Continues slow improvement    Psychiatric Review of Systems:  Behavior over the last 24 hours:  improved  Sleep: normal  Appetite: normal  Medication side effects: No   ROS: all other systems are negative    Vital signs in last 24 hours:  Temp:  [97.5 °F (36.4 °C)-97.7 °F (36.5 °C)] 97.5 °F (36.4 °C)  HR:  [65-67] 67  Resp:  [16] 16  BP: (108-111)/(53) 108/53    Laboratory results:  I have personally reviewed all pertinent laboratory/tests results.  Recent Results (from the past 48 hour(s))   Chlamydia/GC amplified DNA by PCR    Collection Time: 08/19/24 10:10 AM    Specimen: Urine, Other   Result Value Ref Range    N gonorrhoeae, DNA Probe Negative Negative    Chlamydia trachomatis, DNA Probe Negative Negative         Mental Status Evaluation:    Appearance:  age appropriate, casually dressed, dressed appropriately, adequate grooming, looks older than stated age, overtly  female   Behavior:  pleasant, cooperative, slightly guarded   Speech:  normal rate and volume   Mood:  \"Good\"   Affect:  constricted, brighter at times   Thought Process:  organized, goal directed, linear   Associations: intact associations   Thought Content:  no overt delusions   Perceptual Disturbances: Denies auditory or visual hallucinations and Does not appear to be responding to internal stimuli   Risk Potential: Suicidal ideation - None at present  Homicidal ideation - None at present  Potential for aggression - No   Sensorium:  oriented to person, place, and time/date   Memory:  recent and remote memory grossly intact   Consciousness:  alert and awake   Attention/Concentration: attention span and concentration are age appropriate   Insight:  improving   Judgment: improving "   Gait/Station: normal gait/station   Motor Activity: no abnormal movements       Current Medications:  Current Facility-Administered Medications   Medication Dose Route Frequency Provider Last Rate    acetaminophen  650 mg Oral Q6H PRN Laura Sid, DO      acetaminophen  650 mg Oral Q4H PRN Laura Sid, DO      acetaminophen  975 mg Oral Q8H [facility] Summer Loyd MD      albuterol  2 puff Inhalation Q4H PRN Laura Sid, DO      aluminum-magnesium hydroxide-simethicone  30 mL Oral Q4H PRN Laura Sid, DO      benztropine  1 mg Intramuscular BID PRN Laura Sid, DO      benztropine  1 mg Oral BID PRN Laura Sid, DO      budesonide-formoterol  2 puff Inhalation BID Laura Sid, DO      Cholecalciferol  2,000 Units Oral Daily Laura Sid, DO      vitamin B-12  1,000 mcg Oral Daily Laura Sid, DO      dicyclomine  20 mg Oral Q6H PRN Laura Sid, DO      hydrOXYzine HCL  50 mg Oral Q6H PRN Max 4/day Laura Sid, DO      Or    diphenhydrAMINE  50 mg Intramuscular Q6H PRN Laura Sid, DO      famotidine  20 mg Oral BID PRN Laura Sid, DO      hydrOXYzine HCL  100 mg Oral Q6H PRN Max 4/day Laura Sid, DO      Or    LORazepam  2 mg Intramuscular Q6H PRN Laura Sid, DO      hydrOXYzine HCL  25 mg Oral Q6H PRN Max 4/day Laura Sid, DO      hyoscyamine  0.125 mg Sublingual Q4H PRN Laura Sid, DO      loratadine  10 mg Oral Daily Laura Sid, DO      LORazepam  2 mg Intramuscular BID PRN Laura Sid, DO      magnesium Oxide  400 mg Oral BID Laura Sid, DO      melatonin  3 mg Oral HS PRN Laura Sid, DO      OLANZapine  10 mg Oral Q3H PRN Max 3/day Laura Sid, DO      Or    OLANZapine  10 mg Intramuscular Q3H PRN Max 3/day Laura Sid, DO      OLANZapine  5 mg Oral Q3H PRN Max 6/day Laura Sid, DO      Or    OLANZapine  5 mg  Intramuscular Q3H PRN Max 6/day Laura Sid, DO      OLANZapine  10 mg Oral HS Laura Sid, DO      OLANZapine  2.5 mg Oral Q3H PRN Max 8/day Laura Sid, DO      OXcarbazepine  300 mg Oral Q12H [facility] Laura Sid, DO      polyethylene glycol  17 g Oral Daily PRN Laura Sid, DO      polyethylene glycol  17 g Oral Daily Amaury Ndiaye, DO      propranolol  10 mg Oral Q8H PRN Laura Sid, DO      senna-docusate sodium  1 tablet Oral Daily PRN Laura Sid, DO      senna-docusate sodium  2 tablet Oral BID Amaury Ndiaye, DO      tiZANidine  2 mg Oral HS Laura Sid, DO      trimethobenzamide  200 mg Intramuscular Q6H PRN MD Amaury Price, DO 08/21/24  Psychiatry Resident, PGY-II    This note was completed in part utilizing Dragon dictation Software. Grammatical, translation, syntax errors, random word insertions, spelling mistakes, and incomplete sentences may be an occasional consequence of this system secondary to software limitations with voice recognition, ambient noise, and hardware issues. If you have any questions or concerns about the content, text, or information contained within the body of this dictation, please contact the provider for clarification.     This note was not shared with the patient due to reasonable likelihood of causing patient harm

## 2024-08-21 NOTE — NURSING NOTE
Antionette visible on unit socializing with peers, attended group and participated. Patient denies all psychiatric symptoms at this time, compliant with medication and unit routine.

## 2024-08-21 NOTE — NURSING NOTE
Pt visible, withdrawn to slef,  mood is labile but pt mostly appears irritable.  Pt denies HI/SI/AVH, compliant with meds and present for dinner.  Pt is dressed in personal attire.  No remarkable behaviors observed.    total assistance

## 2024-08-21 NOTE — PLAN OF CARE
Problem: SELF HARM/SUICIDALITY  Goal: Will have no self-injury during hospital stay  Description: INTERVENTIONS:  - Q 15 MINUTES: Routine safety checks  - Q WAKING SHIFT & PRN: Assess risk to determine if routine checks are adequate to maintain patient safety  - Encourage patient to participate actively in care by formulating a plan to combat response to suicidal ideation, identify supports and resources  Outcome: Progressing     Problem: DEAN  Goal: Will exhibit normal sleep and speech and no impulsivity  Description: INTERVENTIONS:  - Administer medication as ordered  - Set limits on impulsive behavior  - Make attempts to decrease external stimuli as possible  Outcome: Progressing     Problem: PSYCHOSIS  Goal: Will report no hallucinations or delusions  Description: Interventions:  - Administer medication as  ordered  - Every waking shifts and PRN assess for the presence of hallucinations and or delusions  - Assist with reality testing to support increasing orientation  - Assess if patient's hallucinations or delusions are encouraging self-harm or harm to others and intervene as appropriate  Outcome: Progressing     Problem: BEHAVIOR  Goal: Pt/Family maintain appropriate behavior and adhere to behavioral management agreement, if implemented  Description: INTERVENTIONS:  - Assess the family dynamic   - Encourage verbalization of thoughts and concerns in a socially appropriate manner  - Assess patient/family's coping skills and non-compliant behavior (including use of illegal substances).  - Utilize positive, consistent limit setting strategies supporting safety of patient, staff and others  - Initiate consult with Case Management, Spiritual Care or other ancillary services as appropriate  - If a patient's/visitor's behavior jeopardizes the safety of the patient, staff, or others, refer to organization procedure.   - Notify Security of behavior or suspected illegal substances which indicate the need for search of  the patient and/or belongings  - Encourage participation in the decision making process about a behavioral management agreement; implement if patient meets criteria  Outcome: Progressing     Problem: SUBSTANCE USE/ABUSE  Goal: By discharge, patient will have ongoing treatment plan addressing chemical dependency  Description: INTERVENTIONS:  - Assist patient with resources and/or appointments for ongoing recovery based living  Outcome: Progressing     Problem: DISCHARGE PLANNING - CARE MANAGEMENT  Goal: Discharge to post-acute care or home with appropriate resources  Description: INTERVENTIONS:  - Conduct assessment to determine patient/family and health care team treatment goals, and need for post-acute services based on payer coverage, community resources, and patient preferences, and barriers to discharge  - Address psychosocial, clinical, and financial barriers to discharge as identified in assessment in conjunction with the patient/family and health care team  - Arrange appropriate level of post-acute services according to patient’s   needs and preference and payer coverage in collaboration with the physician and health care team  - Communicate with and update the patient/family, physician, and health care team regarding progress on the discharge plan  - Arrange appropriate transportation to post-acute venues  Outcome: Progressing     Problem: Ineffective Coping  Goal: Participates in unit activities  Description: Interventions:  - Provide therapeutic environment   - Provide required programming   - Redirect inappropriate behaviors   Outcome: Progressing

## 2024-08-21 NOTE — NURSING NOTE
Patient c/o 3/10 abdominal pain and cramping. Requested PRN of Bentyl. Given PRN Bentyl 20 mg po. Will monitor effectiveness.

## 2024-08-22 LAB — SCAN RESULT: NORMAL

## 2024-08-22 PROCEDURE — 99232 SBSQ HOSP IP/OBS MODERATE 35: CPT | Performed by: PSYCHIATRY & NEUROLOGY

## 2024-08-22 RX ADMIN — LORATADINE 10 MG: 10 TABLET ORAL at 08:15

## 2024-08-22 RX ADMIN — TIZANIDINE 2 MG: 4 TABLET ORAL at 21:18

## 2024-08-22 RX ADMIN — OXCARBAZEPINE 300 MG: 300 TABLET, FILM COATED ORAL at 08:15

## 2024-08-22 RX ADMIN — OLANZAPINE 10 MG: 10 TABLET, FILM COATED ORAL at 21:18

## 2024-08-22 RX ADMIN — DICYCLOMINE HYDROCHLORIDE 20 MG: 20 TABLET ORAL at 15:45

## 2024-08-22 RX ADMIN — OXCARBAZEPINE 300 MG: 300 TABLET, FILM COATED ORAL at 21:19

## 2024-08-22 RX ADMIN — Medication 400 MG: at 17:44

## 2024-08-22 RX ADMIN — SENNOSIDES AND DOCUSATE SODIUM 2 TABLET: 50; 8.6 TABLET ORAL at 17:44

## 2024-08-22 RX ADMIN — DICYCLOMINE HYDROCHLORIDE 20 MG: 20 TABLET ORAL at 08:46

## 2024-08-22 RX ADMIN — Medication 2000 UNITS: at 08:15

## 2024-08-22 RX ADMIN — ACETAMINOPHEN 975 MG: 325 TABLET ORAL at 21:18

## 2024-08-22 RX ADMIN — CYANOCOBALAMIN TAB 1000 MCG 1000 MCG: 1000 TAB at 08:15

## 2024-08-22 RX ADMIN — Medication 400 MG: at 08:15

## 2024-08-22 NOTE — NURSING NOTE
Patient c/o 4/10 abdominal pain/cramping and requested Bentyl. Given PRN of Bentyl 20 mg po. Will monitor effectiveness.

## 2024-08-22 NOTE — NURSING NOTE
Patient has been mainly seclusive to her room. Continues with slight irritable edge but cooperative. Denies symptoms. Refused scheduled Symbicort and Senokot but compliant with all other medications. Looking forward to discharge tomorrow.

## 2024-08-22 NOTE — PROGRESS NOTES
"Progress Note - Behavioral Health   Antionette Downing 38 y.o. female MRN: [id number]  Unit/Bed#: [facility] 341-01 Encounter: [id number]    Assessment & Plan   Principal Problem:    Schizoaffective disorder, bipolar type (HCC) r/o bipolar with psychotic features  Active Problems:    Asthma    Diverticulitis    Seizure disorder (HCC)    Possible exposure to STD      Recommended Treatment:   Current medications:  Continue Trileptal 300 mg twice daily for mood stabilization  Continue Zyprexa 10 mg nightly for psychotic symptoms  Continue MiraLAX once daily for 3 total doses due to constipation  Continue Senokot-S 2 tablets twice daily for constipation    Continue with pharmacotherapy, group therapy, milieu therapy and occupational therapy.  Continue to assess for adverse medication side effects.  Encourage Antionette Downing to participate in nonverbal forms of therapy including journaling and art/music therapy.  Continue frequent safety checks and vitals per unit protocol.  Continue to engage CM/SW to assist with collateral, disposition planning, and the implementation of an individualized, patient-centered plan of care.  Continue medical management by medical team.  Case discussed with treatment team.    Legal Status: [id number]  ------------------------------------------------------------    Subjective: All documentation including nursing notes, medication history to ensure medication adherence on the unit, labs, and vitals were reviewed. Antionette was evaluated this morning for continuity of care and no acute distress noted throughout the evaluation. Over the past 24 hours per nursing report, Antionette has been cooperative on the unit and compliant with medications.      Today, Antionette is consenting for safety on the unit. Antionette reports feeling \" good.\" Antionette notes having normal sleep. Antionette states having a normal appetite. Antionette has been compliant taking the medications as prescribed and reporting no side " "effects.    Antionette denies suicidal ideations. Antionette denies homicidal ideations. Regarding hallucinations, Antionette is denying auditory visual hallucinations.    PRNs overnight: Bentyl  VS: Reviewed, within normal limits    Progress Toward Goals: Continues slow improvement    Psychiatric Review of Systems:  Behavior over the last 24 hours:  improved  Sleep: normal  Appetite: normal  Medication side effects: No   ROS: all other systems are negative    Vital signs in last 24 hours:  Temp:  [97.4 °F (36.3 °C)-98.3 °F (36.8 °C)] 98.3 °F (36.8 °C)  HR:  [66-90] 66  Resp:  [16] 16  BP: ()/(53-58) 106/53    Laboratory results:  I have personally reviewed all pertinent laboratory/tests results.  No results found for this or any previous visit (from the past 48 hour(s)).      Mental Status Evaluation:    Appearance:  casually dressed, dressed appropriately, adequate grooming, looks older than stated age, overtly  female   Behavior:  pleasant, cooperative, calm   Speech:  normal rate and volume, fluent   Mood:  \"Good\"   Affect:  constricted, mood-incongruent   Thought Process:  organized, coherent, goal directed, linear   Associations: intact associations   Thought Content:  no overt delusions   Perceptual Disturbances: Denies auditory or visual hallucinations and Does not appear to be responding to internal stimuli   Risk Potential: Suicidal ideation - None at present  Homicidal ideation - None at present  Potential for aggression - No   Sensorium:  oriented to person, place, and time/date   Memory:  recent and remote memory grossly intact   Consciousness:  alert and awake   Attention/Concentration: attention span and concentration are age appropriate   Insight:  improving   Judgment: improving   Gait/Station: normal gait/station   Motor Activity: no abnormal movements       Current Medications:  Current Facility-Administered Medications   Medication Dose Route Frequency Provider Last Rate    acetaminophen  " 650 mg Oral Q6H PRN [place] Sid, DO      acetaminophen  650 mg Oral Q4H [facility]N [facility]      acetaminophen  975 mg Oral Q8H [facility] Summer Loyd MD      albuterol  2 puff Inhalation Q4H PRN [place] Sid, DO      aluminum-magnesium hydroxide-simethicone  30 mL Oral Q4H PRN [facility]      benztropine  1 mg Intramuscular BID PRN [place] Sdi, DO      benztropine  1 mg Oral BID PRN [place] Sid, DO      budesonide-formoterol  2 puff Inhalation BID [place] Sid, DO      Cholecalciferol  2,000 Units Oral Daily [place] Sid, DO      vitamin B-12  1,000 mcg Oral Daily [place] Sid, DO      dicyclomine  20 mg Oral Q6H PRN [facility]      hydrOXYzine HCL  50 mg Oral Q6H PRN Max 4/day [facility]      Or    diphenhydrAMINE  50 mg Intramuscular Q6H PRN [place] Sid, DO      famotidine  20 mg Oral BID PRN [place] Sid, DO      hydrOXYzine HCL  100 mg Oral Q6H PRN Max 4/day [facility]      Or    LORazepam  2 mg Intramuscular Q6H PRN [facility], DO      hydrOXYzine HCL  25 mg Oral Q6H PRN Max 4/day [facility]      hyoscyamine  0.125 mg Sublingual Q4H PRN [place] Sid, DO      loratadine  10 mg Oral Daily [place] Sid, DO      LORazepam  2 mg Intramuscular BID PRN [place] Sid, DO      magnesium Oxide  400 mg Oral BID [place] Sid, DO      melatonin  3 mg Oral HS PRN [place] Sid, DO      OLANZapine  10 mg Oral Q3H PRN Max 3/day [facility]      Or    OLANZapine  10 mg Intramuscular Q3H PRN Max 3/day [facility]      OLANZapine  5 mg Oral Q3H PRN Max 6/day Laura Sid, DO      Or    OLANZapine  5 mg Intramuscular Q3H PRN Max 6/day Laura Sid, DO      OLANZapine  10 mg Oral HS Laura Sid, DO      OLANZapine  2.5 mg Oral Q3H PRN Max 8/day Laura Sid, DO      OXcarbazepine  300 mg Oral Q12H OSIEL  Lauranayeli Ballse, DO      polyethylene glycol  17 g Oral Daily PRN Laura Sid, DO      polyethylene glycol  17 g Oral Daily Amaury Nidaye DO      propranolol  10 mg Oral Q8H PRN Laura Lau, DO      senna-docusate sodium  1 tablet Oral Daily PRN Laura Ballse, DO      senna-docusate sodium  2 tablet Oral BID Amaury Ndiaye,       tiZANidine  2 mg Oral HS Laura Lau, DO      trimethobenzamide  200 mg Intramuscular Q6H PRN MD Amaury Price DO 08/22/24  Psychiatry Resident, PGY-II    This note was completed in part utilizing Dragon dictation Software. Grammatical, translation, syntax errors, random word insertions, spelling mistakes, and incomplete sentences may be an occasional consequence of this system secondary to software limitations with voice recognition, ambient noise, and hardware issues. If you have any questions or concerns about the content, text, or information contained within the body of this dictation, please contact the provider for clarification.     This note was not shared with the patient due to reasonable likelihood of causing patient harm

## 2024-08-22 NOTE — PROGRESS NOTES
08/22/24 0843   Team Meeting   Meeting Type Daily Rounds   Team Members Present   Team Members Present Physician;Nurse;   Physician Team Member Lilo   [facility] Melodie   Patient/Family Present   Patient Present No   Patient's Family Present No     Pt is scheduled for discharge tomorrow. Pt is medication and meal compliant.

## 2024-08-22 NOTE — PLAN OF CARE
Problem: SELF HARM/SUICIDALITY  Goal: Will have no self-injury during hospital stay  Description: INTERVENTIONS:  - Q 15 MINUTES: Routine safety checks  - Q WAKING SHIFT & PRN: Assess risk to determine if routine checks are adequate to maintain patient safety  - Encourage patient to participate actively in care by formulating a plan to combat response to suicidal ideation, identify supports and resources  Outcome: Progressing     Problem: DEAN  Goal: Will exhibit normal sleep and speech and no impulsivity  Description: INTERVENTIONS:  - Administer medication as ordered  - Set limits on impulsive behavior  - Make attempts to decrease external stimuli as possible  Outcome: Progressing     Problem: PSYCHOSIS  Goal: Will report no hallucinations or delusions  Description: Interventions:  - Administer medication as  ordered  - Every waking shifts and PRN assess for the presence of hallucinations and or delusions  - Assist with reality testing to support increasing orientation  - Assess if patient's hallucinations or delusions are encouraging self-harm or harm to others and intervene as appropriate  Outcome: Progressing     Problem: SAFETY, RESTRAINT - VIOLENT/SELF-DESTRUCTIVE  Goal: Remains free of harm/injury from restraints (Restraint for Violent/Self-Destructive Behavior)  Description: INTERVENTIONS:  - Instruct patient/family regarding restraint use   - Assess and monitor physiologic and psychological status   - Provide interventions and comfort measures to meet assessed patient needs   - Ensure continuous in person monitoring is provided   - Identify and implement measures to help patient regain control  - Assess readiness for release of restraint  Outcome: Progressing  Goal: Returns to optimal restraint-free functioning  Description: INTERVENTIONS:  - Assess the patient's behavior and symptoms that indicate continued need for restraint  - Identify and implement measures to help patient regain control  - Assess  readiness for release of restraint   Outcome: Progressing     Problem: Ineffective Coping  Goal: Participates in unit activities  Description: Interventions:  - Provide therapeutic environment   - Provide required programming   - Redirect inappropriate behaviors   Outcome: Progressing     Problem: BEHAVIOR  Goal: Pt/Family maintain appropriate behavior and adhere to behavioral management agreement, if implemented  Description: INTERVENTIONS:  - Assess the family dynamic   - Encourage verbalization of thoughts and concerns in a socially appropriate manner  - Assess patient/family's coping skills and non-compliant behavior (including use of illegal substances).  - Utilize positive, consistent limit setting strategies supporting safety of patient, staff and others  - Initiate consult with Case Management, Spiritual Care or other ancillary services as appropriate  - If a patient's/visitor's behavior jeopardizes the safety of the patient, staff, or others, refer to organization procedure.   - Notify Security of behavior or suspected illegal substances which indicate the need for search of the patient and/or belongings  - Encourage participation in the decision making process about a behavioral management agreement; implement if patient meets criteria  Outcome: Not Progressing

## 2024-08-23 VITALS
OXYGEN SATURATION: 98 % | BODY MASS INDEX: 30.18 KG/M2 | HEART RATE: 70 BPM | SYSTOLIC BLOOD PRESSURE: 112 MMHG | DIASTOLIC BLOOD PRESSURE: 59 MMHG | RESPIRATION RATE: 16 BRPM | WEIGHT: 164 LBS | HEIGHT: 62 IN | TEMPERATURE: 97.3 F

## 2024-08-23 PROCEDURE — 99238 HOSP IP/OBS DSCHRG MGMT 30/<: CPT | Performed by: PSYCHIATRY & NEUROLOGY

## 2024-08-23 RX ORDER — DICYCLOMINE HCL 20 MG
20 TABLET ORAL EVERY 6 HOURS PRN
Qty: 10 TABLET | Refills: 0 | Status: SHIPPED | OUTPATIENT
Start: 2024-08-23

## 2024-08-23 RX ORDER — OXCARBAZEPINE 300 MG/1
300 TABLET, FILM COATED ORAL EVERY 12 HOURS SCHEDULED
Qty: 60 TABLET | Refills: 0 | Status: SHIPPED | OUTPATIENT
Start: 2024-08-23 | End: 2024-09-22

## 2024-08-23 RX ORDER — OLANZAPINE 10 MG/1
10 TABLET ORAL
Qty: 30 TABLET | Refills: 1 | Status: SHIPPED | OUTPATIENT
Start: 2024-08-23 | End: 2024-10-22

## 2024-08-23 RX ORDER — LANOLIN ALCOHOL/MO/W.PET/CERES
400 CREAM (GRAM) TOPICAL 2 TIMES DAILY
Qty: 60 TABLET | Refills: 1 | Status: SHIPPED | OUTPATIENT
Start: 2024-08-23 | End: 2024-10-22

## 2024-08-23 RX ORDER — ONDANSETRON 4 MG/1
4 TABLET, ORALLY DISINTEGRATING ORAL EVERY 6 HOURS PRN
Qty: 20 TABLET | Refills: 0 | Status: SHIPPED | OUTPATIENT
Start: 2024-08-23

## 2024-08-23 RX ORDER — OLANZAPINE 10 MG/1
10 TABLET ORAL
Qty: 30 TABLET | Refills: 1 | Status: SHIPPED | OUTPATIENT
Start: 2024-08-23 | End: 2024-08-23

## 2024-08-23 RX ORDER — HYOSCYAMINE SULFATE 0.125 MG
0.12 TABLET ORAL EVERY 4 HOURS PRN
Qty: 10 TABLET | Refills: 0 | Status: SHIPPED | OUTPATIENT
Start: 2024-08-23

## 2024-08-23 RX ORDER — ALBUTEROL SULFATE 90 UG/1
2 AEROSOL, METERED RESPIRATORY (INHALATION) EVERY 4 HOURS PRN
Qty: 18 G | Refills: 0 | Status: SHIPPED | OUTPATIENT
Start: 2024-08-23

## 2024-08-23 RX ORDER — LANOLIN ALCOHOL/MO/W.PET/CERES
1000 CREAM (GRAM) TOPICAL DAILY
Qty: 30 TABLET | Refills: 1 | Status: SHIPPED | OUTPATIENT
Start: 2024-08-23 | End: 2024-10-22

## 2024-08-23 RX ORDER — FAMOTIDINE 20 MG/1
20 TABLET, FILM COATED ORAL 2 TIMES DAILY PRN
Qty: 10 TABLET | Refills: 0 | Status: SHIPPED | OUTPATIENT
Start: 2024-08-23

## 2024-08-23 RX ORDER — BUDESONIDE AND FORMOTEROL FUMARATE DIHYDRATE 80; 4.5 UG/1; UG/1
2 AEROSOL RESPIRATORY (INHALATION) 2 TIMES DAILY
Qty: 10.2 G | Refills: 0 | Status: SHIPPED | OUTPATIENT
Start: 2024-08-23 | End: 2024-09-22

## 2024-08-23 RX ORDER — TIZANIDINE 2 MG/1
2 TABLET ORAL
Qty: 30 TABLET | Refills: 0 | Status: SHIPPED | OUTPATIENT
Start: 2024-08-23 | End: 2024-10-22

## 2024-08-23 RX ORDER — LORATADINE 10 MG/1
10 TABLET ORAL DAILY
Qty: 30 TABLET | Refills: 0 | Status: SHIPPED | OUTPATIENT
Start: 2024-08-23 | End: 2024-09-22

## 2024-08-23 RX ADMIN — LORATADINE 10 MG: 10 TABLET ORAL at 08:17

## 2024-08-23 RX ADMIN — OXCARBAZEPINE 300 MG: 300 TABLET, FILM COATED ORAL at 08:17

## 2024-08-23 RX ADMIN — ACETAMINOPHEN 975 MG: 325 TABLET ORAL at 05:52

## 2024-08-23 RX ADMIN — DICYCLOMINE HYDROCHLORIDE 20 MG: 20 TABLET ORAL at 05:52

## 2024-08-23 RX ADMIN — Medication 400 MG: at 08:17

## 2024-08-23 RX ADMIN — CYANOCOBALAMIN TAB 1000 MCG 1000 MCG: 1000 TAB at 08:17

## 2024-08-23 RX ADMIN — Medication 2000 UNITS: at 08:17

## 2024-08-23 NOTE — NURSING NOTE
Outpatient appointments and medications reviewed with patient prior to discharge. Prescriptions sent to patients own pharmacy for patient .

## 2024-08-23 NOTE — SOCIAL WORK
"Cm spoke with Pt's .  reported that Pt is able to return home.  asked \"how are the medications going\". Cm reported that Pt has been stabilized on a beneficial medication regiment.  reported that Pt sounds like she is \"back to normal\".  reported he is happy about this because it has been difficult to manage lately with Pt's mental health.  refers to himself as Pt's primary caregiver.  reported that he assists Pt with all domains in her life.  reported that his firearms are in a locked \"finger print\" accessible box that is located at his son's home.  reported that when Pt is not doing well he always brings his firearms to his son's home and out of Pt's access.  reiterated that the guns are locked and Pt does not even have access to the codes.    reported he is able to  Pt at 12 pm at the hospital.  "

## 2024-08-23 NOTE — BH TRANSITION RECORD
Contact Information: If you have any questions, concerns, pended studies, tests and/or procedures, or emergencies regarding your inpatient behavioral health visit. Please contact [facility] [phone] and ask to speak to a , nurse or physician. A contact is available 24 hours/ 7 days a week at this number.     Summary of Procedures Performed During your Stay:  Below is a list of major procedures performed during your hospital stay and a summary of results:  - Cardiac Procedures/Studies: ECG on 08/15/2024 showed normal sinus rhythm and normal ECG.    If studies are pending at discharge, follow up with your PCP and/or referring provider.

## 2024-08-23 NOTE — NURSING NOTE
Patient about the unit. Quiet and to herself. Denies symptoms. Looking forward to discharge today.

## 2024-08-23 NOTE — PLAN OF CARE
Problem: SELF HARM/SUICIDALITY  Goal: Will have no self-injury during hospital stay  Description: INTERVENTIONS:  - Q 15 MINUTES: Routine safety checks  - Q WAKING SHIFT & PRN: Assess risk to determine if routine checks are adequate to maintain patient safety  - Encourage patient to participate actively in care by formulating a plan to combat response to suicidal ideation, identify supports and resources  Outcome: Progressing     Problem: DEAN  Goal: Will exhibit normal sleep and speech and no impulsivity  Description: INTERVENTIONS:  - Administer medication as ordered  - Set limits on impulsive behavior  - Make attempts to decrease external stimuli as possible  Outcome: Progressing     Problem: PSYCHOSIS  Goal: Will report no hallucinations or delusions  Description: Interventions:  - Administer medication as  ordered  - Every waking shifts and PRN assess for the presence of hallucinations and or delusions  - Assist with reality testing to support increasing orientation  - Assess if patient's hallucinations or delusions are encouraging self-harm or harm to others and intervene as appropriate  Outcome: Progressing     Problem: BEHAVIOR  Goal: Pt/Family maintain appropriate behavior and adhere to behavioral management agreement, if implemented  Description: INTERVENTIONS:  - Assess the family dynamic   - Encourage verbalization of thoughts and concerns in a socially appropriate manner  - Assess patient/family's coping skills and non-compliant behavior (including use of illegal substances).  - Utilize positive, consistent limit setting strategies supporting safety of patient, staff and others  - Initiate consult with Case Management, Spiritual Care or other ancillary services as appropriate  - If a patient's/visitor's behavior jeopardizes the safety of the patient, staff, or others, refer to organization procedure.   - Notify Security of behavior or suspected illegal substances which indicate the need for search of  the patient and/or belongings  - Encourage participation in the decision making process about a behavioral management agreement; implement if patient meets criteria  Outcome: Progressing     Problem: SUBSTANCE USE/ABUSE  Goal: By discharge, patient will have ongoing treatment plan addressing chemical dependency  Description: INTERVENTIONS:  - Assist patient with resources and/or appointments for ongoing recovery based living  Outcome: Progressing     Problem: DISCHARGE PLANNING - CARE MANAGEMENT  Goal: Discharge to post-acute care or home with appropriate resources  Description: INTERVENTIONS:  - Conduct assessment to determine patient/family and health care team treatment goals, and need for post-acute services based on payer coverage, community resources, and patient preferences, and barriers to discharge  - Address psychosocial, clinical, and financial barriers to discharge as identified in assessment in conjunction with the patient/family and health care team  - Arrange appropriate level of post-acute services according to patient’s   needs and preference and payer coverage in collaboration with the physician and health care team  - Communicate with and update the patient/family, physician, and health care team regarding progress on the discharge plan  - Arrange appropriate transportation to post-acute venues  Outcome: Progressing

## 2024-08-23 NOTE — PLAN OF CARE
Problem: SELF HARM/SUICIDALITY  Goal: Will have no self-injury during hospital stay  Description: INTERVENTIONS:  - Q 15 MINUTES: Routine safety checks  - Q WAKING SHIFT & PRN: Assess risk to determine if routine checks are adequate to maintain patient safety  - Encourage patient to participate actively in care by formulating a plan to combat response to suicidal ideation, identify supports and resources  Outcome: Completed     Problem: DEAN  Goal: Will exhibit normal sleep and speech and no impulsivity  Description: INTERVENTIONS:  - Administer medication as ordered  - Set limits on impulsive behavior  - Make attempts to decrease external stimuli as possible  Outcome: Completed     Problem: PSYCHOSIS  Goal: Will report no hallucinations or delusions  Description: Interventions:  - Administer medication as  ordered  - Every waking shifts and PRN assess for the presence of hallucinations and or delusions  - Assist with reality testing to support increasing orientation  - Assess if patient's hallucinations or delusions are encouraging self-harm or harm to others and intervene as appropriate  Outcome: Completed     Problem: BEHAVIOR  Goal: Pt/Family maintain appropriate behavior and adhere to behavioral management agreement, if implemented  Description: INTERVENTIONS:  - Assess the family dynamic   - Encourage verbalization of thoughts and concerns in a socially appropriate manner  - Assess patient/family's coping skills and non-compliant behavior (including use of illegal substances).  - Utilize positive, consistent limit setting strategies supporting safety of patient, staff and others  - Initiate consult with Case Management, Spiritual Care or other ancillary services as appropriate  - If a patient's/visitor's behavior jeopardizes the safety of the patient, staff, or others, refer to organization procedure.   - Notify Security of behavior or suspected illegal substances which indicate the need for search of the  patient and/or belongings  - Encourage participation in the decision making process about a behavioral management agreement; implement if patient meets criteria  Outcome: Completed     Problem: SUBSTANCE USE/ABUSE  Goal: By discharge, patient will have ongoing treatment plan addressing chemical dependency  Description: INTERVENTIONS:  - Assist patient with resources and/or appointments for ongoing recovery based living  Outcome: Completed     Problem: INVOLUNTARY ADMIT  Goal: Will cooperate with staff recommendations and doctor's orders and will demonstrate appropriate behavior  Description: INTERVENTIONS:  - Treat underlying conditions and offer medication as ordered  - Educate regarding involuntary admission procedures and rules  - Utilize positive consistent limit setting strategies to support patient and staff safety  Outcome: Completed     Problem: SAFETY, RESTRAINT - VIOLENT/SELF-DESTRUCTIVE  Goal: Remains free of harm/injury from restraints (Restraint for Violent/Self-Destructive Behavior)  Description: INTERVENTIONS:  - Instruct patient/family regarding restraint use   - Assess and monitor physiologic and psychological status   - Provide interventions and comfort measures to meet assessed patient needs   - Ensure continuous in person monitoring is provided   - Identify and implement measures to help patient regain control  - Assess readiness for release of restraint  Outcome: Completed  Goal: Returns to optimal restraint-free functioning  Description: INTERVENTIONS:  - Assess the patient's behavior and symptoms that indicate continued need for restraint  - Identify and implement measures to help patient regain control  - Assess readiness for release of restraint   Outcome: Completed     Problem: DISCHARGE PLANNING - CARE MANAGEMENT  Goal: Discharge to post-acute care or home with appropriate resources  Description: INTERVENTIONS:  - Conduct assessment to determine patient/family and health care team  treatment goals, and need for post-acute services based on payer coverage, community resources, and patient preferences, and barriers to discharge  - Address psychosocial, clinical, and financial barriers to discharge as identified in assessment in conjunction with the patient/family and health care team  - Arrange appropriate level of post-acute services according to patient’s   needs and preference and payer coverage in collaboration with the physician and health care team  - Communicate with and update the patient/family, physician, and health care team regarding progress on the discharge plan  - Arrange appropriate transportation to post-acute venues  Outcome: Completed     Problem: Ineffective Coping  Goal: Participates in unit activities  Description: Interventions:  - Provide therapeutic environment   - Provide required programming   - Redirect inappropriate behaviors   Outcome: Completed

## 2024-08-23 NOTE — PROGRESS NOTES
08/23/24 0845   Team Meeting   Meeting Type Daily Rounds   Team Members Present   Team Members Present Physician;Nurse;   Physician Team Member Lilo   [facility] Melodie   Patient/Family Present   Patient Present No   Patient's Family Present No     Pt is for discharge today.

## 2024-08-23 NOTE — NURSING NOTE
Patient out of room intermittently, for short periods of time. Patient calm and pleasant appears guarded at times. Denies SI/HI/AH/VH at this time. Compliant with medications and routine vitals. Currently offers no complaint of pain or discomfort.
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
## 2024-08-23 NOTE — SOCIAL WORK
Pt to D/C today. Pt denies SI/HI/AVH. Pt oriented x3. [facility] Pt to follow up with [facility] on 8/26/2024 and 8/28/24. Scripts sent to preferred pharmacy.     Discharge Address: [address] [place] [id number]  Phone: [phone]

## 2024-08-26 ENCOUNTER — PATIENT OUTREACH (OUTPATIENT)
Dept: FAMILY MEDICINE CLINIC | Facility: CLINIC | Age: 39
End: 2024-08-26

## 2024-08-26 NOTE — PROGRESS NOTES
JAEL SANFORD completed chart review and noted that pt had d/c home with spouse and  appointment with [facility] on 8/26/24 and 8/28/24.     JAEL SANFORD called pt and introduced self, role and reason for call. Pt reported that she does not have any needs at this time. JAEL SANFORD inquired how her appointment went today and pt reported that it went well.     JAEL SANFORD encouraged pt to reach out to JAEL  if she has any other needs. Pt expressed understanding. JAEL SANFORD will close referral at this time as they are no other needs. JAEL SANFORD will be available for any additional needs as requested.

## 2024-08-28 NOTE — PROGRESS NOTES
08/21/24 1340   Activity/Group Checklist   Group Admission/Discharge  (relapse prevention plan)   Attendance Attended   Attendance Duration (min) 0-15   Interactions Interacted appropriately   Affect/Mood Appropriate   Goals Achieved Identified feelings;Identified triggers;Identified relapse prevention strategies;Identified medication adherence strategies;Discussed safety plan;Discussed coping strategies;Discussed discharge plans;Identified resources and support systems;Able to listen to others;Able to engage in interactions;Able to reflect/comment on own behavior;Able to self-disclose;Able to recieve feedback     Relapse prevention plan completed with pt. Pt pleasant and cooperative. Pt completed plan appropriately and is looking forward to discharge.

## 2024-09-05 ENCOUNTER — TELEPHONE (OUTPATIENT)
Dept: FAMILY MEDICINE CLINIC | Facility: CLINIC | Age: 39
End: 2024-09-05

## 2024-09-06 ENCOUNTER — HOSPITAL ENCOUNTER (EMERGENCY)
Facility: HOSPITAL | Age: 39
Discharge: HOME | End: 2024-09-07
Attending: EMERGENCY MEDICINE
Payer: COMMERCIAL

## 2024-09-06 DIAGNOSIS — R10.9 ABDOMINAL PAIN: ICD-10-CM

## 2024-09-06 DIAGNOSIS — R10.9 FLANK PAIN: Primary | ICD-10-CM

## 2024-09-06 PROCEDURE — 99284 EMERGENCY DEPT VISIT MOD MDM: CPT

## 2024-09-06 PROCEDURE — 81003 URINALYSIS AUTO W/O SCOPE: CPT

## 2024-09-06 PROCEDURE — 99285 EMERGENCY DEPT VISIT HI MDM: CPT | Performed by: EMERGENCY MEDICINE

## 2024-09-06 RX ORDER — KETOROLAC TROMETHAMINE 30 MG/ML
15 INJECTION, SOLUTION INTRAMUSCULAR; INTRAVENOUS ONCE
Status: DISCONTINUED | OUTPATIENT
Start: 2024-09-07 | End: 2024-09-07

## 2024-09-06 RX ORDER — ACETAMINOPHEN 10 MG/ML
1000 INJECTION, SOLUTION INTRAVENOUS ONCE
Status: DISCONTINUED | OUTPATIENT
Start: 2024-09-07 | End: 2024-09-06

## 2024-09-06 RX ORDER — SODIUM CHLORIDE, SODIUM GLUCONATE, SODIUM ACETATE, POTASSIUM CHLORIDE, MAGNESIUM CHLORIDE, SODIUM PHOSPHATE, DIBASIC, AND POTASSIUM PHOSPHATE .53; .5; .37; .037; .03; .012; .00082 G/100ML; G/100ML; G/100ML; G/100ML; G/100ML; G/100ML; G/100ML
1000 INJECTION, SOLUTION INTRAVENOUS ONCE
Status: COMPLETED | OUTPATIENT
Start: 2024-09-07 | End: 2024-09-07

## 2024-09-06 RX ORDER — ONDANSETRON 2 MG/ML
4 INJECTION INTRAMUSCULAR; INTRAVENOUS ONCE
Status: COMPLETED | OUTPATIENT
Start: 2024-09-07 | End: 2024-09-07

## 2024-09-07 ENCOUNTER — APPOINTMENT (EMERGENCY)
Dept: CT IMAGING | Facility: HOSPITAL | Age: 39
End: 2024-09-07
Payer: COMMERCIAL

## 2024-09-07 VITALS
WEIGHT: 141.31 LBS | TEMPERATURE: 98.7 F | BODY MASS INDEX: 25.85 KG/M2 | SYSTOLIC BLOOD PRESSURE: 117 MMHG | RESPIRATION RATE: 17 BRPM | HEART RATE: 90 BPM | DIASTOLIC BLOOD PRESSURE: 80 MMHG | OXYGEN SATURATION: 98 %

## 2024-09-07 VITALS
OXYGEN SATURATION: 95 % | DIASTOLIC BLOOD PRESSURE: 82 MMHG | HEART RATE: 117 BPM | SYSTOLIC BLOOD PRESSURE: 150 MMHG | TEMPERATURE: 98.2 F | RESPIRATION RATE: 18 BRPM

## 2024-09-07 DIAGNOSIS — J45.901 ASTHMA EXACERBATION: ICD-10-CM

## 2024-09-07 DIAGNOSIS — T59.91XA INHALATION OF NOXIOUS FUMES: Primary | ICD-10-CM

## 2024-09-07 DIAGNOSIS — K29.70 GASTRITIS: ICD-10-CM

## 2024-09-07 LAB
ALBUMIN SERPL BCG-MCNC: 3.8 G/DL (ref 3.5–5)
ALP SERPL-CCNC: 49 U/L (ref 34–104)
ALT SERPL W P-5'-P-CCNC: 7 U/L (ref 7–52)
ANION GAP SERPL CALCULATED.3IONS-SCNC: 7 MMOL/L (ref 4–13)
AST SERPL W P-5'-P-CCNC: 12 U/L (ref 13–39)
BASOPHILS # BLD AUTO: 0.04 THOUSANDS/ÂΜL (ref 0–0.1)
BASOPHILS NFR BLD AUTO: 0 % (ref 0–1)
BILIRUB SERPL-MCNC: 0.25 MG/DL (ref 0.2–1)
BILIRUB UR QL STRIP: NEGATIVE
BUN SERPL-MCNC: 9 MG/DL (ref 5–25)
CALCIUM SERPL-MCNC: 8.3 MG/DL (ref 8.4–10.2)
CHLORIDE SERPL-SCNC: 100 MMOL/L (ref 96–108)
CLARITY UR: CLEAR
CO2 SERPL-SCNC: 23 MMOL/L (ref 21–32)
COLOR UR: YELLOW
CREAT SERPL-MCNC: 0.67 MG/DL (ref 0.6–1.3)
EOSINOPHIL # BLD AUTO: 0.17 THOUSAND/ÂΜL (ref 0–0.61)
EOSINOPHIL NFR BLD AUTO: 2 % (ref 0–6)
ERYTHROCYTE [DISTWIDTH] IN BLOOD BY AUTOMATED COUNT: 12.6 % (ref 11.6–15.1)
GFR SERPL CREATININE-BSD FRML MDRD: 113 ML/MIN/1.73SQ M
GLUCOSE SERPL-MCNC: 92 MG/DL (ref 65–140)
GLUCOSE UR STRIP-MCNC: NEGATIVE MG/DL
HCT VFR BLD AUTO: 40.6 % (ref 34.8–46.1)
HGB BLD-MCNC: 14.1 G/DL (ref 11.5–15.4)
HGB UR QL STRIP.AUTO: NEGATIVE
IMM GRANULOCYTES # BLD AUTO: 0.07 THOUSAND/UL (ref 0–0.2)
IMM GRANULOCYTES NFR BLD AUTO: 1 % (ref 0–2)
KETONES UR STRIP-MCNC: NEGATIVE MG/DL
LEUKOCYTE ESTERASE UR QL STRIP: NEGATIVE
LIPASE SERPL-CCNC: 23 U/L (ref 11–82)
LYMPHOCYTES # BLD AUTO: 2.66 THOUSANDS/ÂΜL (ref 0.6–4.47)
LYMPHOCYTES NFR BLD AUTO: 23 % (ref 14–44)
MCH RBC QN AUTO: 31.2 PG (ref 26.8–34.3)
MCHC RBC AUTO-ENTMCNC: 34.7 G/DL (ref 31.4–37.4)
MCV RBC AUTO: 90 FL (ref 82–98)
MONOCYTES # BLD AUTO: 0.84 THOUSAND/ÂΜL (ref 0.17–1.22)
MONOCYTES NFR BLD AUTO: 7 % (ref 4–12)
NEUTROPHILS # BLD AUTO: 7.58 THOUSANDS/ÂΜL (ref 1.85–7.62)
NEUTS SEG NFR BLD AUTO: 67 % (ref 43–75)
NITRITE UR QL STRIP: NEGATIVE
NRBC BLD AUTO-RTO: 0 /100 WBCS
PH UR STRIP.AUTO: 6.5 [PH]
PLATELET # BLD AUTO: 184 THOUSANDS/UL (ref 149–390)
PMV BLD AUTO: 10.1 FL (ref 8.9–12.7)
POTASSIUM SERPL-SCNC: 3.6 MMOL/L (ref 3.5–5.3)
PROT SERPL-MCNC: 6.1 G/DL (ref 6.4–8.4)
PROT UR STRIP-MCNC: NEGATIVE MG/DL
RBC # BLD AUTO: 4.52 MILLION/UL (ref 3.81–5.12)
SODIUM SERPL-SCNC: 130 MMOL/L (ref 135–147)
SP GR UR STRIP.AUTO: 1.02 (ref 1–1.03)
UROBILINOGEN UR STRIP-ACNC: <2 MG/DL
WBC # BLD AUTO: 11.36 THOUSAND/UL (ref 4.31–10.16)

## 2024-09-07 PROCEDURE — 96375 TX/PRO/DX INJ NEW DRUG ADDON: CPT

## 2024-09-07 PROCEDURE — 74177 CT ABD & PELVIS W/CONTRAST: CPT

## 2024-09-07 PROCEDURE — 83690 ASSAY OF LIPASE: CPT

## 2024-09-07 PROCEDURE — 96365 THER/PROPH/DIAG IV INF INIT: CPT

## 2024-09-07 PROCEDURE — 80053 COMPREHEN METABOLIC PANEL: CPT

## 2024-09-07 PROCEDURE — 96366 THER/PROPH/DIAG IV INF ADDON: CPT

## 2024-09-07 PROCEDURE — 99284 EMERGENCY DEPT VISIT MOD MDM: CPT | Performed by: EMERGENCY MEDICINE

## 2024-09-07 PROCEDURE — 85025 COMPLETE CBC W/AUTO DIFF WBC: CPT

## 2024-09-07 PROCEDURE — 36415 COLL VENOUS BLD VENIPUNCTURE: CPT

## 2024-09-07 RX ORDER — SUCRALFATE 1 G/1
1 TABLET ORAL ONCE
Status: COMPLETED | OUTPATIENT
Start: 2024-09-07 | End: 2024-09-07

## 2024-09-07 RX ORDER — ACETAMINOPHEN 10 MG/ML
1000 INJECTION, SOLUTION INTRAVENOUS ONCE
Status: COMPLETED | OUTPATIENT
Start: 2024-09-07 | End: 2024-09-07

## 2024-09-07 RX ORDER — SUCRALFATE ORAL 1 G/10ML
1 SUSPENSION ORAL
Qty: 120 ML | Refills: 0 | Status: SHIPPED | OUTPATIENT
Start: 2024-09-07

## 2024-09-07 RX ORDER — ALBUTEROL SULFATE 0.83 MG/ML
2.5 SOLUTION RESPIRATORY (INHALATION) ONCE
Status: COMPLETED | OUTPATIENT
Start: 2024-09-07 | End: 2024-09-07

## 2024-09-07 RX ADMIN — ALBUTEROL SULFATE 2.5 MG: 2.5 SOLUTION RESPIRATORY (INHALATION) at 21:56

## 2024-09-07 RX ADMIN — SUCRALFATE 1 G: 1 TABLET ORAL at 21:56

## 2024-09-07 RX ADMIN — ACETAMINOPHEN 1000 MG: 10 INJECTION INTRAVENOUS at 00:41

## 2024-09-07 RX ADMIN — IOHEXOL 85 ML: 350 INJECTION, SOLUTION INTRAVENOUS at 02:11

## 2024-09-07 RX ADMIN — SODIUM CHLORIDE, SODIUM GLUCONATE, SODIUM ACETATE, POTASSIUM CHLORIDE, MAGNESIUM CHLORIDE, SODIUM PHOSPHATE, DIBASIC, AND POTASSIUM PHOSPHATE 1000 ML: .53; .5; .37; .037; .03; .012; .00082 INJECTION, SOLUTION INTRAVENOUS at 00:28

## 2024-09-07 RX ADMIN — ONDANSETRON 4 MG: 2 INJECTION INTRAMUSCULAR; INTRAVENOUS at 00:29

## 2024-09-07 NOTE — ED PROVIDER NOTES
History  Chief Complaint   Patient presents with    Flank Pain     Pt reports right flank pain for 2 weeks with NV and burning urination     HPI  Patient is a 38 y.o. female with history of 2 c-sections, diverticulitis, renal stones, UTI presenting to the emergency department for flank pain. Patient states that she has been having right sided flank / lower abdominal pain for the past two weeks. Her pain started mostly in her right side / mid back and is now mostly located in her right lower abdomen. She also complains of having pain in her left lower abdomen as well as upper abdomen. She had 4-5 episodes of non-bloody non-bilious vomiting today and states that she has not had as much to eat / drink today. She denies having any bowel changes, fever, vaginal discharge, chest pain, difficulty breathing.     Prior to Admission Medications   Prescriptions Last Dose Informant Patient Reported? Taking?   Cholecalciferol (VITAMIN D3) 1,000 units tablet   No No   Sig: Take 2 tablets (2,000 Units total) by mouth daily   OLANZapine (ZyPREXA) 10 mg tablet   No No   Sig: Take 1 tablet (10 mg total) by mouth daily at bedtime   OXcarbazepine (TRILEPTAL) 300 mg tablet   No No   Sig: Take 1 tablet (300 mg total) by mouth every 12 (twelve) hours   Ventolin  (90 Base) MCG/ACT inhaler   No No   Sig: Inhale 2 puffs every 4 (four) hours as needed for wheezing   budesonide-formoterol (SYMBICORT) 80-4.5 MCG/ACT inhaler   No No   Sig: Inhale 2 puffs 2 (two) times a day Rinse mouth after use.   dicyclomine (BENTYL) 20 mg tablet   No No   Sig: Take 1 tablet (20 mg total) by mouth every 6 (six) hours as needed (abdominal pain) for up to 10 doses   famotidine (PEPCID) 20 mg tablet   No No   Sig: Take 1 tablet (20 mg total) by mouth 2 (two) times a day as needed for heartburn for up to 10 doses   hyoscyamine (ANASPAZ,LEVSIN) 0.125 MG tablet   No No   Sig: Take 1 tablet (0.125 mg total) by mouth every 4 (four) hours as needed for cramping  for up to 10 doses   loratadine (CLARITIN) 10 mg tablet   No No   Sig: Take 1 tablet (10 mg total) by mouth daily   magnesium Oxide (MAG-OX) 400 mg TABS   No No   Sig: Take 1 tablet (400 mg total) by mouth 2 (two) times a day   ondansetron (ZOFRAN-ODT) 4 mg disintegrating tablet   No No   Sig: Take 1 tablet (4 mg total) by mouth every 6 (six) hours as needed for nausea or vomiting   tiZANidine (ZANAFLEX) 2 mg tablet   No No   Sig: Take 1 tablet (2 mg total) by mouth daily at bedtime   vitamin B-12 (VITAMIN B-12) 1,000 mcg tablet   No No   Sig: Take 1 tablet (1,000 mcg total) by mouth daily      Facility-Administered Medications: None       Past Medical History:   Diagnosis Date    Abnormal Pap smear of cervix     ADHD (attention deficit hyperactivity disorder)     Alcohol abuse     Ankle fracture     Anxiety     Arthritis     back    Asthma     Bipolar disorder (HCC)     Cellulitis     right side fac in 2014    Chronic pain disorder     Depression     Diverticulitis     Flank pain 08/16/2016    Hallucination     Hepatitis C     Hip disease 2006    reports she had fluid removed from right hip and received treatment with antibiotic     History of abnormal cervical Pap smear     History of multiple miscarriages     IBS (irritable bowel syndrome)     Infantile idiopathic scoliosis     Joint pain     Kidney stone     Lactose intolerance     Low back pain     Myofascial pain syndrome     Peripheral neuropathy 03/28/2024    Poor dentition     Psychiatric illness     Psychosis ([facility])     PTSD (post-traumatic stress disorder)     Pyelonephritis affecting pregnancy     Right hand paresthesia     Right ovarian cyst     Schizoaffective disorder, bipolar type ([facility]) r/o bipolar with psychotic features 8/16/2024    Scoliosis     Seizures ([facility])     Self-injurious behavior     Sleep difficulties     Slow transit constipation     Substance abuse ([facility])     Suicide attempt ([facility])        Past Surgical History:   Procedure Laterality Date  "    SECTION  2016     SECTION  2014    HIP SURGERY      ORTHOPEDIC SURGERY      RI  DELIVERY ONLY N/A 2016    Procedure:  SECTION () REPEAT;  Surgeon: Kurt Connor MD;  Location: [facility];  Service: Obstetrics    RI LIG/TRNSXJ FLP TUBE ABDL/VAG APPR UNI/BI Bilateral 2016    Procedure: LIGATION/COAGULATION TUBAL;  Surgeon: Kurt Connor MD;  Location: [facility];  Service: Obstetrics       Family History   Problem Relation Age of Onset    Heart disease Maternal Aunt     Cancer Maternal Aunt     Diabetes Paternal Aunt     No Known Problems Mother     No Known Problems Father     No Known Problems Sister      I have reviewed and agree with the history as documented.    E-Cigarette/Vaping    E-Cigarette Use Former User      E-Cigarette/Vaping Substances    Nicotine No     THC No     CBD No     Flavoring No     Other No     Unknown No      Social History     Tobacco Use    Smoking status: Every Day     Current packs/day: 0.25     Average packs/day: 0.3 packs/day for 15.0 years (3.8 ttl pk-yrs)     Types: Cigarettes     Passive exposure: Never    Smokeless tobacco: Never    Tobacco comments:     pt refused smoking cessation teaching.    Vaping Use    Vaping status: Former   Substance Use Topics    Alcohol use: Not Currently     Comment: Rare Use    Drug use: Not Currently     Types: Marijuana, Cocaine, \"Crack\" cocaine     Comment: on occasion         Review of Systems   Constitutional:  Negative for fever.   HENT:  Negative for congestion.    Eyes:  Negative for visual disturbance.   Respiratory:  Negative for shortness of breath.    Cardiovascular:  Negative for chest pain.   Gastrointestinal:  Positive for abdominal pain, nausea and vomiting.   Endocrine: Negative for polyuria.   Genitourinary:  Positive for dysuria.   Musculoskeletal:  Negative for gait problem.   Skin:  Negative for rash.   Neurological:  Negative for dizziness.   All other " systems reviewed and are negative.      Physical Exam  ED Triage Vitals   Temperature Pulse Respirations Blood Pressure SpO2   09/06/24 2325 09/06/24 2325 09/06/24 2325 09/06/24 2325 09/06/24 2325   98.7 °F (37.1 °C) (!) 121 18 130/70 98 %      Temp Source Heart Rate Source Patient Position - Orthostatic VS BP Location FiO2 (%)   09/06/24 2325 09/07/24 0032 09/07/24 0032 09/07/24 0032 --   Oral Monitor Lying Right arm       Pain Score       09/06/24 2325       10 - Worst Possible Pain             Orthostatic Vital Signs  Vitals:    09/06/24 2325 09/06/24 2356 09/07/24 0032 09/07/24 0100   BP: 130/70  114/68 117/80   Pulse: (!) 121 (!) 106 88 90   Patient Position - Orthostatic VS:   Lying        Physical Exam  Vitals and nursing note reviewed.   Constitutional:       General: She is not in acute distress.     Appearance: Normal appearance. She is not ill-appearing.   HENT:      Head: Normocephalic and atraumatic.      Mouth/Throat:      Mouth: Mucous membranes are moist.   Eyes:      Conjunctiva/sclera: Conjunctivae normal.   Cardiovascular:      Rate and Rhythm: Normal rate and regular rhythm.      Pulses: Normal pulses.      Heart sounds: Normal heart sounds.   Pulmonary:      Effort: Pulmonary effort is normal.      Breath sounds: Normal breath sounds.   Abdominal:      Palpations: Abdomen is soft.      Comments: Epigastric, RLQ, LLQ, and suprapubic TTP without rebound or guarding   Musculoskeletal:         General: No tenderness.      Cervical back: Neck supple.   Skin:     General: Skin is warm and dry.      Capillary Refill: Capillary refill takes less than 2 seconds.   Neurological:      General: No focal deficit present.      Mental Status: She is alert. Mental status is at baseline.   Psychiatric:         Mood and Affect: Mood normal.         ED Medications  Medications   ondansetron (ZOFRAN) injection 4 mg (4 mg Intravenous Given 9/7/24 0029)   multi-electrolyte (ISOLYTE-S PH 7.4) bolus 1,000 mL (0 mL  Intravenous Stopped 9/7/24 0259)   acetaminophen (Ofirmev) injection 1,000 mg (0 mg Intravenous Stopped 9/7/24 0056)   iohexol (OMNIPAQUE) 350 MG/ML injection (MULTI-DOSE) 85 mL (85 mL Intravenous Given 9/7/24 0211)       Diagnostic Studies  Results Reviewed       Procedure Component Value Units Date/Time    Comprehensive metabolic panel [[id number]]  (Abnormal) Collected: 09/07/24 0103    Lab Status: Final result Specimen: Blood from Hand, Left Updated: 09/07/24 0126     Sodium 130 mmol/L      Potassium 3.6 mmol/L      Chloride 100 mmol/L      CO2 23 mmol/L      ANION GAP 7 mmol/L      BUN 9 mg/dL      Creatinine 0.67 mg/dL      Glucose 92 mg/dL      Calcium 8.3 mg/dL      AST 12 U/L      ALT 7 U/L      Alkaline Phosphatase 49 U/L      Total Protein 6.1 g/dL      Albumin 3.8 g/dL      Total Bilirubin 0.25 mg/dL      eGFR 113 ml/min/1.73sq m     Narrative:      National Kid[employer] guidelines for Chronic Kidney Disease (CKD):     Stage 1 with normal or high GFR (GFR > 90 mL/min/1.73 square meters)    Stage 2 Mild CKD (GFR = 60-89 mL/min/1.73 square meters)    Stage 3A Moderate CKD (GFR = 45-59 mL/min/1.73 square meters)    Stage 3B Moderate CKD (GFR = 30-44 mL/min/1.73 square meters)    Stage 4 Severe CKD (GFR = 15-29 mL/min/1.73 square meters)    Stage 5 End Stage CKD (GFR <15 mL/min/1.73 square meters)  Note: GFR calculation is accurate only with a steady state creatinine    Lipase [[id number]]  (Normal) Collected: 09/07/24 0023    Lab Status: Final result Specimen: Blood from Hand, Left Updated: 09/07/24 0047     Lipase 23 u/L     CBC and differential [[id number]]  (Abnormal) Collected: 09/07/24 0023    Lab Status: Final result Specimen: Blood from Hand, Left Updated: 09/07/24 0029     WBC 11.36 Thousand/uL      RBC 4.52 Million/uL      Hemoglobin 14.1 g/dL      Hematocrit 40.6 %      MCV 90 fL      MCH 31.2 pg      MCHC 34.7 g/dL      RDW 12.6 %      MPV 10.1 fL      Platelets 184 Thousands/uL       nRBC 0 /100 WBCs      Segmented % 67 %      Immature Grans % 1 %      Lymphocytes % 23 %      Monocytes % 7 %      Eosinophils Relative 2 %      Basophils Relative 0 %      Absolute Neutrophils 7.58 Thousands/µL      Absolute Immature Grans 0.07 Thousand/uL      Absolute Lymphocytes 2.66 Thousands/µL      Absolute Monocytes 0.84 Thousand/µL      Eosinophils Absolute 0.17 Thousand/µL      Basophils Absolute 0.04 Thousands/µL     UA w Reflex to Microscopic w Reflex to Culture [[id number]] Collected: 09/06/24 5978    Lab Status: Final result Specimen: Urine, Clean Catch Updated: 09/07/24 0013     Color, UA Yellow     Clarity, UA Clear     Specific Gravity, UA 1.022     pH, UA 6.5     Leukocytes, UA Negative     Nitrite, UA Negative     Protein, UA Negative mg/dl      Glucose, UA Negative mg/dl      Ketones, UA Negative mg/dl      Urobilinogen, UA <2.0 mg/dl      Bilirubin, UA Negative     Occult Blood, UA Negative                   CT abdomen pelvis with contrast   Final Result by Donna Lr MD (09/07 0313)      Colonic diverticulosis without evidence of acute diverticulitis.      Normal appendix.         Workstation performed: [id number]               Procedures  Procedures      ED Course  ED Course as of 09/09/24 0552   Sat Sep 07, 2024   0054 LIPASE: 23  Low concern for acute pancreatitis   0129 AST(!): 12  No acute elevation in LFTs, lower concern for acute cholecystitis                              SBIRT 20yo+      Flowsheet Row Most Recent Value   Initial Alcohol Screen: US AUDIT-C     1. How often do you have a drink containing alcohol? 0 Filed at: 09/07/2024 0152   2. How many drinks containing alcohol do you have on a typical day you are drinking?  0 Filed at: 09/07/2024 0152   3b. FEMALE Any Age, or MALE 65+: How often do you have 4 or more drinks on one occassion? 0 Filed at: 09/07/2024 0152   Audit-C Score 0 Filed at: 09/07/2024 0152   JAMEY: How many times in the past year have you...    Used an  illegal drug or used a prescription medication for non-medical reasons? Never Filed at: 09/07/2024 0152                  Medical Decision Making  Amount and/or Complexity of Data Reviewed  Labs: ordered. Decision-making details documented in ED Course.  Radiology: ordered.    Risk  Prescription drug management.    Patient is a 38 y.o. female with PMH of diverticulitis, UTI, renal stone who presents to the ED with right flank pain.    Vital signs tachycardic. On exam epigastric, RLQ, LLQ, and suprapubic TTP.    History and physical exam most consistent with gastritis. However, differential diagnosis included but not limited to pancreatitis, cholecystitis, UTI, renal stone, diverticulitis, appendicitis.     Plan: CBC, CMP, lipase, CT abdomen pelvis, UA. Will give patient IV fluids, zofran, and toradol    View ED course above for further discussion on patient workup.     On review of previous records patient tolerated Toradol when given in on 7/24/24.    All labs reviewed and utilized in the medical decision making process  All radiology studies independently viewed by me and interpreted by the radiologist.  I reviewed all testing with the patient.    Disposition: I have reviewed the patient's vital signs, nursing notes, and other relevant tests/information. I had a detailed discussion with the patient regarding the history, exam findings, and any diagnostic results.   Plan to discharge home in stable condition, follow up with PCP / GI.  Discussed with patient who is agreeable to plan.  I discussed discharge instructions, need for follow-up, and oral return precautions for what to return for in addition to the written return precautions and discharge instructions, specifically highlighting areas of special concern.  The patient verbalized understanding of the discharge instructions and warnings that would necessitate return to the Emergency Department including worsening pain.  All questions the patient had were  "answered prior to discharge to the best of my ability.       Portions of the record may have been created with voice recognition software. Occasional wrong word or \"sound a like\" substitutions may have occurred due to the inherent limitations of voice recognition software. Read the chart carefully and recognize, using context, where substitutions have occurred.        Disposition  Final diagnoses:   Flank pain   Abdominal pain     Time reflects when diagnosis was documented in both MDM as applicable and the Disposition within this note       Time User Action Codes Description Comment    9/7/2024  2:56 AM Letitia Gallego Add [R10.9] Flank pain     9/7/2024  2:56 AM Letitia Gallego Add [R10.9] Abdominal pain           ED Disposition       ED Disposition   Discharge    Condition   Stable    Date/Time   Sat Sep 7, 2024 0322    Comment   Antionette Downing discharge to home/self care.                   Follow-up Information       Follow up With Specialties Details Why Contact Info Additional Information    [facility] Schedule an appointment as soon as possible for a visit in 1 week  [address] [place] [id number] [phone] [facility], [address] [place] [address], [id number] [phone]    [facility] Schedule an appointment as soon as possible for a visit in 3 days  [facility] [address] [id number] [phone] [facility] [place], [facility] , [address], [id number] [phone]            Discharge Medication List as of 9/7/2024  3:23 AM        CONTINUE these medications which have NOT CHANGED    Details   budesonide-formoterol (SYMBICORT) 80-4.5 MCG/ACT inhaler Inhale 2 puffs 2 (two) times a day Rinse mouth after use., Starting Fri " 8/23/2024, Until Sun 9/22/2024, Normal      Cholecalciferol (VITAMIN D3) 1,000 units tablet Take 2 tablets (2,000 Units total) by mouth daily, Starting Fri 8/23/2024, Normal      dicyclomine (BENTYL) 20 mg tablet Take 1 tablet (20 mg total) by mouth every 6 (six) hours as needed (abdominal pain) for up to 10 doses, Starting Fri 8/23/2024, Normal      famotidine (PEPCID) 20 mg tablet Take 1 tablet (20 mg total) by mouth 2 (two) times a day as needed for heartburn for up to 10 doses, Starting Fri 8/23/2024, Normal      hyoscyamine (ANASPAZ,LEVSIN) 0.125 MG tablet Take 1 tablet (0.125 mg total) by mouth every 4 (four) hours as needed for cramping for up to 10 doses, Starting Fri 8/23/2024, Normal      loratadine (CLARITIN) 10 mg tablet Take 1 tablet (10 mg total) by mouth daily, Starting Fri 8/23/2024, Until Sun 9/22/2024, Normal      magnesium Oxide (MAG-OX) 400 mg TABS Take 1 tablet (400 mg total) by mouth 2 (two) times a day, Starting Fri 8/23/2024, Until Tue 10/22/2024, Normal      OLANZapine (ZyPREXA) 10 mg tablet Take 1 tablet (10 mg total) by mouth daily at bedtime, Starting Fri 8/23/2024, Until Tue 10/22/2024, Normal      ondansetron (ZOFRAN-ODT) 4 mg disintegrating tablet Take 1 tablet (4 mg total) by mouth every 6 (six) hours as needed for nausea or vomiting, Starting Fri 8/23/2024, Normal      OXcarbazepine (TRILEPTAL) 300 mg tablet Take 1 tablet (300 mg total) by mouth every 12 (twelve) hours, Starting Fri 8/23/2024, Until Sun 9/22/2024, Normal      tiZANidine (ZANAFLEX) 2 mg tablet Take 1 tablet (2 mg total) by mouth daily at bedtime, Starting Fri 8/23/2024, Until Tue 10/22/2024, Normal      Ventolin  (90 Base) MCG/ACT inhaler Inhale 2 puffs every 4 (four) hours as needed for wheezing, Starting Fri 8/23/2024, Normal      vitamin B-12 (VITAMIN B-12) 1,000 mcg tablet Take 1 tablet (1,000 mcg total) by mouth daily, Starting Fri 8/23/2024, Until Tue 10/22/2024, Normal           No discharge procedures  on file.    PDMP Review         Value Time User    PDMP Reviewed  Yes 8/23/2024 11:12 AM Enmanuel Nagy MD             ED Provider  Attending physically available and evaluated Antionette Downing. I managed the patient along with the ED Attending.    Electronically Signed by           Letitia Gallego DO  09/09/24 0552
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
## 2024-09-08 NOTE — ED ATTENDING ATTESTATION
9/6/2024  IMaribell DO, saw and evaluated the patient. I have discussed the patient with the resident/non-physician practitioner and agree with the resident's/non-physician practitioner's findings, Plan of Care, and MDM as documented in the resident's/non-physician practitioner's note, except where noted. All available labs and Radiology studies were reviewed.  I was present for key portions of any procedure(s) performed by the resident/non-physician practitioner and I was immediately available to provide assistance.       At this point I agree with the current assessment done in the Emergency Department.  I have conducted an independent evaluation of this patient a history and physical is as follows:    38 y.o. female with history of 2 c-sections, diverticulitis, renal stones, UTI presenting to the emergency department for flank pain. Patient states that she has been having right sided flank / lower abdominal pain for the past two weeks. Her pain started mostly in her right side / mid back and is now mostly located in her right lower abdomen. She also complains of having pain in her left lower abdomen as well as upper abdomen. She had 4-5 episodes of non-bloody non-bilious vomiting today and states that she has not had as much to eat / drink today. She denies having any bowel changes, fever, vaginal discharge, chest pain, difficulty breathing.     Benign abd exam.  Agree with plan - awaiting CT results.    ED Course  ED Course as of 09/07/24 2110   Sat Sep 07, 2024   0321 CT shows Colonic diverticulosis without evidence of acute diverticulitis. Normal appendix.     Discussed ability for pt to continue PRN bentyl and f/u with PCP and GI.    Critical Care Time  Procedures

## 2024-09-09 ENCOUNTER — HOSPITAL ENCOUNTER (INPATIENT)
Facility: HOSPITAL | Age: 39
LOS: 8 days | Discharge: HOME | DRG: 753 | End: 2024-09-18
Attending: EMERGENCY MEDICINE | Admitting: PSYCHIATRY & NEUROLOGY
Payer: COMMERCIAL

## 2024-09-09 ENCOUNTER — HOSPITAL ENCOUNTER (EMERGENCY)
Facility: HOSPITAL | Age: 39
Discharge: HOME | End: 2024-09-09
Attending: EMERGENCY MEDICINE
Payer: COMMERCIAL

## 2024-09-09 VITALS
SYSTOLIC BLOOD PRESSURE: 135 MMHG | WEIGHT: 138.23 LBS | OXYGEN SATURATION: 93 % | RESPIRATION RATE: 18 BRPM | TEMPERATURE: 98 F | BODY MASS INDEX: 25.28 KG/M2 | HEART RATE: 99 BPM | DIASTOLIC BLOOD PRESSURE: 76 MMHG

## 2024-09-09 DIAGNOSIS — Z00.8 MEDICAL CLEARANCE FOR PSYCHIATRIC ADMISSION: ICD-10-CM

## 2024-09-09 DIAGNOSIS — F25.0 SCHIZOAFFECTIVE DISORDER, BIPOLAR TYPE (HCC): ICD-10-CM

## 2024-09-09 DIAGNOSIS — R45.1 AGITATION: Primary | ICD-10-CM

## 2024-09-09 DIAGNOSIS — K58.2 IRRITABLE BOWEL SYNDROME WITH BOTH CONSTIPATION AND DIARRHEA: ICD-10-CM

## 2024-09-09 DIAGNOSIS — J45.909 ASTHMA, UNSPECIFIED ASTHMA SEVERITY, UNSPECIFIED WHETHER COMPLICATED, UNSPECIFIED WHETHER PERSISTENT: ICD-10-CM

## 2024-09-09 DIAGNOSIS — F31.2 BIPOLAR I DISORDER, MOST RECENT EPISODE MANIC, SEVERE WITH PSYCHOTIC FEATURES (HCC): Chronic | ICD-10-CM
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
## 2024-09-09 LAB
AMPHETAMINES SERPL QL SCN: NEGATIVE
BARBITURATES UR QL: NEGATIVE
BENZODIAZ UR QL: NEGATIVE
COCAINE UR QL: NEGATIVE
ETHANOL EXG-MCNC: 0 MG/DL
FENTANYL UR QL SCN: NEGATIVE
HYDROCODONE UR QL SCN: NEGATIVE
METHADONE UR QL: NEGATIVE
OPIATES UR QL SCN: NEGATIVE
OXYCODONE+OXYMORPHONE UR QL SCN: NEGATIVE
PCP UR QL: NEGATIVE
THC UR QL: POSITIVE

## 2024-09-09 PROCEDURE — 99285 EMERGENCY DEPT VISIT HI MDM: CPT | Performed by: EMERGENCY MEDICINE

## 2024-09-09 PROCEDURE — 99283 EMERGENCY DEPT VISIT LOW MDM: CPT | Performed by: EMERGENCY MEDICINE

## 2024-09-09 PROCEDURE — 80307 DRUG TEST PRSMV CHEM ANLYZR: CPT | Performed by: EMERGENCY MEDICINE

## 2024-09-09 PROCEDURE — 99284 EMERGENCY DEPT VISIT MOD MDM: CPT

## 2024-09-09 PROCEDURE — 99285 EMERGENCY DEPT VISIT HI MDM: CPT

## 2024-09-09 PROCEDURE — 82075 ASSAY OF BREATH ETHANOL: CPT | Performed by: EMERGENCY MEDICINE

## 2024-09-09 PROCEDURE — 81025 URINE PREGNANCY TEST: CPT | Performed by: EMERGENCY MEDICINE
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
## 2024-09-09 NOTE — ED NOTES
"Pt came out of room and states that she wants to leave, pt claims that it's \"too loud\". Ear plugs offered and refused by pt.      Zakia Aguilar RN  09/09/24 1019    "

## 2024-09-10 LAB
ALBUMIN SERPL BCG-MCNC: 4.3 G/DL (ref 3.5–5)
ALP SERPL-CCNC: 58 U/L (ref 34–104)
ALT SERPL W P-5'-P-CCNC: 8 U/L (ref 7–52)
ANION GAP SERPL CALCULATED.3IONS-SCNC: 8 MMOL/L (ref 4–13)
AST SERPL W P-5'-P-CCNC: 16 U/L (ref 13–39)
ATRIAL RATE: 102 BPM
BASOPHILS # BLD AUTO: 0.03 THOUSANDS/ΜL (ref 0–0.1)
BASOPHILS NFR BLD AUTO: 1 % (ref 0–1)
BILIRUB SERPL-MCNC: 0.51 MG/DL (ref 0.2–1)
BUN SERPL-MCNC: 6 MG/DL (ref 5–25)
CALCIUM SERPL-MCNC: 9.1 MG/DL (ref 8.4–10.2)
CHLORIDE SERPL-SCNC: 104 MMOL/L (ref 96–108)
CO2 SERPL-SCNC: 24 MMOL/L (ref 21–32)
CREAT SERPL-MCNC: 0.63 MG/DL (ref 0.6–1.3)
EOSINOPHIL # BLD AUTO: 0.24 THOUSAND/ΜL (ref 0–0.61)
EOSINOPHIL NFR BLD AUTO: 4 % (ref 0–6)
ERYTHROCYTE [DISTWIDTH] IN BLOOD BY AUTOMATED COUNT: 12.8 % (ref 11.6–15.1)
EXT PREGNANCY TEST URINE: NEGATIVE
EXT. CONTROL: NORMAL
GFR SERPL CREATININE-BSD FRML MDRD: 115 ML/MIN/1.73SQ M
GLUCOSE SERPL-MCNC: 94 MG/DL (ref 65–140)
HCT VFR BLD AUTO: 41.8 % (ref 34.8–46.1)
HGB BLD-MCNC: 14.5 G/DL (ref 11.5–15.4)
IMM GRANULOCYTES # BLD AUTO: 0.03 THOUSAND/UL (ref 0–0.2)
IMM GRANULOCYTES NFR BLD AUTO: 1 % (ref 0–2)
LYMPHOCYTES # BLD AUTO: 2.1 THOUSANDS/ΜL (ref 0.6–4.47)
LYMPHOCYTES NFR BLD AUTO: 34 % (ref 14–44)
MCH RBC QN AUTO: 31.8 PG (ref 26.8–34.3)
MCHC RBC AUTO-ENTMCNC: 34.7 G/DL (ref 31.4–37.4)
MCV RBC AUTO: 92 FL (ref 82–98)
MONOCYTES # BLD AUTO: 0.71 THOUSAND/ΜL (ref 0.17–1.22)
MONOCYTES NFR BLD AUTO: 12 % (ref 4–12)
NEUTROPHILS # BLD AUTO: 3.03 THOUSANDS/ΜL (ref 1.85–7.62)
NEUTS SEG NFR BLD AUTO: 48 % (ref 43–75)
NRBC BLD AUTO-RTO: 0 /100 WBCS
P AXIS: 60 DEGREES
PLATELET # BLD AUTO: 183 THOUSANDS/UL (ref 149–390)
PMV BLD AUTO: 9.9 FL (ref 8.9–12.7)
POTASSIUM SERPL-SCNC: 3.9 MMOL/L (ref 3.5–5.3)
PR INTERVAL: 130 MS
PROT SERPL-MCNC: 6.8 G/DL (ref 6.4–8.4)
QRS AXIS: 86 DEGREES
QRSD INTERVAL: 76 MS
QT INTERVAL: 346 MS
QTC INTERVAL: 450 MS
RBC # BLD AUTO: 4.56 MILLION/UL (ref 3.81–5.12)
SODIUM SERPL-SCNC: 136 MMOL/L (ref 135–147)
T WAVE AXIS: 61 DEGREES
VENTRICULAR RATE: 102 BPM
WBC # BLD AUTO: 6.14 THOUSAND/UL (ref 4.31–10.16)

## 2024-09-10 PROCEDURE — 96372 THER/PROPH/DIAG INJ SC/IM: CPT

## 2024-09-10 PROCEDURE — 93010 ELECTROCARDIOGRAM REPORT: CPT

## 2024-09-10 PROCEDURE — 80053 COMPREHEN METABOLIC PANEL: CPT | Performed by: EMERGENCY MEDICINE

## 2024-09-10 PROCEDURE — 36415 COLL VENOUS BLD VENIPUNCTURE: CPT | Performed by: EMERGENCY MEDICINE

## 2024-09-10 PROCEDURE — 85025 COMPLETE CBC W/AUTO DIFF WBC: CPT | Performed by: EMERGENCY MEDICINE

## 2024-09-10 PROCEDURE — 93005 ELECTROCARDIOGRAM TRACING: CPT

## 2024-09-10 RX ORDER — HYDROXYZINE HYDROCHLORIDE 25 MG/1
25 TABLET, FILM COATED ORAL
Status: DISCONTINUED | OUTPATIENT
Start: 2024-09-10 | End: 2024-09-18 | Stop reason: HOSPADM

## 2024-09-10 RX ORDER — BENZTROPINE MESYLATE 1 MG/ML
1 INJECTION, SOLUTION INTRAMUSCULAR; INTRAVENOUS
Status: DISCONTINUED | OUTPATIENT
Start: 2024-09-10 | End: 2024-09-18 | Stop reason: HOSPADM

## 2024-09-10 RX ORDER — OLANZAPINE 5 MG/1
5 TABLET ORAL
Status: DISCONTINUED | OUTPATIENT
Start: 2024-09-10 | End: 2024-09-18 | Stop reason: HOSPADM

## 2024-09-10 RX ORDER — BENZTROPINE MESYLATE 1 MG/ML
1 INJECTION, SOLUTION INTRAMUSCULAR; INTRAVENOUS ONCE
Status: COMPLETED | OUTPATIENT
Start: 2024-09-10 | End: 2024-09-10

## 2024-09-10 RX ORDER — OLANZAPINE 10 MG/2ML
10 INJECTION, POWDER, FOR SOLUTION INTRAMUSCULAR
Status: DISCONTINUED | OUTPATIENT
Start: 2024-09-10 | End: 2024-09-18 | Stop reason: HOSPADM

## 2024-09-10 RX ORDER — ACETAMINOPHEN 325 MG/1
650 TABLET ORAL EVERY 6 HOURS PRN
Status: DISCONTINUED | OUTPATIENT
Start: 2024-09-10 | End: 2024-09-18 | Stop reason: HOSPADM

## 2024-09-10 RX ORDER — BENZTROPINE MESYLATE 1 MG/1
1 TABLET ORAL
Status: DISCONTINUED | OUTPATIENT
Start: 2024-09-10 | End: 2024-09-18 | Stop reason: HOSPADM

## 2024-09-10 RX ORDER — PROPRANOLOL HCL 10 MG
10 TABLET ORAL EVERY 8 HOURS PRN
Status: DISCONTINUED | OUTPATIENT
Start: 2024-09-10 | End: 2024-09-18 | Stop reason: HOSPADM

## 2024-09-10 RX ORDER — DIPHENHYDRAMINE HYDROCHLORIDE 50 MG/ML
50 INJECTION INTRAMUSCULAR; INTRAVENOUS EVERY 6 HOURS PRN
Status: DISCONTINUED | OUTPATIENT
Start: 2024-09-10 | End: 2024-09-18 | Stop reason: HOSPADM

## 2024-09-10 RX ORDER — LORAZEPAM 1 MG/1
2 TABLET ORAL ONCE
Status: DISCONTINUED | OUTPATIENT
Start: 2024-09-10 | End: 2024-09-10

## 2024-09-10 RX ORDER — HALOPERIDOL 5 MG/ML
5 INJECTION INTRAMUSCULAR ONCE
Status: COMPLETED | OUTPATIENT
Start: 2024-09-10 | End: 2024-09-10

## 2024-09-10 RX ORDER — OLANZAPINE 10 MG/1
10 TABLET ORAL
Status: DISCONTINUED | OUTPATIENT
Start: 2024-09-10 | End: 2024-09-18 | Stop reason: HOSPADM

## 2024-09-10 RX ORDER — OLANZAPINE 2.5 MG/1
2.5 TABLET, FILM COATED ORAL
Status: DISCONTINUED | OUTPATIENT
Start: 2024-09-10 | End: 2024-09-18 | Stop reason: HOSPADM

## 2024-09-10 RX ORDER — ACETAMINOPHEN 325 MG/1
650 TABLET ORAL EVERY 4 HOURS PRN
Status: DISCONTINUED | OUTPATIENT
Start: 2024-09-10 | End: 2024-09-18 | Stop reason: HOSPADM

## 2024-09-10 RX ORDER — LORAZEPAM 2 MG/ML
2 INJECTION INTRAMUSCULAR ONCE
Status: COMPLETED | OUTPATIENT
Start: 2024-09-10 | End: 2024-09-10

## 2024-09-10 RX ORDER — HYDROXYZINE HYDROCHLORIDE 50 MG/1
100 TABLET, FILM COATED ORAL
Status: DISCONTINUED | OUTPATIENT
Start: 2024-09-10 | End: 2024-09-18 | Stop reason: HOSPADM

## 2024-09-10 RX ORDER — ACETAMINOPHEN 325 MG/1
975 TABLET ORAL EVERY 6 HOURS PRN
Status: DISCONTINUED | OUTPATIENT
Start: 2024-09-10 | End: 2024-09-18 | Stop reason: HOSPADM

## 2024-09-10 RX ORDER — OLANZAPINE 10 MG/2ML
5 INJECTION, POWDER, FOR SOLUTION INTRAMUSCULAR
Status: DISCONTINUED | OUTPATIENT
Start: 2024-09-10 | End: 2024-09-18 | Stop reason: HOSPADM

## 2024-09-10 RX ORDER — TRAZODONE HYDROCHLORIDE 50 MG/1
50 TABLET, FILM COATED ORAL
Status: DISCONTINUED | OUTPATIENT
Start: 2024-09-10 | End: 2024-09-18 | Stop reason: HOSPADM

## 2024-09-10 RX ORDER — LORAZEPAM 2 MG/ML
2 INJECTION INTRAMUSCULAR EVERY 6 HOURS PRN
Status: DISCONTINUED | OUTPATIENT
Start: 2024-09-10 | End: 2024-09-18 | Stop reason: HOSPADM

## 2024-09-10 RX ORDER — HYDROXYZINE HYDROCHLORIDE 50 MG/1
50 TABLET, FILM COATED ORAL
Status: DISCONTINUED | OUTPATIENT
Start: 2024-09-10 | End: 2024-09-18 | Stop reason: HOSPADM

## 2024-09-10 RX ADMIN — LORAZEPAM 2 MG: 2 INJECTION INTRAMUSCULAR; INTRAVENOUS at 00:56

## 2024-09-10 RX ADMIN — BENZTROPINE MESYLATE 1 MG: 1 INJECTION INTRAMUSCULAR; INTRAVENOUS at 00:55

## 2024-09-10 RX ADMIN — HALOPERIDOL LACTATE 5 MG: 5 INJECTION, SOLUTION INTRAMUSCULAR at 00:56
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
## 2024-09-10 NOTE — ED NOTES
Pt continues to sleep on stretcher with no s/s of distress observed. Continues on Q15 for safety.      Zakia Aguilar RN  09/10/24 1293

## 2024-09-10 NOTE — ED NOTES
Patient laid down in the bed, nurse covered her with her blankets and presently she is already resting quietly. Continues on q 15 minute checks.     Laura Faulkner RN  09/10/24 0106
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
## 2024-09-10 NOTE — ED NOTES
Awakened from sleep for vital signs. Patient offering no complaints except that she is cold - warm blanket placed next to her body and the rest of the 9 blankets were reapplied. Did not waken fully for BH assessment as this can by done by psych with there assessment. Patient able to return to sleep.     Laura Faulkner RN  09/10/24 9136     no

## 2024-09-10 NOTE — ED CARE HANDOFF
Emergency Department Sign Out Note        Sign out and transfer of care from Dr. Ramos. See Separate Emergency Department note.     The patient, Antionette Downing, was evaluated by the previous provider for psych eval.    Workup Completed:  Med clearance    ED Course / Workup Pending (followup):  Patient is awaiting crisis evaluation.                                  ED Course as of 09/10/24 0635   Tue Sep 10, 2024   0535 The patient was medicated overnight for increasing agitation along with yelling/screaming.  Since then, she has had no acute issues.  Still awaiting crisis eval as she was too sedate when they attempted.  [facility] never arrived/called re: 302.  Her  also did not show up.  Dispo pending crisis eval.     Procedures  Medical Decision Making  Amount and/or Complexity of Data Reviewed  Labs: ordered.    Risk  Prescription drug management.            Disposition  Final diagnoses:   None     ED Disposition       None          Follow-up Information    None       Patient's Medications   Discharge Prescriptions    No medications on file     No discharge procedures on file.       ED Provider  Electronically Signed by     Bebeto Haji DO  09/10/24 0606

## 2024-09-10 NOTE — ED NOTES
Insurance   Eligibility Verification System checked - ([phone]).  Online system / automated system indicates:  [facility]  # [id number]

## 2024-09-10 NOTE — ED CARE HANDOFF
Emergency Department Sign Out Note        Sign out and transfer of care from Dr. Haji. See Separate Emergency Department note.     The patient, Antionette Downing, was evaluated by the previous provider for acute psychosis. Requires ED crisis eval.    Workup Completed:  Patient medically cleared for inpatient psychiatry.    ED Course / Workup Pending (followup):  Patient seen by me. Answers questions. Ate lunch. No immediate needs at this time. Patient did sign a 201. Labs from today were reviewed by me and all are WNL. Patient will be accepted to psychiatry service.          Labs Reviewed   RAPID DRUG SCREEN, URINE - Abnormal       Result Value Ref Range Status    Amph/Meth UR Negative  Negative Final    Barbiturate Ur Negative  Negative Final    Benzodiazepine Urine Negative  Negative Final    Cocaine Urine Negative  Negative Final    Methadone Urine Negative  Negative Final    Opiate Urine Negative  Negative Final    PCP Ur Negative  Negative Final    THC Urine Positive (*) Negative Final    Oxycodone Urine Negative  Negative Final    Fentanyl Urine Negative  Negative Final    HYDROCODONE URINE Negative  Negative Final    Narrative:     Presumptive report. If requested, specimen will be sent to reference lab for confirmation.  FOR MEDICAL PURPOSES ONLY.   IF CONFIRMATION NEEDED PLEASE CONTACT THE LAB WITHIN 5 DAYS.    Drug Screen Cutoff Levels:  AMPHETAMINE/METHAMPHETAMINES  1000 ng/mL  BARBITURATES     200 ng/mL  BENZODIAZEPINES     200 ng/mL  COCAINE      300 ng/mL  METHADONE      300 ng/mL  OPIATES      300 ng/mL  PHENCYCLIDINE     25 ng/mL  THC       50 ng/mL  OXYCODONE      100 ng/mL  FENTANYL      5 ng/mL  HYDROCODONE     300 ng/mL   POCT ALCOHOL BREATH TEST - Normal    EXTBreath Alcohol 0.000   Final   POCT PREGNANCY, URINE - Normal    EXT Preg Test, Ur Negative   Final    Control Valid   Final   CBC AND DIFFERENTIAL    WBC 6.14  4.31 - 10.16 Thousand/uL Final    RBC 4.56  3.81 - 5.12 Million/uL  Final    Hemoglobin 14.5  11.5 - 15.4 g/dL Final    Hematocrit 41.8  34.8 - 46.1 % Final    MCV 92  82 - 98 fL Final    MCH 31.8  26.8 - 34.3 pg Final    MCHC 34.7  31.4 - 37.4 g/dL Final    RDW 12.8  11.6 - 15.1 % Final    MPV 9.9  8.9 - 12.7 fL Final    Platelets 183  149 - 390 Thousands/uL Final    nRBC 0  /100 WBCs Final    Segmented % 48  43 - 75 % Final    Immature Grans % 1  0 - 2 % Final    Lymphocytes % 34  14 - 44 % Final    Monocytes % 12  4 - 12 % Final    Eosinophils Relative 4  0 - 6 % Final    Basophils Relative 1  0 - 1 % Final    Absolute Neutrophils 3.03  1.85 - 7.62 Thousands/µL Final    Absolute Immature Grans 0.03  0.00 - 0.20 Thousand/uL Final    Absolute Lymphocytes 2.10  0.60 - 4.47 Thousands/µL Final    Absolute Monocytes 0.71  0.17 - 1.22 Thousand/µL Final    Eosinophils Absolute 0.24  0.00 - 0.61 Thousand/µL Final    Basophils Absolute 0.03  0.00 - 0.10 Thousands/µL Final   COMPREHENSIVE METABOLIC PANEL    Sodium 136  135 - 147 mmol/L Final    Potassium 3.9  3.5 - 5.3 mmol/L Final    Chloride 104  96 - 108 mmol/L Final    CO2 24  21 - 32 mmol/L Final    ANION GAP 8  4 - 13 mmol/L Final    BUN 6  5 - 25 mg/dL Final    Creatinine 0.63  0.60 - 1.30 mg/dL Final    Comment: Standardized to IDMS reference method    Glucose 94  65 - 140 mg/dL Final    Comment: If the patient is fasting, the ADA then defines impaired fasting glucose as > 100 mg/dL and diabetes as > or equal to 123 mg/dL.    Calcium 9.1  8.4 - 10.2 mg/dL Final    AST 16  13 - 39 U/L Final    ALT 8  7 - 52 U/L Final    Comment: Specimen collection should occur prior to Sulfasalazine administration due to the potential for falsely depressed results.     Alkaline Phosphatase 58  34 - 104 U/L Final    Total Protein 6.8  6.4 - 8.4 g/dL Final    Albumin 4.3  3.5 - 5.0 g/dL Final    Total Bilirubin 0.51  0.20 - 1.00 mg/dL Final    Comment: Use of this assay is not recommended for patients undergoing treatment with eltrombopag due to the  potential for falsely elevated results.  N-acetyl-p-benzoquinone imine (metabolite of Acetaminophen) will generate erroneously low results in samples for patients that have taken an overdose of Acetaminophen.    eGFR 115  ml/min/1.73sq m Final    Narrative:     National Kidney Disease Foundation guidelines for Chronic Kidney Disease (CKD):     Stage 1 with normal or high GFR (GFR > 90 mL/min/1.73 square meters)    Stage 2 Mild CKD (GFR = 60-89 mL/min/1.73 square meters)    Stage 3A Moderate CKD (GFR = 45-59 mL/min/1.73 square meters)    Stage 3B Moderate CKD (GFR = 30-44 mL/min/1.73 square meters)    Stage 4 Severe CKD (GFR = 15-29 mL/min/1.73 square meters)    Stage 5 End Stage CKD (GFR <15 mL/min/1.73 square meters)  Note: GFR calculation is accurate only with a steady state creatinine                             ED Course as of 09/10/24 1350   Tue Sep 10, 2024   1232 Contacted by psychiatry who request repeat CBC, CMP and EKG. Review of records shows last labs were reviewed from 9/7. Sodium was 130.      Procedures  Medical Decision Making  Amount and/or Complexity of Data Reviewed  Labs: ordered.    Risk  Prescription drug management.  Decision regarding hospitalization.            Disposition  Final diagnoses:   Agitation     Time reflects when diagnosis was documented in both MDM as applicable and the Disposition within this note       Time User Action Codes Description Comment    9/10/2024  6:47 AM Bebeto Haji Add [R45.1] Agitation           ED Disposition       ED Disposition   Transfer to Behavioral Health Condition   --    Date/Time   Tue Sep 10, 2024  6:47 AM    Comment   Antionette Downing should be transferred out to behavioral health and has been medically cleared.               MD Documentation      Flowsheet Row Most Recent Value   Sending MD Dr. Jaramillo          Follow-up Information    None       Patient's Medications   Discharge Prescriptions    No medications on file     No discharge  procedures on file.       ED Provider  Electronically Signed by     Alysia Jaramillo MD  09/10/24 8639

## 2024-09-10 NOTE — ED NOTES
Not awakened for vital signs or BH assessment due to receiving sedation earlier in shift. No apparent discomfort or distress noted.     Laura Faulkner RN  09/10/24 0415
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
## 2024-09-10 NOTE — PLAN OF CARE
Problem: DEPRESSION  Goal: Will be euthymic at discharge  Description: INTERVENTIONS:  - Administer medication as ordered  - Provide emotional support via 1:1 interaction with staff  - Encourage involvement in milieu/groups/activities  - Monitor for social isolation  Outcome: Progressing     Problem: PSYCHOSIS  Goal: Will report no hallucinations or delusions  Description: Interventions:  - Administer medication as  ordered  - Every waking shifts and PRN assess for the presence of hallucinations and or delusions  - Assist with reality testing to support increasing orientation  - Assess if patient's hallucinations or delusions are encouraging self-harm or harm to others and intervene as appropriate  Outcome: Progressing     Problem: BEHAVIOR  Goal: Pt/Family maintain appropriate behavior and adhere to behavioral management agreement, if implemented  Description: INTERVENTIONS:  - Assess the family dynamic   - Encourage verbalization of thoughts and concerns in a socially appropriate manner  - Assess patient/family's coping skills and non-compliant behavior (including use of illegal substances).  - Utilize positive, consistent limit setting strategies supporting safety of patient, staff and others  - Initiate consult with Case Management, Spiritual Care or other ancillary services as appropriate  - If a patient's/visitor's behavior jeopardizes the safety of the patient, staff, or others, refer to organization procedure.   - Notify Security of behavior or suspected illegal substances which indicate the need for search of the patient and/or belongings  - Encourage participation in the decision making process about a behavioral management agreement; implement if patient meets criteria  Outcome: Progressing     Problem: ANXIETY  Goal: Will report anxiety at manageable levels  Description: INTERVENTIONS:  - Administer medication as ordered  - Teach and encourage coping skills  - Provide emotional support  - Assess  patient/family for anxiety and ability to cope  Outcome: Progressing  Goal: By discharge: Patient will verbalize 2 strategies to deal with anxiety  Description: Interventions:  - Identify any obvious source/trigger to anxiety  - Staff will assist patient in applying identified coping technique/skills  - Encourage attendance of scheduled groups and activities  Outcome: Progressing     Problem: SUBSTANCE USE/ABUSE  Goal: Will have no detox symptoms and will verbalize plan for changing substance-related behavior  Description: INTERVENTIONS:  - Monitor physical status and assess for symptoms of withdrawal  - Administer medication as ordered  - Provide emotional support with 1 on 1 interaction with staff  - Encourage recovery focused program/ addiction education  - Assess for verbalization of changing behaviors related to substance abuse  - Initiate consults and referrals as appropriate (Case Management, Spiritual Care, etc.)  Outcome: Progressing  Goal: By discharge, will develop insight into their chemical dependency and sustain motivation to continue in recovery  Description: INTERVENTIONS:  - Attends all daily group sessions and scheduled AA groups  - Actively practices coping skills through participation in the therapeutic community and adherence to program rules  - Reviews and completes assignments from individual treatment plan  - Assist patient development of understanding of their personal cycle of addiction and relapse triggers  Outcome: Progressing  Goal: By discharge, patient will have ongoing treatment plan addressing chemical dependency  Description: INTERVENTIONS:  - Assist patient with resources and/or appointments for ongoing recovery based living  Outcome: Progressing

## 2024-09-10 NOTE — ED NOTES
Patient is noted to be sleeping with easy non labored respirations. No apparent discomfort or distress  noted.     Laura Faulkner RN  09/10/24 0157

## 2024-09-10 NOTE — ED NOTES
Assumed care for pt. Pt currently sleeping on stretcher with no s/s of distress observed. Continues on Q15 for safety.      Zakia Aguilar RN  09/10/24 0931

## 2024-09-10 NOTE — ED NOTES
Patient is accepted at   3B  Patient is accepted by Dr. Nagy     Patient may go to the floor at 1530         Nurse report is to be called to [phone]  prior to patient transfer.

## 2024-09-10 NOTE — ED NOTES
Insurance Authorization for admission:   Phone call placed to [facility]  Phone number: [phone]  Spoke to Jeannie.     3 days approved.  9/10-9/12  Level of care: inpatient mental health   Review on 9/12   Authorization # **. To be given on admission

## 2024-09-10 NOTE — NURSING NOTE
PTA medication confirmed with [facility], Dr. Nagy notified. Pt stated they were not taking any medication.

## 2024-09-10 NOTE — ED NOTES
"Patient yelling loudly - lots of obsenities  - accusing staff of killing her children - also  yells \"my  sucks his thumb because he thinks it's his alina.!\"  Patient was  offered po medication to help her to calm down and relax a bit so that she could go off to sleep - she has been hypervigilant since I have been here at 11pm - intermittenlty yelling non sensical statements. Patient refused the po medication referring again to us being responsible for killing all of her children. Patient was medicated with IM meds which surprisingly she took without incident - security was present in room and did  assist with same. Patient accused security of ripping her clothes and then states  \"her  gave her an abscess.\" (Clothes are not ripped.)     Laura Faulkner RN  09/10/24 0104    "

## 2024-09-11 PROCEDURE — 99223 1ST HOSP IP/OBS HIGH 75: CPT | Performed by: STUDENT IN AN ORGANIZED HEALTH CARE EDUCATION/TRAINING PROGRAM

## 2024-09-11 PROCEDURE — 93005 ELECTROCARDIOGRAM TRACING: CPT

## 2024-09-11 RX ORDER — OLANZAPINE 10 MG/2ML
10 INJECTION, POWDER, FOR SOLUTION INTRAMUSCULAR ONCE
Status: COMPLETED | OUTPATIENT
Start: 2024-09-11 | End: 2024-09-11

## 2024-09-11 RX ORDER — WATER 10 ML/10ML
INJECTION INTRAMUSCULAR; INTRAVENOUS; SUBCUTANEOUS
Status: DISCONTINUED
Start: 2024-09-11 | End: 2024-09-18 | Stop reason: HOSPADM

## 2024-09-11 RX ORDER — DIPHENHYDRAMINE HYDROCHLORIDE 50 MG/ML
50 INJECTION INTRAMUSCULAR; INTRAVENOUS ONCE
Status: COMPLETED | OUTPATIENT
Start: 2024-09-11 | End: 2024-09-11

## 2024-09-11 RX ORDER — BUDESONIDE AND FORMOTEROL FUMARATE DIHYDRATE 80; 4.5 UG/1; UG/1
2 AEROSOL RESPIRATORY (INHALATION) 2 TIMES DAILY
Status: DISCONTINUED | OUTPATIENT
Start: 2024-09-11 | End: 2024-09-18 | Stop reason: HOSPADM

## 2024-09-11 RX ORDER — OLANZAPINE 10 MG/1
10 TABLET ORAL
Status: DISCONTINUED | OUTPATIENT
Start: 2024-09-11 | End: 2024-09-11

## 2024-09-11 RX ORDER — OLANZAPINE 10 MG/1
10 TABLET ORAL
Status: DISCONTINUED | OUTPATIENT
Start: 2024-09-12 | End: 2024-09-12

## 2024-09-11 RX ORDER — ALBUTEROL SULFATE 90 UG/1
2 INHALANT RESPIRATORY (INHALATION) EVERY 6 HOURS PRN
Status: DISCONTINUED | OUTPATIENT
Start: 2024-09-11 | End: 2024-09-18 | Stop reason: HOSPADM

## 2024-09-11 RX ORDER — DICYCLOMINE HYDROCHLORIDE 10 MG/1
20 CAPSULE ORAL EVERY 6 HOURS PRN
Status: DISCONTINUED | OUTPATIENT
Start: 2024-09-11 | End: 2024-09-14

## 2024-09-11 RX ADMIN — OLANZAPINE 10 MG: 10 INJECTION, POWDER, LYOPHILIZED, FOR SOLUTION INTRAMUSCULAR at 12:40

## 2024-09-11 RX ADMIN — OLANZAPINE 10 MG: 10 TABLET, FILM COATED ORAL at 08:37

## 2024-09-11 RX ADMIN — DICYCLOMINE HYDROCHLORIDE 20 MG: 10 CAPSULE ORAL at 22:35

## 2024-09-11 RX ADMIN — DICYCLOMINE HYDROCHLORIDE 20 MG: 10 CAPSULE ORAL at 15:27

## 2024-09-11 RX ADMIN — DIPHENHYDRAMINE HYDROCHLORIDE 50 MG: 50 INJECTION, SOLUTION INTRAMUSCULAR; INTRAVENOUS at 12:40

## 2024-09-11 NOTE — TREATMENT PLAN
TREATMENT PLAN REVIEW - Behavioral Health Antionette Downing 38 y.o. 1985 female MRN: [id number]    [facility] 3B BEHAVIORAL [facility] Room / Bed: [facility] 342-01 Encounter: [id number]          Admit Date/Time:  9/9/2024  9:16 PM    Treatment Team:   MD Opal Coates LPN Jennifer King Tara Nickens Portia Ellis Karalyn Binder Kristina Merced Maria Lamura Shawn Berhel Daniel Patrick Dever, MD    Diagnosis: Principal Problem:    Bipolar I disorder, most recent episode manic, severe with psychotic features ([facility])  Active Problems:    Asthma    Seizure disorder ([facility])    Medical clearance for psychiatric admission      Patient Strengths/Assets: resourceful    Patient Barriers/Limitations: difficulty adapting, marital/family conflict, poor insight    Short Term Goals: decrease in paranoid thoughts, decrease in psychotic symptoms, decrease in level of agitation    Long Term Goals: resolution of manic symptoms, resolution of psychotic symptoms, acceptance of need for psychiatric medications    Progress Towards Goals: starting psychiatric medications as prescribed, attends groups, participates in milieu therapy    Recommended Treatment: medication management, patient medication education, group therapy, milieu therapy, continued Behavioral Health psychiatric evaluation/assessment process    Treatment Frequency: daily medication monitoring, group and milieu therapy daily, monitoring through interdisciplinary rounds, monitoring through weekly patient care conferences    Expected Discharge Date:  9/21/24    Discharge Plan: discharge to home    Treatment Plan Created/Updated By: Adam Villa MD

## 2024-09-11 NOTE — NURSING NOTE
"0837 PRN Zyprexa 10 mg Po given.  Pt not able to leave room mate alone and was demanding roommate to clean the bathroom. Pt's room mate reported feeling uncomfortable with Pt.  Pt intrusive with both staff and peers and has been paranoid and suspicious. Pt requested PRN due to AH.  Pt moved into seclusion room until a single room opens shortly. Pt then requested a new mattress and was told she would have to use the mattress that was available. Pt reports I can't go in any of these rooms. I don't like people, I an antisocial.  There are also ghosts in that room\". Pt given reassurance and was educated on coping mechanisms. 0937 Pt observed sleeping in room. Breathing easy and unlabored. No s/s of distress observed.   "
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
## 2024-09-11 NOTE — NURSING NOTE
See  note. Pt was not able to be redirected and was verbally agressive and posturing at staff. Pt received IM Zyprexa 10 mg and Benadryl 50 mg IM at 1240 due to agitation and anxiety. 1340 Pt was observed resting comfortably in room. Pt remains in behavioral control.

## 2024-09-11 NOTE — ASSESSMENT & PLAN NOTE
Stable  Not in acute exacerbation  Home meds: Albuterol 2 puff q4h PRN and Symbicort BID    Plan:  - Continue home meds

## 2024-09-11 NOTE — PROGRESS NOTES
09/11/24 0842   Team Meeting   Meeting Type Daily Rounds   Team Members Present   Team Members Present Physician;Nurse;   Physician Team Member Lilo   [facility]   Care Management Team Member Melodie   Patient/Family Present   Patient Present No   Patient's Family Present No     Pt is a 201 coming from  ED. Pt is paranoid. Pt's readmit score is 31. Pt reported hearing voices. Pt is irritable. Pt refused vitals this morning. Pt's discharge is pending 7-10 days.

## 2024-09-11 NOTE — PLAN OF CARE
Problem: DEPRESSION  Goal: Will be euthymic at discharge  Description: INTERVENTIONS:  - Administer medication as ordered  - Provide emotional support via 1:1 interaction with staff  - Encourage involvement in milieu/groups/activities  - Monitor for social isolation  Outcome: Not Progressing     Problem: PSYCHOSIS  Goal: Will report no hallucinations or delusions  Description: Interventions:  - Administer medication as  ordered  - Every waking shifts and PRN assess for the presence of hallucinations and or delusions  - Assist with reality testing to support increasing orientation  - Assess if patient's hallucinations or delusions are encouraging self-harm or harm to others and intervene as appropriate  Outcome: Not Progressing     Problem: BEHAVIOR  Goal: Pt/Family maintain appropriate behavior and adhere to behavioral management agreement, if implemented  Description: INTERVENTIONS:  - Assess the family dynamic   - Encourage verbalization of thoughts and concerns in a socially appropriate manner  - Assess patient/family's coping skills and non-compliant behavior (including use of illegal substances).  - Utilize positive, consistent limit setting strategies supporting safety of patient, staff and others  - Initiate consult with Case Management, Spiritual Care or other ancillary services as appropriate  - If a patient's/visitor's behavior jeopardizes the safety of the patient, staff, or others, refer to organization procedure.   - Notify Security of behavior or suspected illegal substances which indicate the need for search of the patient and/or belongings  - Encourage participation in the decision making process about a behavioral management agreement; implement if patient meets criteria  Outcome: Not Progressing     Problem: ANXIETY  Goal: Will report anxiety at manageable levels  Description: INTERVENTIONS:  - Administer medication as ordered  - Teach and encourage coping skills  - Provide emotional support  -  Assess patient/family for anxiety and ability to cope  Outcome: Not Progressing  Goal: By discharge: Patient will verbalize 2 strategies to deal with anxiety  Description: Interventions:  - Identify any obvious source/trigger to anxiety  - Staff will assist patient in applying identified coping technique/skills  - Encourage attendance of scheduled groups and activities  Outcome: Not Progressing     Problem: SUBSTANCE USE/ABUSE  Goal: Will have no detox symptoms and will verbalize plan for changing substance-related behavior  Description: INTERVENTIONS:  - Monitor physical status and assess for symptoms of withdrawal  - Administer medication as ordered  - Provide emotional support with 1 on 1 interaction with staff  - Encourage recovery focused program/ addiction education  - Assess for verbalization of changing behaviors related to substance abuse  - Initiate consults and referrals as appropriate (Case Management, Spiritual Care, etc.)  Outcome: Progressing  Goal: By discharge, will develop insight into their chemical dependency and sustain motivation to continue in recovery  Description: INTERVENTIONS:  - Attends all daily group sessions and scheduled AA groups  - Actively practices coping skills through participation in the therapeutic community and adherence to program rules  - Reviews and completes assignments from individual treatment plan  - Assist patient development of understanding of their personal cycle of addiction and relapse triggers  Outcome: Not Progressing  Goal: By discharge, patient will have ongoing treatment plan addressing chemical dependency  Description: INTERVENTIONS:  - Assist patient with resources and/or appointments for ongoing recovery based living  Outcome: Not Progressing

## 2024-09-11 NOTE — NURSING NOTE
Pt withdrawn to room throughout evening, napping on and off. Pt awoken easily, but dismissive and endorses wanting to be left to rest at this time. Pt endorses safety, denies SI/HI/AH/VH and unmet needs. Cooperative with vitals, otherwise irritable and guarded.

## 2024-09-11 NOTE — ASSESSMENT & PLAN NOTE
History limited as patient is agitated and withdrawn  Labs ordered and pending for AM draw-  CBC, CMP, RPR, lipid panel, TSH, Vitamin B 12, Vit D, Folate, hCG  Most recent labs reviewed: CBC, CMP, Rapid drug screen notable for +THC otherwise wnl  Vitals stable   9/10/2024 ECG reviewed- Sinus tachycardia; Otherwise normal ECG. When compared with ECG of 15-AUG-2024. No significant change was found  Medically stable for continued inpatient psychiatric treatment based on available results

## 2024-09-11 NOTE — ASSESSMENT & PLAN NOTE
Neurology on 5/17/24: She does currently follow with epilepsy team for history of PNES and migraines  Will plan to follow up with epilepsy team as scheduled in 5 months. Can follow up with neuromuscular team if needed. To contact the office sooner with any concerns or worsening symptoms.   Neurology 8/16/24 - consult: Agree with plan to continue Trileptal 300 mg twice daily for mood stabilization and seizure prevention and start Zyprexa titrated up to 10 mg at bedtime for psychosis    Home med: Zyprexa 10 mg at bedtime; Trileptal 300 mg BID for mood stabilization    - Continue Zyprexa to 10 mg at bedtime  - Will defer mood stabilization to primary team
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
## 2024-09-11 NOTE — CMS CERTIFICATION NOTE
Recertification: Based upon physical, mental and social evaluations, I certify that inpatient psychiatric services continue to be medically necessary for this patient for a duration of 10 midnights for the treatment of  Bipolar I disorder, most recent episode manic, severe with psychotic features (HCC) Available alternative community resources still do not meet the patient's mental health care needs. I further attest that an established written individualized plan of care has been updated and is outlined in the patient's medical records.

## 2024-09-12 LAB
ATRIAL RATE: 77 BPM
ATRIAL RATE: 79 BPM
P AXIS: 44 DEGREES
P AXIS: 52 DEGREES
PR INTERVAL: 132 MS
PR INTERVAL: 134 MS
QRS AXIS: 79 DEGREES
QRS AXIS: 79 DEGREES
QRSD INTERVAL: 78 MS
QRSD INTERVAL: 78 MS
QT INTERVAL: 370 MS
QT INTERVAL: 370 MS
QTC INTERVAL: 418 MS
QTC INTERVAL: 424 MS
T WAVE AXIS: 52 DEGREES
T WAVE AXIS: 55 DEGREES
VENTRICULAR RATE: 77 BPM
VENTRICULAR RATE: 79 BPM

## 2024-09-12 PROCEDURE — 99222 1ST HOSP IP/OBS MODERATE 55: CPT

## 2024-09-12 PROCEDURE — 93010 ELECTROCARDIOGRAM REPORT: CPT | Performed by: STUDENT IN AN ORGANIZED HEALTH CARE EDUCATION/TRAINING PROGRAM

## 2024-09-12 PROCEDURE — 99232 SBSQ HOSP IP/OBS MODERATE 35: CPT | Performed by: STUDENT IN AN ORGANIZED HEALTH CARE EDUCATION/TRAINING PROGRAM

## 2024-09-12 RX ORDER — OXCARBAZEPINE 300 MG/1
300 TABLET, FILM COATED ORAL EVERY 12 HOURS SCHEDULED
Status: DISCONTINUED | OUTPATIENT
Start: 2024-09-12 | End: 2024-09-18 | Stop reason: HOSPADM

## 2024-09-12 RX ADMIN — NICOTINE POLACRILEX 2 MG: 2 GUM, CHEWING BUCCAL at 19:37

## 2024-09-12 RX ADMIN — OLANZAPINE 10 MG: 10 TABLET, FILM COATED ORAL at 08:52

## 2024-09-12 RX ADMIN — DICYCLOMINE HYDROCHLORIDE 20 MG: 10 CAPSULE ORAL at 06:30

## 2024-09-12 RX ADMIN — DICYCLOMINE HYDROCHLORIDE 20 MG: 10 CAPSULE ORAL at 12:30

## 2024-09-12 RX ADMIN — OXCARBAZEPINE 300 MG: 300 TABLET, FILM COATED ORAL at 22:00

## 2024-09-12 RX ADMIN — DICYCLOMINE HYDROCHLORIDE 20 MG: 10 CAPSULE ORAL at 22:10

## 2024-09-12 RX ADMIN — DICYCLOMINE HYDROCHLORIDE 20 MG: 10 CAPSULE ORAL at 18:44

## 2024-09-12 RX ADMIN — HYDROXYZINE HYDROCHLORIDE 100 MG: 50 TABLET, FILM COATED ORAL at 08:51

## 2024-09-12 RX ADMIN — OXCARBAZEPINE 300 MG: 300 TABLET, FILM COATED ORAL at 13:54

## 2024-09-12 RX ADMIN — OLANZAPINE 15 MG: 5 TABLET, FILM COATED ORAL at 22:03

## 2024-09-12 NOTE — NURSING NOTE
Pt pacing in front of nurses station and was being loud and disruptive to unit. Pt crying and rambling with pressured speech stating that she's scared and doesn't want to be here. 0852 PRN Zyprexa 10 mg Po and Atarax 100 mg Po given. 0952 pt observed participating in group and appears calm. No further concerns at this time.

## 2024-09-12 NOTE — PROGRESS NOTES
09/12/24 0852   Team Meeting   Meeting Type Daily Rounds   Team Members Present   Team Members Present Physician;Nurse;   Physician Team Member Lilo   [facility] Melodie   Patient/Family Present   Patient Present No   Patient's Family Present No     Pt refused her labs. Pt appears paranoid and bizarre. Pt was given IM benadryl and Zyprexa and that was affective. Pt's discharge is pending 7-10 days.
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
## 2024-09-12 NOTE — PROGRESS NOTES
Progress Note - Behavioral Health   Name: Antionette Downing 38 y.o. female I MRN: [id number]  Unit/Bed#: -01 I Date of Admission: 9/9/2024   Date of Service: 9/12/2024 I Hospital Day: 2     Assessment & Plan  Bipolar I disorder, most recent episode manic, severe with psychotic features (HCC)  -Start Trileptal 300mg BID  -Increase Olanzapine to 15mg Nightly  Asthma    Seizure disorder (HCC)    Medical clearance for psychiatric admission      Progress Toward Goals: Improving    Recommended Treatment: Continue with group therapy, milieu therapy and occupational therapy.      Risks, benefits and possible side effects of Medications:   Risks, benefits, and possible side effects of medications explained to patient and patient verbalizes understanding.      History of Present Illness   Behavior over the last 24 hours:  improved  Sleep: normal  Appetite: normal  Medication side effects: No  ROS: no complaints    Subjective: Patient remains bizarre, paranoid, states she doesn't need medications but was willing to accept Trileptal and Olanzapine. Discussed starting patient on Trileptal and increasing Olanzapine at night. Patient endorsed agreement but was very irritable during interview, and focused on attending to activities in the bathroom.     Objective   Mental Status Evaluation:  Appearance:  age appropriate   Behavior:  evasive, guarded, psychomotor agitation, and uncooperative   Speech:  normal volume   Mood:  irritable   Affect:  labile and mood-congruent   Thought Process:  disorganized   Associations: tangential associations, loose associations   Thought Content:  delusions  obsessive/rumination and obsessions   Perceptual Disturbances: None   Risk Potential: Suicidal Ideations none  Homicidal Ideations none  Potential for Aggression No   Sensorium:  person, place, time/date, and situation   Memory:  recent and remote memory grossly intact   Consciousness:  alert and awake    Attention: attention span and  concentration were age appropriate   Insight:  Poor   Judgment: Poor   Gait/Station: normal gait/station   Motor Activity: no abnormal movements     Medications: current meds:   Current Facility-Administered Medications:     acetaminophen (TYLENOL) tablet 650 mg, Q6H PRN    acetaminophen (TYLENOL) tablet 650 mg, Q4H PRN    acetaminophen (TYLENOL) tablet 975 mg, Q6H PRN    albuterol (PROVENTIL HFA,VENTOLIN HFA) inhaler 2 puff, Q6H PRN    benztropine (COGENTIN) injection 1 mg, Q4H PRN Max 6/day    benztropine (COGENTIN) tablet 1 mg, Q4H PRN Max 6/day    budesonide-formoterol (SYMBICORT) 80-4.5 MCG/ACT inhaler 2 puff, BID    dicyclomine (BENTYL) capsule 20 mg, Q6H PRN    hydrOXYzine HCL (ATARAX) tablet 50 mg, Q6H PRN Max 4/day **OR** diphenhydrAMINE (BENADRYL) injection 50 mg, Q6H PRN    hydrOXYzine HCL (ATARAX) tablet 100 mg, Q6H PRN Max 4/day **OR** LORazepam (ATIVAN) injection 2 mg, Q6H PRN    hydrOXYzine HCL (ATARAX) tablet 25 mg, Q6H PRN Max 4/day    nicotine polacrilex (NICORETTE) gum 2 mg, Q2H PRN    OLANZapine (ZyPREXA) tablet 10 mg, Q3H PRN Max 3/day **OR** OLANZapine (ZyPREXA) IM injection 10 mg, Q3H PRN Max 3/day    OLANZapine (ZyPREXA) tablet 5 mg, Q3H PRN Max 6/day **OR** OLANZapine (ZyPREXA) IM injection 5 mg, Q3H PRN Max 6/day    OLANZapine (ZyPREXA) tablet 15 mg, HS    OLANZapine (ZyPREXA) tablet 2.5 mg, Q3H PRN Max 8/day    OXcarbazepine (TRILEPTAL) tablet 300 mg, Q12H OSIEL    propranolol (INDERAL) tablet 10 mg, Q8H PRN    sterile water injection **ADS Override Pull**,     traZODone (DESYREL) tablet 50 mg, HS PRN.      Lab Results: I have reviewed the following results: CBC/BMP: No new results in last 24 hours.   Most Recent Labs:   Lab Results   Component Value Date    WBC 6.14 09/10/2024    RBC 4.56 09/10/2024    HGB 14.5 09/10/2024    HCT 41.8 09/10/2024     09/10/2024    RDW 12.8 09/10/2024    NEUTROABS 3.03 09/10/2024    SODIUM 136 09/10/2024    K 3.9 09/10/2024     09/10/2024    CO2  24 09/10/2024    BUN 6 09/10/2024    CREATININE 0.63 09/10/2024    GLUC 94 09/10/2024    GLUF 92 04/29/2024    CALCIUM 9.1 09/10/2024    AST 16 09/10/2024    ALT 8 09/10/2024    ALKPHOS 58 09/10/2024    TP 6.8 09/10/2024    ALB 4.3 09/10/2024    TBILI 0.51 09/10/2024    CHOLESTEROL 157 04/27/2024    HDL 41 (L) 04/27/2024    TRIG 129 04/27/2024    LDLCALC 90 04/27/2024    NONHDLC 116 04/27/2024    VALPROICTOT <10.0 (L) 02/12/2023    LITHIUM 0.63 02/01/2024    AMMONIA 17 11/12/2019    VLN1YNIYEBUE 1.208 08/16/2024    FREET4 1.24 02/01/2023    PREGSERUM Negative 05/16/2024    HCGQUANT <3 12/05/2019    RPR Non-Reactive 02/02/2023    HGBA1C 5.3 03/28/2024     03/28/2024       Administrative Statements   I have spent a total time of 15-20 minutes in caring for this patient on the day of the visit/encounter including Prognosis, Risks and benefits of tx options, Instructions for management, Importance of tx compliance, Impressions, Counseling / Coordination of care, Documenting in the medical record, Obtaining or reviewing history  , and Communicating with other healthcare professionals .

## 2024-09-12 NOTE — PROGRESS NOTES
09/12/24 1000   Activity/Group Checklist   Group Other (Comment)  (Group Art Therapy/Psychodynamic, Creatively Exploring the Courage/Strength of Vulnerability followed by Process Discussion)   Attendance Attended  (Observed only, but sat with group)   Attendance Duration (min) 31-45  (90 min group)   Interactions Disorganized interaction   Affect/Mood Blunted/flat   Goals Achieved Able to listen to others

## 2024-09-12 NOTE — QUICK NOTE
Antionette Downing was seen by family medicine team including attending. She was found walking in the hallway and she reports feeling okay. She has not questions or concerns.     Patient is currently in acute psychosis.  Patient is on Trileptal 300 mg BID for seizure disorder, which it's started today  morning per primary team. We agreed with continue with Trileptal for now.     Further chart review reveals an appointment with a neurologist back in April 2023 where there was suspicion that she has psychogenic neuroleptic epileptic syndrome (PNES).  EEG performed in February 2023 was normal.  The description of seizure events do not fit typical seizure characteristics based off of this prior note.  She was to follow-up outpatient however at this point in time we do recommend as a precaution continuing with the Trileptal until we can get further clarification from neurology about the nature of these episodes.

## 2024-09-12 NOTE — PLAN OF CARE
Problem: DEPRESSION  Goal: Will be euthymic at discharge  Description: INTERVENTIONS:  - Administer medication as ordered  - Provide emotional support via 1:1 interaction with staff  - Encourage involvement in milieu/groups/activities  - Monitor for social isolation  Outcome: Progressing     Problem: PSYCHOSIS  Goal: Will report no hallucinations or delusions  Description: Interventions:  - Administer medication as  ordered  - Every waking shifts and PRN assess for the presence of hallucinations and or delusions  - Assist with reality testing to support increasing orientation  - Assess if patient's hallucinations or delusions are encouraging self-harm or harm to others and intervene as appropriate  Outcome: Not Progressing     Problem: BEHAVIOR  Goal: Pt/Family maintain appropriate behavior and adhere to behavioral management agreement, if implemented  Description: INTERVENTIONS:  - Assess the family dynamic   - Encourage verbalization of thoughts and concerns in a socially appropriate manner  - Assess patient/family's coping skills and non-compliant behavior (including use of illegal substances).  - Utilize positive, consistent limit setting strategies supporting safety of patient, staff and others  - Initiate consult with Case Management, Spiritual Care or other ancillary services as appropriate  - If a patient's/visitor's behavior jeopardizes the safety of the patient, staff, or others, refer to organization procedure.   - Notify Security of behavior or suspected illegal substances which indicate the need for search of the patient and/or belongings  - Encourage participation in the decision making process about a behavioral management agreement; implement if patient meets criteria  Outcome: Not Progressing     Problem: ANXIETY  Goal: Will report anxiety at manageable levels  Description: INTERVENTIONS:  - Administer medication as ordered  - Teach and encourage coping skills  - Provide emotional support  -  Assess patient/family for anxiety and ability to cope  Outcome: Not Progressing  Goal: By discharge: Patient will verbalize 2 strategies to deal with anxiety  Description: Interventions:  - Identify any obvious source/trigger to anxiety  - Staff will assist patient in applying identified coping technique/skills  - Encourage attendance of scheduled groups and activities  Outcome: Not Progressing     Problem: SUBSTANCE USE/ABUSE  Goal: Will have no detox symptoms and will verbalize plan for changing substance-related behavior  Description: INTERVENTIONS:  - Monitor physical status and assess for symptoms of withdrawal  - Administer medication as ordered  - Provide emotional support with 1 on 1 interaction with staff  - Encourage recovery focused program/ addiction education  - Assess for verbalization of changing behaviors related to substance abuse  - Initiate consults and referrals as appropriate (Case Management, Spiritual Care, etc.)  Outcome: Not Progressing  Goal: By discharge, will develop insight into their chemical dependency and sustain motivation to continue in recovery  Description: INTERVENTIONS:  - Attends all daily group sessions and scheduled AA groups  - Actively practices coping skills through participation in the therapeutic community and adherence to program rules  - Reviews and completes assignments from individual treatment plan  - Assist patient development of understanding of their personal cycle of addiction and relapse triggers  Outcome: Not Progressing  Goal: By discharge, patient will have ongoing treatment plan addressing chemical dependency  Description: INTERVENTIONS:  - Assist patient with resources and/or appointments for ongoing recovery based living  Outcome: Not Progressing
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
## 2024-09-12 NOTE — SOCIAL WORK
Discussed with patient: AUDIT score of 0 UDS/Identified Substance(s) used: THC  Risks discussed included: Mental health, financial stability, physical health   Recommendations discussed: Continue with OP services and consider B[facility] referral   Patient's response: Pt is unsure but will definitely continue with existing OP.

## 2024-09-13 PROCEDURE — 99232 SBSQ HOSP IP/OBS MODERATE 35: CPT | Performed by: STUDENT IN AN ORGANIZED HEALTH CARE EDUCATION/TRAINING PROGRAM

## 2024-09-13 RX ORDER — OLANZAPINE 10 MG/1
20 TABLET ORAL
Status: DISCONTINUED | OUTPATIENT
Start: 2024-09-13 | End: 2024-09-18 | Stop reason: HOSPADM

## 2024-09-13 RX ADMIN — OLANZAPINE 10 MG: 10 TABLET, FILM COATED ORAL at 15:24

## 2024-09-13 RX ADMIN — DICYCLOMINE HYDROCHLORIDE 20 MG: 10 CAPSULE ORAL at 21:16

## 2024-09-13 RX ADMIN — OXCARBAZEPINE 300 MG: 300 TABLET, FILM COATED ORAL at 21:14

## 2024-09-13 RX ADMIN — DICYCLOMINE HYDROCHLORIDE 20 MG: 10 CAPSULE ORAL at 07:30

## 2024-09-13 RX ADMIN — OLANZAPINE 20 MG: 10 TABLET, FILM COATED ORAL at 21:14

## 2024-09-13 RX ADMIN — NICOTINE POLACRILEX 2 MG: 2 GUM, CHEWING BUCCAL at 14:52

## 2024-09-13 RX ADMIN — DICYCLOMINE HYDROCHLORIDE 20 MG: 10 CAPSULE ORAL at 13:59

## 2024-09-13 RX ADMIN — OXCARBAZEPINE 300 MG: 300 TABLET, FILM COATED ORAL at 08:58

## 2024-09-13 NOTE — NURSING NOTE
"Pt visible but withdrawn to self. Pt denies HI/SI/AVH, but is guarded on approach,  appears preoccupied and concerned.  During interview pt reports feeling afraid of her  and stated \"he's got those eyes like the miya from Jeepers Creep[employer].\"  Pt asked for PRN for anxiety but shortly after changed her mind.   "

## 2024-09-13 NOTE — NURSING NOTE
Patient had conversation with this writer without appearing to hallucinate overtly, or as suspicious. Appeared calmer. 10mg of PO PRN zyprexa effective for severe agitation.

## 2024-09-13 NOTE — NURSING NOTE
Bentyl 20mg PO PRN medication effective for abdominal cramping. Patient reports no further cramping in that area.

## 2024-09-13 NOTE — NURSING NOTE
"Patient is defensive, irritable, guarded. Upon assessment she stated, \"I'm here because of you people. You people are keeping me here. I don't need anything.\" Patient was compliant with medications, and denies AVH/SI/HI but is very suspicious and paranoid. She states her  is after her, and \"He has eyes like Jeepers Creepers\" from the movie. Patient in behavioral control, and denies having further needs.   "

## 2024-09-13 NOTE — NURSING NOTE
Pt also reported abdominal cramping and requested PRN BENTYL,  which was given at 2210.  Upon reassessment at 2310 pt was in bed and no longer reporting cramps.
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
## 2024-09-13 NOTE — NURSING NOTE
"Patient c/o of stomach cramps. Given Bentyl PO PRN 20mg.     0832: Patient reports \"It is not cramping as bad.\" Bentyl PO PRN 20mgs effective.   "

## 2024-09-13 NOTE — PROGRESS NOTES
09/13/24 0841   Team Meeting   Meeting Type Daily Rounds   Team Members Present   Team Members Present Physician;Nurse;   Physician Team Member Lilo   [facility] Melodie   Patient/Family Present   Patient Present No   Patient's Family Present No     Pt reported hearing things. Pt refuses labs and EKG. Pt is medication and meal compliant. Pt appears delusional. Pt's discharge is pending 7-10 days.

## 2024-09-13 NOTE — NURSING NOTE
Patient offers no further complaints of stomach discomfort at this time. Prn Bentyl 20 mg  effective.

## 2024-09-13 NOTE — PLAN OF CARE
Problem: DEPRESSION  Goal: Will be euthymic at discharge  Description: INTERVENTIONS:  - Administer medication as ordered  - Provide emotional support via 1:1 interaction with staff  - Encourage involvement in milieu/groups/activities  - Monitor for social isolation  Outcome: Progressing     Problem: SUBSTANCE USE/ABUSE  Goal: Will have no detox symptoms and will verbalize plan for changing substance-related behavior  Description: INTERVENTIONS:  - Monitor physical status and assess for symptoms of withdrawal  - Administer medication as ordered  - Provide emotional support with 1 on 1 interaction with staff  - Encourage recovery focused program/ addiction education  - Assess for verbalization of changing behaviors related to substance abuse  - Initiate consults and referrals as appropriate (Case Management, Spiritual Care, etc.)  Outcome: Progressing  Goal: By discharge, will develop insight into their chemical dependency and sustain motivation to continue in recovery  Description: INTERVENTIONS:  - Attends all daily group sessions and scheduled AA groups  - Actively practices coping skills through participation in the therapeutic community and adherence to program rules  - Reviews and completes assignments from individual treatment plan  - Assist patient development of understanding of their personal cycle of addiction and relapse triggers  Outcome: Progressing  Goal: By discharge, patient will have ongoing treatment plan addressing chemical dependency  Description: INTERVENTIONS:  - Assist patient with resources and/or appointments for ongoing recovery based living  Outcome: Progressing     Problem: PSYCHOSIS  Goal: Will report no hallucinations or delusions  Description: Interventions:  - Administer medication as  ordered  - Every waking shifts and PRN assess for the presence of hallucinations and or delusions  - Assist with reality testing to support increasing orientation  - Assess if patient's hallucinations  or delusions are encouraging self-harm or harm to others and intervene as appropriate  Outcome: Not Progressing     Problem: BEHAVIOR  Goal: Pt/Family maintain appropriate behavior and adhere to behavioral management agreement, if implemented  Description: INTERVENTIONS:  - Assess the family dynamic   - Encourage verbalization of thoughts and concerns in a socially appropriate manner  - Assess patient/family's coping skills and non-compliant behavior (including use of illegal substances).  - Utilize positive, consistent limit setting strategies supporting safety of patient, staff and others  - Initiate consult with Case Management, Spiritual Care or other ancillary services as appropriate  - If a patient's/visitor's behavior jeopardizes the safety of the patient, staff, or others, refer to organization procedure.   - Notify Security of behavior or suspected illegal substances which indicate the need for search of the patient and/or belongings  - Encourage participation in the decision making process about a behavioral management agreement; implement if patient meets criteria  Outcome: Not Progressing     Problem: ANXIETY  Goal: Will report anxiety at manageable levels  Description: INTERVENTIONS:  - Administer medication as ordered  - Teach and encourage coping skills  - Provide emotional support  - Assess patient/family for anxiety and ability to cope  Outcome: Not Progressing  Goal: By discharge: Patient will verbalize 2 strategies to deal with anxiety  Description: Interventions:  - Identify any obvious source/trigger to anxiety  - Staff will assist patient in applying identified coping technique/skills  - Encourage attendance of scheduled groups and activities  Outcome: Not Progressing

## 2024-09-13 NOTE — NURSING NOTE
Patient requested PRN PO bentyl for abdominal cramping. Patient upset she cannot have it yet. Patient reports frequently taking bentyl outpatient.

## 2024-09-13 NOTE — PROGRESS NOTES
"Progress Note - Behavioral Health   Name: Antionette Downing 38 y.o. female I MRN: [id number]  Unit/Bed#: -01 I Date of Admission: 9/9/2024   Date of Service: 9/13/2024 I Hospital Day: 3     Assessment & Plan  Bipolar I disorder, most recent episode manic, severe with psychotic features (HCC)  -Continue Trileptal 300mg BID  -Increase Olanzapine to 20mg Nightly  Asthma    Seizure disorder (HCC)    Medical clearance for psychiatric admission      Progress Toward Goals: Improved    Recommended Treatment: Continue with group therapy, milieu therapy and occupational therapy.      Risks, benefits and possible side effects of Medications:   Risks, benefits, and possible side effects of medications explained to patient and patient verbalizes understanding.      History of Present Illness   Behavior over the last 24 hours:  improved  Sleep: normal  Appetite: normal  Medication side effects: No  ROS: no complaints    Subjective: Patient notably irritable. Focused on discharge, but able to engage with provider. States \"I just want to get out of here.\" Patient uncertain of her legal status. Patient updated that she is on a 201. Still appeared disorganized not understanding what happened the past few days. Discussed plan to increase Olanzapine to 20mg Nightly to help patient stabilize for discharge. Patient endorsed willingness to go up if it will help her get discharged faster. Patient without any other complaints.     Objective   Mental Status Evaluation:  Appearance:  age appropriate   Behavior:  evasive and guarded   Speech:  normal volume   Mood:  angry and irritable   Affect:  mood-congruent   Thought Process:  disorganized and illogical   Associations: loose associations, perseveration, perseverative   Thought Content:  delusions  grandiose and persecutory   Perceptual Disturbances: None   Risk Potential: Suicidal Ideations none  Homicidal Ideations none  Potential for Aggression No   Sensorium:  person, place, " time/date, and situation   Memory:  recent and remote memory grossly intact   Consciousness:  alert and awake    Attention: attention span and concentration were age appropriate   Insight:  Poor   Judgment: Poor   Gait/Station: normal gait/station   Motor Activity: no abnormal movements     Medications: current meds:   Current Facility-Administered Medications:     acetaminophen (TYLENOL) tablet 650 mg, Q6H PRN    acetaminophen (TYLENOL) tablet 650 mg, Q4H PRN    acetaminophen (TYLENOL) tablet 975 mg, Q6H PRN    albuterol (PROVENTIL HFA,VENTOLIN HFA) inhaler 2 puff, Q6H PRN    benztropine (COGENTIN) injection 1 mg, Q4H PRN Max 6/day    benztropine (COGENTIN) tablet 1 mg, Q4H PRN Max 6/day    budesonide-formoterol (SYMBICORT) 80-4.5 MCG/ACT inhaler 2 puff, BID    dicyclomine (BENTYL) capsule 20 mg, Q6H PRN    hydrOXYzine HCL (ATARAX) tablet 50 mg, Q6H PRN Max 4/day **OR** diphenhydrAMINE (BENADRYL) injection 50 mg, Q6H PRN    hydrOXYzine HCL (ATARAX) tablet 100 mg, Q6H PRN Max 4/day **OR** LORazepam (ATIVAN) injection 2 mg, Q6H PRN    hydrOXYzine HCL (ATARAX) tablet 25 mg, Q6H PRN Max 4/day    nicotine polacrilex (NICORETTE) gum 2 mg, Q2H PRN    OLANZapine (ZyPREXA) tablet 10 mg, Q3H PRN Max 3/day **OR** OLANZapine (ZyPREXA) IM injection 10 mg, Q3H PRN Max 3/day    OLANZapine (ZyPREXA) tablet 5 mg, Q3H PRN Max 6/day **OR** OLANZapine (ZyPREXA) IM injection 5 mg, Q3H PRN Max 6/day    OLANZapine (ZyPREXA) tablet 15 mg, HS    OLANZapine (ZyPREXA) tablet 2.5 mg, Q3H PRN Max 8/day    OXcarbazepine (TRILEPTAL) tablet 300 mg, Q12H OSIEL    propranolol (INDERAL) tablet 10 mg, Q8H PRN    sterile water injection **ADS Override Pull**,     traZODone (DESYREL) tablet 50 mg, HS PRN.      Lab Results: I have reviewed the following results: CBC/BMP: No new results in last 24 hours.   Most Recent Labs:   Lab Results   Component Value Date    WBC 6.14 09/10/2024    RBC 4.56 09/10/2024    HGB 14.5 09/10/2024    HCT 41.8 09/10/2024      09/10/2024    RDW 12.8 09/10/2024    NEUTROABS 3.03 09/10/2024    SODIUM 136 09/10/2024    K 3.9 09/10/2024     09/10/2024    CO2 24 09/10/2024    BUN 6 09/10/2024    CREATININE 0.63 09/10/2024    GLUC 94 09/10/2024    GLUF 92 04/29/2024    CALCIUM 9.1 09/10/2024    AST 16 09/10/2024    ALT 8 09/10/2024    ALKPHOS 58 09/10/2024    TP 6.8 09/10/2024    ALB 4.3 09/10/2024    TBILI 0.51 09/10/2024    CHOLESTEROL 157 04/27/2024    HDL 41 (L) 04/27/2024    TRIG 129 04/27/2024    LDLCALC 90 04/27/2024    NONHDLC 116 04/27/2024    VALPROICTOT <10.0 (L) 02/12/2023    LITHIUM 0.63 02/01/2024    AMMONIA 17 11/12/2019    DSB9OHXRMGTX 1.208 08/16/2024    FREET4 1.24 02/01/2023    PREGSERUM Negative 05/16/2024    HCGQUANT <3 12/05/2019    RPR Non-Reactive 02/02/2023    HGBA1C 5.3 03/28/2024     03/28/2024       Administrative Statements   I have spent a total time of 15-20 minutes in caring for this patient on the day of the visit/encounter including Risks and benefits of tx options, Instructions for management, Patient and family education, Importance of tx compliance, Risk factor reductions, Impressions, Counseling / Coordination of care, Documenting in the medical record, and Communicating with other healthcare professionals .

## 2024-09-14 PROCEDURE — 99232 SBSQ HOSP IP/OBS MODERATE 35: CPT | Performed by: STUDENT IN AN ORGANIZED HEALTH CARE EDUCATION/TRAINING PROGRAM

## 2024-09-14 RX ORDER — ONDANSETRON 4 MG/1
4 TABLET, ORALLY DISINTEGRATING ORAL EVERY 6 HOURS PRN
Status: DISCONTINUED | OUTPATIENT
Start: 2024-09-14 | End: 2024-09-18 | Stop reason: HOSPADM

## 2024-09-14 RX ORDER — DICYCLOMINE HYDROCHLORIDE 10 MG/1
20 CAPSULE ORAL
Status: DISCONTINUED | OUTPATIENT
Start: 2024-09-14 | End: 2024-09-18 | Stop reason: HOSPADM

## 2024-09-14 RX ADMIN — NICOTINE POLACRILEX 2 MG: 2 GUM, CHEWING BUCCAL at 12:09

## 2024-09-14 RX ADMIN — OLANZAPINE 20 MG: 10 TABLET, FILM COATED ORAL at 21:36

## 2024-09-14 RX ADMIN — DICYCLOMINE HYDROCHLORIDE 20 MG: 10 CAPSULE ORAL at 08:12

## 2024-09-14 RX ADMIN — NICOTINE POLACRILEX 2 MG: 2 GUM, CHEWING BUCCAL at 16:49

## 2024-09-14 RX ADMIN — OLANZAPINE 5 MG: 5 TABLET, FILM COATED ORAL at 08:20

## 2024-09-14 RX ADMIN — OXCARBAZEPINE 300 MG: 300 TABLET, FILM COATED ORAL at 20:16

## 2024-09-14 RX ADMIN — DICYCLOMINE HYDROCHLORIDE 20 MG: 10 CAPSULE ORAL at 14:16

## 2024-09-14 RX ADMIN — OXCARBAZEPINE 300 MG: 300 TABLET, FILM COATED ORAL at 08:12

## 2024-09-14 RX ADMIN — ONDANSETRON 4 MG: 4 TABLET, ORALLY DISINTEGRATING ORAL at 18:09

## 2024-09-14 RX ADMIN — ACETAMINOPHEN 975 MG: 325 TABLET ORAL at 08:51

## 2024-09-14 NOTE — NURSING NOTE
Patient requested bentyle for stomach cramps/pain.  She was given 20 mg PO PRN at 8:12 am.  Patient reports the medication was not effective.

## 2024-09-14 NOTE — NURSING NOTE
Patient has been visible on the unit.  She is confused and is responding to internal stimuli. Her affect goes between sad and angry.     She denies SI/HI and A/V hallucinations

## 2024-09-14 NOTE — NURSING NOTE
Pt is alert and visible on the unit, able to make needs known. Pleasant and cooperative. Compliant with medications.
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
## 2024-09-14 NOTE — NURSING NOTE
Pt requesting PRN Bentyl at this time for c/o abdominal cramping. Medication administered and tolerated.

## 2024-09-14 NOTE — PLAN OF CARE
Problem: DEPRESSION  Goal: Will be euthymic at discharge  Description: INTERVENTIONS:  - Administer medication as ordered  - Provide emotional support via 1:1 interaction with staff  - Encourage involvement in milieu/groups/activities  - Monitor for social isolation  Outcome: Not Progressing     Problem: PSYCHOSIS  Goal: Will report no hallucinations or delusions  Description: Interventions:  - Administer medication as  ordered  - Every waking shifts and PRN assess for the presence of hallucinations and or delusions  - Assist with reality testing to support increasing orientation  - Assess if patient's hallucinations or delusions are encouraging self-harm or harm to others and intervene as appropriate  Outcome: Not Progressing     Problem: BEHAVIOR  Goal: Pt/Family maintain appropriate behavior and adhere to behavioral management agreement, if implemented  Description: INTERVENTIONS:  - Assess the family dynamic   - Encourage verbalization of thoughts and concerns in a socially appropriate manner  - Assess patient/family's coping skills and non-compliant behavior (including use of illegal substances).  - Utilize positive, consistent limit setting strategies supporting safety of patient, staff and others  - Initiate consult with Case Management, Spiritual Care or other ancillary services as appropriate  - If a patient's/visitor's behavior jeopardizes the safety of the patient, staff, or others, refer to organization procedure.   - Notify Security of behavior or suspected illegal substances which indicate the need for search of the patient and/or belongings  - Encourage participation in the decision making process about a behavioral management agreement; implement if patient meets criteria  Outcome: Not Progressing     Problem: ANXIETY  Goal: Will report anxiety at manageable levels  Description: INTERVENTIONS:  - Administer medication as ordered  - Teach and encourage coping skills  - Provide emotional support  -  Assess patient/family for anxiety and ability to cope  Outcome: Not Progressing  Goal: By discharge: Patient will verbalize 2 strategies to deal with anxiety  Description: Interventions:  - Identify any obvious source/trigger to anxiety  - Staff will assist patient in applying identified coping technique/skills  - Encourage attendance of scheduled groups and activities  Outcome: Not Progressing     Problem: SUBSTANCE USE/ABUSE  Goal: Will have no detox symptoms and will verbalize plan for changing substance-related behavior  Description: INTERVENTIONS:  - Monitor physical status and assess for symptoms of withdrawal  - Administer medication as ordered  - Provide emotional support with 1 on 1 interaction with staff  - Encourage recovery focused program/ addiction education  - Assess for verbalization of changing behaviors related to substance abuse  - Initiate consults and referrals as appropriate (Case Management, Spiritual Care, etc.)  Outcome: Progressing  Goal: By discharge, will develop insight into their chemical dependency and sustain motivation to continue in recovery  Description: INTERVENTIONS:  - Attends all daily group sessions and scheduled AA groups  - Actively practices coping skills through participation in the therapeutic community and adherence to program rules  - Reviews and completes assignments from individual treatment plan  - Assist patient development of understanding of their personal cycle of addiction and relapse triggers  Outcome: Progressing  Goal: By discharge, patient will have ongoing treatment plan addressing chemical dependency  Description: INTERVENTIONS:  - Assist patient with resources and/or appointments for ongoing recovery based living  Outcome: Progressing     Problem: Ineffective Coping  Goal: Participates in unit activities  Description: Interventions:  - Provide therapeutic environment   - Provide required programming   - Redirect inappropriate behaviors   Outcome: Not  Progressing
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
## 2024-09-14 NOTE — ASSESSMENT & PLAN NOTE
Home regimen: Bentyl 20 mg every 6 hours as needed up to 10 doses    Plan:  - Bentyl 20 mg TID before meals  - Fu w/pcp outpatient

## 2024-09-14 NOTE — NURSING NOTE
Patient agreeable and given 5mgs PRN PO zyprexa for moderate agitation related to delusions, appears confused, suspicious, irritable towards staff, labile.     She at this point denies AVH/SI/HI, does appear to be responding to internal stimuli while walking halls. She is cooperative with medication, and became cooperative with assessment. She is in behavioral control.

## 2024-09-15 LAB
25(OH)D3 SERPL-MCNC: 29.7 NG/ML (ref 30–100)
ALBUMIN SERPL BCG-MCNC: 4.1 G/DL (ref 3.5–5)
ALP SERPL-CCNC: 51 U/L (ref 34–104)
ALT SERPL W P-5'-P-CCNC: 6 U/L (ref 7–52)
ANION GAP SERPL CALCULATED.3IONS-SCNC: 6 MMOL/L (ref 4–13)
AST SERPL W P-5'-P-CCNC: 12 U/L (ref 13–39)
BILIRUB SERPL-MCNC: 0.6 MG/DL (ref 0.2–1)
BUN SERPL-MCNC: 6 MG/DL (ref 5–25)
CALCIUM SERPL-MCNC: 8.9 MG/DL (ref 8.4–10.2)
CHLORIDE SERPL-SCNC: 99 MMOL/L (ref 96–108)
CHOLEST SERPL-MCNC: 144 MG/DL
CO2 SERPL-SCNC: 23 MMOL/L (ref 21–32)
CREAT SERPL-MCNC: 0.66 MG/DL (ref 0.6–1.3)
FOLATE SERPL-MCNC: 13.6 NG/ML
GFR SERPL CREATININE-BSD FRML MDRD: 114 ML/MIN/1.73SQ M
GLUCOSE P FAST SERPL-MCNC: 88 MG/DL (ref 65–99)
GLUCOSE SERPL-MCNC: 88 MG/DL (ref 65–140)
HCG SERPL QL: NEGATIVE
HDLC SERPL-MCNC: 46 MG/DL
LDLC SERPL CALC-MCNC: 77 MG/DL (ref 0–100)
NONHDLC SERPL-MCNC: 98 MG/DL
POTASSIUM SERPL-SCNC: 4 MMOL/L (ref 3.5–5.3)
PROT SERPL-MCNC: 6.4 G/DL (ref 6.4–8.4)
SODIUM SERPL-SCNC: 128 MMOL/L (ref 135–147)
TRIGL SERPL-MCNC: 105 MG/DL
TSH SERPL DL<=0.05 MIU/L-ACNC: 3.82 UIU/ML (ref 0.45–4.5)
VIT B12 SERPL-MCNC: 493 PG/ML (ref 180–914)

## 2024-09-15 PROCEDURE — 80053 COMPREHEN METABOLIC PANEL: CPT | Performed by: PHYSICIAN ASSISTANT

## 2024-09-15 PROCEDURE — 80061 LIPID PANEL: CPT | Performed by: PHYSICIAN ASSISTANT

## 2024-09-15 PROCEDURE — 82607 VITAMIN B-12: CPT | Performed by: PHYSICIAN ASSISTANT

## 2024-09-15 PROCEDURE — 84443 ASSAY THYROID STIM HORMONE: CPT | Performed by: PHYSICIAN ASSISTANT

## 2024-09-15 PROCEDURE — 82306 VITAMIN D 25 HYDROXY: CPT | Performed by: PHYSICIAN ASSISTANT

## 2024-09-15 PROCEDURE — 86780 TREPONEMA PALLIDUM: CPT | Performed by: PHYSICIAN ASSISTANT

## 2024-09-15 PROCEDURE — 99232 SBSQ HOSP IP/OBS MODERATE 35: CPT | Performed by: STUDENT IN AN ORGANIZED HEALTH CARE EDUCATION/TRAINING PROGRAM

## 2024-09-15 PROCEDURE — 84703 CHORIONIC GONADOTROPIN ASSAY: CPT | Performed by: PHYSICIAN ASSISTANT

## 2024-09-15 PROCEDURE — 82746 ASSAY OF FOLIC ACID SERUM: CPT | Performed by: PHYSICIAN ASSISTANT

## 2024-09-15 RX ORDER — POLYETHYLENE GLYCOL 3350 17 G/17G
17 POWDER, FOR SOLUTION ORAL DAILY PRN
Status: DISCONTINUED | OUTPATIENT
Start: 2024-09-15 | End: 2024-09-18 | Stop reason: HOSPADM

## 2024-09-15 RX ORDER — FAMOTIDINE 20 MG/1
20 TABLET, FILM COATED ORAL 2 TIMES DAILY PRN
Status: DISCONTINUED | OUTPATIENT
Start: 2024-09-15 | End: 2024-09-18 | Stop reason: HOSPADM

## 2024-09-15 RX ADMIN — DICYCLOMINE HYDROCHLORIDE 20 MG: 10 CAPSULE ORAL at 16:28

## 2024-09-15 RX ADMIN — ONDANSETRON 4 MG: 4 TABLET, ORALLY DISINTEGRATING ORAL at 12:41

## 2024-09-15 RX ADMIN — DICYCLOMINE HYDROCHLORIDE 20 MG: 10 CAPSULE ORAL at 06:16

## 2024-09-15 RX ADMIN — FAMOTIDINE 20 MG: 20 TABLET, FILM COATED ORAL at 12:54

## 2024-09-15 RX ADMIN — OLANZAPINE 20 MG: 10 TABLET, FILM COATED ORAL at 21:18

## 2024-09-15 RX ADMIN — OXCARBAZEPINE 300 MG: 300 TABLET, FILM COATED ORAL at 21:18

## 2024-09-15 RX ADMIN — DICYCLOMINE HYDROCHLORIDE 20 MG: 10 CAPSULE ORAL at 10:55

## 2024-09-15 RX ADMIN — OXCARBAZEPINE 300 MG: 300 TABLET, FILM COATED ORAL at 08:19

## 2024-09-15 RX ADMIN — ONDANSETRON 4 MG: 4 TABLET, ORALLY DISINTEGRATING ORAL at 00:22

## 2024-09-15 RX ADMIN — ACETAMINOPHEN 975 MG: 325 TABLET ORAL at 09:29

## 2024-09-15 NOTE — PLAN OF CARE
Problem: DEPRESSION  Goal: Will be euthymic at discharge  Description: INTERVENTIONS:  - Administer medication as ordered  - Provide emotional support via 1:1 interaction with staff  - Encourage involvement in milieu/groups/activities  - Monitor for social isolation  Outcome: Not Progressing     Problem: PSYCHOSIS  Goal: Will report no hallucinations or delusions  Description: Interventions:  - Administer medication as  ordered  - Every waking shifts and PRN assess for the presence of hallucinations and or delusions  - Assist with reality testing to support increasing orientation  - Assess if patient's hallucinations or delusions are encouraging self-harm or harm to others and intervene as appropriate  Outcome: Not Progressing     Problem: BEHAVIOR  Goal: Pt/Family maintain appropriate behavior and adhere to behavioral management agreement, if implemented  Description: INTERVENTIONS:  - Assess the family dynamic   - Encourage verbalization of thoughts and concerns in a socially appropriate manner  - Assess patient/family's coping skills and non-compliant behavior (including use of illegal substances).  - Utilize positive, consistent limit setting strategies supporting safety of patient, staff and others  - Initiate consult with Case Management, Spiritual Care or other ancillary services as appropriate  - If a patient's/visitor's behavior jeopardizes the safety of the patient, staff, or others, refer to organization procedure.   - Notify Security of behavior or suspected illegal substances which indicate the need for search of the patient and/or belongings  - Encourage participation in the decision making process about a behavioral management agreement; implement if patient meets criteria  Outcome: Not Progressing     Problem: ANXIETY  Goal: Will report anxiety at manageable levels  Description: INTERVENTIONS:  - Administer medication as ordered  - Teach and encourage coping skills  - Provide emotional support  -  Assess patient/family for anxiety and ability to cope  Outcome: Progressing  Goal: By discharge: Patient will verbalize 2 strategies to deal with anxiety  Description: Interventions:  - Identify any obvious source/trigger to anxiety  - Staff will assist patient in applying identified coping technique/skills  - Encourage attendance of scheduled groups and activities  Outcome: Progressing     Problem: SUBSTANCE USE/ABUSE  Goal: Will have no detox symptoms and will verbalize plan for changing substance-related behavior  Description: INTERVENTIONS:  - Monitor physical status and assess for symptoms of withdrawal  - Administer medication as ordered  - Provide emotional support with 1 on 1 interaction with staff  - Encourage recovery focused program/ addiction education  - Assess for verbalization of changing behaviors related to substance abuse  - Initiate consults and referrals as appropriate (Case Management, Spiritual Care, etc.)  Outcome: Progressing  Goal: By discharge, will develop insight into their chemical dependency and sustain motivation to continue in recovery  Description: INTERVENTIONS:  - Attends all daily group sessions and scheduled AA groups  - Actively practices coping skills through participation in the therapeutic community and adherence to program rules  - Reviews and completes assignments from individual treatment plan  - Assist patient development of understanding of their personal cycle of addiction and relapse triggers  Outcome: Progressing  Goal: By discharge, patient will have ongoing treatment plan addressing chemical dependency  Description: INTERVENTIONS:  - Assist patient with resources and/or appointments for ongoing recovery based living  Outcome: Progressing     Problem: BEHAVIOR  Goal: Pt/Family maintain appropriate behavior and adhere to behavioral management agreement, if implemented  Description: INTERVENTIONS:  - Assess the family dynamic   - Encourage verbalization of thoughts and  concerns in a socially appropriate manner  - Assess patient/family's coping skills and non-compliant behavior (including use of illegal substances).  - Utilize positive, consistent limit setting strategies supporting safety of patient, staff and others  - Initiate consult with Case Management, Spiritual Care or other ancillary services as appropriate  - If a patient's/visitor's behavior jeopardizes the safety of the patient, staff, or others, refer to organization procedure.   - Notify Security of behavior or suspected illegal substances which indicate the need for search of the patient and/or belongings  - Encourage participation in the decision making process about a behavioral management agreement; implement if patient meets criteria  Outcome: Not Progressing     Problem: ANXIETY  Goal: Will report anxiety at manageable levels  Description: INTERVENTIONS:  - Administer medication as ordered  - Teach and encourage coping skills  - Provide emotional support  - Assess patient/family for anxiety and ability to cope  Outcome: Progressing  Goal: By discharge: Patient will verbalize 2 strategies to deal with anxiety  Description: Interventions:  - Identify any obvious source/trigger to anxiety  - Staff will assist patient in applying identified coping technique/skills  - Encourage attendance of scheduled groups and activities  Outcome: Progressing     Problem: SUBSTANCE USE/ABUSE  Goal: Will have no detox symptoms and will verbalize plan for changing substance-related behavior  Description: INTERVENTIONS:  - Monitor physical status and assess for symptoms of withdrawal  - Administer medication as ordered  - Provide emotional support with 1 on 1 interaction with staff  - Encourage recovery focused program/ addiction education  - Assess for verbalization of changing behaviors related to substance abuse  - Initiate consults and referrals as appropriate (Case Management, Spiritual Care, etc.)  Outcome: Progressing  Goal: By  discharge, will develop insight into their chemical dependency and sustain motivation to continue in recovery  Description: INTERVENTIONS:  - Attends all daily group sessions and scheduled AA groups  - Actively practices coping skills through participation in the therapeutic community and adherence to program rules  - Reviews and completes assignments from individual treatment plan  - Assist patient development of understanding of their personal cycle of addiction and relapse triggers  Outcome: Progressing  Goal: By discharge, patient will have ongoing treatment plan addressing chemical dependency  Description: INTERVENTIONS:  - Assist patient with resources and/or appointments for ongoing recovery based living  Outcome: Progressing     Problem: Ineffective Coping  Goal: Participates in unit activities  Description: Interventions:  - Provide therapeutic environment   - Provide required programming   - Redirect inappropriate behaviors   Outcome: Not Progressing     Problem: DISCHARGE PLANNING - CARE MANAGEMENT  Goal: Discharge to post-acute care or home with appropriate resources  Description: INTERVENTIONS:  - Conduct assessment to determine patient/family and health care team treatment goals, and need for post-acute services based on payer coverage, community resources, and patient preferences, and barriers to discharge  - Address psychosocial, clinical, and financial barriers to discharge as identified in assessment in conjunction with the patient/family and health care team  - Arrange appropriate level of post-acute services according to patient’s   needs and preference and payer coverage in collaboration with the physician and health care team  - Communicate with and update the patient/family, physician, and health care team regarding progress on the discharge plan  - Arrange appropriate transportation to post-acute venues  Outcome: Not Progressing

## 2024-09-15 NOTE — PROGRESS NOTES
"Progress Note - Behavioral Health   Name: Antionette Downing 38 y.o. female I MRN: [id number]  Unit/Bed#: -01 I Date of Admission: 9/9/2024   Date of Service: 9/15/2024 I Hospital Day: 5     Assessment & Plan  Bipolar I disorder, most recent episode manic, severe with psychotic features (HCC)  - Continue Trileptal 300 mg BID  - Continue Olanzapine 20 mg qhs    ________________________________________________________    Subjective: All documentation including nursing notes, medication history to ensure medication adherence on the unit, labs, and vitals were reviewed. Antionette was evaluated this morning for continuity of care and no acute distress noted throughout the evaluation. Over the past 24 hours per nursing report, Antionette has been cooperative on the unit and compliant with medications. On evening shift per nursing, \"Patient was getting snacks and she yelled at another patient \"Get away from me you faggot. You are the one raping my kids.\" Patient was redirected and escorted to her room. Patient then stated \"everyone is coming in here raping me.\" Patient was reassured this was not happening and patient stated \"Yes it is.\" She was otherwise in behavioral control. She did require Zofran at 1809 and 0022. Otherwise no acute events overnight.     Today, Antionette is consenting for safety on the unit. Antionette reports feeling \"okay.\" Antionette notes having adequate sleep. Antionette states having a good appetite. Antionette has been taking the medications as prescribed and reporting no side effects. She does report intermittent abdominal pain - reports this pain is consistent with her typical IBS pain and relieved with medications. She does report having a BM this AM which was normal. She denies fevers, chills, other associated complaints at this time.     Antionette denies suicidal ideations. Antionette denies homicidal ideations. Regarding hallucinations, Antionette denies auditory and visual hallucinations. Denies other " "complaints today. She does state \"I want to get out of here\" at end of interview. Patient is educated on current treatment and plan of care to which she expresses understanding.     PRNs overnight: Zofran    VS: Reviewed, within normal limits    Progress Toward Goals: slow improvement    Psychiatric Review of Systems:  Behavior over the last 24 hours:  unchanged  Sleep: normal  Appetite: normal  Medication side effects: No   ROS: all other systems are negative    Vital signs in last 24 hours:  Temp:  [97.7 °F (36.5 °C)-98.7 °F (37.1 °C)] 98.7 °F (37.1 °C)  HR:  [77-89] 89  Resp:  [16] 16  BP: (107-120)/(65-69) 120/65    Laboratory results:  I have personally reviewed all pertinent laboratory/tests results.  Recent Results (from the past 48 hour(s))   Comprehensive metabolic panel    Collection Time: 09/15/24  5:47 AM   Result Value Ref Range    Sodium 128 (L) 135 - 147 mmol/L    Potassium 4.0 3.5 - 5.3 mmol/L    Chloride 99 96 - 108 mmol/L    CO2 23 21 - 32 mmol/L    ANION GAP 6 4 - 13 mmol/L    BUN 6 5 - 25 mg/dL    Creatinine 0.66 0.60 - 1.30 mg/dL    Glucose 88 65 - 140 mg/dL    Glucose, Fasting 88 65 - 99 mg/dL    Calcium 8.9 8.4 - 10.2 mg/dL    AST 12 (L) 13 - 39 U/L    ALT 6 (L) 7 - 52 U/L    Alkaline Phosphatase 51 34 - 104 U/L    Total Protein 6.4 6.4 - 8.4 g/dL    Albumin 4.1 3.5 - 5.0 g/dL    Total Bilirubin 0.60 0.20 - 1.00 mg/dL    eGFR 114 ml/min/1.73sq m   Folate    Collection Time: 09/15/24  5:47 AM   Result Value Ref Range    Folate 13.6 >5.9 ng/mL   hCG, serum, qualitative    Collection Time: 09/15/24  5:47 AM   Result Value Ref Range    Preg, Serum Negative Negative   Lipid panel    Collection Time: 09/15/24  5:47 AM   Result Value Ref Range    Cholesterol 144 See Comment mg/dL    Triglycerides 105 See Comment mg/dL    HDL, Direct 46 (L) >=50 mg/dL    LDL Calculated 77 0 - 100 mg/dL    Non-HDL-Chol (CHOL-HDL) 98 mg/dl   TSH, 3rd generation with Free T4 reflex    Collection Time: 09/15/24  5:47 AM " "  Result Value Ref Range    TSH 3RD GENERATON 3.817 0.450 - 4.500 uIU/mL   Vitamin B12    Collection Time: 09/15/24  5:47 AM   Result Value Ref Range    Vitamin B-12 493 180 - 914 pg/mL   Vitamin D 25 hydroxy    Collection Time: 09/15/24  5:47 AM   Result Value Ref Range    Vit D, 25-Hydroxy 29.7 (L) 30.0 - 100.0 ng/mL         Mental Status Evaluation:    Appearance:  age appropriate, casually dressed, marginal hygiene   Behavior:  cooperative, guarded   Speech:  normal rate, decreased volume   Mood:  \"okay\"   Affect:  constricted   Thought Process:  slightly more organized   Associations: concrete associations   Thought Content:  some paranoia   Perceptual Disturbances: Denies auditory or visual hallucinations and Does not appear to be responding to internal stimuli, appears distracted   Risk Potential: Suicidal ideation - None at present, contracts for safety on the unit, would talk to staff if not feeling safe on the unit  Homicidal ideation - None at present  Potential for aggression - Not at present   Sensorium:  oriented to person, place, and time/date   Memory:  recent and remote memory grossly intact   Consciousness:  alert and awake   Attention/Concentration: attention span and concentration are age appropriate   Insight:  limited   Judgment: limited   Gait/Station: normal gait/station   Motor Activity: no abnormal movements       Current Medications:  Current Facility-Administered Medications   Medication Dose Route Frequency Provider Last Rate    acetaminophen  650 mg Oral Q6H PRN Rafita Falcon MD      acetaminophen  650 mg Oral Q4H PRN Rafita Falcon MD      acetaminophen  975 mg Oral Q6H PRN Rafita Falcon MD      albuterol  2 puff Inhalation Q6H PRN Valarie Mar MD      benztropine  1 mg Intramuscular Q4H PRN Max 6/day Rafita Falcon MD      benztropine  1 mg Oral Q4H PRN Max 6/day Rafita Falcon MD      budesonide-formoterol  2 puff Inhalation BID Valarie Mar MD      dicyclomine  20 mg Oral " TID AC Oluwatomi Dana Mar MD      hydrOXYzine HCL  50 mg Oral Q6H PRN Max 4/day Rafita Falcon MD      Or    diphenhydrAMINE  50 mg Intramuscular Q6H PRN Rafita Falcon MD      famotidine  20 mg Oral BID PRN Jennifer Almaraz MD      hydrOXYzine HCL  100 mg Oral Q6H PRN Max 4/day Rafita Falcon MD      Or    LORazepam  2 mg Intramuscular Q6H PRN Rafita Faclon MD      hydrOXYzine HCL  25 mg Oral Q6H PRN Max 4/day Rafita Falcon MD      nicotine polacrilex  2 mg Oral Q2H PRN Rafita Falcon MD      OLANZapine  10 mg Oral Q3H PRN Max 3/day Rafita Falcon MD      Or    OLANZapine  10 mg Intramuscular Q3H PRN Max 3/day Rafita Falcon MD      OLANZapine  5 mg Oral Q3H PRN Max 6/day Rafita Falcon MD      Or    OLANZapine  5 mg Intramuscular Q3H PRN Max 6/day Rafita Falcon MD      OLANZapine  2.5 mg Oral Q3H PRN Max 8/day Rafita Falcon MD      OLANZapine  20 mg Oral HS Adam Villa MD      ondansetron  4 mg Oral Q6H PRN Brodie Gambino MD      OXcarbazepine  300 mg Oral Q12H [facility] Adam Villa MD      polyethylene glycol  17 g Oral Daily PRN Jennifer Almaraz MD      propranolol  10 mg Oral Q8H PRN Rafita Falcon MD      sterile water           traZODone  50 mg Oral HS PRN Rafita Falcon MD         Behavioral Health Medications: All current active meds have been reviewed. Changes as in plan section above.  Risks, benefits and possible side effects of Medications:   Risks, benefits, and possible side effects of medications explained to patient and patient verbalizes understanding.      Counseling / Coordination of Care:  Patient's progress discussed with staff in treatment team meeting.  Medications, treatment progress and treatment plan reviewed with patient.    Laura Lau DO  Psychiatry Resident, PGY-2    This note was completed in part utilizing Dragon dictation Software. Grammatical, translation, syntax errors, random word insertions, spelling mistakes, and incomplete sentences may be an occasional consequence of this system  secondary to software limitations with voice recognition, ambient noise, and hardware issues. If you have any questions or concerns about the content, text, or information contained within the body of this dictation, please contact the provider for clarification.

## 2024-09-15 NOTE — QUICK NOTE
"Pt was asked to complete blood work this morning. Pt stated she is \"tired of dealing with this\"  Pt was asked if [facility] could complete lab work she said \"NO\" and rolled over and went back to sleep. RN notified.  "

## 2024-09-15 NOTE — NURSING NOTE
"Patient was getting snacks and she yelled at another patient \"Get away from me you faggot.  You are the one raping my kids.\" Patient was redirected and escorted to her room.  Patient then stated \"everyone is coming in here raping me.\"  Patient was reassured this was not happening and patient stated \"Yes it is.\"  Patient reassured again.  "

## 2024-09-15 NOTE — NURSING NOTE
"Patient requested Zofran prn for \"my stomach issues.\" She received 4mgs po prn at this time. She said  irritatedly \"you guys don't even have me on Famotidine.\" Apparently she had this prescribed as a home medication and she would like it to be prescribed here also.  "

## 2024-09-15 NOTE — NURSING NOTE
"Upon reassessment of PRN PO Tylenol 975 mg pt states \"it did not help stomach pain is 10/10.\"  "

## 2024-09-15 NOTE — NURSING NOTE
Pt came up to the nurses station asking for pain medication for 10/10 stomach pain. Gave Pt Tylenol 975 mg PO PRN 10/10 stomach pain.

## 2024-09-15 NOTE — NURSING NOTE
"Pt irritable but cooperative when during med pass this morning. Pt constantly at nurses station for needs. Pt denies S, HI and AH but states that she is \"seeing bleach.\" Asked to elaborate and pt states \"my  throws bleach on me and I see it.\" Pt is not going to groups but encouraged to do so. Pt is meal and medication complaint. Pt is seen in milieu pacing but isolates to herself. Pt is using phone appropriately. Pt denies any unmet needs and remains on continuous rounding for safety.   "

## 2024-09-16 LAB — TREPONEMA PALLIDUM IGG+IGM AB [PRESENCE] IN SERUM OR PLASMA BY IMMUNOASSAY: NORMAL

## 2024-09-16 PROCEDURE — 99232 SBSQ HOSP IP/OBS MODERATE 35: CPT | Performed by: PSYCHIATRY & NEUROLOGY

## 2024-09-16 RX ORDER — ONDANSETRON 2 MG/ML
4 INJECTION INTRAMUSCULAR; INTRAVENOUS EVERY 6 HOURS PRN
Status: DISCONTINUED | OUTPATIENT
Start: 2024-09-16 | End: 2024-09-16

## 2024-09-16 RX ADMIN — OLANZAPINE 20 MG: 10 TABLET, FILM COATED ORAL at 21:50

## 2024-09-16 RX ADMIN — DICYCLOMINE HYDROCHLORIDE 20 MG: 10 CAPSULE ORAL at 15:01

## 2024-09-16 RX ADMIN — DICYCLOMINE HYDROCHLORIDE 20 MG: 10 CAPSULE ORAL at 06:42

## 2024-09-16 RX ADMIN — DICYCLOMINE HYDROCHLORIDE 20 MG: 10 CAPSULE ORAL at 11:18

## 2024-09-16 RX ADMIN — ACETAMINOPHEN 975 MG: 325 TABLET ORAL at 12:02

## 2024-09-16 RX ADMIN — NICOTINE POLACRILEX 2 MG: 2 GUM, CHEWING BUCCAL at 16:06

## 2024-09-16 RX ADMIN — ACETAMINOPHEN 975 MG: 325 TABLET ORAL at 20:20

## 2024-09-16 RX ADMIN — ONDANSETRON 4 MG: 2 INJECTION INTRAMUSCULAR; INTRAVENOUS at 00:48

## 2024-09-16 RX ADMIN — OXCARBAZEPINE 300 MG: 300 TABLET, FILM COATED ORAL at 08:15

## 2024-09-16 RX ADMIN — OXCARBAZEPINE 300 MG: 300 TABLET, FILM COATED ORAL at 20:21

## 2024-09-16 NOTE — PLAN OF CARE
Pt has attended a few groups since admission. Pt disorganized, along with some delusions in thought content  when participating.

## 2024-09-16 NOTE — NURSING NOTE
"Pt is irritable, labile, and withdrawn to room. Pt is med compliant. Pt denies all psych symptoms when asked. Pt became upset when asked if she would like medications for her stomach saying \"I don't have time for this shit\". No unmet needs reported. Will continue to monitor.  "

## 2024-09-16 NOTE — PROGRESS NOTES
"   09/16/24 0840   Team Meeting   Meeting Type Daily Rounds   Team Members Present   Team Members Present Physician;;Nurse   Physician Team Member Lilo   [facility]   Care Management Team Member Melodie   Patient/Family Present   Patient Present No   Patient's Family Present No     Pt appears paranoid, irritable. Pt is paranoid of  killing her. Over the weekend, Pt was visible, labile, and was observed lashing out on other peers and accusing staff and peers of \"raping her\". Pt did allow staff to complete blood work. Pt denies SI/HI/AVH. Pt is paranoid that the water she is receiving has bleach in it. Pt's discharge is pending 7-10 days.   "

## 2024-09-16 NOTE — NURSING NOTE
"Patient came to the nurses station asking for medication \"for my stomach\" - she reports pain in her stomach and nausea. She was offered po Zofran but she literally fell asleep standing at the nurses station. She was assisted to a chair and then assisted by 2 staff to her bed. In her bed she was unable to sleep despite appearing sedated due to abdominal discomfort and small amounts of emesis.  Medical provider contacted to give patient IM Zofran and she is receiving 4 mgs IM Zofran at this time. Staff are sitting at bedside at this time for safety. See VS in flowsheet.  "

## 2024-09-16 NOTE — PLAN OF CARE
Problem: DEPRESSION  Goal: Will be euthymic at discharge  Description: INTERVENTIONS:  - Administer medication as ordered  - Provide emotional support via 1:1 interaction with staff  - Encourage involvement in milieu/groups/activities  - Monitor for social isolation  Outcome: Not Progressing     Problem: PSYCHOSIS  Goal: Will report no hallucinations or delusions  Description: Interventions:  - Administer medication as  ordered  - Every waking shifts and PRN assess for the presence of hallucinations and or delusions  - Assist with reality testing to support increasing orientation  - Assess if patient's hallucinations or delusions are encouraging self-harm or harm to others and intervene as appropriate  Outcome: Not Progressing     Problem: BEHAVIOR  Goal: Pt/Family maintain appropriate behavior and adhere to behavioral management agreement, if implemented  Description: INTERVENTIONS:  - Assess the family dynamic   - Encourage verbalization of thoughts and concerns in a socially appropriate manner  - Assess patient/family's coping skills and non-compliant behavior (including use of illegal substances).  - Utilize positive, consistent limit setting strategies supporting safety of patient, staff and others  - Initiate consult with Case Management, Spiritual Care or other ancillary services as appropriate  - If a patient's/visitor's behavior jeopardizes the safety of the patient, staff, or others, refer to organization procedure.   - Notify Security of behavior or suspected illegal substances which indicate the need for search of the patient and/or belongings  - Encourage participation in the decision making process about a behavioral management agreement; implement if patient meets criteria  Outcome: Not Progressing     Problem: ANXIETY  Goal: Will report anxiety at manageable levels  Description: INTERVENTIONS:  - Administer medication as ordered  - Teach and encourage coping skills  - Provide emotional support  -  Assess patient/family for anxiety and ability to cope  Outcome: Not Progressing  Goal: By discharge: Patient will verbalize 2 strategies to deal with anxiety  Description: Interventions:  - Identify any obvious source/trigger to anxiety  - Staff will assist patient in applying identified coping technique/skills  - Encourage attendance of scheduled groups and activities  Outcome: Not Progressing     Problem: SUBSTANCE USE/ABUSE  Goal: Will have no detox symptoms and will verbalize plan for changing substance-related behavior  Description: INTERVENTIONS:  - Monitor physical status and assess for symptoms of withdrawal  - Administer medication as ordered  - Provide emotional support with 1 on 1 interaction with staff  - Encourage recovery focused program/ addiction education  - Assess for verbalization of changing behaviors related to substance abuse  - Initiate consults and referrals as appropriate (Case Management, Spiritual Care, etc.)  Outcome: Progressing  Goal: By discharge, will develop insight into their chemical dependency and sustain motivation to continue in recovery  Description: INTERVENTIONS:  - Attends all daily group sessions and scheduled AA groups  - Actively practices coping skills through participation in the therapeutic community and adherence to program rules  - Reviews and completes assignments from individual treatment plan  - Assist patient development of understanding of their personal cycle of addiction and relapse triggers  Outcome: Progressing  Goal: By discharge, patient will have ongoing treatment plan addressing chemical dependency  Description: INTERVENTIONS:  - Assist patient with resources and/or appointments for ongoing recovery based living  Outcome: Progressing     Problem: Ineffective Coping  Goal: Participates in unit activities  Description: Interventions:  - Provide therapeutic environment   - Provide required programming   - Redirect inappropriate behaviors   Outcome: Not  Progressing     Problem: DISCHARGE PLANNING - CARE MANAGEMENT  Goal: Discharge to post-acute care or home with appropriate resources  Description: INTERVENTIONS:  - Conduct assessment to determine patient/family and health care team treatment goals, and need for post-acute services based on payer coverage, community resources, and patient preferences, and barriers to discharge  - Address psychosocial, clinical, and financial barriers to discharge as identified in assessment in conjunction with the patient/family and health care team  - Arrange appropriate level of post-acute services according to patient’s   needs and preference and payer coverage in collaboration with the physician and health care team  - Communicate with and update the patient/family, physician, and health care team regarding progress on the discharge plan  - Arrange appropriate transportation to post-acute venues  Outcome: Not Progressing
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
## 2024-09-16 NOTE — PROGRESS NOTES
"Progress Note - Behavioral Health   Name: Antionette Downing 38 y.o. female I MRN: [id number]  Unit/Bed#: -01 I Date of Admission: 9/9/2024   Date of Service: 9/16/2024 I Hospital Day: 6     Assessment & Plan  Bipolar I disorder, most recent episode manic, severe with psychotic features (HCC)  - Continue Trileptal 300 mg BID  - Continue Olanzapine 20 mg qhs    Case discussed with treatment team.  Risks, benefits and possible side effects of Medications: Risks, benefits, and possible side effects of medications have been explained to the patient, who verbalizes understanding.      ------------------------------------------------------------  Chief Complaint:  \"The medication is working\"    Subjective:  The patient was evaluated this morning for continuity of care and no acute distress noted throughout the evaluation.  Over the past 24 hours staff noted the patient was irritable and at times withdrawn.    Today on exam the patient was lying in bed and reports that she would like to be discharged.  She reports that medication has been helpful and she feels less aggressive.  She states that she currently lives with her  and that \"he has been the only one in my corner.  \"She does admit that her and her  argue a lot which makes her upset.  She states that at this time she has a psychiatrist and therapist and is motivated to continue with these services.  She denies side effects to medication.  She does report chronic stomach problems.    Psychiatric Review of Systems:  Behavior over the last 24 hours:  unchanged  Sleep: normal  Appetite: normal  Medication side effects: No   ROS:  Chronic stomach pain      Current Medications:  Current Facility-Administered Medications   Medication Dose Route Frequency Provider Last Rate    acetaminophen  650 mg Oral Q6H PRDORIS Falcon MD      acetaminophen  650 mg Oral Q4H PRDORIS Falcon MD      acetaminophen  975 mg Oral Q6H PRDORIS Falcon MD      albuterol  2 " puff Inhalation Q6H PRN Valarie Mar MD      benztropine  1 mg Intramuscular Q4H PRN Max 6/day Rafita Falcon MD      benztropine  1 mg Oral Q4H PRN Max 6/day Rafita Falcon MD      budesonide-formoterol  2 puff Inhalation BID Valarie Mar MD      dicyclomine  20 mg Oral TID AC Valarie Mar MD      hydrOXYzine HCL  50 mg Oral Q6H PRN Max 4/day Rafita Falcon MD      Or    diphenhydrAMINE  50 mg Intramuscular Q6H PRN Rafita Falcon MD      famotidine  20 mg Oral BID PRN Jennifer Almaraz MD      hydrOXYzine HCL  100 mg Oral Q6H PRN Max 4/day Rafita Falcon MD      Or    LORazepam  2 mg Intramuscular Q6H PRN Rafita Falcon MD      hydrOXYzine HCL  25 mg Oral Q6H PRN Max 4/day Rafita Falcon MD      nicotine polacrilex  2 mg Oral Q2H PRN Rafita Falcon MD      OLANZapine  10 mg Oral Q3H PRN Max 3/day Rafita Falcon MD      Or    OLANZapine  10 mg Intramuscular Q3H PRN Max 3/day Rafita Falcon MD      OLANZapine  5 mg Oral Q3H PRN Max 6/day Rafita Falcon MD      Or    OLANZapine  5 mg Intramuscular Q3H PRN Max 6/day Rafita Falcon MD      OLANZapine  2.5 mg Oral Q3H PRN Max 8/day Rafita Falcon MD      OLANZapine  20 mg Oral HS Adam Villa MD      ondansetron  4 mg Oral Q6H PRN Brodie Gambino MD      OXcarbazepine  300 mg Oral Q12H [facility] Adam Villa MD      polyethylene glycol  17 g Oral Daily PRN Jennifer Almaraz MD      propranolol  10 mg Oral Q8H PRN Rafita Falcon MD      sterile water           traZODone  50 mg Oral HS PRN Rafita Falcon MD         Behavioral Health Medications: all current active meds have been reviewed and current meds:   Current Facility-Administered Medications:     acetaminophen (TYLENOL) tablet 650 mg, Q6H PRN    acetaminophen (TYLENOL) tablet 650 mg, Q4H PRN    acetaminophen (TYLENOL) tablet 975 mg, Q6H PRN    albuterol (PROVENTIL HFA,VENTOLIN HFA) inhaler 2 puff, Q6H PRN    benztropine (COGENTIN) injection 1 mg, Q4H PRN Max 6/day    benztropine (COGENTIN) tablet 1 mg, Q4H PRN Max  6/day    budesonide-formoterol (SYMBICORT) 80-4.5 MCG/ACT inhaler 2 puff, BID    dicyclomine (BENTYL) capsule 20 mg, TID AC    hydrOXYzine HCL (ATARAX) tablet 50 mg, Q6H PRN Max 4/day **OR** diphenhydrAMINE (BENADRYL) injection 50 mg, Q6H PRN    famotidine (PEPCID) tablet 20 mg, BID PRN    hydrOXYzine HCL (ATARAX) tablet 100 mg, Q6H PRN Max 4/day **OR** LORazepam (ATIVAN) injection 2 mg, Q6H PRN    hydrOXYzine HCL (ATARAX) tablet 25 mg, Q6H PRN Max 4/day    nicotine polacrilex (NICORETTE) gum 2 mg, Q2H PRN    OLANZapine (ZyPREXA) tablet 10 mg, Q3H PRN Max 3/day **OR** OLANZapine (ZyPREXA) IM injection 10 mg, Q3H PRN Max 3/day    OLANZapine (ZyPREXA) tablet 5 mg, Q3H PRN Max 6/day **OR** OLANZapine (ZyPREXA) IM injection 5 mg, Q3H PRN Max 6/day    OLANZapine (ZyPREXA) tablet 2.5 mg, Q3H PRN Max 8/day    OLANZapine (ZyPREXA) tablet 20 mg, HS    ondansetron (ZOFRAN-ODT) dispersible tablet 4 mg, Q6H PRN    OXcarbazepine (TRILEPTAL) tablet 300 mg, Q12H OSIEL    polyethylene glycol (MIRALAX) packet 17 g, Daily PRN    propranolol (INDERAL) tablet 10 mg, Q8H PRN    sterile water injection **ADS Override Pull**,     traZODone (DESYREL) tablet 50 mg, HS PRN.    Vital signs in last 24 hours:  Temp:  [97.1 °F (36.2 °C)-98.2 °F (36.8 °C)] 97.1 °F (36.2 °C)  HR:  [70-71] 71  Resp:  [16] 16  BP: (104-110)/(57-70) 107/57    Laboratory results:  I have personally reviewed all pertinent laboratory/tests results.  Most Recent Labs:   Lab Results   Component Value Date    WBC 6.14 09/10/2024    RBC 4.56 09/10/2024    HGB 14.5 09/10/2024    HCT 41.8 09/10/2024     09/10/2024    RDW 12.8 09/10/2024    TOTANEUTABS 6.86 07/30/2016    NEUTROABS 3.03 09/10/2024    SODIUM 128 (L) 09/15/2024    K 4.0 09/15/2024    CL 99 09/15/2024    CO2 23 09/15/2024    BUN 6 09/15/2024    CREATININE 0.66 09/15/2024    GLUC 88 09/15/2024    GLUF 88 09/15/2024    CALCIUM 8.9 09/15/2024    AST 12 (L) 09/15/2024    ALT 6 (L) 09/15/2024    ALKPHOS 51  "09/15/2024    TP 6.4 09/15/2024    ALB 4.1 09/15/2024    TBILI 0.60 09/15/2024    CHOLESTEROL 144 09/15/2024    HDL 46 (L) 09/15/2024    TRIG 105 09/15/2024    LDLCALC 77 09/15/2024    NONHDLC 98 09/15/2024    VALPROICTOT <10.0 (L) 02/12/2023    LITHIUM 0.63 02/01/2024    AMMONIA 17 11/12/2019    [id number] 3.817 09/15/2024    FREET4 1.24 02/01/2023    PREGUR NEGATIVE 10/15/2022    PREGSERUM Negative 09/15/2024    HCGQUANT <3 12/05/2019    RPR Non-Reactive 02/02/2023    HGBA1C 5.3 03/28/2024     03/28/2024       Mental Status Evaluation:    Appearance:  age appropriate, casually dressed   Behavior:  cooperative, guarded   Speech:  normal rate and volume   Mood:  \"Pretty good \"   Affect:  constricted   Thought Process:  logical   Associations: concrete associations   Thought Content:  no overt delusions   Perceptual Disturbances: no auditory hallucinations, no visual hallucinations, does not appear responding to internal stimuli   Risk Potential: Suicidal ideation - None at present  Homicidal ideation - None at present  Potential for aggression - No   Sensorium:  oriented to person, place, and time/date   Memory:  recent and remote memory grossly intact   Consciousness:  alert and awake   Attention/Concentration: attention span and concentration are age appropriate   Insight:  limited   Judgment: limited   Gait/Station: normal gait/station   Motor Activity: no abnormal movements             Recommended Treatment:   See above for assessment and plan.     Risks, benefits and possible side effects of Medications:   Risks, benefits, and possible side effects of medications explained to patient and patient verbalizes understanding.      This note has been constructed using a voice recognition system.  There may be translation, syntax, or grammatical errors. If you have any questions, please contact the dictating provider.    Yamini Gomez D.O.    "

## 2024-09-17 PROCEDURE — 99232 SBSQ HOSP IP/OBS MODERATE 35: CPT | Performed by: STUDENT IN AN ORGANIZED HEALTH CARE EDUCATION/TRAINING PROGRAM

## 2024-09-17 RX ADMIN — OLANZAPINE 20 MG: 10 TABLET, FILM COATED ORAL at 21:47

## 2024-09-17 RX ADMIN — NICOTINE POLACRILEX 2 MG: 2 GUM, CHEWING BUCCAL at 09:52

## 2024-09-17 RX ADMIN — OXCARBAZEPINE 300 MG: 300 TABLET, FILM COATED ORAL at 08:10

## 2024-09-17 RX ADMIN — DICYCLOMINE HYDROCHLORIDE 20 MG: 10 CAPSULE ORAL at 06:44

## 2024-09-17 RX ADMIN — DICYCLOMINE HYDROCHLORIDE 20 MG: 10 CAPSULE ORAL at 11:14

## 2024-09-17 RX ADMIN — DICYCLOMINE HYDROCHLORIDE 20 MG: 10 CAPSULE ORAL at 16:06

## 2024-09-17 RX ADMIN — ACETAMINOPHEN 650 MG: 325 TABLET ORAL at 10:27

## 2024-09-17 RX ADMIN — OXCARBAZEPINE 300 MG: 300 TABLET, FILM COATED ORAL at 21:47

## 2024-09-17 NOTE — NURSING NOTE
Patient agreeable to blood work tomorrow morning. Compliant with medications. Less irritable during interaction and no delusional/paranoid comments noted as of this time.  Focused on discharge and hopeful to leave soon. Maintained on 2000 cc fluid restriction.

## 2024-09-17 NOTE — NURSING NOTE
Pt is visible,  withdrawn to self, guarded and suspicious.  Pt refused afternoon Symbicort,  compliant with all other scheduled medications.  Pt believes there is comedone tampering with the drinking water,  attempts to reassure her that the water is clean were me dismissed, pt continues to mistrust staff.  Pt denies HI/SI/AVH,  but appears distracted at times during interactions with this writer.  Pt present for dinner and complaining  abdominal cramps and was given PRN BENTYL at 2235.

## 2024-09-17 NOTE — PLAN OF CARE
Problem: ANXIETY  Goal: Will report anxiety at manageable levels  Description: INTERVENTIONS:  - Administer medication as ordered  - Teach and encourage coping skills  - Provide emotional support  - Assess patient/family for anxiety and ability to cope  Outcome: Not Progressing  Goal: By discharge: Patient will verbalize 2 strategies to deal with anxiety  Description: Interventions:  - Identify any obvious source/trigger to anxiety  - Staff will assist patient in applying identified coping technique/skills  - Encourage attendance of scheduled groups and activities  Outcome: Not Progressing     Problem: SUBSTANCE USE/ABUSE  Goal: By discharge, will develop insight into their chemical dependency and sustain motivation to continue in recovery  Description: INTERVENTIONS:  - Attends all daily group sessions and scheduled AA groups  - Actively practices coping skills through participation in the therapeutic community and adherence to program rules  - Reviews and completes assignments from individual treatment plan  - Assist patient development of understanding of their personal cycle of addiction and relapse triggers  Outcome: Not Progressing     Problem: Ineffective Coping  Goal: Participates in unit activities  Description: Interventions:  - Provide therapeutic environment   - Provide required programming   - Redirect inappropriate behaviors   Outcome: Not Progressing

## 2024-09-17 NOTE — SOCIAL WORK
Phone call received from pt's . Pt provided verbal consent for staff to speak with .      reports he feels as though pt is at baseline and is doing better than she was the last time she was discharged.  requesting update on pt's discharge an ability to return home tomorrow. SW advised his information will be provided to the primary team and an update can be provided after discussion.
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
## 2024-09-17 NOTE — PROGRESS NOTES
09/12/24 1200   Team Meeting   Meeting Type Tx Team Meeting   Team Members Present   Team Members Present Physician;Nurse;   Physician Team Member Allen   Nursing Team Member Mahad   Care Management Team Member Melodie   Patient/Family Present   Patient Present Yes   Patient's Family Present No     Tx plan was reviewed and discussed with Pt. Pt was encouraged to attend groups. Medication was discussed with Pt. Pt refused to signed tx plan.
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
## 2024-09-17 NOTE — PROGRESS NOTES
Progress Note - Behavioral Health   Name: Antionette Downing 38 y.o. female I MRN: [id number]  Unit/Bed#: -01 I Date of Admission: 9/9/2024   Date of Service: 9/17/2024 I Hospital Day: 7     Assessment & Plan  Bipolar I disorder, most recent episode manic, severe with psychotic features (HCC)  - Continue Trileptal 300 mg BID  - Continue Olanzapine 20 mg qhs    Progress Toward Goals: Improved    Recommended Treatment: Continue with group therapy, milieu therapy and occupational therapy.      Risks, benefits and possible side effects of Medications:   Risks, benefits, and possible side effects of medications explained to patient and patient verbalizes understanding.      History of Present Illness   Behavior over the last 24 hours: Improved  Sleep: normal  Appetite: normal  Medication side effects: No  ROS: no complaints    Subjective: Confronted patient about staff report that she was refusing blood draw. Patient endorsed being confused. States she got blood 2 days ago. Clarified with patient that the blood draw shows low sodium so a repeat blood draw would be needed. Patient spoke about being told she would be discharged tomorrow. Discussed that it was a discussion with her previous provider and that this writer would not be able to comment on that. After discussion with her medications and goals of care patient endorsed wanting to keep her medications at the current dosages.     Objective   Mental Status Evaluation:  Appearance:  age appropriate   Behavior:  Cooperative   Speech:  normal volume   Mood:  irritable   Affect:  mood-congruent   Thought Process:  goal directed and logical   Associations: intact associations   Thought Content:  normal   Perceptual Disturbances: None   Risk Potential: Suicidal Ideations none  Homicidal Ideations none  Potential for Aggression No   Sensorium:  person, place, time/date, and situation   Memory:  recent and remote memory grossly intact   Consciousness:  alert and  awake    Attention: attention span and concentration were age appropriate   Insight:  limited   Judgment: limited   Gait/Station: normal gait/station   Motor Activity: no abnormal movements     Medications: current meds:   Current Facility-Administered Medications:     acetaminophen (TYLENOL) tablet 650 mg, Q6H PRN    acetaminophen (TYLENOL) tablet 650 mg, Q4H PRN    acetaminophen (TYLENOL) tablet 975 mg, Q6H PRN    albuterol (PROVENTIL HFA,VENTOLIN HFA) inhaler 2 puff, Q6H PRN    benztropine (COGENTIN) injection 1 mg, Q4H PRN Max 6/day    benztropine (COGENTIN) tablet 1 mg, Q4H PRN Max 6/day    budesonide-formoterol (SYMBICORT) 80-4.5 MCG/ACT inhaler 2 puff, BID    dicyclomine (BENTYL) capsule 20 mg, TID AC    hydrOXYzine HCL (ATARAX) tablet 50 mg, Q6H PRN Max 4/day **OR** diphenhydrAMINE (BENADRYL) injection 50 mg, Q6H PRN    famotidine (PEPCID) tablet 20 mg, BID PRN    hydrOXYzine HCL (ATARAX) tablet 100 mg, Q6H PRN Max 4/day **OR** LORazepam (ATIVAN) injection 2 mg, Q6H PRN    hydrOXYzine HCL (ATARAX) tablet 25 mg, Q6H PRN Max 4/day    nicotine polacrilex (NICORETTE) gum 2 mg, Q2H PRN    OLANZapine (ZyPREXA) tablet 10 mg, Q3H PRN Max 3/day **OR** OLANZapine (ZyPREXA) IM injection 10 mg, Q3H PRN Max 3/day    OLANZapine (ZyPREXA) tablet 5 mg, Q3H PRN Max 6/day **OR** OLANZapine (ZyPREXA) IM injection 5 mg, Q3H PRN Max 6/day    OLANZapine (ZyPREXA) tablet 2.5 mg, Q3H PRN Max 8/day    OLANZapine (ZyPREXA) tablet 20 mg, HS    ondansetron (ZOFRAN-ODT) dispersible tablet 4 mg, Q6H PRN    OXcarbazepine (TRILEPTAL) tablet 300 mg, Q12H OSILE    polyethylene glycol (MIRALAX) packet 17 g, Daily PRN    propranolol (INDERAL) tablet 10 mg, Q8H PRN    sterile water injection **ADS Override Pull**,     traZODone (DESYREL) tablet 50 mg, HS PRN.      Lab Results: I have reviewed the following results: CBC/BMP: No new results in last 24 hours.   Most Recent Labs:   Lab Results   Component Value Date    WBC 6.14 09/10/2024    RBC 4.56  09/10/2024    HGB 14.5 09/10/2024    HCT 41.8 09/10/2024     09/10/2024    RDW 12.8 09/10/2024    NEUTROABS 3.03 09/10/2024    SODIUM 128 (L) 09/15/2024    K 4.0 09/15/2024    CL 99 09/15/2024    CO2 23 09/15/2024    BUN 6 09/15/2024    CREATININE 0.66 09/15/2024    GLUC 88 09/15/2024    GLUF 88 09/15/2024    CALCIUM 8.9 09/15/2024    AST 12 (L) 09/15/2024    ALT 6 (L) 09/15/2024    ALKPHOS 51 09/15/2024    TP 6.4 09/15/2024    ALB 4.1 09/15/2024    TBILI 0.60 09/15/2024    CHOLESTEROL 144 09/15/2024    HDL 46 (L) 09/15/2024    TRIG 105 09/15/2024    LDLCALC 77 09/15/2024    NONHDLC 98 09/15/2024    VALPROICTOT <10.0 (L) 02/12/2023    LITHIUM 0.63 02/01/2024    AMMONIA 17 11/12/2019    [id number] 3.817 09/15/2024    FREET4 1.24 02/01/2023    PREGSERUM Negative 09/15/2024    HCGQUANT <3 12/05/2019    RPR Non-Reactive 02/02/2023    HGBA1C 5.3 03/28/2024     03/28/2024       Administrative Statements   I have spent a total time of 15-20 minutes in caring for this patient on the day of the visit/encounter including Prognosis, Risks and benefits of tx options, Instructions for management, Patient and family education, Importance of tx compliance, Risk factor reductions, Impressions, Counseling / Coordination of care, Documenting in the medical record, Reviewing / ordering tests, medicine, procedures  , Obtaining or reviewing history  , and Communicating with other healthcare professionals .

## 2024-09-18 VITALS
WEIGHT: 139.6 LBS | HEIGHT: 62 IN | DIASTOLIC BLOOD PRESSURE: 64 MMHG | RESPIRATION RATE: 16 BRPM | HEART RATE: 78 BPM | BODY MASS INDEX: 25.69 KG/M2 | OXYGEN SATURATION: 97 % | TEMPERATURE: 97.7 F | SYSTOLIC BLOOD PRESSURE: 110 MMHG

## 2024-09-18 PROBLEM — Z00.8 MEDICAL CLEARANCE FOR PSYCHIATRIC ADMISSION: Status: RESOLVED | Noted: 2023-06-22 | Resolved: 2024-09-18

## 2024-09-18 LAB
ALBUMIN SERPL BCG-MCNC: 4.2 G/DL (ref 3.5–5)
ALP SERPL-CCNC: 50 U/L (ref 34–104)
ALT SERPL W P-5'-P-CCNC: 6 U/L (ref 7–52)
ANION GAP SERPL CALCULATED.3IONS-SCNC: 9 MMOL/L (ref 4–13)
AST SERPL W P-5'-P-CCNC: 15 U/L (ref 13–39)
BASOPHILS # BLD AUTO: 0.04 THOUSANDS/ΜL (ref 0–0.1)
BASOPHILS NFR BLD AUTO: 1 % (ref 0–1)
BILIRUB SERPL-MCNC: 0.33 MG/DL (ref 0.2–1)
BUN SERPL-MCNC: 8 MG/DL (ref 5–25)
CALCIUM SERPL-MCNC: 8.9 MG/DL (ref 8.4–10.2)
CHLORIDE SERPL-SCNC: 103 MMOL/L (ref 96–108)
CO2 SERPL-SCNC: 22 MMOL/L (ref 21–32)
CREAT SERPL-MCNC: 0.78 MG/DL (ref 0.6–1.3)
EOSINOPHIL # BLD AUTO: 0.18 THOUSAND/ΜL (ref 0–0.61)
EOSINOPHIL NFR BLD AUTO: 3 % (ref 0–6)
ERYTHROCYTE [DISTWIDTH] IN BLOOD BY AUTOMATED COUNT: 12.3 % (ref 11.6–15.1)
GFR SERPL CREATININE-BSD FRML MDRD: 98 ML/MIN/1.73SQ M
GLUCOSE SERPL-MCNC: 94 MG/DL (ref 65–140)
HCT VFR BLD AUTO: 43 % (ref 34.8–46.1)
HGB BLD-MCNC: 14.3 G/DL (ref 11.5–15.4)
IMM GRANULOCYTES # BLD AUTO: 0.03 THOUSAND/UL (ref 0–0.2)
IMM GRANULOCYTES NFR BLD AUTO: 1 % (ref 0–2)
LYMPHOCYTES # BLD AUTO: 2.14 THOUSANDS/ΜL (ref 0.6–4.47)
LYMPHOCYTES NFR BLD AUTO: 39 % (ref 14–44)
MCH RBC QN AUTO: 32.8 PG (ref 26.8–34.3)
MCHC RBC AUTO-ENTMCNC: 33.3 G/DL (ref 31.4–37.4)
MCV RBC AUTO: 99 FL (ref 82–98)
MONOCYTES # BLD AUTO: 0.54 THOUSAND/ΜL (ref 0.17–1.22)
MONOCYTES NFR BLD AUTO: 10 % (ref 4–12)
NEUTROPHILS # BLD AUTO: 2.56 THOUSANDS/ΜL (ref 1.85–7.62)
NEUTS SEG NFR BLD AUTO: 46 % (ref 43–75)
NRBC BLD AUTO-RTO: 0 /100 WBCS
PLATELET # BLD AUTO: 270 THOUSANDS/UL (ref 149–390)
PMV BLD AUTO: 12.7 FL (ref 8.9–12.7)
POTASSIUM SERPL-SCNC: 4.3 MMOL/L (ref 3.5–5.3)
PROT SERPL-MCNC: 6.6 G/DL (ref 6.4–8.4)
RBC # BLD AUTO: 4.36 MILLION/UL (ref 3.81–5.12)
SODIUM SERPL-SCNC: 134 MMOL/L (ref 135–147)
WBC # BLD AUTO: 5.49 THOUSAND/UL (ref 4.31–10.16)

## 2024-09-18 PROCEDURE — 99238 HOSP IP/OBS DSCHRG MGMT 30/<: CPT | Performed by: PSYCHIATRY & NEUROLOGY

## 2024-09-18 PROCEDURE — 80053 COMPREHEN METABOLIC PANEL: CPT

## 2024-09-18 PROCEDURE — 85025 COMPLETE CBC W/AUTO DIFF WBC: CPT | Performed by: PSYCHIATRY & NEUROLOGY

## 2024-09-18 RX ORDER — OXCARBAZEPINE 300 MG/1
300 TABLET, FILM COATED ORAL EVERY 12 HOURS SCHEDULED
Qty: 60 TABLET | Refills: 1 | Status: SHIPPED | OUTPATIENT
Start: 2024-09-18 | End: 2024-11-17

## 2024-09-18 RX ORDER — OLANZAPINE 20 MG/1
20 TABLET ORAL
Qty: 30 TABLET | Refills: 1 | Status: SHIPPED | OUTPATIENT
Start: 2024-09-18 | End: 2024-11-17

## 2024-09-18 RX ADMIN — OXCARBAZEPINE 300 MG: 300 TABLET, FILM COATED ORAL at 08:15

## 2024-09-18 RX ADMIN — DICYCLOMINE HYDROCHLORIDE 20 MG: 10 CAPSULE ORAL at 06:52

## 2024-09-18 RX ADMIN — DICYCLOMINE HYDROCHLORIDE 20 MG: 10 CAPSULE ORAL at 11:29

## 2024-09-18 RX ADMIN — ACETAMINOPHEN 650 MG: 325 TABLET ORAL at 09:36

## 2024-09-18 NOTE — NURSING NOTE
Medications and outpatient appointments reviewed with patient prior to discharge. Prescriptions sent to patients own pharmacy for patient .

## 2024-09-18 NOTE — NURSING NOTE
"Pt is visible but guarded and appears confused.  Pt is suspicious  that someone is tampering with her food and medication,  but remains compliant with both, all except for evening Symbicort inhaler which she insists is a mistaken order. Pt denies HI/SI/AVH but appears to be clearly staring at something in space and easy to distract. Pt believe she is in trouble Pt was agreeable and cooperative  i8 spite of he her.  No PRNs requested or given this evening, the pt did mention that \"NOTHING THEY GIVE ME HELOS ANYWAYS\".  Pt refused a heat pack whern offered for her cramps,  nut walked away in denies.    "
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
## 2024-09-18 NOTE — SOCIAL WORK
Pt to D/C today. Pt denies SI/HI/AVH. Pt oriented x3. Pt to d/c to home and  will  upon discharge. Pt to follow up with [facility] on 9/25/24. Scripts sent to preferred pharmacy.     Discharge Address: [address] [place] [id number]   Phone: [phone]

## 2024-09-18 NOTE — PROGRESS NOTES
09/18/24 0840   Team Meeting   Meeting Type Daily Rounds   Team Members Present   Team Members Present Physician;Nurse;   Physician Team Member Allen   Nursing Team Member [facility] Management Team Member Melodie   Patient/Family Present   Patient Present No   Patient's Family Present No     Pt is scheduled for discharge today.

## 2024-09-18 NOTE — DISCHARGE INSTR - LAB
If you smoke, use tobacco or nicotine, and/or are exposed to second hand smoke, you are encouraged to stop to improve your health.  If you need help quitting, please talk to your health care provider or call:  [facility] ([phone])  [place] [facility] ([phone])   [place] [employer] ([phone])

## 2024-09-18 NOTE — NURSING NOTE
"Pt is visible but withdrawn to herself, and guarded against staff and peers. Pt is scant in speech, with labile affect.  Pt endorses paranoid delusions` including the idea that some \"miya\" possibly her significant other, has control over the water and in most cases will refuse it.  Pt also believes that her pills are misrepresented as medication and are actually meant to harm her.  Earlier in the afternoon the pt approach this writer and said \"the back of my head in numb\".  Pt believed this sensation would soon be followed by a seizure and began to panic,  was able to be redirected to her room, vital remained WDL and steady,  and the pt shortly after reported her head was no longer bothering her.  Pt is disorganize dbut denies HI/SI/AVH, and compliant with all meds with the exception of her Symbicort inhaler in the early evening.  The pt did receive PRNs on request including Bentyl and Tylenol,  all to help with abdominal cramps and discomfort but were reported as ineffective.   "

## 2024-09-18 NOTE — BH TRANSITION RECORD
Contact Information: If you have any questions, concerns, pended studies, tests and/or procedures, or emergencies regarding your inpatient behavioral health visit. Please contact [facility] [phone]  and ask to speak to a , nurse or physician. A contact is available 24 hours/ 7 days a week at this number.     Summary of Procedures Performed During your Stay:  Below is a list of major procedures performed during your hospital stay and a summary of results:  - Cardiac Procedures/Studies: EKG on 9/11/24 - Normal Sinus Rhythm.    Pending Studies (From admission, onward)       Start     Ordered    09/18/24 1008  Comprehensive metabolic panel  Morning draw         09/17/24 1051    Pending  CBC and differential  Morning draw         Pending                  Please follow up on the above pending studies with your PCP and/or referring provider.

## 2024-09-18 NOTE — NURSING NOTE
Visible in the milieu. Making phone calls to her . Irritable on approach. Denying symptoms. Agreeable to scheduled blood work. Compliant with medication. Focused on discharge and is looking forward to going home today.

## 2024-09-18 NOTE — PLAN OF CARE
Problem: DEPRESSION  Goal: Will be euthymic at discharge  Description: INTERVENTIONS:  - Administer medication as ordered  - Provide emotional support via 1:1 interaction with staff  - Encourage involvement in milieu/groups/activities  - Monitor for social isolation  Outcome: Progressing     Problem: BEHAVIOR  Goal: Pt/Family maintain appropriate behavior and adhere to behavioral management agreement, if implemented  Description: INTERVENTIONS:  - Assess the family dynamic   - Encourage verbalization of thoughts and concerns in a socially appropriate manner  - Assess patient/family's coping skills and non-compliant behavior (including use of illegal substances).  - Utilize positive, consistent limit setting strategies supporting safety of patient, staff and others  - Initiate consult with Case Management, Spiritual Care or other ancillary services as appropriate  - If a patient's/visitor's behavior jeopardizes the safety of the patient, staff, or others, refer to organization procedure.   - Notify Security of behavior or suspected illegal substances which indicate the need for search of the patient and/or belongings  - Encourage participation in the decision making process about a behavioral management agreement; implement if patient meets criteria  Outcome: Progressing     Problem: ANXIETY  Goal: Will report anxiety at manageable levels  Description: INTERVENTIONS:  - Administer medication as ordered  - Teach and encourage coping skills  - Provide emotional support  - Assess patient/family for anxiety and ability to cope  Outcome: Progressing  Goal: By discharge: Patient will verbalize 2 strategies to deal with anxiety  Description: Interventions:  - Identify any obvious source/trigger to anxiety  - Staff will assist patient in applying identified coping technique/skills  - Encourage attendance of scheduled groups and activities  Outcome: Progressing     Problem: SUBSTANCE USE/ABUSE  Goal: Will have no detox  symptoms and will verbalize plan for changing substance-related behavior  Description: INTERVENTIONS:  - Monitor physical status and assess for symptoms of withdrawal  - Administer medication as ordered  - Provide emotional support with 1 on 1 interaction with staff  - Encourage recovery focused program/ addiction education  - Assess for verbalization of changing behaviors related to substance abuse  - Initiate consults and referrals as appropriate (Case Management, Spiritual Care, etc.)  Outcome: Progressing  Goal: By discharge, will develop insight into their chemical dependency and sustain motivation to continue in recovery  Description: INTERVENTIONS:  - Attends all daily group sessions and scheduled AA groups  - Actively practices coping skills through participation in the therapeutic community and adherence to program rules  - Reviews and completes assignments from individual treatment plan  - Assist patient development of understanding of their personal cycle of addiction and relapse triggers  Outcome: Progressing  Goal: By discharge, patient will have ongoing treatment plan addressing chemical dependency  Description: INTERVENTIONS:  - Assist patient with resources and/or appointments for ongoing recovery based living  Outcome: Progressing     Problem: DISCHARGE PLANNING - CARE MANAGEMENT  Goal: Discharge to post-acute care or home with appropriate resources  Description: INTERVENTIONS:  - Conduct assessment to determine patient/family and health care team treatment goals, and need for post-acute services based on payer coverage, community resources, and patient preferences, and barriers to discharge  - Address psychosocial, clinical, and financial barriers to discharge as identified in assessment in conjunction with the patient/family and health care team  - Arrange appropriate level of post-acute services according to patient’s   needs and preference and payer coverage in collaboration with the physician  and health care team  - Communicate with and update the patient/family, physician, and health care team regarding progress on the discharge plan  - Arrange appropriate transportation to post-acute venues  Outcome: Progressing     Problem: PSYCHOSIS  Goal: Will report no hallucinations or delusions  Description: Interventions:  - Administer medication as  ordered  - Every waking shifts and PRN assess for the presence of hallucinations and or delusions  - Assist with reality testing to support increasing orientation  - Assess if patient's hallucinations or delusions are encouraging self-harm or harm to others and intervene as appropriate  Outcome: Not Progressing

## 2024-09-18 NOTE — SOCIAL WORK
Cm called preventative measures to confirm Pt's appointment for 9/25 and 9/26 with therapist and provider.   Cm met with Pt and revisited the idea of possibility of getting referred to ICM or something called an ACT team. Pt continues to decline and reports she has enough people involved in her life. Pt was very fixated on discharge. Pt reported she is feeling better and wants to go home.    Cm spoke with Pt's .  reported Pt is at her baseline. Cm discussed discharge plan with . Cm confirmed with  that there are no firearms in the home and no medication stockpiles in the home.  asked Cm about the medications Pt is currently taking. Cm informed  of these meds and informed him that the same information about Pt's medication regiment is in Pt's paperwork upon discharge.  understood and reported he will be taking Pt to the pharmacy to  her medications and taking Pt tp her appointments.  reported he understands and is thankful for Pt getting the help she needs. Pt reported he will pick Pt up from the hospital at 12:30 pm.

## 2024-09-18 NOTE — DISCHARGE INSTR - APPOINTMENTS
Marlyn our Behavioral Health Nurse Navigator, will be calling you after your discharge, on the phone number that you provided.  She will be available as an additional support, if needed.   If you wish to speak with Marlyn, you may contact her at [phone].

## 2024-09-18 NOTE — PROGRESS NOTES
09/18/24 0959   Activity/Group Checklist   Group Admission/Discharge  (relapse prevention plan)   Attendance Attended   Attendance Duration (min) 0-15   Interactions Interacted appropriately   Affect/Mood Appropriate   Goals Achieved Identified feelings;Identified triggers;Identified relapse prevention strategies;Identified medication adherence strategies;Discussed safety plan;Discussed coping strategies;Discussed discharge plans;Identified resources and support systems;Able to listen to others;Able to engage in interactions;Able to reflect/comment on own behavior;Able to self-disclose;Able to recieve feedback     Relapse prevention plan completed. Pt reluctant to complete plan, but ultimately mostly cooperative. Pt completed plan, but did not appear motivated to reflect on safety plan thoroughly. Pt focused on discharge.

## 2024-09-18 NOTE — PLAN OF CARE
Problem: DEPRESSION  Goal: Will be euthymic at discharge  Description: INTERVENTIONS:  - Administer medication as ordered  - Provide emotional support via 1:1 interaction with staff  - Encourage involvement in milieu/groups/activities  - Monitor for social isolation  Outcome: Completed     Problem: PSYCHOSIS  Goal: Will report no hallucinations or delusions  Description: Interventions:  - Administer medication as  ordered  - Every waking shifts and PRN assess for the presence of hallucinations and or delusions  - Assist with reality testing to support increasing orientation  - Assess if patient's hallucinations or delusions are encouraging self-harm or harm to others and intervene as appropriate  Outcome: Completed     Problem: BEHAVIOR  Goal: Pt/Family maintain appropriate behavior and adhere to behavioral management agreement, if implemented  Description: INTERVENTIONS:  - Assess the family dynamic   - Encourage verbalization of thoughts and concerns in a socially appropriate manner  - Assess patient/family's coping skills and non-compliant behavior (including use of illegal substances).  - Utilize positive, consistent limit setting strategies supporting safety of patient, staff and others  - Initiate consult with Case Management, Spiritual Care or other ancillary services as appropriate  - If a patient's/visitor's behavior jeopardizes the safety of the patient, staff, or others, refer to organization procedure.   - Notify Security of behavior or suspected illegal substances which indicate the need for search of the patient and/or belongings  - Encourage participation in the decision making process about a behavioral management agreement; implement if patient meets criteria  Outcome: Completed     Problem: ANXIETY  Goal: Will report anxiety at manageable levels  Description: INTERVENTIONS:  - Administer medication as ordered  - Teach and encourage coping skills  - Provide emotional support  - Assess  patient/family for anxiety and ability to cope  Outcome: Completed  Goal: By discharge: Patient will verbalize 2 strategies to deal with anxiety  Description: Interventions:  - Identify any obvious source/trigger to anxiety  - Staff will assist patient in applying identified coping technique/skills  - Encourage attendance of scheduled groups and activities  Outcome: Completed     Problem: SUBSTANCE USE/ABUSE  Goal: Will have no detox symptoms and will verbalize plan for changing substance-related behavior  Description: INTERVENTIONS:  - Monitor physical status and assess for symptoms of withdrawal  - Administer medication as ordered  - Provide emotional support with 1 on 1 interaction with staff  - Encourage recovery focused program/ addiction education  - Assess for verbalization of changing behaviors related to substance abuse  - Initiate consults and referrals as appropriate (Case Management, Spiritual Care, etc.)  Outcome: Completed  Goal: By discharge, will develop insight into their chemical dependency and sustain motivation to continue in recovery  Description: INTERVENTIONS:  - Attends all daily group sessions and scheduled AA groups  - Actively practices coping skills through participation in the therapeutic community and adherence to program rules  - Reviews and completes assignments from individual treatment plan  - Assist patient development of understanding of their personal cycle of addiction and relapse triggers  Outcome: Completed  Goal: By discharge, patient will have ongoing treatment plan addressing chemical dependency  Description: INTERVENTIONS:  - Assist patient with resources and/or appointments for ongoing recovery based living  Outcome: Completed     Problem: Ineffective Coping  Goal: Participates in unit activities  Description: Interventions:  - Provide therapeutic environment   - Provide required programming   - Redirect inappropriate behaviors   Outcome: Completed     Problem: DISCHARGE  PLANNING - CARE MANAGEMENT  Goal: Discharge to post-acute care or home with appropriate resources  Description: INTERVENTIONS:  - Conduct assessment to determine patient/family and health care team treatment goals, and need for post-acute services based on payer coverage, community resources, and patient preferences, and barriers to discharge  - Address psychosocial, clinical, and financial barriers to discharge as identified in assessment in conjunction with the patient/family and health care team  - Arrange appropriate level of post-acute services according to patient’s   needs and preference and payer coverage in collaboration with the physician and health care team  - Communicate with and update the patient/family, physician, and health care team regarding progress on the discharge plan  - Arrange appropriate transportation to post-acute venues  Outcome: Completed

## 2024-09-18 NOTE — DISCHARGE INSTR - OTHER ORDERS
CRISIS INFORMATION  If you are experiencing a mental health emergency, you may call the [facility]vention Office 24 hours a day, 7 days per week at [phone].    In [facility], call [phone].    [facility] is a confidential 24/7 telephone support service manned by trained mental health consumers.  [facility] provides support, a listening ear and can provide information about available services.[facility] specializes in the concerns of mental health consumers, their families and friends.  However, we are also here for anyone who has a mental health concern, is confused about or just doesn't know anything about mental health or where to get information.  To reach [facility], call [phone].    HOW TO GET SUBSTANCE ABUSE HELP:  If you or someone you know has a drug or alcohol problem, there is help:  [facility]: [phone]  [facility]: [phone]  An assessment is the first step.   In addition to those listed there are other programs available in the area but assessment is best to determine an appropriate level of care.  If you DO NOT have Medical Assistance (MA) or Private Insurance, an assessment can be scheduled at one of these providers:  [facility]  [address] [place] [address]  [phone]   [facility]  [address] [place] [address]  [phone]   [address] [place] [address]  [phone]   [facility]  [address] [place] [address]  [phone]   [facility]  [address] [place] [address]  [phone]   [facility]  [address] [place] [address]  [phone]   [facility]  [address], [place] [address]  [phone]     If you HAVE Medical Assistance, an assessment can be scheduled at one of these providers:  [facility]  [address] [place] [address]  [phone]   [facility]  [address] [place] [address]  [phone]   [facility]  [address] [facility], [place] [address]  [phone]  [address] [place] [address], [address], [place] [address] [phone]   [address] [address]  [phone]   [facility]  [address] [place] [address]  [phone]   [facility] ([facility])  [address] [facility] [address] [place] [address]  [phone]   [facility] [employer]  [address] [facility] [address] [place] [address]  [phone]   [facility]  [address] [place] [address]  [phone]   [facility]  [address] [place] [address]  [phone]   [employer]  [address], [place] [address]  [phone]     If you HAVE Private Insurance, an assessment can be scheduled at one of these providers.  Please contact these Providers to determine if they are in your network plan:  [facility]  [address] [facility] [facility], [place] [address]  [phone]   [address] [address]  [phone]   [facility]  [address] [facility] [place] [address]  [phone]   [facility] ([facility])  [address] [facility] [address] [facility] [place] [address]  [phone]   [employer]  [address] [place] [address]  [phone]   [employer]  [address], [place] [address]  [phone]     From the [facility] website [place]guides[place]opioid-epidemic/#GetNaloxone    How do I get naloxone?  Family members and friends can access naloxone by:    Obtaining a prescription from their family doctor  Using the standing order issued by Acting Physician General Eileen Villavicencio. A standing order is a prescription written  for the general public, rather than specifically for an individual.  To use the standing order, print it and take it with you to the pharmacy or have the digital version on your phone. Download the standing order from the Department of Health (PDF).    If you are unable to print it or use the digital version, the standing order is kept on file at many pharmacies. If a pharmacy does not have it on file, they may have the ability to look it up.    Naloxone prescriptions can be filled at most pharmacies. Although the medication might not be available for same-day pickup, it often can be ordered and available within a day or two.      [facility] [facility]. is a private, non-profit organization dedicated to:  1. Providing free and confidential services to the people of [facility] whose lives have been directly affected by crime;  2. Advocating for the rights, welfare and empowerment of all victims of crime;  3. Developing resources for individuals and community education regarding crime victimization and prevention.     [address]  [address]  [place] [address] [address] [facility] [address]  [place] [address]   [phone]   [email or web address]  Regular Business Hours are Monday- Friday 8:30am- 4:30pm  If you need immediate assistance outside of normal hours please call our 24-hour hotline at [phone].      About [facility]     It's not always easy to tell at the beginning of a relationship if it will become abusive.  Abuse can be verbal, physical, emotional, sexual, or financial.  Domestic and intimate partner abuse does not discriminate against age, gender, sexual orientation, Advent, or economic status.  If you have any questions or need our services, please call our 24/7 Helpline at [phone].  If you have general questions about our agency, its mission and work, please call our administrative  offices at [phone].   You are not alone.  If you are in a domestic or intimate partner abuse situation or know someone who is and would like information, we can help.  Our Helpline is for crisis and non-crisis phone calls.   Call our 24-hour Helpline at [phone]  If you are in immediate danger, call 911  Our helpline is available in both English and Mongolian, as well as in other languages, via a  line.  Calling our 24-hour Helpline is the first step a victim of domestic and intimate partner abuse takes to receive confidential and at no cost services.  Trained staff and volunteers offer information and emotional support to callers in both crisis and non-crisis situations.  Families and friends of victims who call the helpline may also receive information and emotional support.

## 2024-09-27 ENCOUNTER — HOSPITAL ENCOUNTER (EMERGENCY)
Facility: HOSPITAL | Age: 39
Discharge: HOME | End: 2024-09-27
Attending: EMERGENCY MEDICINE
Payer: COMMERCIAL

## 2024-09-27 VITALS
DIASTOLIC BLOOD PRESSURE: 76 MMHG | TEMPERATURE: 98.2 F | OXYGEN SATURATION: 99 % | RESPIRATION RATE: 16 BRPM | HEART RATE: 81 BPM | SYSTOLIC BLOOD PRESSURE: 120 MMHG

## 2024-09-27 DIAGNOSIS — K59.01 SLOW TRANSIT CONSTIPATION: Primary | ICD-10-CM

## 2024-09-27 DIAGNOSIS — R10.9 ABDOMINAL PAIN: ICD-10-CM

## 2024-09-27 LAB
ALBUMIN SERPL BCG-MCNC: 4.2 G/DL (ref 3.5–5)
ALP SERPL-CCNC: 54 U/L (ref 34–104)
ALT SERPL W P-5'-P-CCNC: 9 U/L (ref 7–52)
ANION GAP SERPL CALCULATED.3IONS-SCNC: 6 MMOL/L (ref 4–13)
AST SERPL W P-5'-P-CCNC: 12 U/L (ref 13–39)
BASOPHILS # BLD AUTO: 0.03 THOUSANDS/ΜL (ref 0–0.1)
BASOPHILS NFR BLD AUTO: 0 % (ref 0–1)
BILIRUB SERPL-MCNC: 0.26 MG/DL (ref 0.2–1)
BILIRUB UR QL STRIP: NEGATIVE
BUN SERPL-MCNC: 5 MG/DL (ref 5–25)
CALCIUM SERPL-MCNC: 8.9 MG/DL (ref 8.4–10.2)
CHLORIDE SERPL-SCNC: 104 MMOL/L (ref 96–108)
CLARITY UR: CLEAR
CO2 SERPL-SCNC: 25 MMOL/L (ref 21–32)
COLOR UR: NORMAL
CREAT SERPL-MCNC: 0.62 MG/DL (ref 0.6–1.3)
EOSINOPHIL # BLD AUTO: 0.19 THOUSAND/ΜL (ref 0–0.61)
EOSINOPHIL NFR BLD AUTO: 2 % (ref 0–6)
ERYTHROCYTE [DISTWIDTH] IN BLOOD BY AUTOMATED COUNT: 12.6 % (ref 11.6–15.1)
GFR SERPL CREATININE-BSD FRML MDRD: 116 ML/MIN/1.73SQ M
GLUCOSE SERPL-MCNC: 74 MG/DL (ref 65–140)
GLUCOSE UR STRIP-MCNC: NEGATIVE MG/DL
HCT VFR BLD AUTO: 41.6 % (ref 34.8–46.1)
HGB BLD-MCNC: 14 G/DL (ref 11.5–15.4)
HGB UR QL STRIP.AUTO: NEGATIVE
IMM GRANULOCYTES # BLD AUTO: 0.05 THOUSAND/UL (ref 0–0.2)
IMM GRANULOCYTES NFR BLD AUTO: 1 % (ref 0–2)
KETONES UR STRIP-MCNC: NEGATIVE MG/DL
LEUKOCYTE ESTERASE UR QL STRIP: NEGATIVE
LIPASE SERPL-CCNC: 27 U/L (ref 11–82)
LYMPHOCYTES # BLD AUTO: 2.05 THOUSANDS/ΜL (ref 0.6–4.47)
LYMPHOCYTES NFR BLD AUTO: 23 % (ref 14–44)
MCH RBC QN AUTO: 32 PG (ref 26.8–34.3)
MCHC RBC AUTO-ENTMCNC: 33.7 G/DL (ref 31.4–37.4)
MCV RBC AUTO: 95 FL (ref 82–98)
MONOCYTES # BLD AUTO: 0.7 THOUSAND/ΜL (ref 0.17–1.22)
MONOCYTES NFR BLD AUTO: 8 % (ref 4–12)
NEUTROPHILS # BLD AUTO: 5.77 THOUSANDS/ΜL (ref 1.85–7.62)
NEUTS SEG NFR BLD AUTO: 66 % (ref 43–75)
NITRITE UR QL STRIP: NEGATIVE
NRBC BLD AUTO-RTO: 0 /100 WBCS
PH UR STRIP.AUTO: 6.5 [PH]
PLATELET # BLD AUTO: 196 THOUSANDS/UL (ref 149–390)
PMV BLD AUTO: 10 FL (ref 8.9–12.7)
POTASSIUM SERPL-SCNC: 3.7 MMOL/L (ref 3.5–5.3)
PROT SERPL-MCNC: 6.3 G/DL (ref 6.4–8.4)
PROT UR STRIP-MCNC: NEGATIVE MG/DL
RBC # BLD AUTO: 4.38 MILLION/UL (ref 3.81–5.12)
SODIUM SERPL-SCNC: 135 MMOL/L (ref 135–147)
SP GR UR STRIP.AUTO: 1.01 (ref 1–1.03)
UROBILINOGEN UR STRIP-ACNC: <2 MG/DL
WBC # BLD AUTO: 8.79 THOUSAND/UL (ref 4.31–10.16)

## 2024-09-27 PROCEDURE — 80053 COMPREHEN METABOLIC PANEL: CPT | Performed by: EMERGENCY MEDICINE

## 2024-09-27 PROCEDURE — 83690 ASSAY OF LIPASE: CPT | Performed by: EMERGENCY MEDICINE

## 2024-09-27 PROCEDURE — 36415 COLL VENOUS BLD VENIPUNCTURE: CPT

## 2024-09-27 PROCEDURE — 99284 EMERGENCY DEPT VISIT MOD MDM: CPT | Performed by: EMERGENCY MEDICINE

## 2024-09-27 PROCEDURE — 81003 URINALYSIS AUTO W/O SCOPE: CPT | Performed by: STUDENT IN AN ORGANIZED HEALTH CARE EDUCATION/TRAINING PROGRAM

## 2024-09-27 PROCEDURE — 93005 ELECTROCARDIOGRAM TRACING: CPT

## 2024-09-27 PROCEDURE — 99284 EMERGENCY DEPT VISIT MOD MDM: CPT

## 2024-09-27 PROCEDURE — 85025 COMPLETE CBC W/AUTO DIFF WBC: CPT | Performed by: EMERGENCY MEDICINE

## 2024-09-27 RX ORDER — AMOXICILLIN 250 MG
1 CAPSULE ORAL DAILY
Qty: 60 TABLET | Refills: 0 | Status: SHIPPED | OUTPATIENT
Start: 2024-09-27 | End: 2024-09-27

## 2024-09-27 RX ORDER — METOCLOPRAMIDE 10 MG/1
5 TABLET ORAL 3 TIMES DAILY PRN
Qty: 15 TABLET | Refills: 0 | Status: SHIPPED | OUTPATIENT
Start: 2024-09-27

## 2024-09-27 RX ORDER — POLYETHYLENE GLYCOL 3350 17 G/17G
17 POWDER, FOR SOLUTION ORAL DAILY
Qty: 170 G | Refills: 0 | Status: SHIPPED | OUTPATIENT
Start: 2024-09-27 | End: 2024-10-07

## 2024-09-27 RX ORDER — ACETAMINOPHEN 325 MG/1
650 TABLET ORAL ONCE
Status: DISCONTINUED | OUTPATIENT
Start: 2024-09-27 | End: 2024-09-27 | Stop reason: HOSPADM

## 2024-09-27 RX ORDER — AMOXICILLIN 250 MG
1 CAPSULE ORAL ONCE
Status: DISCONTINUED | OUTPATIENT
Start: 2024-09-27 | End: 2024-09-27 | Stop reason: HOSPADM

## 2024-09-27 RX ORDER — POLYETHYLENE GLYCOL 3350 17 G/17G
17 POWDER, FOR SOLUTION ORAL ONCE
Status: DISCONTINUED | OUTPATIENT
Start: 2024-09-27 | End: 2024-09-27 | Stop reason: HOSPADM

## 2024-09-27 RX ORDER — POLYETHYLENE GLYCOL 3350 17 G/17G
17 POWDER, FOR SOLUTION ORAL DAILY
Qty: 578 G | Refills: 0 | Status: SHIPPED | OUTPATIENT
Start: 2024-09-27 | End: 2024-09-27

## 2024-09-27 RX ORDER — AMOXICILLIN 250 MG
1 CAPSULE ORAL DAILY
Qty: 60 TABLET | Refills: 0 | Status: SHIPPED | OUTPATIENT
Start: 2024-09-27

## 2024-09-27 RX ORDER — METOCLOPRAMIDE 5 MG/1
5 TABLET ORAL AS NEEDED
Qty: 30 TABLET | Refills: 0 | Status: SHIPPED | OUTPATIENT
Start: 2024-09-27

## 2024-09-27 RX ORDER — ONDANSETRON 4 MG/1
4 TABLET, ORALLY DISINTEGRATING ORAL ONCE
Status: COMPLETED | OUTPATIENT
Start: 2024-09-27 | End: 2024-09-27

## 2024-09-27 RX ADMIN — ONDANSETRON 4 MG: 4 TABLET, ORALLY DISINTEGRATING ORAL at 12:50

## 2024-09-27 NOTE — ED PROVIDER NOTES
Final diagnoses:   Abdominal pain   Slow transit constipation     ED Disposition       ED Disposition   Discharge    Condition   Stable    Date/Time   Fri Sep 27, 2024  1:47 PM    Comment   Antionette Downing discharge to home/self care.                   Assessment & Plan       Medical Decision Making  38F with 3-day episode of acute on chronic abdominal pain in the setting of IBS, consistent with functional abdominal pain. Exam and history demonstrate generalized abdominal pain without focus in any quadrant or suspicious for surgical pathology. Has no leukocytosis, fever, and vitals are normal. Unlikely pancreatitis given normal lipase and labs. Despite suprapubic pain and CVA tenderness, UA normal and do not think this is UTI. Had extensive discussion with patient free functional abdominal pain and functional GI disorders, optimizing bowel regimen and managing diet.  Patient already has follow-up with GI scheduled.    Bowel regimen initiated prior to discharge, however patient left before meds could be administered. Bowel regimen was also prescribed as outpatient.    Amount and/or Complexity of Data Reviewed  Labs: ordered. Decision-making details documented in ED Course.    Risk  OTC drugs.  Prescription drug management.          ED Course as of 09/27/24 1422   Fri Sep 27, 2024   1248 WBC: 8.79  No leukocytosis   1248 Pulse: 99  Mild tachycardia improving   1248 Temperature: 98.2 °F (36.8 °C)  Afebrile   1255 Comprehensive metabolic panel(!)  WNL   1255 Lipase  WNL   1315 UA w Reflex to Microscopic w Reflex to Culture  neg       Medications   acetaminophen (TYLENOL) tablet 650 mg (650 mg Oral Not Given 9/27/24 1320)   polyethylene glycol (MIRALAX) packet 17 g (17 g Oral Not Given 9/27/24 1400)   senna-docusate sodium (SENOKOT S) 8.6-50 mg per tablet 1 tablet (1 tablet Oral Not Given 9/27/24 1401)   psyllium (METAMUCIL) 1 packet (1 packet Oral Not Given 9/27/24 1400)   ondansetron (ZOFRAN-ODT) dispersible  tablet 4 mg (4 mg Oral Given 9/27/24 1250)       ED Risk Strat Scores             SBIRT 22yo+      Flowsheet Row Most Recent Value   Initial Alcohol Screen: US AUDIT-C     1. How often do you have a drink containing alcohol? 0 Filed at: 09/27/2024 1145   2. How many drinks containing alcohol do you have on a typical day you are drinking?  0 Filed at: 09/27/2024 1145   3a. Male UNDER 65: How often do you have five or more drinks on one occasion? 0 Filed at: 09/27/2024 1145   3b. FEMALE Any Age, or MALE 65+: How often do you have 4 or more drinks on one occassion? 0 Filed at: 09/27/2024 1145   Audit-C Score 0 Filed at: 09/27/2024 1146   JAMEY: How many times in the past year have you...    Used an illegal drug or used a prescription medication for non-medical reasons? Never Filed at: 09/27/2024 1145                            History of Present Illness       Chief Complaint   Patient presents with    Abdominal Pain     Pt has c/o L sided abdominal pain for 3 days.  Pt states it happens alot       Past Medical History:   Diagnosis Date    Abnormal Pap smear of cervix     ADHD (attention deficit hyperactivity disorder)     Alcohol abuse     Ankle fracture     Anxiety     Arthritis     back    Asthma     Bipolar disorder (HCC)     Cellulitis     right side fac in 2014    Chronic pain disorder     Depression     Diverticulitis     Flank pain 08/16/2016    Hallucination     Hepatitis C     Hip disease 2006    reports she had fluid removed from right hip and received treatment with antibiotic     History of abnormal cervical Pap smear     History of multiple miscarriages     IBS (irritable bowel syndrome)     Infantile idiopathic scoliosis     Joint pain     Kidney stone     Lactose intolerance     Low back pain     Myofascial pain syndrome     Peripheral neuropathy 03/28/2024    Poor dentition     Psychiatric illness     Psychosis (HCC)     PTSD (post-traumatic stress disorder)     Pyelonephritis affecting pregnancy      "Right hand paresthesia     Right ovarian cyst     Schizoaffective disorder, bipolar type (HCC) r/o bipolar with psychotic features 2024    Scoliosis     Seizures (HCC)     Self-injurious behavior     Sleep difficulties     Slow transit constipation     Substance abuse (HCC)     Suicide attempt (HCC)       Past Surgical History:   Procedure Laterality Date     SECTION  2016     SECTION  2014    HIP SURGERY      ORTHOPEDIC SURGERY      MA  DELIVERY ONLY N/A 2016    Procedure:  SECTION () REPEAT;  Surgeon: Kurt Connor MD;  Location: [facility];  Service: Obstetrics    MA LIG/TRNSXJ FLP TUBE ABDL/VAG APPR UNI/BI Bilateral 2016    Procedure: LIGATION/COAGULATION TUBAL;  Surgeon: Kurt Connor MD;  Location: [facility];  Service: Obstetrics      Family History   Problem Relation Age of Onset    Heart disease Maternal Aunt     Cancer Maternal Aunt     Diabetes Paternal Aunt     No Known Problems Mother     No Known Problems Father     No Known Problems Sister       Social History     Tobacco Use    Smoking status: Every Day     Current packs/day: 0.25     Average packs/day: 0.3 packs/day for 15.0 years (3.8 ttl pk-yrs)     Types: Cigarettes     Passive exposure: Never    Smokeless tobacco: Never    Tobacco comments:     pt refused smoking cessation teaching.    Vaping Use    Vaping status: Former   Substance Use Topics    Alcohol use: Yes     Comment: Rare Use    Drug use: Not Currently     Types: Marijuana, Cocaine, \"Crack\" cocaine     Comment: on occasion       E-Cigarette/Vaping    E-Cigarette Use Former User       E-Cigarette/Vaping Substances    Nicotine No     THC No     CBD No     Flavoring No     Other No     Unknown No       I have reviewed and agree with the history as documented.     Pt is a 38F with bipolar d/o and chronic abdominal pain in the setting of IBS, and one prior episode of acute cecal diverticulitis presenting with 3 " days of generalized abdominal pain, and nausea today. She reports pain is similar to pain she had most recently, and on multiple other occasions. Pain is in RLQ, LLQ, and epigastrium, and spreads to back BL. She started having some nausea today but no emesis. Has alternating constipation/diarrhea, constipation, with a constipated hard BM this morning, last diarrhea 3 days ago. Is intermittently taking Bentyl, not sure if helping. Sometimes has BRBPR, no hematemesis. No current dysuria. Has felt chilly, but no fevers. Has had 10 abdominal CT scans so far this year--all demonstrate uncomplicated diverticulosis, and one scan noting right-sided colonic thickening c/w acute cecal diverticulitis, tx w abx. She reports she is currently taking all of her prescribed neuroleptics.          Review of Systems   Constitutional:  Positive for appetite change and chills. Negative for fever.   HENT: Negative.     Respiratory: Negative.     Cardiovascular: Negative.    Gastrointestinal:  Positive for abdominal pain, constipation, diarrhea and nausea. Negative for vomiting.   Endocrine: Negative.    Genitourinary: Negative.    Neurological: Negative.            Objective       ED Triage Vitals [09/27/24 1143]   Temperature Pulse Blood Pressure Respirations SpO2 Patient Position - Orthostatic VS   98.2 °F (36.8 °C) 102 150/76 18 99 % Sitting      Temp Source Heart Rate Source BP Location FiO2 (%) Pain Score    Temporal Monitor Left arm -- 10 - Worst Possible Pain      Vitals      Date and Time Temp Pulse SpO2 Resp BP Pain Score FACES Pain Rating User   09/27/24 1315 -- 81 99 % 16 -- -- -- VR   09/27/24 1215 -- 99 98 % -- 120/76 -- -- VR   09/27/24 1143 98.2 °F (36.8 °C) 102 99 % 18 150/76 10 - Worst Possible Pain -- KRR            Physical Exam  Constitutional:       General: She is not in acute distress.     Appearance: She is well-developed. She is not toxic-appearing.   HENT:      Head: Normocephalic and atraumatic.       Mouth/Throat:      Mouth: Mucous membranes are moist.   Eyes:      Extraocular Movements: Extraocular movements intact.   Cardiovascular:      Rate and Rhythm: Normal rate and regular rhythm.   Pulmonary:      Effort: Pulmonary effort is normal.      Breath sounds: Normal breath sounds.   Abdominal:      Palpations: Abdomen is soft.      Tenderness: There is abdominal tenderness in the right lower quadrant, epigastric area, suprapubic area and left lower quadrant. There is right CVA tenderness, left CVA tenderness and guarding. There is no rebound.   Skin:     General: Skin is warm.   Neurological:      Mental Status: She is alert and oriented to person, place, and time.         Results Reviewed       Procedure Component Value Units Date/Time    UA w Reflex to Microscopic w Reflex to Culture [[id number]] Collected: 09/27/24 1249    Lab Status: Final result Specimen: Urine, Clean Catch Updated: 09/27/24 1309     Color, UA Light Yellow     Clarity, UA Clear     Specific Gravity, UA 1.010     pH, UA 6.5     Leukocytes, UA Negative     Nitrite, UA Negative     Protein, UA Negative mg/dl      Glucose, UA Negative mg/dl      Ketones, UA Negative mg/dl      Urobilinogen, UA <2.0 mg/dl      Bilirubin, UA Negative     Occult Blood, UA Negative    Comprehensive metabolic panel [[id number]]  (Abnormal) Collected: 09/27/24 1221    Lab Status: Final result Specimen: Blood from Arm, Left Updated: 09/27/24 1254     Sodium 135 mmol/L      Potassium 3.7 mmol/L      Chloride 104 mmol/L      CO2 25 mmol/L      ANION GAP 6 mmol/L      BUN 5 mg/dL      Creatinine 0.62 mg/dL      Glucose 74 mg/dL      Calcium 8.9 mg/dL      AST 12 U/L      ALT 9 U/L      Alkaline Phosphatase 54 U/L      Total Protein 6.3 g/dL      Albumin 4.2 g/dL      Total Bilirubin 0.26 mg/dL      eGFR 116 ml/min/1.73sq m     Narrative:      [employer] guidelines for Chronic Kidney Disease (CKD):     Stage 1 with normal or high GFR (GFR > 90  mL/min/1.73 square meters)    Stage 2 Mild CKD (GFR = 60-89 mL/min/1.73 square meters)    Stage 3A Moderate CKD (GFR = 45-59 mL/min/1.73 square meters)    Stage 3B Moderate CKD (GFR = 30-44 mL/min/1.73 square meters)    Stage 4 Severe CKD (GFR = 15-29 mL/min/1.73 square meters)    Stage 5 End Stage CKD (GFR <15 mL/min/1.73 square meters)  Note: GFR calculation is accurate only with a steady state creatinine    Lipase [[id number]]  (Normal) Collected: 09/27/24 1221    Lab Status: Final result Specimen: Blood from Arm, Left Updated: 09/27/24 1254     Lipase 27 u/L     CBC and differential [[id number]] Collected: 09/27/24 1221    Lab Status: Final result Specimen: Blood from Arm, Left Updated: 09/27/24 1241     WBC 8.79 Thousand/uL      RBC 4.38 Million/uL      Hemoglobin 14.0 g/dL      Hematocrit 41.6 %      MCV 95 fL      MCH 32.0 pg      MCHC 33.7 g/dL      RDW 12.6 %      MPV 10.0 fL      Platelets 196 Thousands/uL      nRBC 0 /100 WBCs      Segmented % 66 %      Immature Grans % 1 %      Lymphocytes % 23 %      Monocytes % 8 %      Eosinophils Relative 2 %      Basophils Relative 0 %      Absolute Neutrophils 5.77 Thousands/µL      Absolute Immature Grans 0.05 Thousand/uL      Absolute Lymphocytes 2.05 Thousands/µL      Absolute Monocytes 0.70 Thousand/µL      Eosinophils Absolute 0.19 Thousand/µL      Basophils Absolute 0.03 Thousands/µL             No orders to display       Procedures    ED Medication and Procedure Management   Prior to Admission Medications   Prescriptions Last Dose Informant Patient Reported? Taking?   Cholecalciferol (VITAMIN D3) 1,000 units tablet   No No   Sig: Take 2 tablets (2,000 Units total) by mouth daily   Patient not taking: Reported on 9/10/2024   OLANZapine (ZyPREXA) 20 MG tablet   No No   Sig: Take 1 tablet (20 mg total) by mouth daily at bedtime   OXcarbazepine (TRILEPTAL) 300 mg tablet   No No   Sig: Take 1 tablet (300 mg total) by mouth every 12 (twelve) hours   Ventolin   (90 Base) MCG/ACT inhaler   No No   Sig: Inhale 2 puffs every 4 (four) hours as needed for wheezing   budesonide-formoterol (SYMBICORT) 80-4.5 MCG/ACT inhaler   No No   Sig: Inhale 2 puffs 2 (two) times a day Rinse mouth after use.   dicyclomine (BENTYL) 20 mg tablet   No No   Sig: Take 1 tablet (20 mg total) by mouth every 6 (six) hours as needed (abdominal pain) for up to 10 doses   famotidine (PEPCID) 20 mg tablet   No No   Sig: Take 1 tablet (20 mg total) by mouth 2 (two) times a day as needed for heartburn for up to 10 doses   loratadine (CLARITIN) 10 mg tablet   No No   Sig: Take 1 tablet (10 mg total) by mouth daily   Patient not taking: Reported on 9/10/2024   magnesium Oxide (MAG-OX) 400 mg TABS   No No   Sig: Take 1 tablet (400 mg total) by mouth 2 (two) times a day   ondansetron (ZOFRAN-ODT) 4 mg disintegrating tablet   No No   Sig: Take 1 tablet (4 mg total) by mouth every 6 (six) hours as needed for nausea or vomiting   Patient not taking: Reported on 9/10/2024   sucralfate (CARAFATE) 1 g/10 mL suspension   No No   Sig: Take 10 mL (1 g total) by mouth 4 (four) times a day (with meals and at bedtime)   Patient not taking: Reported on 9/10/2024   tiZANidine (ZANAFLEX) 2 mg tablet   No No   Sig: Take 1 tablet (2 mg total) by mouth daily at bedtime   Patient not taking: Reported on 9/10/2024   vitamin B-12 (VITAMIN B-12) 1,000 mcg tablet   No No   Sig: Take 1 tablet (1,000 mcg total) by mouth daily   Patient not taking: Reported on 9/10/2024      Facility-Administered Medications: None     Discharge Medication List as of 9/27/2024  1:55 PM        START taking these medications    Details   metoclopramide (REGLAN) 5 mg tablet Take 1 tablet (5 mg total) by mouth as needed (Nausea), Starting Fri 9/27/2024, Normal      polyethylene glycol (GLYCOLAX) 17 GM/SCOOP powder Take 17 g by mouth daily, Starting Fri 9/27/2024, Normal      psyllium (METAMUCIL) 58.6 % powder Take 1 packet by mouth daily, Starting Fri  9/27/2024, Normal      senna-docusate sodium (SENOKOT S) 8.6-50 mg per tablet Take 1 tablet by mouth daily, Starting Fri 9/27/2024, Normal           CONTINUE these medications which have NOT CHANGED    Details   budesonide-formoterol (SYMBICORT) 80-4.5 MCG/ACT inhaler Inhale 2 puffs 2 (two) times a day Rinse mouth after use., Starting Fri 8/23/2024, Until Sun 9/22/2024, Normal      Cholecalciferol (VITAMIN D3) 1,000 units tablet Take 2 tablets (2,000 Units total) by mouth daily, Starting Fri 8/23/2024, Normal      dicyclomine (BENTYL) 20 mg tablet Take 1 tablet (20 mg total) by mouth every 6 (six) hours as needed (abdominal pain) for up to 10 doses, Starting Fri 8/23/2024, Normal      famotidine (PEPCID) 20 mg tablet Take 1 tablet (20 mg total) by mouth 2 (two) times a day as needed for heartburn for up to 10 doses, Starting Fri 8/23/2024, Normal      loratadine (CLARITIN) 10 mg tablet Take 1 tablet (10 mg total) by mouth daily, Starting Fri 8/23/2024, Until Sun 9/22/2024, Normal      magnesium Oxide (MAG-OX) 400 mg TABS Take 1 tablet (400 mg total) by mouth 2 (two) times a day, Starting Fri 8/23/2024, Until Tue 10/22/2024, Normal      OLANZapine (ZyPREXA) 20 MG tablet Take 1 tablet (20 mg total) by mouth daily at bedtime, Starting Wed 9/18/2024, Until Sun 11/17/2024, Normal      ondansetron (ZOFRAN-ODT) 4 mg disintegrating tablet Take 1 tablet (4 mg total) by mouth every 6 (six) hours as needed for nausea or vomiting, Starting Fri 8/23/2024, Normal      OXcarbazepine (TRILEPTAL) 300 mg tablet Take 1 tablet (300 mg total) by mouth every 12 (twelve) hours, Starting Wed 9/18/2024, Until Sun 11/17/2024, Normal      sucralfate (CARAFATE) 1 g/10 mL suspension Take 10 mL (1 g total) by mouth 4 (four) times a day (with meals and at bedtime), Starting Sat 9/7/2024, Normal      tiZANidine (ZANAFLEX) 2 mg tablet Take 1 tablet (2 mg total) by mouth daily at bedtime, Starting Fri 8/23/2024, Until Tue 10/22/2024, Normal       Ventolin  (90 Base) MCG/ACT inhaler Inhale 2 puffs every 4 (four) hours as needed for wheezing, Starting Fri 8/23/2024, Normal      vitamin B-12 (VITAMIN B-12) 1,000 mcg tablet Take 1 tablet (1,000 mcg total) by mouth daily, Starting Fri 8/23/2024, Until Tue 10/22/2024, Normal           No discharge procedures on file.  ED SEPSIS DOCUMENTATION   Time reflects when diagnosis was documented in both MDM as applicable and the Disposition within this note       Time User Action Codes Description Comment    9/27/2024  1:47 PM Maria Esther Benitez [R10.9] Abdominal pain     9/27/2024  1:51 PM Maria Esther Benitez [K59.01] Slow transit constipation                  Maria Esther Benitez MD  09/27/24 4760

## 2024-09-27 NOTE — DISCHARGE INSTRUCTIONS
Your labs today were all normal  When you are experiencing constipation, make sure you are taking fiber, miralax, and senna-docusate.   If you have nausea you can also take reglan. This also helps with bowel motility.  Make sure to follow up with GI as scheduled  Consider trying some probiotics or prebiotics, information was printed for you

## 2024-09-27 NOTE — ED ATTENDING ATTESTATION
9/27/2024  IRobert MD, saw and evaluated the patient. I have discussed the patient with the resident/non-physician practitioner and agree with the resident's/non-physician practitioner's findings, Plan of Care, and MDM as documented in the resident's/non-physician practitioner's note, except where noted. All available labs and Radiology studies were reviewed.  I was present for key portions of any procedure(s) performed by the resident/non-physician practitioner and I was immediately available to provide assistance.       At this point I agree with the current assessment done in the Emergency Department.  I have conducted an independent evaluation of this patient a history and physical is as follows:    ED Course  ED Course as of 09/27/24 1320   Fri Sep 27, 2024   1251 Per resident h&p 39 YO F presents for recurrent abdominal pain; nausea; constipation recently ; L and R abdomen and the epigastric; feels sweaty at night h/o C section tender R and L quadrants; mild cva tenderness I/P 10 CTAP throughout the this ; UA r/o uti; symptomatic Tx with zofran   Emergency Department Note- Antionette Downing 38 y.o. female MRN: [id number]    Unit/Bed#: [facility] Encounter: [id number]    Antionette Downing is a 38 y.o. female who presents with   Chief Complaint   Patient presents with    Abdominal Pain     Pt has c/o L sided abdominal pain for 3 days.  Pt states it happens alot         History of Present Illness   HPI:  Antionette Downing is a 38 y.o. female who presents for evaluation of:  Recurrent abdominal pain, nausea, and vomiting.  Patient has had pain like this intermittently over the last year.  Patient denies associated fevers and chills.  She has occasional diarrhea that is nonbloody.  Her vomitus has been mostly mucus, nonbloody and nonbilious.  Patient is a recent episode of diverticulitis.  Review of electronic medical record: Patient has had 10 CT scans of her abdomen and pelvis over the last  year.    Review of Systems   Constitutional:  Negative for fatigue and fever.   HENT:  Negative for congestion and sore throat.    Respiratory:  Negative for cough and shortness of breath.    Cardiovascular:  Negative for chest pain and palpitations.   Gastrointestinal:  Positive for abdominal pain, diarrhea, nausea and vomiting.   Genitourinary:  Negative for flank pain and frequency.   Neurological:  Negative for light-headedness and headaches.   Psychiatric/Behavioral:  Negative for dysphoric mood and hallucinations.    All other systems reviewed and are negative.      Historical Information   Past Medical History:   Diagnosis Date    Abnormal Pap smear of cervix     ADHD (attention deficit hyperactivity disorder)     Alcohol abuse     Ankle fracture     Anxiety     Arthritis     back    Asthma     Bipolar disorder ([facility])     Cellulitis     right side fac in     Chronic pain disorder     Depression     Diverticulitis     Flank pain 2016    Hallucination     Hepatitis C     Hip disease 2006    reports she had fluid removed from right hip and received treatment with antibiotic     History of abnormal cervical Pap smear     History of multiple miscarriages     IBS (irritable bowel syndrome)     Infantile idiopathic scoliosis     Joint pain     Kidney stone     Lactose intolerance     Low back pain     Myofascial pain syndrome     Peripheral neuropathy 2024    Poor dentition     Psychiatric illness     Psychosis ([facility])     PTSD (post-traumatic stress disorder)     Pyelonephritis affecting pregnancy     Right hand paresthesia     Right ovarian cyst     Schizoaffective disorder, bipolar type ([facility]) r/o bipolar with psychotic features 2024    Scoliosis     Seizures ([facility])     Self-injurious behavior     Sleep difficulties     Slow transit constipation     Substance abuse ([facility])     Suicide attempt ([facility])      Past Surgical History:   Procedure Laterality Date     SECTION  2016      "SECTION  2014    HIP SURGERY      ORTHOPEDIC SURGERY      NE  DELIVERY ONLY N/A 2016    Procedure:  SECTION () REPEAT;  Surgeon: Kurt Connor MD;  Location: [place] ;  Service: Obstetrics    NE LIG/TRNSXJ FLP TUBE ABDL/VAG APPR UNI/BI Bilateral 2016    Procedure: LIGATION/COAGULATION TUBAL;  Surgeon: Kurt Connor MD;  Location: [facility];  Service: Obstetrics     Social History   Social History     Substance and Sexual Activity   Alcohol Use Yes    Comment: Rare Use     Social History     Substance and Sexual Activity   Drug Use Not Currently    Types: Marijuana, Cocaine, \"Crack\" cocaine    Comment: on occasion      Social History     Tobacco Use   Smoking Status Every Day    Current packs/day: 0.25    Average packs/day: 0.3 packs/day for 15.0 years (3.8 ttl pk-yrs)    Types: Cigarettes    Passive exposure: Never   Smokeless Tobacco Never   Tobacco Comments    pt refused smoking cessation teaching.      Family History:   Family History   Problem Relation Age of Onset    Heart disease Maternal Aunt     Cancer Maternal Aunt     Diabetes Paternal Aunt     No Known Problems Mother     No Known Problems Father     No Known Problems Sister        Meds/Allergies   PTA meds:   Prior to Admission Medications   Prescriptions Last Dose Informant Patient Reported? Taking?   Cholecalciferol (VITAMIN D3) 1,000 units tablet   No No   Sig: Take 2 tablets (2,000 Units total) by mouth daily   Patient not taking: Reported on 9/10/2024   OLANZapine (ZyPREXA) 20 MG tablet   No No   Sig: Take 1 tablet (20 mg total) by mouth daily at bedtime   OXcarbazepine (TRILEPTAL) 300 mg tablet   No No   Sig: Take 1 tablet (300 mg total) by mouth every 12 (twelve) hours   Ventolin  (90 Base) MCG/ACT inhaler   No No   Sig: Inhale 2 puffs every 4 (four) hours as needed for wheezing   budesonide-formoterol (SYMBICORT) 80-4.5 MCG/ACT inhaler   No No   Sig: Inhale 2 puffs 2 (two) times a day " Rinse mouth after use.   dicyclomine (BENTYL) 20 mg tablet   No No   Sig: Take 1 tablet (20 mg total) by mouth every 6 (six) hours as needed (abdominal pain) for up to 10 doses   famotidine (PEPCID) 20 mg tablet   No No   Sig: Take 1 tablet (20 mg total) by mouth 2 (two) times a day as needed for heartburn for up to 10 doses   loratadine (CLARITIN) 10 mg tablet   No No   Sig: Take 1 tablet (10 mg total) by mouth daily   Patient not taking: Reported on 9/10/2024   magnesium Oxide (MAG-OX) 400 mg TABS   No No   Sig: Take 1 tablet (400 mg total) by mouth 2 (two) times a day   ondansetron (ZOFRAN-ODT) 4 mg disintegrating tablet   No No   Sig: Take 1 tablet (4 mg total) by mouth every 6 (six) hours as needed for nausea or vomiting   Patient not taking: Reported on 9/10/2024   sucralfate (CARAFATE) 1 g/10 mL suspension   No No   Sig: Take 10 mL (1 g total) by mouth 4 (four) times a day (with meals and at bedtime)   Patient not taking: Reported on 9/10/2024   tiZANidine (ZANAFLEX) 2 mg tablet   No No   Sig: Take 1 tablet (2 mg total) by mouth daily at bedtime   Patient not taking: Reported on 9/10/2024   vitamin B-12 (VITAMIN B-12) 1,000 mcg tablet   No No   Sig: Take 1 tablet (1,000 mcg total) by mouth daily   Patient not taking: Reported on 9/10/2024      Facility-Administered Medications: None     Allergies   Allergen Reactions    Aspirin      Pt given enteric coated 81 mg, when ask pt denies any allergy, listed under allergies on paperwork provided by berny Hernández - Food Allergy Itching    Naproxen     Toradol [Ketorolac Tromethamine] GI Bleeding    Azithromycin Rash    Latex Hives and Rash    Neurontin [Gabapentin] Rash and GI Bleeding    Ppd [Tuberculin Purified Protein Derivative] Rash       Objective   First Vitals:   Blood Pressure: 150/76 (09/27/24 1143)  Pulse: 102 (09/27/24 1143)  Temperature: 98.2 °F (36.8 °C) (09/27/24 1143)  Temp Source: Temporal (09/27/24 1143)  Respirations: 18 (09/27/24 1143)  SpO2:  99 % (24 1143)    Current Vitals:   Blood Pressure: 120/76 (24 1215)  Pulse: 81 (24 1315)  Temperature: 98.2 °F (36.8 °C) (24 1143)  Temp Source: Temporal (24 1143)  Respirations: 16 (24 1315)  SpO2: 99 % (24 1315)    No intake or output data in the 24 hours ending 24 1321    Invasive Devices       None                   Physical Exam  Vitals and nursing note reviewed.   Constitutional:       General: She is not in acute distress.     Appearance: Normal appearance. She is well-developed.   HENT:      Head: Normocephalic and atraumatic.      Right Ear: External ear normal.      Left Ear: External ear normal.      Nose: Nose normal.      Mouth/Throat:      Pharynx: No oropharyngeal exudate.   Eyes:      Conjunctiva/sclera: Conjunctivae normal.      Pupils: Pupils are equal, round, and reactive to light.   Cardiovascular:      Rate and Rhythm: Normal rate and regular rhythm.   Pulmonary:      Effort: Pulmonary effort is normal. No respiratory distress.   Abdominal:      General: Abdomen is flat. There is no distension.      Palpations: Abdomen is soft.   Musculoskeletal:         General: No deformity. Normal range of motion.      Cervical back: Normal range of motion and neck supple.   Skin:     General: Skin is warm and dry.      Capillary Refill: Capillary refill takes less than 2 seconds.   Neurological:      General: No focal deficit present.      Mental Status: She is alert and oriented to person, place, and time. Mental status is at baseline.      Coordination: Coordination normal.   Psychiatric:         Mood and Affect: Mood normal.         Behavior: Behavior normal.         Thought Content: Thought content normal.         Judgment: Judgment normal.           Medical Decision Makin.  Recurrent abdominal pain, nausea, vomiting, and diarrhea: Symptomatic therapy with antiemetics to treat nausea and vomiting and loperamide to acute diarrhea.  Recent Results  (from the past 36 hour(s))   ECG 12 lead    Collection Time: 09/27/24 11:42 AM   Result Value Ref Range    Ventricular Rate 100 BPM    Atrial Rate 100 BPM    GA Interval 126 ms    QRSD Interval 90 ms    QT Interval 328 ms    QTC Interval 423 ms    P [place] 55 degrees    QRS Axis 91 degrees    T Wave Axis 21 degrees   CBC and differential    Collection Time: 09/27/24 12:21 PM   Result Value Ref Range    WBC 8.79 4.31 - 10.16 Thousand/uL    RBC 4.38 3.81 - 5.12 Million/uL    Hemoglobin 14.0 11.5 - 15.4 g/dL    Hematocrit 41.6 34.8 - 46.1 %    MCV 95 82 - 98 fL    MCH 32.0 26.8 - 34.3 pg    MCHC 33.7 31.4 - 37.4 g/dL    RDW 12.6 11.6 - 15.1 %    MPV 10.0 8.9 - 12.7 fL    Platelets 196 149 - 390 Thousands/uL    nRBC 0 /100 WBCs    Segmented % 66 43 - 75 %    Immature Grans % 1 0 - 2 %    Lymphocytes % 23 14 - 44 %    Monocytes % 8 4 - 12 %    Eosinophils Relative 2 0 - 6 %    Basophils Relative 0 0 - 1 %    Absolute Neutrophils 5.77 1.85 - 7.62 Thousands/µL    Absolute Immature Grans 0.05 0.00 - 0.20 Thousand/uL    Absolute Lymphocytes 2.05 0.60 - 4.47 Thousands/µL    Absolute Monocytes 0.70 0.17 - 1.22 Thousand/µL    Eosinophils Absolute 0.19 0.00 - 0.61 Thousand/µL    Basophils Absolute 0.03 0.00 - 0.10 Thousands/µL   Comprehensive metabolic panel    Collection Time: 09/27/24 12:21 PM   Result Value Ref Range    Sodium 135 135 - 147 mmol/L    Potassium 3.7 3.5 - 5.3 mmol/L    Chloride 104 96 - 108 mmol/L    CO2 25 21 - 32 mmol/L    ANION GAP 6 4 - 13 mmol/L    BUN 5 5 - 25 mg/dL    Creatinine 0.62 0.60 - 1.30 mg/dL    Glucose 74 65 - 140 mg/dL    Calcium 8.9 8.4 - 10.2 mg/dL    AST 12 (L) 13 - 39 U/L    ALT 9 7 - 52 U/L    Alkaline Phosphatase 54 34 - 104 U/L    Total Protein 6.3 (L) 6.4 - 8.4 g/dL    Albumin 4.2 3.5 - 5.0 g/dL    Total Bilirubin 0.26 0.20 - 1.00 mg/dL    eGFR 116 ml/min/1.73sq m   Lipase    Collection Time: 09/27/24 12:21 PM   Result Value Ref Range    Lipase 27 11 - 82 u/L   UA w Reflex to Microscopic  "w Reflex to Culture    Collection Time: 09/27/24 12:49 PM    Specimen: Urine, Clean Catch   Result Value Ref Range    Color, UA Light Yellow     Clarity, UA Clear     Specific Gravity, UA 1.010 1.003 - 1.030    pH, UA 6.5 4.5, 5.0, 5.5, 6.0, 6.5, 7.0, 7.5, 8.0    Leukocytes, UA Negative Negative    Nitrite, UA Negative Negative    Protein, UA Negative Negative mg/dl    Glucose, UA Negative Negative mg/dl    Ketones, UA Negative Negative mg/dl    Urobilinogen, UA <2.0 <2.0 mg/dl mg/dl    Bilirubin, UA Negative Negative    Occult Blood, UA Negative Negative     No orders to display         Portions of the record may have been created with voice recognition software. Occasional wrong word or \"sound a like\" substitutions may have occurred due to the inherent limitations of voice recognition software.  Read the chart carefully and recognize, using context, where substitutions have occurred.          Critical Care Time  Procedures      "

## 2024-09-28 LAB
ATRIAL RATE: 100 BPM
P AXIS: 55 DEGREES
PR INTERVAL: 126 MS
QRS AXIS: 91 DEGREES
QRSD INTERVAL: 90 MS
QT INTERVAL: 328 MS
QTC INTERVAL: 423 MS
T WAVE AXIS: 21 DEGREES
VENTRICULAR RATE: 100 BPM

## 2024-09-28 PROCEDURE — 93010 ELECTROCARDIOGRAM REPORT: CPT | Performed by: INTERNAL MEDICINE

## 2024-10-07 ENCOUNTER — OFFICE VISIT (OUTPATIENT)
Dept: NEUROLOGY | Facility: CLINIC | Age: 39
End: 2024-10-07
Payer: COMMERCIAL

## 2024-10-07 VITALS
TEMPERATURE: 98.3 F | HEART RATE: 102 BPM | BODY MASS INDEX: 25.44 KG/M2 | OXYGEN SATURATION: 98 % | DIASTOLIC BLOOD PRESSURE: 70 MMHG | SYSTOLIC BLOOD PRESSURE: 126 MMHG | WEIGHT: 139.1 LBS

## 2024-10-07 DIAGNOSIS — G62.9 PERIPHERAL NEUROPATHY: ICD-10-CM

## 2024-10-07 PROCEDURE — 99214 OFFICE O/P EST MOD 30 MIN: CPT | Performed by: STUDENT IN AN ORGANIZED HEALTH CARE EDUCATION/TRAINING PROGRAM

## 2024-10-07 RX ORDER — ALBUTEROL SULFATE 1.25 MG/3ML
1.25 SOLUTION RESPIRATORY (INHALATION) EVERY 6 HOURS PRN
COMMUNITY

## 2024-10-07 RX ORDER — LANOLIN ALCOHOL/MO/W.PET/CERES
1000 CREAM (GRAM) TOPICAL DAILY
Qty: 90 TABLET | Refills: 1 | Status: SHIPPED | OUTPATIENT
Start: 2024-10-07 | End: 2025-04-05
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
## 2024-10-28 ENCOUNTER — OFFICE VISIT (OUTPATIENT)
Dept: FAMILY MEDICINE CLINIC | Facility: CLINIC | Age: 39
End: 2024-10-28

## 2024-10-28 VITALS
WEIGHT: 144 LBS | HEART RATE: 105 BPM | BODY MASS INDEX: 26.5 KG/M2 | DIASTOLIC BLOOD PRESSURE: 70 MMHG | TEMPERATURE: 98.6 F | SYSTOLIC BLOOD PRESSURE: 118 MMHG | RESPIRATION RATE: 18 BRPM | OXYGEN SATURATION: 97 % | HEIGHT: 62 IN

## 2024-10-28 DIAGNOSIS — Z23 ENCOUNTER FOR IMMUNIZATION: ICD-10-CM

## 2024-10-28 DIAGNOSIS — Z00.00 ANNUAL PHYSICAL EXAM: Primary | ICD-10-CM

## 2024-10-28 DIAGNOSIS — Z76.0 MEDICATION REFILL: ICD-10-CM

## 2024-10-28 RX ORDER — FAMOTIDINE 20 MG/1
20 TABLET, FILM COATED ORAL 2 TIMES DAILY PRN
Qty: 10 TABLET | Refills: 0 | Status: SHIPPED | OUTPATIENT
Start: 2024-10-28

## 2024-10-28 RX ORDER — LORATADINE 10 MG/1
10 TABLET ORAL DAILY
Qty: 30 TABLET | Refills: 0 | Status: SHIPPED | OUTPATIENT
Start: 2024-10-28 | End: 2024-11-27
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
## 2024-10-28 NOTE — ASSESSMENT & PLAN NOTE
Orders:    Cholecalciferol (VITAMIN D3) 1,000 units tablet; Take 1 tablet (1,000 Units total) by mouth daily

## 2024-10-30 ENCOUNTER — TELEPHONE (OUTPATIENT)
Dept: FAMILY MEDICINE CLINIC | Facility: CLINIC | Age: 39
End: 2024-10-30

## 2024-10-30 NOTE — TELEPHONE ENCOUNTER
Pt left a voice mail in the nurse line stating that she was heaving problems picking up her prescriptions. I called back and left a voice mail.

## 2024-11-01 ENCOUNTER — HOSPITAL ENCOUNTER (EMERGENCY)
Facility: HOSPITAL | Age: 39
End: 2024-11-02
Attending: EMERGENCY MEDICINE
Payer: COMMERCIAL

## 2024-11-01 DIAGNOSIS — F31.10: Primary | ICD-10-CM

## 2024-11-01 PROCEDURE — 99283 EMERGENCY DEPT VISIT LOW MDM: CPT

## 2024-11-01 PROCEDURE — 96372 THER/PROPH/DIAG INJ SC/IM: CPT

## 2024-11-01 PROCEDURE — 99285 EMERGENCY DEPT VISIT HI MDM: CPT | Performed by: EMERGENCY MEDICINE

## 2024-11-01 PROCEDURE — 82075 ASSAY OF BREATH ETHANOL: CPT | Performed by: EMERGENCY MEDICINE

## 2024-11-01 RX ORDER — PANTOPRAZOLE SODIUM 40 MG/1
40 TABLET, DELAYED RELEASE ORAL DAILY
COMMUNITY
Start: 2024-10-17

## 2024-11-01 RX ORDER — LORAZEPAM 2 MG/ML
2 INJECTION INTRAMUSCULAR ONCE
Status: COMPLETED | OUTPATIENT
Start: 2024-11-01 | End: 2024-11-01

## 2024-11-01 RX ORDER — BENZTROPINE MESYLATE 1 MG/ML
INJECTION, SOLUTION INTRAMUSCULAR; INTRAVENOUS
Status: COMPLETED
Start: 2024-11-01 | End: 2024-11-02

## 2024-11-01 RX ORDER — HYDROXYZINE HYDROCHLORIDE 50 MG/1
50 TABLET, FILM COATED ORAL 3 TIMES DAILY
COMMUNITY
Start: 2024-10-22

## 2024-11-01 RX ORDER — LORAZEPAM 1 MG/1
2 TABLET ORAL ONCE
Status: DISCONTINUED | OUTPATIENT
Start: 2024-11-01 | End: 2024-11-01

## 2024-11-01 RX ORDER — BENZTROPINE MESYLATE 1 MG/ML
2 INJECTION, SOLUTION INTRAMUSCULAR; INTRAVENOUS ONCE
Status: COMPLETED | OUTPATIENT
Start: 2024-11-01 | End: 2024-11-02

## 2024-11-01 RX ORDER — HALOPERIDOL 5 MG/ML
5 INJECTION INTRAMUSCULAR ONCE
Status: COMPLETED | OUTPATIENT
Start: 2024-11-01 | End: 2024-11-02

## 2024-11-01 RX ORDER — HALOPERIDOL 5 MG/ML
INJECTION INTRAMUSCULAR
Status: COMPLETED
Start: 2024-11-01 | End: 2024-11-02

## 2024-11-01 RX ORDER — RISPERIDONE 1 MG/1
2 TABLET, ORALLY DISINTEGRATING ORAL ONCE
Status: DISCONTINUED | OUTPATIENT
Start: 2024-11-01 | End: 2024-11-01

## 2024-11-01 RX ORDER — LORAZEPAM 2 MG/ML
INJECTION INTRAMUSCULAR
Status: COMPLETED
Start: 2024-11-01 | End: 2024-11-01

## 2024-11-01 RX ADMIN — LORAZEPAM 2 MG: 2 INJECTION INTRAMUSCULAR; INTRAVENOUS at 23:58

## 2024-11-01 RX ADMIN — LORAZEPAM 2 MG: 2 INJECTION INTRAMUSCULAR at 23:58

## 2024-11-02 VITALS
DIASTOLIC BLOOD PRESSURE: 65 MMHG | HEART RATE: 91 BPM | WEIGHT: 142 LBS | TEMPERATURE: 98.8 F | SYSTOLIC BLOOD PRESSURE: 105 MMHG | OXYGEN SATURATION: 95 % | BODY MASS INDEX: 25.97 KG/M2 | RESPIRATION RATE: 18 BRPM

## 2024-11-02 PROBLEM — F29 PSYCHOSIS (HCC): Status: ACTIVE | Noted: 2024-11-02

## 2024-11-02 LAB
ALBUMIN SERPL BCG-MCNC: 4.3 G/DL (ref 3.5–5)
ALP SERPL-CCNC: 60 U/L (ref 34–104)
ALT SERPL W P-5'-P-CCNC: 8 U/L (ref 7–52)
AMPHETAMINES SERPL QL SCN: NEGATIVE
ANION GAP SERPL CALCULATED.3IONS-SCNC: 8 MMOL/L (ref 4–13)
AST SERPL W P-5'-P-CCNC: 19 U/L (ref 13–39)
ATRIAL RATE: 86 BPM
BARBITURATES UR QL: NEGATIVE
BASOPHILS # BLD AUTO: 0.04 THOUSANDS/ΜL (ref 0–0.1)
BASOPHILS NFR BLD AUTO: 1 % (ref 0–1)
BENZODIAZ UR QL: NEGATIVE
BILIRUB SERPL-MCNC: 0.47 MG/DL (ref 0.2–1)
BILIRUB UR QL STRIP: NEGATIVE
BUN SERPL-MCNC: 5 MG/DL (ref 5–25)
CALCIUM SERPL-MCNC: 9.2 MG/DL (ref 8.4–10.2)
CHLORIDE SERPL-SCNC: 104 MMOL/L (ref 96–108)
CLARITY UR: CLEAR
CO2 SERPL-SCNC: 24 MMOL/L (ref 21–32)
COCAINE UR QL: NEGATIVE
COLOR UR: NORMAL
CREAT SERPL-MCNC: 0.72 MG/DL (ref 0.6–1.3)
EOSINOPHIL # BLD AUTO: 0.19 THOUSAND/ΜL (ref 0–0.61)
EOSINOPHIL NFR BLD AUTO: 2 % (ref 0–6)
ERYTHROCYTE [DISTWIDTH] IN BLOOD BY AUTOMATED COUNT: 12 % (ref 11.6–15.1)
ETHANOL EXG-MCNC: NORMAL MG/DL
ETHANOL SERPL-MCNC: <10 MG/DL
FENTANYL UR QL SCN: NEGATIVE
GFR SERPL CREATININE-BSD FRML MDRD: 108 ML/MIN/1.73SQ M
GLUCOSE SERPL-MCNC: 92 MG/DL (ref 65–140)
GLUCOSE UR STRIP-MCNC: NEGATIVE MG/DL
HCT VFR BLD AUTO: 42 % (ref 34.8–46.1)
HGB BLD-MCNC: 15.3 G/DL (ref 11.5–15.4)
HGB UR QL STRIP.AUTO: NEGATIVE
HYDROCODONE UR QL SCN: NEGATIVE
IMM GRANULOCYTES # BLD AUTO: 0.03 THOUSAND/UL (ref 0–0.2)
IMM GRANULOCYTES NFR BLD AUTO: 0 % (ref 0–2)
KETONES UR STRIP-MCNC: NEGATIVE MG/DL
LEUKOCYTE ESTERASE UR QL STRIP: NEGATIVE
LYMPHOCYTES # BLD AUTO: 2.7 THOUSANDS/ΜL (ref 0.6–4.47)
LYMPHOCYTES NFR BLD AUTO: 30 % (ref 14–44)
MCH RBC QN AUTO: 32.6 PG (ref 26.8–34.3)
MCHC RBC AUTO-ENTMCNC: 36.4 G/DL (ref 31.4–37.4)
MCV RBC AUTO: 89 FL (ref 82–98)
METHADONE UR QL: NEGATIVE
MONOCYTES # BLD AUTO: 0.81 THOUSAND/ΜL (ref 0.17–1.22)
MONOCYTES NFR BLD AUTO: 9 % (ref 4–12)
NEUTROPHILS # BLD AUTO: 5.11 THOUSANDS/ΜL (ref 1.85–7.62)
NEUTS SEG NFR BLD AUTO: 58 % (ref 43–75)
NITRITE UR QL STRIP: NEGATIVE
NRBC BLD AUTO-RTO: 0 /100 WBCS
OPIATES UR QL SCN: NEGATIVE
OXYCODONE+OXYMORPHONE UR QL SCN: NEGATIVE
P AXIS: 46 DEGREES
PCP UR QL: NEGATIVE
PH UR STRIP.AUTO: 7 [PH]
PLATELET # BLD AUTO: 231 THOUSANDS/UL (ref 149–390)
PMV BLD AUTO: 9.7 FL (ref 8.9–12.7)
POTASSIUM SERPL-SCNC: 3.9 MMOL/L (ref 3.5–5.3)
PR INTERVAL: 130 MS
PROT SERPL-MCNC: 6.9 G/DL (ref 6.4–8.4)
PROT UR STRIP-MCNC: NEGATIVE MG/DL
QRS AXIS: 89 DEGREES
QRSD INTERVAL: 84 MS
QT INTERVAL: 378 MS
QTC INTERVAL: 452 MS
RBC # BLD AUTO: 4.7 MILLION/UL (ref 3.81–5.12)
SODIUM SERPL-SCNC: 136 MMOL/L (ref 135–147)
SP GR UR STRIP.AUTO: 1 (ref 1–1.04)
T WAVE AXIS: 58 DEGREES
THC UR QL: NEGATIVE
TSH SERPL DL<=0.05 MIU/L-ACNC: 1.44 UIU/ML (ref 0.45–4.5)
UROBILINOGEN UA: NEGATIVE MG/DL
VENTRICULAR RATE: 86 BPM
WBC # BLD AUTO: 8.88 THOUSAND/UL (ref 4.31–10.16)

## 2024-11-02 PROCEDURE — 84443 ASSAY THYROID STIM HORMONE: CPT

## 2024-11-02 PROCEDURE — 85025 COMPLETE CBC W/AUTO DIFF WBC: CPT

## 2024-11-02 PROCEDURE — 93005 ELECTROCARDIOGRAM TRACING: CPT

## 2024-11-02 PROCEDURE — 96372 THER/PROPH/DIAG INJ SC/IM: CPT

## 2024-11-02 PROCEDURE — 80053 COMPREHEN METABOLIC PANEL: CPT

## 2024-11-02 PROCEDURE — 36415 COLL VENOUS BLD VENIPUNCTURE: CPT | Performed by: EMERGENCY MEDICINE

## 2024-11-02 PROCEDURE — 93010 ELECTROCARDIOGRAM REPORT: CPT | Performed by: INTERNAL MEDICINE

## 2024-11-02 PROCEDURE — 82077 ASSAY SPEC XCP UR&BREATH IA: CPT | Performed by: EMERGENCY MEDICINE

## 2024-11-02 PROCEDURE — 80307 DRUG TEST PRSMV CHEM ANLYZR: CPT | Performed by: EMERGENCY MEDICINE

## 2024-11-02 PROCEDURE — 99204 OFFICE O/P NEW MOD 45 MIN: CPT | Performed by: STUDENT IN AN ORGANIZED HEALTH CARE EDUCATION/TRAINING PROGRAM

## 2024-11-02 RX ORDER — DICYCLOMINE HCL 20 MG
20 TABLET ORAL ONCE
Status: COMPLETED | OUTPATIENT
Start: 2024-11-02 | End: 2024-11-02

## 2024-11-02 RX ORDER — MAGNESIUM HYDROXIDE/ALUMINUM HYDROXICE/SIMETHICONE 120; 1200; 1200 MG/30ML; MG/30ML; MG/30ML
30 SUSPENSION ORAL ONCE
Status: DISCONTINUED | OUTPATIENT
Start: 2024-11-02 | End: 2024-11-02

## 2024-11-02 RX ORDER — FAMOTIDINE 20 MG/1
20 TABLET, FILM COATED ORAL ONCE
Status: COMPLETED | OUTPATIENT
Start: 2024-11-02 | End: 2024-11-02

## 2024-11-02 RX ORDER — OLANZAPINE 10 MG/1
10 TABLET ORAL
Status: DISCONTINUED | OUTPATIENT
Start: 2024-11-02 | End: 2024-11-02 | Stop reason: HOSPADM

## 2024-11-02 RX ADMIN — FAMOTIDINE 20 MG: 20 TABLET, FILM COATED ORAL at 13:17

## 2024-11-02 RX ADMIN — BENZTROPINE MESYLATE 2 MG: 1 INJECTION INTRAMUSCULAR; INTRAVENOUS at 00:02

## 2024-11-02 RX ADMIN — HALOPERIDOL 5 MG: 5 INJECTION INTRAMUSCULAR at 00:01

## 2024-11-02 RX ADMIN — DICYCLOMINE HYDROCHLORIDE 20 MG: 20 TABLET ORAL at 13:20

## 2024-11-02 RX ADMIN — BENZTROPINE MESYLATE 2 MG: 1 INJECTION, SOLUTION INTRAMUSCULAR; INTRAVENOUS at 00:02

## 2024-11-02 RX ADMIN — HALOPERIDOL LACTATE 5 MG: 5 INJECTION, SOLUTION INTRAMUSCULAR at 00:01

## 2024-11-02 NOTE — ED CARE HANDOFF
Emergency Department Sign Out Note        Sign out and transfer of care from Dr Wolff. See Separate Emergency Department note.     The patient, Antionette Downing, was evaluated by the previous provider for 201.    Workup Completed:  As completed    ED Course / Workup Pending (followup):  E[place] filled out await transport                                     Procedures  Medical Decision Making  Amount and/or Complexity of Data Reviewed  Labs: ordered.    Risk  Prescription drug management.  Decision regarding hospitalization.            Disposition  Final diagnoses:   Bipolar depression manic phase (HCC)     Time reflects when diagnosis was documented in both MDM as applicable and the Disposition within this note       Time User Action Codes Description Comment    11/1/2024 11:34 PM Pee Campbell Add [F31.10] Bipolar depression manic phase (HCC)           ED Disposition       ED Disposition   Transfer to Behavioral Health Condition   --    Date/Time   Fri Nov 1, 2024 11:33 PM    Comment   Antionette Downing should be transferred out to D and has been medically cleared.               MD Documentation      Flowsheet Row Most Recent Value   Patient Condition The patient has been stabilized such that within reasonable medical probability, no material deterioration of the patient condition or the condition of the unborn child(sin) is likely to result from the transfer   Reason for Transfer Level of Care needed not available at this facility   Benefits of Transfer Specialized equipment and/or services available at the receiving facility (Include comment)________________________  [[facility]]   Risks of Transfer Potential for delay in receiving treatment   Accepting Facility Name, [facility]   Sending MD Wolff   Provider Certification General risk, such as traffic hazards, adverse weather conditions, rough terrain or turbulence, possible failure of equipment (including vehicle or aircraft), or  consequences of actions of persons outside the control of the transport personnel          RN Documentation      Flowsheet Row Most Recent Value   Accepting Facility Name, City & State  [place]          Follow-up Information    None       Patient's Medications   Discharge Prescriptions    No medications on file     No discharge procedures on file.       ED Provider  Electronically Signed by     Bladimir Ramos MD  11/02/24 2146

## 2024-11-02 NOTE — ED NOTES
Insurance Authorization for admission:   Phone call placed to [facility]  Phone number: [phone]     Spoke to José B     4 days approved, LCD 11/5.  Level of care: [facility].  Review on 11/5.   Authorization # To be issued upon arrival.

## 2024-11-02 NOTE — ED NOTES
Presently patient is resting quietly in bed, cooperative with care measures. Soft spoken now. Virtual observation in place. Lights dimmed, warm blankets and other comfort measures completed.     Laura Faulkner RN  11/02/24 0041
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
## 2024-11-02 NOTE — ED NOTES
Patient continues to sleep; respirations are even and non-labored; Virtual monitoring continues at bedside     Bethanie Roa RN  11/02/24 0949       Bethanie Roa RN  11/02/24 0929

## 2024-11-02 NOTE — ED NOTES
Recipient:    Name: MUKESH GRACIA   Recipient ID: [id number]   YOB: 1985   Gender: Female           Eligibility Summary:    Type Name   Medicaid Category: MHX  Program Status: 00  Service Program: [facility] Based Funding Only - Non-Medic   [id number]   Managed Care [facility]

## 2024-11-02 NOTE — ED NOTES
Current treatment plan: hold patient for overnight observation and re-assess in the morning when she can appropriately engage with crisis/psychiatry. Psychiatry consult placed for further assessment of patient's mental status and disposition recommendations.     The patient's , Ap Downing ([phone]), is willing to petition for a 302 if inpatient is recommended and the patient refuses to sign herself in. Again, the [facility] does not have a petition started at this time.
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
## 2024-11-02 NOTE — ED NOTES
Spoke with , Maxwell to inform ED crisis will be here shortly if he would like to wait in our waiting room. He stated he would rather go home and rest and provided phone number to contact him when needed. Number: [phone]       Bon Dale RN  11/01/24 6312
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
## 2024-11-02 NOTE — ED NOTES
Presently sleeping with easy non labored respirations after having received sedation earlier. Did not awaken patient at this time, allowing patient to rest. Will reassess vs and BH when awake. No apparent discomfort or distress. Virtual monitoring in place.     Laura Faulkner RN  11/02/24 6790

## 2024-11-02 NOTE — ED NOTES
Report received from previous nurse; patient is currently sleeping; respirations are even and non-labored     Bethanie Roa RN  11/02/24 0739
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
## 2024-11-02 NOTE — ED NOTES
"I woke patient up to do assessment; patient does not want to talk right now; When asked if there is anything she needs she replies, \"No.\" Patient rolls over to continue sleeping; Virtual monitoring continues at bedside     Bethanie Roa RN  11/02/24 1525    "

## 2024-11-02 NOTE — ED NOTES
Patient is accepted at [facility].  Patient is accepted by Taran Delgado MD     Transportation is arranged with:  Transportation is scheduled for **.   There are not time constraints on arrival per Cindy ALCANTARA    Nurse report is not required.

## 2024-11-02 NOTE — EMTALA/ACUTE CARE TRANSFER
[facility] EMERGENCY DEPARTMENT  [address] [address]  [phone]  Dept: [phone]      EMTALA TRANSFER CONSENT    NAME Antionette PADILLA 1985                              MRN [id number]    I have been informed of my rights regarding examination, treatment, and transfer   by Dr. Singh Wolff MD    Benefits: Specialized equipment and/or services available at the receiving facility (Include comment)________________________ ([facility])    Risks: Potential for delay in receiving treatment      Consent for Transfer:  I acknowledge that my medical condition has been evaluated and explained to me by the emergency department physician or other qualified medical person and/or my attending physician, who has recommended that I be transferred to the service of    at Accepting Facility Name, City & State : [place]. The above potential benefits of such transfer, the potential risks associated with such transfer, and the probable risks of not being transferred have been explained to me, and I fully understand them.  The doctor has explained that, in my case, the benefits of transfer outweigh the risks.  I agree to be transferred.    I authorize the performance of emergency medical procedures and treatments upon me in both transit and upon arrival at the receiving facility.  Additionally, I authorize the release of any and all medical records to the receiving facility and request they be transported with me, if possible.  I understand that the safest mode of transportation during a medical emergency is an ambulance and that the Hospital advocates the use of this mode of transport. Risks of traveling to the receiving facility by car, including absence of medical control, life sustaining equipment, such as oxygen, and medical personnel has been explained to me and I fully understand them.    (JEMIMA CORRECT BOX BELOW)  [  ]  I consent to  the stated transfer and to be transported by ambulance/helicopter.  [  ]  I consent to the stated transfer, but refuse transportation by ambulance and accept full responsibility for my transportation by car.  I understand the risks of non-ambulance transfers and I exonerate the Hospital and its staff from any deterioration in my condition that results from this refusal.    X___________________________________________    DATE  24  TIME________  Signature of patient or legally responsible individual signing on patient behalf           RELATIONSHIP TO PATIENT_________________________          Provider Certification    NAME Antionette Downing                                         1985                              MRN [id number]    A medical screening exam was performed on the above named patient.  Based on the examination:    Condition Necessitating Transfer The encounter diagnosis was Bipolar depression manic phase (HCC).    Patient Condition: The patient has been stabilized such that within reasonable medical probability, no material deterioration of the patient condition or the condition of the unborn child(sin) is likely to result from the transfer    Reason for Transfer: Level of Care needed not available at this facility    Transfer Requirements: Facility [place]   Space available and qualified personnel available for treatment as acknowledged by    Agreed to accept transfer and to provide appropriate medical treatment as acknowledged by          Appropriate medical records of the examination and treatment of the patient are provided at the time of transfer   STAFF INITIAL WHEN COMPLETED _______  Transfer will be performed by qualified personnel from    and appropriate transfer equipment as required, including the use of necessary and appropriate life support measures.    Provider Certification: I have examined the patient and explained the following risks and benefits of being  transferred/refusing transfer to the patient/family:  General risk, such as traffic hazards, adverse weather conditions, rough terrain or turbulence, possible failure of equipment (including vehicle or aircraft), or consequences of actions of persons outside the control of the transport personnel      Based on these reasonable risks and benefits to the patient and/or the unborn child(sin), and based upon the information available at the time of the patient’s examination, I certify that the medical benefits reasonably to be expected from the provision of appropriate medical treatments at another medical facility outweigh the increasing risks, if any, to the individual’s medical condition, and in the case of labor to the unborn child, from effecting the transfer.    X____________________________________________ DATE 11/02/24        TIME_______      ORIGINAL - SEND TO MEDICAL RECORDS   COPY - SEND WITH PATIENT DURING TRANSFER
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
## 2024-11-02 NOTE — ED NOTES
Pt provided with sandwich, apple juice,cookies and chips @ bedside.  Pt is currently resting in bed. Urine specimen is still needed @ pt unable to void at this time.     Adrian Richardson  11/02/24 0015

## 2024-11-02 NOTE — ED NOTES
Patient is accepted at [facility].  Patient is accepted by Taran Delgado MD      Transportation is arranged [facility]  Transportation is scheduled for 11/02/2024 @1815  There are not time constraints on arrival per Cindy ROCHA.     Nurse report is not required.        Yobani Gray  Crisis Intervention Specialist II

## 2024-11-11 ENCOUNTER — OFFICE VISIT (OUTPATIENT)
Dept: FAMILY MEDICINE CLINIC | Facility: CLINIC | Age: 39
End: 2024-11-11

## 2024-11-11 VITALS
HEART RATE: 102 BPM | BODY MASS INDEX: 25.95 KG/M2 | TEMPERATURE: 98.9 F | OXYGEN SATURATION: 98 % | HEIGHT: 62 IN | DIASTOLIC BLOOD PRESSURE: 70 MMHG | WEIGHT: 141 LBS | SYSTOLIC BLOOD PRESSURE: 112 MMHG | RESPIRATION RATE: 18 BRPM

## 2024-11-11 DIAGNOSIS — N64.4 BREAST PAIN: Primary | ICD-10-CM

## 2024-11-11 PROCEDURE — 99212 OFFICE O/P EST SF 10 MIN: CPT | Performed by: FAMILY MEDICINE

## 2024-11-11 NOTE — PROGRESS NOTES
"Ambulatory Visit  Name: Antionette Downing      : 1985      MRN: [id number]  Encounter Provider: Shea Simmons MD  Encounter Date: 2024   Encounter department: [facility]    Assessment & Plan  Breast pain  < 1 cm tender mobile nodule at 4 O'clock on left areola  No signs that would indicate a active infectious process     Plan:  Mammogram  Patient advised to monitor pain around her period  Follow up once results are back  Advised to return/call if symptoms worsen     Orders:    Mammo diagnostic bilateral w 3d and cad; Future       History of Present Illness     Antionette is a 38 y.o female with complex PMH who presents today for breast examination. Patient reports that she has been experiencing left breast pain for the a few months (unsure of time). Pain is mild (1-2/10) and is only present when palpated. Not associated with menstrual period. Denies any nipple discharge. LMP 6 days ago.   Reports family history  of breast cancer (2 maternal grandmother's sisters -  in their 40s due to breast cancer).             Review of Systems   Constitutional:  Negative for chills and fever.   Respiratory:  Negative for cough and shortness of breath.    Cardiovascular:  Negative for chest pain.   Genitourinary:  Negative for menstrual problem.   Skin:  Negative for rash.   Hematological:  Negative for adenopathy.           Objective     /70 (BP Location: Right arm, Patient Position: Sitting, Cuff Size: Standard)   Pulse 102   Temp 98.9 °F (37.2 °C) (Temporal)   Resp 18   Ht 5' 2\" (1.575 m)   Wt 64 kg (141 lb)   LMP 2024   SpO2 98%   Breastfeeding No   BMI 25.79 kg/m²     Physical Exam  Constitutional:       General: She is not in acute distress.     Appearance: Normal appearance. She is not ill-appearing.   HENT:      Head: Normocephalic.      Nose: Nose normal.   Eyes:      General: No scleral icterus.     Conjunctiva/sclera: Conjunctivae normal. "   Chest:      Chest wall: No mass or tenderness.   Breasts:     Yordy Score is 5.      Breasts are symmetrical.      Right: Tenderness present. No swelling, bleeding, inverted nipple, mass, nipple discharge or skin change.      Left: Mass and tenderness present. No swelling, bleeding, inverted nipple, nipple discharge or skin change.      Comments: < 1 cm mobile tender nodule noted in the left areola at about 4 O'clock. Mild tenderness in both breast on palpation  Lymphadenopathy:      Upper Body:      Right upper body: No supraclavicular, axillary or pectoral adenopathy.      Left upper body: No supraclavicular, axillary or pectoral adenopathy.   Skin:     General: Skin is warm and dry.      Findings: No rash.   Neurological:      Mental Status: She is alert.   Psychiatric:         Mood and Affect: Mood normal.         Behavior: Behavior normal.

## 2024-11-26 ENCOUNTER — OFFICE VISIT (OUTPATIENT)
Dept: GASTROENTEROLOGY | Facility: MEDICAL CENTER | Age: 39
End: 2024-11-26
Payer: COMMERCIAL

## 2024-11-26 VITALS
HEART RATE: 107 BPM | WEIGHT: 146.4 LBS | SYSTOLIC BLOOD PRESSURE: 126 MMHG | OXYGEN SATURATION: 98 % | HEIGHT: 62 IN | BODY MASS INDEX: 26.94 KG/M2 | DIASTOLIC BLOOD PRESSURE: 70 MMHG | TEMPERATURE: 96.5 F

## 2024-11-26 DIAGNOSIS — K57.92 DIVERTICULITIS: ICD-10-CM

## 2024-11-26 DIAGNOSIS — R10.11 RUQ PAIN: ICD-10-CM

## 2024-11-26 DIAGNOSIS — K58.2 IRRITABLE BOWEL SYNDROME WITH BOTH CONSTIPATION AND DIARRHEA: Primary | ICD-10-CM

## 2024-11-26 DIAGNOSIS — K21.9 GASTROESOPHAGEAL REFLUX DISEASE WITHOUT ESOPHAGITIS: ICD-10-CM

## 2024-11-26 PROCEDURE — 99214 OFFICE O/P EST MOD 30 MIN: CPT | Performed by: STUDENT IN AN ORGANIZED HEALTH CARE EDUCATION/TRAINING PROGRAM

## 2024-11-26 RX ORDER — PANTOPRAZOLE SODIUM 40 MG/1
40 TABLET, DELAYED RELEASE ORAL DAILY
Qty: 90 TABLET | Refills: 3 | Status: SHIPPED | OUTPATIENT
Start: 2024-11-26

## 2024-11-26 RX ORDER — FAMOTIDINE 20 MG/1
20 TABLET, FILM COATED ORAL 2 TIMES DAILY PRN
Qty: 180 TABLET | Refills: 3 | Status: SHIPPED | OUTPATIENT
Start: 2024-11-26

## 2024-11-26 RX ORDER — DICYCLOMINE HCL 20 MG
20 TABLET ORAL 2 TIMES DAILY PRN
Qty: 180 TABLET | Refills: 3 | Status: SHIPPED | OUTPATIENT
Start: 2024-11-26

## 2024-11-26 RX ORDER — DOCUSATE SODIUM 100 MG/1
100 CAPSULE, LIQUID FILLED ORAL 2 TIMES DAILY
Qty: 180 CAPSULE | Refills: 3 | Status: SHIPPED | OUTPATIENT
Start: 2024-11-26

## 2024-11-26 NOTE — ASSESSMENT & PLAN NOTE
Orders:    psyllium (METAMUCIL) 58.6 % packet; Take 1 packet by mouth daily    docusate sodium (COLACE) 100 mg capsule; Take 1 capsule (100 mg total) by mouth 2 (two) times a day    dicyclomine (BENTYL) 20 mg tablet; Take 1 tablet (20 mg total) by mouth 2 (two) times a day as needed (abdominal pain)

## 2024-11-26 NOTE — PROGRESS NOTES
Name: Antionette Downing      : 1985      MRN: [id number]  Encounter Provider: Erika Barnard MD  Encounter Date: 2024   Encounter [facility]  :  Assessment & Plan  Irritable bowel syndrome with both constipation and diarrhea    Orders:    psyllium (METAMUCIL) 58.6 % packet; Take 1 packet by mouth daily    docusate sodium (COLACE) 100 mg capsule; Take 1 capsule (100 mg total) by mouth 2 (two) times a day    dicyclomine (BENTYL) 20 mg tablet; Take 1 tablet (20 mg total) by mouth 2 (two) times a day as needed (abdominal pain)    Gastroesophageal reflux disease without esophagitis    Orders:    pantoprazole (PROTONIX) 40 mg tablet; Take 1 tablet (40 mg total) by mouth daily    famotidine (PEPCID) 20 mg tablet; Take 1 tablet (20 mg total) by mouth 2 (two) times a day as needed for heartburn    RUQ pain  24 CMP with normal LFTs.  CT 24 unremarkable.  If symptoms do not improve, may warrant RUQ US to assess for cholelithiasis though symptoms are not suggestive biliary colic.       Diverticulitis  Had recurrent cecal diverticulitis 24.  This was not her first episode of diverticulitis so does need repeat colonoscopy           History of Present Illness     Abdominal Pain  This is a chronic problem. The current episode started more than 1 year ago. The onset quality is sudden. The problem occurs daily. The most recent episode lasted 12 months. The problem has been rapidly worsening. The pain is located in the generalized abdominal region. The pain is at a severity of 10/10. The quality of the pain is aching, cramping and sharp. The abdominal pain radiates to the back. Associated symptoms include diarrhea, flatus and weight loss. Pertinent negatives include no arthralgias, dysuria, fever, hematuria or vomiting. The pain is aggravated by bowel movement, certain positions and coughing. The pain is relieved by Nothing. Prior diagnostic workup  "includes lower endoscopy and surgery.     Antionette Downing is a 39 y.o. female who presents for follow up of abdominal pain    Still having belly pain.  Reflux has come back. Was taking pantoprazole while admitted at  for psych issues but wasn't able to get the prescription at discharge.  Taking famotidine, bentyl    Bowels alternate constipation and diarrhea.  Was prescribed colace and thought this was helpful.    Getting intermittent RUQ pain.  Feels like constant pain under rib cage    CT 8/13/24  Acute cecal diverticulitis without perforation or abscess.  Stable hiatal hernia.        Review of Systems   Constitutional:  Positive for weight loss. Negative for chills and fever.   HENT:  Negative for ear pain and sore throat.    Eyes:  Negative for pain and visual disturbance.   Respiratory:  Negative for cough and shortness of breath.    Cardiovascular:  Negative for chest pain and palpitations.   Gastrointestinal:  Positive for abdominal pain, diarrhea and flatus. Negative for vomiting.   Genitourinary:  Negative for dysuria and hematuria.   Musculoskeletal:  Negative for arthralgias and back pain.   Skin:  Negative for color change and rash.   Neurological:  Negative for seizures and syncope.   All other systems reviewed and are negative.         Objective   /70 (BP Location: Left arm)   Pulse (!) 107   Temp (!) 96.5 °F (35.8 °C)   Ht 5' 2\" (1.575 m)   Wt 66.4 kg (146 lb 6.4 oz)   [facility] 11/03/2024   SpO2 98%   BMI 26.78 kg/m²      Physical Exam      "

## 2024-11-30 ENCOUNTER — HOSPITAL ENCOUNTER (EMERGENCY)
Facility: HOSPITAL | Age: 39
Discharge: HOME | End: 2024-11-30
Attending: EMERGENCY MEDICINE
Payer: COMMERCIAL

## 2024-11-30 ENCOUNTER — APPOINTMENT (EMERGENCY)
Dept: CT IMAGING | Facility: HOSPITAL | Age: 39
End: 2024-11-30
Payer: COMMERCIAL

## 2024-11-30 VITALS
DIASTOLIC BLOOD PRESSURE: 55 MMHG | SYSTOLIC BLOOD PRESSURE: 114 MMHG | HEART RATE: 104 BPM | RESPIRATION RATE: 20 BRPM | BODY MASS INDEX: 26.73 KG/M2 | WEIGHT: 146.16 LBS | TEMPERATURE: 98.5 F | OXYGEN SATURATION: 99 %

## 2024-11-30 DIAGNOSIS — K52.9 GASTROENTERITIS: Primary | ICD-10-CM

## 2024-11-30 LAB
ALBUMIN SERPL BCG-MCNC: 4.4 G/DL (ref 3.5–5)
ALP SERPL-CCNC: 64 U/L (ref 34–104)
ALT SERPL W P-5'-P-CCNC: 8 U/L (ref 7–52)
ANION GAP SERPL CALCULATED.3IONS-SCNC: 7 MMOL/L (ref 4–13)
APTT PPP: 27 SECONDS (ref 23–34)
AST SERPL W P-5'-P-CCNC: 12 U/L (ref 13–39)
BACTERIA UR QL AUTO: ABNORMAL /HPF
BASOPHILS # BLD AUTO: 0.02 THOUSANDS/ΜL (ref 0–0.1)
BASOPHILS NFR BLD AUTO: 0 % (ref 0–1)
BILIRUB SERPL-MCNC: 0.39 MG/DL (ref 0.2–1)
BILIRUB UR QL STRIP: ABNORMAL
BUN SERPL-MCNC: 5 MG/DL (ref 5–25)
CALCIUM SERPL-MCNC: 9.3 MG/DL (ref 8.4–10.2)
CHLORIDE SERPL-SCNC: 107 MMOL/L (ref 96–108)
CLARITY UR: ABNORMAL
CO2 SERPL-SCNC: 22 MMOL/L (ref 21–32)
COLOR UR: YELLOW
CREAT SERPL-MCNC: 0.77 MG/DL (ref 0.6–1.3)
EOSINOPHIL # BLD AUTO: 0.12 THOUSAND/ΜL (ref 0–0.61)
EOSINOPHIL NFR BLD AUTO: 1 % (ref 0–6)
ERYTHROCYTE [DISTWIDTH] IN BLOOD BY AUTOMATED COUNT: 12 % (ref 11.6–15.1)
EXT PREGNANCY TEST URINE: NEGATIVE
EXT. CONTROL: NORMAL
FLUAV AG UPPER RESP QL IA.RAPID: NEGATIVE
FLUBV AG UPPER RESP QL IA.RAPID: NEGATIVE
GFR SERPL CREATININE-BSD FRML MDRD: 98 ML/MIN/1.73SQ M
GLUCOSE SERPL-MCNC: 94 MG/DL (ref 65–140)
GLUCOSE UR STRIP-MCNC: NEGATIVE MG/DL
HCT VFR BLD AUTO: 43.6 % (ref 34.8–46.1)
HGB BLD-MCNC: 15.2 G/DL (ref 11.5–15.4)
HGB UR QL STRIP.AUTO: ABNORMAL
IMM GRANULOCYTES # BLD AUTO: 0.03 THOUSAND/UL (ref 0–0.2)
IMM GRANULOCYTES NFR BLD AUTO: 0 % (ref 0–2)
INR PPP: 0.94 (ref 0.85–1.19)
KETONES UR STRIP-MCNC: ABNORMAL MG/DL
LEUKOCYTE ESTERASE UR QL STRIP: ABNORMAL
LIPASE SERPL-CCNC: 20 U/L (ref 11–82)
LYMPHOCYTES # BLD AUTO: 2.09 THOUSANDS/ΜL (ref 0.6–4.47)
LYMPHOCYTES NFR BLD AUTO: 24 % (ref 14–44)
MCH RBC QN AUTO: 33 PG (ref 26.8–34.3)
MCHC RBC AUTO-ENTMCNC: 34.9 G/DL (ref 31.4–37.4)
MCV RBC AUTO: 95 FL (ref 82–98)
MONOCYTES # BLD AUTO: 0.71 THOUSAND/ΜL (ref 0.17–1.22)
MONOCYTES NFR BLD AUTO: 8 % (ref 4–12)
MUCOUS THREADS UR QL AUTO: ABNORMAL
NEUTROPHILS # BLD AUTO: 5.7 THOUSANDS/ΜL (ref 1.85–7.62)
NEUTS SEG NFR BLD AUTO: 67 % (ref 43–75)
NITRITE UR QL STRIP: NEGATIVE
NON-SQ EPI CELLS URNS QL MICRO: ABNORMAL /HPF
NRBC BLD AUTO-RTO: 0 /100 WBCS
PH UR STRIP.AUTO: 6 [PH] (ref 4.5–8)
PLATELET # BLD AUTO: 200 THOUSANDS/UL (ref 149–390)
PMV BLD AUTO: 10.1 FL (ref 8.9–12.7)
POTASSIUM SERPL-SCNC: 3.8 MMOL/L (ref 3.5–5.3)
PROT SERPL-MCNC: 7.3 G/DL (ref 6.4–8.4)
PROT UR STRIP-MCNC: ABNORMAL MG/DL
PROTHROMBIN TIME: 12.8 SECONDS (ref 12.3–15)
RBC # BLD AUTO: 4.61 MILLION/UL (ref 3.81–5.12)
RBC #/AREA URNS AUTO: ABNORMAL /HPF
SARS-COV+SARS-COV-2 AG RESP QL IA.RAPID: NEGATIVE
SODIUM SERPL-SCNC: 136 MMOL/L (ref 135–147)
SP GR UR STRIP.AUTO: 1.02 (ref 1–1.03)
UROBILINOGEN UR QL STRIP.AUTO: 0.2 E.U./DL
WBC # BLD AUTO: 8.67 THOUSAND/UL (ref 4.31–10.16)
WBC #/AREA URNS AUTO: ABNORMAL /HPF

## 2024-11-30 PROCEDURE — 96374 THER/PROPH/DIAG INJ IV PUSH: CPT

## 2024-11-30 PROCEDURE — 81001 URINALYSIS AUTO W/SCOPE: CPT

## 2024-11-30 PROCEDURE — 85025 COMPLETE CBC W/AUTO DIFF WBC: CPT | Performed by: EMERGENCY MEDICINE

## 2024-11-30 PROCEDURE — 99285 EMERGENCY DEPT VISIT HI MDM: CPT | Performed by: EMERGENCY MEDICINE

## 2024-11-30 PROCEDURE — 85610 PROTHROMBIN TIME: CPT | Performed by: EMERGENCY MEDICINE

## 2024-11-30 PROCEDURE — 83690 ASSAY OF LIPASE: CPT | Performed by: EMERGENCY MEDICINE

## 2024-11-30 PROCEDURE — 36415 COLL VENOUS BLD VENIPUNCTURE: CPT | Performed by: EMERGENCY MEDICINE

## 2024-11-30 PROCEDURE — 87811 SARS-COV-2 COVID19 W/OPTIC: CPT | Performed by: EMERGENCY MEDICINE

## 2024-11-30 PROCEDURE — 99284 EMERGENCY DEPT VISIT MOD MDM: CPT

## 2024-11-30 PROCEDURE — 71260 CT THORAX DX C+: CPT

## 2024-11-30 PROCEDURE — 87804 INFLUENZA ASSAY W/OPTIC: CPT | Performed by: EMERGENCY MEDICINE

## 2024-11-30 PROCEDURE — 85730 THROMBOPLASTIN TIME PARTIAL: CPT | Performed by: EMERGENCY MEDICINE

## 2024-11-30 PROCEDURE — 96361 HYDRATE IV INFUSION ADD-ON: CPT

## 2024-11-30 PROCEDURE — 74177 CT ABD & PELVIS W/CONTRAST: CPT

## 2024-11-30 PROCEDURE — 81025 URINE PREGNANCY TEST: CPT | Performed by: EMERGENCY MEDICINE

## 2024-11-30 PROCEDURE — 80053 COMPREHEN METABOLIC PANEL: CPT | Performed by: EMERGENCY MEDICINE

## 2024-11-30 PROCEDURE — 87086 URINE CULTURE/COLONY COUNT: CPT

## 2024-11-30 RX ORDER — ONDANSETRON 4 MG/1
4 TABLET, ORALLY DISINTEGRATING ORAL EVERY 8 HOURS PRN
Qty: 10 TABLET | Refills: 0 | Status: SHIPPED | OUTPATIENT
Start: 2024-11-30

## 2024-11-30 RX ORDER — ONDANSETRON 2 MG/ML
4 INJECTION INTRAMUSCULAR; INTRAVENOUS ONCE
Status: COMPLETED | OUTPATIENT
Start: 2024-11-30 | End: 2024-11-30

## 2024-11-30 RX ORDER — DICYCLOMINE HCL 20 MG
20 TABLET ORAL EVERY 6 HOURS PRN
Qty: 10 TABLET | Refills: 0 | Status: SHIPPED | OUTPATIENT
Start: 2024-11-30

## 2024-11-30 RX ADMIN — SODIUM CHLORIDE 1000 ML: 0.9 INJECTION, SOLUTION INTRAVENOUS at 14:31

## 2024-11-30 RX ADMIN — IOHEXOL 100 ML: 350 INJECTION, SOLUTION INTRAVENOUS at 15:29

## 2024-11-30 RX ADMIN — ONDANSETRON 4 MG: 2 INJECTION INTRAMUSCULAR; INTRAVENOUS at 14:37

## 2024-11-30 NOTE — ED PROVIDER NOTES
Time reflects when diagnosis was documented in both MDM as applicable and the Disposition within this note       Time User Action Codes Description Comment    11/30/2024  5:13 PM Elvira Choi Add [K52.9] Gastroenteritis           ED Disposition       ED Disposition   Discharge    Condition   Good    Date/Time   Sat Nov 30, 2024  5:07 PM    Comment   Antionette Garry Downing discharge to home/self care.                   Assessment & Plan       Medical Decision Making  39-year-old female presents to the ED with multiple episodes of vomiting and she states bloody diarrhea over the last 2 days.  She also complains of right flank pain and cough.  No fevers or chills.  On exam she looks well in no distress.  She does have tenderness in the right upper and right lower quadrant as well as right CVA.  Lungs are clear.  Rectal exam no gross blood and heme-negative on exam.  Will check CBC, CMP, lipase will check CT chest abdomen pelvis rule out pneumonia versus diverticulosis versus kidney stone versus diverticulitis/appendicitis/colitis.  Will likely viral gastroenteritis.  Will give IV fluids and Zofran will reevaluate    Amount and/or Complexity of Data Reviewed  Labs: ordered. Decision-making details documented in ED Course.  Radiology: ordered.    Risk  Prescription drug management.        ED Course as of 11/30/24 1714   Sat Nov 30, 2024   1528 SARS COV Rapid Antigen: Negative   1528 Influenza A Rapid Antigen: Negative   1528 Influenza B Rapid Antigen: Negative   1528 PROTIME: 12.8   1528 POCT INR: 0.94   1528 PTT: 27   1528 WBC: 8.67   1528 Hemoglobin: 15.2   1528 Sodium: 136   1528 Potassium: 3.8   1528 Creatinine: 0.77   1528 GLUCOSE: 94   1528 AST(!): 12   1528 ALT: 8   1528 ALK PHOS: 64   1528 Total Bilirubin: 0.39   1633 Color, UA: Yellow   1633 Leukocytes, UA(!): Trace   1633 Nitrite, UA: Negative   1633 Bilirubin Urine(!): Small   1633 Blood, UA(!): Large  Patient states currently menstruating   1634  LIPASE: 20   1701 WBC, UA(!): 20-30   1701 Bacteria, UA: None Seen   1701 RBC Urine(!): Innumerable  Menses   1706 CT chest abdomen pelvis:No acute findings in the chest, abdomen, or pelvis.            Medications   sodium chloride 0.9 % bolus 1,000 mL (1,000 mL Intravenous New Bag 11/30/24 1431)   ondansetron (ZOFRAN) injection 4 mg (4 mg Intravenous Given 11/30/24 1437)   iohexol (OMNIPAQUE) 350 MG/ML injection (MULTI-DOSE) 100 mL (100 mL Intravenous Given 11/30/24 1529)       ED Risk Strat Scores                                               History of Present Illness       Chief Complaint   Patient presents with    Flank Pain     Right sided flank pain x5 days. Taking otc medication without relief. Also reporting bloody stools.        Past Medical History:   Diagnosis Date    Abnormal Pap smear of cervix     ADHD (attention deficit hyperactivity disorder)     Alcohol abuse     Ankle fracture     Anxiety     Arthritis     back    Asthma     Bipolar disorder ([facility])     Cellulitis     right side fac in 2014    Chronic pain disorder     Depression     Diverticulitis     Flank pain 08/16/2016    Hallucination     Hepatitis C     Hip disease 2006    reports she had fluid removed from right hip and received treatment with antibiotic     History of abnormal cervical Pap smear     History of multiple miscarriages     IBS (irritable bowel syndrome)     Infantile idiopathic scoliosis     Joint pain     Kidney stone     Lactose intolerance     Low back pain     Myofascial pain syndrome     Peripheral neuropathy 03/28/2024    Poor dentition     Psychiatric illness     Psychosis ([facility])     PTSD (post-traumatic stress disorder)     Pyelonephritis affecting pregnancy     Right hand paresthesia     Right ovarian cyst     Schizoaffective disorder, bipolar type ([facility]) r/o bipolar with psychotic features 8/16/2024    Scoliosis     Seizures ([facility])     Self-injurious behavior     Sleep difficulties     Slow transit constipation      "Substance abuse (HCC)     Suicide attempt (HCC)       Past Surgical History:   Procedure Laterality Date     SECTION  2016     SECTION  2014    HIP SURGERY      ORTHOPEDIC SURGERY      IN  DELIVERY ONLY N/A 2016    Procedure:  SECTION () REPEAT;  Surgeon: Kurt Connor MD;  Location: [facility];  Service: Obstetrics    IN LIG/TRNSXJ FLP TUBE ABDL/VAG APPR UNI/BI Bilateral 2016    Procedure: LIGATION/COAGULATION TUBAL;  Surgeon: Kurt Connor MD;  Location: [facility];  Service: Obstetrics      Family History   Problem Relation Age of Onset    Heart disease Maternal Aunt     Cancer Maternal Aunt     Diabetes Paternal Aunt     No Known Problems Mother     No Known Problems Father     No Known Problems Sister       Social History     Tobacco Use    Smoking status: Every Day     Current packs/day: 0.25     Average packs/day: 0.3 packs/day for 15.0 years (3.8 ttl pk-yrs)     Types: Cigarettes     Passive exposure: Never    Smokeless tobacco: Never    Tobacco comments:     pt refused smoking cessation teaching.    Vaping Use    Vaping status: Former   Substance Use Topics    Alcohol use: Yes     Comment: Rare Use    Drug use: Not Currently     Types: Marijuana, Cocaine, \"Crack\" cocaine     Comment: on occasion       E-Cigarette/Vaping    E-Cigarette Use Former User       E-Cigarette/Vaping Substances    Nicotine No     THC No     CBD No     Flavoring No     Other No     Unknown No       I have reviewed and agree with the history as documented.     39-year-old female with history of bipolar disorder, schizophrenia, chronic pain, substance abuse, diverticulitis presents to the emergency department with a 2-day history of nausea, vomiting and diarrhea.  She states that the diarrhea is \"maroon\".  She does not take blood thinners.  She also states she is currently on her menses so is unsure if the blood is from that.  She also complains of right flank " pain, cough.  No fevers or chills.  No known ill contacts or recent travel.      History provided by:  Patient   used: No    Flank Pain  Pain location:  R flank  Pain quality: shooting    Pain radiates to:  Does not radiate  Pain severity:  Unable to specify  Onset quality:  Gradual  Duration:  2 days  Timing:  Constant  Progression:  Unchanged  Chronicity:  New  Context: not eating, not previous surgeries, not recent illness, not sick contacts, not suspicious food intake and not trauma    Relieved by:  None tried  Worsened by:  Nothing  Ineffective treatments:  None tried  Associated symptoms: diarrhea, hematochezia, nausea, vaginal bleeding and vomiting    Associated symptoms: no anorexia, no belching, no chest pain, no chills, no constipation, no cough, no dysuria, no fatigue, no fever, no flatus, no hematemesis, no hematuria, no melena, no shortness of breath and no sore throat    Diarrhea:     Quality:  Bloody    Severity:  Unable to specify    Timing:  Intermittent    Progression:  Unchanged  Vomiting:     Quality:  Stomach contents    Duration:  2 days    Timing:  Intermittent    Progression:  Unchanged  Risk factors: no alcohol abuse, has not had multiple surgeries, no NSAID use, not obese, not pregnant and no recent hospitalization        Review of Systems   Constitutional:  Positive for activity change and appetite change. Negative for chills, diaphoresis, fatigue and fever.   HENT: Negative.  Negative for congestion, rhinorrhea and sore throat.    Eyes: Negative.  Negative for discharge, redness and itching.   Respiratory: Negative.  Negative for apnea, cough, chest tightness, shortness of breath and wheezing.    Cardiovascular:  Negative for chest pain, palpitations and leg swelling.   Gastrointestinal:  Positive for blood in stool, diarrhea, hematochezia, nausea and vomiting. Negative for abdominal pain, anorexia, constipation, flatus, hematemesis and melena.   Endocrine: Negative.     Genitourinary:  Positive for flank pain and vaginal bleeding. Negative for dysuria, frequency, hematuria and urgency.   Musculoskeletal:  Negative for back pain.   Skin: Negative.    Allergic/Immunologic: Negative.    Neurological: Negative.  Negative for dizziness, syncope, weakness, light-headedness, numbness and headaches.   Hematological: Negative.    All other systems reviewed and are negative.          Objective       ED Triage Vitals [11/30/24 1332]   Temperature Pulse Blood Pressure Respirations SpO2 Patient Position - Orthostatic VS   98.5 °F (36.9 °C) (!) 124 132/61 20 96 % --      Temp Source Heart Rate Source BP Location FiO2 (%) Pain Score    Oral -- -- -- --      Vitals      Date and Time Temp Pulse SpO2 Resp BP Pain Score FACES Pain Rating User   11/30/24 1332 98.5 °F (36.9 °C) 124 96 % 20 132/61 -- -- JONAH            Physical Exam  Vitals and nursing note reviewed.   Constitutional:       General: She is awake. She is not in acute distress.     Appearance: Normal appearance. She is well-developed and normal weight. She is not ill-appearing, toxic-appearing or diaphoretic.   HENT:      Head: Normocephalic and atraumatic.      Right Ear: External ear normal.      Left Ear: External ear normal.      Nose: Nose normal.      Mouth/Throat:      Mouth: Mucous membranes are moist.      Pharynx: No oropharyngeal exudate.   Eyes:      General: No scleral icterus.        Right eye: No discharge.         Left eye: No discharge.      Conjunctiva/sclera: Conjunctivae normal.   Neck:      Thyroid: No thyromegaly.      Vascular: No JVD.      Trachea: No tracheal deviation.   Cardiovascular:      Rate and Rhythm: Regular rhythm. Tachycardia present.      Heart sounds: Normal heart sounds. No murmur heard.     No friction rub. No gallop.   Pulmonary:      Effort: Pulmonary effort is normal. No respiratory distress.      Breath sounds: Normal breath sounds. No stridor. No wheezing, rhonchi or rales.   Chest:      Chest  wall: No tenderness.   Abdominal:      General: Bowel sounds are normal. There is no distension.      Palpations: Abdomen is soft. There is no mass.      Tenderness: There is abdominal tenderness in the right upper quadrant and right lower quadrant. There is right CVA tenderness. There is no guarding or rebound.      Hernia: No hernia is present.   Genitourinary:     Rectum: Guaiac result negative.   Musculoskeletal:      Right lower leg: No edema.      Left lower leg: No edema.   Skin:     General: Skin is warm and dry.      Coloration: Skin is not jaundiced or pale.      Findings: No bruising, erythema, lesion or rash.   Neurological:      General: No focal deficit present.      Mental Status: She is alert and oriented to person, place, and time.      Motor: No weakness or abnormal muscle tone.      Deep Tendon Reflexes: Reflexes are normal and symmetric.   Psychiatric:         Mood and Affect: Mood normal.         Behavior: Behavior is cooperative.         Results Reviewed       Procedure Component Value Units Date/Time    FLU/COVID Rapid Antigen (30 min. TAT) - Preferred screening test in ED [[id number]]  (Normal) Collected: 11/30/24 1432    Lab Status: Final result Specimen: Nares from Nose Updated: 11/30/24 1504     SARS COV Rapid Antigen Negative     Influenza A Rapid Antigen Negative     Influenza B Rapid Antigen Negative    Narrative:      This test has been performed using the Quidel Luciana 2 FLU+SARS Antigen test under the Emergency Use Authorization (EUA). This test has been validated by the  and verified by the performing laboratory. The Luciana uses lateral flow immunofluorescent sandwich assay to detect SARS-COV, Influenza A and Influenza B Antigen.     The Quidel Luciana 2 SARS Antigen test does not differentiate between SARS-CoV and SARS-CoV-2.     Negative results are presumptive and may be confirmed with a molecular assay, if necessary, for patient management. Negative results do not rule  out SARS-CoV-2 or influenza infection and should not be used as the sole basis for treatment or patient management decisions. A negative test result may occur if the level of antigen in a sample is below the limit of detection of this test.     Positive results are indicative of the presence of viral antigens, but do not rule out bacterial infection or co-infection with other viruses.     All test results should be used as an adjunct to clinical observations and other information available to the provider.    FOR PEDIATRIC PATIENTS - copy/paste COVID Guidelines URL to browser: [email or web address]    Lipase [[id number]]  (Normal) Collected: 11/30/24 1432    Lab Status: Final result Specimen: Blood from Arm, Right Updated: 11/30/24 1457     Lipase 20 u/L     Comprehensive metabolic panel [[id number]]  (Abnormal) Collected: 11/30/24 1432    Lab Status: Final result Specimen: Blood from Arm, Right Updated: 11/30/24 1457     Sodium 136 mmol/L      Potassium 3.8 mmol/L      Chloride 107 mmol/L      CO2 22 mmol/L      ANION GAP 7 mmol/L      BUN 5 mg/dL      Creatinine 0.77 mg/dL      Glucose 94 mg/dL      Calcium 9.3 mg/dL      AST 12 U/L      ALT 8 U/L      Alkaline Phosphatase 64 U/L      Total Protein 7.3 g/dL      Albumin 4.4 g/dL      Total Bilirubin 0.39 mg/dL      eGFR 98 ml/min/1.73sq m     Narrative:      National Kidney Disease Foundation guidelines for Chronic Kidney Disease (CKD):     Stage 1 with normal or high GFR (GFR > 90 mL/min/1.73 square meters)    Stage 2 Mild CKD (GFR = 60-89 mL/min/1.73 square meters)    Stage 3A Moderate CKD (GFR = 45-59 mL/min/1.73 square meters)    Stage 3B Moderate CKD (GFR = 30-44 mL/min/1.73 square meters)    Stage 4 Severe CKD (GFR = 15-29 mL/min/1.73 square meters)    Stage 5 End Stage CKD (GFR <15 mL/min/1.73 square meters)  Note: GFR calculation is accurate only with a steady state creatinine    Protime-INR [[id number]]   (Normal) Collected: 11/30/24 1432    Lab Status: Final result Specimen: Blood from Arm, Right Updated: 11/30/24 1453     Protime 12.8 seconds      INR 0.94    Narrative:      INR Therapeutic Range    Indication                                             INR Range      Atrial Fibrillation                                               2.0-3.0  Hypercoagulable State                                    2.0.2.3  Left Ventricular Asist Device                            2.0-3.0  Mechanical Heart Valve                                  -    Aortic(with afib, MI, embolism, HF, LA enlargement,    and/or coagulopathy)                                     2.0-3.0 (2.5-3.5)     Mitral                                                             2.5-3.5  Prosthetic/Bioprosthetic Heart Valve               2.0-3.0  Venous thromboembolism (VTE: VT, PE        2.0-3.0    APTT [[id number]]  (Normal) Collected: 11/30/24 1432    Lab Status: Final result Specimen: Blood from Arm, Right Updated: 11/30/24 1453     PTT 27 seconds     CBC and differential [[id number]] Collected: 11/30/24 1432    Lab Status: Final result Specimen: Blood from Arm, Right Updated: 11/30/24 1441     WBC 8.67 Thousand/uL      RBC 4.61 Million/uL      Hemoglobin 15.2 g/dL      Hematocrit 43.6 %      MCV 95 fL      MCH 33.0 pg      MCHC 34.9 g/dL      RDW 12.0 %      MPV 10.1 fL      Platelets 200 Thousands/uL      nRBC 0 /100 WBCs      Segmented % 67 %      Immature Grans % 0 %      Lymphocytes % 24 %      Monocytes % 8 %      Eosinophils Relative 1 %      Basophils Relative 0 %      Absolute Neutrophils 5.70 Thousands/µL      Absolute Immature Grans 0.03 Thousand/uL      Absolute Lymphocytes 2.09 Thousands/µL      Absolute Monocytes 0.71 Thousand/µL      Eosinophils Absolute 0.12 Thousand/µL      Basophils Absolute 0.02 Thousands/µL     Urine Microscopic [[id number]]  (Abnormal) Collected: 11/30/24 1412    Lab Status: Final result Specimen: Urine, Clean Catch Updated:  11/30/24 1437     RBC, UA Innumerable /hpf      WBC, UA 20-30 /hpf      Epithelial Cells Occasional /hpf      Bacteria, UA None Seen /hpf      MUCUS THREADS Innumerable    Urine culture [[id number]] Collected: 11/30/24 1412    Lab Status: In process Specimen: Urine, Clean Catch Updated: 11/30/24 1437    POCT pregnancy, urine [[id number]]  (Normal) Collected: 11/30/24 1415    Lab Status: Final result Updated: 11/30/24 1415     EXT Preg Test, Ur Negative     Control Valid    Urine Macroscopic, POC [[id number]]  (Abnormal) Collected: 11/30/24 1412    Lab Status: Final result Specimen: Urine Updated: 11/30/24 1413     Color, UA Yellow     Clarity, UA Cloudy     pH, UA 6.0     Leukocytes, UA Trace     Nitrite, UA Negative     Protein, UA 30 (1+) mg/dl      Glucose, UA Negative mg/dl      Ketones, UA Trace mg/dl      Urobilinogen, UA 0.2 E.U./dl      Bilirubin, UA Small     Occult Blood, UA Large     Specific Gravity, UA 1.025    Narrative:      CLINITEK RESULT            CT chest abdomen pelvis w contrast   Final Interpretation by Hipolito Edwards MD (11/30 1703)      No acute findings in the chest, abdomen, or pelvis.                     Workstation performed: [id number]             Procedures    ED Medication and Procedure Management   Prior to Admission Medications   Prescriptions Last Dose Informant Patient Reported? Taking?   Cholecalciferol (VITAMIN D3) 1,000 units tablet   No No   Sig: Take 1 tablet (1,000 Units total) by mouth daily   OLANZapine (ZyPREXA) 20 MG tablet   No No   Sig: Take 1 tablet (20 mg total) by mouth daily at bedtime   OXcarbazepine (TRILEPTAL) 300 mg tablet   No No   Sig: Take 1 tablet (300 mg total) by mouth every 12 (twelve) hours   Ventolin  (90 Base) MCG/ACT inhaler   No No   Sig: Inhale 2 puffs every 4 (four) hours as needed for wheezing   Patient not taking: Reported on 10/7/2024   albuterol (ACCUNEB) 1.25 MG/3ML nebulizer solution   Yes No   Sig: Take 1.25 mg by nebulization  every 6 (six) hours as needed for wheezing   budesonide-formoterol (SYMBICORT) 80-4.5 MCG/ACT inhaler   No No   Sig: Inhale 2 puffs 2 (two) times a day Rinse mouth after use.   Patient not taking: Reported on 10/7/2024   dicyclomine (BENTYL) 20 mg tablet   No No   Sig: Take 1 tablet (20 mg total) by mouth 2 (two) times a day as needed (abdominal pain)   docusate sodium (COLACE) 100 mg capsule   No No   Sig: Take 1 capsule (100 mg total) by mouth 2 (two) times a day   famotidine (PEPCID) 20 mg tablet   No No   Sig: Take 1 tablet (20 mg total) by mouth 2 (two) times a day as needed for heartburn   hydrOXYzine HCL (ATARAX) 50 mg tablet   Yes No   Sig: Take 50 mg by mouth 3 (three) times a day   loratadine (CLARITIN) 10 mg tablet   No No   Sig: Take 1 tablet (10 mg total) by mouth daily   pantoprazole (PROTONIX) 40 mg tablet   No No   Sig: Take 1 tablet (40 mg total) by mouth daily   psyllium (METAMUCIL) 58.6 % packet   No No   Sig: Take 1 packet by mouth daily      Facility-Administered Medications: None     Patient's Medications   Discharge Prescriptions    DICYCLOMINE (BENTYL) 20 MG TABLET    Take 1 tablet (20 mg total) by mouth every 6 (six) hours as needed (pain)       Start Date: 11/30/2024End Date: --       Order Dose: 20 mg       Quantity: 10 tablet    Refills: 0    ONDANSETRON (ZOFRAN-ODT) 4 MG DISINTEGRATING TABLET    Take 1 tablet (4 mg total) by mouth every 8 (eight) hours as needed for vomiting or nausea       Start Date: 11/30/2024End Date: --       Order Dose: 4 mg       Quantity: 10 tablet    Refills: 0     No discharge procedures on file.  ED SEPSIS DOCUMENTATION   Time reflects when diagnosis was documented in both MDM as applicable and the Disposition within this note       Time User Action Codes Description Comment    11/30/2024  5:13 PM Elvira Choi Add [K52.9] Gastroenteritis                  Elvira Choi,   11/30/24 1714

## 2024-12-01 LAB — BACTERIA UR CULT: NORMAL

## 2024-12-05 DIAGNOSIS — J45.20 INTERMITTENT ASTHMA WITHOUT COMPLICATION, UNSPECIFIED ASTHMA SEVERITY: ICD-10-CM

## 2024-12-05 RX ORDER — BUDESONIDE AND FORMOTEROL FUMARATE DIHYDRATE 80; 4.5 UG/1; UG/1
2 AEROSOL RESPIRATORY (INHALATION) 2 TIMES DAILY
Qty: 10.2 G | Refills: 0 | Status: SHIPPED | OUTPATIENT
Start: 2024-12-05 | End: 2024-12-06

## 2024-12-05 RX ORDER — ALBUTEROL SULFATE 90 UG/1
2 AEROSOL, METERED RESPIRATORY (INHALATION) EVERY 4 HOURS PRN
Qty: 18 G | Refills: 0 | Status: SHIPPED | OUTPATIENT
Start: 2024-12-05 | End: 2024-12-06 | Stop reason: SDUPTHER

## 2024-12-06 RX ORDER — BUDESONIDE AND FORMOTEROL FUMARATE DIHYDRATE 80; 4.5 UG/1; UG/1
AEROSOL RESPIRATORY (INHALATION)
Qty: 10.2 G | Refills: 0 | Status: SHIPPED | OUTPATIENT
Start: 2024-12-06

## 2024-12-06 RX ORDER — ALBUTEROL SULFATE 90 UG/1
2 AEROSOL, METERED RESPIRATORY (INHALATION) EVERY 4 HOURS PRN
Qty: 18 G | Refills: 0 | Status: SHIPPED | OUTPATIENT
Start: 2024-12-06

## 2024-12-06 RX ORDER — ALBUTEROL SULFATE 90 UG/1
2 AEROSOL, METERED RESPIRATORY (INHALATION) EVERY 4 HOURS PRN
Qty: 18 G | Refills: 0 | OUTPATIENT
Start: 2024-12-06

## 2024-12-17 ENCOUNTER — TELEPHONE (OUTPATIENT)
Dept: FAMILY MEDICINE CLINIC | Facility: CLINIC | Age: 39
End: 2024-12-17

## 2024-12-17 DIAGNOSIS — J45.20 INTERMITTENT ASTHMA WITHOUT COMPLICATION, UNSPECIFIED ASTHMA SEVERITY: ICD-10-CM

## 2024-12-17 RX ORDER — BUDESONIDE AND FORMOTEROL FUMARATE DIHYDRATE 80; 4.5 UG/1; UG/1
2 AEROSOL RESPIRATORY (INHALATION) 2 TIMES DAILY
Qty: 10.2 G | Refills: 0 | Status: SHIPPED | OUTPATIENT
Start: 2024-12-17

## 2024-12-17 NOTE — TELEPHONE ENCOUNTER
Patient called office today stating that she was unable to get medication from pharmacy because provider is not enroll with medicare . Please advise. Thank you!

## 2025-01-05 ENCOUNTER — HOSPITAL ENCOUNTER (EMERGENCY)
Facility: HOSPITAL | Age: 40
End: 2025-01-06
Attending: EMERGENCY MEDICINE
Payer: COMMERCIAL

## 2025-01-05 DIAGNOSIS — F22 ACUTE PARANOIA (HCC): ICD-10-CM

## 2025-01-05 DIAGNOSIS — Z00.8 ENCOUNTER FOR PSYCHOLOGICAL EVALUATION: Primary | ICD-10-CM

## 2025-01-05 DIAGNOSIS — Z00.8 MEDICAL CLEARANCE FOR PSYCHIATRIC ADMISSION: ICD-10-CM

## 2025-01-05 LAB
AMPHETAMINES SERPL QL SCN: NEGATIVE
BACTERIA UR QL AUTO: NORMAL /HPF
BARBITURATES UR QL: NEGATIVE
BENZODIAZ UR QL: NEGATIVE
BILIRUB UR QL STRIP: NEGATIVE
CLARITY UR: CLEAR
COCAINE UR QL: NEGATIVE
COLOR UR: ABNORMAL
ETHANOL EXG-MCNC: 0 MG/DL
EXT PREGNANCY TEST URINE: NEGATIVE
EXT. CONTROL: NORMAL
FENTANYL UR QL SCN: NEGATIVE
GLUCOSE UR STRIP-MCNC: NEGATIVE MG/DL
HGB UR QL STRIP.AUTO: NEGATIVE
HYDROCODONE UR QL SCN: NEGATIVE
KETONES UR STRIP-MCNC: ABNORMAL MG/DL
LEUKOCYTE ESTERASE UR QL STRIP: 25
METHADONE UR QL: NEGATIVE
NITRITE UR QL STRIP: NEGATIVE
NON-SQ EPI CELLS URNS QL MICRO: NORMAL /HPF
OPIATES UR QL SCN: NEGATIVE
OXYCODONE+OXYMORPHONE UR QL SCN: NEGATIVE
PCP UR QL: NEGATIVE
PH UR STRIP.AUTO: 5 [PH]
PROT UR STRIP-MCNC: NEGATIVE MG/DL
RBC #/AREA URNS AUTO: NORMAL /HPF
SP GR UR STRIP.AUTO: 1.02 (ref 1–1.04)
THC UR QL: NEGATIVE
UROBILINOGEN UA: NEGATIVE MG/DL
WBC #/AREA URNS AUTO: NORMAL /HPF

## 2025-01-05 PROCEDURE — 81025 URINE PREGNANCY TEST: CPT | Performed by: PHYSICIAN ASSISTANT

## 2025-01-05 PROCEDURE — 99285 EMERGENCY DEPT VISIT HI MDM: CPT | Performed by: PHYSICIAN ASSISTANT

## 2025-01-05 PROCEDURE — 82075 ASSAY OF BREATH ETHANOL: CPT | Performed by: PHYSICIAN ASSISTANT

## 2025-01-05 PROCEDURE — 80307 DRUG TEST PRSMV CHEM ANLYZR: CPT | Performed by: PHYSICIAN ASSISTANT

## 2025-01-05 PROCEDURE — 81003 URINALYSIS AUTO W/O SCOPE: CPT | Performed by: PHYSICIAN ASSISTANT

## 2025-01-05 PROCEDURE — 81001 URINALYSIS AUTO W/SCOPE: CPT | Performed by: PHYSICIAN ASSISTANT

## 2025-01-05 PROCEDURE — 99285 EMERGENCY DEPT VISIT HI MDM: CPT

## 2025-01-05 RX ORDER — ALBUTEROL SULFATE 90 UG/1
2 INHALANT RESPIRATORY (INHALATION) EVERY 6 HOURS PRN
Status: DISCONTINUED | OUTPATIENT
Start: 2025-01-05 | End: 2025-01-06 | Stop reason: HOSPADM

## 2025-01-05 RX ORDER — FAMOTIDINE 20 MG/1
20 TABLET, FILM COATED ORAL 2 TIMES DAILY PRN
Status: DISCONTINUED | OUTPATIENT
Start: 2025-01-05 | End: 2025-01-06 | Stop reason: HOSPADM

## 2025-01-05 RX ORDER — BUDESONIDE AND FORMOTEROL FUMARATE DIHYDRATE 80; 4.5 UG/1; UG/1
2 AEROSOL RESPIRATORY (INHALATION) 2 TIMES DAILY
Status: DISCONTINUED | OUTPATIENT
Start: 2025-01-05 | End: 2025-01-06 | Stop reason: HOSPADM

## 2025-01-05 RX ORDER — HYDROXYZINE HYDROCHLORIDE 50 MG/1
50 TABLET, FILM COATED ORAL EVERY 8 HOURS PRN
Status: DISCONTINUED | OUTPATIENT
Start: 2025-01-05 | End: 2025-01-06 | Stop reason: HOSPADM

## 2025-01-05 RX ORDER — LORAZEPAM 1 MG/1
2 TABLET ORAL EVERY 4 HOURS PRN
Status: COMPLETED | OUTPATIENT
Start: 2025-01-05 | End: 2025-01-05

## 2025-01-05 RX ORDER — NICOTINE 21 MG/24HR
14 PATCH, TRANSDERMAL 24 HOURS TRANSDERMAL ONCE
Status: DISCONTINUED | OUTPATIENT
Start: 2025-01-05 | End: 2025-01-06 | Stop reason: HOSPADM

## 2025-01-05 RX ORDER — DICYCLOMINE HCL 20 MG
20 TABLET ORAL 2 TIMES DAILY PRN
Status: DISCONTINUED | OUTPATIENT
Start: 2025-01-05 | End: 2025-01-06 | Stop reason: HOSPADM

## 2025-01-05 RX ORDER — HALOPERIDOL 5 MG/1
10 TABLET ORAL ONCE
Status: DISCONTINUED | OUTPATIENT
Start: 2025-01-05 | End: 2025-01-06 | Stop reason: HOSPADM

## 2025-01-05 RX ORDER — OLANZAPINE 10 MG/1
30 TABLET ORAL
Status: DISCONTINUED | OUTPATIENT
Start: 2025-01-05 | End: 2025-01-06 | Stop reason: HOSPADM

## 2025-01-05 RX ORDER — LORAZEPAM 1 MG/1
2 TABLET ORAL ONCE
Status: COMPLETED | OUTPATIENT
Start: 2025-01-05 | End: 2025-01-05

## 2025-01-05 RX ORDER — PANTOPRAZOLE SODIUM 40 MG/1
40 TABLET, DELAYED RELEASE ORAL DAILY
Status: DISCONTINUED | OUTPATIENT
Start: 2025-01-06 | End: 2025-01-06 | Stop reason: HOSPADM

## 2025-01-05 RX ORDER — OXCARBAZEPINE 300 MG/1
300 TABLET, FILM COATED ORAL EVERY 12 HOURS SCHEDULED
Status: DISCONTINUED | OUTPATIENT
Start: 2025-01-05 | End: 2025-01-06 | Stop reason: HOSPADM

## 2025-01-05 RX ORDER — OLANZAPINE 15 MG/1
30 TABLET ORAL
COMMUNITY
Start: 2024-12-12

## 2025-01-05 RX ADMIN — HYDROXYZINE HYDROCHLORIDE 50 MG: 50 TABLET, FILM COATED ORAL at 21:04

## 2025-01-05 RX ADMIN — OLANZAPINE 30 MG: 10 TABLET, FILM COATED ORAL at 21:05

## 2025-01-05 RX ADMIN — NICOTINE 14 MG: 14 PATCH, EXTENDED RELEASE TRANSDERMAL at 21:15

## 2025-01-05 RX ADMIN — LORAZEPAM 2 MG: 1 TABLET ORAL at 17:35

## 2025-01-05 RX ADMIN — BUDESONIDE AND FORMOTEROL FUMARATE DIHYDRATE 2 PUFF: 80; 4.5 AEROSOL RESPIRATORY (INHALATION) at 21:07

## 2025-01-05 RX ADMIN — LORAZEPAM 2 MG: 1 TABLET ORAL at 23:24

## 2025-01-05 RX ADMIN — OXCARBAZEPINE 300 MG: 300 TABLET, FILM COATED ORAL at 21:05

## 2025-01-05 RX ADMIN — DICYCLOMINE HYDROCHLORIDE 20 MG: 20 TABLET ORAL at 21:05

## 2025-01-05 RX ADMIN — FAMOTIDINE 20 MG: 20 TABLET, FILM COATED ORAL at 21:05
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
## 2025-01-05 NOTE — LETTER
[facility], [facility] EMERGENCY DEPARTMENT  [address] [address]  [phone]  Dept: [phone]      EMTALA TRANSFER CONSENT    NAME Antionette Downing                                        RANDY 1985                              MRN [id number]    I have been informed of my rights regarding examination, treatment, and transfer   by Dr. Dexter Sarmiento MD    Benefits:  mental health treatment    Risks:    Delay in care     { ED EMTALA TRANSFER CHOICES:[id number]}    I authorize the performance of emergency medical procedures and treatments upon me in both transit and upon arrival at the receiving facility.  Additionally, I authorize the release of any and all medical records to the receiving facility and request they be transported with me, if possible.  I understand that the safest mode of transportation during a medical emergency is an ambulance and that the Hospital advocates the use of this mode of transport. Risks of traveling to the receiving facility by car, including absence of medical control, life sustaining equipment, such as oxygen, and medical personnel has been explained to me and I fully understand them.    (JEMIMA CORRECT BOX BELOW)  [  x]  I consent to the stated transfer and to be transported by ambulance/helicopter.  [  ]  I consent to the stated transfer, but refuse transportation by ambulance and accept full responsibility for my transportation by car.  I understand the risks of non-ambulance transfers and I exonerate the Hospital and its staff from any deterioration in my condition that results from this refusal.    X___________________________________________    DATE  25  TIME________  Signature of patient or legally responsible individual signing on patient behalf           RELATIONSHIP TO PATIENT________self _________________          Provider Certification    NAME Antionette Downing                                        RANDY 1985                               MRN [id number]    A medical screening exam was performed on the above named patient.  Based on the examination:    Condition Necessitating Transfer The primary encounter diagnosis was Encounter for psychological evaluation. Diagnoses of Acute paranoia (HCC) and Medical clearance for psychiatric admission were also pertinent to this visit.    Patient Condition:  bipolar disorder     Reason for Transfer:  involuntary admission    Transfer Requirements: Facility   [facility]   Space available and qualified personnel available for treatment as acknowledged by  intake  Agreed to accept transfer and to provide appropriate medical treatment as acknowledged by        Antionette LIZAMA  Appropriate medical records of the examination and treatment of the patient are provided at the time of transfer   STAFF INITIAL WHEN COMPLETED ap_______  Transfer will be performed by qualified personnel from   [facility] and appropriate transfer equipment as required, including the use of necessary and appropriate life support measures.    Provider Certification: I have examined the patient and explained the following risks and benefits of being transferred/refusing transfer to the patient/family:   Involuntary mental health treatment      Based on these reasonable risks and benefits to the patient and/or the unborn child(sin), and based upon the information available at the time of the patient’s examination, I certify that the medical benefits reasonably to be expected from the provision of appropriate medical treatments at another medical facility outweigh the increasing risks, if any, to the individual’s medical condition, and in the case of labor to the unborn child, from effecting the transfer.    X____________________________________________ DATE 01/06/25        TIME_______      ORIGINAL - SEND TO MEDICAL RECORDS   COPY - SEND WITH PATIENT DURING TRANSFER

## 2025-01-06 ENCOUNTER — HOSPITAL ENCOUNTER (INPATIENT)
Facility: HOSPITAL | Age: 40
LOS: 4 days | Discharge: HOME | End: 2025-01-10
Attending: STUDENT IN AN ORGANIZED HEALTH CARE EDUCATION/TRAINING PROGRAM | Admitting: STUDENT IN AN ORGANIZED HEALTH CARE EDUCATION/TRAINING PROGRAM
Payer: COMMERCIAL

## 2025-01-06 VITALS
RESPIRATION RATE: 16 BRPM | SYSTOLIC BLOOD PRESSURE: 106 MMHG | TEMPERATURE: 97.4 F | HEART RATE: 89 BPM | DIASTOLIC BLOOD PRESSURE: 72 MMHG | OXYGEN SATURATION: 98 %

## 2025-01-06 DIAGNOSIS — F31.2 BIPOLAR I DISORDER, MOST RECENT EPISODE MANIC, SEVERE WITH PSYCHOTIC FEATURES (HCC): Primary | Chronic | ICD-10-CM

## 2025-01-06 DIAGNOSIS — Z00.8 MEDICAL CLEARANCE FOR PSYCHIATRIC ADMISSION: ICD-10-CM

## 2025-01-06 DIAGNOSIS — Z72.0 TOBACCO ABUSE: ICD-10-CM

## 2025-01-06 RX ORDER — BENZTROPINE MESYLATE 1 MG/1
1 TABLET ORAL
Status: CANCELLED | OUTPATIENT
Start: 2025-01-06

## 2025-01-06 RX ORDER — AMOXICILLIN 250 MG
1 CAPSULE ORAL DAILY PRN
Status: DISCONTINUED | OUTPATIENT
Start: 2025-01-06 | End: 2025-01-10 | Stop reason: HOSPADM

## 2025-01-06 RX ORDER — HYDROXYZINE HYDROCHLORIDE 50 MG/1
100 TABLET, FILM COATED ORAL
Status: CANCELLED | OUTPATIENT
Start: 2025-01-06

## 2025-01-06 RX ORDER — BISACODYL 10 MG
10 SUPPOSITORY, RECTAL RECTAL DAILY PRN
Status: CANCELLED | OUTPATIENT
Start: 2025-01-06

## 2025-01-06 RX ORDER — ACETAMINOPHEN 325 MG/1
975 TABLET ORAL EVERY 6 HOURS PRN
Status: CANCELLED | OUTPATIENT
Start: 2025-01-06

## 2025-01-06 RX ORDER — OLANZAPINE 10 MG/2ML
10 INJECTION, POWDER, FOR SOLUTION INTRAMUSCULAR
Status: DISCONTINUED | OUTPATIENT
Start: 2025-01-06 | End: 2025-01-10 | Stop reason: HOSPADM

## 2025-01-06 RX ORDER — OLANZAPINE 10 MG/1
30 TABLET ORAL
Status: CANCELLED | OUTPATIENT
Start: 2025-01-06

## 2025-01-06 RX ORDER — BISACODYL 10 MG
10 SUPPOSITORY, RECTAL RECTAL DAILY PRN
Status: DISCONTINUED | OUTPATIENT
Start: 2025-01-06 | End: 2025-01-10 | Stop reason: HOSPADM

## 2025-01-06 RX ORDER — BENZTROPINE MESYLATE 1 MG/1
1 TABLET ORAL
Status: DISCONTINUED | OUTPATIENT
Start: 2025-01-06 | End: 2025-01-10 | Stop reason: HOSPADM

## 2025-01-06 RX ORDER — HYDROXYZINE HYDROCHLORIDE 25 MG/1
25 TABLET, FILM COATED ORAL
Status: CANCELLED | OUTPATIENT
Start: 2025-01-06

## 2025-01-06 RX ORDER — POLYETHYLENE GLYCOL 3350 17 G/17G
17 POWDER, FOR SOLUTION ORAL DAILY PRN
Status: DISCONTINUED | OUTPATIENT
Start: 2025-01-06 | End: 2025-01-10 | Stop reason: HOSPADM

## 2025-01-06 RX ORDER — BENZTROPINE MESYLATE 1 MG/ML
1 INJECTION, SOLUTION INTRAMUSCULAR; INTRAVENOUS
Status: CANCELLED | OUTPATIENT
Start: 2025-01-06

## 2025-01-06 RX ORDER — HYDROXYZINE HYDROCHLORIDE 50 MG/1
50 TABLET, FILM COATED ORAL
Status: CANCELLED | OUTPATIENT
Start: 2025-01-06

## 2025-01-06 RX ORDER — FAMOTIDINE 20 MG/1
20 TABLET, FILM COATED ORAL 2 TIMES DAILY PRN
Status: CANCELLED | OUTPATIENT
Start: 2025-01-06

## 2025-01-06 RX ORDER — BENZTROPINE MESYLATE 1 MG/ML
1 INJECTION, SOLUTION INTRAMUSCULAR; INTRAVENOUS
Status: DISCONTINUED | OUTPATIENT
Start: 2025-01-06 | End: 2025-01-10 | Stop reason: HOSPADM

## 2025-01-06 RX ORDER — MAGNESIUM HYDROXIDE/ALUMINUM HYDROXICE/SIMETHICONE 120; 1200; 1200 MG/30ML; MG/30ML; MG/30ML
30 SUSPENSION ORAL EVERY 4 HOURS PRN
Status: CANCELLED | OUTPATIENT
Start: 2025-01-06

## 2025-01-06 RX ORDER — POLYETHYLENE GLYCOL 3350 17 G/17G
17 POWDER, FOR SOLUTION ORAL DAILY PRN
Status: CANCELLED | OUTPATIENT
Start: 2025-01-06

## 2025-01-06 RX ORDER — PANTOPRAZOLE SODIUM 40 MG/1
40 TABLET, DELAYED RELEASE ORAL DAILY
Status: CANCELLED | OUTPATIENT
Start: 2025-01-07

## 2025-01-06 RX ORDER — OLANZAPINE 5 MG/1
5 TABLET ORAL
Status: CANCELLED | OUTPATIENT
Start: 2025-01-06

## 2025-01-06 RX ORDER — DICYCLOMINE HCL 20 MG
20 TABLET ORAL 2 TIMES DAILY PRN
Status: CANCELLED | OUTPATIENT
Start: 2025-01-06

## 2025-01-06 RX ORDER — OXCARBAZEPINE 300 MG/1
300 TABLET, FILM COATED ORAL EVERY 12 HOURS SCHEDULED
Status: CANCELLED | OUTPATIENT
Start: 2025-01-06

## 2025-01-06 RX ORDER — ALBUTEROL SULFATE 90 UG/1
2 INHALANT RESPIRATORY (INHALATION) EVERY 6 HOURS PRN
Status: CANCELLED | OUTPATIENT
Start: 2025-01-06

## 2025-01-06 RX ORDER — FAMOTIDINE 20 MG/1
20 TABLET, FILM COATED ORAL 2 TIMES DAILY PRN
Status: DISCONTINUED | OUTPATIENT
Start: 2025-01-06 | End: 2025-01-08

## 2025-01-06 RX ORDER — OLANZAPINE 10 MG/1
10 TABLET ORAL
Status: CANCELLED | OUTPATIENT
Start: 2025-01-06

## 2025-01-06 RX ORDER — DOCUSATE SODIUM 100 MG/1
100 CAPSULE, LIQUID FILLED ORAL 2 TIMES DAILY
Status: DISCONTINUED | OUTPATIENT
Start: 2025-01-06 | End: 2025-01-10 | Stop reason: HOSPADM

## 2025-01-06 RX ORDER — ACETAMINOPHEN 325 MG/1
650 TABLET ORAL EVERY 6 HOURS PRN
Status: DISCONTINUED | OUTPATIENT
Start: 2025-01-06 | End: 2025-01-10 | Stop reason: HOSPADM

## 2025-01-06 RX ORDER — OLANZAPINE 2.5 MG/1
2.5 TABLET, FILM COATED ORAL
Status: DISCONTINUED | OUTPATIENT
Start: 2025-01-06 | End: 2025-01-10 | Stop reason: HOSPADM

## 2025-01-06 RX ORDER — OLANZAPINE 10 MG/1
10 TABLET ORAL
Status: DISCONTINUED | OUTPATIENT
Start: 2025-01-06 | End: 2025-01-10 | Stop reason: HOSPADM

## 2025-01-06 RX ORDER — OLANZAPINE 5 MG/1
5 TABLET ORAL
Status: DISCONTINUED | OUTPATIENT
Start: 2025-01-06 | End: 2025-01-10 | Stop reason: HOSPADM

## 2025-01-06 RX ORDER — ACETAMINOPHEN 325 MG/1
650 TABLET ORAL EVERY 4 HOURS PRN
Status: CANCELLED | OUTPATIENT
Start: 2025-01-06

## 2025-01-06 RX ORDER — BUDESONIDE AND FORMOTEROL FUMARATE DIHYDRATE 80; 4.5 UG/1; UG/1
2 AEROSOL RESPIRATORY (INHALATION) 2 TIMES DAILY
Status: DISCONTINUED | OUTPATIENT
Start: 2025-01-06 | End: 2025-01-10 | Stop reason: HOSPADM

## 2025-01-06 RX ORDER — HYDROXYZINE HYDROCHLORIDE 50 MG/1
100 TABLET, FILM COATED ORAL
Status: DISCONTINUED | OUTPATIENT
Start: 2025-01-06 | End: 2025-01-10 | Stop reason: HOSPADM

## 2025-01-06 RX ORDER — ALBUTEROL SULFATE 90 UG/1
2 INHALANT RESPIRATORY (INHALATION) EVERY 6 HOURS PRN
Status: DISCONTINUED | OUTPATIENT
Start: 2025-01-06 | End: 2025-01-10 | Stop reason: HOSPADM

## 2025-01-06 RX ORDER — AMOXICILLIN 250 MG
1 CAPSULE ORAL DAILY PRN
Status: CANCELLED | OUTPATIENT
Start: 2025-01-06

## 2025-01-06 RX ORDER — PROPRANOLOL HCL 20 MG
10 TABLET ORAL EVERY 8 HOURS PRN
Status: CANCELLED | OUTPATIENT
Start: 2025-01-06

## 2025-01-06 RX ORDER — OLANZAPINE 10 MG/1
30 TABLET ORAL
Status: DISCONTINUED | OUTPATIENT
Start: 2025-01-06 | End: 2025-01-10 | Stop reason: HOSPADM

## 2025-01-06 RX ORDER — OLANZAPINE 10 MG/2ML
5 INJECTION, POWDER, FOR SOLUTION INTRAMUSCULAR
Status: DISCONTINUED | OUTPATIENT
Start: 2025-01-06 | End: 2025-01-10 | Stop reason: HOSPADM

## 2025-01-06 RX ORDER — LORAZEPAM 2 MG/ML
2 INJECTION INTRAMUSCULAR EVERY 6 HOURS PRN
Status: CANCELLED | OUTPATIENT
Start: 2025-01-06

## 2025-01-06 RX ORDER — ACETAMINOPHEN 325 MG/1
975 TABLET ORAL EVERY 6 HOURS PRN
Status: DISCONTINUED | OUTPATIENT
Start: 2025-01-06 | End: 2025-01-10 | Stop reason: HOSPADM

## 2025-01-06 RX ORDER — DIPHENHYDRAMINE HYDROCHLORIDE 50 MG/ML
50 INJECTION INTRAMUSCULAR; INTRAVENOUS EVERY 6 HOURS PRN
Status: CANCELLED | OUTPATIENT
Start: 2025-01-06

## 2025-01-06 RX ORDER — LORAZEPAM 2 MG/ML
2 INJECTION INTRAMUSCULAR EVERY 6 HOURS PRN
Status: DISCONTINUED | OUTPATIENT
Start: 2025-01-06 | End: 2025-01-10 | Stop reason: HOSPADM

## 2025-01-06 RX ORDER — TRAZODONE HYDROCHLORIDE 50 MG/1
50 TABLET, FILM COATED ORAL ONCE
Status: DISCONTINUED | OUTPATIENT
Start: 2025-01-06 | End: 2025-01-10 | Stop reason: HOSPADM

## 2025-01-06 RX ORDER — OLANZAPINE 10 MG/2ML
5 INJECTION, POWDER, FOR SOLUTION INTRAMUSCULAR
Status: CANCELLED | OUTPATIENT
Start: 2025-01-06

## 2025-01-06 RX ORDER — DICYCLOMINE HCL 20 MG
20 TABLET ORAL 2 TIMES DAILY PRN
Status: DISCONTINUED | OUTPATIENT
Start: 2025-01-06 | End: 2025-01-07

## 2025-01-06 RX ORDER — HYDROXYZINE HYDROCHLORIDE 50 MG/1
50 TABLET, FILM COATED ORAL
Status: DISCONTINUED | OUTPATIENT
Start: 2025-01-06 | End: 2025-01-10 | Stop reason: HOSPADM

## 2025-01-06 RX ORDER — PROPRANOLOL HYDROCHLORIDE 10 MG/1
10 TABLET ORAL EVERY 8 HOURS PRN
Status: DISCONTINUED | OUTPATIENT
Start: 2025-01-06 | End: 2025-01-10 | Stop reason: HOSPADM

## 2025-01-06 RX ORDER — HYDROXYZINE HYDROCHLORIDE 25 MG/1
25 TABLET, FILM COATED ORAL
Status: DISCONTINUED | OUTPATIENT
Start: 2025-01-06 | End: 2025-01-10 | Stop reason: HOSPADM

## 2025-01-06 RX ORDER — DIPHENHYDRAMINE HYDROCHLORIDE 50 MG/ML
50 INJECTION INTRAMUSCULAR; INTRAVENOUS EVERY 6 HOURS PRN
Status: DISCONTINUED | OUTPATIENT
Start: 2025-01-06 | End: 2025-01-10 | Stop reason: HOSPADM

## 2025-01-06 RX ORDER — BUDESONIDE AND FORMOTEROL FUMARATE DIHYDRATE 80; 4.5 UG/1; UG/1
2 AEROSOL RESPIRATORY (INHALATION) 2 TIMES DAILY
Status: CANCELLED | OUTPATIENT
Start: 2025-01-06

## 2025-01-06 RX ORDER — MAGNESIUM HYDROXIDE/ALUMINUM HYDROXICE/SIMETHICONE 120; 1200; 1200 MG/30ML; MG/30ML; MG/30ML
30 SUSPENSION ORAL EVERY 4 HOURS PRN
Status: DISCONTINUED | OUTPATIENT
Start: 2025-01-06 | End: 2025-01-10 | Stop reason: HOSPADM

## 2025-01-06 RX ORDER — PANTOPRAZOLE SODIUM 40 MG/1
40 TABLET, DELAYED RELEASE ORAL DAILY
Status: DISCONTINUED | OUTPATIENT
Start: 2025-01-07 | End: 2025-01-10 | Stop reason: HOSPADM

## 2025-01-06 RX ORDER — OLANZAPINE 5 MG/1
2.5 TABLET ORAL
Status: CANCELLED | OUTPATIENT
Start: 2025-01-06

## 2025-01-06 RX ORDER — ACETAMINOPHEN 325 MG/1
650 TABLET ORAL EVERY 4 HOURS PRN
Status: DISCONTINUED | OUTPATIENT
Start: 2025-01-06 | End: 2025-01-10 | Stop reason: HOSPADM

## 2025-01-06 RX ORDER — OLANZAPINE 10 MG/2ML
10 INJECTION, POWDER, FOR SOLUTION INTRAMUSCULAR
Status: CANCELLED | OUTPATIENT
Start: 2025-01-06

## 2025-01-06 RX ORDER — OXCARBAZEPINE 300 MG/1
300 TABLET, FILM COATED ORAL EVERY 12 HOURS SCHEDULED
Status: DISCONTINUED | OUTPATIENT
Start: 2025-01-06 | End: 2025-01-10 | Stop reason: HOSPADM

## 2025-01-06 RX ORDER — ACETAMINOPHEN 325 MG/1
650 TABLET ORAL EVERY 6 HOURS PRN
Status: CANCELLED | OUTPATIENT
Start: 2025-01-06

## 2025-01-06 RX ADMIN — BUDESONIDE AND FORMOTEROL FUMARATE DIHYDRATE 2 PUFF: 80; 4.5 AEROSOL RESPIRATORY (INHALATION) at 21:19

## 2025-01-06 RX ADMIN — OXCARBAZEPINE 300 MG: 300 TABLET, FILM COATED ORAL at 21:12

## 2025-01-06 RX ADMIN — PANTOPRAZOLE SODIUM 40 MG: 40 TABLET, DELAYED RELEASE ORAL at 08:59

## 2025-01-06 RX ADMIN — OLANZAPINE 30 MG: 10 TABLET, FILM COATED ORAL at 21:12

## 2025-01-06 RX ADMIN — DICYCLOMINE HYDROCHLORIDE 20 MG: 20 TABLET ORAL at 18:38

## 2025-01-06 RX ADMIN — DOCUSATE SODIUM 100 MG: 100 CAPSULE, LIQUID FILLED ORAL at 21:12

## 2025-01-06 RX ADMIN — OXCARBAZEPINE 300 MG: 300 TABLET, FILM COATED ORAL at 08:59

## 2025-01-06 RX ADMIN — DICYCLOMINE HYDROCHLORIDE 20 MG: 20 TABLET ORAL at 14:38

## 2025-01-06 NOTE — ED CARE HANDOFF
Emergency Department Sign Out Note        Sign out and transfer of care from Dr. Sarmiento. See Separate Emergency Department note.     The patient, Antionette Downing, was evaluated by the previous provider for .    Workup Completed:  302    ED Course / Workup Pending (followup):  Pending placement                                     Procedures  Medical Decision Making  Amount and/or Complexity of Data Reviewed  Labs: ordered.    Risk  OTC drugs.  Prescription drug management.  Decision regarding hospitalization.            Disposition  Final diagnoses:   Encounter for psychological evaluation   Acute paranoia (HCC)     Time reflects when diagnosis was documented in both MDM as applicable and the Disposition within this note       Time User Action Codes Description Comment    1/5/2025  5:43 PM Suzette Javier [Z00.8] Encounter for psychological evaluation     1/5/2025  5:43 PM Suzette Javier [F22] Acute paranoia (HCC)     1/6/2025 12:47 PM Josie Antionette Add [Z00.8] Medical clearance for psychiatric admission           ED Disposition       ED Disposition   Transfer to Behavioral [facility]    Condition   --    Date/Time   Sun Jan 5, 2025  5:43 PM    Comment   Antionette Downing should be transferred out to Behavioral Health and has been medically cleared.               Follow-up Information    None       Patient's Medications   Discharge Prescriptions    No medications on file     No discharge procedures on file.       ED Provider  Electronically Signed by     Pee Campbell MD  01/06/25 0040
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
## 2025-01-06 NOTE — NURSING NOTE
"Pt petitioned as a 302 by spouse d/t being paranoid, off medications, aggressive, and making threats to . Pt denies being suicidal or homicidal. Believes  is cheating on pt and that is reason for \"sending me to the hospital\". Reports \"I was doing laundry and the  came\". Pt guarded and scant. Reports one suicide attempt in 2016 VIA cutting and OD. History of substance use. UDS(-).     Dr. Jaramillo made aware med req completed and scored Moderate Risk on lifetime. No new orders.  "

## 2025-01-06 NOTE — ED CARE HANDOFF
Emergency Department Sign Out Note        Sign out and transfer of care from Dr. Haji. See Separate Emergency Department note.     The patient, Antionette Downing, was evaluated by the previous provider for Psych.    Workup Completed:  Psych    ED Course / Workup Pending (followup):  302 signed waiting placement                                      Procedures  Medical Decision Making  Amount and/or Complexity of Data Reviewed  Labs: ordered.    Risk  OTC drugs.  Prescription drug management.  Decision regarding hospitalization.            Disposition  Final diagnoses:   Encounter for psychological evaluation   Acute paranoia (HCC)     Time reflects when diagnosis was documented in both MDM as applicable and the Disposition within this note       Time User Action Codes Description Comment    1/5/2025  5:43 PM Suzette Javier [Z00.8] Encounter for psychological evaluation     1/5/2025  5:43 PM Suzette Javier [F22] Acute paranoia (HCC)           ED Disposition       ED Disposition   Transfer to Behavioral [facility]    Condition   --    Date/Time   Sun Jan 5, 2025  5:43 PM    Comment   nAtionette Downing should be transferred out to Behavioral Health and has been medically cleared.               Follow-up Information    None       Patient's Medications   Discharge Prescriptions    No medications on file     No discharge procedures on file.       ED Provider  Electronically Signed by     Dexter Sarmiento MD  01/06/25 0928

## 2025-01-06 NOTE — ED NOTES
Insurance Authorization for admission:   Phone call placed to Wojciech (EDISON Cool)  Phone number: [phone]  Spoke to Marely VALENCIA     4 days approved.  Level of care: involuntary inpatient mental health   Review on pending admission   Authorization # pending admission    Eligibility Verification System checked - ([phone]).  Online system / automated system indicates: active     Recipient ID: [id number]

## 2025-01-06 NOTE — ED NOTES
Pt sleeping, no distress noted, respirations even unlabored.  1:1 continues for safety     Michele Cole RN  01/06/25 0739

## 2025-01-06 NOTE — PLAN OF CARE
Problem: PSYCHOSIS  Goal: Will report no hallucinations or delusions  Description: Interventions:  - Administer medication as  ordered  - Every waking shifts and PRN assess for the presence of hallucinations and or delusions  - Assist with reality testing to support increasing orientation  - Assess if patient's hallucinations or delusions are encouraging self-harm or harm to others and intervene as appropriate  Outcome: Progressing     Problem: BEHAVIOR  Goal: Pt/Family maintain appropriate behavior and adhere to behavioral management agreement, if implemented  Description: INTERVENTIONS:  - Assess the family dynamic   - Encourage verbalization of thoughts and concerns in a socially appropriate manner  - Assess patient/family's coping skills and non-compliant behavior (including use of illegal substances).  - Utilize positive, consistent limit setting strategies supporting safety of patient, staff and others  - Initiate consult with Case Management, Spiritual Care or other ancillary services as appropriate  - If a patient's/visitor's behavior jeopardizes the safety of the patient, staff, or others, refer to organization procedure.   - Notify Security of behavior or suspected illegal substances which indicate the need for search of the patient and/or belongings  - Encourage participation in the decision making process about a behavioral management agreement; implement if patient meets criteria  Outcome: Progressing     Problem: ANXIETY  Goal: Will report anxiety at manageable levels  Description: INTERVENTIONS:  - Administer medication as ordered  - Teach and encourage coping skills  - Provide emotional support  - Assess patient/family for anxiety and ability to cope  Outcome: Progressing  Goal: By discharge: Patient will verbalize 2 strategies to deal with anxiety  Description: Interventions:  - Identify any obvious source/trigger to anxiety  - Staff will assist patient in applying identified coping  technique/skills  - Encourage attendance of scheduled groups and activities  Outcome: Progressing

## 2025-01-06 NOTE — ED NOTES
Patient is accepted at [facility]   Patient is accepted by [facility]    Transportation is arranged with [facility].     Transportation is scheduled for 1630 with [facility]      Nurse report is to be called to [phone] prior to patient transfer.       [place] completed  Patient is aware of transfer

## 2025-01-06 NOTE — ED NOTES
Pt given chips, pretzels and cola. Tolerating well. Pt has no other needs @ this time. All safety precautions continue including 1:1 @ bedside.     Adrian Richardson  01/05/25 [id number]

## 2025-01-07 PROBLEM — Z00.8 MEDICAL CLEARANCE FOR PSYCHIATRIC ADMISSION: Status: ACTIVE | Noted: 2025-01-07

## 2025-01-07 LAB
25(OH)D3 SERPL-MCNC: 16.9 NG/ML (ref 30–100)
ALBUMIN SERPL BCG-MCNC: 3.6 G/DL (ref 3.5–5)
ALP SERPL-CCNC: 46 U/L (ref 34–104)
ALT SERPL W P-5'-P-CCNC: 5 U/L (ref 7–52)
ANION GAP SERPL CALCULATED.3IONS-SCNC: 5 MMOL/L (ref 4–13)
AST SERPL W P-5'-P-CCNC: 11 U/L (ref 13–39)
BASOPHILS # BLD AUTO: 0.02 THOUSANDS/ΜL (ref 0–0.1)
BASOPHILS NFR BLD AUTO: 0 % (ref 0–1)
BILIRUB SERPL-MCNC: 0.21 MG/DL (ref 0.2–1)
BUN SERPL-MCNC: 13 MG/DL (ref 5–25)
CALCIUM SERPL-MCNC: 8.5 MG/DL (ref 8.4–10.2)
CHLORIDE SERPL-SCNC: 111 MMOL/L (ref 96–108)
CHOLEST SERPL-MCNC: 151 MG/DL (ref ?–200)
CO2 SERPL-SCNC: 25 MMOL/L (ref 21–32)
CREAT SERPL-MCNC: 0.64 MG/DL (ref 0.6–1.3)
EOSINOPHIL # BLD AUTO: 0.22 THOUSAND/ΜL (ref 0–0.61)
EOSINOPHIL NFR BLD AUTO: 4 % (ref 0–6)
ERYTHROCYTE [DISTWIDTH] IN BLOOD BY AUTOMATED COUNT: 12.2 % (ref 11.6–15.1)
EST. AVERAGE GLUCOSE BLD GHB EST-MCNC: 97 MG/DL
FOLATE SERPL-MCNC: 7 NG/ML
GFR SERPL CREATININE-BSD FRML MDRD: 114 ML/MIN/1.73SQ M
GLUCOSE P FAST SERPL-MCNC: 87 MG/DL (ref 65–99)
GLUCOSE SERPL-MCNC: 87 MG/DL (ref 65–140)
HBA1C MFR BLD: 5 %
HCT VFR BLD AUTO: 38.9 % (ref 34.8–46.1)
HDLC SERPL-MCNC: 43 MG/DL
HGB BLD-MCNC: 12.9 G/DL (ref 11.5–15.4)
IMM GRANULOCYTES # BLD AUTO: 0.02 THOUSAND/UL (ref 0–0.2)
IMM GRANULOCYTES NFR BLD AUTO: 0 % (ref 0–2)
LDLC SERPL CALC-MCNC: 81 MG/DL (ref 0–100)
LYMPHOCYTES # BLD AUTO: 2.61 THOUSANDS/ΜL (ref 0.6–4.47)
LYMPHOCYTES NFR BLD AUTO: 42 % (ref 14–44)
MCH RBC QN AUTO: 32.3 PG (ref 26.8–34.3)
MCHC RBC AUTO-ENTMCNC: 33.2 G/DL (ref 31.4–37.4)
MCV RBC AUTO: 97 FL (ref 82–98)
MONOCYTES # BLD AUTO: 0.58 THOUSAND/ΜL (ref 0.17–1.22)
MONOCYTES NFR BLD AUTO: 9 % (ref 4–12)
NEUTROPHILS # BLD AUTO: 2.75 THOUSANDS/ΜL (ref 1.85–7.62)
NEUTS SEG NFR BLD AUTO: 45 % (ref 43–75)
NONHDLC SERPL-MCNC: 108 MG/DL
NRBC BLD AUTO-RTO: 0 /100 WBCS
PLATELET # BLD AUTO: 159 THOUSANDS/UL (ref 149–390)
PMV BLD AUTO: 11.5 FL (ref 8.9–12.7)
POTASSIUM SERPL-SCNC: 3.9 MMOL/L (ref 3.5–5.3)
PROT SERPL-MCNC: 5.7 G/DL (ref 6.4–8.4)
RBC # BLD AUTO: 4 MILLION/UL (ref 3.81–5.12)
SODIUM SERPL-SCNC: 141 MMOL/L (ref 135–147)
TREPONEMA PALLIDUM IGG+IGM AB [PRESENCE] IN SERUM OR PLASMA BY IMMUNOASSAY: NORMAL
TRIGL SERPL-MCNC: 133 MG/DL (ref ?–150)
TSH SERPL DL<=0.05 MIU/L-ACNC: 1.48 UIU/ML (ref 0.45–4.5)
VIT B12 SERPL-MCNC: 190 PG/ML (ref 180–914)
WBC # BLD AUTO: 6.2 THOUSAND/UL (ref 4.31–10.16)

## 2025-01-07 PROCEDURE — 82607 VITAMIN B-12: CPT | Performed by: PSYCHIATRY & NEUROLOGY

## 2025-01-07 PROCEDURE — 83036 HEMOGLOBIN GLYCOSYLATED A1C: CPT | Performed by: PSYCHIATRY & NEUROLOGY

## 2025-01-07 PROCEDURE — 99222 1ST HOSP IP/OBS MODERATE 55: CPT

## 2025-01-07 PROCEDURE — 99223 1ST HOSP IP/OBS HIGH 75: CPT | Performed by: STUDENT IN AN ORGANIZED HEALTH CARE EDUCATION/TRAINING PROGRAM

## 2025-01-07 PROCEDURE — 84443 ASSAY THYROID STIM HORMONE: CPT | Performed by: PSYCHIATRY & NEUROLOGY

## 2025-01-07 PROCEDURE — 82306 VITAMIN D 25 HYDROXY: CPT | Performed by: PSYCHIATRY & NEUROLOGY

## 2025-01-07 PROCEDURE — 80053 COMPREHEN METABOLIC PANEL: CPT | Performed by: PSYCHIATRY & NEUROLOGY

## 2025-01-07 PROCEDURE — 86780 TREPONEMA PALLIDUM: CPT | Performed by: PSYCHIATRY & NEUROLOGY

## 2025-01-07 PROCEDURE — 80061 LIPID PANEL: CPT | Performed by: PSYCHIATRY & NEUROLOGY

## 2025-01-07 PROCEDURE — 85025 COMPLETE CBC W/AUTO DIFF WBC: CPT | Performed by: PSYCHIATRY & NEUROLOGY

## 2025-01-07 PROCEDURE — 82746 ASSAY OF FOLIC ACID SERUM: CPT | Performed by: PSYCHIATRY & NEUROLOGY

## 2025-01-07 RX ORDER — TRAZODONE HYDROCHLORIDE 50 MG/1
50 TABLET, FILM COATED ORAL
Status: DISCONTINUED | OUTPATIENT
Start: 2025-01-07 | End: 2025-01-10 | Stop reason: HOSPADM

## 2025-01-07 RX ORDER — DICYCLOMINE HCL 20 MG
20 TABLET ORAL
Status: DISCONTINUED | OUTPATIENT
Start: 2025-01-07 | End: 2025-01-10 | Stop reason: HOSPADM

## 2025-01-07 RX ADMIN — BUDESONIDE AND FORMOTEROL FUMARATE DIHYDRATE 2 PUFF: 80; 4.5 AEROSOL RESPIRATORY (INHALATION) at 21:13

## 2025-01-07 RX ADMIN — DOCUSATE SODIUM 100 MG: 100 CAPSULE, LIQUID FILLED ORAL at 17:40

## 2025-01-07 RX ADMIN — OLANZAPINE 30 MG: 10 TABLET, FILM COATED ORAL at 21:13

## 2025-01-07 RX ADMIN — DICYCLOMINE HYDROCHLORIDE 20 MG: 20 TABLET ORAL at 08:47

## 2025-01-07 RX ADMIN — OXCARBAZEPINE 300 MG: 300 TABLET, FILM COATED ORAL at 21:13

## 2025-01-07 RX ADMIN — DOCUSATE SODIUM 100 MG: 100 CAPSULE, LIQUID FILLED ORAL at 08:45

## 2025-01-07 RX ADMIN — DICYCLOMINE HYDROCHLORIDE 20 MG: 20 TABLET ORAL at 15:58

## 2025-01-07 RX ADMIN — PANTOPRAZOLE SODIUM 40 MG: 40 TABLET, DELAYED RELEASE ORAL at 08:45

## 2025-01-07 RX ADMIN — OXCARBAZEPINE 300 MG: 300 TABLET, FILM COATED ORAL at 08:45

## 2025-01-07 NOTE — NURSING NOTE
Pt requested Bentyl for stomach pain. Upon reassessment one hour later pt reported less stomach pain, PRN effective.

## 2025-01-07 NOTE — PROGRESS NOTES
01/07/25 0754   Team Meeting   Meeting Type Daily Rounds   Team Members Present   Team Members Present Physician;;Nurse;Other (Discipline and Name)   Physician Team Member Dr. Gutierres / Dr. Michelle / EMMA Bro / PA Student   Nursing Team Member Christos   Care Management Team Member Amie / Magali / Abby   Other (Discipline and Name) Sigmund - Group Facilitator   Patient/Family Present   Patient Present No   Patient's Family Present No     New admission 302 petitioned by spouse from  ED. Pt was reportedly aggression and threats towards SO. Reported being sober a few months. Please on unit. Denies all symptoms.

## 2025-01-07 NOTE — ASSESSMENT & PLAN NOTE
History of PNES and has previously followed with outpatient neurology  She was seen in neurology office April 2024 and most recently 10/7/2024  She is on Trileptal 300 mg by mouth twice daily for mood stabilization, this is also preventative against seizures  Continue inpatient psychiatric treatment

## 2025-01-07 NOTE — ASSESSMENT & PLAN NOTE
Admission labs reviewed, CBC, CMP, lipid panel, TSH, UA acceptable   HbA1C, RPR, Vitamin levels pending  Vitals stable   UDS negative  EKG: pending   Medically stable for continued inpatient psychiatric treatment based on available results

## 2025-01-07 NOTE — CONSULTS
"Consultation - Hospitalist   Name: Antionette Downing 39 y.o. female I MRN: [id number]  Unit/Bed#: -01 I Date of Admission: 1/6/2025   Date of Service: 1/7/2025 I Hospital Day: 1   Inpatient consult for Medical Clearance for  patient  Consult performed by: Manjula Montgomery PA-C  Consult ordered by: Antionette Galindo PA-C        Physician Requesting Evaluation: Sheryl Russo*   Reason for Evaluation / Principal Problem: Medical clearance for psychiatric admission       Assessment & Plan  Bipolar I disorder, most recent episode manic, severe with psychotic features (HCC)    Medical clearance for psychiatric admission  Admission labs reviewed, CBC, CMP, lipid panel, TSH, UA acceptable   HbA1C, RPR, Vitamin levels pending  Vitals stable   UDS negative  EKG: pending   Medically stable for continued inpatient psychiatric treatment based on available results   Psychogenic nonepileptic seizure  History of PNES and has previously followed with outpatient neurology  She was seen in neurology office April 2024 and most recently 10/7/2024  She is on Trileptal 300 mg by mouth twice daily for mood stabilization, this is also preventative against seizures  Continue inpatient psychiatric treatment  History of hepatitis C  Hx of HCV exposure reportedly in childhood   No hx of treatment in the past  Seen by GI 2019 several times:   Per note in 2021: \"She likely cleared virus on her own. Hepatitis C viral load from 2016 in 2019 undetectable. No evidence of chronic liver disease or cirrhosis on labs, CT, elastography.\"  Asthma  No current exacerbation  Encourage cessation of tobacco use  Continue home symbicort  Albuterol as needed  Post-traumatic stress disorder, chronic    Migraine without aura and without status migrainosus, not intractable  History of migraines however patient denies any current headaches  She was previously on Topamax 100 mg twice daily however does not appear to be the on this " currently  Continue outpatient neurology follow-up as needed  Peripheral neuropathy  Patient was previously taking B12 supplements however not currently taking  B12 levels pending        Counseling / Coordination of Care Time: 30 minutes.  Greater than 50% of total time spent on patient counseling and coordination of care.    Collaboration of Care: Were Recommendations Directly Discussed with Primary Treatment Team? - No     History of Present Illness:    Antionette Downing is a 39 y.o. female with PMH of HCV, psychogenic nonepileptic seizures, migraines, asthma, ADHD, history of psychosis, bipolar disorder who is originally admitted to the psychiatry service due to auditory hallucinations, aggressive behavior, paranoia under 302 status. We are consulted for medical clearance for admission to Behavioral Health Unit and treatment of underlying psychiatric illness.      Patient is a poor historian, she gives limited responses to questions, currently not interested in interview.  She does confirm her home medications as above.  She endorses cigarette use but unable to state how often or how many per day and unable to elaborate on any additional substance use.  She denies any physical complaints at this time.  Vitals reviewed.  Based on all available data patient appears medically stable to continue inpatient psychiatric treatment.      Review of Systems:    Review of Systems   Constitutional:  Negative for chills and fever.   HENT:  Negative for congestion, rhinorrhea and sore throat.    Eyes:  Negative for visual disturbance.   Respiratory:  Negative for cough, chest tightness, shortness of breath and wheezing.    Cardiovascular:  Negative for chest pain and palpitations.   Gastrointestinal:  Negative for abdominal pain, constipation, diarrhea, nausea and vomiting.   Genitourinary:  Negative for difficulty urinating, dysuria, frequency and urgency.   Musculoskeletal:  Negative for arthralgias, back pain and  myalgias.   Skin:  Negative for rash and wound.   Neurological:  Negative for dizziness, light-headedness and headaches.   Psychiatric/Behavioral:  Positive for agitation, behavioral problems, decreased concentration, dysphoric mood and hallucinations. The patient is nervous/anxious.    All other systems reviewed and are negative.      Past Medical and Surgical History:     Past Medical History:   Diagnosis Date    Abnormal Pap smear of cervix     ADHD (attention deficit hyperactivity disorder)     Alcohol abuse     Ankle fracture     Anxiety     Arthritis     back    Asthma     Bipolar disorder (HCC)     Cellulitis     right side fac in     Chronic pain disorder     Depression     Diverticulitis     Flank pain 2016    Hallucination     Hepatitis C     Hip disease 2006    reports she had fluid removed from right hip and received treatment with antibiotic     History of abnormal cervical Pap smear     History of multiple miscarriages     IBS (irritable bowel syndrome)     Infantile idiopathic scoliosis     Joint pain     Kidney stone     Lactose intolerance     Low back pain     Myofascial pain syndrome     Peripheral neuropathy 2024    Poor dentition     Psychiatric illness     Psychosis ([facility])     PTSD (post-traumatic stress disorder)     Pyelonephritis affecting pregnancy     Right hand paresthesia     Right ovarian cyst     Schizoaffective disorder, bipolar type ([facility]) r/o bipolar with psychotic features 2024    Scoliosis     Seizures ([facility])     Self-injurious behavior     Sleep difficulties     Slow transit constipation     Substance abuse ([facility])     Suicide attempt ([facility])        Past Surgical History:   Procedure Laterality Date     SECTION  2016     SECTION  2014    HIP SURGERY      ORTHOPEDIC SURGERY      MO  DELIVERY ONLY N/A 2016    Procedure:  SECTION () REPEAT;  Surgeon: Kurt Connor MD;  Location: [facility];  Service:  "Obstetrics    AR LIG/TRNSXJ FLP TUBE ABDL/VAG APPR UNI/BI Bilateral 11/29/2016    Procedure: LIGATION/COAGULATION TUBAL;  Surgeon: Kurt Connor MD;  Location: [facility] ;  Service: Obstetrics       Meds/Allergies:    all medications and allergies reviewed    Allergies:   Allergies   Allergen Reactions    Aspirin      Pt given enteric coated 81 mg, when ask pt denies any allergy, listed under allergies on paperwork provided by berny Hernández - Food Allergy Itching    Naproxen     Toradol [Ketorolac Tromethamine] GI Bleeding    Azithromycin Rash    Latex Hives and Rash    Neurontin [Gabapentin] Rash and GI Bleeding    Ppd [Tuberculin Purified Protein Derivative] Rash       Social History:     Marital Status: /Civil Union    Substance Use History:   Social History     Substance and Sexual Activity   Alcohol Use Not Currently    Comment: Rare Use     Social History     Tobacco Use   Smoking Status Every Day    Current packs/day: 0.50    Average packs/day: 0.4 packs/day for 30.0 years (11.3 ttl pk-yrs)    Types: Cigarettes    Start date: 2010    Passive exposure: Never   Smokeless Tobacco Never   Tobacco Comments    pt refused smoking cessation teaching.      Social History     Substance and Sexual Activity   Drug Use Not Currently    Types: Marijuana, Cocaine, \"Crack\" cocaine       Family History:    Family History   Problem Relation Age of Onset    Heart disease Maternal Aunt     Cancer Maternal Aunt     Diabetes Paternal Aunt     No Known Problems Mother     No Known Problems Father     No Known Problems Sister        Physical Exam:     Vitals:   Blood Pressure: 105/67 (01/07/25 0812)  Pulse: 84 (01/07/25 0812)  Temperature: (!) 97.1 °F (36.2 °C) (01/07/25 0812)  Temp Source: Tympanic (01/07/25 0812)  Respirations: 16 (01/07/25 0812)  Height: 5' 2\" (157.5 cm) (01/06/25 1755)  Weight - Scale: 66.2 kg (146 lb) (01/06/25 1755)  SpO2: 96 % (01/07/25 0812)    Physical Exam  Vitals and nursing note reviewed. "   Constitutional:       General: She is not in acute distress.     Appearance: She is not ill-appearing.   HENT:      Head: Normocephalic and atraumatic.      Nose: Nose normal.   Eyes:      General:         Right eye: No discharge.         Left eye: No discharge.      Extraocular Movements: Extraocular movements intact.      Conjunctiva/sclera: Conjunctivae normal.   Cardiovascular:      Rate and Rhythm: Normal rate and regular rhythm.      Heart sounds: Normal heart sounds. No murmur heard.  Pulmonary:      Effort: Pulmonary effort is normal. No respiratory distress.      Breath sounds: Normal breath sounds. No wheezing, rhonchi or rales.   Abdominal:      General: Bowel sounds are normal.      Palpations: Abdomen is soft.      Tenderness: There is no abdominal tenderness. There is no guarding.   Musculoskeletal:      Right lower leg: No edema.      Left lower leg: No edema.   Skin:     General: Skin is warm and dry.   Neurological:      General: No focal deficit present.      Mental Status: She is alert and oriented to person, place, and time.      Cranial Nerves: No cranial nerve deficit.   Psychiatric:         Mood and Affect: Mood normal.         Behavior: Behavior normal.          Additional Data:     Lab Results: Results Review Statement: No pertinent imaging studies reviewed.    Results from last 7 days   Lab Units 01/07/25  0558   WBC Thousand/uL 6.20   HEMOGLOBIN g/dL 12.9   HEMATOCRIT % 38.9   PLATELETS Thousands/uL 159   SEGS PCT % 45   LYMPHO PCT % 42   MONO PCT % 9   EOS PCT % 4     Results from last 7 days   Lab Units 01/07/25  0558   SODIUM mmol/L 141   POTASSIUM mmol/L 3.9   CHLORIDE mmol/L 111*   CO2 mmol/L 25   BUN mg/dL 13   CREATININE mg/dL 0.64   ANION GAP mmol/L 5   CALCIUM mg/dL 8.5   ALBUMIN g/dL 3.6   TOTAL BILIRUBIN mg/dL 0.21   ALK PHOS U/L 46   ALT U/L 5*   AST U/L 11*   GLUCOSE RANDOM mg/dL 87             Lab Results   Component Value Date/Time    HGBA1C 5.3 03/28/2024 04:06 PM     HGBA1C 4.7 02/02/2023 06:26 AM    HGBA1C 4.9 09/01/2021 01:21 PM           EKG, Pathology, and Other Studies Reviewed on Admission:   EKG pending     ** Please Note: This note has been constructed using a voice recognition system. **

## 2025-01-07 NOTE — ASSESSMENT & PLAN NOTE
No current exacerbation  Encourage cessation of tobacco use  Continue home symbicort  Albuterol as needed

## 2025-01-07 NOTE — PLAN OF CARE
Problem: PSYCHOSIS  Goal: Will report no hallucinations or delusions  Description: Interventions:  - Administer medication as  ordered  - Every waking shifts and PRN assess for the presence of hallucinations and or delusions  - Assist with reality testing to support increasing orientation  - Assess if patient's hallucinations or delusions are encouraging self-harm or harm to others and intervene as appropriate  Outcome: Progressing     Problem: BEHAVIOR  Goal: Pt/Family maintain appropriate behavior and adhere to behavioral management agreement, if implemented  Description: INTERVENTIONS:  - Assess the family dynamic   - Encourage verbalization of thoughts and concerns in a socially appropriate manner  - Assess patient/family's coping skills and non-compliant behavior (including use of illegal substances).  - Utilize positive, consistent limit setting strategies supporting safety of patient, staff and others  - Initiate consult with Case Management, Spiritual Care or other ancillary services as appropriate  - If a patient's/visitor's behavior jeopardizes the safety of the patient, staff, or others, refer to organization procedure.   - Notify Security of behavior or suspected illegal substances which indicate the need for search of the patient and/or belongings  - Encourage participation in the decision making process about a behavioral management agreement; implement if patient meets criteria  Outcome: Progressing     Problem: ANXIETY  Goal: Will report anxiety at manageable levels  Description: INTERVENTIONS:  - Administer medication as ordered  - Teach and encourage coping skills  - Provide emotional support  - Assess patient/family for anxiety and ability to cope  Outcome: Progressing  Goal: By discharge: Patient will verbalize 2 strategies to deal with anxiety  Description: Interventions:  - Identify any obvious source/trigger to anxiety  - Staff will assist patient in applying identified coping  technique/skills  - Encourage attendance of scheduled groups and activities  Outcome: Progressing     Problem: Ineffective Coping  Goal: Participates in unit activities  Description: Interventions:  - Provide therapeutic environment   - Provide required programming   - Redirect inappropriate behaviors   Outcome: Not Progressing

## 2025-01-07 NOTE — CMS CERTIFICATION NOTE
Recertification: Based upon physical, mental and social evaluations, I certify that inpatient psychiatric services continue to be medically necessary for this patient for a duration of 5 midnights for the treatment of  Bipolar I disorder, most recent episode manic, severe with psychotic features (HCC) Available alternative community resources still do not meet the patient's mental health care needs. I further attest that an established written individualized plan of care has been updated and is outlined in the patient's medical records.

## 2025-01-07 NOTE — TREATMENT PLAN
TREATMENT PLAN REVIEW - Behavioral Health Antionette Downing 39 y.o. 1985 female MRN: [id number]    [facility] QU IP BEHAVIORAL HLTH Room / Bed: [facility] 201/[facility] 201-01 Encounter: [id number]          Admit Date/Time:  1/6/2025  5:49 PM    Treatment Team:   MD Laura Mckeon, TORRIE Roca, TORRIE Douglas, TORRIE Sher, TORRIE Galeana, TORRIE Holloway    Diagnosis: Principal Problem:    Bipolar I disorder, most recent episode manic, severe with psychotic features (HCC)  Active Problems:    Psychogenic nonepileptic seizure    Post-traumatic stress disorder, chronic      Patient Strengths/Assets: ability for insight, cooperative, motivation for treatment/growth    Patient Barriers/Limitations: difficulty adapting, marital/family conflict, noncompliant with medication    Short Term Goals: decrease in depressive symptoms, decrease in anxiety symptoms, decrease in paranoid thoughts, decrease in psychotic symptoms, improvement in insight, sleep improvement, improvement in appetite, mood stabilization    Long Term Goals: improvement in depression, improvement in anxiety, resolution of depressive symptoms, resolution of manic symptoms, stabilization of mood, improved insight, acceptance of need for psychiatric medications, acceptance of need for psychiatric treatment, adequate self care, adequate sleep, adequate appetite    Progress Towards Goals: starting psychiatric medications as prescribed, improving, attends groups, mood is stabilizing, less agitated, less labile    Recommended Treatment: medication management, patient medication education, group therapy, milieu therapy, continued Behavioral Health psychiatric evaluation/assessment process    Treatment Frequency: daily medication monitoring, group and milieu therapy daily, monitoring through interdisciplinary rounds,  monitoring through weekly patient care conferences    Expected Discharge Date:  4-5 days    Discharge Plan: referral for outpatient medication management with a psychiatrist, referral for outpatient psychotherapy    Treatment Plan Created/Updated By: Sheryl Gutierres MD
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
## 2025-01-07 NOTE — ASSESSMENT & PLAN NOTE
History of migraines however patient denies any current headaches  She was previously on Topamax 100 mg twice daily however does not appear to be the on this currently  Continue outpatient neurology follow-up as needed

## 2025-01-07 NOTE — NURSING NOTE
Closet:  Black bra with wire  4 hair ties  Black joggers (w/strings)  Black socks  Black Derik shoes (w/strings)  Black blinged out jacket  Gray valuable ba rings  1 neckalce  7 earrings      Bedside:  Long sleeved black Ann Marie shirt  Ralls cigarettes
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
## 2025-01-07 NOTE — ASSESSMENT & PLAN NOTE
"Hx of HCV exposure reportedly in childhood   No hx of treatment in the past  Seen by GI 2019 several times:   Per note in 2021: \"She likely cleared virus on her own. Hepatitis C viral load from 2016 in 2019 undetectable. No evidence of chronic liver disease or cirrhosis on labs, CT, elastography.\"  "
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
## 2025-01-08 PROCEDURE — 99232 SBSQ HOSP IP/OBS MODERATE 35: CPT | Performed by: STUDENT IN AN ORGANIZED HEALTH CARE EDUCATION/TRAINING PROGRAM

## 2025-01-08 RX ORDER — FAMOTIDINE 20 MG/1
20 TABLET, FILM COATED ORAL 2 TIMES DAILY
Status: DISCONTINUED | OUTPATIENT
Start: 2025-01-08 | End: 2025-01-10 | Stop reason: HOSPADM

## 2025-01-08 RX ADMIN — DICYCLOMINE HYDROCHLORIDE 20 MG: 20 TABLET ORAL at 16:28

## 2025-01-08 RX ADMIN — PANTOPRAZOLE SODIUM 40 MG: 40 TABLET, DELAYED RELEASE ORAL at 08:26

## 2025-01-08 RX ADMIN — OXCARBAZEPINE 300 MG: 300 TABLET, FILM COATED ORAL at 08:26

## 2025-01-08 RX ADMIN — FAMOTIDINE 20 MG: 20 TABLET, FILM COATED ORAL at 17:04

## 2025-01-08 RX ADMIN — ACETAMINOPHEN 650 MG: 325 TABLET, FILM COATED ORAL at 09:36

## 2025-01-08 RX ADMIN — OLANZAPINE 30 MG: 10 TABLET, FILM COATED ORAL at 21:18

## 2025-01-08 RX ADMIN — DOCUSATE SODIUM 100 MG: 100 CAPSULE, LIQUID FILLED ORAL at 17:04

## 2025-01-08 RX ADMIN — FAMOTIDINE 20 MG: 20 TABLET, FILM COATED ORAL at 10:15

## 2025-01-08 RX ADMIN — DOCUSATE SODIUM 100 MG: 100 CAPSULE, LIQUID FILLED ORAL at 08:26

## 2025-01-08 RX ADMIN — DICYCLOMINE HYDROCHLORIDE 20 MG: 20 TABLET ORAL at 08:26

## 2025-01-08 RX ADMIN — DICYCLOMINE HYDROCHLORIDE 20 MG: 20 TABLET ORAL at 11:49

## 2025-01-08 RX ADMIN — BUDESONIDE AND FORMOTEROL FUMARATE DIHYDRATE 2 PUFF: 80; 4.5 AEROSOL RESPIRATORY (INHALATION) at 21:20

## 2025-01-08 RX ADMIN — OXCARBAZEPINE 300 MG: 300 TABLET, FILM COATED ORAL at 21:18
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
## 2025-01-08 NOTE — NURSING NOTE
Pt in bed on approach. Calm and cooperative during assessment, however, scant in conversation. Remains primarily unchanged from previous assessment; Continues to deny SI/HI/AVH. OOB for personal needs but stays seclusive to room otherwise. Plan of care ongoing. Pt denies any unmet needs or concerns at this time. Will continue to monitor.

## 2025-01-08 NOTE — PLAN OF CARE
Problem: PSYCHOSIS  Goal: Will report no hallucinations or delusions  Description: Interventions:  - Administer medication as  ordered  - Every waking shifts and PRN assess for the presence of hallucinations and or delusions  - Assist with reality testing to support increasing orientation  - Assess if patient's hallucinations or delusions are encouraging self-harm or harm to others and intervene as appropriate  1/8/2025 0007 by Munir Jaquez LPN  Outcome: Progressing  1/7/2025 2358 by Munir Jaquez LPN  Outcome: Progressing     Problem: BEHAVIOR  Goal: Pt/Family maintain appropriate behavior and adhere to behavioral management agreement, if implemented  Description: INTERVENTIONS:  - Assess the family dynamic   - Encourage verbalization of thoughts and concerns in a socially appropriate manner  - Assess patient/family's coping skills and non-compliant behavior (including use of illegal substances).  - Utilize positive, consistent limit setting strategies supporting safety of patient, staff and others  - Initiate consult with Case Management, Spiritual Care or other ancillary services as appropriate  - If a patient's/visitor's behavior jeopardizes the safety of the patient, staff, or others, refer to organization procedure.   - Notify Security of behavior or suspected illegal substances which indicate the need for search of the patient and/or belongings  - Encourage participation in the decision making process about a behavioral management agreement; implement if patient meets criteria  1/8/2025 0007 by Munir Jaquez LPN  Outcome: Progressing  1/7/2025 2358 by Munir Jaquez LPN  Outcome: Progressing     Problem: ANXIETY  Goal: Will report anxiety at manageable levels  Description: INTERVENTIONS:  - Administer medication as ordered  - Teach and encourage coping skills  - Provide emotional support  - Assess patient/family for anxiety and ability to cope  1/8/2025 0007 by Munir Jaquez LPN  Outcome:  Progressing  1/7/2025 2358 by Munir Jaquez LPN  Outcome: Progressing  Goal: By discharge: Patient will verbalize 2 strategies to deal with anxiety  Description: Interventions:  - Identify any obvious source/trigger to anxiety  - Staff will assist patient in applying identified coping technique/skills  - Encourage attendance of scheduled groups and activities  1/8/2025 0007 by Munir Jaquez LPN  Outcome: Progressing  1/7/2025 2358 by Munir Jaquez LPN  Outcome: Progressing     Problem: DISCHARGE PLANNING  Goal: Discharge to home or other facility with appropriate resources  Description: INTERVENTIONS:  - Identify barriers to discharge w/patient and caregiver  - Arrange for needed discharge resources and transportation as appropriate  - Identify discharge learning needs (meds, wound care, etc.)  - Arrange for interpretive services to assist at discharge as needed  - Refer to Case Management Department for coordinating discharge planning if the patient needs post-hospital services based on physician/advanced practitioner order or complex needs related to functional status, cognitive ability, or social support system  Outcome: Progressing     Problem: Ineffective Coping  Goal: Participates in unit activities  Description: Interventions:  - Provide therapeutic environment   - Provide required programming   - Redirect inappropriate behaviors   Outcome: Progressing

## 2025-01-08 NOTE — PROGRESS NOTES
Progress Note - Behavioral Health     Name: Antionette Downing 39 y.o. female I MRN: [id number]   Unit/Bed#: -01 I Date of Admission: 2025   Date of Service: 2025 I Hospital Day: 2         Assessment & Plan  Bipolar I disorder, most recent episode manic, severe with psychotic features (HCC)  Continue Zyprexa 30 mg at bedtime  Trileptal 300 mg twice daily  Psychogenic nonepileptic seizure  Trileptal  Post-traumatic stress disorder, chronic  Psychotherapy  History of hepatitis C  As per medical recommendations  Asthma  As per medical recommendations  Migraine without aura and without status migrainosus, not intractable  As per medical recommendations  Peripheral neuropathy  As per medical recommendations  Medical clearance for psychiatric admission  Done     Principal Problem:    Bipolar I disorder, most recent episode manic, severe with psychotic features (HCC)  Active Problems:    Psychogenic nonepileptic seizure    History of hepatitis C    Asthma    Post-traumatic stress disorder, chronic    Migraine without aura and without status migrainosus, not intractable    Peripheral neuropathy    Medical clearance for psychiatric admission       Recommended Treatment:     Planned medication and treatment changes:    All current active medications have been reviewed  Encourage group therapy, milieu therapy and occupational therapy  Behavioral Health checks every 15 minutes  On a 302 commitment status    Continue current medications.  Involuntary status set to  on Friday, unlikely that there are sufficient medicolegal grounds to pursue 303 at this time.  Will continue to monitor.    Will have case management confirm that firearms are secured at home given what was previously documented in the 302.    Current medications:  Current Facility-Administered Medications   Medication Dose Route Frequency Provider Last Rate    acetaminophen  650 mg Oral Q6H PRN Antionette Galindo PA-C      acetaminophen  650  mg Oral Q4H PRN Antionette Razia Stives, PA-C      acetaminophen  975 mg Oral Q6H PRN Antionette Razia Stives, PA-C      albuterol  2 puff Inhalation Q6H PRN Antionette Razia Stives, PA-C      aluminum-magnesium hydroxide-simethicone  30 mL Oral Q4H PRN Antionette Razia Stives, PA-C      benztropine  1 mg Intramuscular Q4H PRN Max 6/day Antionette Razia Stives, PA-C      benztropine  1 mg Oral Q4H PRN Max 6/day Antionette Razia Stives, PA-C      bisacodyl  10 mg Rectal Daily PRN Antionette Razia Stives, PA-C      budesonide-formoterol  2 puff Inhalation BID Antionette Razia Stives, PA-C      dicyclomine  20 mg Oral TID AC Manjula Montgomery, PA-C      hydrOXYzine HCL  50 mg Oral Q6H PRN Max 4/day Antionette Razia Stives, PA-C      Or    diphenhydrAMINE  50 mg Intramuscular Q6H PRN Antionette Razia Stives, PA-C      docusate sodium  100 mg Oral BID Giana Tang, PA-C      famotidine  20 mg Oral BID Octavia Tony, PA-C      hydrOXYzine HCL  100 mg Oral Q6H PRN Max 4/day Antionette Razia Stives, PA-C      Or    LORazepam  2 mg Intramuscular Q6H PRN Antionette Razia Stives, PA-C      hydrOXYzine HCL  25 mg Oral Q6H PRN Max 4/day Antionette Razia Stives, PA-C      melatonin  3 mg Oral HS PRN Antionette Razia Stives, PA-C      nicotine polacrilex  2 mg Oral Q2H PRN Antionette Razia Stives, PA-C      OLANZapine  10 mg Oral Q3H PRN Max 3/day Antionette Razia Stives, PA-C      Or    OLANZapine  10 mg Intramuscular Q3H PRN Max 3/day Antionette Razia Stives, PA-C      OLANZapine  5 mg Oral Q3H PRN Max 6/day Antionette Razia Stives, PA-C      Or    OLANZapine  5 mg Intramuscular Q3H PRN Max 6/day Antionette Razia Stives, PA-C      OLANZapine  2.5 mg Oral Q3H PRN Max 8/day Antionette Galindo, EMMA      OLANZapine  30 mg Oral HS Antionette Galindo, PA-DOMINGA      OXcarbazepine  300 mg Oral Q12H [facility] Antionette Galindo, PA-DOMINGA      pantoprazole  40 mg Oral Daily Antionette Galindo, EMMA      polyethylene glycol  17 g Oral Daily PRN Antionette Galindo, EMMA      propranolol  10 mg Oral Q8H PRN Antionette  "Razia Galindo PA-C      senna-docusate sodium  1 tablet Oral Daily PRN Antionette Razia Galindo PA-C      traZODone  50 mg Oral Once Joanne Jaramillo MD      traZODone  50 mg Oral HS PRN Sheryl Gutierres MD         Behavior over the last 24 hours: unchanged.     Antionette is a 39-year-old female with a history of bipolar disorder with psychotic features who presents for psychiatric follow-up under 302 involuntary status.  Staff reports that she continues to appear paranoid but has been compliant with medications.  Upon approach, she is pleasant, calm and cooperative, albeit somewhat paranoid appearing.  Has been brighter and more visible and social in the milieu and in inpatient programming today.  Has some difficulty identifying what brought her to the hospital and was surprised to hear that she is involuntarily committed on a 302 petition by her .  I read her what was documented in the 302 petition and she categorically denies everything that is alleged against her.  She states, \"none of that is true.  I have been taking my medicines.  I have not been threatening my , I would never hurt him or anyone else.  I never said I would shoot him.\"  She does make possibly delusional statements of jealousy/infidelity regarding her , says that he often records her and believes that he is cheating on her.  She thinks that he petitioned the 302 against her because she is aware of his infidelity.  She otherwise reports feeling safe at home and denies any SI or HI. I asked about her comments of the water at home \"tasting like latex\" and she says that \"it is no big deal\" and that she will \"just drink bottled water.\" She adamantly denies any thoughts of violence towards her  or anyone else.  She denies any AH and VH and does not appear preoccupied.  She reports taking her medications and denies any side effects. When asked to assess the state of her mental health, the patient says that she " feels to be at her baseline and is focused on discharge.    Sleep: normal  Appetite: normal  Medication side effects: No   ROS: no complaints, all other systems are negative    Mental Status Evaluation:    Appearance: adequate grooming, appears consistent with stated age  Motor: no psychomotor disturbances, no gait abnormalities  Behavior: pleasant, calm, cooperative  Speech: normal rate and rhythm  Mood: anxious  Affect: expansive  Thought Process: disorganized and illogical at times, circumstantial at times  Thought Content: denies auditory and visual hallucinations, does not appear preoccupied, ? Jealous delusions  Risk Potential: denies suicidal ideation, plan, or intent. Adamantly denies homicidal ideation  Sensorium: Oriented to person, place, time, and situation  Cognition: cognitive ability appears intact but was not quantitatively tested  Consciousness: alert and awake  Attention: shorter than expected for age  Insight: limited  Judgement: fair    Vital signs in last 24 hours:    Temp:  [96.8 °F (36 °C)-98.6 °F (37 °C)] 96.8 °F (36 °C)  HR:  [74-79] 74  BP: (104-129)/(69-78) 104/78  Resp:  [17] 17  SpO2:  [98 %] 98 %  O2 Device: None (Room air)    Laboratory results: I have personally reviewed all pertinent laboratory/tests results    Results from the past 24 hours: No results found for this or any previous visit (from the past 24 hours).  Most Recent Labs:   Lab Results   Component Value Date    WBC 6.20 01/07/2025    RBC 4.00 01/07/2025    HGB 12.9 01/07/2025    HCT 38.9 01/07/2025     01/07/2025    RDW 12.2 01/07/2025    NEUTROABS 2.75 01/07/2025    TOTANEUTABS 6.86 07/30/2016    SODIUM 141 01/07/2025    K 3.9 01/07/2025     (H) 01/07/2025    CO2 25 01/07/2025    BUN 13 01/07/2025    CREATININE 0.64 01/07/2025    GLUC 87 01/07/2025    CALCIUM 8.5 01/07/2025    AST 11 (L) 01/07/2025    ALT 5 (L) 01/07/2025    ALKPHOS 46 01/07/2025    TP 5.7 (L) 01/07/2025    ALB 3.6 01/07/2025    TBILI 0.21  01/07/2025    CHOLESTEROL 151 01/07/2025    HDL 43 (L) 01/07/2025    TRIG 133 01/07/2025    LDLCALC 81 01/07/2025    NONHDLC 108 01/07/2025    VALPROICTOT <10.0 (L) 02/12/2023    LITHIUM 0.63 02/01/2024    AMMONIA 17 11/12/2019    [id number] 1.476 01/07/2025    FREET4 1.24 02/01/2023    PREGUR NEGATIVE 10/15/2022    PREGSERUM Negative 09/15/2024    HCGQUANT <3 12/05/2019    SYPHILISAB Non-reactive 01/07/2025    HGBA1C 5.0 01/07/2025    EAG 97 01/07/2025       Progress Toward Goals: progressing, working on coping skills    Risks / Benefits of Treatment:    Risks, benefits, and possible side effects of medications explained to patient and patient verbalizes understanding and agreement for treatment.    Counseling / Coordination of Care:    Patient's progress discussed with staff in treatment team meeting.  Medications, treatment progress and treatment plan reviewed with patient.    Lowell Bro PA-C 01/08/25    This note was constructed with the assistance of network approved dictation software. Please excuse any minor errors of syntax or grammar as a result.
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
## 2025-01-09 PROBLEM — Z00.8 MEDICAL CLEARANCE FOR PSYCHIATRIC ADMISSION: Status: RESOLVED | Noted: 2025-01-07 | Resolved: 2025-01-09

## 2025-01-09 PROCEDURE — 99232 SBSQ HOSP IP/OBS MODERATE 35: CPT | Performed by: STUDENT IN AN ORGANIZED HEALTH CARE EDUCATION/TRAINING PROGRAM

## 2025-01-09 RX ADMIN — OXCARBAZEPINE 300 MG: 300 TABLET, FILM COATED ORAL at 08:34

## 2025-01-09 RX ADMIN — DOCUSATE SODIUM 100 MG: 100 CAPSULE, LIQUID FILLED ORAL at 08:34

## 2025-01-09 RX ADMIN — DICYCLOMINE HYDROCHLORIDE 20 MG: 20 TABLET ORAL at 16:23

## 2025-01-09 RX ADMIN — DICYCLOMINE HYDROCHLORIDE 20 MG: 20 TABLET ORAL at 11:24

## 2025-01-09 RX ADMIN — DOCUSATE SODIUM 100 MG: 100 CAPSULE, LIQUID FILLED ORAL at 18:17

## 2025-01-09 RX ADMIN — OLANZAPINE 30 MG: 10 TABLET, FILM COATED ORAL at 21:17

## 2025-01-09 RX ADMIN — FAMOTIDINE 20 MG: 20 TABLET, FILM COATED ORAL at 18:17

## 2025-01-09 RX ADMIN — OXCARBAZEPINE 300 MG: 300 TABLET, FILM COATED ORAL at 21:17

## 2025-01-09 RX ADMIN — PANTOPRAZOLE SODIUM 40 MG: 40 TABLET, DELAYED RELEASE ORAL at 08:34

## 2025-01-09 RX ADMIN — DICYCLOMINE HYDROCHLORIDE 20 MG: 20 TABLET ORAL at 06:28

## 2025-01-09 RX ADMIN — FAMOTIDINE 20 MG: 20 TABLET, FILM COATED ORAL at 08:34

## 2025-01-09 NOTE — NURSING NOTE
"Pt laying in bed after breakfast. Reported having nausea. Declined PRNs. States \"It is just my IBS\". Pt denies SI/HI or hallucination. Reports plan is to discharge tomorrow back to living with . Pt feels safe to return home. States \"I don't even know why I was sent here. I never said any of those things\".   "
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
## 2025-01-09 NOTE — PROGRESS NOTES
Progress Note - Behavioral Health     Name: Antionette Downing 39 y.o. female I MRN: [id number]   Unit/Bed#: -01 I Date of Admission: 1/6/2025   Date of Service: 1/9/2025 I Hospital Day: 3         Assessment & Plan  Bipolar I disorder, most recent episode manic, severe with psychotic features (HCC)  Continue Zyprexa 30 mg at bedtime  Trileptal 300 mg twice daily  Psychogenic nonepileptic seizure  Trileptal  Post-traumatic stress disorder, chronic  Psychotherapy  History of hepatitis C  As per medical recommendations  Asthma  As per medical recommendations  Migraine without aura and without status migrainosus, not intractable  As per medical recommendations  Peripheral neuropathy  As per medical recommendations  Medical clearance for psychiatric admission  Done     Principal Problem:    Bipolar I disorder, most recent episode manic, severe with psychotic features (HCC)  Active Problems:    Psychogenic nonepileptic seizure    History of hepatitis C    Asthma    Post-traumatic stress disorder, chronic    Migraine without aura and without status migrainosus, not intractable    Peripheral neuropathy    Medical clearance for psychiatric admission       Recommended Treatment:     Planned medication and treatment changes:    All current active medications have been reviewed  Encourage group therapy, milieu therapy and occupational therapy  Behavioral Health checks every 15 minutes  On a 302 commitment status    Continue current medication regimen. Continue discharge planning for tomorrow prior to expiration of 72-hour notice.  No grounds for involuntary commitment.    Case management confirming that firearms at home have been secured.    Current medications:  Current Facility-Administered Medications   Medication Dose Route Frequency Provider Last Rate    acetaminophen  650 mg Oral Q6H PRN Antionette Galindo PA-C      acetaminophen  650 mg Oral Q4H PRN Antionette Galindo PA-C      acetaminophen  975 mg Oral  Q6H PRN Antionette Razia Stives, PA-C      albuterol  2 puff Inhalation Q6H PRN Antionette Razia Stives, PA-C      aluminum-magnesium hydroxide-simethicone  30 mL Oral Q4H PRN Antionette Razia Stives, PA-C      benztropine  1 mg Intramuscular Q4H PRN Max 6/day Antionette Razia Stives, PA-C      benztropine  1 mg Oral Q4H PRN Max 6/day Antionette Razia Stives, PA-C      bisacodyl  10 mg Rectal Daily PRN Antionette Razia Stives, PA-C      budesonide-formoterol  2 puff Inhalation BID Antionette Razia Stives, PA-C      dicyclomine  20 mg Oral TID AC Manjula Montgomery, PA-C      hydrOXYzine HCL  50 mg Oral Q6H PRN Max 4/day Antionette Razia Stives, PA-C      Or    diphenhydrAMINE  50 mg Intramuscular Q6H PRN Antionette Razia Stives, PA-C      docusate sodium  100 mg Oral BID Giana Huantonio, PA-C      famotidine  20 mg Oral BID Octavia Tony, PA-C      hydrOXYzine HCL  100 mg Oral Q6H PRN Max 4/day Antionette Razia Stives, PA-C      Or    LORazepam  2 mg Intramuscular Q6H PRN Antionette Razia Stives, PA-C      hydrOXYzine HCL  25 mg Oral Q6H PRN Max 4/day Antionette Razia Stives, PA-C      melatonin  3 mg Oral HS PRN Antionette Razia Stives, PA-C      nicotine polacrilex  2 mg Oral Q2H PRN Antionette Razia Stives, PA-C      OLANZapine  10 mg Oral Q3H PRN Max 3/day Antionette Razia Stives, PA-C      Or    OLANZapine  10 mg Intramuscular Q3H PRN Max 3/day Antionette Razia Stives, PA-C      OLANZapine  5 mg Oral Q3H PRN Max 6/day Antionette Razia Stives, PA-C      Or    OLANZapine  5 mg Intramuscular Q3H PRN Max 6/day Antionette Razia Stives, PA-C      OLANZapine  2.5 mg Oral Q3H PRN Max 8/day Antionette Razia Stives, PA-C      OLANZapine  30 mg Oral HS Antionette Galindo, PA-DOMINGA      OXcarbazepine  300 mg Oral Q12H [facility] Antionette Galindo, PA-DOMINGA      pantoprazole  40 mg Oral Daily Antionette Galindo, PA-DOMINGA      polyethylene glycol  17 g Oral Daily PRN Antionette Galindo, PA-DOMINGA      propranolol  10 mg Oral Q8H PRN Antionette Galindo, EMMA      senna-docusate sodium  1 tablet Oral Daily PRN Antionette  "Razia Galindo PA-C      traZODone  50 mg Oral Once Joanne Jaramillo MD      traZODone  50 mg Oral HS ALEXAN Sheryl Gutierres MD         Behavior over the last 24 hours: improved.     Antionette is a 39-year-old female with past history of bipolar 1 disorder, most recent episode manic with severe psychotic features, presenting for inpatient psychiatric evaluation status post homicidal ideations to shoot her .  Staff reports no issues overnight.  Patient states she \"feels positive about going home.\"  She continues to be paranoid about her 's actions and thoughts, stating that she \"will try to ignore it in the future.\" She denies homicidal ideation, thoughts of self-harm, suicidal thoughts.  She denies auditory and visual hallucinations.  She states she is compliant with her medications and denies experiencing any side effects.    Sleep: normal  Appetite: normal  Medication side effects: No   ROS: no complaints, all other systems are negative    Mental Status Evaluation:    Appearance:  age appropriate, casually dressed, dressed appropriately, adequate grooming   Behavior:  pleasant, cooperative, calm   Speech:  normal rate, normal volume, coherent   Mood:  improved, \"I look forward to going home.\"   Affect:  normal range and intensity, appropriate   Thought Process:  organized, goal directed, normal rate of thoughts   Associations: intact associations   Thought Content:  paranoid delusions which are fixed/chronic   Perceptual Disturbances: no auditory hallucinations, no visual hallucinations, does not appear responding to internal stimuli   Risk Potential: Suicidal ideation - None at present  Homicidal ideation - None at present  Potential for aggression - No   Sensorium:  oriented to person, place, and time/date   Memory:  recent and remote memory grossly intact   Consciousness:  alert and awake   Attention/Concentration: attention span and concentration appear shorter than expected for age "   Insight:  fair   Judgment: fair   Gait/Station: normal gait/station, normal balance   Motor Activity: no abnormal movements     Vital signs in last 24 hours:    Temp:  [96.5 °F (35.8 °C)-97 °F (36.1 °C)] 97 °F (36.1 °C)  HR:  [64-84] 64  BP: (105-126)/(66-80) 105/66  Resp:  [17-18] 18  SpO2:  [95 %-98 %] 98 %  O2 Device: None (Room air)    Laboratory results: I have personally reviewed all pertinent laboratory/tests results    Results from the past 24 hours: No results found for this or any previous visit (from the past 24 hours).  Most Recent Labs:   Lab Results   Component Value Date    WBC 6.20 01/07/2025    RBC 4.00 01/07/2025    HGB 12.9 01/07/2025    HCT 38.9 01/07/2025     01/07/2025    RDW 12.2 01/07/2025    NEUTROABS 2.75 01/07/2025    TOTANEUTABS 6.86 07/30/2016    SODIUM 141 01/07/2025    K 3.9 01/07/2025     (H) 01/07/2025    CO2 25 01/07/2025    BUN 13 01/07/2025    CREATININE 0.64 01/07/2025    GLUC 87 01/07/2025    CALCIUM 8.5 01/07/2025    AST 11 (L) 01/07/2025    ALT 5 (L) 01/07/2025    ALKPHOS 46 01/07/2025    TP 5.7 (L) 01/07/2025    ALB 3.6 01/07/2025    TBILI 0.21 01/07/2025    CHOLESTEROL 151 01/07/2025    HDL 43 (L) 01/07/2025    TRIG 133 01/07/2025    LDLCALC 81 01/07/2025    NONHDLC 108 01/07/2025    VALPROICTOT <10.0 (L) 02/12/2023    LITHIUM 0.63 02/01/2024    AMMONIA 17 11/12/2019    [id number] 1.476 01/07/2025    FREET4 1.24 02/01/2023    PREGUR NEGATIVE 10/15/2022    PREGSERUM Negative 09/15/2024    HCGQUANT <3 12/05/2019    SYPHILISAB Non-reactive 01/07/2025    HGBA1C 5.0 01/07/2025    EAG 97 01/07/2025       Progress Toward Goals: improving    Risks / Benefits of Treatment:    Risks, benefits, and possible side effects of medications explained to patient and patient verbalizes understanding and agreement for treatment.    Counseling / Coordination of Care:    Patient's progress discussed with staff in treatment team meeting.  Medications, treatment progress and  treatment plan reviewed with patient.    Lowell Bro PA-C 01/09/25    This note was constructed with the assistance of network approved dictation software. Please excuse any minor errors of syntax or grammar as a result.
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
## 2025-01-09 NOTE — DISCHARGE INSTR - OTHER ORDERS
[facility] CRISIS INFORMATION   [facility] is a confidential 7 days/week telephone support service manned by trained mental health consumers.  [facility] operates daily but is not able to accept calls between 2AM-6AM.   [facility] provides support, a listening ear and can provide information about available services. [facility] specializes in the concerns of mental health consumers, their families and friends.  However, we are also here for anyone who has a mental health concern, is confused about or just doesn't know anything about mental health or where to get information. To reach [facility], call 24 hours a day [phone]   Text CONNECT to 982795 from anywhere in the [place], anytime, about any type of crisis.  A live, trained Crisis Counselor receives the text and lets you know that they are here to listen.  The volunteer Crisis Counselor will help you move from a hot moment to a cool moment.  [facility] Crisis Intervention - licensed telephone and mobile crisis services that provide mental health assessments to all age groups regardless of income or insurance.  Crisis Intervention operates 24-hour/7 days a week. [facility] Crisis Intervention assists consumer who are experiencing a mental health emergency and lack the resources to assist themselves.  Immediate intervention for suicidal and depressed individuals with home visits/outreach being top priority. Crisis can be contacted at [phone].   The National [place] on Mental Illness (CELESTE) offers various education & support groups for you & your family.  For more information visit their website at   http://www.celeste-lv.org/.  Dial 2-1-1 to get connected/get help.  Free, confidential information & referral available 24/7: Aging Services, Child & Youth Services, Counseling, Education/Training, Food/Shelter/Clothing, Health Services, Parenting, Substance Abuse, Support Groups, Volunteer Opportunities, & much more.  Phone: 2-1-1 or 835-722-2859, Web:  www.ma360bzte.org, Email: 211@[facility].org      [place] [facility]   Our [place] is to eliminate domestic and intimate partner abuse in the [facility] through survivor empowerment, and community education and engagement.  If you need help for a domestic or intimate partner abuse situation, please reach out to our:  24/7 Helpline: [phone]  Toll-free: [phone]  TTY: 507.502.1017    [address] [id number] [phone]    [place] is a safe place where ALL survivors of domestic and intimate partner abuse and their children can find refuge.  [facility] offers confidential resources to individuals and families. We provide services in [facility] and reached over 3,500 people in rickey year 3997-6732 through our trauma-informed, inclusive programs and services.  Those include: a 24-hour Helpline, Emergency Safe House, Empowerment Counseling and Groups, Legal Advocacy, Education and Training, and Medical Advocacy.    National Domestic Violence Hotline  1-893.811.3976 (SAFE) 1-244.376.7085 (TTY)  [email or web address]

## 2025-01-09 NOTE — NURSING NOTE
Pt mostly withdrawn to room, sleeping at times. Reports feeling some nausea and back pain, declined heat pack. Pt states resting helps the most. Denies SI/HI, mild anxiety. Pt remains mostly minimal in conversation. Asking questions regarding schedule however denies any concerns at this time. Reports sleeping well overnight.

## 2025-01-09 NOTE — BH TRANSITION RECORD
Contact Information: If you have any questions, concerns, pended studies, tests and/or procedures, or emergencies regarding your inpatient behavioral health visit. Please contact [place] behavioral health [facility] [phone] and ask to speak to a , nurse or physician. A contact is available 24 hours/ 7 days a week at this number.     Summary of Procedures Performed During your Stay:  Below is a list of major procedures performed during your hospital stay and a summary of results:  - No major procedures performed.    Pending Studies (From admission, onward)      None          Please follow up on the above pending studies with your PCP and/or referring provider.

## 2025-01-09 NOTE — DISCHARGE INSTR - APPOINTMENTS
You will be discharged to [address] [place] [id number]   You confirmed your cell phone number is [phone]        Behavioral Health Nurse Navigator, Marlyn or Randa will be calling you after your discharge, on the phone number that you provided.  They will be available as an additional support, if needed.   If you wish to speak with Marlyn, you may contact her at [phone].

## 2025-01-09 NOTE — QUICK NOTE
Patient states that she has adequate home supplies of all of her psychiatric and medical medications.  Will not need prescriptions sent to the pharmacy and discharge tomorrow.

## 2025-01-09 NOTE — PLAN OF CARE
Problem: PSYCHOSIS  Goal: Will report no hallucinations or delusions  Description: Interventions:  - Administer medication as  ordered  - Every waking shifts and PRN assess for the presence of hallucinations and or delusions  - Assist with reality testing to support increasing orientation  - Assess if patient's hallucinations or delusions are encouraging self-harm or harm to others and intervene as appropriate  Outcome: Progressing     Problem: BEHAVIOR  Goal: Pt/Family maintain appropriate behavior and adhere to behavioral management agreement, if implemented  Description: INTERVENTIONS:  - Assess the family dynamic   - Encourage verbalization of thoughts and concerns in a socially appropriate manner  - Assess patient/family's coping skills and non-compliant behavior (including use of illegal substances).  - Utilize positive, consistent limit setting strategies supporting safety of patient, staff and others  - Initiate consult with Case Management, Spiritual Care or other ancillary services as appropriate  - If a patient's/visitor's behavior jeopardizes the safety of the patient, staff, or others, refer to organization procedure.   - Notify Security of behavior or suspected illegal substances which indicate the need for search of the patient and/or belongings  - Encourage participation in the decision making process about a behavioral management agreement; implement if patient meets criteria  Outcome: Progressing     Problem: ANXIETY  Goal: Will report anxiety at manageable levels  Description: INTERVENTIONS:  - Administer medication as ordered  - Teach and encourage coping skills  - Provide emotional support  - Assess patient/family for anxiety and ability to cope  Outcome: Progressing  Goal: By discharge: Patient will verbalize 2 strategies to deal with anxiety  Description: Interventions:  - Identify any obvious source/trigger to anxiety  - Staff will assist patient in applying identified coping  technique/skills  - Encourage attendance of scheduled groups and activities  Outcome: Not Progressing     Problem: DISCHARGE PLANNING  Goal: Discharge to home or other facility with appropriate resources  Description: INTERVENTIONS:  - Identify barriers to discharge w/patient and caregiver  - Arrange for needed discharge resources and transportation as appropriate  - Identify discharge learning needs (meds, wound care, etc.)  - Arrange for interpretive services to assist at discharge as needed  - Refer to Case Management Department for coordinating discharge planning if the patient needs post-hospital services based on physician/advanced practitioner order or complex needs related to functional status, cognitive ability, or social support system  Outcome: Progressing     Problem: Ineffective Coping  Goal: Participates in unit activities  Description: Interventions:  - Provide therapeutic environment   - Provide required programming   - Redirect inappropriate behaviors   Outcome: Progressing

## 2025-01-10 VITALS
TEMPERATURE: 96.8 F | WEIGHT: 146 LBS | HEIGHT: 62 IN | SYSTOLIC BLOOD PRESSURE: 116 MMHG | RESPIRATION RATE: 18 BRPM | DIASTOLIC BLOOD PRESSURE: 59 MMHG | HEART RATE: 95 BPM | OXYGEN SATURATION: 99 % | BODY MASS INDEX: 26.87 KG/M2

## 2025-01-10 PROCEDURE — 99239 HOSP IP/OBS DSCHRG MGMT >30: CPT | Performed by: PHYSICIAN ASSISTANT

## 2025-01-10 RX ADMIN — PANTOPRAZOLE SODIUM 40 MG: 40 TABLET, DELAYED RELEASE ORAL at 08:13

## 2025-01-10 RX ADMIN — DICYCLOMINE HYDROCHLORIDE 20 MG: 20 TABLET ORAL at 08:13

## 2025-01-10 RX ADMIN — OXCARBAZEPINE 300 MG: 300 TABLET, FILM COATED ORAL at 08:13

## 2025-01-10 RX ADMIN — FAMOTIDINE 20 MG: 20 TABLET, FILM COATED ORAL at 08:13

## 2025-01-10 RX ADMIN — DOCUSATE SODIUM 100 MG: 100 CAPSULE, LIQUID FILLED ORAL at 08:13
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
## 2025-01-10 NOTE — NURSING NOTE
Pt just awoke and is requesting Bentyl. Pt made aware that med pass would begin in a few minutes. Pt expresses readiness for discharge. Denies SI, HI, AVH. Reports feeling safe returning home. Plans to continue with medication regimen. Denies questions or concerns at this time.

## 2025-01-10 NOTE — NURSING NOTE
Patient is resting in bed with eyes open upon initiation of interview. Patient is flat and constricted, and scant in converstation. She denies SI, HI, and AVH at this time. She reports looking forward to leaving tomorrow. Denies any unmet needs at this time. Will come to staff if any needs or concerns arise.

## 2025-01-10 NOTE — NURSING NOTE
AVS reviewed with pt. Belongings packed with staff, all personal belongings accounted for. No meds sent, pt reports having newly filled meds at home. Pt expresses readiness for discharge. Transport provided by .

## 2025-01-10 NOTE — PLAN OF CARE
Problem: PSYCHOSIS  Goal: Will report no hallucinations or delusions  Description: Interventions:  - Administer medication as  ordered  - Every waking shifts and PRN assess for the presence of hallucinations and or delusions  - Assist with reality testing to support increasing orientation  - Assess if patient's hallucinations or delusions are encouraging self-harm or harm to others and intervene as appropriate  Outcome: Adequate for Discharge     Problem: BEHAVIOR  Goal: Pt/Family maintain appropriate behavior and adhere to behavioral management agreement, if implemented  Description: INTERVENTIONS:  - Assess the family dynamic   - Encourage verbalization of thoughts and concerns in a socially appropriate manner  - Assess patient/family's coping skills and non-compliant behavior (including use of illegal substances).  - Utilize positive, consistent limit setting strategies supporting safety of patient, staff and others  - Initiate consult with Case Management, Spiritual Care or other ancillary services as appropriate  - If a patient's/visitor's behavior jeopardizes the safety of the patient, staff, or others, refer to organization procedure.   - Notify Security of behavior or suspected illegal substances which indicate the need for search of the patient and/or belongings  - Encourage participation in the decision making process about a behavioral management agreement; implement if patient meets criteria  Outcome: Adequate for Discharge     Problem: ANXIETY  Goal: Will report anxiety at manageable levels  Description: INTERVENTIONS:  - Administer medication as ordered  - Teach and encourage coping skills  - Provide emotional support  - Assess patient/family for anxiety and ability to cope  Outcome: Adequate for Discharge  Goal: By discharge: Patient will verbalize 2 strategies to deal with anxiety  Description: Interventions:  - Identify any obvious source/trigger to anxiety  - Staff will assist patient in applying  identified coping technique/skills  - Encourage attendance of scheduled groups and activities  Outcome: Adequate for Discharge     Problem: DISCHARGE PLANNING  Goal: Discharge to home or other facility with appropriate resources  Description: INTERVENTIONS:  - Identify barriers to discharge w/patient and caregiver  - Arrange for needed discharge resources and transportation as appropriate  - Identify discharge learning needs (meds, wound care, etc.)  - Arrange for interpretive services to assist at discharge as needed  - Refer to Case Management Department for coordinating discharge planning if the patient needs post-hospital services based on physician/advanced practitioner order or complex needs related to functional status, cognitive ability, or social support system  Outcome: Adequate for Discharge     Problem: Ineffective Coping  Goal: Participates in unit activities  Description: Interventions:  - Provide therapeutic environment   - Provide required programming   - Redirect inappropriate behaviors   Outcome: Adequate for Discharge

## 2025-01-10 NOTE — PROGRESS NOTES
Diagnosis of Bipolar I disorder, most recent episode manic, severe with psychotic features reviewed.   Short term goals for decrease in depressive symptoms, decrease in anxiety symptoms, decrease in paranoid thoughts, decrease in psychotic symptoms, improvement in insight, sleep improvement, improvement in appetite, mood stabilization discussed.    01/09/25 1441   Team Meeting   Meeting Type Tx Team Meeting   Team Members Present   Team Members Present Physician;Nurse;   Physician Team Member Dr. Gutierres   [facility]   Care Management Team Member Amie   Patient/Family Present   Patient Present No  (Pt declined to meet with treatment team)   Patient's Family Present No

## 2025-01-12 ENCOUNTER — HOSPITAL ENCOUNTER (EMERGENCY)
Facility: HOSPITAL | Age: 40
Discharge: HOME | End: 2025-01-12
Attending: EMERGENCY MEDICINE
Payer: COMMERCIAL

## 2025-01-12 ENCOUNTER — APPOINTMENT (EMERGENCY)
Dept: CT IMAGING | Facility: HOSPITAL | Age: 40
End: 2025-01-12
Payer: COMMERCIAL

## 2025-01-12 VITALS
RESPIRATION RATE: 16 BRPM | SYSTOLIC BLOOD PRESSURE: 122 MMHG | DIASTOLIC BLOOD PRESSURE: 70 MMHG | HEART RATE: 90 BPM | OXYGEN SATURATION: 97 % | BODY MASS INDEX: 26.69 KG/M2 | TEMPERATURE: 97.8 F | WEIGHT: 145.94 LBS

## 2025-01-12 DIAGNOSIS — R11.2 NAUSEA AND VOMITING: Primary | ICD-10-CM

## 2025-01-12 LAB
ALBUMIN SERPL BCG-MCNC: 4.4 G/DL (ref 3.5–5)
ALP SERPL-CCNC: 55 U/L (ref 34–104)
ALT SERPL W P-5'-P-CCNC: 8 U/L (ref 7–52)
ANION GAP SERPL CALCULATED.3IONS-SCNC: 7 MMOL/L (ref 4–13)
AST SERPL W P-5'-P-CCNC: 14 U/L (ref 13–39)
BASOPHILS # BLD AUTO: 0.04 THOUSANDS/ΜL (ref 0–0.1)
BASOPHILS NFR BLD AUTO: 0 % (ref 0–1)
BILIRUB DIRECT SERPL-MCNC: 0.04 MG/DL (ref 0–0.2)
BILIRUB SERPL-MCNC: 0.31 MG/DL (ref 0.2–1)
BILIRUB UR QL STRIP: NEGATIVE
BUN SERPL-MCNC: 8 MG/DL (ref 5–25)
CALCIUM SERPL-MCNC: 8.9 MG/DL (ref 8.4–10.2)
CHLORIDE SERPL-SCNC: 104 MMOL/L (ref 96–108)
CLARITY UR: CLEAR
CO2 SERPL-SCNC: 24 MMOL/L (ref 21–32)
COLOR UR: NORMAL
CREAT SERPL-MCNC: 0.76 MG/DL (ref 0.6–1.3)
EOSINOPHIL # BLD AUTO: 0.06 THOUSAND/ΜL (ref 0–0.61)
EOSINOPHIL NFR BLD AUTO: 1 % (ref 0–6)
ERYTHROCYTE [DISTWIDTH] IN BLOOD BY AUTOMATED COUNT: 12 % (ref 11.6–15.1)
EXT PREGNANCY TEST URINE: NEGATIVE
EXT. CONTROL: NORMAL
GFR SERPL CREATININE-BSD FRML MDRD: 100 ML/MIN/1.73SQ M
GLUCOSE SERPL-MCNC: 89 MG/DL (ref 65–140)
GLUCOSE UR STRIP-MCNC: NEGATIVE MG/DL
HCT VFR BLD AUTO: 39.7 % (ref 34.8–46.1)
HGB BLD-MCNC: 13.7 G/DL (ref 11.5–15.4)
HGB UR QL STRIP.AUTO: NEGATIVE
IMM GRANULOCYTES # BLD AUTO: 0.04 THOUSAND/UL (ref 0–0.2)
IMM GRANULOCYTES NFR BLD AUTO: 0 % (ref 0–2)
KETONES UR STRIP-MCNC: NEGATIVE MG/DL
LEUKOCYTE ESTERASE UR QL STRIP: NEGATIVE
LIPASE SERPL-CCNC: 21 U/L (ref 11–82)
LYMPHOCYTES # BLD AUTO: 2.35 THOUSANDS/ΜL (ref 0.6–4.47)
LYMPHOCYTES NFR BLD AUTO: 25 % (ref 14–44)
MAGNESIUM SERPL-MCNC: 1.6 MG/DL (ref 1.9–2.7)
MCH RBC QN AUTO: 31.6 PG (ref 26.8–34.3)
MCHC RBC AUTO-ENTMCNC: 34.5 G/DL (ref 31.4–37.4)
MCV RBC AUTO: 92 FL (ref 82–98)
MONOCYTES # BLD AUTO: 0.76 THOUSAND/ΜL (ref 0.17–1.22)
MONOCYTES NFR BLD AUTO: 8 % (ref 4–12)
NEUTROPHILS # BLD AUTO: 6.12 THOUSANDS/ΜL (ref 1.85–7.62)
NEUTS SEG NFR BLD AUTO: 66 % (ref 43–75)
NITRITE UR QL STRIP: NEGATIVE
NRBC BLD AUTO-RTO: 0 /100 WBCS
PH UR STRIP.AUTO: 6 [PH] (ref 4.5–8)
PLATELET # BLD AUTO: 185 THOUSANDS/UL (ref 149–390)
PMV BLD AUTO: 10.2 FL (ref 8.9–12.7)
POTASSIUM SERPL-SCNC: 3.6 MMOL/L (ref 3.5–5.3)
PROT SERPL-MCNC: 6.9 G/DL (ref 6.4–8.4)
PROT UR STRIP-MCNC: NEGATIVE MG/DL
RBC # BLD AUTO: 4.34 MILLION/UL (ref 3.81–5.12)
SODIUM SERPL-SCNC: 135 MMOL/L (ref 135–147)
SP GR UR STRIP.AUTO: 1.01 (ref 1–1.03)
UROBILINOGEN UR QL STRIP.AUTO: 0.2 E.U./DL
WBC # BLD AUTO: 9.37 THOUSAND/UL (ref 4.31–10.16)

## 2025-01-12 PROCEDURE — 80048 BASIC METABOLIC PNL TOTAL CA: CPT | Performed by: EMERGENCY MEDICINE

## 2025-01-12 PROCEDURE — 81003 URINALYSIS AUTO W/O SCOPE: CPT

## 2025-01-12 PROCEDURE — 99284 EMERGENCY DEPT VISIT MOD MDM: CPT

## 2025-01-12 PROCEDURE — 36415 COLL VENOUS BLD VENIPUNCTURE: CPT | Performed by: EMERGENCY MEDICINE

## 2025-01-12 PROCEDURE — 83735 ASSAY OF MAGNESIUM: CPT | Performed by: EMERGENCY MEDICINE

## 2025-01-12 PROCEDURE — 99285 EMERGENCY DEPT VISIT HI MDM: CPT | Performed by: EMERGENCY MEDICINE

## 2025-01-12 PROCEDURE — 85025 COMPLETE CBC W/AUTO DIFF WBC: CPT | Performed by: EMERGENCY MEDICINE

## 2025-01-12 PROCEDURE — 93005 ELECTROCARDIOGRAM TRACING: CPT

## 2025-01-12 PROCEDURE — 96375 TX/PRO/DX INJ NEW DRUG ADDON: CPT

## 2025-01-12 PROCEDURE — 96367 TX/PROPH/DG ADDL SEQ IV INF: CPT

## 2025-01-12 PROCEDURE — 96361 HYDRATE IV INFUSION ADD-ON: CPT

## 2025-01-12 PROCEDURE — 83690 ASSAY OF LIPASE: CPT | Performed by: EMERGENCY MEDICINE

## 2025-01-12 PROCEDURE — 80076 HEPATIC FUNCTION PANEL: CPT | Performed by: EMERGENCY MEDICINE

## 2025-01-12 PROCEDURE — 74177 CT ABD & PELVIS W/CONTRAST: CPT

## 2025-01-12 PROCEDURE — 96365 THER/PROPH/DIAG IV INF INIT: CPT

## 2025-01-12 PROCEDURE — 81025 URINE PREGNANCY TEST: CPT | Performed by: EMERGENCY MEDICINE

## 2025-01-12 RX ORDER — ONDANSETRON 4 MG/1
4 TABLET, ORALLY DISINTEGRATING ORAL EVERY 6 HOURS PRN
Qty: 20 TABLET | Refills: 0 | Status: SHIPPED | OUTPATIENT
Start: 2025-01-12

## 2025-01-12 RX ORDER — MAGNESIUM SULFATE HEPTAHYDRATE 40 MG/ML
2 INJECTION, SOLUTION INTRAVENOUS ONCE
Status: COMPLETED | OUTPATIENT
Start: 2025-01-12 | End: 2025-01-12

## 2025-01-12 RX ORDER — ONDANSETRON 2 MG/ML
4 INJECTION INTRAMUSCULAR; INTRAVENOUS ONCE
Status: COMPLETED | OUTPATIENT
Start: 2025-01-12 | End: 2025-01-12

## 2025-01-12 RX ORDER — ACETAMINOPHEN 10 MG/ML
1000 INJECTION, SOLUTION INTRAVENOUS ONCE
Status: COMPLETED | OUTPATIENT
Start: 2025-01-12 | End: 2025-01-12

## 2025-01-12 RX ADMIN — MAGNESIUM SULFATE HEPTAHYDRATE 2 G: 40 INJECTION, SOLUTION INTRAVENOUS at 18:15

## 2025-01-12 RX ADMIN — ACETAMINOPHEN 1000 MG: 10 INJECTION INTRAVENOUS at 19:31

## 2025-01-12 RX ADMIN — IOHEXOL 100 ML: 350 INJECTION, SOLUTION INTRAVENOUS at 18:24

## 2025-01-12 RX ADMIN — ONDANSETRON 4 MG: 2 INJECTION INTRAMUSCULAR; INTRAVENOUS at 17:03

## 2025-01-12 RX ADMIN — SODIUM CHLORIDE 1000 ML: 0.9 INJECTION, SOLUTION INTRAVENOUS at 17:03

## 2025-01-12 NOTE — DISCHARGE INSTRUCTIONS
Take the zofran as needed for nausea, this can be placed under the tongue to dissolve if you are vomiting.

## 2025-01-12 NOTE — ED PROVIDER NOTES
Time reflects when diagnosis was documented in both MDM as applicable and the Disposition within this note       Time User Action Codes Description Comment    1/12/2025  5:54 PM Kelton Gaitan Add [R11.2] Nausea and vomiting           ED Disposition       ED Disposition   Discharge    Condition   Good    Date/Time   Sun Jan 12, 2025  8:49 PM    Comment   Antionette Joneser discharge to home/self care.                   Assessment & Plan       Medical Decision Making  1. Nausea with vomiting - Patient with constant LLQ abdominal pain. Will check urine for infection and pregnancy, CBC for leukocytosis, metabolic panel for electrolyte abnormalities and dehydration,  LFT's to assess GB dysfunction, lipase for pancreatitis, CT abdomen and pelvis to assess possible kidney stone, diverticulitis, considering also colitis. Will give IV fluids and antiemetics.     Problems Addressed:  Nausea and vomiting: acute illness or injury    Amount and/or Complexity of Data Reviewed  Labs: ordered.  Radiology: ordered.    Risk  Prescription drug management.        ED Course as of 01/14/25 0933   Sun Jan 12, 2025   1830 Patient states nausea has improved, continues to have discomfort. Will order acetaminophen.       Medications   sodium chloride 0.9 % bolus 1,000 mL (0 mL Intravenous Stopped 1/12/25 1931)   ondansetron (ZOFRAN) injection 4 mg (4 mg Intravenous Given 1/12/25 1703)   magnesium sulfate 2 g/50 mL IVPB (premix) 2 g (0 g Intravenous Stopped 1/12/25 1928)   acetaminophen (Ofirmev) injection 1,000 mg (0 mg Intravenous Stopped 1/12/25 2007)   iohexol (OMNIPAQUE) 350 MG/ML injection (MULTI-DOSE) 100 mL (100 mL Intravenous Given 1/12/25 1824)       ED Risk Strat Scores                          SBIRT 20yo+      Flowsheet Row Most Recent Value   Initial Alcohol Screen: US AUDIT-C     1. How often do you have a drink containing alcohol? 0 Filed at: 01/12/2025 1821   2. How many drinks containing alcohol do you have on a typical  day you are drinking?  0 Filed at: 01/12/2025 1821   3a. Male UNDER 65: How often do you have five or more drinks on one occasion? 0 Filed at: 01/12/2025 1821   3b. FEMALE Any Age, or MALE 65+: How often do you have 4 or more drinks on one occassion? 0 Filed at: 01/12/2025 1821   Audit-C Score 0 Filed at: 01/12/2025 1821   JAMEY: How many times in the past year have you...    Used an illegal drug or used a prescription medication for non-medical reasons? Never Filed at: 01/12/2025 1821                            History of Present Illness       Chief Complaint   Patient presents with    Abdominal Pain     Pt reports abdominal pain, diarrhea and vomiting beginning Friday.        Past Medical History:   Diagnosis Date    Abnormal Pap smear of cervix     ADHD (attention deficit hyperactivity disorder)     Alcohol abuse     Ankle fracture     Anxiety     Arthritis     back    Asthma     Bipolar disorder ([facility])     Cellulitis     right side fac in 2014    Chronic pain disorder     Depression     Diverticulitis     Flank pain 08/16/2016    Hallucination     Hepatitis C     Hip disease 2006    reports she had fluid removed from right hip and received treatment with antibiotic     History of abnormal cervical Pap smear     History of multiple miscarriages     IBS (irritable bowel syndrome)     Infantile idiopathic scoliosis     Joint pain     Kidney stone     Lactose intolerance     Low back pain     Myofascial pain syndrome     Peripheral neuropathy 03/28/2024    Poor dentition     Psychiatric illness     Psychosis ([facility])     PTSD (post-traumatic stress disorder)     Pyelonephritis affecting pregnancy     Right hand paresthesia     Right ovarian cyst     Schizoaffective disorder, bipolar type ([facility]) r/o bipolar with psychotic features 8/16/2024    Scoliosis     Seizures ([facility])     Self-injurious behavior     Sleep difficulties     Slow transit constipation     Substance abuse ([facility])     Suicide attempt ([facility])       Past Surgical  "History:   Procedure Laterality Date     SECTION  2016     SECTION  2014    HIP SURGERY      ORTHOPEDIC SURGERY      CA  DELIVERY ONLY N/A 2016    Procedure:  SECTION () REPEAT;  Surgeon: Kurt Connor MD;  Location: [facility];  Service: Obstetrics    CA LIG/TRNSXJ FLP TUBE ABDL/VAG APPR UNI/BI Bilateral 2016    Procedure: LIGATION/COAGULATION TUBAL;  Surgeon: Kurt Connor MD;  Location: [facility];  Service: Obstetrics      Family History   Problem Relation Age of Onset    Heart disease Maternal Aunt     Cancer Maternal Aunt     Diabetes Paternal Aunt     No Known Problems Mother     No Known Problems Father     No Known Problems Sister       Social History     Tobacco Use    Smoking status: Every Day     Current packs/day: 0.50     Average packs/day: 0.4 packs/day for 30.0 years (11.3 ttl pk-yrs)     Types: Cigarettes     Start date:      Passive exposure: Never    Smokeless tobacco: Never    Tobacco comments:     pt refused smoking cessation teaching.    Vaping Use    Vaping status: Former   Substance Use Topics    Alcohol use: Not Currently     Comment: Rare Use    Drug use: Not Currently     Types: Marijuana, Cocaine, \"Crack\" cocaine      E-Cigarette/Vaping    E-Cigarette Use Former User       E-Cigarette/Vaping Substances    Nicotine No     THC No     CBD No     Flavoring No     Other No     Unknown No       I have reviewed and agree with the history as documented.     40 YO female presents with nausea, vomiting, diarrhea and abdominal pain. States this began over the last day. She has had a constant, LLQ abdominal discomfort. She has not been tolerating PO. Denies known exacerbating or alleviating factors. She was recently admitted for psychiatric reasons. Patient has had no fevers. She denies known sick contacts or suspicious food intake. Pt denies CP/SOB/F/C, no dysuria, burning on urination or blood in urine.       History " provided by:  Patient   used: No        Review of Systems   Constitutional:  Negative for chills, fatigue and fever.   HENT:  Negative for dental problem.    Eyes:  Negative for visual disturbance.   Respiratory:  Negative for shortness of breath.    Cardiovascular:  Negative for chest pain.   Gastrointestinal:  Positive for abdominal pain, diarrhea, nausea and vomiting.   Genitourinary:  Negative for dysuria and frequency.   Musculoskeletal:  Negative for arthralgias.   Skin:  Negative for rash.   Neurological:  Negative for dizziness, weakness and light-headedness.   Psychiatric/Behavioral:  Negative for agitation, behavioral problems, confusion and suicidal ideas.    All other systems reviewed and are negative.          Objective       ED Triage Vitals   Temperature Pulse Blood Pressure Respirations SpO2 Patient Position - Orthostatic VS   01/12/25 1627 01/12/25 1627 01/12/25 1627 01/12/25 1627 01/12/25 1627 01/12/25 1811   97.8 °F (36.6 °C) (!) 138 143/78 16 97 % Lying      Temp src Heart Rate Source BP Location FiO2 (%) Pain Score    -- 01/12/25 1811 01/12/25 1811 -- --     Monitor Left arm        Vitals      Date and Time Temp Pulse SpO2 Resp BP Pain Score FACES Pain Rating User   01/12/25 1811 -- 90 97 % 16 122/70 -- -- ML   01/12/25 1627 97.8 °F (36.6 °C) 138 97 % 16 143/78 -- -- JL            Physical Exam  Vitals and nursing note reviewed.   Constitutional:       Appearance: Normal appearance.   HENT:      Head: Normocephalic and atraumatic.   Eyes:      Extraocular Movements: Extraocular movements intact.      Conjunctiva/sclera: Conjunctivae normal.   Cardiovascular:      Rate and Rhythm: Normal rate.   Pulmonary:      Effort: Pulmonary effort is normal.   Abdominal:      General: There is no distension.      Tenderness: There is abdominal tenderness in the left lower quadrant. There is no guarding or rebound.   Musculoskeletal:         General: Normal range of motion.      Cervical  back: Normal range of motion.   Skin:     Findings: No rash.   Neurological:      General: No focal deficit present.      Mental Status: She is alert.      Cranial Nerves: No cranial nerve deficit.   Psychiatric:         Attention and Perception: She does not perceive auditory or visual hallucinations.         Mood and Affect: Mood normal.         Thought Content: Thought content does not include homicidal or suicidal ideation.         Results Reviewed       Procedure Component Value Units Date/Time    Urine Macroscopic, POC [[id number]] Collected: 01/12/25 1802    Lab Status: Final result Specimen: Urine Updated: 01/12/25 1804     Color, UA Rubina     Clarity, UA Clear     pH, UA 6.0     Leukocytes, UA Negative     Nitrite, UA Negative     Protein, UA Negative mg/dl      Glucose, UA Negative mg/dl      Ketones, UA Negative mg/dl      Urobilinogen, UA 0.2 E.U./dl      Bilirubin, UA Negative     Occult Blood, UA Negative     Specific Gravity, UA 1.015    Narrative:      CLINITEK RESULT    POCT pregnancy, urine [[id number]]  (Normal) Collected: 01/12/25 1755    Lab Status: Final result Updated: 01/12/25 1755     EXT Preg Test, Ur Negative     Control Valid    Basic metabolic panel [[id number]] Collected: 01/12/25 1659    Lab Status: Final result Specimen: Blood from Arm, Right Updated: 01/12/25 1718     Sodium 135 mmol/L      Potassium 3.6 mmol/L      Chloride 104 mmol/L      CO2 24 mmol/L      ANION GAP 7 mmol/L      BUN 8 mg/dL      Creatinine 0.76 mg/dL      Glucose 89 mg/dL      Calcium 8.9 mg/dL      eGFR 100 ml/min/1.73sq m     Narrative:      National Kid[employer] guidelines for Chronic Kidney Disease (CKD):     Stage 1 with normal or high GFR (GFR > 90 mL/min/1.73 square meters)    Stage 2 Mild CKD (GFR = 60-89 mL/min/1.73 square meters)    Stage 3A Moderate CKD (GFR = 45-59 mL/min/1.73 square meters)    Stage 3B Moderate CKD (GFR = 30-44 mL/min/1.73 square meters)    Stage 4 Severe CKD (GFR = 15-29  mL/min/1.73 square meters)    Stage 5 End Stage CKD (GFR <15 mL/min/1.73 square meters)  Note: GFR calculation is accurate only with a steady state creatinine    Hepatic function panel [[id number]]  (Normal) Collected: 01/12/25 1659    Lab Status: Final result Specimen: Blood from Arm, Right Updated: 01/12/25 1718     Total Bilirubin 0.31 mg/dL      Bilirubin, Direct 0.04 mg/dL      Alkaline Phosphatase 55 U/L      AST 14 U/L      ALT 8 U/L      Total Protein 6.9 g/dL      Albumin 4.4 g/dL     Lipase [[id number]]  (Normal) Collected: 01/12/25 1659    Lab Status: Final result Specimen: Blood from Arm, Right Updated: 01/12/25 1718     Lipase 21 u/L     Magnesium [[id number]]  (Abnormal) Collected: 01/12/25 1659    Lab Status: Final result Specimen: Blood from Arm, Right Updated: 01/12/25 1718     Magnesium 1.6 mg/dL     CBC and differential [[id number]] Collected: 01/12/25 1659    Lab Status: Final result Specimen: Blood from Arm, Right Updated: 01/12/25 1704     WBC 9.37 Thousand/uL      RBC 4.34 Million/uL      Hemoglobin 13.7 g/dL      Hematocrit 39.7 %      MCV 92 fL      MCH 31.6 pg      MCHC 34.5 g/dL      RDW 12.0 %      MPV 10.2 fL      Platelets 185 Thousands/uL      nRBC 0 /100 WBCs      Segmented % 66 %      Immature Grans % 0 %      Lymphocytes % 25 %      Monocytes % 8 %      Eosinophils Relative 1 %      Basophils Relative 0 %      Absolute Neutrophils 6.12 Thousands/µL      Absolute Immature Grans 0.04 Thousand/uL      Absolute Lymphocytes 2.35 Thousands/µL      Absolute Monocytes 0.76 Thousand/µL      Eosinophils Absolute 0.06 Thousand/µL      Basophils Absolute 0.04 Thousands/µL             CT abdomen pelvis with contrast   Final Interpretation by Greogrio Montano DO (01/12 2014)      No acute intra-abdominal abnormality. No free air, free fluid, mesenteric inflammatory process, or bowel wall thickening. Colonic diverticulosis without evidence for acute diverticulitis.         Workstation performed:  [id number]             Procedures    ED Medication and Procedure Management   Prior to Admission Medications   Prescriptions Last Dose Informant Patient Reported? Taking?   OLANZapine (ZyPREXA) 15 mg tablet   Yes Yes   Sig: Take 30 mg by mouth daily at bedtime   OXcarbazepine (TRILEPTAL) 300 mg tablet   No No   Sig: Take 1 tablet (300 mg total) by mouth every 12 (twelve) hours   Ventolin  (90 Base) MCG/ACT inhaler   No Yes   Sig: Inhale 2 puffs every 4 (four) hours as needed for wheezing   albuterol (ACCUNEB) 1.25 MG/3ML nebulizer solution   Yes Yes   Sig: Take 1.25 mg by nebulization every 6 (six) hours as needed for wheezing   budesonide-formoterol (SYMBICORT) 80-4.5 MCG/ACT inhaler   No Yes   Sig: Inhale 2 puffs 2 (two) times a day Rinse mouth after use.   dicyclomine (BENTYL) 20 mg tablet   No Yes   Sig: Take 1 tablet (20 mg total) by mouth 2 (two) times a day as needed (abdominal pain)   docusate sodium (COLACE) 100 mg capsule   No Yes   Sig: Take 1 capsule (100 mg total) by mouth 2 (two) times a day   famotidine (PEPCID) 20 mg tablet   No Yes   Sig: Take 1 tablet (20 mg total) by mouth 2 (two) times a day as needed for heartburn   pantoprazole (PROTONIX) 40 mg tablet   No Yes   Sig: Take 1 tablet (40 mg total) by mouth daily      Facility-Administered Medications: None     Discharge Medication List as of 1/12/2025  8:50 PM        START taking these medications    Details   ondansetron (ZOFRAN-ODT) 4 mg disintegrating tablet Take 1 tablet (4 mg total) by mouth every 6 (six) hours as needed for nausea, Starting Sun 1/12/2025, Normal           CONTINUE these medications which have NOT CHANGED    Details   albuterol (ACCUNEB) 1.25 MG/3ML nebulizer solution Take 1.25 mg by nebulization every 6 (six) hours as needed for wheezing, Historical Med      budesonide-formoterol (SYMBICORT) 80-4.5 MCG/ACT inhaler Inhale 2 puffs 2 (two) times a day Rinse mouth after use., Starting Tue 12/17/2024, Normal       dicyclomine (BENTYL) 20 mg tablet Take 1 tablet (20 mg total) by mouth 2 (two) times a day as needed (abdominal pain), Starting Tue 11/26/2024, Normal      docusate sodium (COLACE) 100 mg capsule Take 1 capsule (100 mg total) by mouth 2 (two) times a day, Starting Tue 11/26/2024, Normal      famotidine (PEPCID) 20 mg tablet Take 1 tablet (20 mg total) by mouth 2 (two) times a day as needed for heartburn, Starting Tue 11/26/2024, Normal      OLANZapine (ZyPREXA) 15 mg tablet Take 30 mg by mouth daily at bedtime, Starting Thu 12/12/2024, Historical Med      pantoprazole (PROTONIX) 40 mg tablet Take 1 tablet (40 mg total) by mouth daily, Starting Tue 11/26/2024, Normal      Ventolin  (90 Base) MCG/ACT inhaler Inhale 2 puffs every 4 (four) hours as needed for wheezing, Starting Fri 12/6/2024, Normal      OXcarbazepine (TRILEPTAL) 300 mg tablet Take 1 tablet (300 mg total) by mouth every 12 (twelve) hours, Starting Wed 9/18/2024, Until Sun 11/17/2024, Normal           No discharge procedures on file.  ED SEPSIS DOCUMENTATION   Time reflects when diagnosis was documented in both MDM as applicable and the Disposition within this note       Time User Action Codes Description Comment    1/12/2025  5:54 PM Kelton Gaitan Add [R11.2] Nausea and vomiting                  Kelton Gaitan MD  01/14/25 0320

## 2025-01-13 LAB
ATRIAL RATE: 89 BPM
P AXIS: 55 DEGREES
PR INTERVAL: 142 MS
QRS AXIS: 86 DEGREES
QRSD INTERVAL: 82 MS
QT INTERVAL: 358 MS
QTC INTERVAL: 435 MS
T WAVE AXIS: 48 DEGREES
VENTRICULAR RATE: 89 BPM

## 2025-01-13 PROCEDURE — 93010 ELECTROCARDIOGRAM REPORT: CPT | Performed by: INTERNAL MEDICINE

## 2025-01-13 NOTE — ED CARE HANDOFF
"Emergency Department Sign Out Note        Sign out and transfer of care from Dr. Gaitan. See Separate Emergency Department note.     The patient, Antionette Downing, was evaluated by the previous provider for abd pain, N/V.    Workup Completed:  Labs, CTAP    ED Course / Workup Pending (followup):  Labs and IVF given.                                    ED Course as of 01/12/25 2051   Sun Jan 12, 2025 2050 CT abdomen pelvis with contrast  Imaging reviewed and interpreted myself and concur with radiologist:   \"No acute intra-abdominal abnormality. No free air, free fluid, mesenteric inflammatory process, or bowel wall thickening. Colonic diverticulosis without evidence for acute diverticulitis.\"     Procedures  Medical Decision Making  Amount and/or Complexity of Data Reviewed  Labs: ordered.  Radiology: ordered.    Risk  Prescription drug management.            Disposition  Final diagnoses:   Nausea and vomiting     Time reflects when diagnosis was documented in both MDM as applicable and the Disposition within this note       Time User Action Codes Description Comment    1/12/2025  5:54 PM Kelton Gaitan Add [R11.2] Nausea and vomiting           ED Disposition       ED Disposition   Discharge    Condition   Good    Date/Time   Sun Jan 12, 2025  8:49 PM    Comment   Antionette Downing discharge to home/self care.                   Follow-up Information       Follow up With Specialties Details Why Contact Info Additional Information    [facility] Schedule an appointment as soon as possible for a visit  If symptoms worsen [address]  [facility] [address] [phone] [facility] [place] [address], [place], [place], [address] [phone]          Patient's Medications   Discharge Prescriptions    ONDANSETRON (ZOFRAN-ODT) 4 MG DISINTEGRATING TABLET    Take 1 tablet (4 mg total) " by mouth every 6 (six) hours as needed for nausea       Start Date: 1/12/2025 End Date: --       Order Dose: 4 mg       Quantity: 20 tablet    Refills: 0     No discharge procedures on file.       ED Provider  Electronically Signed by     Bon Perea MD  01/12/25 7582

## 2025-01-24 ENCOUNTER — HOSPITAL ENCOUNTER (EMERGENCY)
Facility: HOSPITAL | Age: 40
Discharge: HOME | End: 2025-01-24
Attending: EMERGENCY MEDICINE | Admitting: EMERGENCY MEDICINE
Payer: COMMERCIAL

## 2025-01-24 VITALS
OXYGEN SATURATION: 97 % | SYSTOLIC BLOOD PRESSURE: 144 MMHG | DIASTOLIC BLOOD PRESSURE: 75 MMHG | WEIGHT: 153.88 LBS | RESPIRATION RATE: 20 BRPM | HEART RATE: 100 BPM | BODY MASS INDEX: 28.15 KG/M2 | TEMPERATURE: 98 F

## 2025-01-24 DIAGNOSIS — T74.91XA DOMESTIC VIOLENCE OF ADULT, INITIAL ENCOUNTER: Primary | ICD-10-CM

## 2025-01-24 PROCEDURE — 99284 EMERGENCY DEPT VISIT MOD MDM: CPT | Performed by: PHYSICIAN ASSISTANT

## 2025-01-24 PROCEDURE — 99284 EMERGENCY DEPT VISIT MOD MDM: CPT
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
## 2025-01-28 ENCOUNTER — HOSPITAL ENCOUNTER (OUTPATIENT)
Facility: HOSPITAL | Age: 40
Setting detail: OBSERVATION
Discharge: HOME | End: 2025-01-30
Attending: EMERGENCY MEDICINE | Admitting: FAMILY MEDICINE
Payer: COMMERCIAL

## 2025-01-28 ENCOUNTER — APPOINTMENT (EMERGENCY)
Dept: RADIOLOGY | Facility: HOSPITAL | Age: 40
End: 2025-01-28
Payer: COMMERCIAL

## 2025-01-28 DIAGNOSIS — R10.9 ABDOMINAL PAIN: ICD-10-CM

## 2025-01-28 DIAGNOSIS — T74.91XA DOMESTIC VIOLENCE OF ADULT, INITIAL ENCOUNTER: ICD-10-CM

## 2025-01-28 DIAGNOSIS — R11.2 NAUSEA & VOMITING: Primary | ICD-10-CM

## 2025-01-28 LAB
BACTERIA UR QL AUTO: ABNORMAL /HPF
BASOPHILS # BLD AUTO: 0.05 THOUSANDS/ΜL (ref 0–0.1)
BASOPHILS NFR BLD AUTO: 1 % (ref 0–1)
BILIRUB UR QL STRIP: NEGATIVE
CARDIAC TROPONIN I PNL SERPL HS: 3 NG/L (ref ?–50)
CLARITY UR: ABNORMAL
COLOR UR: YELLOW
EOSINOPHIL # BLD AUTO: 0.14 THOUSAND/ΜL (ref 0–0.61)
EOSINOPHIL NFR BLD AUTO: 1 % (ref 0–6)
ERYTHROCYTE [DISTWIDTH] IN BLOOD BY AUTOMATED COUNT: 12.1 % (ref 11.6–15.1)
EXT PREGNANCY TEST URINE: NEGATIVE
EXT. CONTROL: NORMAL
GLUCOSE UR STRIP-MCNC: NEGATIVE MG/DL
HCT VFR BLD AUTO: 42.5 % (ref 34.8–46.1)
HGB BLD-MCNC: 14.7 G/DL (ref 11.5–15.4)
HGB UR QL STRIP.AUTO: NEGATIVE
IMM GRANULOCYTES # BLD AUTO: 0.05 THOUSAND/UL (ref 0–0.2)
IMM GRANULOCYTES NFR BLD AUTO: 1 % (ref 0–2)
KETONES UR STRIP-MCNC: NEGATIVE MG/DL
LEUKOCYTE ESTERASE UR QL STRIP: NEGATIVE
LYMPHOCYTES # BLD AUTO: 3.48 THOUSANDS/ΜL (ref 0.6–4.47)
LYMPHOCYTES NFR BLD AUTO: 32 % (ref 14–44)
MCH RBC QN AUTO: 31.5 PG (ref 26.8–34.3)
MCHC RBC AUTO-ENTMCNC: 34.6 G/DL (ref 31.4–37.4)
MCV RBC AUTO: 91 FL (ref 82–98)
MONOCYTES # BLD AUTO: 0.81 THOUSAND/ΜL (ref 0.17–1.22)
MONOCYTES NFR BLD AUTO: 7 % (ref 4–12)
MUCOUS THREADS UR QL AUTO: ABNORMAL
NEUTROPHILS # BLD AUTO: 6.51 THOUSANDS/ΜL (ref 1.85–7.62)
NEUTS SEG NFR BLD AUTO: 58 % (ref 43–75)
NITRITE UR QL STRIP: NEGATIVE
NON-SQ EPI CELLS URNS QL MICRO: ABNORMAL /HPF
NRBC BLD AUTO-RTO: 0 /100 WBCS
PH UR STRIP.AUTO: 6 [PH]
PLATELET # BLD AUTO: 258 THOUSANDS/UL (ref 149–390)
PMV BLD AUTO: 9.6 FL (ref 8.9–12.7)
PROT UR STRIP-MCNC: ABNORMAL MG/DL
RBC # BLD AUTO: 4.66 MILLION/UL (ref 3.81–5.12)
RBC #/AREA URNS AUTO: ABNORMAL /HPF
SP GR UR STRIP.AUTO: 1.02 (ref 1–1.03)
UROBILINOGEN UR STRIP-ACNC: <2 MG/DL
WBC # BLD AUTO: 11.04 THOUSAND/UL (ref 4.31–10.16)
WBC #/AREA URNS AUTO: ABNORMAL /HPF

## 2025-01-28 PROCEDURE — 96365 THER/PROPH/DIAG IV INF INIT: CPT

## 2025-01-28 PROCEDURE — 96375 TX/PRO/DX INJ NEW DRUG ADDON: CPT

## 2025-01-28 PROCEDURE — 81001 URINALYSIS AUTO W/SCOPE: CPT

## 2025-01-28 PROCEDURE — 36415 COLL VENOUS BLD VENIPUNCTURE: CPT

## 2025-01-28 PROCEDURE — 76942 ECHO GUIDE FOR BIOPSY: CPT | Performed by: EMERGENCY MEDICINE

## 2025-01-28 PROCEDURE — 36000 PLACE NEEDLE IN VEIN: CPT | Performed by: EMERGENCY MEDICINE

## 2025-01-28 PROCEDURE — 83690 ASSAY OF LIPASE: CPT

## 2025-01-28 PROCEDURE — 71046 X-RAY EXAM CHEST 2 VIEWS: CPT

## 2025-01-28 PROCEDURE — 85025 COMPLETE CBC W/AUTO DIFF WBC: CPT

## 2025-01-28 PROCEDURE — 84484 ASSAY OF TROPONIN QUANT: CPT

## 2025-01-28 PROCEDURE — 83735 ASSAY OF MAGNESIUM: CPT

## 2025-01-28 PROCEDURE — 99285 EMERGENCY DEPT VISIT HI MDM: CPT

## 2025-01-28 PROCEDURE — 96366 THER/PROPH/DIAG IV INF ADDON: CPT

## 2025-01-28 PROCEDURE — 80053 COMPREHEN METABOLIC PANEL: CPT

## 2025-01-28 PROCEDURE — 99285 EMERGENCY DEPT VISIT HI MDM: CPT | Performed by: EMERGENCY MEDICINE

## 2025-01-28 PROCEDURE — 81025 URINE PREGNANCY TEST: CPT

## 2025-01-28 RX ORDER — SODIUM CHLORIDE, SODIUM GLUCONATE, SODIUM ACETATE, POTASSIUM CHLORIDE, MAGNESIUM CHLORIDE, SODIUM PHOSPHATE, DIBASIC, AND POTASSIUM PHOSPHATE .53; .5; .37; .037; .03; .012; .00082 G/100ML; G/100ML; G/100ML; G/100ML; G/100ML; G/100ML; G/100ML
1000 INJECTION, SOLUTION INTRAVENOUS ONCE
Status: COMPLETED | OUTPATIENT
Start: 2025-01-28 | End: 2025-01-28

## 2025-01-28 RX ORDER — FAMOTIDINE 10 MG/ML
20 INJECTION, SOLUTION INTRAVENOUS ONCE
Status: COMPLETED | OUTPATIENT
Start: 2025-01-29 | End: 2025-01-29

## 2025-01-28 RX ORDER — DIPHENHYDRAMINE HYDROCHLORIDE AND LIDOCAINE HYDROCHLORIDE AND ALUMINUM HYDROXIDE AND MAGNESIUM HYDRO
10 KIT ONCE
Status: COMPLETED | OUTPATIENT
Start: 2025-01-29 | End: 2025-01-29

## 2025-01-28 RX ORDER — ACETAMINOPHEN 325 MG/1
975 TABLET ORAL ONCE
Status: COMPLETED | OUTPATIENT
Start: 2025-01-29 | End: 2025-01-29

## 2025-01-28 RX ORDER — ONDANSETRON 2 MG/ML
4 INJECTION INTRAMUSCULAR; INTRAVENOUS ONCE
Status: COMPLETED | OUTPATIENT
Start: 2025-01-28 | End: 2025-01-28

## 2025-01-28 RX ORDER — KETOROLAC TROMETHAMINE 30 MG/ML
15 INJECTION, SOLUTION INTRAMUSCULAR; INTRAVENOUS ONCE
Status: COMPLETED | OUTPATIENT
Start: 2025-01-29 | End: 2025-01-29

## 2025-01-28 RX ADMIN — ONDANSETRON 4 MG: 2 INJECTION INTRAMUSCULAR; INTRAVENOUS at 22:55

## 2025-01-28 RX ADMIN — SODIUM CHLORIDE, SODIUM GLUCONATE, SODIUM ACETATE, POTASSIUM CHLORIDE, MAGNESIUM CHLORIDE, SODIUM PHOSPHATE, DIBASIC, AND POTASSIUM PHOSPHATE 1000 ML: .53; .5; .37; .037; .03; .012; .00082 INJECTION, SOLUTION INTRAVENOUS at 21:52

## 2025-01-29 ENCOUNTER — APPOINTMENT (EMERGENCY)
Dept: RADIOLOGY | Facility: HOSPITAL | Age: 40
End: 2025-01-29
Payer: COMMERCIAL

## 2025-01-29 PROBLEM — R10.84 GENERALIZED ABDOMINAL PAIN: Status: ACTIVE | Noted: 2025-01-29

## 2025-01-29 PROBLEM — Z91.89 AT RISK FOR DOMESTIC VIOLENCE: Status: ACTIVE | Noted: 2025-01-29

## 2025-01-29 LAB
ALBUMIN SERPL BCG-MCNC: 5.1 G/DL (ref 3.5–5)
ALP SERPL-CCNC: 68 U/L (ref 34–104)
ALT SERPL W P-5'-P-CCNC: 11 U/L (ref 7–52)
ANION GAP SERPL CALCULATED.3IONS-SCNC: 10 MMOL/L (ref 4–13)
ANION GAP SERPL CALCULATED.3IONS-SCNC: 6 MMOL/L (ref 4–13)
AST SERPL W P-5'-P-CCNC: 18 U/L (ref 13–39)
BASOPHILS # BLD AUTO: 0.04 THOUSANDS/ΜL (ref 0–0.1)
BASOPHILS NFR BLD AUTO: 1 % (ref 0–1)
BILIRUB SERPL-MCNC: 0.42 MG/DL (ref 0.2–1)
BUN SERPL-MCNC: 4 MG/DL (ref 5–25)
BUN SERPL-MCNC: 4 MG/DL (ref 5–25)
CALCIUM SERPL-MCNC: 8.6 MG/DL (ref 8.4–10.2)
CALCIUM SERPL-MCNC: 9.3 MG/DL (ref 8.4–10.2)
CHLORIDE SERPL-SCNC: 100 MMOL/L (ref 96–108)
CHLORIDE SERPL-SCNC: 93 MMOL/L (ref 96–108)
CO2 SERPL-SCNC: 23 MMOL/L (ref 21–32)
CO2 SERPL-SCNC: 25 MMOL/L (ref 21–32)
CREAT SERPL-MCNC: 0.65 MG/DL (ref 0.6–1.3)
CREAT SERPL-MCNC: 0.66 MG/DL (ref 0.6–1.3)
EOSINOPHIL # BLD AUTO: 0.23 THOUSAND/ΜL (ref 0–0.61)
EOSINOPHIL NFR BLD AUTO: 3 % (ref 0–6)
ERYTHROCYTE [DISTWIDTH] IN BLOOD BY AUTOMATED COUNT: 12.2 % (ref 11.6–15.1)
GFR SERPL CREATININE-BSD FRML MDRD: 113 ML/MIN/1.73SQ M
GFR SERPL CREATININE-BSD FRML MDRD: 113 ML/MIN/1.73SQ M
GLUCOSE P FAST SERPL-MCNC: 104 MG/DL (ref 65–99)
GLUCOSE SERPL-MCNC: 104 MG/DL (ref 65–140)
GLUCOSE SERPL-MCNC: 89 MG/DL (ref 65–140)
HCT VFR BLD AUTO: 40 % (ref 34.8–46.1)
HGB BLD-MCNC: 13.8 G/DL (ref 11.5–15.4)
IMM GRANULOCYTES # BLD AUTO: 0.02 THOUSAND/UL (ref 0–0.2)
IMM GRANULOCYTES NFR BLD AUTO: 0 % (ref 0–2)
LIPASE SERPL-CCNC: 17 U/L (ref 11–82)
LYMPHOCYTES # BLD AUTO: 2.65 THOUSANDS/ΜL (ref 0.6–4.47)
LYMPHOCYTES NFR BLD AUTO: 34 % (ref 14–44)
MAGNESIUM SERPL-MCNC: 1.5 MG/DL (ref 1.9–2.7)
MAGNESIUM SERPL-MCNC: 2.2 MG/DL (ref 1.9–2.7)
MCH RBC QN AUTO: 32.2 PG (ref 26.8–34.3)
MCHC RBC AUTO-ENTMCNC: 34.5 G/DL (ref 31.4–37.4)
MCV RBC AUTO: 93 FL (ref 82–98)
MONOCYTES # BLD AUTO: 0.71 THOUSAND/ΜL (ref 0.17–1.22)
MONOCYTES NFR BLD AUTO: 9 % (ref 4–12)
NEUTROPHILS # BLD AUTO: 4.2 THOUSANDS/ΜL (ref 1.85–7.62)
NEUTS SEG NFR BLD AUTO: 53 % (ref 43–75)
NRBC BLD AUTO-RTO: 0 /100 WBCS
PLATELET # BLD AUTO: 256 THOUSANDS/UL (ref 149–390)
PMV BLD AUTO: 10.2 FL (ref 8.9–12.7)
POTASSIUM SERPL-SCNC: 3.3 MMOL/L (ref 3.5–5.3)
POTASSIUM SERPL-SCNC: 5.1 MMOL/L (ref 3.5–5.3)
PROT SERPL-MCNC: 7.6 G/DL (ref 6.4–8.4)
RBC # BLD AUTO: 4.29 MILLION/UL (ref 3.81–5.12)
SODIUM SERPL-SCNC: 128 MMOL/L (ref 135–147)
SODIUM SERPL-SCNC: 129 MMOL/L (ref 135–147)
WBC # BLD AUTO: 7.85 THOUSAND/UL (ref 4.31–10.16)

## 2025-01-29 PROCEDURE — 83735 ASSAY OF MAGNESIUM: CPT

## 2025-01-29 PROCEDURE — 96375 TX/PRO/DX INJ NEW DRUG ADDON: CPT

## 2025-01-29 PROCEDURE — 85025 COMPLETE CBC W/AUTO DIFF WBC: CPT

## 2025-01-29 PROCEDURE — 99222 1ST HOSP IP/OBS MODERATE 55: CPT | Performed by: FAMILY MEDICINE

## 2025-01-29 PROCEDURE — 74177 CT ABD & PELVIS W/CONTRAST: CPT

## 2025-01-29 PROCEDURE — 36415 COLL VENOUS BLD VENIPUNCTURE: CPT

## 2025-01-29 PROCEDURE — 80048 BASIC METABOLIC PNL TOTAL CA: CPT

## 2025-01-29 PROCEDURE — 96368 THER/DIAG CONCURRENT INF: CPT

## 2025-01-29 PROCEDURE — 96367 TX/PROPH/DG ADDL SEQ IV INF: CPT

## 2025-01-29 RX ORDER — OLANZAPINE 10 MG/1
30 TABLET ORAL
Status: DISCONTINUED | OUTPATIENT
Start: 2025-01-29 | End: 2025-01-30 | Stop reason: HOSPADM

## 2025-01-29 RX ORDER — ACETAMINOPHEN 10 MG/ML
1000 INJECTION, SOLUTION INTRAVENOUS EVERY 8 HOURS
Status: DISCONTINUED | OUTPATIENT
Start: 2025-01-29 | End: 2025-01-29

## 2025-01-29 RX ORDER — MAGNESIUM SULFATE HEPTAHYDRATE 40 MG/ML
2 INJECTION, SOLUTION INTRAVENOUS ONCE
Status: COMPLETED | OUTPATIENT
Start: 2025-01-29 | End: 2025-01-29

## 2025-01-29 RX ORDER — DICYCLOMINE HCL 20 MG
20 TABLET ORAL 2 TIMES DAILY PRN
Status: DISCONTINUED | OUTPATIENT
Start: 2025-01-29 | End: 2025-01-29

## 2025-01-29 RX ORDER — ONDANSETRON 2 MG/ML
4 INJECTION INTRAMUSCULAR; INTRAVENOUS EVERY 8 HOURS PRN
Status: DISCONTINUED | OUTPATIENT
Start: 2025-01-29 | End: 2025-01-29

## 2025-01-29 RX ORDER — DIPHENHYDRAMINE HYDROCHLORIDE 50 MG/ML
25 INJECTION INTRAMUSCULAR; INTRAVENOUS ONCE
Status: COMPLETED | OUTPATIENT
Start: 2025-01-29 | End: 2025-01-29

## 2025-01-29 RX ORDER — BUDESONIDE AND FORMOTEROL FUMARATE DIHYDRATE 80; 4.5 UG/1; UG/1
2 AEROSOL RESPIRATORY (INHALATION) 2 TIMES DAILY
Status: DISCONTINUED | OUTPATIENT
Start: 2025-01-29 | End: 2025-01-30 | Stop reason: HOSPADM

## 2025-01-29 RX ORDER — DICYCLOMINE HCL 20 MG
20 TABLET ORAL 4 TIMES DAILY PRN
Status: DISCONTINUED | OUTPATIENT
Start: 2025-01-29 | End: 2025-01-30 | Stop reason: HOSPADM

## 2025-01-29 RX ORDER — ACETAMINOPHEN 325 MG/1
975 TABLET ORAL EVERY 6 HOURS PRN
Status: DISCONTINUED | OUTPATIENT
Start: 2025-01-29 | End: 2025-01-30 | Stop reason: HOSPADM

## 2025-01-29 RX ORDER — PANTOPRAZOLE SODIUM 40 MG/1
40 TABLET, DELAYED RELEASE ORAL
Status: DISCONTINUED | OUTPATIENT
Start: 2025-01-29 | End: 2025-01-30 | Stop reason: HOSPADM

## 2025-01-29 RX ORDER — POTASSIUM CHLORIDE 14.9 MG/ML
20 INJECTION INTRAVENOUS
Status: DISCONTINUED | OUTPATIENT
Start: 2025-01-29 | End: 2025-01-29

## 2025-01-29 RX ORDER — POTASSIUM CHLORIDE 14.9 MG/ML
20 INJECTION INTRAVENOUS ONCE
Status: DISCONTINUED | OUTPATIENT
Start: 2025-01-29 | End: 2025-01-29

## 2025-01-29 RX ORDER — DOCUSATE SODIUM 100 MG/1
100 CAPSULE, LIQUID FILLED ORAL 2 TIMES DAILY
Status: DISCONTINUED | OUTPATIENT
Start: 2025-01-29 | End: 2025-01-30 | Stop reason: HOSPADM

## 2025-01-29 RX ORDER — LANOLIN ALCOHOL/MO/W.PET/CERES
400 CREAM (GRAM) TOPICAL DAILY
Status: DISCONTINUED | OUTPATIENT
Start: 2025-01-29 | End: 2025-01-29

## 2025-01-29 RX ORDER — DOCUSATE SODIUM 100 MG/1
100 CAPSULE, LIQUID FILLED ORAL 2 TIMES DAILY
Status: DISCONTINUED | OUTPATIENT
Start: 2025-01-29 | End: 2025-01-29

## 2025-01-29 RX ORDER — EPINEPHRINE 1 MG/ML
0.3 INJECTION, SOLUTION, CONCENTRATE INTRAVENOUS ONCE AS NEEDED
Status: DISCONTINUED | OUTPATIENT
Start: 2025-01-29 | End: 2025-01-30 | Stop reason: HOSPADM

## 2025-01-29 RX ORDER — POTASSIUM CHLORIDE 1500 MG/1
20 TABLET, EXTENDED RELEASE ORAL ONCE
Status: COMPLETED | OUTPATIENT
Start: 2025-01-29 | End: 2025-01-29

## 2025-01-29 RX ORDER — ACETAMINOPHEN 325 MG/1
975 TABLET ORAL EVERY 8 HOURS SCHEDULED
Status: DISCONTINUED | OUTPATIENT
Start: 2025-01-29 | End: 2025-01-29

## 2025-01-29 RX ORDER — PANTOPRAZOLE SODIUM 40 MG/1
40 TABLET, DELAYED RELEASE ORAL DAILY
Status: DISCONTINUED | OUTPATIENT
Start: 2025-01-29 | End: 2025-01-29

## 2025-01-29 RX ORDER — ALBUTEROL SULFATE 90 UG/1
2 INHALANT RESPIRATORY (INHALATION) EVERY 4 HOURS PRN
Status: DISCONTINUED | OUTPATIENT
Start: 2025-01-29 | End: 2025-01-30 | Stop reason: HOSPADM

## 2025-01-29 RX ORDER — PANTOPRAZOLE SODIUM 40 MG/10ML
40 INJECTION, POWDER, LYOPHILIZED, FOR SOLUTION INTRAVENOUS
Status: DISCONTINUED | OUTPATIENT
Start: 2025-01-30 | End: 2025-01-29

## 2025-01-29 RX ORDER — SODIUM CHLORIDE 9 MG/ML
125 INJECTION, SOLUTION INTRAVENOUS CONTINUOUS
Status: DISCONTINUED | OUTPATIENT
Start: 2025-01-29 | End: 2025-01-29

## 2025-01-29 RX ORDER — OXCARBAZEPINE 300 MG/1
300 TABLET, FILM COATED ORAL EVERY 12 HOURS SCHEDULED
Status: DISCONTINUED | OUTPATIENT
Start: 2025-01-29 | End: 2025-01-30 | Stop reason: HOSPADM

## 2025-01-29 RX ORDER — METOCLOPRAMIDE HYDROCHLORIDE 5 MG/ML
10 INJECTION INTRAMUSCULAR; INTRAVENOUS ONCE
Status: DISCONTINUED | OUTPATIENT
Start: 2025-01-29 | End: 2025-01-29

## 2025-01-29 RX ORDER — ONDANSETRON 4 MG/1
4 TABLET, ORALLY DISINTEGRATING ORAL EVERY 8 HOURS PRN
Status: DISCONTINUED | OUTPATIENT
Start: 2025-01-29 | End: 2025-01-30 | Stop reason: HOSPADM

## 2025-01-29 RX ORDER — PANTOPRAZOLE SODIUM 40 MG/10ML
40 INJECTION, POWDER, LYOPHILIZED, FOR SOLUTION INTRAVENOUS ONCE
Status: COMPLETED | OUTPATIENT
Start: 2025-01-29 | End: 2025-01-29

## 2025-01-29 RX ORDER — NICOTINE 21 MG/24HR
1 PATCH, TRANSDERMAL 24 HOURS TRANSDERMAL DAILY
Status: DISCONTINUED | OUTPATIENT
Start: 2025-01-29 | End: 2025-01-30 | Stop reason: HOSPADM

## 2025-01-29 RX ORDER — ONDANSETRON 4 MG/1
4 TABLET, ORALLY DISINTEGRATING ORAL EVERY 6 HOURS PRN
Status: DISCONTINUED | OUTPATIENT
Start: 2025-01-29 | End: 2025-01-29

## 2025-01-29 RX ORDER — LANOLIN ALCOHOL/MO/W.PET/CERES
400 CREAM (GRAM) TOPICAL DAILY
Status: DISCONTINUED | OUTPATIENT
Start: 2025-01-29 | End: 2025-01-30 | Stop reason: HOSPADM

## 2025-01-29 RX ADMIN — DOCUSATE SODIUM 100 MG: 100 CAPSULE, LIQUID FILLED ORAL at 18:09

## 2025-01-29 RX ADMIN — POTASSIUM CHLORIDE 20 MEQ: 1500 TABLET, EXTENDED RELEASE ORAL at 04:36

## 2025-01-29 RX ADMIN — ACETAMINOPHEN 975 MG: 325 TABLET, FILM COATED ORAL at 18:09

## 2025-01-29 RX ADMIN — ONDANSETRON 4 MG: 4 TABLET, ORALLY DISINTEGRATING ORAL at 18:53

## 2025-01-29 RX ADMIN — POTASSIUM CHLORIDE 20 MEQ: 14.9 INJECTION, SOLUTION INTRAVENOUS at 00:44

## 2025-01-29 RX ADMIN — FAMOTIDINE 20 MG: 10 INJECTION, SOLUTION INTRAVENOUS at 00:10

## 2025-01-29 RX ADMIN — MAGNESIUM OXIDE TAB 400 MG (241.3 MG ELEMENTAL MG) 400 MG: 400 (241.3 MG) TAB at 12:14

## 2025-01-29 RX ADMIN — MAGNESIUM SULFATE HEPTAHYDRATE 2 G: 40 INJECTION, SOLUTION INTRAVENOUS at 00:44

## 2025-01-29 RX ADMIN — OLANZAPINE 30 MG: 10 TABLET, FILM COATED ORAL at 21:44

## 2025-01-29 RX ADMIN — DIPHENHYDRAMINE HYDROCHLORIDE 25 MG: 50 INJECTION INTRAMUSCULAR; INTRAVENOUS at 11:18

## 2025-01-29 RX ADMIN — OXCARBAZEPINE 300 MG: 300 TABLET, FILM COATED ORAL at 21:44

## 2025-01-29 RX ADMIN — DOCUSATE SODIUM 100 MG: 100 CAPSULE, LIQUID FILLED ORAL at 09:37

## 2025-01-29 RX ADMIN — IOHEXOL 85 ML: 350 INJECTION, SOLUTION INTRAVENOUS at 00:13

## 2025-01-29 RX ADMIN — SODIUM CHLORIDE 125 ML/HR: 0.9 INJECTION, SOLUTION INTRAVENOUS at 04:22

## 2025-01-29 RX ADMIN — NICOTINE 1 PATCH: 21 PATCH, EXTENDED RELEASE TRANSDERMAL at 09:29

## 2025-01-29 RX ADMIN — KETOROLAC TROMETHAMINE 15 MG: 30 INJECTION, SOLUTION INTRAMUSCULAR; INTRAVENOUS at 00:10

## 2025-01-29 RX ADMIN — DICYCLOMINE HYDROCHLORIDE 20 MG: 20 TABLET ORAL at 18:09

## 2025-01-29 RX ADMIN — DICYCLOMINE HYDROCHLORIDE 20 MG: 20 TABLET ORAL at 04:20

## 2025-01-29 RX ADMIN — LIDOCAINE HYDROCHLORIDE 10 ML: 20 SOLUTION ORAL at 00:10

## 2025-01-29 RX ADMIN — BUDESONIDE AND FORMOTEROL FUMARATE DIHYDRATE 2 PUFF: 80; 4.5 AEROSOL RESPIRATORY (INHALATION) at 22:31

## 2025-01-29 RX ADMIN — ACETAMINOPHEN 975 MG: 325 TABLET, FILM COATED ORAL at 00:10

## 2025-01-29 RX ADMIN — DICYCLOMINE HYDROCHLORIDE 20 MG: 20 TABLET ORAL at 09:38

## 2025-01-29 RX ADMIN — DICYCLOMINE HYDROCHLORIDE 20 MG: 20 TABLET ORAL at 21:50

## 2025-01-29 RX ADMIN — PANTOPRAZOLE SODIUM 40 MG: 40 INJECTION, POWDER, FOR SOLUTION INTRAVENOUS at 04:20

## 2025-01-29 NOTE — ASSESSMENT & PLAN NOTE
Per recent 302 stay from this month, Zyprexa 30 mg at bedtime, Trileptal 300 mg twice daily for mood and seizures    -Will continue those medications

## 2025-01-29 NOTE — ASSESSMENT & PLAN NOTE
Home medication: Symbicort 2 puff twice daily, albuterol 2 puffs every 4 hours as needed or albuterol 1.25 mg nebulizer every 6 hours as needed  Reports last albuterol use this morning  Lungs were wheezy throughout    -Will order home meds

## 2025-01-29 NOTE — ASSESSMENT & PLAN NOTE
Smokes 1 pack/day, smoking increased for the past 4 months  Requested nicotine patch    -Will order nicotine patch

## 2025-01-29 NOTE — ED NOTES
Pt. refusing new IV access for morning blood work and fluids. Provider made aware and ok with no lab work and IV access.      Opal Licona RN  01/29/25 0534
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
## 2025-01-29 NOTE — ED PROCEDURE NOTE
Procedure  Complex Venous Access Line    Date/Time: 1/28/2025 9:52 PM    Performed by: Shaan Perez MD  Authorized by: Shaan Perez MD    Patient location:  ED  Consent:     Consent obtained:  Verbal  Universal protocol:     Procedure explained and questions answered to patient or proxy's satisfaction: yes    Pre-procedure details:     Hand hygiene: Hand hygiene performed prior to insertion      Sterile barrier technique: All elements of maximal sterile technique followed      Skin preparation:  Alcohol    Skin preparation agent: Skin preparation agent completely dried prior to procedure    Procedure details:     Complex Venous Access Line Type: US Guided Peripheral IV      Orientation:  Right    Location:  Antecubital    Catheter size:  20 gauge    Ultrasound image availability:  Images available in PACS    Number of attempts:  1    Successful placement: yes      Landmarks identified: yes    Post-procedure details:     Post-procedure:  Dressing applied    Patient tolerance of procedure:  Tolerated well, no immediate complications                   Shaan Perez MD  01/28/25 9036

## 2025-01-29 NOTE — ED PROCEDURE NOTE
Procedure  Complex Venous Access Line    Date/Time: 1/29/2025 11:40 AM    Performed by: Meche Dunn DO  Authorized by: Meche Dunn DO    Patient location:  ED  Other Assisting Provider: Yes (comment) (Dr. Bebeto Pedro)    Consent:     Consent obtained:  Verbal  Pre-procedure details:     Hand hygiene: Hand hygiene performed prior to insertion      Sterile barrier technique: All elements of maximal sterile technique followed      Skin preparation:  2% chlorhexidine    Skin preparation agent: Skin preparation agent completely dried prior to procedure    Procedure details:     Complex Venous Access Line Type: US Guided Peripheral IV      Orientation:  Left    Location:  Arm    Catheter size:  20 gauge    Patient position:  Flat    Ultrasound image availability:  Images available in PACS    Sterile ultrasound techniques: Sterile gel and sterile probe covers were used      Number of attempts:  1    Successful placement: yes      Landmarks identified: yes    Anesthesia (see MAR for exact dosages):     Anesthesia method:  None  Post-procedure details:     Post-procedure:  Dressing applied    Assessment:  Blood return through all ports and free fluid flow    Patient tolerance of procedure:  Tolerated well, no immediate complications                   Meche Dunn DO  01/29/25 1141
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
## 2025-01-29 NOTE — ASSESSMENT & PLAN NOTE
Presents for vague abdominal pain complaints, endorses pressure like pain suprapubic and left lower quadrant.  Associated with intractable nausea and vomiting, inability to tolerate p.o.  Last vomit at 6 PM of 1/28.  Unable to disclose how many episodes of emesis.  CT abdomen in the ED showed diverticulosis and moderate hiatal hernia  UA unremarkable  Does have electrolyte derangements including sodium 128, potassium 3.3, and magnesium 1.5 status post repletion in the ED with IV bolus, IV potassium chloride 20 mEq, IV mag 2 g.  IV Reglan for pain    -Clear liquid diet for now, advance as tolerated  -IVF@125 for 10 hours  -IV Zofran PRN Q8  -PO Bentyl 20 PRN Q6  -Scheduled IV Tylenol Q8  -IV Protonix 40 daily  -Home Colace twice daily  -Monitor abdominal pain

## 2025-01-30 VITALS
OXYGEN SATURATION: 96 % | RESPIRATION RATE: 18 BRPM | HEART RATE: 70 BPM | SYSTOLIC BLOOD PRESSURE: 108 MMHG | TEMPERATURE: 98.2 F | DIASTOLIC BLOOD PRESSURE: 61 MMHG

## 2025-01-30 PROCEDURE — 99238 HOSP IP/OBS DSCHRG MGMT 30/<: CPT | Performed by: FAMILY MEDICINE

## 2025-01-30 RX ADMIN — PANTOPRAZOLE SODIUM 40 MG: 40 TABLET, DELAYED RELEASE ORAL at 09:53

## 2025-01-30 RX ADMIN — OXCARBAZEPINE 300 MG: 300 TABLET, FILM COATED ORAL at 09:54

## 2025-01-30 RX ADMIN — DOCUSATE SODIUM 100 MG: 100 CAPSULE, LIQUID FILLED ORAL at 09:53

## 2025-01-30 RX ADMIN — BUDESONIDE AND FORMOTEROL FUMARATE DIHYDRATE 2 PUFF: 80; 4.5 AEROSOL RESPIRATORY (INHALATION) at 09:55

## 2025-01-30 RX ADMIN — MAGNESIUM OXIDE TAB 400 MG (241.3 MG ELEMENTAL MG) 400 MG: 400 (241.3 MG) TAB at 09:54

## 2025-01-30 RX ADMIN — DICYCLOMINE HYDROCHLORIDE 20 MG: 20 TABLET ORAL at 09:54

## 2025-01-30 NOTE — PLAN OF CARE
Problem: PAIN - ADULT  Goal: Verbalizes/displays adequate comfort level or baseline comfort level  Description: Interventions:  - Encourage patient to monitor pain and request assistance  - Assess pain using appropriate pain scale  - Administer analgesics based on type and severity of pain and evaluate response  - Implement non-pharmacological measures as appropriate and evaluate response  - Consider cultural and social influences on pain and pain management  - Notify physician/advanced practitioner if interventions unsuccessful or patient reports new pain  Outcome: Progressing     Problem: INFECTION - ADULT  Goal: Absence or prevention of progression during hospitalization  Description: INTERVENTIONS:  - Assess and monitor for signs and symptoms of infection  - Monitor lab/diagnostic results  - Monitor all insertion sites, i.e. indwelling lines, tubes, and drains  - Monitor endotracheal if appropriate and nasal secretions for changes in amount and color  - [place] appropriate cooling/warming therapies per order  - Administer medications as ordered  - Instruct and encourage patient and family to use good hand hygiene technique  - Identify and instruct in appropriate isolation precautions for identified infection/condition  Outcome: Progressing     Problem: SAFETY ADULT  Goal: Patient will remain free of falls  Description: INTERVENTIONS:  - Educate patient/family on patient safety including physical limitations  - Instruct patient to call for assistance with activity   - Consult OT/PT to assist with strengthening/mobility   - Keep Call bell within reach  - Keep bed low and locked with side rails adjusted as appropriate  - Keep care items and personal belongings within reach  - Initiate and maintain comfort rounds  - Make Fall Risk Sign visible to staff  - Offer Toileting every 2 Hours, in advance of need  - Initiate/Maintain bed/chair alarm  - Obtain necessary fall risk management equipment:   - Apply yellow  socks and bracelet for high fall risk patients  - Consider moving patient to room near nurses station  Outcome: Progressing  Goal: Maintain or return to baseline ADL function  Description: INTERVENTIONS:  -  Assess patient's ability to carry out ADLs; assess patient's baseline for ADL function and identify physical deficits which impact ability to perform ADLs (bathing, care of mouth/teeth, toileting, grooming, dressing, etc.)  - Assess/evaluate cause of self-care deficits   - Assess range of motion  - Assess patient's mobility; develop plan if impaired  - Assess patient's need for assistive devices and provide as appropriate  - Encourage maximum independence but intervene and supervise when necessary  - Involve family in performance of ADLs  - Assess for home care needs following discharge   - Consider OT consult to assist with ADL evaluation and planning for discharge  - Provide patient education as appropriate  Outcome: Progressing  Goal: Maintains/Returns to pre admission functional level  Description: INTERVENTIONS:  - Perform AM-PAC 6 Click Basic Mobility/ Daily Activity assessment daily.  - Set and communicate daily mobility goal to care team and patient/family/caregiver.   - Collaborate with rehabilitation services on mobility goals if consulted  - Perform Range of Motion 3 times a day.  - Reposition patient every 2 hours.  - Dangle patient 3 times a day  - Stand patient 3 times a day  - Ambulate patient 3 times a day  - Out of bed to chair 3 times a day   - Out of bed for meals 3 times a day  - Out of bed for toileting  - Record patient progress and toleration of activity level   Outcome: Progressing     Problem: DISCHARGE PLANNING  Goal: Discharge to home or other facility with appropriate resources  Description: INTERVENTIONS:  - Identify barriers to discharge w/patient and caregiver  - Arrange for needed discharge resources and transportation as appropriate  - Identify discharge learning needs (meds,  wound care, etc.)  - Arrange for interpretive services to assist at discharge as needed  - Refer to Case Management Department for coordinating discharge planning if the patient needs post-hospital services based on physician/advanced practitioner order or complex needs related to functional status, cognitive ability, or social support system  Outcome: Progressing     Problem: Knowledge Deficit  Goal: Patient/family/caregiver demonstrates understanding of disease process, treatment plan, medications, and discharge instructions  Description: Complete learning assessment and assess knowledge base.  Interventions:  - Provide teaching at level of understanding  - Provide teaching via preferred learning methods  Outcome: Progressing     Problem: GASTROINTESTINAL - ADULT  Goal: Minimal or absence of nausea and/or vomiting  Description: INTERVENTIONS:  - Administer IV fluids if ordered to ensure adequate hydration  - Maintain NPO status until nausea and vomiting are resolved  - Nasogastric tube if ordered  - Administer ordered antiemetic medications as needed  - Provide nonpharmacologic comfort measures as appropriate  - Advance diet as tolerated, if ordered  - Consider nutrition services referral to assist patient with adequate nutrition and appropriate food choices  Outcome: Progressing  Goal: Maintains or returns to baseline bowel function  Description: INTERVENTIONS:  - Assess bowel function  - Encourage oral fluids to ensure adequate hydration  - Administer IV fluids if ordered to ensure adequate hydration  - Administer ordered medications as needed  - Encourage mobilization and activity  - Consider nutritional services referral to assist patient with adequate nutrition and appropriate food choices  Outcome: Progressing  Goal: Maintains adequate nutritional intake  Description: INTERVENTIONS:  - Monitor percentage of each meal consumed  - Identify factors contributing to decreased intake, treat as appropriate  -  Assist with meals as needed  - Monitor I&O, weight, and lab values if indicated  - Obtain nutrition services referral as needed  Outcome: Progressing  Goal: Oral mucous membranes remain intact  Description: INTERVENTIONS  - Assess oral mucosa and hygiene practices  - Implement preventative oral hygiene regimen  - Implement oral medicated treatments as ordered  - Initiate Nutrition services referral as needed  Outcome: Progressing
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
## 2025-01-30 NOTE — PLAN OF CARE
Problem: PAIN - ADULT  Goal: Verbalizes/displays adequate comfort level or baseline comfort level  Description: Interventions:  - Encourage patient to monitor pain and request assistance  - Assess pain using appropriate pain scale  - Administer analgesics based on type and severity of pain and evaluate response  - Implement non-pharmacological measures as appropriate and evaluate response  - Consider cultural and social influences on pain and pain management  - Notify physician/advanced practitioner if interventions unsuccessful or patient reports new pain  Outcome: Progressing     Problem: INFECTION - ADULT  Goal: Absence or prevention of progression during hospitalization  Description: INTERVENTIONS:  - Assess and monitor for signs and symptoms of infection  - Monitor lab/diagnostic results  - Monitor all insertion sites, i.e. indwelling lines, tubes, and drains  - Monitor endotracheal if appropriate and nasal secretions for changes in amount and color  - [place] appropriate cooling/warming therapies per order  - Administer medications as ordered  - Instruct and encourage patient and family to use good hand hygiene technique  - Identify and instruct in appropriate isolation precautions for identified infection/condition  Outcome: Progressing     Problem: SAFETY ADULT  Goal: Patient will remain free of falls  Description: INTERVENTIONS:  - Educate patient/family on patient safety including physical limitations  - Instruct patient to call for assistance with activity   - Consult OT/PT to assist with strengthening/mobility   - Keep Call bell within reach  - Keep bed low and locked with side rails adjusted as appropriate  - Keep care items and personal belongings within reach  - Initiate and maintain comfort rounds  - Make Fall Risk Sign visible to staff  - Apply yellow socks and bracelet for high fall risk patients  - Consider moving patient to room near nurses station  Outcome: Progressing  Goal: Maintain or  return to baseline ADL function  Description: INTERVENTIONS:  -  Assess patient's ability to carry out ADLs; assess patient's baseline for ADL function and identify physical deficits which impact ability to perform ADLs (bathing, care of mouth/teeth, toileting, grooming, dressing, etc.)  - Assess/evaluate cause of self-care deficits   - Assess range of motion  - Assess patient's mobility; develop plan if impaired  - Assess patient's need for assistive devices and provide as appropriate  - Encourage maximum independence but intervene and supervise when necessary  - Involve family in performance of ADLs  - Assess for home care needs following discharge   - Consider OT consult to assist with ADL evaluation and planning for discharge  - Provide patient education as appropriate  Outcome: Progressing  Goal: Maintains/Returns to pre admission functional level  Description: INTERVENTIONS:  - Perform AM-PAC 6 Click Basic Mobility/ Daily Activity assessment daily.  - Set and communicate daily mobility goal to care team and patient/family/caregiver.   - Collaborate with rehabilitation services on mobility goals if consulted  - Perform Range of Motion 3 times a day.  - Reposition patient every 2 hours.  - Dangle patient 3 times a day  - Stand patient 3 times a day  - Ambulate patient 3 times a day  - Out of bed to chair 3 times a day   - Out of bed for meals 3 times a day  - Out of bed for toileting  - Record patient progress and toleration of activity level   Outcome: Progressing     Problem: DISCHARGE PLANNING  Goal: Discharge to home or other facility with appropriate resources  Description: INTERVENTIONS:  - Identify barriers to discharge w/patient and caregiver  - Arrange for needed discharge resources and transportation as appropriate  - Identify discharge learning needs (meds, wound care, etc.)  - Arrange for interpretive services to assist at discharge as needed  - Refer to Case Management Department for coordinating  discharge planning if the patient needs post-hospital services based on physician/advanced practitioner order or complex needs related to functional status, cognitive ability, or social support system  Outcome: Progressing     Problem: Knowledge Deficit  Goal: Patient/family/caregiver demonstrates understanding of disease process, treatment plan, medications, and discharge instructions  Description: Complete learning assessment and assess knowledge base.  Interventions:  - Provide teaching at level of understanding  - Provide teaching via preferred learning methods  Outcome: Progressing

## 2025-01-30 NOTE — PLAN OF CARE
Problem: PAIN - ADULT  Goal: Verbalizes/displays adequate comfort level or baseline comfort level  Description: Interventions:  - Encourage patient to monitor pain and request assistance  - Assess pain using appropriate pain scale  - Administer analgesics based on type and severity of pain and evaluate response  - Implement non-pharmacological measures as appropriate and evaluate response  - Consider cultural and social influences on pain and pain management  - Notify physician/advanced practitioner if interventions unsuccessful or patient reports new pain  1/30/2025 1255 by Sophie Walls RN  Outcome: Adequate for Discharge  1/30/2025 0943 by Sophie Walls RN  Outcome: Progressing     Problem: INFECTION - ADULT  Goal: Absence or prevention of progression during hospitalization  Description: INTERVENTIONS:  - Assess and monitor for signs and symptoms of infection  - Monitor lab/diagnostic results  - Monitor all insertion sites, i.e. indwelling lines, tubes, and drains  - Monitor endotracheal if appropriate and nasal secretions for changes in amount and color  - [place] appropriate cooling/warming therapies per order  - Administer medications as ordered  - Instruct and encourage patient and family to use good hand hygiene technique  - Identify and instruct in appropriate isolation precautions for identified infection/condition  1/30/2025 1255 by Sophie Walls RN  Outcome: Adequate for Discharge  1/30/2025 0943 by Sophie Walls RN  Outcome: Progressing     Problem: SAFETY ADULT  Goal: Patient will remain free of falls  Description: INTERVENTIONS:  - Educate patient/family on patient safety including physical limitations  - Instruct patient to call for assistance with activity   - Consult OT/PT to assist with strengthening/mobility   - Keep Call bell within reach  - Keep bed low and locked with side rails adjusted as appropriate  - Keep care items and personal belongings within reach  - Initiate and  maintain comfort rounds  - Make Fall Risk Sign visible to staff  - Offer Toileting every 2 Hours, in advance of need  - Initiate/Maintain bed/chair alarm  - Obtain necessary fall risk management equipment:   - Apply yellow socks and bracelet for high fall risk patients  - Consider moving patient to room near nurses station  1/30/2025 1255 by Sophie Walls RN  Outcome: Adequate for Discharge  1/30/2025 0943 by Sophie Walls RN  Outcome: Progressing  Goal: Maintain or return to baseline ADL function  Description: INTERVENTIONS:  -  Assess patient's ability to carry out ADLs; assess patient's baseline for ADL function and identify physical deficits which impact ability to perform ADLs (bathing, care of mouth/teeth, toileting, grooming, dressing, etc.)  - Assess/evaluate cause of self-care deficits   - Assess range of motion  - Assess patient's mobility; develop plan if impaired  - Assess patient's need for assistive devices and provide as appropriate  - Encourage maximum independence but intervene and supervise when necessary  - Involve family in performance of ADLs  - Assess for home care needs following discharge   - Consider OT consult to assist with ADL evaluation and planning for discharge  - Provide patient education as appropriate  1/30/2025 1255 by Sophie Walls RN  Outcome: Adequate for Discharge  1/30/2025 0943 by Sophie Walls RN  Outcome: Progressing  Goal: Maintains/Returns to pre admission functional level  Description: INTERVENTIONS:  - Perform AM-PAC 6 Click Basic Mobility/ Daily Activity assessment daily.  - Set and communicate daily mobility goal to care team and patient/family/caregiver.   - Collaborate with rehabilitation services on mobility goals if consulted  - Perform Range of Motion 3 times a day.  - Reposition patient every 2 hours.  - Dangle patient 3 times a day  - Stand patient 3 times a day  - Ambulate patient 3 times a day  - Out of bed to chair 3 times a day   - Out of bed  for meals 3 times a day  - Out of bed for toileting  - Record patient progress and toleration of activity level   1/30/2025 1255 by Sophie Walls RN  Outcome: Adequate for Discharge  1/30/2025 0943 by Sophie Walls RN  Outcome: Progressing     Problem: DISCHARGE PLANNING  Goal: Discharge to home or other facility with appropriate resources  Description: INTERVENTIONS:  - Identify barriers to discharge w/patient and caregiver  - Arrange for needed discharge resources and transportation as appropriate  - Identify discharge learning needs (meds, wound care, etc.)  - Arrange for interpretive services to assist at discharge as needed  - Refer to Case Management Department for coordinating discharge planning if the patient needs post-hospital services based on physician/advanced practitioner order or complex needs related to functional status, cognitive ability, or social support system  1/30/2025 1255 by Sophie Walls RN  Outcome: Adequate for Discharge  1/30/2025 0943 by Sophie Walls RN  Outcome: Progressing     Problem: Knowledge Deficit  Goal: Patient/family/caregiver demonstrates understanding of disease process, treatment plan, medications, and discharge instructions  Description: Complete learning assessment and assess knowledge base.  Interventions:  - Provide teaching at level of understanding  - Provide teaching via preferred learning methods  1/30/2025 1255 by Sophie Walls RN  Outcome: Adequate for Discharge  1/30/2025 0943 by Sophie Walls RN  Outcome: Progressing     Problem: GASTROINTESTINAL - ADULT  Goal: Minimal or absence of nausea and/or vomiting  Description: INTERVENTIONS:  - Administer IV fluids if ordered to ensure adequate hydration  - Maintain NPO status until nausea and vomiting are resolved  - Nasogastric tube if ordered  - Administer ordered antiemetic medications as needed  - Provide nonpharmacologic comfort measures as appropriate  - Advance diet as tolerated, if ordered  -  Consider nutrition services referral to assist patient with adequate nutrition and appropriate food choices  1/30/2025 1255 by Sophie Walls RN  Outcome: Adequate for Discharge  1/30/2025 0943 by Sophie Walls RN  Outcome: Progressing  Goal: Maintains or returns to baseline bowel function  Description: INTERVENTIONS:  - Assess bowel function  - Encourage oral fluids to ensure adequate hydration  - Administer IV fluids if ordered to ensure adequate hydration  - Administer ordered medications as needed  - Encourage mobilization and activity  - Consider nutritional services referral to assist patient with adequate nutrition and appropriate food choices  1/30/2025 1255 by Sophie Walls RN  Outcome: Adequate for Discharge  1/30/2025 0943 by Sophie Walls RN  Outcome: Progressing  Goal: Maintains adequate nutritional intake  Description: INTERVENTIONS:  - Monitor percentage of each meal consumed  - Identify factors contributing to decreased intake, treat as appropriate  - Assist with meals as needed  - Monitor I&O, weight, and lab values if indicated  - Obtain nutrition services referral as needed  1/30/2025 1255 by Sophie Walls RN  Outcome: Adequate for Discharge  1/30/2025 0943 by Sophie Walls RN  Outcome: Progressing  Goal: Oral mucous membranes remain intact  Description: INTERVENTIONS  - Assess oral mucosa and hygiene practices  - Implement preventative oral hygiene regimen  - Implement oral medicated treatments as ordered  - Initiate Nutrition services referral as needed  1/30/2025 1255 by Sophie Walls RN  Outcome: Adequate for Discharge  1/30/2025 0943 by Sophie Walls RN  Outcome: Progressing

## 2025-01-30 NOTE — PLAN OF CARE
Problem: PAIN - ADULT  Goal: Verbalizes/displays adequate comfort level or baseline comfort level  Description: Interventions:  - Encourage patient to monitor pain and request assistance  - Assess pain using appropriate pain scale  - Administer analgesics based on type and severity of pain and evaluate response  - Implement non-pharmacological measures as appropriate and evaluate response  - Consider cultural and social influences on pain and pain management  - Notify physician/advanced practitioner if interventions unsuccessful or patient reports new pain  1/29/2025 2019 by Tawny Lua RN  Outcome: Progressing  1/29/2025 2019 by Tawny Lua RN  Outcome: Progressing     Problem: INFECTION - ADULT  Goal: Absence or prevention of progression during hospitalization  Description: INTERVENTIONS:  - Assess and monitor for signs and symptoms of infection  - Monitor lab/diagnostic results  - Monitor all insertion sites, i.e. indwelling lines, tubes, and drains  - Monitor endotracheal if appropriate and nasal secretions for changes in amount and color  - [place] appropriate cooling/warming therapies per order  - Administer medications as ordered  - Instruct and encourage patient and family to use good hand hygiene technique  - Identify and instruct in appropriate isolation precautions for identified infection/condition  1/29/2025 2019 by Tawny Lua RN  Outcome: Progressing  1/29/2025 2019 by Tawny Lua RN  Outcome: Progressing     Problem: SAFETY ADULT  Goal: Patient will remain free of falls  Description: INTERVENTIONS:  - Educate patient/family on patient safety including physical limitations  - Instruct patient to call for assistance with activity   - Consult OT/PT to assist with strengthening/mobility   - Keep Call bell within reach  - Keep bed low and locked with side rails adjusted as appropriate  - Keep care items and personal belongings within reach  - Initiate and maintain comfort rounds  - Make Fall Risk Sign  visible to staff  - Offer Toileting every  Hours, in advance of need  - Initiate/Maintain alarm  - Obtain necessary fall risk management equipment:   - Apply yellow socks and bracelet for high fall risk patients  - Consider moving patient to room near nurses station  1/29/2025 2019 by Tawny Lua RN  Outcome: Progressing  1/29/2025 2019 by Tawny Lua RN  Outcome: Progressing  Goal: Maintain or return to baseline ADL function  Description: INTERVENTIONS:  -  Assess patient's ability to carry out ADLs; assess patient's baseline for ADL function and identify physical deficits which impact ability to perform ADLs (bathing, care of mouth/teeth, toileting, grooming, dressing, etc.)  - Assess/evaluate cause of self-care deficits   - Assess range of motion  - Assess patient's mobility; develop plan if impaired  - Assess patient's need for assistive devices and provide as appropriate  - Encourage maximum independence but intervene and supervise when necessary  - Involve family in performance of ADLs  - Assess for home care needs following discharge   - Consider OT consult to assist with ADL evaluation and planning for discharge  - Provide patient education as appropriate  1/29/2025 2019 by Tawny Lua RN  Outcome: Progressing  1/29/2025 2019 by Tawny Lua RN  Outcome: Progressing  Goal: Maintains/Returns to pre admission functional level  Description: INTERVENTIONS:  - Perform AM-PAC 6 Click Basic Mobility/ Daily Activity assessment daily.  - Set and communicate daily mobility goal to care team and patient/family/caregiver.   - Collaborate with rehabilitation services on mobility goals if consulted  - Perform Range of Motion  times a day.  - Reposition patient every  hours.  - Dangle patient  times a day  - Stand patient  times a day  - Ambulate patient  times a day  - Out of bed to chair  times a day   - Out of bed for meals  times a day  - Out of bed for toileting  - Record patient progress and toleration of activity level    1/29/2025 2019 by Tawny Lua RN  Outcome: Progressing  1/29/2025 2019 by Tawny Lua RN  Outcome: Progressing     Problem: DISCHARGE PLANNING  Goal: Discharge to home or other facility with appropriate resources  Description: INTERVENTIONS:  - Identify barriers to discharge w/patient and caregiver  - Arrange for needed discharge resources and transportation as appropriate  - Identify discharge learning needs (meds, wound care, etc.)  - Arrange for interpretive services to assist at discharge as needed  - Refer to Case Management Department for coordinating discharge planning if the patient needs post-hospital services based on physician/advanced practitioner order or complex needs related to functional status, cognitive ability, or social support system  1/29/2025 2019 by Tawny Lua RN  Outcome: Progressing  1/29/2025 2019 by Tawny Lua RN  Outcome: Progressing     Problem: Knowledge Deficit  Goal: Patient/family/caregiver demonstrates understanding of disease process, treatment plan, medications, and discharge instructions  Description: Complete learning assessment and assess knowledge base.  Interventions:  - Provide teaching at level of understanding  - Provide teaching via preferred learning methods  1/29/2025 2019 by Tawny Lua RN  Outcome: Progressing  1/29/2025 2019 by Tawny Lua RN  Outcome: Progressing
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
## 2025-01-31 ENCOUNTER — TELEPHONE (OUTPATIENT)
Dept: FAMILY MEDICINE CLINIC | Facility: CLINIC | Age: 40
End: 2025-01-31

## 2025-01-31 NOTE — UTILIZATION REVIEW
NOTIFICATION OF ADMISSION DISCHARGE   This is a Notification of Discharge from [facility]. Please be advised that this patient has been discharge from our facility. Below you will find the admission and discharge date and time including the patient’s disposition.   UTILIZATION REVIEW CONTACT:  Griselda Wells  Utilization   Network Utilization Review Department  Phone: [phone] x carefully listen to the prompts. All voicemails are confidential.  Email: [email or web address]     ADMISSION INFORMATION  PRESENTATION DATE: 1/28/2025  8:59 PM  OBERVATION ADMISSION DATE: 01/29/2025 0154  INPATIENT ADMISSION DATE: N/A N/A   DISCHARGE DATE: 1/30/2025  2:35 PM   DISPOSITION:Home/Self Care    Network Utilization Review Department  ATTENTION: Please call with any questions or concerns to [phone] and carefully listen to the prompts so that you are directed to the right person. All voicemails are confidential.   For Discharge needs, contact Care Management DC Support Team at [phone] opt. 2  Send all requests for admission clinical reviews, approved or denied determinations and any other requests to dedicated fax number below belonging to the campus where the patient is receiving treatment. List of dedicated fax numbers for the Facilities:  FACILITY NAME UR FAX NUMBER   ADMISSION DENIALS (Administrative/Medical Necessity) [phone]   DISCHARGE SUPPORT TEAM ([facility]) [phone]   PARENT CHILD HEALTH (Maternity/NICU/Pediatrics) [phone]   [facility] [phone]   [facility] [phone]   [facility] [phone]   [facility] [phone]   Critical access hospital [phone]   [facility] [phone]   [facility] [phone]   [facility]  [phone]   [facility] [phone]   [facility] [phone]   [facility] [phone]   [facility] [phone]
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
## 2025-02-06 ENCOUNTER — HOSPITAL ENCOUNTER (OUTPATIENT)
Facility: HOSPITAL | Age: 40
Setting detail: OBSERVATION
Discharge: HOME | End: 2025-02-08
Attending: EMERGENCY MEDICINE | Admitting: STUDENT IN AN ORGANIZED HEALTH CARE EDUCATION/TRAINING PROGRAM
Payer: COMMERCIAL

## 2025-02-06 DIAGNOSIS — Z91.89 AT RISK FOR DOMESTIC VIOLENCE: ICD-10-CM

## 2025-02-06 DIAGNOSIS — E87.1 HYPONATREMIA: Primary | ICD-10-CM

## 2025-02-06 DIAGNOSIS — R19.7 NAUSEA VOMITING AND DIARRHEA: ICD-10-CM

## 2025-02-06 DIAGNOSIS — R11.2 NAUSEA VOMITING AND DIARRHEA: ICD-10-CM

## 2025-02-06 LAB
ALBUMIN SERPL BCG-MCNC: 4.4 G/DL (ref 3.5–5)
ALP SERPL-CCNC: 67 U/L (ref 34–104)
ALT SERPL W P-5'-P-CCNC: 9 U/L (ref 7–52)
AMPHETAMINES SERPL QL SCN: NEGATIVE
ANION GAP SERPL CALCULATED.3IONS-SCNC: 8 MMOL/L (ref 4–13)
AST SERPL W P-5'-P-CCNC: 16 U/L (ref 13–39)
BACTERIA UR QL AUTO: NORMAL /HPF
BARBITURATES UR QL: NEGATIVE
BASOPHILS # BLD AUTO: 0.02 THOUSANDS/ΜL (ref 0–0.1)
BASOPHILS NFR BLD AUTO: 0 % (ref 0–1)
BENZODIAZ UR QL: NEGATIVE
BILIRUB DIRECT SERPL-MCNC: 0.03 MG/DL (ref 0–0.2)
BILIRUB SERPL-MCNC: 0.39 MG/DL (ref 0.2–1)
BILIRUB UR QL STRIP: NEGATIVE
BUN SERPL-MCNC: 6 MG/DL (ref 5–25)
CALCIUM SERPL-MCNC: 9 MG/DL (ref 8.4–10.2)
CHLORIDE SERPL-SCNC: 93 MMOL/L (ref 96–108)
CLARITY UR: CLEAR
CO2 SERPL-SCNC: 22 MMOL/L (ref 21–32)
COCAINE UR QL: NEGATIVE
COLOR UR: YELLOW
CREAT SERPL-MCNC: 0.66 MG/DL (ref 0.6–1.3)
EOSINOPHIL # BLD AUTO: 0.03 THOUSAND/ΜL (ref 0–0.61)
EOSINOPHIL NFR BLD AUTO: 0 % (ref 0–6)
ERYTHROCYTE [DISTWIDTH] IN BLOOD BY AUTOMATED COUNT: 11.4 % (ref 11.6–15.1)
EXT PREGNANCY TEST URINE: NEGATIVE
EXT. CONTROL: NORMAL
FENTANYL UR QL SCN: NEGATIVE
GFR SERPL CREATININE-BSD FRML MDRD: 113 ML/MIN/1.73SQ M
GLUCOSE SERPL-MCNC: 79 MG/DL (ref 65–140)
GLUCOSE UR STRIP-MCNC: NEGATIVE MG/DL
HCT VFR BLD AUTO: 41.2 % (ref 34.8–46.1)
HGB BLD-MCNC: 14.7 G/DL (ref 11.5–15.4)
HGB UR QL STRIP.AUTO: ABNORMAL
HYDROCODONE UR QL SCN: NEGATIVE
IMM GRANULOCYTES # BLD AUTO: 0.06 THOUSAND/UL (ref 0–0.2)
IMM GRANULOCYTES NFR BLD AUTO: 1 % (ref 0–2)
KETONES UR STRIP-MCNC: ABNORMAL MG/DL
LEUKOCYTE ESTERASE UR QL STRIP: NEGATIVE
LIPASE SERPL-CCNC: 15 U/L (ref 11–82)
LYMPHOCYTES # BLD AUTO: 1.49 THOUSANDS/ΜL (ref 0.6–4.47)
LYMPHOCYTES NFR BLD AUTO: 16 % (ref 14–44)
MCH RBC QN AUTO: 31.5 PG (ref 26.8–34.3)
MCHC RBC AUTO-ENTMCNC: 35.7 G/DL (ref 31.4–37.4)
MCV RBC AUTO: 88 FL (ref 82–98)
METHADONE UR QL: NEGATIVE
MONOCYTES # BLD AUTO: 0.82 THOUSAND/ΜL (ref 0.17–1.22)
MONOCYTES NFR BLD AUTO: 9 % (ref 4–12)
NEUTROPHILS # BLD AUTO: 6.84 THOUSANDS/ΜL (ref 1.85–7.62)
NEUTS SEG NFR BLD AUTO: 74 % (ref 43–75)
NITRITE UR QL STRIP: NEGATIVE
NON-SQ EPI CELLS URNS QL MICRO: NORMAL /HPF
NRBC BLD AUTO-RTO: 0 /100 WBCS
OPIATES UR QL SCN: NEGATIVE
OXYCODONE+OXYMORPHONE UR QL SCN: NEGATIVE
PCP UR QL: NEGATIVE
PH UR STRIP.AUTO: 6.5 [PH] (ref 4.5–8)
PLATELET # BLD AUTO: 168 THOUSANDS/UL (ref 149–390)
PMV BLD AUTO: 10.1 FL (ref 8.9–12.7)
POTASSIUM SERPL-SCNC: 4.2 MMOL/L (ref 3.5–5.3)
PROT SERPL-MCNC: 7.1 G/DL (ref 6.4–8.4)
PROT UR STRIP-MCNC: NEGATIVE MG/DL
RBC # BLD AUTO: 4.67 MILLION/UL (ref 3.81–5.12)
RBC #/AREA URNS AUTO: NORMAL /HPF
SODIUM SERPL-SCNC: 123 MMOL/L (ref 135–147)
SODIUM UR-SCNC: 24 MMOL/L
SP GR UR STRIP.AUTO: 1.01 (ref 1–1.03)
THC UR QL: NEGATIVE
UROBILINOGEN UR QL STRIP.AUTO: 0.2 E.U./DL
WBC # BLD AUTO: 9.26 THOUSAND/UL (ref 4.31–10.16)
WBC #/AREA URNS AUTO: NORMAL /HPF

## 2025-02-06 PROCEDURE — 99222 1ST HOSP IP/OBS MODERATE 55: CPT | Performed by: NURSE PRACTITIONER

## 2025-02-06 PROCEDURE — 96375 TX/PRO/DX INJ NEW DRUG ADDON: CPT

## 2025-02-06 PROCEDURE — 85025 COMPLETE CBC W/AUTO DIFF WBC: CPT | Performed by: EMERGENCY MEDICINE

## 2025-02-06 PROCEDURE — 80307 DRUG TEST PRSMV CHEM ANLYZR: CPT | Performed by: NURSE PRACTITIONER

## 2025-02-06 PROCEDURE — 84300 ASSAY OF URINE SODIUM: CPT | Performed by: NURSE PRACTITIONER

## 2025-02-06 PROCEDURE — 83935 ASSAY OF URINE OSMOLALITY: CPT | Performed by: NURSE PRACTITIONER

## 2025-02-06 PROCEDURE — 80048 BASIC METABOLIC PNL TOTAL CA: CPT | Performed by: NURSE PRACTITIONER

## 2025-02-06 PROCEDURE — 99284 EMERGENCY DEPT VISIT MOD MDM: CPT

## 2025-02-06 PROCEDURE — 36415 COLL VENOUS BLD VENIPUNCTURE: CPT | Performed by: EMERGENCY MEDICINE

## 2025-02-06 PROCEDURE — 96365 THER/PROPH/DIAG IV INF INIT: CPT

## 2025-02-06 PROCEDURE — 80076 HEPATIC FUNCTION PANEL: CPT | Performed by: EMERGENCY MEDICINE

## 2025-02-06 PROCEDURE — 81001 URINALYSIS AUTO W/SCOPE: CPT

## 2025-02-06 PROCEDURE — 96367 TX/PROPH/DG ADDL SEQ IV INF: CPT

## 2025-02-06 PROCEDURE — 96366 THER/PROPH/DIAG IV INF ADDON: CPT

## 2025-02-06 PROCEDURE — 83930 ASSAY OF BLOOD OSMOLALITY: CPT | Performed by: NURSE PRACTITIONER

## 2025-02-06 PROCEDURE — 81025 URINE PREGNANCY TEST: CPT | Performed by: EMERGENCY MEDICINE

## 2025-02-06 PROCEDURE — 80048 BASIC METABOLIC PNL TOTAL CA: CPT | Performed by: EMERGENCY MEDICINE

## 2025-02-06 PROCEDURE — 99285 EMERGENCY DEPT VISIT HI MDM: CPT | Performed by: EMERGENCY MEDICINE

## 2025-02-06 PROCEDURE — 83690 ASSAY OF LIPASE: CPT | Performed by: EMERGENCY MEDICINE

## 2025-02-06 RX ORDER — HYDROMORPHONE HCL/PF 1 MG/ML
0.5 SYRINGE (ML) INJECTION ONCE
Refills: 0 | Status: COMPLETED | OUTPATIENT
Start: 2025-02-06 | End: 2025-02-06

## 2025-02-06 RX ORDER — ACETAMINOPHEN 325 MG/1
975 TABLET ORAL EVERY 6 HOURS PRN
Status: DISCONTINUED | OUTPATIENT
Start: 2025-02-06 | End: 2025-02-08 | Stop reason: HOSPADM

## 2025-02-06 RX ORDER — MAGNESIUM SULFATE HEPTAHYDRATE 40 MG/ML
2 INJECTION, SOLUTION INTRAVENOUS ONCE
Status: COMPLETED | OUTPATIENT
Start: 2025-02-06 | End: 2025-02-06

## 2025-02-06 RX ORDER — FAMOTIDINE 20 MG/1
20 TABLET, FILM COATED ORAL 2 TIMES DAILY PRN
Status: DISCONTINUED | OUTPATIENT
Start: 2025-02-06 | End: 2025-02-08 | Stop reason: HOSPADM

## 2025-02-06 RX ORDER — ONDANSETRON 2 MG/ML
4 INJECTION INTRAMUSCULAR; INTRAVENOUS EVERY 6 HOURS PRN
Status: DISCONTINUED | OUTPATIENT
Start: 2025-02-06 | End: 2025-02-08 | Stop reason: HOSPADM

## 2025-02-06 RX ORDER — ONDANSETRON 2 MG/ML
4 INJECTION INTRAMUSCULAR; INTRAVENOUS ONCE
Status: COMPLETED | OUTPATIENT
Start: 2025-02-06 | End: 2025-02-06

## 2025-02-06 RX ORDER — DICYCLOMINE HCL 20 MG
20 TABLET ORAL ONCE
Status: COMPLETED | OUTPATIENT
Start: 2025-02-06 | End: 2025-02-06

## 2025-02-06 RX ORDER — DOCUSATE SODIUM 100 MG/1
100 CAPSULE, LIQUID FILLED ORAL 2 TIMES DAILY
Status: DISCONTINUED | OUTPATIENT
Start: 2025-02-07 | End: 2025-02-08 | Stop reason: HOSPADM

## 2025-02-06 RX ORDER — ACETAMINOPHEN 10 MG/ML
1000 INJECTION, SOLUTION INTRAVENOUS ONCE
Status: COMPLETED | OUTPATIENT
Start: 2025-02-06 | End: 2025-02-06

## 2025-02-06 RX ORDER — SODIUM CHLORIDE 9 MG/ML
100 INJECTION, SOLUTION INTRAVENOUS CONTINUOUS
Status: DISCONTINUED | OUTPATIENT
Start: 2025-02-06 | End: 2025-02-06

## 2025-02-06 RX ORDER — BUDESONIDE AND FORMOTEROL FUMARATE DIHYDRATE 80; 4.5 UG/1; UG/1
2 AEROSOL RESPIRATORY (INHALATION) 2 TIMES DAILY
Status: DISCONTINUED | OUTPATIENT
Start: 2025-02-06 | End: 2025-02-08 | Stop reason: HOSPADM

## 2025-02-06 RX ORDER — DROPERIDOL 2.5 MG/ML
1.25 INJECTION, SOLUTION INTRAMUSCULAR; INTRAVENOUS ONCE
Status: COMPLETED | OUTPATIENT
Start: 2025-02-06 | End: 2025-02-06

## 2025-02-06 RX ORDER — PROPOFOL 10 MG/ML
INJECTION, EMULSION INTRAVENOUS
Status: DISCONTINUED
Start: 2025-02-06 | End: 2025-02-06 | Stop reason: WASHOUT

## 2025-02-06 RX ORDER — MAGNESIUM HYDROXIDE/ALUMINUM HYDROXICE/SIMETHICONE 120; 1200; 1200 MG/30ML; MG/30ML; MG/30ML
30 SUSPENSION ORAL EVERY 6 HOURS PRN
Status: DISCONTINUED | OUTPATIENT
Start: 2025-02-06 | End: 2025-02-06

## 2025-02-06 RX ORDER — OXCARBAZEPINE 300 MG/1
300 TABLET, FILM COATED ORAL EVERY 12 HOURS SCHEDULED
Status: DISCONTINUED | OUTPATIENT
Start: 2025-02-06 | End: 2025-02-08 | Stop reason: HOSPADM

## 2025-02-06 RX ORDER — PANTOPRAZOLE SODIUM 40 MG/1
40 TABLET, DELAYED RELEASE ORAL
Status: DISCONTINUED | OUTPATIENT
Start: 2025-02-07 | End: 2025-02-08 | Stop reason: HOSPADM

## 2025-02-06 RX ORDER — NICOTINE 21 MG/24HR
1 PATCH, TRANSDERMAL 24 HOURS TRANSDERMAL DAILY
Status: DISCONTINUED | OUTPATIENT
Start: 2025-02-07 | End: 2025-02-08 | Stop reason: HOSPADM

## 2025-02-06 RX ORDER — SODIUM CHLORIDE 9 MG/ML
100 INJECTION, SOLUTION INTRAVENOUS CONTINUOUS
Status: DISPENSED | OUTPATIENT
Start: 2025-02-06 | End: 2025-02-07

## 2025-02-06 RX ORDER — DICYCLOMINE HCL 20 MG
20 TABLET ORAL
Status: DISCONTINUED | OUTPATIENT
Start: 2025-02-06 | End: 2025-02-08 | Stop reason: HOSPADM

## 2025-02-06 RX ORDER — METHOCARBAMOL 750 MG/1
750 TABLET, FILM COATED ORAL EVERY 6 HOURS PRN
Status: DISCONTINUED | OUTPATIENT
Start: 2025-02-06 | End: 2025-02-08 | Stop reason: HOSPADM

## 2025-02-06 RX ORDER — OLANZAPINE 5 MG/1
30 TABLET ORAL
Status: DISCONTINUED | OUTPATIENT
Start: 2025-02-06 | End: 2025-02-08 | Stop reason: HOSPADM

## 2025-02-06 RX ADMIN — ACETAMINOPHEN 975 MG: 325 TABLET, FILM COATED ORAL at 23:14

## 2025-02-06 RX ADMIN — DICYCLOMINE HYDROCHLORIDE 20 MG: 20 TABLET ORAL at 23:14

## 2025-02-06 RX ADMIN — MAGNESIUM SULFATE HEPTAHYDRATE 2 G: 40 INJECTION, SOLUTION INTRAVENOUS at 18:22

## 2025-02-06 RX ADMIN — ACETAMINOPHEN 1000 MG: 10 INJECTION INTRAVENOUS at 17:48

## 2025-02-06 RX ADMIN — SODIUM CHLORIDE 100 ML/HR: 0.9 INJECTION, SOLUTION INTRAVENOUS at 23:15

## 2025-02-06 RX ADMIN — BUDESONIDE AND FORMOTEROL FUMARATE DIHYDRATE 2 PUFF: 80; 4.5 AEROSOL RESPIRATORY (INHALATION) at 23:15

## 2025-02-06 RX ADMIN — DICYCLOMINE HYDROCHLORIDE 20 MG: 20 TABLET ORAL at 18:16

## 2025-02-06 RX ADMIN — HYDROMORPHONE HYDROCHLORIDE 0.5 MG: 1 INJECTION, SOLUTION INTRAMUSCULAR; INTRAVENOUS; SUBCUTANEOUS at 19:55

## 2025-02-06 RX ADMIN — OLANZAPINE 30 MG: 5 TABLET, FILM COATED ORAL at 23:15

## 2025-02-06 RX ADMIN — SODIUM CHLORIDE 1000 ML: 0.9 INJECTION, SOLUTION INTRAVENOUS at 17:46

## 2025-02-06 RX ADMIN — OXCARBAZEPINE 300 MG: 300 TABLET, FILM COATED ORAL at 23:14

## 2025-02-06 RX ADMIN — ONDANSETRON 4 MG: 2 INJECTION INTRAMUSCULAR; INTRAVENOUS at 17:46

## 2025-02-06 RX ADMIN — DROPERIDOL 1.25 MG: 2.5 INJECTION, SOLUTION INTRAMUSCULAR; INTRAVENOUS at 19:56

## 2025-02-07 LAB
ANION GAP SERPL CALCULATED.3IONS-SCNC: 5 MMOL/L (ref 4–13)
ANION GAP SERPL CALCULATED.3IONS-SCNC: 6 MMOL/L (ref 4–13)
BASOPHILS # BLD AUTO: 0.04 THOUSANDS/ΜL (ref 0–0.1)
BASOPHILS NFR BLD AUTO: 1 % (ref 0–1)
BUN SERPL-MCNC: 5 MG/DL (ref 5–25)
BUN SERPL-MCNC: 5 MG/DL (ref 5–25)
CALCIUM SERPL-MCNC: 8.6 MG/DL (ref 8.4–10.2)
CALCIUM SERPL-MCNC: 8.6 MG/DL (ref 8.4–10.2)
CHLORIDE SERPL-SCNC: 104 MMOL/L (ref 96–108)
CHLORIDE SERPL-SCNC: 106 MMOL/L (ref 96–108)
CO2 SERPL-SCNC: 21 MMOL/L (ref 21–32)
CO2 SERPL-SCNC: 22 MMOL/L (ref 21–32)
CREAT SERPL-MCNC: 0.67 MG/DL (ref 0.6–1.3)
CREAT SERPL-MCNC: 0.82 MG/DL (ref 0.6–1.3)
EOSINOPHIL # BLD AUTO: 0.12 THOUSAND/ΜL (ref 0–0.61)
EOSINOPHIL NFR BLD AUTO: 2 % (ref 0–6)
ERYTHROCYTE [DISTWIDTH] IN BLOOD BY AUTOMATED COUNT: 11.6 % (ref 11.6–15.1)
GFR SERPL CREATININE-BSD FRML MDRD: 112 ML/MIN/1.73SQ M
GFR SERPL CREATININE-BSD FRML MDRD: 91 ML/MIN/1.73SQ M
GLUCOSE SERPL-MCNC: 92 MG/DL (ref 65–140)
GLUCOSE SERPL-MCNC: 98 MG/DL (ref 65–140)
HCT VFR BLD AUTO: 40.6 % (ref 34.8–46.1)
HGB BLD-MCNC: 14.4 G/DL (ref 11.5–15.4)
IMM GRANULOCYTES # BLD AUTO: 0.04 THOUSAND/UL (ref 0–0.2)
IMM GRANULOCYTES NFR BLD AUTO: 1 % (ref 0–2)
LYMPHOCYTES # BLD AUTO: 2.93 THOUSANDS/ΜL (ref 0.6–4.47)
LYMPHOCYTES NFR BLD AUTO: 37 % (ref 14–44)
MCH RBC QN AUTO: 32.2 PG (ref 26.8–34.3)
MCHC RBC AUTO-ENTMCNC: 35.5 G/DL (ref 31.4–37.4)
MCV RBC AUTO: 91 FL (ref 82–98)
MONOCYTES # BLD AUTO: 1 THOUSAND/ΜL (ref 0.17–1.22)
MONOCYTES NFR BLD AUTO: 13 % (ref 4–12)
NEUTROPHILS # BLD AUTO: 3.87 THOUSANDS/ΜL (ref 1.85–7.62)
NEUTS SEG NFR BLD AUTO: 46 % (ref 43–75)
NRBC BLD AUTO-RTO: 0 /100 WBCS
OSMOLALITY UR/SERPL-RTO: 277 MMOL/KG (ref 282–298)
OSMOLALITY UR: 129 MMOL/KG (ref 250–900)
PLATELET # BLD AUTO: 204 THOUSANDS/UL (ref 149–390)
PMV BLD AUTO: 10.3 FL (ref 8.9–12.7)
POTASSIUM SERPL-SCNC: 3.9 MMOL/L (ref 3.5–5.3)
POTASSIUM SERPL-SCNC: 4.6 MMOL/L (ref 3.5–5.3)
RBC # BLD AUTO: 4.47 MILLION/UL (ref 3.81–5.12)
SODIUM SERPL-SCNC: 131 MMOL/L (ref 135–147)
SODIUM SERPL-SCNC: 133 MMOL/L (ref 135–147)
WBC # BLD AUTO: 8 THOUSAND/UL (ref 4.31–10.16)

## 2025-02-07 PROCEDURE — 80048 BASIC METABOLIC PNL TOTAL CA: CPT | Performed by: NURSE PRACTITIONER

## 2025-02-07 PROCEDURE — 85025 COMPLETE CBC W/AUTO DIFF WBC: CPT | Performed by: NURSE PRACTITIONER

## 2025-02-07 PROCEDURE — 99232 SBSQ HOSP IP/OBS MODERATE 35: CPT

## 2025-02-07 RX ORDER — SODIUM CHLORIDE 9 MG/ML
75 INJECTION, SOLUTION INTRAVENOUS CONTINUOUS
Status: DISPENSED | OUTPATIENT
Start: 2025-02-07 | End: 2025-02-08

## 2025-02-07 RX ORDER — ENOXAPARIN SODIUM 100 MG/ML
40 INJECTION SUBCUTANEOUS
Status: DISCONTINUED | OUTPATIENT
Start: 2025-02-07 | End: 2025-02-08 | Stop reason: HOSPADM

## 2025-02-07 RX ADMIN — BUDESONIDE AND FORMOTEROL FUMARATE DIHYDRATE 2 PUFF: 80; 4.5 AEROSOL RESPIRATORY (INHALATION) at 21:27

## 2025-02-07 RX ADMIN — METHOCARBAMOL 750 MG: 750 TABLET ORAL at 08:37

## 2025-02-07 RX ADMIN — DICYCLOMINE HYDROCHLORIDE 20 MG: 20 TABLET ORAL at 21:28

## 2025-02-07 RX ADMIN — ENOXAPARIN SODIUM 40 MG: 40 INJECTION SUBCUTANEOUS at 16:00

## 2025-02-07 RX ADMIN — PANTOPRAZOLE SODIUM 40 MG: 40 TABLET, DELAYED RELEASE ORAL at 08:37

## 2025-02-07 RX ADMIN — NICOTINE 1 PATCH: 21 PATCH, EXTENDED RELEASE TRANSDERMAL at 08:37

## 2025-02-07 RX ADMIN — ONDANSETRON 4 MG: 2 INJECTION INTRAMUSCULAR; INTRAVENOUS at 16:39

## 2025-02-07 RX ADMIN — METHOCARBAMOL 750 MG: 750 TABLET ORAL at 00:58

## 2025-02-07 RX ADMIN — OXCARBAZEPINE 300 MG: 300 TABLET, FILM COATED ORAL at 21:28

## 2025-02-07 RX ADMIN — OLANZAPINE 30 MG: 5 TABLET, FILM COATED ORAL at 21:28

## 2025-02-07 RX ADMIN — DICYCLOMINE HYDROCHLORIDE 20 MG: 20 TABLET ORAL at 08:37

## 2025-02-07 RX ADMIN — DOCUSATE SODIUM 100 MG: 100 CAPSULE, LIQUID FILLED ORAL at 08:37

## 2025-02-07 RX ADMIN — ACETAMINOPHEN 975 MG: 325 TABLET, FILM COATED ORAL at 04:56

## 2025-02-07 RX ADMIN — DICYCLOMINE HYDROCHLORIDE 20 MG: 20 TABLET ORAL at 11:05

## 2025-02-07 RX ADMIN — ACETAMINOPHEN 975 MG: 325 TABLET, FILM COATED ORAL at 11:06

## 2025-02-07 RX ADMIN — DICYCLOMINE HYDROCHLORIDE 20 MG: 20 TABLET ORAL at 16:35

## 2025-02-07 RX ADMIN — OXCARBAZEPINE 300 MG: 300 TABLET, FILM COATED ORAL at 08:37

## 2025-02-07 RX ADMIN — BUDESONIDE AND FORMOTEROL FUMARATE DIHYDRATE 2 PUFF: 80; 4.5 AEROSOL RESPIRATORY (INHALATION) at 08:37

## 2025-02-07 NOTE — ASSESSMENT & PLAN NOTE
History of bipolar disorder, posttraumatic stress disorder and psychogenic nonepileptic seizure.  Recent admission at [facility] 1/6/25.  Continue PTA Trileptal 300mg BID, olanzapine 30 mg hs
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
## 2025-02-07 NOTE — PLAN OF CARE
Problem: PAIN - ADULT  Goal: Verbalizes/displays adequate comfort level or baseline comfort level  Description: Interventions:  - Encourage patient to monitor pain and request assistance  - Assess pain using appropriate pain scale  - Administer analgesics based on type and severity of pain and evaluate response  - Implement non-pharmacological measures as appropriate and evaluate response  - Consider cultural and social influences on pain and pain management  - Notify physician/advanced practitioner if interventions unsuccessful or patient reports new pain  Outcome: Progressing     Problem: INFECTION - ADULT  Goal: Absence or prevention of progression during hospitalization  Description: INTERVENTIONS:  - Assess and monitor for signs and symptoms of infection  - Monitor lab/diagnostic results  - Monitor all insertion sites, i.e. indwelling lines, tubes, and drains  - Monitor endotracheal if appropriate and nasal secretions for changes in amount and color  - Superior appropriate cooling/warming therapies per order  - Administer medications as ordered  - Instruct and encourage patient and family to use good hand hygiene technique  - Identify and instruct in appropriate isolation precautions for identified infection/condition  Outcome: Progressing  Goal: Absence of fever/infection during neutropenic period  Description: INTERVENTIONS:  - Monitor WBC    Outcome: Progressing     Problem: SAFETY ADULT  Goal: Patient will remain free of falls  Description: INTERVENTIONS:  - Educate patient/family on patient safety including physical limitations  - Instruct patient to call for assistance with activity   - Consult OT/PT to assist with strengthening/mobility   - Keep Call bell within reach  - Keep bed low and locked with side rails adjusted as appropriate  - Keep care items and personal belongings within reach  - Initiate and maintain comfort rounds  - Make Fall Risk Sign visible to staff  - Offer Toileting every 2 Hours,  in advance of need  - Initiate/Maintain bed alarm  - Obtain necessary fall risk management equipment: bed alarm  - Apply yellow socks and bracelet for high fall risk patients  - Consider moving patient to room near nurses station  Outcome: Progressing  Goal: Maintain or return to baseline ADL function  Description: INTERVENTIONS:  -  Assess patient's ability to carry out ADLs; assess patient's baseline for ADL function and identify physical deficits which impact ability to perform ADLs (bathing, care of mouth/teeth, toileting, grooming, dressing, etc.)  - Assess/evaluate cause of self-care deficits   - Assess range of motion  - Assess patient's mobility; develop plan if impaired  - Assess patient's need for assistive devices and provide as appropriate  - Encourage maximum independence but intervene and supervise when necessary  - Involve family in performance of ADLs  - Assess for home care needs following discharge   - Consider OT consult to assist with ADL evaluation and planning for discharge  - Provide patient education as appropriate  Outcome: Progressing  Goal: Maintains/Returns to pre admission functional level  Description: INTERVENTIONS:  - Perform AM-PAC 6 Click Basic Mobility/ Daily Activity assessment daily.  - Set and communicate daily mobility goal to care team and patient/family/caregiver.   - Collaborate with rehabilitation services on mobility goals if consulted  - Perform Range of Motion 4 times a day.  - Reposition patient every 2 hours.  - Dangle patient 3 times a day  - Stand patient 3 times a day  - Ambulate patient 3 times a day  - Out of bed to chair 3 times a day   - Out of bed for meals 3 times a day  - Out of bed for toileting  - Record patient progress and toleration of activity level   Outcome: Progressing     Problem: DISCHARGE PLANNING  Goal: Discharge to home or other facility with appropriate resources  Description: INTERVENTIONS:  - Identify barriers to discharge w/patient and  caregiver  - Arrange for needed discharge resources and transportation as appropriate  - Identify discharge learning needs (meds, wound care, etc.)  - Arrange for interpretive services to assist at discharge as needed  - Refer to Case Management Department for coordinating discharge planning if the patient needs post-hospital services based on physician/advanced practitioner order or complex needs related to functional status, cognitive ability, or social support system  Outcome: Progressing     Problem: Knowledge Deficit  Goal: Patient/family/caregiver demonstrates understanding of disease process, treatment plan, medications, and discharge instructions  Description: Complete learning assessment and assess knowledge base.  Interventions:  - Provide teaching at level of understanding  - Provide teaching via preferred learning methods  Outcome: Progressing     Problem: GENITOURINARY - ADULT  Goal: Maintains or returns to baseline urinary function  Description: INTERVENTIONS:  - Assess urinary function  - Encourage oral fluids to ensure adequate hydration if ordered  - Administer IV fluids as ordered to ensure adequate hydration  - Administer ordered medications as needed  - Offer frequent toileting  - Follow urinary retention protocol if ordered  Outcome: Progressing  Goal: Absence of urinary retention  Description: INTERVENTIONS:  - Assess patient’s ability to void and empty bladder  - Monitor I/O  - Bladder scan as needed  - Discuss with physician/AP medications to alleviate retention as needed  - Discuss catheterization for long term situations as appropriate  Outcome: Progressing  Goal: Urinary catheter remains patent  Description: INTERVENTIONS:  - Assess patency of urinary catheter  - If patient has a chronic dumont, consider changing catheter if non-functioning  - Follow guidelines for intermittent irrigation of non-functioning urinary catheter  Outcome: Progressing
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
## 2025-02-07 NOTE — ASSESSMENT & PLAN NOTE
Presents reporting emesis, diarrhea and abdominal pain.  Recent admission at \[facility]"" 1/28-1/30 for same.  CT at that time only showing moderate hiatal hernia and diverticulosis without evidence of diverticulitis.  Treated symptomatically with improvement  Continue Bentyl for abdominal cramping, PRN zofran, PRN Tyl, pantoprazole, and prn pepcid  IV hydration  Trial regular diet
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
## 2025-02-07 NOTE — ASSESSMENT & PLAN NOTE
History of bipolar disorder, posttraumatic stress disorder and psychogenic nonepileptic seizure.  Recent admission at [facility] 1/6/25.  Continue PTA Trileptal 300mg BID, olanzapine 30 mg hs
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
## 2025-02-07 NOTE — PLAN OF CARE
Problem: PAIN - ADULT  Goal: Verbalizes/displays adequate comfort level or baseline comfort level  Description: Interventions:  - Encourage patient to monitor pain and request assistance  - Assess pain using appropriate pain scale  - Administer analgesics based on type and severity of pain and evaluate response  - Implement non-pharmacological measures as appropriate and evaluate response  - Consider cultural and social influences on pain and pain management  - Notify physician/advanced practitioner if interventions unsuccessful or patient reports new pain  Outcome: Progressing     Problem: INFECTION - ADULT  Goal: Absence or prevention of progression during hospitalization  Description: INTERVENTIONS:  - Assess and monitor for signs and symptoms of infection  - Monitor lab/diagnostic results  - Monitor all insertion sites, i.e. indwelling lines, tubes, and drains  - Monitor endotracheal if appropriate and nasal secretions for changes in amount and color  - Orient appropriate cooling/warming therapies per order  - Administer medications as ordered  - Instruct and encourage patient and family to use good hand hygiene technique  - Identify and instruct in appropriate isolation precautions for identified infection/condition  Outcome: Progressing  Goal: Absence of fever/infection during neutropenic period  Description: INTERVENTIONS:  - Monitor WBC    Outcome: Progressing     Problem: SAFETY ADULT  Goal: Patient will remain free of falls  Description: INTERVENTIONS:  - Educate patient/family on patient safety including physical limitations  - Instruct patient to call for assistance with activity   - Consult OT/PT to assist with strengthening/mobility   - Keep Call bell within reach  - Keep bed low and locked with side rails adjusted as appropriate  - Keep care items and personal belongings within reach  - Initiate and maintain comfort rounds  - Make Fall Risk Sign visible to staff  - Offer Toileting every 2 Hours,  in advance of need  - Initiate/Maintain alarm  - Obtain necessary fall risk management equipment:   - Apply yellow socks and bracelet for high fall risk patients  - Consider moving patient to room near nurses station  Outcome: Progressing  Goal: Maintain or return to baseline ADL function  Description: INTERVENTIONS:  -  Assess patient's ability to carry out ADLs; assess patient's baseline for ADL function and identify physical deficits which impact ability to perform ADLs (bathing, care of mouth/teeth, toileting, grooming, dressing, etc.)  - Assess/evaluate cause of self-care deficits   - Assess range of motion  - Assess patient's mobility; develop plan if impaired  - Assess patient's need for assistive devices and provide as appropriate  - Encourage maximum independence but intervene and supervise when necessary  - Involve family in performance of ADLs  - Assess for home care needs following discharge   - Consider OT consult to assist with ADL evaluation and planning for discharge  - Provide patient education as appropriate  Outcome: Progressing  Goal: Maintains/Returns to pre admission functional level  Description: INTERVENTIONS:  - Perform AM-PAC 6 Click Basic Mobility/ Daily Activity assessment daily.  - Set and communicate daily mobility goal to care team and patient/family/caregiver.   - Collaborate with rehabilitation services on mobility goals if consulted  - Perform Range of Motion 2 times a day.  - Reposition patient every 2 hours.  - Dangle patient 3 times a day  - Stand patient 3 times a day  - Ambulate patient 3 times a day  - Out of bed to chair 3 times a day   - Out of bed for meals 3 times a day  - Out of bed for toileting  - Record patient progress and toleration of activity level   Outcome: Progressing
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
## 2025-02-07 NOTE — ASSESSMENT & PLAN NOTE
Presents reporting emesis, diarrhea and abdominal pain.  Recent admission at [facility] 1/28-1/30 for same.  CT at that time only showing moderate hiatal hernia and diverticulosis without evidence of diverticulitis.  Treated symptomatically with improvement  Continue Bentyl for abdominal cramping, PRN zofran, PRN Tyl, pantoprazole, and prn pepcid  IV hydration  Trial regular diet

## 2025-02-07 NOTE — ASSESSMENT & PLAN NOTE
Na 128. Likely hypovolemic given reported emesis and diarrhea  Avoid overcorrection, will aim for increase of 6-8mEq/L over 24h  Check serum osmolality, urine osmolality and urine sodium  IV hydration 0.9NS  Serial BMP

## 2025-02-07 NOTE — ASSESSMENT & PLAN NOTE
Na 128.  Corrected to 133 likely hypovolemic given reported emesis and diarrhea  Avoid overcorrection, will aim for increase of 6-8mEq/L over 24h  Check serum osmolality, urine osmolality and urine sodium  IV hydration 0.9NS  Serial BMP

## 2025-02-08 VITALS
BODY MASS INDEX: 26.17 KG/M2 | SYSTOLIC BLOOD PRESSURE: 108 MMHG | DIASTOLIC BLOOD PRESSURE: 50 MMHG | TEMPERATURE: 98.7 F | RESPIRATION RATE: 20 BRPM | HEART RATE: 79 BPM | WEIGHT: 142.2 LBS | HEIGHT: 62 IN | OXYGEN SATURATION: 95 %

## 2025-02-08 LAB
ANION GAP SERPL CALCULATED.3IONS-SCNC: 6 MMOL/L (ref 4–13)
BUN SERPL-MCNC: 14 MG/DL (ref 5–25)
CALCIUM SERPL-MCNC: 9.1 MG/DL (ref 8.4–10.2)
CHLORIDE SERPL-SCNC: 105 MMOL/L (ref 96–108)
CO2 SERPL-SCNC: 25 MMOL/L (ref 21–32)
CREAT SERPL-MCNC: 0.91 MG/DL (ref 0.6–1.3)
ERYTHROCYTE [DISTWIDTH] IN BLOOD BY AUTOMATED COUNT: 11.9 % (ref 11.6–15.1)
GFR SERPL CREATININE-BSD FRML MDRD: 80 ML/MIN/1.73SQ M
GLUCOSE P FAST SERPL-MCNC: 113 MG/DL (ref 65–99)
GLUCOSE SERPL-MCNC: 113 MG/DL (ref 65–140)
HCT VFR BLD AUTO: 39.2 % (ref 34.8–46.1)
HGB BLD-MCNC: 13.5 G/DL (ref 11.5–15.4)
MCH RBC QN AUTO: 31.5 PG (ref 26.8–34.3)
MCHC RBC AUTO-ENTMCNC: 34.4 G/DL (ref 31.4–37.4)
MCV RBC AUTO: 91 FL (ref 82–98)
PLATELET # BLD AUTO: 164 THOUSANDS/UL (ref 149–390)
PMV BLD AUTO: 10 FL (ref 8.9–12.7)
POTASSIUM SERPL-SCNC: 3.9 MMOL/L (ref 3.5–5.3)
RBC # BLD AUTO: 4.29 MILLION/UL (ref 3.81–5.12)
SODIUM SERPL-SCNC: 136 MMOL/L (ref 135–147)
WBC # BLD AUTO: 6.08 THOUSAND/UL (ref 4.31–10.16)

## 2025-02-08 PROCEDURE — 99239 HOSP IP/OBS DSCHRG MGMT >30: CPT

## 2025-02-08 PROCEDURE — 85027 COMPLETE CBC AUTOMATED: CPT

## 2025-02-08 PROCEDURE — 80048 BASIC METABOLIC PNL TOTAL CA: CPT

## 2025-02-08 RX ADMIN — DOCUSATE SODIUM 100 MG: 100 CAPSULE, LIQUID FILLED ORAL at 09:45

## 2025-02-08 RX ADMIN — OXCARBAZEPINE 300 MG: 300 TABLET, FILM COATED ORAL at 09:45

## 2025-02-08 RX ADMIN — DICYCLOMINE HYDROCHLORIDE 20 MG: 20 TABLET ORAL at 06:18

## 2025-02-08 RX ADMIN — PANTOPRAZOLE SODIUM 40 MG: 40 TABLET, DELAYED RELEASE ORAL at 06:18

## 2025-02-08 RX ADMIN — NICOTINE 1 PATCH: 21 PATCH, EXTENDED RELEASE TRANSDERMAL at 09:47

## 2025-02-08 RX ADMIN — BUDESONIDE AND FORMOTEROL FUMARATE DIHYDRATE 2 PUFF: 80; 4.5 AEROSOL RESPIRATORY (INHALATION) at 09:47

## 2025-02-08 RX ADMIN — ENOXAPARIN SODIUM 40 MG: 40 INJECTION SUBCUTANEOUS at 09:48

## 2025-02-08 NOTE — PLAN OF CARE
Problem: PAIN - ADULT  Goal: Verbalizes/displays adequate comfort level or baseline comfort level  Description: Interventions:  - Encourage patient to monitor pain and request assistance  - Assess pain using appropriate pain scale  - Administer analgesics based on type and severity of pain and evaluate response  - Implement non-pharmacological measures as appropriate and evaluate response  - Consider cultural and social influences on pain and pain management  - Notify physician/advanced practitioner if interventions unsuccessful or patient reports new pain  Outcome: Progressing     Problem: INFECTION - ADULT  Goal: Absence or prevention of progression during hospitalization  Description: INTERVENTIONS:  - Assess and monitor for signs and symptoms of infection  - Monitor lab/diagnostic results  - Monitor all insertion sites, i.e. indwelling lines, tubes, and drains  - Monitor endotracheal if appropriate and nasal secretions for changes in amount and color  - [place] appropriate cooling/warming therapies per order  - Administer medications as ordered  - Instruct and encourage patient and family to use good hand hygiene technique  - Identify and instruct in appropriate isolation precautions for identified infection/condition  Outcome: Progressing  Goal: Absence of fever/infection during neutropenic period  Description: INTERVENTIONS:  - Monitor WBC    Outcome: Progressing     Problem: SAFETY ADULT  Goal: Patient will remain free of falls  Description: INTERVENTIONS:  - Educate patient/family on patient safety including physical limitations  - Instruct patient to call for assistance with activity   - Consult OT/PT to assist with strengthening/mobility   - Keep Call bell within reach  - Keep bed low and locked with side rails adjusted as appropriate  - Keep care items and personal belongings within reach  - Initiate and maintain comfort rounds  - Make Fall Risk Sign visible to staff  - Offer Toileting every  Hours,  in advance of need  - Initiate/Maintain alarm  - Obtain necessary fall risk management equipment:   - Apply yellow socks and bracelet for high fall risk patients  - Consider moving patient to room near nurses station  Outcome: Progressing  Goal: Maintain or return to baseline ADL function  Description: INTERVENTIONS:  -  Assess patient's ability to carry out ADLs; assess patient's baseline for ADL function and identify physical deficits which impact ability to perform ADLs (bathing, care of mouth/teeth, toileting, grooming, dressing, etc.)  - Assess/evaluate cause of self-care deficits   - Assess range of motion  - Assess patient's mobility; develop plan if impaired  - Assess patient's need for assistive devices and provide as appropriate  - Encourage maximum independence but intervene and supervise when necessary  - Involve family in performance of ADLs  - Assess for home care needs following discharge   - Consider OT consult to assist with ADL evaluation and planning for discharge  - Provide patient education as appropriate  Outcome: Progressing  Goal: Maintains/Returns to pre admission functional level  Description: INTERVENTIONS:  - Perform AM-PAC 6 Click Basic Mobility/ Daily Activity assessment daily.  - Set and communicate daily mobility goal to care team and patient/family/caregiver.   - Collaborate with rehabilitation services on mobility goals if consulted  - Perform Range of Motion  times a day.  - Reposition patient every  hours.  - Dangle patient  times a day  - Stand patient  times a day  - Ambulate patient  times a day  - Out of bed to chair  times a day   - Out of bed for meals  times a day  - Out of bed for toileting  - Record patient progress and toleration of activity level   Outcome: Progressing     Problem: DISCHARGE PLANNING  Goal: Discharge to home or other facility with appropriate resources  Description: INTERVENTIONS:  - Identify barriers to discharge w/patient and caregiver  - Arrange for  needed discharge resources and transportation as appropriate  - Identify discharge learning needs (meds, wound care, etc.)  - Arrange for interpretive services to assist at discharge as needed  - Refer to Case Management Department for coordinating discharge planning if the patient needs post-hospital services based on physician/advanced practitioner order or complex needs related to functional status, cognitive ability, or social support system  Outcome: Progressing     Problem: Knowledge Deficit  Goal: Patient/family/caregiver demonstrates understanding of disease process, treatment plan, medications, and discharge instructions  Description: Complete learning assessment and assess knowledge base.  Interventions:  - Provide teaching at level of understanding  - Provide teaching via preferred learning methods  Outcome: Progressing     Problem: GENITOURINARY - ADULT  Goal: Maintains or returns to baseline urinary function  Description: INTERVENTIONS:  - Assess urinary function  - Encourage oral fluids to ensure adequate hydration if ordered  - Administer IV fluids as ordered to ensure adequate hydration  - Administer ordered medications as needed  - Offer frequent toileting  - Follow urinary retention protocol if ordered  Outcome: Progressing  Goal: Absence of urinary retention  Description: INTERVENTIONS:  - Assess patient’s ability to void and empty bladder  - Monitor I/O  - Bladder scan as needed  - Discuss with physician/AP medications to alleviate retention as needed  - Discuss catheterization for long term situations as appropriate  Outcome: Progressing  Goal: Urinary catheter remains patent  Description: INTERVENTIONS:  - Assess patency of urinary catheter  - If patient has a chronic dumont, consider changing catheter if non-functioning  - Follow guidelines for intermittent irrigation of non-functioning urinary catheter  Outcome: Progressing     Problem: Nutrition/Hydration-ADULT  Goal: Nutrient/Hydration  intake appropriate for improving, restoring or maintaining nutritional needs  Description: Monitor and assess patient's nutrition/hydration status for malnutrition. Collaborate with interdisciplinary team and initiate plan and interventions as ordered.  Monitor patient's weight and dietary intake as ordered or per policy. Utilize nutrition screening tool and intervene as necessary. Determine patient's food preferences and provide high-protein, high-caloric foods as appropriate.     INTERVENTIONS:  - Monitor oral intake, urinary output, labs, and treatment plans  - Assess nutrition and hydration status and recommend course of action  - Evaluate amount of meals eaten  - Assist patient with eating if necessary   - Allow adequate time for meals  - Recommend/ encourage appropriate diets, oral nutritional supplements, and vitamin/mineral supplements  - Order, calculate, and assess calorie counts as needed  - Recommend, monitor, and adjust tube feedings and TPN/PPN based on assessed needs  - Assess need for intravenous fluids  - Provide specific nutrition/hydration education as appropriate  - Include patient/family/caregiver in decisions related to nutrition  Outcome: Progressing

## 2025-02-08 NOTE — UTILIZATION REVIEW
NOTIFICATION OF OBSERVATION ADMISSION   AUTHORIZATION REQUEST   SERVICING FACILITY:   [place] [address]  Tax ID: [id number]  NPI: [id number]   ATTENDING PROVIDER:  Attending Name and NPI#: Winnie Clark Md [[id number]]  Address: [address] [place] [address]  Phone: [phone]     ADMISSION INFORMATION:  Place of Service: [facility]  Place of Service Code: 22 CPT Code:   Admitting Diagnosis Code/Description:  Hyponatremia [E87.1]  Abdominal pain [R10.9]  Observation Admission Date/Time: 02/06  Discharge Date/Time: No discharge date for patient encounter.     UTILIZATION REVIEW CONTACT:  Ramona Joy Utilization   Network Utilization Review Department  Phone: [phone]  Fax: [phone]  Email: [email or web address]  Contact for approvals/pending authorizations, clinical reviews, and discharge.     PHYSICIAN ADVISORY SERVICES:  Medical Necessity Denial & Cjip-yu-Nwxq Review  Phone: [phone]  Fax: [phone]  Email: [email or web address]     DISCHARGE SUPPORT TEAM:  For Patients Discharge Needs & Updates  Phone: [phone] opt. 2 Fax: [phone]  Email: [email or web address]

## 2025-02-08 NOTE — ASSESSMENT & PLAN NOTE
Resolved  Presents reporting emesis, diarrhea and abdominal pain.  Recent admission at [facility] 1/28-1/30 for same.  CT at that time only showing moderate hiatal hernia and diverticulosis without evidence of diverticulitis.  Treated symptomatically with improvement  Continue Bentyl for abdominal cramping, PRN zofran, PRN Tyl, pantoprazole, and prn pepcid  IV hydration  Trial regular diet

## 2025-02-08 NOTE — DISCHARGE INSTR - AVS FIRST PAGE
Follow-ups:    -Follow-up with your PCP within 1 week of discharge  -Continue to follow-up with your outpatient psychiatry

## 2025-02-08 NOTE — PLAN OF CARE
Problem: PAIN - ADULT  Goal: Verbalizes/displays adequate comfort level or baseline comfort level  Description: Interventions:  - Encourage patient to monitor pain and request assistance  - Assess pain using appropriate pain scale  - Administer analgesics based on type and severity of pain and evaluate response  - Implement non-pharmacological measures as appropriate and evaluate response  - Consider cultural and social influences on pain and pain management  - Notify physician/advanced practitioner if interventions unsuccessful or patient reports new pain  Outcome: Progressing     Problem: INFECTION - ADULT  Goal: Absence or prevention of progression during hospitalization  Description: INTERVENTIONS:  - Assess and monitor for signs and symptoms of infection  - Monitor lab/diagnostic results  - Monitor all insertion sites, i.e. indwelling lines, tubes, and drains  - Monitor endotracheal if appropriate and nasal secretions for changes in amount and color  - [place] appropriate cooling/warming therapies per order  - Administer medications as ordered  - Instruct and encourage patient and family to use good hand hygiene technique  - Identify and instruct in appropriate isolation precautions for identified infection/condition  Outcome: Progressing  Goal: Absence of fever/infection during neutropenic period  Description: INTERVENTIONS:  - Monitor WBC    Outcome: Progressing     Problem: SAFETY ADULT  Goal: Patient will remain free of falls  Description: INTERVENTIONS:  - Educate patient/family on patient safety including physical limitations  - Instruct patient to call for assistance with activity   - Consult OT/PT to assist with strengthening/mobility   - Keep Call bell within reach  - Keep bed low and locked with side rails adjusted as appropriate  - Keep care items and personal belongings within reach  - Initiate and maintain comfort rounds  - Make Fall Risk Sign visible to staff  - Apply yellow socks and bracelet  for high fall risk patients  - Consider moving patient to room near nurses station  Outcome: Progressing  Goal: Maintain or return to baseline ADL function  Description: INTERVENTIONS:  -  Assess patient's ability to carry out ADLs; assess patient's baseline for ADL function and identify physical deficits which impact ability to perform ADLs (bathing, care of mouth/teeth, toileting, grooming, dressing, etc.)  - Assess/evaluate cause of self-care deficits   - Assess range of motion  - Assess patient's mobility; develop plan if impaired  - Assess patient's need for assistive devices and provide as appropriate  - Encourage maximum independence but intervene and supervise when necessary  - Involve family in performance of ADLs  - Assess for home care needs following discharge   - Consider OT consult to assist with ADL evaluation and planning for discharge  - Provide patient education as appropriate  Outcome: Progressing  Goal: Maintains/Returns to pre admission functional level  Description: INTERVENTIONS:  - Perform AM-PAC 6 Click Basic Mobility/ Daily Activity assessment daily.  - Set and communicate daily mobility goal to care team and patient/family/caregiver.   - Collaborate with rehabilitation services on mobility goals if consulted  - Out of bed for toileting  - Record patient progress and toleration of activity level   Outcome: Progressing     Problem: DISCHARGE PLANNING  Goal: Discharge to home or other facility with appropriate resources  Description: INTERVENTIONS:  - Identify barriers to discharge w/patient and caregiver  - Arrange for needed discharge resources and transportation as appropriate  - Identify discharge learning needs (meds, wound care, etc.)  - Arrange for interpretive services to assist at discharge as needed  - Refer to Case Management Department for coordinating discharge planning if the patient needs post-hospital services based on physician/advanced practitioner order or complex needs  related to functional status, cognitive ability, or social support system  Outcome: Progressing

## 2025-02-08 NOTE — NURSING NOTE
Patient was d/c'd to home in no distress. No c/o pain, able to make needs known. Instructions were reviewed with the patient. Patient refused Uber services and stated that she will walk home because she does not live far. Patient also stated that she called a family member to make them aware that she was being d/c and on her way home. Patient walked to the front entrance alone. All belongings left with the patient.
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
## 2025-02-08 NOTE — ASSESSMENT & PLAN NOTE
History of bipolar disorder, posttraumatic stress disorder and psychogenic nonepileptic seizure.  Recent admission at [facility] 1/6/25.  Continue PTA Trileptal 300mg BID, olanzapine 30 mg hs
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
## 2025-02-16 ENCOUNTER — HOSPITAL ENCOUNTER (EMERGENCY)
Facility: HOSPITAL | Age: 40
Discharge: HOME | End: 2025-02-16
Attending: EMERGENCY MEDICINE
Payer: COMMERCIAL

## 2025-02-16 ENCOUNTER — APPOINTMENT (EMERGENCY)
Dept: RADIOLOGY | Facility: HOSPITAL | Age: 40
End: 2025-02-16
Payer: COMMERCIAL

## 2025-02-16 VITALS
HEART RATE: 78 BPM | RESPIRATION RATE: 18 BRPM | SYSTOLIC BLOOD PRESSURE: 126 MMHG | TEMPERATURE: 97.8 F | OXYGEN SATURATION: 98 % | WEIGHT: 145.5 LBS | BODY MASS INDEX: 26.61 KG/M2 | DIASTOLIC BLOOD PRESSURE: 71 MMHG

## 2025-02-16 DIAGNOSIS — G43.809 OTHER MIGRAINE WITHOUT STATUS MIGRAINOSUS, NOT INTRACTABLE: Primary | ICD-10-CM

## 2025-02-16 DIAGNOSIS — R10.31 RIGHT INGUINAL PAIN: ICD-10-CM

## 2025-02-16 LAB
BACTERIA UR QL AUTO: ABNORMAL /HPF
BILIRUB UR QL STRIP: NEGATIVE
CLARITY UR: CLEAR
COLOR UR: YELLOW
EXT PREGNANCY TEST URINE: NEGATIVE
EXT. CONTROL: NORMAL
GLUCOSE UR STRIP-MCNC: NEGATIVE MG/DL
HGB UR QL STRIP.AUTO: ABNORMAL
KETONES UR STRIP-MCNC: NEGATIVE MG/DL
LEUKOCYTE ESTERASE UR QL STRIP: ABNORMAL
MUCOUS THREADS UR QL AUTO: ABNORMAL
NITRITE UR QL STRIP: NEGATIVE
NON-SQ EPI CELLS URNS QL MICRO: ABNORMAL /HPF
PH UR STRIP.AUTO: 7 [PH] (ref 4.5–8)
PROT UR STRIP-MCNC: NEGATIVE MG/DL
RBC #/AREA URNS AUTO: ABNORMAL /HPF
SP GR UR STRIP.AUTO: 1.01 (ref 1–1.03)
UROBILINOGEN UR QL STRIP.AUTO: 0.2 E.U./DL
WBC #/AREA URNS AUTO: ABNORMAL /HPF

## 2025-02-16 PROCEDURE — 73502 X-RAY EXAM HIP UNI 2-3 VIEWS: CPT

## 2025-02-16 PROCEDURE — 96375 TX/PRO/DX INJ NEW DRUG ADDON: CPT

## 2025-02-16 PROCEDURE — 99284 EMERGENCY DEPT VISIT MOD MDM: CPT

## 2025-02-16 PROCEDURE — 96365 THER/PROPH/DIAG IV INF INIT: CPT

## 2025-02-16 PROCEDURE — 96372 THER/PROPH/DIAG INJ SC/IM: CPT

## 2025-02-16 PROCEDURE — 87491 CHLMYD TRACH DNA AMP PROBE: CPT | Performed by: EMERGENCY MEDICINE

## 2025-02-16 PROCEDURE — 99284 EMERGENCY DEPT VISIT MOD MDM: CPT | Performed by: EMERGENCY MEDICINE

## 2025-02-16 PROCEDURE — 87591 N.GONORRHOEAE DNA AMP PROB: CPT | Performed by: EMERGENCY MEDICINE

## 2025-02-16 PROCEDURE — 81001 URINALYSIS AUTO W/SCOPE: CPT

## 2025-02-16 PROCEDURE — 81025 URINE PREGNANCY TEST: CPT | Performed by: EMERGENCY MEDICINE

## 2025-02-16 RX ORDER — MAGNESIUM SULFATE HEPTAHYDRATE 40 MG/ML
2 INJECTION, SOLUTION INTRAVENOUS ONCE
Status: COMPLETED | OUTPATIENT
Start: 2025-02-16 | End: 2025-02-16

## 2025-02-16 RX ORDER — METOCLOPRAMIDE HYDROCHLORIDE 5 MG/ML
10 INJECTION INTRAMUSCULAR; INTRAVENOUS ONCE
Status: COMPLETED | OUTPATIENT
Start: 2025-02-16 | End: 2025-02-16

## 2025-02-16 RX ORDER — SUMATRIPTAN 6 MG/.5ML
6 INJECTION, SOLUTION SUBCUTANEOUS ONCE
Status: COMPLETED | OUTPATIENT
Start: 2025-02-16 | End: 2025-02-16

## 2025-02-16 RX ORDER — DEXAMETHASONE SODIUM PHOSPHATE 10 MG/ML
10 INJECTION, SOLUTION INTRAMUSCULAR; INTRAVENOUS ONCE
Status: COMPLETED | OUTPATIENT
Start: 2025-02-16 | End: 2025-02-16

## 2025-02-16 RX ORDER — DIPHENHYDRAMINE HYDROCHLORIDE 50 MG/ML
25 INJECTION INTRAMUSCULAR; INTRAVENOUS ONCE
Status: COMPLETED | OUTPATIENT
Start: 2025-02-16 | End: 2025-02-16

## 2025-02-16 RX ADMIN — SODIUM CHLORIDE 1000 ML: 0.9 INJECTION, SOLUTION INTRAVENOUS at 17:48

## 2025-02-16 RX ADMIN — MAGNESIUM SULFATE HEPTAHYDRATE 2 G: 40 INJECTION, SOLUTION INTRAVENOUS at 17:49

## 2025-02-16 RX ADMIN — DEXAMETHASONE SODIUM PHOSPHATE 10 MG: 10 INJECTION INTRAMUSCULAR; INTRAVENOUS at 17:50

## 2025-02-16 RX ADMIN — METOCLOPRAMIDE 10 MG: 5 INJECTION, SOLUTION INTRAMUSCULAR; INTRAVENOUS at 17:50

## 2025-02-16 RX ADMIN — DIPHENHYDRAMINE HYDROCHLORIDE 25 MG: 50 INJECTION INTRAMUSCULAR; INTRAVENOUS at 17:51

## 2025-02-16 RX ADMIN — SUMATRIPTAN 6 MG: 6 INJECTION, SOLUTION SUBCUTANEOUS at 17:23

## 2025-02-16 NOTE — ED PROVIDER NOTES
Time reflects when diagnosis was documented in both MDM as applicable and the Disposition within this note       Time User Action Codes Description Comment    2/16/2025  6:33 PM Elvira Choi Add [G43.809] Other migraine without status migrainosus, not intractable     2/16/2025  6:59 PM Elvira Choi Add [R10.31] Right inguinal pain           ED Disposition       ED Disposition   Discharge    Condition   Good    Date/Time   Sun Feb 16, 2025  6:33 PM    Comment   Antionette Downing discharge to home/self care.                   Assessment & Plan       Medical Decision Making  39-year-old female presents to the ED with 2 complaints both of which started at the same time.  She complains of a 3-day history of migraine headache typical of her usual migraines unrelieved with Tylenol.  No fevers, neck stiffness or vomiting.  She also complains of atraumatic right inguinal pain.  She states it hurts when she ambulates.  She has had no swelling or redness.  No vaginal discharge or urinary complaints.  On exam she looks very well in no distress.  She is mildly tachycardic on exam.  Her abdomen is completely nontender.  She has mild right inguinal tenderness without adenopathy, swelling or erythema noted.  Her neuroexam is normal.  Uncertain etiology of the inguinal pain but at this time I do not feel there is an acute surgical process or infectious process.  It seems to hurt mostly with movement so may be more of an inguinal strain.  Will x-ray right hip but doubt any osseous pathology.  Will obtain urine rule out UTI/STD/pregnancy.  Will also treat migraine headache with modified migraine cocktail.    Amount and/or Complexity of Data Reviewed  Labs: ordered. Decision-making details documented in ED Course.  Radiology: ordered and independent interpretation performed.    Risk  Prescription drug management.        ED Course as of 02/16/25 1901   [place] Feb 16, 2025   1651 Blood Pressure: 149/78   1652 Temperature:  97.8 °F (36.6 °C)   1652 Pulse(!): 122   1652 Respirations: 20   1652 SpO2: 97 %   1815 Color, UA: Yellow   1815 Leukocytes, UA(!): Trace   1815 Nitrite, UA: Negative   1815 Blood, UA(!): Large  Patient currently menstruating   1815 PREGNANCY TEST URINE: Negative   1815 Pulse: 78   1857 RBC Urine: 1-2 1857 WBC, UA(!): 2-4 1857 Bacteria, UA: Occasional   1857 X-ray right hip: No fracture/effusion       Medications   sodium chloride 0.9 % bolus 1,000 mL (1,000 mL Intravenous New Bag 2/16/25 1748)   dexamethasone (PF) (DECADRON) injection 10 mg (10 mg Intravenous Given 2/16/25 1750)   metoclopramide (REGLAN) injection 10 mg (10 mg Intravenous Given 2/16/25 1750)   diphenhydrAMINE (BENADRYL) injection 25 mg (25 mg Intravenous Given 2/16/25 1751)   SUMAtriptan (IMITREX) subcutaneous injection 6 mg (6 mg Subcutaneous Given 2/16/25 1723)   magnesium sulfate 2 g/50 mL IVPB (premix) 2 g (2 g Intravenous New Bag 2/16/25 1749)       ED Risk Strat Scores                            SBIRT 20yo+      Flowsheet Row Most Recent Value   Initial Alcohol Screen: US AUDIT-C     1. How often do you have a drink containing alcohol? 0 Filed at: 02/16/2025 1734   2. How many drinks containing alcohol do you have on a typical day you are drinking?  0 Filed at: 02/16/2025 1734   3b. FEMALE Any Age, or MALE 65+: How often do you have 4 or more drinks on one occassion? 0 Filed at: 02/16/2025 1734   Audit-C Score 0 Filed at: 02/16/2025 1734   JAMEY: How many times in the past year have you...    Used an illegal drug or used a prescription medication for non-medical reasons? Never Filed at: 02/16/2025 1734                            History of Present Illness       Chief Complaint   Patient presents with    Hip Pain     C/o right hip/groin pain x3 days     Migraine     C/o migraines x3 days. Pt states she took tylenol with no relief        Past Medical History:   Diagnosis Date    Abnormal Pap smear of cervix     ADHD (attention deficit  hyperactivity disorder)     Alcohol abuse     Ankle fracture     Anxiety     Arthritis     back    Asthma     Bipolar disorder (HCC)     Cellulitis     right side fac in 2014    Chronic pain disorder     Depression     Diverticulitis     Flank pain 2016    Hallucination     Hepatitis C     Hip disease 2006    reports she had fluid removed from right hip and received treatment with antibiotic     History of abnormal cervical Pap smear     History of multiple miscarriages     IBS (irritable bowel syndrome)     Infantile idiopathic scoliosis     Joint pain     Kidney stone     Lactose intolerance     Low back pain     Myofascial pain syndrome     Peripheral neuropathy 2024    Poor dentition     Psychiatric illness     Psychosis ([facility])     PTSD (post-traumatic stress disorder)     Pyelonephritis affecting pregnancy     Right hand paresthesia     Right ovarian cyst     Schizoaffective disorder, bipolar type ([facility]) r/o bipolar with psychotic features 2024    Scoliosis     Seizures ([facility])     Self-injurious behavior     Sleep difficulties     Slow transit constipation     Substance abuse ([facility])     Suicide attempt ([facility])       Past Surgical History:   Procedure Laterality Date     SECTION  2016     SECTION  2014    HIP SURGERY      ORTHOPEDIC SURGERY      AK  DELIVERY ONLY N/A 2016    Procedure:  SECTION () REPEAT;  Surgeon: Kurt Connor MD;  Location: [facility];  Service: Obstetrics    AK LIG/TRNSXJ FLP TUBE ABDL/VAG APPR UNI/BI Bilateral 2016    Procedure: LIGATION/COAGULATION TUBAL;  Surgeon: Kurt Connor MD;  Location: [facility];  Service: Obstetrics      Family History   Problem Relation Age of Onset    Heart disease Maternal Aunt     Cancer Maternal Aunt     Diabetes Paternal Aunt     No Known Problems Mother     No Known Problems Father     No Known Problems Sister       Social History     Tobacco Use    Smoking status: Every  "Day     Current packs/day: 0.50     Average packs/day: 0.4 packs/day for 30.1 years (11.3 ttl pk-yrs)     Types: Cigarettes     Start date: 2010     Passive exposure: Never    Smokeless tobacco: Never    Tobacco comments:     pt refused smoking cessation teaching.    Vaping Use    Vaping status: Former   Substance Use Topics    Alcohol use: Not Currently     Comment: Rare Use    Drug use: Not Currently     Types: Marijuana, Cocaine, \"Crack\" cocaine      E-Cigarette/Vaping    E-Cigarette Use Former User       E-Cigarette/Vaping Substances    Nicotine No     THC No     CBD No     Flavoring No     Other No     Unknown No       I have reviewed and agree with the history as documented.     39-year-old female with history of asthma, untreated hepatitis C, migraine headaches, bipolar depression, substance abuse, drug-seeking behavior presents to the emergency department with migraine headache as well as right inguinal pain both of which started 3 days ago.  She states the migraine headache is in its typical location at the back of her head it is unrelieved with Tylenol.  No fevers or neck stiffness.  No vomiting or diarrhea.  She took Tylenol without relief.  She states the right inguinal pain started around the same time.  No trauma.  She has not noticed any redness or swelling.  She states she had \"fluid\" taken off her hip in 2006.  No dysuria or vaginal bleeding/discharge.  She is currently on her menses.  Patient was recently admitted earlier in the month for gastroenteritis symptoms.  Patient states she walked to the emergency department       History provided by:  Patient   used: No    Migraine  Location:  Posterior head  Onset quality:  Gradual  Duration:  3 days  Timing:  Constant  Progression:  Waxing and waning  Chronicity:  Recurrent  Relieved by:  Nothing  Ineffective treatments:  Tylenol  Associated symptoms: headaches    Associated symptoms: no abdominal pain, no chest pain, no congestion, " no cough, no diarrhea, no ear pain, no fatigue, no fever, no loss of consciousness, no myalgias, no nausea, no rash, no rhinorrhea, no shortness of breath, no sore throat, no vomiting and no wheezing    Groin Pain  Location:  Right groin  Onset quality:  Gradual  Duration:  3 days  Timing:  Constant  Progression:  Unchanged  Chronicity:  New  Context:  Patient denies trauma, swelling, erythema  Relieved by:  Nothing  Worsened by:  Palpation and movement  Ineffective treatments:  Tylenol  Associated symptoms: headaches    Associated symptoms: no abdominal pain, no chest pain, no congestion, no cough, no diarrhea, no ear pain, no fatigue, no fever, no loss of consciousness, no myalgias, no nausea, no rash, no rhinorrhea, no shortness of breath, no sore throat, no vomiting and no wheezing        Review of Systems   Constitutional: Negative.  Negative for activity change, appetite change, chills, diaphoresis, fatigue and fever.   HENT: Negative.  Negative for congestion, ear pain, rhinorrhea and sore throat.    Eyes: Negative.  Negative for discharge, redness and itching.   Respiratory: Negative.  Negative for apnea, cough, chest tightness, shortness of breath and wheezing.    Cardiovascular:  Negative for chest pain, palpitations and leg swelling.   Gastrointestinal: Negative.  Negative for abdominal pain, diarrhea, nausea and vomiting.   Endocrine: Negative.    Genitourinary: Negative.  Negative for flank pain, frequency and urgency.   Musculoskeletal: Negative.  Negative for back pain and myalgias.   Skin: Negative.  Negative for rash.   Allergic/Immunologic: Negative.    Neurological:  Positive for seizures (History of seizure disorder) and headaches. Negative for dizziness, tremors, loss of consciousness, syncope, facial asymmetry, speech difficulty, weakness, light-headedness and numbness.   Hematological: Negative.    All other systems reviewed and are negative.          Objective       ED Triage Vitals [02/16/25  1629]   Temperature Pulse Blood Pressure Respirations SpO2 Patient Position - Orthostatic VS   97.8 °F (36.6 °C) (!) 122 149/78 20 97 % Sitting      Temp Source Heart Rate Source BP Location FiO2 (%) Pain Score    Oral Monitor Right arm -- 10 - Worst Possible Pain      Vitals      Date and Time Temp Pulse SpO2 Resp BP Pain Score FACES Pain Rating User   02/16/25 1755 -- 78 98 % 18 126/71 -- -- AS   02/16/25 1629 97.8 °F (36.6 °C) 122 97 % 20 149/78 10 - Worst Possible Pain -- ML            Physical Exam  Vitals and nursing note reviewed.   Constitutional:       General: She is awake. She is not in acute distress.     Appearance: Normal appearance. She is well-developed and normal weight. She is not ill-appearing, toxic-appearing or diaphoretic.   HENT:      Head: Normocephalic and atraumatic.      Right Ear: Tympanic membrane and external ear normal.      Left Ear: Tympanic membrane and external ear normal.      Nose: Nose normal.      Mouth/Throat:      Mouth: Mucous membranes are moist.   Eyes:      General: No scleral icterus.        Right eye: No discharge.         Left eye: No discharge.      Extraocular Movements: Extraocular movements intact.      Conjunctiva/sclera: Conjunctivae normal.      Pupils: Pupils are equal, round, and reactive to light.   Neck:      Thyroid: No thyromegaly.      Vascular: No JVD.      Trachea: No tracheal deviation.   Cardiovascular:      Rate and Rhythm: Regular rhythm. Tachycardia present.      Heart sounds: Normal heart sounds. No murmur heard.     No friction rub. No gallop.   Pulmonary:      Effort: Pulmonary effort is normal. No respiratory distress.      Breath sounds: Normal breath sounds. No stridor. No wheezing, rhonchi or rales.   Chest:      Chest wall: No tenderness.   Abdominal:      General: Bowel sounds are normal. There is no distension.      Palpations: Abdomen is soft. There is no mass.      Tenderness: There is no abdominal tenderness.      Hernia: No hernia is  present.      Comments: Mild tenderness right inguinal region.  No hernia palpated.  No lymphadenopathy or erythema appreciated   Musculoskeletal:         General: No swelling, tenderness, deformity or signs of injury. Normal range of motion.      Cervical back: Normal range of motion and neck supple. No rigidity or tenderness.      Right lower leg: No edema.      Left lower leg: No edema.   Lymphadenopathy:      Cervical: No cervical adenopathy.   Skin:     General: Skin is warm and dry.      Coloration: Skin is not jaundiced or pale.      Findings: No bruising, erythema, lesion or rash.   Neurological:      General: No focal deficit present.      Mental Status: She is alert and oriented to person, place, and time.      Cranial Nerves: No cranial nerve deficit.      Motor: No weakness or abnormal muscle tone.      Coordination: Coordination normal.      Gait: Gait normal.      Deep Tendon Reflexes: Reflexes are normal and symmetric. Reflexes normal.   Psychiatric:         Mood and Affect: Mood normal.         Behavior: Behavior is cooperative.         Results Reviewed       Procedure Component Value Units Date/Time    Urine Microscopic [[id number]]  (Abnormal) Collected: 02/16/25 1727    Lab Status: Final result Specimen: Urine, Clean Catch Updated: 02/16/25 1848     RBC, UA 1-2 /hpf      WBC, UA 2-4 /hpf      Epithelial Cells Occasional /hpf      Bacteria, UA Occasional /hpf      MUCUS THREADS Occasional    Chlamydia/GC amplified DNA by PCR [[id number]] Collected: 02/16/25 1745    Lab Status: In process Specimen: Urine, Other Updated: 02/16/25 1807    Urine Macroscopic, POC [[id number]]  (Abnormal) Collected: 02/16/25 1727    Lab Status: Final result Specimen: Urine Updated: 02/16/25 1728     Color, UA Yellow     Clarity, UA Clear     pH, UA 7.0     Leukocytes, UA Trace     Nitrite, UA Negative     Protein, UA Negative mg/dl      Glucose, UA Negative mg/dl      Ketones, UA Negative mg/dl      Urobilinogen, UA 0.2  E.U./dl      Bilirubin, UA Negative     Occult Blood, UA Large     Specific Gravity, UA 1.015    Narrative:      CLINITEK RESULT    POCT pregnancy, urine [[id number]]  (Normal) Collected: 02/16/25 1720    Lab Status: Final result Updated: 02/16/25 1725     EXT Preg Test, Ur Negative     Control Valid            XR hip/pelv 2-3 vws right   ED Interpretation by Elvira Choi DO (02/16 1857)   No fracture or effusion noted          Procedures    ED Medication and Procedure Management   Prior to Admission Medications   Prescriptions Last Dose Informant Patient Reported? Taking?   OLANZapine (ZyPREXA) 15 mg tablet   Yes No   Sig: Take 30 mg by mouth daily at bedtime   OXcarbazepine (TRILEPTAL) 300 mg tablet   No No   Sig: Take 1 tablet (300 mg total) by mouth every 12 (twelve) hours   Ventolin  (90 Base) MCG/ACT inhaler   No No   Sig: Inhale 2 puffs every 4 (four) hours as needed for wheezing   albuterol (ACCUNEB) 1.25 MG/3ML nebulizer solution   Yes No   Sig: Take 1.25 mg by nebulization every 6 (six) hours as needed for wheezing   budesonide-formoterol (SYMBICORT) 80-4.5 MCG/ACT inhaler   No No   Sig: Inhale 2 puffs 2 (two) times a day Rinse mouth after use.   dicyclomine (BENTYL) 20 mg tablet   No No   Sig: Take 1 tablet (20 mg total) by mouth 2 (two) times a day as needed (abdominal pain)   docusate sodium (COLACE) 100 mg capsule   No No   Sig: Take 1 capsule (100 mg total) by mouth 2 (two) times a day   famotidine (PEPCID) 20 mg tablet   No No   Sig: Take 1 tablet (20 mg total) by mouth 2 (two) times a day as needed for heartburn   ondansetron (ZOFRAN-ODT) 4 mg disintegrating tablet   No No   Sig: Take 1 tablet (4 mg total) by mouth every 6 (six) hours as needed for nausea   pantoprazole (PROTONIX) 40 mg tablet   No No   Sig: Take 1 tablet (40 mg total) by mouth daily      Facility-Administered Medications: None     Patient's Medications   Discharge Prescriptions    No medications on file     No  discharge procedures on file.  ED SEPSIS DOCUMENTATION   Time reflects when diagnosis was documented in both MDM as applicable and the Disposition within this note       Time User Action Codes Description Comment    2/16/2025  6:33 PM Elvira Choi Add [G43.809] Other migraine without status migrainosus, not intractable     2/16/2025  6:59 PM Elvira Choi Add [R10.31] Right inguinal pain                  Elvira Choi,   02/16/25 1902

## 2025-02-17 LAB
C TRACH DNA SPEC QL NAA+PROBE: NEGATIVE
N GONORRHOEA DNA SPEC QL NAA+PROBE: NEGATIVE

## 2025-02-19 ENCOUNTER — DOCUMENTATION (OUTPATIENT)
Dept: CASE MANAGEMENT | Facility: HOSPITAL | Age: 40
End: 2025-02-19

## 2025-02-19 ENCOUNTER — HOSPITAL ENCOUNTER (EMERGENCY)
Facility: HOSPITAL | Age: 40
Discharge: HOME | End: 2025-02-19
Attending: EMERGENCY MEDICINE
Payer: COMMERCIAL

## 2025-02-19 VITALS
RESPIRATION RATE: 18 BRPM | DIASTOLIC BLOOD PRESSURE: 75 MMHG | SYSTOLIC BLOOD PRESSURE: 153 MMHG | OXYGEN SATURATION: 97 % | TEMPERATURE: 97.7 F | BODY MASS INDEX: 26.61 KG/M2 | HEART RATE: 124 BPM | WEIGHT: 145.5 LBS

## 2025-02-19 DIAGNOSIS — R11.2 NAUSEA AND VOMITING: ICD-10-CM

## 2025-02-19 DIAGNOSIS — R19.7 DIARRHEA: ICD-10-CM

## 2025-02-19 DIAGNOSIS — R10.84 GENERALIZED ABDOMINAL PAIN: Primary | ICD-10-CM

## 2025-02-19 LAB
ALBUMIN SERPL BCG-MCNC: 4.5 G/DL (ref 3.5–5)
ALP SERPL-CCNC: 55 U/L (ref 34–104)
ALT SERPL W P-5'-P-CCNC: 10 U/L (ref 7–52)
ANION GAP SERPL CALCULATED.3IONS-SCNC: 8 MMOL/L (ref 4–13)
AST SERPL W P-5'-P-CCNC: 13 U/L (ref 13–39)
BACTERIA UR QL AUTO: ABNORMAL /HPF
BASOPHILS # BLD AUTO: 0.03 THOUSANDS/ΜL (ref 0–0.1)
BASOPHILS NFR BLD AUTO: 0 % (ref 0–1)
BILIRUB SERPL-MCNC: 0.36 MG/DL (ref 0.2–1)
BILIRUB UR QL STRIP: NEGATIVE
BUN SERPL-MCNC: 6 MG/DL (ref 5–25)
CALCIUM SERPL-MCNC: 9.4 MG/DL (ref 8.4–10.2)
CHLORIDE SERPL-SCNC: 96 MMOL/L (ref 96–108)
CLARITY UR: CLEAR
CO2 SERPL-SCNC: 26 MMOL/L (ref 21–32)
COLOR UR: YELLOW
CREAT SERPL-MCNC: 0.72 MG/DL (ref 0.6–1.3)
EOSINOPHIL # BLD AUTO: 0.13 THOUSAND/ΜL (ref 0–0.61)
EOSINOPHIL NFR BLD AUTO: 1 % (ref 0–6)
ERYTHROCYTE [DISTWIDTH] IN BLOOD BY AUTOMATED COUNT: 12.1 % (ref 11.6–15.1)
EXT PREGNANCY TEST URINE: NEGATIVE
EXT. CONTROL: NORMAL
GFR SERPL CREATININE-BSD FRML MDRD: 107 ML/MIN/1.73SQ M
GLUCOSE SERPL-MCNC: 91 MG/DL (ref 65–140)
GLUCOSE UR STRIP-MCNC: NEGATIVE MG/DL
HCT VFR BLD AUTO: 39.8 % (ref 34.8–46.1)
HGB BLD-MCNC: 13.9 G/DL (ref 11.5–15.4)
HGB UR QL STRIP.AUTO: NEGATIVE
IMM GRANULOCYTES # BLD AUTO: 0.04 THOUSAND/UL (ref 0–0.2)
IMM GRANULOCYTES NFR BLD AUTO: 0 % (ref 0–2)
KETONES UR STRIP-MCNC: NEGATIVE MG/DL
LEUKOCYTE ESTERASE UR QL STRIP: NEGATIVE
LIPASE SERPL-CCNC: 18 U/L (ref 11–82)
LYMPHOCYTES # BLD AUTO: 2.69 THOUSANDS/ΜL (ref 0.6–4.47)
LYMPHOCYTES NFR BLD AUTO: 29 % (ref 14–44)
MCH RBC QN AUTO: 32 PG (ref 26.8–34.3)
MCHC RBC AUTO-ENTMCNC: 34.9 G/DL (ref 31.4–37.4)
MCV RBC AUTO: 92 FL (ref 82–98)
MONOCYTES # BLD AUTO: 0.65 THOUSAND/ΜL (ref 0.17–1.22)
MONOCYTES NFR BLD AUTO: 7 % (ref 4–12)
MUCOUS THREADS UR QL AUTO: ABNORMAL
NEUTROPHILS # BLD AUTO: 5.77 THOUSANDS/ΜL (ref 1.85–7.62)
NEUTS SEG NFR BLD AUTO: 63 % (ref 43–75)
NITRITE UR QL STRIP: NEGATIVE
NON-SQ EPI CELLS URNS QL MICRO: ABNORMAL /HPF
NRBC BLD AUTO-RTO: 0 /100 WBCS
PH UR STRIP.AUTO: 6.5 [PH]
PLATELET # BLD AUTO: 235 THOUSANDS/UL (ref 149–390)
PMV BLD AUTO: 9.4 FL (ref 8.9–12.7)
POTASSIUM SERPL-SCNC: 3.5 MMOL/L (ref 3.5–5.3)
PROT SERPL-MCNC: 6.9 G/DL (ref 6.4–8.4)
PROT UR STRIP-MCNC: ABNORMAL MG/DL
RBC # BLD AUTO: 4.34 MILLION/UL (ref 3.81–5.12)
RBC #/AREA URNS AUTO: ABNORMAL /HPF
SODIUM SERPL-SCNC: 130 MMOL/L (ref 135–147)
SP GR UR STRIP.AUTO: 1.02 (ref 1–1.03)
UROBILINOGEN UR STRIP-ACNC: 2 MG/DL
WBC # BLD AUTO: 9.31 THOUSAND/UL (ref 4.31–10.16)
WBC #/AREA URNS AUTO: ABNORMAL /HPF

## 2025-02-19 PROCEDURE — 81001 URINALYSIS AUTO W/SCOPE: CPT

## 2025-02-19 PROCEDURE — 96361 HYDRATE IV INFUSION ADD-ON: CPT

## 2025-02-19 PROCEDURE — 81025 URINE PREGNANCY TEST: CPT

## 2025-02-19 PROCEDURE — 80053 COMPREHEN METABOLIC PANEL: CPT

## 2025-02-19 PROCEDURE — 99283 EMERGENCY DEPT VISIT LOW MDM: CPT

## 2025-02-19 PROCEDURE — 85025 COMPLETE CBC W/AUTO DIFF WBC: CPT

## 2025-02-19 PROCEDURE — 96375 TX/PRO/DX INJ NEW DRUG ADDON: CPT

## 2025-02-19 PROCEDURE — 96365 THER/PROPH/DIAG IV INF INIT: CPT

## 2025-02-19 PROCEDURE — 83690 ASSAY OF LIPASE: CPT

## 2025-02-19 PROCEDURE — 99284 EMERGENCY DEPT VISIT MOD MDM: CPT

## 2025-02-19 PROCEDURE — 36415 COLL VENOUS BLD VENIPUNCTURE: CPT

## 2025-02-19 RX ORDER — ONDANSETRON 2 MG/ML
4 INJECTION INTRAMUSCULAR; INTRAVENOUS ONCE
Status: COMPLETED | OUTPATIENT
Start: 2025-02-19 | End: 2025-02-19

## 2025-02-19 RX ORDER — ALUMINUM HYDROXIDE, MAGNESIUM HYDROXIDE, SIMETHICONE 400; 400; 40 MG/10ML; MG/10ML; MG/10ML
30 SUSPENSION ORAL
Qty: 355 ML | Refills: 0 | Status: SHIPPED | OUTPATIENT
Start: 2025-02-19

## 2025-02-19 RX ORDER — FAMOTIDINE 10 MG/ML
20 INJECTION, SOLUTION INTRAVENOUS ONCE
Status: COMPLETED | OUTPATIENT
Start: 2025-02-19 | End: 2025-02-19

## 2025-02-19 RX ORDER — DICYCLOMINE HCL 20 MG
20 TABLET ORAL 2 TIMES DAILY
Qty: 20 TABLET | Refills: 0 | Status: SHIPPED | OUTPATIENT
Start: 2025-02-19

## 2025-02-19 RX ORDER — ACETAMINOPHEN 10 MG/ML
1000 INJECTION, SOLUTION INTRAVENOUS ONCE
Status: COMPLETED | OUTPATIENT
Start: 2025-02-19 | End: 2025-02-19

## 2025-02-19 RX ORDER — ACETAMINOPHEN 500 MG
1000 TABLET ORAL EVERY 6 HOURS PRN
Qty: 30 TABLET | Refills: 0 | Status: SHIPPED | OUTPATIENT
Start: 2025-02-19

## 2025-02-19 RX ORDER — DICYCLOMINE HCL 20 MG
20 TABLET ORAL ONCE
Status: COMPLETED | OUTPATIENT
Start: 2025-02-19 | End: 2025-02-19

## 2025-02-19 RX ORDER — FAMOTIDINE 20 MG/1
20 TABLET, FILM COATED ORAL 2 TIMES DAILY
Qty: 30 TABLET | Refills: 0 | Status: SHIPPED | OUTPATIENT
Start: 2025-02-19

## 2025-02-19 RX ORDER — ONDANSETRON 4 MG/1
4 TABLET, ORALLY DISINTEGRATING ORAL EVERY 6 HOURS PRN
Qty: 20 TABLET | Refills: 0 | Status: SHIPPED | OUTPATIENT
Start: 2025-02-19

## 2025-02-19 RX ORDER — MAGNESIUM HYDROXIDE/ALUMINUM HYDROXICE/SIMETHICONE 120; 1200; 1200 MG/30ML; MG/30ML; MG/30ML
30 SUSPENSION ORAL ONCE
Status: COMPLETED | OUTPATIENT
Start: 2025-02-19 | End: 2025-02-19

## 2025-02-19 RX ADMIN — ONDANSETRON 4 MG: 2 INJECTION INTRAMUSCULAR; INTRAVENOUS at 20:47

## 2025-02-19 RX ADMIN — ALUMINUM HYDROXIDE, MAGNESIUM HYDROXIDE, AND SIMETHICONE 30 ML: 200; 200; 20 SUSPENSION ORAL at 20:53

## 2025-02-19 RX ADMIN — DICYCLOMINE HYDROCHLORIDE 20 MG: 20 TABLET ORAL at 20:53

## 2025-02-19 RX ADMIN — FAMOTIDINE 20 MG: 10 INJECTION, SOLUTION INTRAVENOUS at 20:50

## 2025-02-19 RX ADMIN — SODIUM CHLORIDE 1000 ML: 0.9 INJECTION, SOLUTION INTRAVENOUS at 20:45

## 2025-02-19 RX ADMIN — ACETAMINOPHEN 1000 MG: 10 INJECTION INTRAVENOUS at 22:50
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
## 2025-02-20 NOTE — ED NOTES
Pt requesting no one, including spouse, know she is here or be allowed back to pt room. Registration and primary nurse aware     Destiny Guy RN  02/19/25 1922

## 2025-02-20 NOTE — ED PROVIDER NOTES
Time reflects when diagnosis was documented in both MDM as applicable and the Disposition within this note       Time User Action Codes Description Comment    2/19/2025 11:41 PM Brodie Villaseñor [R10.84] Generalized abdominal pain     2/19/2025 11:42 PM Brodie Villaseñor [R11.2] Nausea and vomiting     2/19/2025 11:42 PM Brodie Villaseñor [R19.7] Diarrhea           ED Disposition       ED Disposition   Discharge    Condition   Stable    Date/Time   Wed Feb 19, 2025 11:41 PM    Comment   Antionette Wyronnellcornell Downing discharge to home/self care.                   Assessment & Plan       Medical Decision Making  Patient is a 39-year-old female with past medical history of bipolar 1 disorder, PTSD, asthma, diverticulitis, polysubstance abuse, presenting emergency department with acute left lower abdominal pain that started 2 days ago.  Patient shows diffuse subjective abdominal tenderness with objective left lower quadrant abdominal tenderness.  No rebound or guarding appreciated without any CVA tenderness noted. DDx including but not limited to: appendicitis, gastroenteritis, gastritis, PUD, GERD, gastroparesis, hepatitis, pancreatitis, colitis, enteritis, food poisoning, mesenteric adenitis, epiploic appendagitis, IBD, IBS, ileus, bowel obstruction, volvulus, cholecystitis, biliary colic, choledocholithiasis, perforated viscus, tumor, splenic etiology, diverticulitis, internal hernia, constipation, pelvic pathology, renal colic, pyelonephritis, UTI.  CBC within normal limits, CMP does show hyponatremia of 130, repleted with normal saline 1 L.  Lipase within normal limits, COVID/flu/RSV  declined by patient.  Pregnancy test negative.  Findings most consistent with viral gastroenteritis.  Due to the lack of abnormal lab no abnormalities and improvement of patient's pain, findings most consistent with gastroenteritis and consistent with diverticulitis.  Discussed with patient to follow-up with PCP in outpatient setting  within 1 week of discharge.  Patient prescribed multiple GI medications for symptomatic relief.  Patient given strict return precautions to return to emergency room for increasing fever, body aches, chills, worsening abdominal pain or intractable nausea and vomiting, hematemesis of bright blood blood per rectum.  Discussed findings with patient,  patient verbalized understanding and agrees to current plan.  Patient discharged home in stable condition at this time.    Amount and/or Complexity of Data Reviewed  Labs: ordered.    Risk  OTC drugs.  Prescription drug management.             Medications   sodium chloride 0.9 % bolus 1,000 mL (0 mL Intravenous Stopped 2/19/25 2346)   ondansetron (ZOFRAN) injection 4 mg (4 mg Intravenous Given 2/19/25 2047)   Famotidine (PF) (PEPCID) injection 20 mg (20 mg Intravenous Given 2/19/25 2050)   aluminum-magnesium hydroxide-simethicone (MAALOX) oral suspension 30 mL (30 mL Oral Given 2/19/25 2053)   dicyclomine (BENTYL) tablet 20 mg (20 mg Oral Given 2/19/25 2053)   acetaminophen (Ofirmev) injection 1,000 mg (0 mg Intravenous Stopped 2/19/25 2320)       ED Risk Strat Scores                            SBIRT 22yo+      Flowsheet Row Most Recent Value   Initial Alcohol Screen: US AUDIT-C     1. How often do you have a drink containing alcohol? 0 Filed at: 02/19/2025 1908   2. How many drinks containing alcohol do you have on a typical day you are drinking?  0 Filed at: 02/19/2025 1908   3b. FEMALE Any Age, or MALE 65+: How often do you have 4 or more drinks on one occassion? 0 Filed at: 02/19/2025 1908   Audit-C Score 0 Filed at: 02/19/2025 1908   JAMEY: How many times in the past year have you...    Used an illegal drug or used a prescription medication for non-medical reasons? Never Filed at: 02/19/2025 1908                            History of Present Illness       Chief Complaint   Patient presents with    Abdominal Pain     Pt reports lower abd pain that started two days ago  with vomiting and diarrhea. Taking tylenol with no relief       Past Medical History:   Diagnosis Date    Abnormal Pap smear of cervix     ADHD (attention deficit hyperactivity disorder)     Alcohol abuse     Ankle fracture     Anxiety     Arthritis     back    Asthma     Bipolar disorder (HCC)     Cellulitis     right side fac in     Chronic pain disorder     Depression     Diverticulitis     Flank pain 2016    Hallucination     Hepatitis C     Hip disease 2006    reports she had fluid removed from right hip and received treatment with antibiotic     History of abnormal cervical Pap smear     History of multiple miscarriages     IBS (irritable bowel syndrome)     Infantile idiopathic scoliosis     Joint pain     Kidney stone     Lactose intolerance     Low back pain     Myofascial pain syndrome     Peripheral neuropathy 2024    Poor dentition     Psychiatric illness     Psychosis ([facility])     PTSD (post-traumatic stress disorder)     Pyelonephritis affecting pregnancy     Right hand paresthesia     Right ovarian cyst     Schizoaffective disorder, bipolar type ([facility]) r/o bipolar with psychotic features 2024    Scoliosis     Seizures ([facility])     Self-injurious behavior     Sleep difficulties     Slow transit constipation     Substance abuse ([facility])     Suicide attempt ([facility])       Past Surgical History:   Procedure Laterality Date     SECTION  2016     SECTION  2014    HIP SURGERY      ORTHOPEDIC SURGERY      SD  DELIVERY ONLY N/A 2016    Procedure:  SECTION () REPEAT;  Surgeon: Kurt Connor MD;  Location: [facility];  Service: Obstetrics    SD LIG/TRNSXJ FLP TUBE ABDL/VAG APPR UNI/BI Bilateral 2016    Procedure: LIGATION/COAGULATION TUBAL;  Surgeon: Kurt Connor MD;  Location: [facility];  Service: Obstetrics      Family History   Problem Relation Age of Onset    Heart disease Maternal Aunt     Cancer Maternal Aunt     Diabetes  "Paternal Aunt     No Known Problems Mother     No Known Problems Father     No Known Problems Sister       Social History     Tobacco Use    Smoking status: Every Day     Current packs/day: 0.50     Average packs/day: 0.4 packs/day for 30.1 years (11.3 ttl pk-yrs)     Types: Cigarettes     Start date: 2010     Passive exposure: Never    Smokeless tobacco: Never    Tobacco comments:     pt refused smoking cessation teaching.    Vaping Use    Vaping status: Former   Substance Use Topics    Alcohol use: Not Currently     Comment: Rare Use    Drug use: Not Currently     Types: Marijuana, Cocaine, \"Crack\" cocaine      E-Cigarette/Vaping    E-Cigarette Use Former User       E-Cigarette/Vaping Substances    Nicotine No     THC No     CBD No     Flavoring No     Other No     Unknown No       I have reviewed and agree with the history as documented.     Patient is a 39-year-old female with past medical history of bipolar 1 disorder, PTSD, asthma, diverticulitis, polysubstance abuse, presenting emergency department with acute left lower abdominal pain that started 2 days ago.  Patient states that she has had intermittent vomiting and diarrhea since then.  Patient describes the vomiting as nonbloody nonbilious vomiting.  Patient describes diarrhea as mucoid without any prior blood per rectum or melanotic stools.  Patient states that she been taking Tylenol with minimal relief.  Patient states that the pain is in the left lower quadrant and does not radiate.  Patient also admits to being bloated.  Patient denies any fever, aches, chills, chest pain, palpitation, shortness of breath, or any  symptoms at this time.      Abdominal Pain  Associated symptoms: diarrhea, nausea and vomiting    Associated symptoms: no chest pain, no chills, no constipation, no cough, no dysuria, no fatigue, no fever, no hematuria, no shortness of breath and no sore throat        Review of Systems   Constitutional:  Negative for chills, fatigue and " fever.   HENT:  Negative for congestion, drooling, ear pain, facial swelling, rhinorrhea, sinus pressure, sinus pain, sneezing and sore throat.    Eyes:  Negative for pain and visual disturbance.   Respiratory:  Negative for cough, choking, shortness of breath, wheezing and stridor.    Cardiovascular:  Negative for chest pain and palpitations.   Gastrointestinal:  Positive for abdominal pain, diarrhea, nausea and vomiting. Negative for abdominal distention, anal bleeding, blood in stool, constipation and rectal pain.   Genitourinary:  Negative for dysuria and hematuria.   Musculoskeletal:  Negative for arthralgias, back pain, myalgias, neck pain and neck stiffness.   Skin:  Negative for color change and rash.   Neurological:  Negative for dizziness, seizures, syncope, weakness, light-headedness and headaches.   All other systems reviewed and are negative.          Objective       ED Triage Vitals [02/19/25 1909]   Temperature Pulse Blood Pressure Respirations SpO2 Patient Position - Orthostatic VS   97.7 °F (36.5 °C) (!) 124 153/75 18 97 % Sitting      Temp Source Heart Rate Source BP Location FiO2 (%) Pain Score    Oral Monitor Right arm -- 10 - Worst Possible Pain      Vitals      Date and Time Temp Pulse SpO2 Resp BP Pain Score FACES Pain Rating User   02/19/25 1909 97.7 °F (36.5 °C) 124 97 % 18 153/75 10 - Worst Possible Pain -- SL            Physical Exam  Vitals and nursing note reviewed.   Constitutional:       General: She is not in acute distress.     Appearance: She is well-developed. She is not ill-appearing.   HENT:      Head: Normocephalic and atraumatic.      Mouth/Throat:      Mouth: Mucous membranes are moist.      Pharynx: Oropharynx is clear. No pharyngeal swelling or oropharyngeal exudate.   Eyes:      General: No scleral icterus.     Extraocular Movements: Extraocular movements intact.      Conjunctiva/sclera: Conjunctivae normal.   Cardiovascular:      Rate and Rhythm: Normal rate and regular  rhythm.      Heart sounds: Normal heart sounds. No murmur heard.     No friction rub. No gallop.   Pulmonary:      Effort: Pulmonary effort is normal. No respiratory distress.      Breath sounds: Normal breath sounds. No stridor. No wheezing, rhonchi or rales.   Chest:      Chest wall: No tenderness.   Abdominal:      General: Abdomen is flat. Bowel sounds are normal. There is no distension or abdominal bruit. There are no signs of injury.      Palpations: Abdomen is soft. There is no shifting dullness, fluid wave, hepatomegaly or mass.      Tenderness: There is generalized abdominal tenderness and tenderness in the left lower quadrant. There is no right CVA tenderness, left CVA tenderness, guarding or rebound. Negative signs include Herndon's sign, Rovsing's sign, McBurney's sign and obturator sign.      Hernia: No hernia is present.   Musculoskeletal:         General: No swelling.      Cervical back: Neck supple.   Skin:     General: Skin is warm and dry.      Capillary Refill: Capillary refill takes less than 2 seconds.      Coloration: Skin is not cyanotic, jaundiced, mottled or pale.      Findings: No erythema or rash.   Neurological:      Mental Status: She is alert.   Psychiatric:         Mood and Affect: Mood normal.         Results Reviewed       Procedure Component Value Units Date/Time    Urine Microscopic [[id number]]  (Abnormal) Collected: 02/19/25 2250    Lab Status: Final result Specimen: Urine, Clean Catch Updated: 02/19/25 2303     RBC, UA 2-4 /hpf      WBC, UA 1-2 /hpf      Epithelial Cells Occasional /hpf      Bacteria, UA None Seen /hpf      MUCUS THREADS Innumerable    UA w Reflex to Microscopic w Reflex to Culture [[id number]]  (Abnormal) Collected: 02/19/25 2250    Lab Status: Final result Specimen: Urine, Clean Catch Updated: 02/19/25 2259     Color, UA Yellow     Clarity, UA Clear     Specific Gravity, UA 1.022     pH, UA 6.5     Leukocytes, UA Negative     Nitrite, UA Negative     Protein, UA  Trace mg/dl      Glucose, UA Negative mg/dl      Ketones, UA Negative mg/dl      Urobilinogen, UA 2.0 mg/dl      Bilirubin, UA Negative     Occult Blood, UA Negative    POCT pregnancy, urine [[id number]]  (Normal) Collected: 02/19/25 2247    Lab Status: Final result Updated: 02/19/25 2248     EXT Preg Test, Ur Negative     Control Valid    Comprehensive metabolic panel [[id number]]  (Abnormal) Collected: 02/19/25 2044    Lab Status: Final result Specimen: Blood from Arm, Left Updated: 02/19/25 2118     Sodium 130 mmol/L      Potassium 3.5 mmol/L      Chloride 96 mmol/L      CO2 26 mmol/L      ANION GAP 8 mmol/L      BUN 6 mg/dL      Creatinine 0.72 mg/dL      Glucose 91 mg/dL      Calcium 9.4 mg/dL      AST 13 U/L      ALT 10 U/L      Alkaline Phosphatase 55 U/L      Total Protein 6.9 g/dL      Albumin 4.5 g/dL      Total Bilirubin 0.36 mg/dL      eGFR 107 ml/min/1.73sq m     Narrative:      [employer] guidelines for Chronic Kidney Disease (CKD):     Stage 1 with normal or high GFR (GFR > 90 mL/min/1.73 square meters)    Stage 2 Mild CKD (GFR = 60-89 mL/min/1.73 square meters)    Stage 3A Moderate CKD (GFR = 45-59 mL/min/1.73 square meters)    Stage 3B Moderate CKD (GFR = 30-44 mL/min/1.73 square meters)    Stage 4 Severe CKD (GFR = 15-29 mL/min/1.73 square meters)    Stage 5 End Stage CKD (GFR <15 mL/min/1.73 square meters)  Note: GFR calculation is accurate only with a steady state creatinine    Lipase [[id number]]  (Normal) Collected: 02/19/25 2044    Lab Status: Final result Specimen: Blood from Arm, Left Updated: 02/19/25 2118     Lipase 18 u/L     CBC and differential [[id number]] Collected: 02/19/25 2044    Lab Status: Final result Specimen: Blood from Arm, Left Updated: 02/19/25 2101     WBC 9.31 Thousand/uL      RBC 4.34 Million/uL      Hemoglobin 13.9 g/dL      Hematocrit 39.8 %      MCV 92 fL      MCH 32.0 pg      MCHC 34.9 g/dL      RDW 12.1 %      MPV 9.4 fL      Platelets 235  Thousands/uL      nRBC 0 /100 WBCs      Segmented % 63 %      Immature Grans % 0 %      Lymphocytes % 29 %      Monocytes % 7 %      Eosinophils Relative 1 %      Basophils Relative 0 %      Absolute Neutrophils 5.77 Thousands/µL      Absolute Immature Grans 0.04 Thousand/uL      Absolute Lymphocytes 2.69 Thousands/µL      Absolute Monocytes 0.65 Thousand/µL      Eosinophils Absolute 0.13 Thousand/µL      Basophils Absolute 0.03 Thousands/µL     FLU/RSV/COVID - if FLU/RSV clinically relevant (2hr TAT) [[id number]]     Lab Status: No result Specimen: Nares from Nose             No orders to display       Procedures    ED Medication and Procedure Management   Prior to Admission Medications   Prescriptions Last Dose Informant Patient Reported? Taking?   OLANZapine (ZyPREXA) 15 mg tablet   Yes No   Sig: Take 30 mg by mouth daily at bedtime   OXcarbazepine (TRILEPTAL) 300 mg tablet   No No   Sig: Take 1 tablet (300 mg total) by mouth every 12 (twelve) hours   Ventolin  (90 Base) MCG/ACT inhaler   No No   Sig: Inhale 2 puffs every 4 (four) hours as needed for wheezing   albuterol (ACCUNEB) 1.25 MG/3ML nebulizer solution   Yes No   Sig: Take 1.25 mg by nebulization every 6 (six) hours as needed for wheezing   budesonide-formoterol (SYMBICORT) 80-4.5 MCG/ACT inhaler   No No   Sig: Inhale 2 puffs 2 (two) times a day Rinse mouth after use.   dicyclomine (BENTYL) 20 mg tablet   No No   Sig: Take 1 tablet (20 mg total) by mouth 2 (two) times a day as needed (abdominal pain)   docusate sodium (COLACE) 100 mg capsule   No No   Sig: Take 1 capsule (100 mg total) by mouth 2 (two) times a day   famotidine (PEPCID) 20 mg tablet   No No   Sig: Take 1 tablet (20 mg total) by mouth 2 (two) times a day as needed for heartburn   ondansetron (ZOFRAN-ODT) 4 mg disintegrating tablet   No No   Sig: Take 1 tablet (4 mg total) by mouth every 6 (six) hours as needed for nausea   pantoprazole (PROTONIX) 40 mg tablet   No No   Sig: Take 1  tablet (40 mg total) by mouth daily      Facility-Administered Medications: None     Discharge Medication List as of 2/19/2025 11:44 PM        START taking these medications    Details   acetaminophen (TYLENOL) 500 mg tablet Take 2 tablets (1,000 mg total) by mouth every 6 (six) hours as needed for mild pain, Starting Wed 2/19/2025, Normal      aluminum-magnesium hydroxide-simethicone (MAALOX) 200-200-20 MG/5ML SUSP Take 30 mL by mouth 4 (four) times a day (before meals and at bedtime), Starting Wed 2/19/2025, Normal      !! dicyclomine (BENTYL) 20 mg tablet Take 1 tablet (20 mg total) by mouth 2 (two) times a day, Starting Wed 2/19/2025, Normal      !! famotidine (PEPCID) 20 mg tablet Take 1 tablet (20 mg total) by mouth 2 (two) times a day, Starting Wed 2/19/2025, Normal      !! ondansetron (ZOFRAN-ODT) 4 mg disintegrating tablet Take 1 tablet (4 mg total) by mouth every 6 (six) hours as needed for nausea or vomiting, Starting Wed 2/19/2025, Normal       !! - Potential duplicate medications found. Please discuss with provider.        CONTINUE these medications which have NOT CHANGED    Details   albuterol (ACCUNEB) 1.25 MG/3ML nebulizer solution Take 1.25 mg by nebulization every 6 (six) hours as needed for wheezing, Historical Med      budesonide-formoterol (SYMBICORT) 80-4.5 MCG/ACT inhaler Inhale 2 puffs 2 (two) times a day Rinse mouth after use., Starting Tue 12/17/2024, Normal      !! dicyclomine (BENTYL) 20 mg tablet Take 1 tablet (20 mg total) by mouth 2 (two) times a day as needed (abdominal pain), Starting Tue 11/26/2024, Normal      docusate sodium (COLACE) 100 mg capsule Take 1 capsule (100 mg total) by mouth 2 (two) times a day, Starting Tue 11/26/2024, Normal      !! famotidine (PEPCID) 20 mg tablet Take 1 tablet (20 mg total) by mouth 2 (two) times a day as needed for heartburn, Starting Tue 11/26/2024, Normal      OLANZapine (ZyPREXA) 15 mg tablet Take 30 mg by mouth daily at bedtime, Starting Thu  12/12/2024, Historical Med      !! ondansetron (ZOFRAN-ODT) 4 mg disintegrating tablet Take 1 tablet (4 mg total) by mouth every 6 (six) hours as needed for nausea, Starting Sun 1/12/2025, Normal      OXcarbazepine (TRILEPTAL) 300 mg tablet Take 1 tablet (300 mg total) by mouth every 12 (twelve) hours, Starting Wed 9/18/2024, Until Wed 1/29/2025, Normal      pantoprazole (PROTONIX) 40 mg tablet Take 1 tablet (40 mg total) by mouth daily, Starting Tue 11/26/2024, Normal      Ventolin  (90 Base) MCG/ACT inhaler Inhale 2 puffs every 4 (four) hours as needed for wheezing, Starting Fri 12/6/2024, Normal       !! - Potential duplicate medications found. Please discuss with provider.        No discharge procedures on file.  ED SEPSIS DOCUMENTATION   Time reflects when diagnosis was documented in both MDM as applicable and the Disposition within this note       Time User Action Codes Description Comment    2/19/2025 11:41 PM Brodie Villaseñor [R10.84] Generalized abdominal pain     2/19/2025 11:42 PM Brodie Villaseñor [R11.2] Nausea and vomiting     2/19/2025 11:42 PM Brodie Villaseñor [R19.7] Diarrhea                  Brodie Villaseñor PA-C  02/20/25 0257

## 2025-02-20 NOTE — DISCHARGE INSTRUCTIONS
Take medication as prescribed  Follow-up with PCP in outpatient setting  Return to emergency room for increasing fever, body aches, chills, worsening abdominal pain or intractable nausea and vomiting.  Or blood in the vomit or stool.

## 2025-02-21 ENCOUNTER — PATIENT OUTREACH (OUTPATIENT)
Dept: CASE MANAGEMENT | Facility: OTHER | Age: 40
End: 2025-02-21

## 2025-02-21 ENCOUNTER — HOSPITAL ENCOUNTER (EMERGENCY)
Facility: HOSPITAL | Age: 40
Discharge: HOME | End: 2025-02-21
Attending: EMERGENCY MEDICINE
Payer: COMMERCIAL

## 2025-02-21 VITALS
TEMPERATURE: 98.6 F | BODY MASS INDEX: 27.02 KG/M2 | OXYGEN SATURATION: 97 % | SYSTOLIC BLOOD PRESSURE: 131 MMHG | DIASTOLIC BLOOD PRESSURE: 75 MMHG | HEART RATE: 100 BPM | WEIGHT: 147.71 LBS | RESPIRATION RATE: 18 BRPM

## 2025-02-21 DIAGNOSIS — R19.7 DIARRHEA: ICD-10-CM

## 2025-02-21 DIAGNOSIS — E83.42 HYPOMAGNESEMIA: Primary | ICD-10-CM

## 2025-02-21 DIAGNOSIS — Z71.89 ENCOUNTER FOR COORDINATION OF COMPLEX CARE: Primary | ICD-10-CM

## 2025-02-21 LAB
AMORPH PHOS CRY URNS QL MICRO: NORMAL /HPF
ANION GAP SERPL CALCULATED.3IONS-SCNC: 9 MMOL/L (ref 4–13)
BACTERIA UR QL AUTO: NORMAL /HPF
BASOPHILS # BLD AUTO: 0.04 THOUSANDS/ΜL (ref 0–0.1)
BASOPHILS NFR BLD AUTO: 1 % (ref 0–1)
BILIRUB UR QL STRIP: NEGATIVE
BUN SERPL-MCNC: 5 MG/DL (ref 5–25)
CALCIUM SERPL-MCNC: 8.9 MG/DL (ref 8.4–10.2)
CHLORIDE SERPL-SCNC: 97 MMOL/L (ref 96–108)
CLARITY UR: ABNORMAL
CO2 SERPL-SCNC: 25 MMOL/L (ref 21–32)
COLOR UR: YELLOW
CREAT SERPL-MCNC: 0.74 MG/DL (ref 0.6–1.3)
EOSINOPHIL # BLD AUTO: 0.06 THOUSAND/ΜL (ref 0–0.61)
EOSINOPHIL NFR BLD AUTO: 1 % (ref 0–6)
ERYTHROCYTE [DISTWIDTH] IN BLOOD BY AUTOMATED COUNT: 11.9 % (ref 11.6–15.1)
EXT PREGNANCY TEST URINE: NEGATIVE
EXT. CONTROL: NORMAL
GFR SERPL CREATININE-BSD FRML MDRD: 103 ML/MIN/1.73SQ M
GLUCOSE SERPL-MCNC: 97 MG/DL (ref 65–140)
GLUCOSE UR STRIP-MCNC: NEGATIVE MG/DL
HCT VFR BLD AUTO: 40 % (ref 34.8–46.1)
HGB BLD-MCNC: 13.6 G/DL (ref 11.5–15.4)
HGB UR QL STRIP.AUTO: NEGATIVE
IMM GRANULOCYTES # BLD AUTO: 0.04 THOUSAND/UL (ref 0–0.2)
IMM GRANULOCYTES NFR BLD AUTO: 1 % (ref 0–2)
KETONES UR STRIP-MCNC: NEGATIVE MG/DL
LEUKOCYTE ESTERASE UR QL STRIP: NEGATIVE
LIPASE SERPL-CCNC: 18 U/L (ref 11–82)
LYMPHOCYTES # BLD AUTO: 2.24 THOUSANDS/ΜL (ref 0.6–4.47)
LYMPHOCYTES NFR BLD AUTO: 25 % (ref 14–44)
MAGNESIUM SERPL-MCNC: 1.8 MG/DL (ref 1.9–2.7)
MCH RBC QN AUTO: 31.8 PG (ref 26.8–34.3)
MCHC RBC AUTO-ENTMCNC: 34 G/DL (ref 31.4–37.4)
MCV RBC AUTO: 94 FL (ref 82–98)
MONOCYTES # BLD AUTO: 0.72 THOUSAND/ΜL (ref 0.17–1.22)
MONOCYTES NFR BLD AUTO: 8 % (ref 4–12)
NEUTROPHILS # BLD AUTO: 5.78 THOUSANDS/ΜL (ref 1.85–7.62)
NEUTS SEG NFR BLD AUTO: 64 % (ref 43–75)
NITRITE UR QL STRIP: NEGATIVE
NON-SQ EPI CELLS URNS QL MICRO: NORMAL /HPF
NRBC BLD AUTO-RTO: 0 /100 WBCS
PH UR STRIP.AUTO: 8 [PH]
PLATELET # BLD AUTO: 224 THOUSANDS/UL (ref 149–390)
PMV BLD AUTO: 9.5 FL (ref 8.9–12.7)
POTASSIUM SERPL-SCNC: 4.1 MMOL/L (ref 3.5–5.3)
PROT UR STRIP-MCNC: ABNORMAL MG/DL
RBC # BLD AUTO: 4.28 MILLION/UL (ref 3.81–5.12)
RBC #/AREA URNS AUTO: NORMAL /HPF
SODIUM SERPL-SCNC: 131 MMOL/L (ref 135–147)
SP GR UR STRIP.AUTO: 1.02 (ref 1–1.04)
UROBILINOGEN UA: NEGATIVE MG/DL
WBC # BLD AUTO: 8.88 THOUSAND/UL (ref 4.31–10.16)
WBC #/AREA URNS AUTO: NORMAL /HPF

## 2025-02-21 PROCEDURE — 81025 URINE PREGNANCY TEST: CPT | Performed by: EMERGENCY MEDICINE

## 2025-02-21 PROCEDURE — 99284 EMERGENCY DEPT VISIT MOD MDM: CPT | Performed by: EMERGENCY MEDICINE

## 2025-02-21 PROCEDURE — 81001 URINALYSIS AUTO W/SCOPE: CPT | Performed by: EMERGENCY MEDICINE

## 2025-02-21 PROCEDURE — 80048 BASIC METABOLIC PNL TOTAL CA: CPT | Performed by: EMERGENCY MEDICINE

## 2025-02-21 PROCEDURE — 81003 URINALYSIS AUTO W/O SCOPE: CPT | Performed by: EMERGENCY MEDICINE

## 2025-02-21 PROCEDURE — 85025 COMPLETE CBC W/AUTO DIFF WBC: CPT | Performed by: EMERGENCY MEDICINE

## 2025-02-21 PROCEDURE — 83735 ASSAY OF MAGNESIUM: CPT | Performed by: EMERGENCY MEDICINE

## 2025-02-21 PROCEDURE — 83690 ASSAY OF LIPASE: CPT | Performed by: EMERGENCY MEDICINE

## 2025-02-21 PROCEDURE — 99283 EMERGENCY DEPT VISIT LOW MDM: CPT

## 2025-02-21 PROCEDURE — 36415 COLL VENOUS BLD VENIPUNCTURE: CPT | Performed by: EMERGENCY MEDICINE

## 2025-02-21 RX ORDER — LANOLIN ALCOHOL/MO/W.PET/CERES
400 CREAM (GRAM) TOPICAL ONCE
Status: COMPLETED | OUTPATIENT
Start: 2025-02-21 | End: 2025-02-21

## 2025-02-21 RX ADMIN — Medication 400 MG: at 18:45
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
## 2025-02-21 NOTE — PROGRESS NOTES
Email received from High Utilizer Committee on 2/20/25.  Committee reviewed and determined a care plan is not appropriate at this time.   ED utilization is R/T SDOH/MOE/Psych disorders.  Case was referred to [facility] committee and new care plan written.   Patient is referred to OP SW CM for Socially complex care management as a Rising Utilizer.  OPCM Watch List database updated. IB Message sent to R. Reyes and ORALIA Caldera for case communication and care coordination.    52.6

## 2025-02-23 ENCOUNTER — HOSPITAL ENCOUNTER (EMERGENCY)
Facility: HOSPITAL | Age: 40
Discharge: HOME | End: 2025-02-23
Attending: EMERGENCY MEDICINE
Payer: COMMERCIAL

## 2025-02-23 ENCOUNTER — HOSPITAL ENCOUNTER (EMERGENCY)
Facility: HOSPITAL | Age: 40
Discharge: HOME | End: 2025-02-23
Payer: COMMERCIAL

## 2025-02-23 VITALS
OXYGEN SATURATION: 97 % | BODY MASS INDEX: 25.56 KG/M2 | DIASTOLIC BLOOD PRESSURE: 72 MMHG | RESPIRATION RATE: 16 BRPM | HEART RATE: 98 BPM | SYSTOLIC BLOOD PRESSURE: 126 MMHG | WEIGHT: 139.77 LBS | TEMPERATURE: 97.6 F

## 2025-02-23 VITALS
DIASTOLIC BLOOD PRESSURE: 74 MMHG | SYSTOLIC BLOOD PRESSURE: 130 MMHG | OXYGEN SATURATION: 98 % | TEMPERATURE: 98 F | RESPIRATION RATE: 16 BRPM | HEART RATE: 107 BPM

## 2025-02-23 DIAGNOSIS — Z65.8 DOMESTIC CONCERNS: Primary | ICD-10-CM
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
## 2025-02-23 PROCEDURE — 99283 EMERGENCY DEPT VISIT LOW MDM: CPT

## 2025-02-23 PROCEDURE — 99284 EMERGENCY DEPT VISIT MOD MDM: CPT

## 2025-02-23 PROCEDURE — 99283 EMERGENCY DEPT VISIT LOW MDM: CPT | Performed by: EMERGENCY MEDICINE
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
## 2025-02-24 ENCOUNTER — TELEPHONE (OUTPATIENT)
Dept: PSYCHIATRY | Facility: CLINIC | Age: 40
End: 2025-02-24

## 2025-02-24 ENCOUNTER — APPOINTMENT (EMERGENCY)
Dept: CT IMAGING | Facility: HOSPITAL | Age: 40
End: 2025-02-24
Payer: COMMERCIAL

## 2025-02-24 ENCOUNTER — PATIENT OUTREACH (OUTPATIENT)
Dept: FAMILY MEDICINE CLINIC | Facility: CLINIC | Age: 40
End: 2025-02-24

## 2025-02-24 ENCOUNTER — HOSPITAL ENCOUNTER (INPATIENT)
Facility: HOSPITAL | Age: 40
LOS: 11 days | Discharge: HOME | End: 2025-03-07
Attending: EMERGENCY MEDICINE | Admitting: PSYCHIATRY & NEUROLOGY
Payer: COMMERCIAL

## 2025-02-24 DIAGNOSIS — J45.20 INTERMITTENT ASTHMA WITHOUT COMPLICATION, UNSPECIFIED ASTHMA SEVERITY: ICD-10-CM

## 2025-02-24 DIAGNOSIS — Z00.8 MEDICAL CLEARANCE FOR PSYCHIATRIC ADMISSION: ICD-10-CM

## 2025-02-24 DIAGNOSIS — R45.851 SUICIDAL IDEATION: ICD-10-CM

## 2025-02-24 DIAGNOSIS — K58.2 IRRITABLE BOWEL SYNDROME WITH BOTH CONSTIPATION AND DIARRHEA: ICD-10-CM

## 2025-02-24 DIAGNOSIS — R10.84 GENERALIZED ABDOMINAL PAIN: ICD-10-CM

## 2025-02-24 DIAGNOSIS — F25.0 SCHIZOAFFECTIVE DISORDER, BIPOLAR TYPE (HCC): Primary | ICD-10-CM

## 2025-02-24 DIAGNOSIS — F31.2 BIPOLAR I DISORDER, MOST RECENT EPISODE MANIC, SEVERE WITH PSYCHOTIC FEATURES (HCC): Chronic | ICD-10-CM

## 2025-02-24 DIAGNOSIS — K21.9 GASTROESOPHAGEAL REFLUX DISEASE WITHOUT ESOPHAGITIS: ICD-10-CM

## 2025-02-24 LAB
ALBUMIN SERPL BCG-MCNC: 4.7 G/DL (ref 3.5–5)
ALP SERPL-CCNC: 64 U/L (ref 34–104)
ALT SERPL W P-5'-P-CCNC: 11 U/L (ref 7–52)
AMPHETAMINES SERPL QL SCN: NEGATIVE
ANION GAP SERPL CALCULATED.3IONS-SCNC: 11 MMOL/L (ref 4–13)
AST SERPL W P-5'-P-CCNC: 16 U/L (ref 13–39)
ATRIAL RATE: 108 BPM
BARBITURATES UR QL: NEGATIVE
BASOPHILS # BLD AUTO: 0.03 THOUSANDS/ÂΜL (ref 0–0.1)
BASOPHILS NFR BLD AUTO: 0 % (ref 0–1)
BENZODIAZ UR QL: NEGATIVE
BILIRUB DIRECT SERPL-MCNC: 0.06 MG/DL (ref 0–0.2)
BILIRUB SERPL-MCNC: 0.45 MG/DL (ref 0.2–1)
BUN SERPL-MCNC: 10 MG/DL (ref 5–25)
CALCIUM SERPL-MCNC: 9.7 MG/DL (ref 8.4–10.2)
CARDIAC TROPONIN I PNL SERPL HS: <2 NG/L (ref ?–50)
CHLORIDE SERPL-SCNC: 103 MMOL/L (ref 96–108)
CO2 SERPL-SCNC: 24 MMOL/L (ref 21–32)
COCAINE UR QL: NEGATIVE
CREAT SERPL-MCNC: 0.77 MG/DL (ref 0.6–1.3)
EOSINOPHIL # BLD AUTO: 0.04 THOUSAND/ÂΜL (ref 0–0.61)
EOSINOPHIL NFR BLD AUTO: 1 % (ref 0–6)
ERYTHROCYTE [DISTWIDTH] IN BLOOD BY AUTOMATED COUNT: 12.2 % (ref 11.6–15.1)
ETHANOL EXG-MCNC: 0 MG/DL
EXT PREGNANCY TEST URINE: NEGATIVE
EXT. CONTROL: NORMAL
FENTANYL UR QL SCN: NEGATIVE
FLUAV AG UPPER RESP QL IA.RAPID: NEGATIVE
FLUBV AG UPPER RESP QL IA.RAPID: NEGATIVE
GFR SERPL CREATININE-BSD FRML MDRD: 98 ML/MIN/1.73SQ M
GLUCOSE SERPL-MCNC: 108 MG/DL (ref 65–140)
GLUCOSE SERPL-MCNC: 110 MG/DL (ref 65–140)
HCT VFR BLD AUTO: 43 % (ref 34.8–46.1)
HGB BLD-MCNC: 14.6 G/DL (ref 11.5–15.4)
HYDROCODONE UR QL SCN: NEGATIVE
IMM GRANULOCYTES # BLD AUTO: 0.04 THOUSAND/UL (ref 0–0.2)
IMM GRANULOCYTES NFR BLD AUTO: 1 % (ref 0–2)
LYMPHOCYTES # BLD AUTO: 2.23 THOUSANDS/ÂΜL (ref 0.6–4.47)
LYMPHOCYTES NFR BLD AUTO: 27 % (ref 14–44)
MAGNESIUM SERPL-MCNC: 2.1 MG/DL (ref 1.9–2.7)
MCH RBC QN AUTO: 32 PG (ref 26.8–34.3)
MCHC RBC AUTO-ENTMCNC: 34 G/DL (ref 31.4–37.4)
MCV RBC AUTO: 94 FL (ref 82–98)
METHADONE UR QL: NEGATIVE
MONOCYTES # BLD AUTO: 0.87 THOUSAND/ÂΜL (ref 0.17–1.22)
MONOCYTES NFR BLD AUTO: 10 % (ref 4–12)
NEUTROPHILS # BLD AUTO: 5.17 THOUSANDS/ÂΜL (ref 1.85–7.62)
NEUTS SEG NFR BLD AUTO: 61 % (ref 43–75)
NRBC BLD AUTO-RTO: 0 /100 WBCS
OPIATES UR QL SCN: NEGATIVE
OXYCODONE+OXYMORPHONE UR QL SCN: NEGATIVE
P AXIS: 68 DEGREES
PCP UR QL: NEGATIVE
PLATELET # BLD AUTO: 233 THOUSANDS/UL (ref 149–390)
PMV BLD AUTO: 9.2 FL (ref 8.9–12.7)
POTASSIUM SERPL-SCNC: 3.9 MMOL/L (ref 3.5–5.3)
PR INTERVAL: 138 MS
PROT SERPL-MCNC: 7.3 G/DL (ref 6.4–8.4)
QRS AXIS: 83 DEGREES
QRSD INTERVAL: 80 MS
QT INTERVAL: 336 MS
QTC INTERVAL: 451 MS
RBC # BLD AUTO: 4.56 MILLION/UL (ref 3.81–5.12)
SARS-COV+SARS-COV-2 AG RESP QL IA.RAPID: NEGATIVE
SODIUM SERPL-SCNC: 138 MMOL/L (ref 135–147)
T WAVE AXIS: 28 DEGREES
THC UR QL: NEGATIVE
VENTRICULAR RATE: 108 BPM
WBC # BLD AUTO: 8.38 THOUSAND/UL (ref 4.31–10.16)

## 2025-02-24 PROCEDURE — 83735 ASSAY OF MAGNESIUM: CPT | Performed by: EMERGENCY MEDICINE

## 2025-02-24 PROCEDURE — 80076 HEPATIC FUNCTION PANEL: CPT | Performed by: EMERGENCY MEDICINE

## 2025-02-24 PROCEDURE — 36415 COLL VENOUS BLD VENIPUNCTURE: CPT | Performed by: EMERGENCY MEDICINE

## 2025-02-24 PROCEDURE — 72125 CT NECK SPINE W/O DYE: CPT

## 2025-02-24 PROCEDURE — 82948 REAGENT STRIP/BLOOD GLUCOSE: CPT

## 2025-02-24 PROCEDURE — 85025 COMPLETE CBC W/AUTO DIFF WBC: CPT | Performed by: EMERGENCY MEDICINE

## 2025-02-24 PROCEDURE — 80307 DRUG TEST PRSMV CHEM ANLYZR: CPT | Performed by: EMERGENCY MEDICINE

## 2025-02-24 PROCEDURE — 80048 BASIC METABOLIC PNL TOTAL CA: CPT | Performed by: EMERGENCY MEDICINE

## 2025-02-24 PROCEDURE — 99285 EMERGENCY DEPT VISIT HI MDM: CPT

## 2025-02-24 PROCEDURE — 93010 ELECTROCARDIOGRAM REPORT: CPT | Performed by: INTERNAL MEDICINE

## 2025-02-24 PROCEDURE — 93005 ELECTROCARDIOGRAM TRACING: CPT

## 2025-02-24 PROCEDURE — 87811 SARS-COV-2 COVID19 W/OPTIC: CPT | Performed by: EMERGENCY MEDICINE

## 2025-02-24 PROCEDURE — 96360 HYDRATION IV INFUSION INIT: CPT

## 2025-02-24 PROCEDURE — 70450 CT HEAD/BRAIN W/O DYE: CPT

## 2025-02-24 PROCEDURE — 99291 CRITICAL CARE FIRST HOUR: CPT | Performed by: EMERGENCY MEDICINE

## 2025-02-24 PROCEDURE — 96372 THER/PROPH/DIAG INJ SC/IM: CPT

## 2025-02-24 PROCEDURE — 84484 ASSAY OF TROPONIN QUANT: CPT | Performed by: EMERGENCY MEDICINE

## 2025-02-24 PROCEDURE — 96361 HYDRATE IV INFUSION ADD-ON: CPT

## 2025-02-24 PROCEDURE — 82075 ASSAY OF BREATH ETHANOL: CPT | Performed by: EMERGENCY MEDICINE

## 2025-02-24 PROCEDURE — 81025 URINE PREGNANCY TEST: CPT | Performed by: EMERGENCY MEDICINE

## 2025-02-24 PROCEDURE — 87804 INFLUENZA ASSAY W/OPTIC: CPT | Performed by: EMERGENCY MEDICINE

## 2025-02-24 RX ORDER — HALOPERIDOL 5 MG/ML
5 INJECTION INTRAMUSCULAR ONCE
Status: COMPLETED | OUTPATIENT
Start: 2025-02-24 | End: 2025-02-24

## 2025-02-24 RX ORDER — HALOPERIDOL 1 MG/1
1 TABLET ORAL EVERY 6 HOURS PRN
Status: DISCONTINUED | OUTPATIENT
Start: 2025-02-24 | End: 2025-03-07 | Stop reason: HOSPADM

## 2025-02-24 RX ORDER — ACETAMINOPHEN 325 MG/1
650 TABLET ORAL EVERY 4 HOURS PRN
Status: DISCONTINUED | OUTPATIENT
Start: 2025-02-24 | End: 2025-03-07 | Stop reason: HOSPADM

## 2025-02-24 RX ORDER — ACETAMINOPHEN 325 MG/1
650 TABLET ORAL EVERY 6 HOURS PRN
Status: DISCONTINUED | OUTPATIENT
Start: 2025-02-24 | End: 2025-03-07 | Stop reason: HOSPADM

## 2025-02-24 RX ORDER — AMOXICILLIN 250 MG
1 CAPSULE ORAL DAILY PRN
Status: DISCONTINUED | OUTPATIENT
Start: 2025-02-24 | End: 2025-03-07 | Stop reason: HOSPADM

## 2025-02-24 RX ORDER — LORAZEPAM 2 MG/ML
2 INJECTION INTRAMUSCULAR
Status: DISCONTINUED | OUTPATIENT
Start: 2025-02-24 | End: 2025-03-07 | Stop reason: HOSPADM

## 2025-02-24 RX ORDER — LORAZEPAM 2 MG/ML
2 INJECTION INTRAMUSCULAR ONCE
Status: COMPLETED | OUTPATIENT
Start: 2025-02-24 | End: 2025-02-24

## 2025-02-24 RX ORDER — HYDROXYZINE HYDROCHLORIDE 25 MG/1
25 TABLET, FILM COATED ORAL
Status: DISCONTINUED | OUTPATIENT
Start: 2025-02-24 | End: 2025-03-07 | Stop reason: HOSPADM

## 2025-02-24 RX ORDER — BENZTROPINE MESYLATE 1 MG/ML
1 INJECTION, SOLUTION INTRAMUSCULAR; INTRAVENOUS
Status: DISCONTINUED | OUTPATIENT
Start: 2025-02-24 | End: 2025-03-07 | Stop reason: HOSPADM

## 2025-02-24 RX ORDER — POLYETHYLENE GLYCOL 3350 17 G/17G
17 POWDER, FOR SOLUTION ORAL DAILY PRN
Status: DISCONTINUED | OUTPATIENT
Start: 2025-02-24 | End: 2025-02-26

## 2025-02-24 RX ORDER — BENZTROPINE MESYLATE 1 MG/1
1 TABLET ORAL
Status: DISCONTINUED | OUTPATIENT
Start: 2025-02-24 | End: 2025-03-07 | Stop reason: HOSPADM

## 2025-02-24 RX ORDER — PROPRANOLOL HYDROCHLORIDE 10 MG/1
10 TABLET ORAL EVERY 8 HOURS PRN
Status: DISCONTINUED | OUTPATIENT
Start: 2025-02-24 | End: 2025-03-07 | Stop reason: HOSPADM

## 2025-02-24 RX ORDER — HYDROXYZINE HYDROCHLORIDE 50 MG/1
50 TABLET, FILM COATED ORAL
Status: DISCONTINUED | OUTPATIENT
Start: 2025-02-24 | End: 2025-03-07 | Stop reason: HOSPADM

## 2025-02-24 RX ORDER — LORAZEPAM 2 MG/ML
2 INJECTION INTRAMUSCULAR EVERY 6 HOURS PRN
Status: DISCONTINUED | OUTPATIENT
Start: 2025-02-24 | End: 2025-03-07 | Stop reason: HOSPADM

## 2025-02-24 RX ORDER — HYDROXYZINE HYDROCHLORIDE 50 MG/1
100 TABLET, FILM COATED ORAL
Status: DISCONTINUED | OUTPATIENT
Start: 2025-02-24 | End: 2025-03-07 | Stop reason: HOSPADM

## 2025-02-24 RX ORDER — HALOPERIDOL 5 MG/1
5 TABLET ORAL
Status: DISCONTINUED | OUTPATIENT
Start: 2025-02-24 | End: 2025-03-07 | Stop reason: HOSPADM

## 2025-02-24 RX ORDER — ACETAMINOPHEN 325 MG/1
975 TABLET ORAL EVERY 6 HOURS PRN
Status: DISCONTINUED | OUTPATIENT
Start: 2025-02-24 | End: 2025-03-07 | Stop reason: HOSPADM

## 2025-02-24 RX ORDER — BENZTROPINE MESYLATE 1 MG/ML
0.5 INJECTION, SOLUTION INTRAMUSCULAR; INTRAVENOUS
Status: DISCONTINUED | OUTPATIENT
Start: 2025-02-24 | End: 2025-03-07 | Stop reason: HOSPADM

## 2025-02-24 RX ORDER — LORAZEPAM 2 MG/ML
1 INJECTION INTRAMUSCULAR
Status: DISCONTINUED | OUTPATIENT
Start: 2025-02-24 | End: 2025-03-07 | Stop reason: HOSPADM

## 2025-02-24 RX ORDER — BISACODYL 10 MG
10 SUPPOSITORY, RECTAL RECTAL DAILY PRN
Status: DISCONTINUED | OUTPATIENT
Start: 2025-02-24 | End: 2025-02-26

## 2025-02-24 RX ORDER — MAGNESIUM HYDROXIDE/ALUMINUM HYDROXICE/SIMETHICONE 120; 1200; 1200 MG/30ML; MG/30ML; MG/30ML
30 SUSPENSION ORAL EVERY 4 HOURS PRN
Status: DISCONTINUED | OUTPATIENT
Start: 2025-02-24 | End: 2025-03-07 | Stop reason: HOSPADM

## 2025-02-24 RX ORDER — DIPHENHYDRAMINE HYDROCHLORIDE 50 MG/ML
50 INJECTION INTRAMUSCULAR; INTRAVENOUS EVERY 6 HOURS PRN
Status: DISCONTINUED | OUTPATIENT
Start: 2025-02-24 | End: 2025-03-07 | Stop reason: HOSPADM

## 2025-02-24 RX ORDER — TRAZODONE HYDROCHLORIDE 50 MG/1
50 TABLET ORAL
Status: DISCONTINUED | OUTPATIENT
Start: 2025-02-24 | End: 2025-03-07 | Stop reason: HOSPADM

## 2025-02-24 RX ORDER — HALOPERIDOL 5 MG/ML
2.5 INJECTION INTRAMUSCULAR
Status: DISCONTINUED | OUTPATIENT
Start: 2025-02-24 | End: 2025-03-07 | Stop reason: HOSPADM

## 2025-02-24 RX ORDER — HALOPERIDOL 5 MG/ML
5 INJECTION INTRAMUSCULAR
Status: DISCONTINUED | OUTPATIENT
Start: 2025-02-24 | End: 2025-03-07 | Stop reason: HOSPADM

## 2025-02-24 RX ADMIN — LORAZEPAM 2 MG: 2 INJECTION INTRAMUSCULAR; INTRAVENOUS at 13:45

## 2025-02-24 RX ADMIN — HALOPERIDOL LACTATE 5 MG: 5 INJECTION, SOLUTION INTRAMUSCULAR at 12:08

## 2025-02-24 RX ADMIN — HALOPERIDOL LACTATE 5 MG: 5 INJECTION, SOLUTION INTRAMUSCULAR at 13:45

## 2025-02-24 RX ADMIN — SODIUM CHLORIDE 1000 ML: 0.9 INJECTION, SOLUTION INTRAVENOUS at 14:11
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
## 2025-02-24 NOTE — TELEPHONE ENCOUNTER
Psych consult called into [facility] manually at 1250. When physician attempted to start consult they were informed pt was at CT. Pt out of CT but per RN too sedated to participate currently. Advised her that we will remove consult from [facility] queue and it can be called back in once pt is able to participate.

## 2025-02-24 NOTE — ED NOTES
Insurance Authorization for admission:   Phone call placed to Travon Jeffrey   Phone number: [phone]    Spoke to Patience .     3 days approved.  Level of care: inpatient mental health   Review on **.   Authorization # **.    To be given on admission
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
## 2025-02-25 ENCOUNTER — PATIENT OUTREACH (OUTPATIENT)
Dept: FAMILY MEDICINE CLINIC | Facility: CLINIC | Age: 40
End: 2025-02-25

## 2025-02-25 PROBLEM — F31.9 BIPOLAR 1 DISORDER (HCC): Status: ACTIVE | Noted: 2025-02-25

## 2025-02-25 PROCEDURE — 99223 1ST HOSP IP/OBS HIGH 75: CPT | Performed by: PSYCHIATRY & NEUROLOGY

## 2025-02-25 RX ORDER — DICYCLOMINE HCL 20 MG
20 TABLET ORAL 2 TIMES DAILY
Status: DISCONTINUED | OUTPATIENT
Start: 2025-02-25 | End: 2025-03-07 | Stop reason: HOSPADM

## 2025-02-25 RX ORDER — FAMOTIDINE 20 MG/1
20 TABLET, FILM COATED ORAL 2 TIMES DAILY PRN
Status: DISCONTINUED | OUTPATIENT
Start: 2025-02-25 | End: 2025-03-07 | Stop reason: HOSPADM

## 2025-02-25 RX ORDER — ONDANSETRON 4 MG/1
4 TABLET, ORALLY DISINTEGRATING ORAL EVERY 6 HOURS PRN
Status: DISCONTINUED | OUTPATIENT
Start: 2025-02-25 | End: 2025-03-07 | Stop reason: HOSPADM

## 2025-02-25 RX ORDER — FAMOTIDINE 20 MG/1
20 TABLET, FILM COATED ORAL 2 TIMES DAILY
Status: DISCONTINUED | OUTPATIENT
Start: 2025-02-25 | End: 2025-03-07 | Stop reason: HOSPADM

## 2025-02-25 RX ORDER — HYDROXYZINE PAMOATE 50 MG/1
50 CAPSULE ORAL 3 TIMES DAILY PRN
COMMUNITY
End: 2025-03-07

## 2025-02-25 RX ORDER — BENZTROPINE MESYLATE 1 MG/1
1 TABLET ORAL
Status: ON HOLD | COMMUNITY
End: 2025-02-25

## 2025-02-25 RX ORDER — BUDESONIDE AND FORMOTEROL FUMARATE DIHYDRATE 80; 4.5 UG/1; UG/1
2 AEROSOL RESPIRATORY (INHALATION) 2 TIMES DAILY
Status: DISCONTINUED | OUTPATIENT
Start: 2025-02-25 | End: 2025-03-07 | Stop reason: HOSPADM

## 2025-02-25 RX ORDER — OXCARBAZEPINE 300 MG/1
300 TABLET, FILM COATED ORAL EVERY 12 HOURS SCHEDULED
Status: DISCONTINUED | OUTPATIENT
Start: 2025-02-25 | End: 2025-03-07 | Stop reason: HOSPADM

## 2025-02-25 RX ORDER — DICYCLOMINE HCL 20 MG
20 TABLET ORAL 2 TIMES DAILY PRN
Status: DISCONTINUED | OUTPATIENT
Start: 2025-02-25 | End: 2025-03-07 | Stop reason: HOSPADM

## 2025-02-25 RX ORDER — BUDESONIDE AND FORMOTEROL FUMARATE DIHYDRATE 80; 4.5 UG/1; UG/1
2 AEROSOL RESPIRATORY (INHALATION) 2 TIMES DAILY
COMMUNITY
Start: 2024-12-17 | End: 2025-03-07

## 2025-02-25 RX ORDER — PANTOPRAZOLE SODIUM 40 MG/1
40 TABLET, DELAYED RELEASE ORAL DAILY
COMMUNITY
End: 2025-03-07

## 2025-02-25 RX ORDER — DOCUSATE SODIUM 100 MG/1
100 CAPSULE, LIQUID FILLED ORAL 2 TIMES DAILY
Status: DISCONTINUED | OUTPATIENT
Start: 2025-02-25 | End: 2025-03-07 | Stop reason: HOSPADM

## 2025-02-25 RX ORDER — PANTOPRAZOLE SODIUM 40 MG/1
40 TABLET, DELAYED RELEASE ORAL
Status: DISCONTINUED | OUTPATIENT
Start: 2025-02-25 | End: 2025-03-07 | Stop reason: HOSPADM

## 2025-02-25 RX ADMIN — OXCARBAZEPINE 300 MG: 300 TABLET, FILM COATED ORAL at 21:58

## 2025-02-25 RX ADMIN — PANTOPRAZOLE SODIUM 40 MG: 40 TABLET, DELAYED RELEASE ORAL at 09:48

## 2025-02-25 RX ADMIN — ONDANSETRON 4 MG: 4 TABLET, ORALLY DISINTEGRATING ORAL at 09:48

## 2025-02-25 RX ADMIN — FAMOTIDINE 20 MG: 20 TABLET, FILM COATED ORAL at 17:02

## 2025-02-25 RX ADMIN — FAMOTIDINE 20 MG: 20 TABLET, FILM COATED ORAL at 09:48

## 2025-02-25 RX ADMIN — DICYCLOMINE HYDROCHLORIDE 20 MG: 20 TABLET ORAL at 22:22

## 2025-02-25 RX ADMIN — DICYCLOMINE HYDROCHLORIDE 20 MG: 20 TABLET ORAL at 09:48

## 2025-02-25 RX ADMIN — OXCARBAZEPINE 300 MG: 300 TABLET, FILM COATED ORAL at 08:11

## 2025-02-25 RX ADMIN — DICYCLOMINE HYDROCHLORIDE 20 MG: 20 TABLET ORAL at 17:02

## 2025-02-25 RX ADMIN — DOCUSATE SODIUM 100 MG: 100 CAPSULE, LIQUID FILLED ORAL at 09:48

## 2025-02-25 RX ADMIN — OLANZAPINE 15 MG: 5 TABLET, FILM COATED ORAL at 21:58

## 2025-02-25 RX ADMIN — DOCUSATE SODIUM 100 MG: 100 CAPSULE, LIQUID FILLED ORAL at 17:02
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
## 2025-02-25 NOTE — ED NOTES
Pt woke up asking for food. Pt offered safety dinner tray but declined. Pt given turkey sandwich and cola per her request. Pt drank her soda and laid back down to sleep. Virtual monitoring and q15 checks continue for pt safety.      Fidelina Gaming RN  02/24/25 2034

## 2025-02-25 NOTE — PLAN OF CARE
Problem: SELF HARM/SUICIDALITY  Goal: Will have no self-injury during hospital stay  Description: INTERVENTIONS:  - Q 15 MINUTES: Routine safety checks  - Q WAKING SHIFT & PRN: Assess risk to determine if routine checks are adequate to maintain patient safety  - Encourage patient to participate actively in care by formulating a plan to combat response to suicidal ideation, identify supports and resources  Outcome: Not Progressing     Problem: PSYCHOSIS  Goal: Will report no hallucinations or delusions  Description: Interventions:  - Administer medication as  ordered  - Every waking shifts and PRN assess for the presence of hallucinations and or delusions  - Assist with reality testing to support increasing orientation  - Assess if patient's hallucinations or delusions are encouraging self-harm or harm to others and intervene as appropriate  Outcome: Not Progressing     Problem: BEHAVIOR  Goal: Pt/Family maintain appropriate behavior and adhere to behavioral management agreement, if implemented  Description: INTERVENTIONS:  - Assess the family dynamic   - Encourage verbalization of thoughts and concerns in a socially appropriate manner  - Assess patient/family's coping skills and non-compliant behavior (including use of illegal substances).  - Utilize positive, consistent limit setting strategies supporting safety of patient, staff and others  - Initiate consult with Case Management, Spiritual Care or other ancillary services as appropriate  - If a patient's/visitor's behavior jeopardizes the safety of the patient, staff, or others, refer to organization procedure.   - Notify Security of behavior or suspected illegal substances which indicate the need for search of the patient and/or belongings  - Encourage participation in the decision making process about a behavioral management agreement; implement if patient meets criteria  Outcome: Not Progressing     Problem: Ineffective Coping  Goal: Cooperates with  admission process  Description: Interventions:   - Complete admission process  Outcome: Not Progressing  Goal: Participates in unit activities  Description: Interventions:  - Provide therapeutic environment   - Provide required programming   - Redirect inappropriate behaviors   Outcome: Not Progressing

## 2025-02-25 NOTE — CMS CERTIFICATION NOTE
Recertification: Based upon physical, mental and social evaluations, I certify that inpatient psychiatric services continue to be medically necessary for this patient for a duration of greater than 2 midnights for the treatment of  Schizoaffective disorder, bipolar type (HCC) Available alternative community resources still do not meet the patient's mental health care needs. I further attest that an established written individualized plan of care has been updated and is outlined in the patient's medical records.
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
## 2025-02-25 NOTE — PROGRESS NOTES
OP SW had received an ADT alert. Per chart, patient admitted into inpatient BH unit. Per chart, [facility] Crisis petition for a 302. Per chart, patient accepted into inpatient BH unit. OP SW will be closing this referral at this time. OP SW notes patient can be referred again once discharge. Please reconsult as needed.

## 2025-02-25 NOTE — QUICK NOTE
Patient receives care from [facility] - will change the medical clearance consult to their practice and notify the covering provider. Please see Family Medicine consult note for medical clearance to [facility].

## 2025-02-25 NOTE — PLAN OF CARE
Problem: SELF HARM/SUICIDALITY  Goal: Will have no self-injury during hospital stay  Description: INTERVENTIONS:  - Q 15 MINUTES: Routine safety checks  - Q WAKING SHIFT & PRN: Assess risk to determine if routine checks are adequate to maintain patient safety  - Encourage patient to participate actively in care by formulating a plan to combat response to suicidal ideation, identify supports and resources  Outcome: Progressing     Problem: PSYCHOSIS  Goal: Will report no hallucinations or delusions  Description: Interventions:  - Administer medication as  ordered  - Every waking shifts and PRN assess for the presence of hallucinations and or delusions  - Assist with reality testing to support increasing orientation  - Assess if patient's hallucinations or delusions are encouraging self-harm or harm to others and intervene as appropriate  Outcome: Progressing     Problem: BEHAVIOR  Goal: Pt/Family maintain appropriate behavior and adhere to behavioral management agreement, if implemented  Description: INTERVENTIONS:  - Assess the family dynamic   - Encourage verbalization of thoughts and concerns in a socially appropriate manner  - Assess patient/family's coping skills and non-compliant behavior (including use of illegal substances).  - Utilize positive, consistent limit setting strategies supporting safety of patient, staff and others  - Initiate consult with Case Management, Spiritual Care or other ancillary services as appropriate  - If a patient's/visitor's behavior jeopardizes the safety of the patient, staff, or others, refer to organization procedure.   - Notify Security of behavior or suspected illegal substances which indicate the need for search of the patient and/or belongings  - Encourage participation in the decision making process about a behavioral management agreement; implement if patient meets criteria  Outcome: Progressing     Problem: Ineffective Coping  Goal: Cooperates with admission  process  Description: Interventions:   - Complete admission process  Outcome: Progressing

## 2025-02-25 NOTE — ED NOTES
Patient is accepted at [facility]  Patient is accepted by Dr. SAM Hodge ( Intake)       Patient may go to the floor at 2205        Nurse report is to be called to [phone] prior to patient transfer.    Yobani Gray  Crisis Intervention Specialist II

## 2025-02-25 NOTE — NURSING NOTE
Pharmacy currently closed. On-call attending, Dr. Jaramillo made aware, via rover. No new orders at this time

## 2025-02-25 NOTE — TREATMENT PLAN
TREATMENT PLAN REVIEW - Behavioral Health Antionette Downing 39 y.o. 1985 female MRN: [id number]    [facility] 3B BEHAVIORAL [facility] Room / Bed: [facility] 344/[facility] 344-01 Encounter: [id number]          Admit Date/Time:  2/24/2025 10:32 AM    Treatment Team:   MD Antionette Montelongo LPN Sean Washburn Kristina Merced Jennifer King Karissa Marie Kormandy, CRNP Mame Saye Cisse, MD    Diagnosis: Principal Problem:    Schizoaffective disorder, bipolar type (HCC) r/o bipolar with psychotic features      Patient Strengths/Assets: family ties, negotiates basic needs    Patient Barriers/Limitations: chronic mental illness, difficulty adapting, noncompliant with treatment, patient is on an involuntary commitment, poor insight    Short Term Goals: decrease in paranoid thoughts, decrease in psychotic symptoms    Long Term Goals: stabilization of mood, resolution of psychotic symptoms    Progress Towards Goals: starting psychiatric medications as prescribed    Recommended Treatment: medication management, patient medication education, group therapy, milieu therapy, continued Behavioral Health psychiatric evaluation/assessment process    Treatment Frequency: daily medication monitoring, group and milieu therapy daily, monitoring through interdisciplinary rounds, monitoring through weekly patient care conferences    Expected Discharge Date:  Greater than 2 midnights    Discharge Plan: referrals as indicated, return to previous living arrangement    Treatment Plan Created/Updated By: Tristan Denson MD

## 2025-02-25 NOTE — NURSING NOTE
"Pt denies SI/HI/AH/VH. Isolative to room and self. Medication compliant. Refused Breakfast due to nausea. Pt reports vomiting x2 and requested bentyl. Medical notified. 0948 PRN Zofran 4 mg PO given. Attempted to recheck VS and pt stated \" no, I'm busy\". Pt refusing to complete lab work. 1048 pt was observed sleeping while attempting to reassess Zofran. No s/s of discomfort observed. Pt awoke and has irritable edge> pt upset about lunch stating \" I didn't order this, this is something my  would order. I don't want it\". Pt ate small amount. No other concerns as of present. Plan of care ongoing.  "

## 2025-02-26 LAB
BACTERIA UR QL AUTO: ABNORMAL /HPF
BILIRUB UR QL STRIP: NEGATIVE
CLARITY UR: ABNORMAL
COLOR UR: ABNORMAL
GLUCOSE UR STRIP-MCNC: NEGATIVE MG/DL
HGB UR QL STRIP.AUTO: 10
KETONES UR STRIP-MCNC: NEGATIVE MG/DL
LEUKOCYTE ESTERASE UR QL STRIP: 25
MUCOUS THREADS UR QL AUTO: ABNORMAL
NITRITE UR QL STRIP: NEGATIVE
NON-SQ EPI CELLS URNS QL MICRO: ABNORMAL /HPF
PH UR STRIP.AUTO: 6 [PH]
PROT UR STRIP-MCNC: ABNORMAL MG/DL
RBC #/AREA URNS AUTO: ABNORMAL /HPF
SP GR UR STRIP.AUTO: 1.02 (ref 1–1.04)
UROBILINOGEN UA: NEGATIVE MG/DL
WBC #/AREA URNS AUTO: ABNORMAL /HPF

## 2025-02-26 PROCEDURE — 99232 SBSQ HOSP IP/OBS MODERATE 35: CPT | Performed by: PSYCHIATRY & NEUROLOGY

## 2025-02-26 PROCEDURE — 81001 URINALYSIS AUTO W/SCOPE: CPT | Performed by: PSYCHIATRY & NEUROLOGY

## 2025-02-26 PROCEDURE — NC001 PR NO CHARGE: Performed by: FAMILY MEDICINE

## 2025-02-26 RX ORDER — POLYETHYLENE GLYCOL 3350 17 G/17G
17 POWDER, FOR SOLUTION ORAL DAILY PRN
Status: DISCONTINUED | OUTPATIENT
Start: 2025-02-26 | End: 2025-03-07 | Stop reason: HOSPADM

## 2025-02-26 RX ORDER — BISACODYL 10 MG
10 SUPPOSITORY, RECTAL RECTAL DAILY PRN
Status: DISCONTINUED | OUTPATIENT
Start: 2025-02-26 | End: 2025-03-07 | Stop reason: HOSPADM

## 2025-02-26 RX ORDER — NICOTINE 21 MG/24HR
1 PATCH, TRANSDERMAL 24 HOURS TRANSDERMAL DAILY
Status: DISCONTINUED | OUTPATIENT
Start: 2025-02-26 | End: 2025-03-07 | Stop reason: HOSPADM

## 2025-02-26 RX ADMIN — DICYCLOMINE HYDROCHLORIDE 20 MG: 20 TABLET ORAL at 18:40

## 2025-02-26 RX ADMIN — DOCUSATE SODIUM 100 MG: 100 CAPSULE, LIQUID FILLED ORAL at 08:17

## 2025-02-26 RX ADMIN — ONDANSETRON 4 MG: 4 TABLET, ORALLY DISINTEGRATING ORAL at 17:42

## 2025-02-26 RX ADMIN — FAMOTIDINE 20 MG: 20 TABLET, FILM COATED ORAL at 18:39

## 2025-02-26 RX ADMIN — HYDROXYZINE HYDROCHLORIDE 25 MG: 25 TABLET, FILM COATED ORAL at 15:25

## 2025-02-26 RX ADMIN — DICYCLOMINE HYDROCHLORIDE 20 MG: 20 TABLET ORAL at 15:25

## 2025-02-26 RX ADMIN — HALOPERIDOL 5 MG: 5 TABLET ORAL at 19:13

## 2025-02-26 RX ADMIN — DOCUSATE SODIUM 100 MG: 100 CAPSULE, LIQUID FILLED ORAL at 18:40

## 2025-02-26 RX ADMIN — DICYCLOMINE HYDROCHLORIDE 20 MG: 20 TABLET ORAL at 08:16

## 2025-02-26 RX ADMIN — OXCARBAZEPINE 300 MG: 300 TABLET, FILM COATED ORAL at 08:16

## 2025-02-26 RX ADMIN — PANTOPRAZOLE SODIUM 40 MG: 40 TABLET, DELAYED RELEASE ORAL at 06:23

## 2025-02-26 RX ADMIN — NICOTINE 1 PATCH: 21 PATCH, EXTENDED RELEASE TRANSDERMAL at 15:50

## 2025-02-26 RX ADMIN — OLANZAPINE 15 MG: 5 TABLET, FILM COATED ORAL at 21:10

## 2025-02-26 RX ADMIN — OXCARBAZEPINE 300 MG: 300 TABLET, FILM COATED ORAL at 21:10

## 2025-02-26 RX ADMIN — FAMOTIDINE 20 MG: 20 TABLET, FILM COATED ORAL at 08:16

## 2025-02-26 RX ADMIN — PROPRANOLOL HYDROCHLORIDE 10 MG: 10 TABLET ORAL at 19:13

## 2025-02-26 NOTE — PROGRESS NOTES
02/26/25 1424   Team Meeting   Meeting Type Tx Team Meeting   Team Members Present   Team Members Present Physician;Nurse;   Physician Team Member preet   Nursing Team Member brenda   Care Management Team Member cate   Patient/Family Present   Patient Present Yes   Patient's Family Present No     Tx plan was reviewed and discussed with Pt. Pt was encouraged to attend groups. Medication was discussed with Pt. Pt refused to sign tx plan due to paranoia and suspicion.

## 2025-02-26 NOTE — NURSING NOTE
Pt denies SI/HI/AH/VH. Present in milieu. Pt is anxious/restless. C/o ongoing abdominal pain. Isolative to self. Pt preoccupied with getting phone to get numbers. Pt reported throwing up after dinner and showed staff vomit in a cup. 1742 PRN Zofran 4 mg PO given. 1842 no further c/o nausea. Pt later required PRN's for paranoia/delusions, and restlessness ( see note). Compliant with HS medication, refused inhaler. No further concerns as of present. Plan of care ongoing.
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
## 2025-02-26 NOTE — NURSING NOTE
Pt approaches nurses station and asked for PRN Bentyl and PRN for mild anxiety. 1525 PRN Bentyl 10 mg PO and Hydroxyzine 25 mg PO given. Pt appears anxious/tense. 1625 Pt reports that after taking Hydroxyzine she felt less anxious. Pt still reports abdominal discomfort. No other concerns at this time.

## 2025-02-26 NOTE — PROGRESS NOTES
02/26/25 0802   Team Meeting   Meeting Type Daily Rounds   Team Members Present   Team Members Present Physician;;Nurse   Physician Team Member Maldonado   Nursing Team Member [facility] Management Team Member Melodie   Patient/Family Present   Patient Present No   Patient's Family Present No     Pt refused blood work and vitals. Pt appears paranoid and denied all symptoms. Pt reported she does not want any vitals taken every day.

## 2025-02-26 NOTE — NURSING NOTE
"Pt refused AM vitals, stating \"I don't have to get them every day,\" and covered herself up with her blanket. Nurse notified. Patient educated on unit routine and unit expectations.   "

## 2025-02-26 NOTE — NURSING NOTE
Pt continues to refuse lab work. Isolative to room and self. Pt denies SI/HI/AH/VH. Compliant with PO medication, refused inhaler. Pt refused to eat Breakfast due to ongoing abdominal discomfort. Pt refused need for PRN at this time. Pt only ate small amount at lunch. Pt remains isolative to room and self this afternoon. No further concerns as of present. Plan of care ongoing.
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
## 2025-02-26 NOTE — NURSING NOTE
Pt withdrawn to room and bed this evening. Pt observed to be awake for most of evening, brief in responses but cooperative. Pt is not cooperative with vitals or lab work this evening despite education and encouragement. Pt appears paranoid about labs and UA. Pt denies SI/HI/AH/VH depression and anxiety, presents with mild disorganization and blunted, depressed affect. Pt endorses stomach pain, given PRN Bentyl 20mg at 22:22. Denies other unmet needs.

## 2025-02-27 LAB
25(OH)D3 SERPL-MCNC: 27.4 NG/ML (ref 30–100)
ALBUMIN SERPL BCG-MCNC: 4.2 G/DL (ref 3.5–5)
ALP SERPL-CCNC: 59 U/L (ref 34–104)
ALT SERPL W P-5'-P-CCNC: 7 U/L (ref 7–52)
ANION GAP SERPL CALCULATED.3IONS-SCNC: 11 MMOL/L (ref 4–13)
AST SERPL W P-5'-P-CCNC: 14 U/L (ref 13–39)
BASOPHILS # BLD AUTO: 0.05 THOUSANDS/ÂΜL (ref 0–0.1)
BASOPHILS NFR BLD AUTO: 1 % (ref 0–1)
BILIRUB SERPL-MCNC: 0.29 MG/DL (ref 0.2–1)
BUN SERPL-MCNC: 8 MG/DL (ref 5–25)
CALCIUM SERPL-MCNC: 9 MG/DL (ref 8.4–10.2)
CHLORIDE SERPL-SCNC: 102 MMOL/L (ref 96–108)
CHOLEST SERPL-MCNC: 163 MG/DL (ref ?–200)
CO2 SERPL-SCNC: 22 MMOL/L (ref 21–32)
CREAT SERPL-MCNC: 0.74 MG/DL (ref 0.6–1.3)
EOSINOPHIL # BLD AUTO: 0.21 THOUSAND/ÂΜL (ref 0–0.61)
EOSINOPHIL NFR BLD AUTO: 2 % (ref 0–6)
ERYTHROCYTE [DISTWIDTH] IN BLOOD BY AUTOMATED COUNT: 11.7 % (ref 11.6–15.1)
FOLATE SERPL-MCNC: 15 NG/ML
GFR SERPL CREATININE-BSD FRML MDRD: 103 ML/MIN/1.73SQ M
GLUCOSE SERPL-MCNC: 95 MG/DL (ref 65–140)
HCG SERPL QL: NEGATIVE
HCT VFR BLD AUTO: 39.2 % (ref 34.8–46.1)
HDLC SERPL-MCNC: 48 MG/DL
HGB BLD-MCNC: 13.6 G/DL (ref 11.5–15.4)
IMM GRANULOCYTES # BLD AUTO: 0.07 THOUSAND/UL (ref 0–0.2)
IMM GRANULOCYTES NFR BLD AUTO: 1 % (ref 0–2)
LDLC SERPL CALC-MCNC: 91 MG/DL (ref 0–100)
LYMPHOCYTES # BLD AUTO: 2.59 THOUSANDS/ÂΜL (ref 0.6–4.47)
LYMPHOCYTES NFR BLD AUTO: 26 % (ref 14–44)
MCH RBC QN AUTO: 31.9 PG (ref 26.8–34.3)
MCHC RBC AUTO-ENTMCNC: 34.7 G/DL (ref 31.4–37.4)
MCV RBC AUTO: 92 FL (ref 82–98)
MONOCYTES # BLD AUTO: 1.09 THOUSAND/ÂΜL (ref 0.17–1.22)
MONOCYTES NFR BLD AUTO: 11 % (ref 4–12)
NEUTROPHILS # BLD AUTO: 6.09 THOUSANDS/ÂΜL (ref 1.85–7.62)
NEUTS SEG NFR BLD AUTO: 59 % (ref 43–75)
NONHDLC SERPL-MCNC: 115 MG/DL
NRBC BLD AUTO-RTO: 0 /100 WBCS
PLATELET # BLD AUTO: 174 THOUSANDS/UL (ref 149–390)
PMV BLD AUTO: 10.6 FL (ref 8.9–12.7)
POTASSIUM SERPL-SCNC: 3.5 MMOL/L (ref 3.5–5.3)
PROT SERPL-MCNC: 6.9 G/DL (ref 6.4–8.4)
RBC # BLD AUTO: 4.26 MILLION/UL (ref 3.81–5.12)
SODIUM SERPL-SCNC: 135 MMOL/L (ref 135–147)
T4 FREE SERPL-MCNC: 0.85 NG/DL (ref 0.61–1.12)
TRIGL SERPL-MCNC: 119 MG/DL (ref ?–150)
TSH SERPL DL<=0.05 MIU/L-ACNC: 0.38 UIU/ML (ref 0.45–4.5)
VIT B12 SERPL-MCNC: 302 PG/ML (ref 180–914)
WBC # BLD AUTO: 10.1 THOUSAND/UL (ref 4.31–10.16)

## 2025-02-27 PROCEDURE — 85025 COMPLETE CBC W/AUTO DIFF WBC: CPT | Performed by: PSYCHIATRY & NEUROLOGY

## 2025-02-27 PROCEDURE — 80053 COMPREHEN METABOLIC PANEL: CPT | Performed by: PSYCHIATRY & NEUROLOGY

## 2025-02-27 PROCEDURE — 82306 VITAMIN D 25 HYDROXY: CPT | Performed by: PSYCHIATRY & NEUROLOGY

## 2025-02-27 PROCEDURE — 84443 ASSAY THYROID STIM HORMONE: CPT | Performed by: PSYCHIATRY & NEUROLOGY

## 2025-02-27 PROCEDURE — 82746 ASSAY OF FOLIC ACID SERUM: CPT | Performed by: PSYCHIATRY & NEUROLOGY

## 2025-02-27 PROCEDURE — 84439 ASSAY OF FREE THYROXINE: CPT | Performed by: PSYCHIATRY & NEUROLOGY

## 2025-02-27 PROCEDURE — 99232 SBSQ HOSP IP/OBS MODERATE 35: CPT | Performed by: PSYCHIATRY & NEUROLOGY

## 2025-02-27 PROCEDURE — 86780 TREPONEMA PALLIDUM: CPT | Performed by: PSYCHIATRY & NEUROLOGY

## 2025-02-27 PROCEDURE — 80061 LIPID PANEL: CPT | Performed by: PSYCHIATRY & NEUROLOGY

## 2025-02-27 PROCEDURE — 84703 CHORIONIC GONADOTROPIN ASSAY: CPT | Performed by: PSYCHIATRY & NEUROLOGY

## 2025-02-27 PROCEDURE — 82607 VITAMIN B-12: CPT | Performed by: PSYCHIATRY & NEUROLOGY

## 2025-02-27 RX ADMIN — PROPRANOLOL HYDROCHLORIDE 10 MG: 10 TABLET ORAL at 15:08

## 2025-02-27 RX ADMIN — OXCARBAZEPINE 300 MG: 300 TABLET, FILM COATED ORAL at 08:43

## 2025-02-27 RX ADMIN — OXCARBAZEPINE 300 MG: 300 TABLET, FILM COATED ORAL at 21:11

## 2025-02-27 RX ADMIN — FAMOTIDINE 20 MG: 20 TABLET, FILM COATED ORAL at 08:43

## 2025-02-27 RX ADMIN — HYDROXYZINE HYDROCHLORIDE 100 MG: 50 TABLET, FILM COATED ORAL at 11:52

## 2025-02-27 RX ADMIN — DICYCLOMINE HYDROCHLORIDE 20 MG: 20 TABLET ORAL at 17:21

## 2025-02-27 RX ADMIN — DOCUSATE SODIUM 100 MG: 100 CAPSULE, LIQUID FILLED ORAL at 08:43

## 2025-02-27 RX ADMIN — PANTOPRAZOLE SODIUM 40 MG: 40 TABLET, DELAYED RELEASE ORAL at 08:43

## 2025-02-27 RX ADMIN — FAMOTIDINE 20 MG: 20 TABLET, FILM COATED ORAL at 17:21

## 2025-02-27 RX ADMIN — OLANZAPINE 15 MG: 5 TABLET, FILM COATED ORAL at 21:11

## 2025-02-27 RX ADMIN — NICOTINE 1 PATCH: 21 PATCH, EXTENDED RELEASE TRANSDERMAL at 08:45

## 2025-02-27 RX ADMIN — DICYCLOMINE HYDROCHLORIDE 20 MG: 20 TABLET ORAL at 12:43

## 2025-02-27 RX ADMIN — DICYCLOMINE HYDROCHLORIDE 20 MG: 20 TABLET ORAL at 08:43

## 2025-02-27 RX ADMIN — DICYCLOMINE HYDROCHLORIDE 20 MG: 20 TABLET ORAL at 21:11

## 2025-02-27 RX ADMIN — DOCUSATE SODIUM 100 MG: 100 CAPSULE, LIQUID FILLED ORAL at 17:21
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
## 2025-02-27 NOTE — NURSING NOTE
Pt visible on unit in milieu and dayroom. She has an irritable edge but social with select peers. Medication and meal compliant. She denies SI/HI/AH/VH, endorses having anxiety. She attended group today. She has remained in behavioral control. No unmet needs.    PAIN

## 2025-02-27 NOTE — PROGRESS NOTES
02/27/25 1000   Activity/Group Checklist   Group Other (Comment)  (Group Art Therapy/Psychodynamic, Creatively Explore Altered Perspective followed by Process Discussion)   Attendance Attended   Attendance Duration (min) Greater than 60  (90 min)   Interactions Interacted appropriately   Affect/Mood Appropriate   Goals Achieved Identified feelings;Discussed coping strategies;Increased hopefulness;Able to listen to others;Identified distorted thoughts/beliefs;Able to engage in interactions;Able to reflect/comment on own behavior;Able to manage/cope with feelings;Able to recieve feedback;Able to experience relief/decrease in symptoms
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
## 2025-02-27 NOTE — NURSING NOTE
Pt c/o abdominal pain, denies N/V/D. PRN Bentyl 20 mg PO given at 1243.    1343, pt reports relief from PRN

## 2025-02-27 NOTE — NURSING NOTE
Pt approached nursing station with an irritable edge requesting PRN for anxiety. She would not elaborate further. She was observed having trouble sitting in group. Loo scale completed with a score of 27/ severe. PRN Atarax 100 mg PO give at 1152.     1252, pt is sitting in group with her peers, she appears calmer. No s/s continued anxiety.
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
## 2025-02-28 LAB — TREPONEMA PALLIDUM IGG+IGM AB [PRESENCE] IN SERUM OR PLASMA BY IMMUNOASSAY: NORMAL

## 2025-02-28 PROCEDURE — 99232 SBSQ HOSP IP/OBS MODERATE 35: CPT | Performed by: PSYCHIATRY & NEUROLOGY

## 2025-02-28 RX ORDER — OLANZAPINE 10 MG/1
20 TABLET ORAL
Status: DISCONTINUED | OUTPATIENT
Start: 2025-02-28 | End: 2025-03-03

## 2025-02-28 RX ADMIN — NICOTINE 1 PATCH: 21 PATCH, EXTENDED RELEASE TRANSDERMAL at 08:02

## 2025-02-28 RX ADMIN — OXCARBAZEPINE 300 MG: 300 TABLET, FILM COATED ORAL at 21:19

## 2025-02-28 RX ADMIN — BUDESONIDE AND FORMOTEROL FUMARATE DIHYDRATE 2 PUFF: 80; 4.5 AEROSOL RESPIRATORY (INHALATION) at 21:22

## 2025-02-28 RX ADMIN — DOCUSATE SODIUM 100 MG: 100 CAPSULE, LIQUID FILLED ORAL at 17:28

## 2025-02-28 RX ADMIN — OLANZAPINE 20 MG: 10 TABLET, FILM COATED ORAL at 21:19

## 2025-02-28 RX ADMIN — HALOPERIDOL LACTATE 5 MG: 5 INJECTION, SOLUTION INTRAMUSCULAR at 11:25

## 2025-02-28 RX ADMIN — OXCARBAZEPINE 300 MG: 300 TABLET, FILM COATED ORAL at 08:01

## 2025-02-28 RX ADMIN — DICYCLOMINE HYDROCHLORIDE 20 MG: 20 TABLET ORAL at 20:02

## 2025-02-28 RX ADMIN — FAMOTIDINE 20 MG: 20 TABLET, FILM COATED ORAL at 17:28

## 2025-02-28 RX ADMIN — DICYCLOMINE HYDROCHLORIDE 20 MG: 20 TABLET ORAL at 17:29

## 2025-02-28 RX ADMIN — DICYCLOMINE HYDROCHLORIDE 20 MG: 20 TABLET ORAL at 11:06

## 2025-02-28 RX ADMIN — DICYCLOMINE HYDROCHLORIDE 20 MG: 20 TABLET ORAL at 08:01

## 2025-02-28 RX ADMIN — BENZTROPINE MESYLATE 1 MG: 1 INJECTION INTRAMUSCULAR; INTRAVENOUS at 11:25

## 2025-02-28 RX ADMIN — DOCUSATE SODIUM 100 MG: 100 CAPSULE, LIQUID FILLED ORAL at 08:01

## 2025-02-28 RX ADMIN — FAMOTIDINE 20 MG: 20 TABLET, FILM COATED ORAL at 08:01

## 2025-02-28 RX ADMIN — ACETAMINOPHEN 975 MG: 325 TABLET, FILM COATED ORAL at 18:45

## 2025-02-28 RX ADMIN — PANTOPRAZOLE SODIUM 40 MG: 40 TABLET, DELAYED RELEASE ORAL at 06:53

## 2025-02-28 RX ADMIN — LORAZEPAM 2 MG: 2 INJECTION INTRAMUSCULAR; INTRAVENOUS at 11:25

## 2025-02-28 RX ADMIN — HYDROXYZINE HYDROCHLORIDE 100 MG: 50 TABLET, FILM COATED ORAL at 09:01

## 2025-02-28 NOTE — PLAN OF CARE
Problem: SELF HARM/SUICIDALITY  Goal: Will have no self-injury during hospital stay  Description: INTERVENTIONS:  - Q 15 MINUTES: Routine safety checks  - Q WAKING SHIFT & PRN: Assess risk to determine if routine checks are adequate to maintain patient safety  - Encourage patient to participate actively in care by formulating a plan to combat response to suicidal ideation, identify supports and resources  Outcome: Progressing     Problem: DISCHARGE PLANNING - CARE MANAGEMENT  Goal: Discharge to post-acute care or home with appropriate resources  Description: INTERVENTIONS:  - Conduct assessment to determine patient/family and health care team treatment goals, and need for post-acute services based on payer coverage, community resources, and patient preferences, and barriers to discharge  - Address psychosocial, clinical, and financial barriers to discharge as identified in assessment in conjunction with the patient/family and health care team  - Arrange appropriate level of post-acute services according to patient’s   needs and preference and payer coverage in collaboration with the physician and health care team  - Communicate with and update the patient/family, physician, and health care team regarding progress on the discharge plan  - Arrange appropriate transportation to post-acute venues  Outcome: Progressing     Problem: PSYCHOSIS  Goal: Will report no hallucinations or delusions  Description: Interventions:  - Administer medication as  ordered  - Every waking shifts and PRN assess for the presence of hallucinations and or delusions  - Assist with reality testing to support increasing orientation  - Assess if patient's hallucinations or delusions are encouraging self-harm or harm to others and intervene as appropriate  Outcome: Not Progressing     Problem: BEHAVIOR  Goal: Pt/Family maintain appropriate behavior and adhere to behavioral management agreement, if implemented  Description: INTERVENTIONS:  -  Assess the family dynamic   - Encourage verbalization of thoughts and concerns in a socially appropriate manner  - Assess patient/family's coping skills and non-compliant behavior (including use of illegal substances).  - Utilize positive, consistent limit setting strategies supporting safety of patient, staff and others  - Initiate consult with Case Management, Spiritual Care or other ancillary services as appropriate  - If a patient's/visitor's behavior jeopardizes the safety of the patient, staff, or others, refer to organization procedure.   - Notify Security of behavior or suspected illegal substances which indicate the need for search of the patient and/or belongings  - Encourage participation in the decision making process about a behavioral management agreement; implement if patient meets criteria  Outcome: Not Progressing     Problem: Ineffective Coping  Goal: Cooperates with admission process  Description: Interventions:   - Complete admission process  Outcome: Completed

## 2025-02-28 NOTE — NURSING NOTE
Pt is in behavior control and significantly less agitated and irritable. PRN haldol 5 mg, ativan 2 mg and cogentin 1 mg given @ 1125 moderately effective.
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
## 2025-02-28 NOTE — NURSING NOTE
Pt denies SI/HI/AH/VH. Pt medication and meal compliant. Pt remained in behavioral control but irritable. Q 15 checks maintained. No unmet needs this shift.

## 2025-02-28 NOTE — NURSING NOTE
Pt c/o severe anxiety and received atarax 100 mg @ 0901. Reassessed @ 1001 and pt reports it only somewhat effective. Medication and meal compliant. Suspicious of staff. Denies SI/HI/AH/VH. Irritable edge but able to appropriately make her needs known. Attending groups. Frequent complaints of stomach pain. Received prn bentyl 20 mg @ 1106.

## 2025-02-28 NOTE — PROGRESS NOTES
02/28/25 0842   Team Meeting   Meeting Type Daily Rounds   Team Members Present   Team Members Present Physician;Nurse;   Physician Team Member Maldonado   [facility]   Care Management Team Member Melodie   Patient/Family Present   Patient Present No   Patient's Family Present No     Pt is medication and meal compliant. Pt denies SI/HI. Pt appears paranoid and reports delusions regarding children and . Pt attended 303 hearing today and was appropriate.

## 2025-03-01 PROCEDURE — 99232 SBSQ HOSP IP/OBS MODERATE 35: CPT | Performed by: STUDENT IN AN ORGANIZED HEALTH CARE EDUCATION/TRAINING PROGRAM

## 2025-03-01 RX ADMIN — OLANZAPINE 20 MG: 10 TABLET, FILM COATED ORAL at 21:02

## 2025-03-01 RX ADMIN — OXCARBAZEPINE 300 MG: 300 TABLET, FILM COATED ORAL at 08:00

## 2025-03-01 RX ADMIN — DICYCLOMINE HYDROCHLORIDE 20 MG: 20 TABLET ORAL at 08:00

## 2025-03-01 RX ADMIN — DICYCLOMINE HYDROCHLORIDE 20 MG: 20 TABLET ORAL at 20:52

## 2025-03-01 RX ADMIN — NICOTINE 1 PATCH: 21 PATCH, EXTENDED RELEASE TRANSDERMAL at 08:01

## 2025-03-01 RX ADMIN — DICYCLOMINE HYDROCHLORIDE 20 MG: 20 TABLET ORAL at 14:09

## 2025-03-01 RX ADMIN — DICYCLOMINE HYDROCHLORIDE 20 MG: 20 TABLET ORAL at 17:01

## 2025-03-01 RX ADMIN — ALUMINUM HYDROXIDE, MAGNESIUM HYDROXIDE, AND SIMETHICONE 30 ML: 1200; 120; 1200 SUSPENSION ORAL at 11:05

## 2025-03-01 RX ADMIN — FAMOTIDINE 20 MG: 20 TABLET, FILM COATED ORAL at 08:00

## 2025-03-01 RX ADMIN — HYDROXYZINE HYDROCHLORIDE 100 MG: 50 TABLET, FILM COATED ORAL at 16:49

## 2025-03-01 RX ADMIN — OXCARBAZEPINE 300 MG: 300 TABLET, FILM COATED ORAL at 20:46

## 2025-03-01 RX ADMIN — PANTOPRAZOLE SODIUM 40 MG: 40 TABLET, DELAYED RELEASE ORAL at 06:47

## 2025-03-01 RX ADMIN — DOCUSATE SODIUM 100 MG: 100 CAPSULE, LIQUID FILLED ORAL at 08:00

## 2025-03-01 RX ADMIN — FAMOTIDINE 20 MG: 20 TABLET, FILM COATED ORAL at 17:01

## 2025-03-01 RX ADMIN — DOCUSATE SODIUM 100 MG: 100 CAPSULE, LIQUID FILLED ORAL at 17:01

## 2025-03-01 NOTE — NURSING NOTE
Pt c/o severe anxiety and received atarax 100 mg @ 1649. Reassessed at this time and pt reports feeling better, appears less outwardly anxious.
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
## 2025-03-01 NOTE — NURSING NOTE
Patient has been withdrawn to her room most of the evening. She interacts with staff when she has a request or need and she keeps the interaction short.  She is depressed with an irritable edge.

## 2025-03-01 NOTE — NURSING NOTE
Pt remains focused on stomach pain, received maalox @ 1105 for indigestion. Pt reports it was only somewhat helpful. Pt forgetful at times needing frequent reminders of things such as having already eaten a meal, already having received her meds etc. Pt is scant in conversation with an irritable edge. Visible on the unit but withdrawn to herself.

## 2025-03-01 NOTE — PLAN OF CARE
Problem: SELF HARM/SUICIDALITY  Goal: Will have no self-injury during hospital stay  Description: INTERVENTIONS:  - Q 15 MINUTES: Routine safety checks  - Q WAKING SHIFT & PRN: Assess risk to determine if routine checks are adequate to maintain patient safety  - Encourage patient to participate actively in care by formulating a plan to combat response to suicidal ideation, identify supports and resources  Outcome: Progressing     Problem: BEHAVIOR  Goal: Pt/Family maintain appropriate behavior and adhere to behavioral management agreement, if implemented  Description: INTERVENTIONS:  - Assess the family dynamic   - Encourage verbalization of thoughts and concerns in a socially appropriate manner  - Assess patient/family's coping skills and non-compliant behavior (including use of illegal substances).  - Utilize positive, consistent limit setting strategies supporting safety of patient, staff and others  - Initiate consult with Case Management, Spiritual Care or other ancillary services as appropriate  - If a patient's/visitor's behavior jeopardizes the safety of the patient, staff, or others, refer to organization procedure.   - Notify Security of behavior or suspected illegal substances which indicate the need for search of the patient and/or belongings  - Encourage participation in the decision making process about a behavioral management agreement; implement if patient meets criteria  Outcome: Progressing     Problem: Ineffective Coping  Goal: Participates in unit activities  Description: Interventions:  - Provide therapeutic environment   - Provide required programming   - Redirect inappropriate behaviors   Outcome: Progressing     Problem: DISCHARGE PLANNING - CARE MANAGEMENT  Goal: Discharge to post-acute care or home with appropriate resources  Description: INTERVENTIONS:  - Conduct assessment to determine patient/family and health care team treatment goals, and need for post-acute services based on payer  coverage, community resources, and patient preferences, and barriers to discharge  - Address psychosocial, clinical, and financial barriers to discharge as identified in assessment in conjunction with the patient/family and health care team  - Arrange appropriate level of post-acute services according to patient’s   needs and preference and payer coverage in collaboration with the physician and health care team  - Communicate with and update the patient/family, physician, and health care team regarding progress on the discharge plan  - Arrange appropriate transportation to post-acute venues  Outcome: Progressing     Problem: PSYCHOSIS  Goal: Will report no hallucinations or delusions  Description: Interventions:  - Administer medication as  ordered  - Every waking shifts and PRN assess for the presence of hallucinations and or delusions  - Assist with reality testing to support increasing orientation  - Assess if patient's hallucinations or delusions are encouraging self-harm or harm to others and intervene as appropriate  Outcome: Not Progressing

## 2025-03-02 PROCEDURE — 99232 SBSQ HOSP IP/OBS MODERATE 35: CPT | Performed by: STUDENT IN AN ORGANIZED HEALTH CARE EDUCATION/TRAINING PROGRAM

## 2025-03-02 RX ADMIN — OXCARBAZEPINE 300 MG: 300 TABLET, FILM COATED ORAL at 08:27

## 2025-03-02 RX ADMIN — DICYCLOMINE HYDROCHLORIDE 20 MG: 20 TABLET ORAL at 11:29

## 2025-03-02 RX ADMIN — DICYCLOMINE HYDROCHLORIDE 20 MG: 20 TABLET ORAL at 08:25

## 2025-03-02 RX ADMIN — DOCUSATE SODIUM 100 MG: 100 CAPSULE, LIQUID FILLED ORAL at 17:09

## 2025-03-02 RX ADMIN — OXCARBAZEPINE 300 MG: 300 TABLET, FILM COATED ORAL at 21:02

## 2025-03-02 RX ADMIN — DOCUSATE SODIUM 100 MG: 100 CAPSULE, LIQUID FILLED ORAL at 08:25

## 2025-03-02 RX ADMIN — FAMOTIDINE 20 MG: 20 TABLET, FILM COATED ORAL at 17:08

## 2025-03-02 RX ADMIN — POLYETHYLENE GLYCOL 3350 17 G: 17 POWDER, FOR SOLUTION ORAL at 14:04

## 2025-03-02 RX ADMIN — BUDESONIDE AND FORMOTEROL FUMARATE DIHYDRATE 2 PUFF: 80; 4.5 AEROSOL RESPIRATORY (INHALATION) at 08:27

## 2025-03-02 RX ADMIN — OLANZAPINE 20 MG: 10 TABLET, FILM COATED ORAL at 21:02

## 2025-03-02 RX ADMIN — DICYCLOMINE HYDROCHLORIDE 20 MG: 20 TABLET ORAL at 21:05

## 2025-03-02 RX ADMIN — DICYCLOMINE HYDROCHLORIDE 20 MG: 20 TABLET ORAL at 17:08

## 2025-03-02 RX ADMIN — NICOTINE 1 PATCH: 21 PATCH, EXTENDED RELEASE TRANSDERMAL at 08:26

## 2025-03-02 RX ADMIN — FAMOTIDINE 20 MG: 20 TABLET, FILM COATED ORAL at 08:25

## 2025-03-02 NOTE — NURSING NOTE
Patient did not wake up to take scheduled protonix at 0600. Patient turned over and did not take covers off her head.

## 2025-03-02 NOTE — NURSING NOTE
"Pt suspicious of staff believing they are talking about her. Asking this writer \"why do you always make side comments about me?\" Pt given reassurance that staff was not discussing her but pt remains irritable stating \"Sounded like side comments to me.\"   "

## 2025-03-02 NOTE — NURSING NOTE
Patient reports abdominal pain has lessened. Bentyl 20mgs po prn has been effective. She remains preoccupied and withdrawn but she is less irritable.  She denies S.I.H.I.A/H V/H

## 2025-03-02 NOTE — PLAN OF CARE
Problem: SELF HARM/SUICIDALITY  Goal: Will have no self-injury during hospital stay  Description: INTERVENTIONS:  - Q 15 MINUTES: Routine safety checks  - Q WAKING SHIFT & PRN: Assess risk to determine if routine checks are adequate to maintain patient safety  - Encourage patient to participate actively in care by formulating a plan to combat response to suicidal ideation, identify supports and resources  Outcome: Progressing     Problem: PSYCHOSIS  Goal: Will report no hallucinations or delusions  Description: Interventions:  - Administer medication as  ordered  - Every waking shifts and PRN assess for the presence of hallucinations and or delusions  - Assist with reality testing to support increasing orientation  - Assess if patient's hallucinations or delusions are encouraging self-harm or harm to others and intervene as appropriate  Outcome: Progressing     Problem: BEHAVIOR  Goal: Pt/Family maintain appropriate behavior and adhere to behavioral management agreement, if implemented  Description: INTERVENTIONS:  - Assess the family dynamic   - Encourage verbalization of thoughts and concerns in a socially appropriate manner  - Assess patient/family's coping skills and non-compliant behavior (including use of illegal substances).  - Utilize positive, consistent limit setting strategies supporting safety of patient, staff and others  - Initiate consult with Case Management, Spiritual Care or other ancillary services as appropriate  - If a patient's/visitor's behavior jeopardizes the safety of the patient, staff, or others, refer to organization procedure.   - Notify Security of behavior or suspected illegal substances which indicate the need for search of the patient and/or belongings  - Encourage participation in the decision making process about a behavioral management agreement; implement if patient meets criteria  Outcome: Progressing     Problem: Ineffective Coping  Goal: Participates in unit  activities  Description: Interventions:  - Provide therapeutic environment   - Provide required programming   - Redirect inappropriate behaviors   Outcome: Progressing     Problem: DISCHARGE PLANNING - CARE MANAGEMENT  Goal: Discharge to post-acute care or home with appropriate resources  Description: INTERVENTIONS:  - Conduct assessment to determine patient/family and health care team treatment goals, and need for post-acute services based on payer coverage, community resources, and patient preferences, and barriers to discharge  - Address psychosocial, clinical, and financial barriers to discharge as identified in assessment in conjunction with the patient/family and health care team  - Arrange appropriate level of post-acute services according to patient’s   needs and preference and payer coverage in collaboration with the physician and health care team  - Communicate with and update the patient/family, physician, and health care team regarding progress on the discharge plan  - Arrange appropriate transportation to post-acute venues  Outcome: Progressing

## 2025-03-02 NOTE — PROGRESS NOTES
Progress Note - Behavioral Health   Name: Antionette Downing 39 y.o. female I MRN: [id number]  Unit/Bed#: -02 I Date of Admission: 2/24/2025   Date of Service: 3/2/2025 I Hospital Day: 6    Assessment & Plan  Schizoaffective disorder, bipolar type (HCC) r/o bipolar with psychotic features  Still paranoid though less irritable/accusatory of staff.  -Trileptal 300 twice daily  -Zyprexa 20 mg at bedtime    Psychogenic nonepileptic seizure    History of hepatitis C    Tobacco abuse    Asthma    Chronic midline low back pain without sciatica    Irritable bowel syndrome with both constipation and diarrhea    Medical clearance for psychiatric admission        Current medications:  Current Facility-Administered Medications   Medication Dose Route Frequency Provider Last Rate    acetaminophen  650 mg Oral Q6H PRN Joanne Jaramillo MD      acetaminophen  650 mg Oral Q4H PRN Joanne Jaramillo MD      acetaminophen  975 mg Oral Q6H PRN Joanne Jaramillo MD      aluminum-magnesium hydroxide-simethicone  30 mL Oral Q4H PRN Joanne Jaramillo MD      haloperidol lactate  2.5 mg Intramuscular Q4H PRN Max 4/day Joanne Jaramillo MD      And    LORazepam  1 mg Intramuscular Q4H PRN Max 4/day Joanne Jaramillo MD      And    benztropine  0.5 mg Intramuscular Q4H PRN Max 4/day Joanne Jaramillo MD      haloperidol lactate  5 mg Intramuscular Q4H PRN Max 4/day Joanne Jaramillo MD      And    LORazepam  2 mg Intramuscular Q4H PRN Max 4/day Joanne Jaramillo MD      And    benztropine  1 mg Intramuscular Q4H PRN Max 4/day Joanne Jaramillo MD      benztropine  1 mg Intramuscular Q4H PRN Max 6/day Joanne Jaramillo MD      benztropine  1 mg Oral Q4H PRN Max 6/day Joanne Jaramillo MD      bisacodyl  10 mg Rectal Daily PRN Joanne Jaramillo MD      budesonide-formoterol  2 puff Inhalation BID Tristan Denson MD      dicyclomine  20 mg Oral BID PRN Tristan Denson MD      dicyclomine  20 mg Oral BID Tristan Denson MD      hydrOXYzine  HCL  50 mg Oral Q6H PRN Max 4/day Joanne Jaramillo MD      Or    diphenhydrAMINE  50 mg Intramuscular Q6H PRN Joanne Jaramillo MD      docusate sodium  100 mg Oral BID Tristan Denson MD      famotidine  20 mg Oral BID PRN Tristan Denson MD      famotidine  20 mg Oral BID Tristan Denson MD      haloperidol  1 mg Oral Q6H PRN Joanne Jaramillo MD      haloperidol  2.5 mg Oral Q4H PRN Max 4/day Joanne Jaramillo MD      haloperidol  5 mg Oral Q4H PRN Max 4/day Joanne Jaramillo MD      hydrOXYzine HCL  100 mg Oral Q6H PRN Max 4/day Joanne Jaramillo MD      Or    LORazepam  2 mg Intramuscular Q6H PRN Joanne Jaramillo MD      hydrOXYzine HCL  25 mg Oral Q6H PRN Max 4/day Joanne Jaramillo MD      nicotine  1 patch Transdermal Daily Kiran Okeefe MD      nicotine polacrilex  4 mg Oral Q2H PRN Joanne Jaramillo MD      OLANZapine  20 mg Oral HS Kiran Okeefe MD      ondansetron  4 mg Oral Q6H PRN Tristan Denson MD      OXcarbazepine  300 mg Oral Q12H OSIEL Tristan Denson MD      pantoprazole  40 mg Oral Daily Before Breakfast Tristan Denson MD      polyethylene glycol  17 g Oral Daily PRN Joanne Jaramillo MD      propranolol  10 mg Oral Q8H PRN Joanne Jaramillo MD      senna-docusate sodium  1 tablet Oral Daily PRN Joanne Jaramillo MD      traZODone  50 mg Oral HS PRN Joanne Jaramillo MD          Risks/Benefits of Treatment:     Risks, benefits, and possible side effects of medications explained to patient and patient verbalizes understanding and agreement for treatment.    Progress Toward Goals: progressing    Treatment Planning:     All current active medications have been reviewed.  Continue to monitor response to treatment and assess for potential side effects of medications.  Encourage group therapy, milieu therapy and occupational therapy  Collaboration with medical service for medical comorbidities as indicated.  Behavioral Health checks for safety monitoring.  Estimated Discharge Day: 3/5/2025   Legal  "Status: Status of Admission  Status of Admission: 302  Date 302 will :: 25  Release of Information Signed: Yes (CALLIE FOR MARIA ALEJANDRA GRACIA).    Subjective     Per nursing report patient preoccupied about abdominal pain receiving Bentyl as a as needed yesterday.  Compliant with medications though withdrawn mostly to her room.    On examination today she covered majority of her face with a blanket.  Answered questions with very scant and terse responses.  She states that her mood is \"okay\".  She still appears suspicious and paranoid.  She reports that she slept fairly well last night.  Appetite levels remain increased.  No SI, HI, AVH endorsed.  Tolerating medicines without side effect.    Sleep: slept better  Appetite: increased  Medication side effects: No  ROS: review of systems as noted above in HPI/Subjective report, all other systems are negative    Objective :  Temp:  [97.2 °F (36.2 °C)-97.4 °F (36.3 °C)] 97.2 °F (36.2 °C)  HR:  [87-94] 87  BP: (114)/(62-67) 114/62  Resp:  [16] 16  SpO2:  [95 %-99 %] 99 %  O2 Device: None (Room air)    Temp:  [97.2 °F (36.2 °C)-97.4 °F (36.3 °C)] 97.2 °F (36.2 °C)  HR:  [87-94] 87  BP: (114)/(62-67) 114/62  Resp:  [16] 16  SpO2:  [95 %-99 %] 99 %  O2 Device: None (Room air)    Mental Status Evaluation:    Appearance:  marginal hygiene   Behavior:  angry, guarded   Speech:  scant   Mood:  dysphoric   Affect:  blunted   Thought Process:  decreased rate of thoughts   Thought Content:  some paranoia   Perceptual Disturbances: no auditory hallucinations, no visual hallucinations   Risk Potential: Suicidal Ideation -  None  Homicidal Ideation -  None  Potential for Aggression - No   Sensorium:  oriented to person, place, and time/date   Memory:  recent and remote memory grossly intact   Consciousness:  alert and awake   Attention/Concentration: attention span and concentration appear shorter than expected for age   Insight:  limited   Judgment: limited   Gait/Station: normal " gait/station, normal balance   Motor Activity: no abnormal movements       Lab Results: I have reviewed the following results:  Results from the past 24 hours: No results found for this or any previous visit (from the past 24 hours).  Most Recent Labs:   Lab Results   Component Value Date    WBC 10.10 02/27/2025    RBC 4.26 02/27/2025    HGB 13.6 02/27/2025    HCT 39.2 02/27/2025     02/27/2025    RDW 11.7 02/27/2025    NEUTROABS 6.09 02/27/2025    TOTANEUTABS 6.86 07/30/2016    SODIUM 135 02/27/2025    K 3.5 02/27/2025     02/27/2025    CO2 22 02/27/2025    BUN 8 02/27/2025    CREATININE 0.74 02/27/2025    GLUC 95 02/27/2025    CALCIUM 9.0 02/27/2025    AST 14 02/27/2025    ALT 7 02/27/2025    ALKPHOS 59 02/27/2025    TP 6.9 02/27/2025    ALB 4.2 02/27/2025    TBILI 0.29 02/27/2025    CHOLESTEROL 163 02/27/2025    HDL 48 (L) 02/27/2025    TRIG 119 02/27/2025    LDLCALC 91 02/27/2025    NONHDLC 115 02/27/2025    VALPROICTOT <10.0 (L) 02/12/2023    LITHIUM 0.63 02/01/2024    AMMONIA 17 11/12/2019    GRN3XKATAGWH 0.376 (L) 02/27/2025    FREET4 0.85 02/27/2025    PREGUR NEGATIVE 10/15/2022    PREGSERUM Negative 02/27/2025    HCGQUANT <3 12/05/2019    SYPHILISAB Non-reactive 01/07/2025    HGBA1C 5.0 01/07/2025    EAG 97 01/07/2025       Administrative Statements     Counseling / Coordination of Care:   Patient's progress discussed with staff in treatment team meeting.  Medication changes reviewed with staff in treatment team meeting..    Garett Vazquez MD 03/02/25

## 2025-03-03 PROCEDURE — 99232 SBSQ HOSP IP/OBS MODERATE 35: CPT | Performed by: PSYCHIATRY & NEUROLOGY

## 2025-03-03 RX ORDER — OLANZAPINE 10 MG/1
30 TABLET ORAL
Status: DISCONTINUED | OUTPATIENT
Start: 2025-03-03 | End: 2025-03-07 | Stop reason: HOSPADM

## 2025-03-03 RX ADMIN — ACETAMINOPHEN 975 MG: 325 TABLET, FILM COATED ORAL at 09:57

## 2025-03-03 RX ADMIN — OLANZAPINE 30 MG: 10 TABLET, FILM COATED ORAL at 21:11

## 2025-03-03 RX ADMIN — DICYCLOMINE HYDROCHLORIDE 20 MG: 20 TABLET ORAL at 12:18

## 2025-03-03 RX ADMIN — FAMOTIDINE 20 MG: 20 TABLET, FILM COATED ORAL at 17:07

## 2025-03-03 RX ADMIN — DOCUSATE SODIUM 100 MG: 100 CAPSULE, LIQUID FILLED ORAL at 17:07

## 2025-03-03 RX ADMIN — HYDROXYZINE HYDROCHLORIDE 100 MG: 50 TABLET, FILM COATED ORAL at 11:25

## 2025-03-03 RX ADMIN — FAMOTIDINE 20 MG: 20 TABLET, FILM COATED ORAL at 08:32

## 2025-03-03 RX ADMIN — OXCARBAZEPINE 300 MG: 300 TABLET, FILM COATED ORAL at 08:33

## 2025-03-03 RX ADMIN — NICOTINE 1 PATCH: 21 PATCH, EXTENDED RELEASE TRANSDERMAL at 08:34

## 2025-03-03 RX ADMIN — DOCUSATE SODIUM 100 MG: 100 CAPSULE, LIQUID FILLED ORAL at 08:32

## 2025-03-03 RX ADMIN — OXCARBAZEPINE 300 MG: 300 TABLET, FILM COATED ORAL at 21:12

## 2025-03-03 RX ADMIN — DICYCLOMINE HYDROCHLORIDE 20 MG: 20 TABLET ORAL at 08:32

## 2025-03-03 RX ADMIN — DICYCLOMINE HYDROCHLORIDE 20 MG: 20 TABLET ORAL at 21:29

## 2025-03-03 RX ADMIN — PANTOPRAZOLE SODIUM 40 MG: 40 TABLET, DELAYED RELEASE ORAL at 06:29

## 2025-03-03 RX ADMIN — DICYCLOMINE HYDROCHLORIDE 20 MG: 20 TABLET ORAL at 17:07

## 2025-03-03 NOTE — NURSING NOTE
Pt is visible on the unit for meals and needs. Denies SI/HI/AH/VH at this time. Irritable edge. Denies any unmet needs or complaints.

## 2025-03-03 NOTE — NURSING NOTE
Patient reporting abdominal discomfort and requesting prn Bentyl. 20mgs po prn Bentyl given at this time.

## 2025-03-03 NOTE — PROGRESS NOTES
03/03/25 0837   Team Meeting   Meeting Type Daily Rounds   Team Members Present   Team Members Present Physician;Nurse;   Physician Team Member Maldonado   Nursing Team Member Echotrena   Care Management Team Member Etelvina   Patient/Family Present   Patient Present No   Patient's Family Present No     Pt c/o stomach pain, received PRN Maalox. Pt forgetful at times, requiring reminders of things she has already completed. Pt visible intermittently on the unit. Improved mood but remains with irritable edge. Suspicious of staff, believing staff is speaking about her. Med/meal compliant. Discussed potential increase of Risperdal.

## 2025-03-03 NOTE — NURSING NOTE
Patient has been in her room and is keeping to hersel. She is depressed and withdrawn and communicates with staff only when she has a need. She denies S.I.H.I.A/H V/H  She was cooperative with HS scheduled medications.
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
## 2025-03-03 NOTE — NURSING NOTE
Pt came to nurses station, reports severe anxiety. Pt received Atarax 100 mg at 1125 for chavez score of 25. Will continue to monitor.

## 2025-03-04 PROCEDURE — 99232 SBSQ HOSP IP/OBS MODERATE 35: CPT | Performed by: PSYCHIATRY & NEUROLOGY

## 2025-03-04 RX ADMIN — DICYCLOMINE HYDROCHLORIDE 20 MG: 20 TABLET ORAL at 13:10

## 2025-03-04 RX ADMIN — FAMOTIDINE 20 MG: 20 TABLET, FILM COATED ORAL at 08:45

## 2025-03-04 RX ADMIN — HYDROXYZINE HYDROCHLORIDE 100 MG: 50 TABLET, FILM COATED ORAL at 11:00

## 2025-03-04 RX ADMIN — NICOTINE 1 PATCH: 21 PATCH, EXTENDED RELEASE TRANSDERMAL at 08:46

## 2025-03-04 RX ADMIN — OLANZAPINE 30 MG: 10 TABLET, FILM COATED ORAL at 21:13

## 2025-03-04 RX ADMIN — PANTOPRAZOLE SODIUM 40 MG: 40 TABLET, DELAYED RELEASE ORAL at 06:16

## 2025-03-04 RX ADMIN — FAMOTIDINE 20 MG: 20 TABLET, FILM COATED ORAL at 17:38

## 2025-03-04 RX ADMIN — DICYCLOMINE HYDROCHLORIDE 20 MG: 20 TABLET ORAL at 08:45

## 2025-03-04 RX ADMIN — OXCARBAZEPINE 300 MG: 300 TABLET, FILM COATED ORAL at 21:14

## 2025-03-04 RX ADMIN — DOCUSATE SODIUM 100 MG: 100 CAPSULE, LIQUID FILLED ORAL at 08:45

## 2025-03-04 RX ADMIN — DICYCLOMINE HYDROCHLORIDE 20 MG: 20 TABLET ORAL at 17:38

## 2025-03-04 RX ADMIN — DICYCLOMINE HYDROCHLORIDE 20 MG: 20 TABLET ORAL at 06:23

## 2025-03-04 RX ADMIN — ACETAMINOPHEN 975 MG: 325 TABLET, FILM COATED ORAL at 15:43

## 2025-03-04 RX ADMIN — DOCUSATE SODIUM 100 MG: 100 CAPSULE, LIQUID FILLED ORAL at 17:38

## 2025-03-04 RX ADMIN — OXCARBAZEPINE 300 MG: 300 TABLET, FILM COATED ORAL at 08:46

## 2025-03-04 NOTE — NURSING NOTE
"Pt is irritable and suspicious of staff. Overheard telling her roommate \"the androids are coming and you better pack all your shit because this is the end.\" When asked by staff to please give her roommate some space she said \"I am just telling her what nobody else will, god eleonora maya.\" Pt declining prns and pacing halls remaining in behavior control. Medication and meal compliant. Denies SI/HI. Denies any unmet needs at this time.   "

## 2025-03-04 NOTE — PROGRESS NOTES
03/04/25 0894   Team Meeting   Meeting Type Daily Rounds   Team Members Present   Team Members Present Physician;Nurse;   Physician Team Member Jarett   [facility] Management Team Member Etelvina   Patient/Family Present   Patient Present No   Patient's Family Present No     Pt's Zyprexa was increased yesterday. Continues reporting thoughts that staff are speaking about her.

## 2025-03-04 NOTE — NURSING NOTE
Patient secluded to room most of the shift ,out to make needs known. Patient appears guarded,suspicious and preoccupied at times. Patient denies SI/HI but reports hearing voices talking about her. Patient compliant with medications and routine vitals. Patient requested Bentyl for stomach pain with HS medications. Prn Bentyl 20 mg given.

## 2025-03-04 NOTE — NURSING NOTE
Patient is isolative to her room most of the shift.  Patient is able to make her needs known.  Patient appears guarded, suspicious, and preoccupied at times.  Patient denies all psych s/s but started spitting something out of her mouth that I could not see.  I asked what she was spitting and she mumbled something that I could not understand.  Safety precautions maintained.

## 2025-03-04 NOTE — NURSING NOTE
"Patient reported 10/10 right abdominal pain while administering scheduled Protonix. Client was sleep, easy to arouse prior to taking medication. Denies new onset pain. Reported last bowel movement was \"yesterday.\"  Patient requested PRN dicyclomine. Dicyclomine 20mg administered at 0623.  "
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
## 2025-03-05 PROCEDURE — 99232 SBSQ HOSP IP/OBS MODERATE 35: CPT | Performed by: PSYCHIATRY & NEUROLOGY

## 2025-03-05 RX ADMIN — DOCUSATE SODIUM 100 MG: 100 CAPSULE, LIQUID FILLED ORAL at 08:22

## 2025-03-05 RX ADMIN — OXCARBAZEPINE 300 MG: 300 TABLET, FILM COATED ORAL at 21:38

## 2025-03-05 RX ADMIN — DICYCLOMINE HYDROCHLORIDE 20 MG: 20 TABLET ORAL at 17:06

## 2025-03-05 RX ADMIN — DICYCLOMINE HYDROCHLORIDE 20 MG: 20 TABLET ORAL at 14:31

## 2025-03-05 RX ADMIN — OLANZAPINE 30 MG: 10 TABLET, FILM COATED ORAL at 21:38

## 2025-03-05 RX ADMIN — FAMOTIDINE 20 MG: 20 TABLET, FILM COATED ORAL at 17:06

## 2025-03-05 RX ADMIN — DICYCLOMINE HYDROCHLORIDE 20 MG: 20 TABLET ORAL at 21:40

## 2025-03-05 RX ADMIN — FAMOTIDINE 20 MG: 20 TABLET, FILM COATED ORAL at 08:21

## 2025-03-05 RX ADMIN — OXCARBAZEPINE 300 MG: 300 TABLET, FILM COATED ORAL at 08:23

## 2025-03-05 RX ADMIN — PANTOPRAZOLE SODIUM 40 MG: 40 TABLET, DELAYED RELEASE ORAL at 08:00

## 2025-03-05 RX ADMIN — DICYCLOMINE HYDROCHLORIDE 20 MG: 20 TABLET ORAL at 08:22

## 2025-03-05 RX ADMIN — DOCUSATE SODIUM 100 MG: 100 CAPSULE, LIQUID FILLED ORAL at 17:06
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
## 2025-03-05 NOTE — NURSING NOTE
Prn bentyl 10mg po was effective in reducing stomach spasm symptoms on reassessment. Pt is no longer presenting with this concern at this time.
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
## 2025-03-05 NOTE — PROGRESS NOTES
03/05/25 0839   Team Meeting   Meeting Type Daily Rounds   Team Members Present   Team Members Present Physician;Nurse;   Physician Team Member Jarett   [facility] Melodie   Patient/Family Present   Patient Present No   Patient's Family Present No     Pt is medication and meal compliant. Pt denies SI/HI/AVH. Pt is bizarre and delusional. Pt is seclusive to her room. Pt's discharge is pending further stabilization.

## 2025-03-05 NOTE — PLAN OF CARE
Problem: SELF HARM/SUICIDALITY  Goal: Will have no self-injury during hospital stay  Description: INTERVENTIONS:  - Q 15 MINUTES: Routine safety checks  - Q WAKING SHIFT & PRN: Assess risk to determine if routine checks are adequate to maintain patient safety  - Encourage patient to participate actively in care by formulating a plan to combat response to suicidal ideation, identify supports and resources  Outcome: Progressing     Problem: PSYCHOSIS  Goal: Will report no hallucinations or delusions  Description: Interventions:  - Administer medication as  ordered  - Every waking shifts and PRN assess for the presence of hallucinations and or delusions  - Assist with reality testing to support increasing orientation  - Assess if patient's hallucinations or delusions are encouraging self-harm or harm to others and intervene as appropriate  Outcome: Progressing     Problem: BEHAVIOR  Goal: Pt/Family maintain appropriate behavior and adhere to behavioral management agreement, if implemented  Description: INTERVENTIONS:  - Assess the family dynamic   - Encourage verbalization of thoughts and concerns in a socially appropriate manner  - Assess patient/family's coping skills and non-compliant behavior (including use of illegal substances).  - Utilize positive, consistent limit setting strategies supporting safety of patient, staff and others  - Initiate consult with Case Management, Spiritual Care or other ancillary services as appropriate  - If a patient's/visitor's behavior jeopardizes the safety of the patient, staff, or others, refer to organization procedure.   - Notify Security of behavior or suspected illegal substances which indicate the need for search of the patient and/or belongings  - Encourage participation in the decision making process about a behavioral management agreement; implement if patient meets criteria  Outcome: Progressing     Problem: DISCHARGE PLANNING - CARE MANAGEMENT  Goal: Discharge to  post-acute care or home with appropriate resources  Description: INTERVENTIONS:  - Conduct assessment to determine patient/family and health care team treatment goals, and need for post-acute services based on payer coverage, community resources, and patient preferences, and barriers to discharge  - Address psychosocial, clinical, and financial barriers to discharge as identified in assessment in conjunction with the patient/family and health care team  - Arrange appropriate level of post-acute services according to patient’s   needs and preference and payer coverage in collaboration with the physician and health care team  - Communicate with and update the patient/family, physician, and health care team regarding progress on the discharge plan  - Arrange appropriate transportation to post-acute venues  Outcome: Progressing

## 2025-03-06 PROCEDURE — 99232 SBSQ HOSP IP/OBS MODERATE 35: CPT | Performed by: PSYCHIATRY & NEUROLOGY

## 2025-03-06 RX ORDER — DOCUSATE SODIUM 100 MG/1
100 CAPSULE, LIQUID FILLED ORAL 2 TIMES DAILY
Qty: 180 CAPSULE | Refills: 0 | Status: SHIPPED | OUTPATIENT
Start: 2025-03-06

## 2025-03-06 RX ORDER — DICYCLOMINE HCL 20 MG
20 TABLET ORAL 2 TIMES DAILY
Qty: 20 TABLET | Refills: 0 | Status: SHIPPED | OUTPATIENT
Start: 2025-03-06

## 2025-03-06 RX ORDER — OLANZAPINE 15 MG/1
30 TABLET ORAL
Qty: 60 TABLET | Refills: 1 | Status: SHIPPED | OUTPATIENT
Start: 2025-03-06

## 2025-03-06 RX ORDER — PANTOPRAZOLE SODIUM 40 MG/1
40 TABLET, DELAYED RELEASE ORAL
Qty: 30 TABLET | Refills: 0 | Status: SHIPPED | OUTPATIENT
Start: 2025-03-07

## 2025-03-06 RX ORDER — FAMOTIDINE 20 MG/1
20 TABLET, FILM COATED ORAL 2 TIMES DAILY
Qty: 30 TABLET | Refills: 0 | Status: SHIPPED | OUTPATIENT
Start: 2025-03-06

## 2025-03-06 RX ORDER — OXCARBAZEPINE 300 MG/1
300 TABLET, FILM COATED ORAL EVERY 12 HOURS SCHEDULED
Qty: 60 TABLET | Refills: 1 | Status: SHIPPED | OUTPATIENT
Start: 2025-03-06 | End: 2025-05-05

## 2025-03-06 RX ORDER — BUDESONIDE AND FORMOTEROL FUMARATE DIHYDRATE 80; 4.5 UG/1; UG/1
2 AEROSOL RESPIRATORY (INHALATION) 2 TIMES DAILY
Qty: 10.2 G | Refills: 0 | Status: SHIPPED | OUTPATIENT
Start: 2025-03-06

## 2025-03-06 RX ADMIN — DICYCLOMINE HYDROCHLORIDE 20 MG: 20 TABLET ORAL at 12:18

## 2025-03-06 RX ADMIN — OLANZAPINE 30 MG: 10 TABLET, FILM COATED ORAL at 21:26

## 2025-03-06 RX ADMIN — FAMOTIDINE 20 MG: 20 TABLET, FILM COATED ORAL at 08:36

## 2025-03-06 RX ADMIN — OXCARBAZEPINE 300 MG: 300 TABLET, FILM COATED ORAL at 21:26

## 2025-03-06 RX ADMIN — DOCUSATE SODIUM 100 MG: 100 CAPSULE, LIQUID FILLED ORAL at 17:11

## 2025-03-06 RX ADMIN — DICYCLOMINE HYDROCHLORIDE 20 MG: 20 TABLET ORAL at 08:36

## 2025-03-06 RX ADMIN — HYDROXYZINE HYDROCHLORIDE 50 MG: 50 TABLET, FILM COATED ORAL at 13:02

## 2025-03-06 RX ADMIN — FAMOTIDINE 20 MG: 20 TABLET, FILM COATED ORAL at 17:11

## 2025-03-06 RX ADMIN — OXCARBAZEPINE 300 MG: 300 TABLET, FILM COATED ORAL at 08:36

## 2025-03-06 RX ADMIN — DOCUSATE SODIUM 100 MG: 100 CAPSULE, LIQUID FILLED ORAL at 08:36

## 2025-03-06 RX ADMIN — DICYCLOMINE HYDROCHLORIDE 20 MG: 20 TABLET ORAL at 17:11

## 2025-03-06 RX ADMIN — DICYCLOMINE HYDROCHLORIDE 20 MG: 20 TABLET ORAL at 21:26

## 2025-03-06 RX ADMIN — PANTOPRAZOLE SODIUM 40 MG: 40 TABLET, DELAYED RELEASE ORAL at 08:36

## 2025-03-06 NOTE — NURSING NOTE
"Pt visible on the unit wandering the halls, withdrawn to self. Irritable and scant in conversation. Denies SI/HI/AH/VH. Appears paranoid at times stating, \"I think people are using my body wash\" and \"I don't like being social, I feel like I'm being watched on the cameras.\" Reassured pt that no one is using her toiletries and not being watched. Pt continues to have abdominal pain and cramping. Pt given PRN bentyl 20mg po @1218; minimally effective. Pt then c/o anxiety. HAM score 20. PRN atarax 50mg po given @1302. Will monitor for effectiveness.   "

## 2025-03-06 NOTE — PROGRESS NOTES
03/06/25 0835   Team Meeting   Meeting Type Daily Rounds   Team Members Present   Team Members Present Physician;Nurse;   Physician Team Member Jarett   [facility] Member Melodie   Patient/Family Present   Patient Present No   Patient's Family Present No     Pt is medication and meal compliant. Pt denies SI/HI/AVH. Pt is discharging tomorrow.

## 2025-03-06 NOTE — PROGRESS NOTES
Progress Note - Behavioral Health   Name: Antionette Downing 39 y.o. female I MRN: [id number]  Unit/Bed#: -02 I Date of Admission: 2/24/2025   Date of Service: 3/6/2025 I Hospital Day: 10     Assessment & Plan  Schizoaffective disorder, bipolar type (HCC) r/o bipolar with psychotic features    At this time patient is continuing to gradually improve.  She is less irritable and less internally preoccupied.  No overt delusions or paranoia.  If continues to have good behaviors without any 302 -able criteria, then will plan for discharge on Friday    -Trileptal 300 twice daily  -Zyprexa 30 mg at bedtime    Psychogenic nonepileptic seizure  -as above  History of hepatitis C  -as per family med  -outpatient f/u  Tobacco abuse  -encourage cessation  -Nicotine replacement as as needed  Asthma  -as per family med  Chronic midline low back pain without sciatica  -as per family med  Irritable bowel syndrome with both constipation and diarrhea  -as per family med  Medical clearance for psychiatric admission  -as per family med      Plan     Recommended Treatment:    - Encourage early mobility and having a structured day  - Provide frequent re-orientation, and cognitive stimulation  - Ensure assistive devices are in proper working order (eye-glasses, hearing aids)  - Encourage adequate hydration, nutrition and monitor bowel movements  - Maintain sleep-wake cycle: Uninterrupted sleep time; low-level lighting at night  - Fall precaution  - f/u [facility] recs regarding the medical problems   - Continue medication titration and treatment plan; adjust medication to optimize treatment response and as clinically indicated. .   - Observation: routine            - VS: as per unit protocol  - Diet: Regular diet  - Encourage group attendance and milieu therapy  - Dispo: To be determined     Scheduled medications:  Current Facility-Administered Medications   Medication Dose Route Frequency Provider Last Rate    acetaminophen  650 mg  Oral Q6H PRN Joanne Jaramillo MD      acetaminophen  650 mg Oral Q4H PRN Joanne Jaramillo MD      acetaminophen  975 mg Oral Q6H PRN Joanne Jaramillo MD      aluminum-magnesium hydroxide-simethicone  30 mL Oral Q4H PRN Joanne Jaramillo MD      haloperidol lactate  2.5 mg Intramuscular Q4H PRN Max 4/day Joanne Jaramillo MD      And    LORazepam  1 mg Intramuscular Q4H PRN Max 4/day Joanne Jaramillo MD      And    benztropine  0.5 mg Intramuscular Q4H PRN Max 4/day Joanne Jaramillo MD      haloperidol lactate  5 mg Intramuscular Q4H PRN Max 4/day Joanne Jaramillo MD      And    LORazepam  2 mg Intramuscular Q4H PRN Max 4/day Joanne Jaramillo MD      And    benztropine  1 mg Intramuscular Q4H PRN Max 4/day Joanne Jaramillo MD      benztropine  1 mg Intramuscular Q4H PRN Max 6/day Joanne Jaramillo MD      benztropine  1 mg Oral Q4H PRN Max 6/day Joanne Jaramillo MD      bisacodyl  10 mg Rectal Daily PRN Joanne Jaramillo MD      budesonide-formoterol  2 puff Inhalation BID Tristan Denson MD      dicyclomine  20 mg Oral BID PRN Tristan Denson MD      dicyclomine  20 mg Oral BID Tristan Denson MD      hydrOXYzine HCL  50 mg Oral Q6H PRN Max 4/day Joanne Jaramillo MD      Or    diphenhydrAMINE  50 mg Intramuscular Q6H PRN Joanne Jaramillo MD      docusate sodium  100 mg Oral BID Tristan Denson MD      famotidine  20 mg Oral BID PRN Tristan Denson MD      famotidine  20 mg Oral BID Tristan Denson MD      haloperidol  1 mg Oral Q6H PRN Joanne Jaramillo MD      haloperidol  2.5 mg Oral Q4H PRN Max 4/day Joanne Jaramillo MD      haloperidol  5 mg Oral Q4H PRN Max 4/day Joanne Jaramillo MD      hydrOXYzine HCL  100 mg Oral Q6H PRN Max 4/day Joanne Jaramillo MD      Or    LORazepam  2 mg Intramuscular Q6H PRN Joanne Jaramillo MD      hydrOXYzine HCL  25 mg Oral Q6H PRN Max 4/day Joanne Jaramillo MD      nicotine  1 patch Transdermal Daily Kiran Okeefe MD      nicotine polacrilex  4 mg Oral Q2H PRN Joanne REARDON  MD Ella      OLANZapine  30 mg Oral HS Kiran Okeefe MD      ondansetron  4 mg Oral Q6H PRN Tristan Denson MD      OXcarbazepine  300 mg Oral Q12H formerly [facility] Tristan Denson MD      pantoprazole  40 mg Oral Daily Before Breakfast Tristan Denson MD      polyethylene glycol  17 g Oral Daily PRN Joanne Jaramillo MD      propranolol  10 mg Oral Q8H PRN Joanne Jaramillo MD      senna-docusate sodium  1 tablet Oral Daily PRN Joanne Jaramillo MD      traZODone  50 mg Oral HS PRN Joanen Jaramillo MD        PRN:    acetaminophen    acetaminophen    acetaminophen    aluminum-magnesium hydroxide-simethicone    haloperidol lactate **AND** LORazepam **AND** benztropine    haloperidol lactate **AND** LORazepam **AND** benztropine    benztropine    benztropine    bisacodyl    dicyclomine    hydrOXYzine HCL **OR** diphenhydrAMINE    famotidine    haloperidol    haloperidol    haloperidol    hydrOXYzine HCL **OR** LORazepam    hydrOXYzine HCL    nicotine polacrilex    ondansetron    polyethylene glycol    propranolol    senna-docusate sodium    traZODone       Subjective     Patient was visited on unit for continuing care; chart reviewed and discussed with multidisciplinary treatment team.  On approach, the patient was calm and cooperative. Denied any changes in mood, appetite, and energy level. No problem initiating and maintaining sleep.  Denied A/VH currently.  Denied SI/HI, intent or plan upon direct inquiry at this time.    Patient continues to be visible in the milieu and interacts with staff and peers. No reports of aggression or self-injurious behavior on unit. No PRN medications used in the past 24 hours.    Patient accepted all offered medications and no adverse effects of medications noted or reported.    Objective    Current Mental Status Evaluation:  Mental Status Exam  Appearance: moderately kempt  Motor: no psychomotor agitation  Behavior: cooperative, less irritable  Speech: normal rate, rhythm, and volume  Mood:  "\"ok\"  Affect: blunted  Thought Process: concrete  Thought Content: denies delusions or paranoia  Risk Potential: denies suicidal ideation, plan, or intent. Denies homicidal ideation  Perceptions: denies auditory hallucinations, denies visual hallucinations, no IP/RIS  Sensorium: Oriented to person, place, time, and situation  Cognition: cognitive ability appears intact but was not quantitatively tested  Consciousness: alert and awake  Attention: currently intact  Insight: limited  Judgement: fair            Vital signs in last 24 hours:    Temp:  [97.8 °F (36.6 °C)-98.7 °F (37.1 °C)] 97.8 °F (36.6 °C)  HR:  [78-80] 78  BP: (125-128)/(60-65) 125/60  Resp:  [16] 16  SpO2:  [98 %] 98 %  O2 Device: None (Room air)    Psychiatric Review of Systems  Medication Adverse Effects: see HPI  Behaviors: see HPI  Sleep: unchanged  Appetite: unchanged    Laboratory results:    I have personally reviewed all pertinent laboratory/tests results  No results found for this or any previous visit (from the past 48 hours).       Progress Toward Goals & Illness Status:   progressing, mood is stabilizing, reality testing is improving, discharge planning    Patient is not at goal. They are not yet ready for discharge. The patient's condition currently requires active psychopharmacological medication management, interdisciplinary coordination with case management, and the utilization of adjunctive milieu and group therapy to augment psychopharmacological efficacy. The patient's risk of morbidity, and progression or decompensation of psychiatric disease, is higher without this current treatment.     Next of Kin  No emergency contact information on file.    Counseling / Coordination of Care  Patient's progress discussed with staff in treatment team meeting.  Medications, treatment progress and treatment plan reviewed with patient.  Medication education provided to patient.  Educated on importance of medication and treatment " compliance.  Supportive therapy provided to patient.  Cognitive techniques utilized during the session.  Reassurance and supportive therapy provided.  Reoriented to reality and reassured.  Encouraged participation in milieu and group therapy on the unit.  Crisis/safety plan discussed with patient.       Tristan Denson MD  Attending Psychiatrist   [facility]      This note has been constructed using a voice recognition system. There may be translation, syntax, or grammatical errors. If you have any questions, please contact the dictating provider.

## 2025-03-06 NOTE — NURSING NOTE
"Pt withdrawn to room and bed this evening, declined when RN encouraged activity and unit participation. Pt is guarded and scant in responses. Pt denies SI/HI/AH/VH, endorses \"10/10\" abdominal cramping; PRN bentyl 20mg given at 21:40; appears effective, pt currently resting in bed. Pt refused scheduled Symbicort inhaler, has been refusing consistently. Otherwise medication adherent.   "

## 2025-03-06 NOTE — PLAN OF CARE
Problem: SELF HARM/SUICIDALITY  Goal: Will have no self-injury during hospital stay  Description: INTERVENTIONS:  - Q 15 MINUTES: Routine safety checks  - Q WAKING SHIFT & PRN: Assess risk to determine if routine checks are adequate to maintain patient safety  - Encourage patient to participate actively in care by formulating a plan to combat response to suicidal ideation, identify supports and resources  Outcome: Progressing     Problem: PSYCHOSIS  Goal: Will report no hallucinations or delusions  Description: Interventions:  - Administer medication as  ordered  - Every waking shifts and PRN assess for the presence of hallucinations and or delusions  - Assist with reality testing to support increasing orientation  - Assess if patient's hallucinations or delusions are encouraging self-harm or harm to others and intervene as appropriate  Outcome: Progressing     Problem: BEHAVIOR  Goal: Pt/Family maintain appropriate behavior and adhere to behavioral management agreement, if implemented  Description: INTERVENTIONS:  - Assess the family dynamic   - Encourage verbalization of thoughts and concerns in a socially appropriate manner  - Assess patient/family's coping skills and non-compliant behavior (including use of illegal substances).  - Utilize positive, consistent limit setting strategies supporting safety of patient, staff and others  - Initiate consult with Case Management, Spiritual Care or other ancillary services as appropriate  - If a patient's/visitor's behavior jeopardizes the safety of the patient, staff, or others, refer to organization procedure.   - Notify Security of behavior or suspected illegal substances which indicate the need for search of the patient and/or belongings  - Encourage participation in the decision making process about a behavioral management agreement; implement if patient meets criteria  Outcome: Progressing     Problem: Ineffective Coping  Goal: Participates in unit  activities  Description: Interventions:  - Provide therapeutic environment   - Provide required programming   - Redirect inappropriate behaviors   Outcome: Progressing

## 2025-03-07 VITALS
HEART RATE: 80 BPM | SYSTOLIC BLOOD PRESSURE: 123 MMHG | RESPIRATION RATE: 20 BRPM | HEIGHT: 62 IN | DIASTOLIC BLOOD PRESSURE: 68 MMHG | WEIGHT: 141.2 LBS | OXYGEN SATURATION: 99 % | TEMPERATURE: 98.5 F | BODY MASS INDEX: 25.98 KG/M2

## 2025-03-07 PROCEDURE — 99238 HOSP IP/OBS DSCHRG MGMT 30/<: CPT | Performed by: PSYCHIATRY & NEUROLOGY

## 2025-03-07 RX ADMIN — HYDROXYZINE HYDROCHLORIDE 100 MG: 50 TABLET, FILM COATED ORAL at 10:39

## 2025-03-07 RX ADMIN — FAMOTIDINE 20 MG: 20 TABLET, FILM COATED ORAL at 08:08

## 2025-03-07 RX ADMIN — DOCUSATE SODIUM 100 MG: 100 CAPSULE, LIQUID FILLED ORAL at 08:08

## 2025-03-07 RX ADMIN — PANTOPRAZOLE SODIUM 40 MG: 40 TABLET, DELAYED RELEASE ORAL at 08:08

## 2025-03-07 RX ADMIN — OXCARBAZEPINE 300 MG: 300 TABLET, FILM COATED ORAL at 08:12

## 2025-03-07 RX ADMIN — DICYCLOMINE HYDROCHLORIDE 20 MG: 20 TABLET ORAL at 08:07

## 2025-03-07 NOTE — NURSING NOTE
Pt discharged and accompanied by staff off of unit 3B at 1200. Pt was given all personal belongings, discharge instructions and acknowledges understanding of all expectations and education after discharge as well as followup recommendations.

## 2025-03-07 NOTE — NURSING NOTE
Pt's legs observed shaking and appears tense. Pt approached this writer and reported having anxiety for today's discharge. Loo scale completed with a score of 25/ severe. PRN Atarax 100 mg PO given at 1039.    1139, as per pt she felt better to go to art therapy but had to leave because she felt the group set her off. Pt reports feeling better after leaving group.

## 2025-03-07 NOTE — SOCIAL WORK
Pt called .  reported that she speaks with him every day on the phone.  asked some questions regarding after care and discharge planning.  expressed his feelings towards trying to manage Pt's mental health.  reported that he calls crisis when Pt begins to decompensate.  reported he is going to start to administer Pt's medications.        reported that the firearms are unloaded and secured in a finger print lock box.
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
## 2025-03-07 NOTE — BH TRANSITION RECORD
Contact Information: If you have any questions, concerns, pended studies, tests and/or procedures, or emergencies regarding your inpatient behavioral health visit. Please contact [facility] [phone]  and ask to speak to a , nurse or physician. A contact is available 24 hours/ 7 days a week at this number.     Summary of Procedures Performed During your Stay:  Below is a list of major procedures performed during your hospital stay and a summary of results:  - Cardiac Procedures/Studies: EKG.  Sinus tachycardia  Nonspecific T wave abnormality  Abnormal ECG  When compared with ECG of 12-Jan-2025 18:08,  Nonspecific T wave abnormality, worse in Anterior leads  Confirmed by Tata Guan ([id number]) on 2/24/2025 5:30:14 PM  Pending Studies (From admission, onward)      None          Please follow up on the above pending studies with your PCP and/or referring provider.

## 2025-03-07 NOTE — DISCHARGE INSTR - OTHER ORDERS
CRISIS INFORMATION  If you are experiencing a mental health emergency, you may call the [facility] Crisis Intervention Office 24 hours a day, 7 days per week at [phone].    In [facility], call [phone].    [facility] is a confidential 24/7 telephone support service manned by trained mental health consumers.  [facility] provides support, a listening ear and can provide information about available services.[facility] specializes in the concerns of mental health consumers, their families and friends.  However, we are also here for anyone who has a mental health concern, is confused about or just doesn't know anything about mental health or where to get information.  To reach [facility], call [phone].    HOW TO GET SUBSTANCE ABUSE HELP:  If you or someone you know has a drug or alcohol problem, there is help:  [facility]: [phone]  [facility]: [phone]  An assessment is the first step.   In addition to those listed there are other programs available in the area but assessment is best to determine an appropriate level of care.  If you DO NOT have Medical Assistance (MA) or Private Insurance, an assessment can be scheduled at one of these providers:  [facility]  [address] [place] [address]  [phone]   [facility]  [address] [place] [address]  [phone]   [address] [place] [address]  [phone]   [facility]  [address] [place] [address]  [phone]   [facility]  [address] [place] [address]  [phone]   [facility]  [address] [place] [address]  [phone]     If you HAVE Medical Assistance, an assessment can be scheduled at one of these providers:  [facility]  [address] [place] [address]  [phone]   [facility]  [address] [place] [address]  [phone]   [facility]  [address], [place] [address]  [phone]  [address] [place] [address], [address], [place] [address] [phone]   [address] [address]  [phone]   [address] [place] [address]  [phone]   [facility]  [address] [place] [place] [address]  [phone]   [facility]  [address] [place] [address]  [phone]   [facility]  [address] [place] [address]  [phone]   [facility]  [address] [place] [address]  [phone]     If you HAVE Private Insurance, an assessment can be scheduled at one of these providers.  Please contact these Providers to determine if they are in your network plan:  [facility]  [address] [facility] [facility] [address], [place] [address]  [phone]   [address] [address]  [phone]   [address] [place] [address]  [phone]   [facility]  [address] [place] [place] [address]  [phone]   [facility]  [address] [place] [address]  [phone]     From the [facility] website [place]opioid-epidemic/#GetNaloxone    How do I get naloxone?  Family members and friends can access naloxone by:    Obtaining a prescription from their family doctor  Using the standing order issued by [facility] Eileen Villavicencio. A standing order is a prescription written for the general public, rather than specifically for an individual.  To use the standing order, print it and take it with you to the pharmacy or have the digital version on your phone. Download the standing order from the Department of [facility] (PDF).    If you are unable to print  it or use the digital version, the standing order is kept on file at many pharmacies. If a pharmacy does not have it on file, they may have the ability to look it up.    Naloxone prescriptions can be filled at most pharmacies. Although the medication might not be available for same-day pickup, it often can be ordered and available within a day or two.
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
## 2025-03-07 NOTE — ASSESSMENT & PLAN NOTE
At this time patient appears to be reapproaching her baseline.  She is less disorganized and paranoid compared to when I saw her last week.  I imagine that after a couple of days with Zyprexa 30 mg patient will be able to be discharged home.  Will follow-up with social work regarding outpatient care.    -Trileptal 300 twice daily  -Zyprexa 30 mg at bedtime    
  At this time patient is continuing to gradually improve.  She is less irritable and less internally preoccupied.  No overt delusions or paranoia.  If continues to have good behaviors without any 302 -able criteria, then will plan for discharge on Friday    -Trileptal 300 twice daily  -Zyprexa 30 mg at bedtime    
  At this time patient is continuing to gradually improve.  She is less irritable and less internally preoccupied.  No overt delusions or paranoia.  If continues to have good behaviors without any 302 -able criteria, then will plan for discharge on Friday    -Trileptal 300 twice daily  -Zyprexa 30 mg at bedtime    
  At this time patient is continuing to gradually improve.  She remains irritable but behaviorally under control.  Is internally preoccupied but no overt delusions or paranoia.  If continues to have good behaviors without any 302 -able criteria, then will plan for discharge on Friday    -Trileptal 300 twice daily  -Zyprexa 30 mg at bedtime    
+ in childhood never got  treatment  Most recent HCV ab 4/19 highly reactive  No viral load HCV 4/10.  2021 noted that most likely cleared virus     PLAN:  Consider hepatitis panel  Follow up outpatient PCP  
-as above  
-as per family med  
-as per family med  -outpatient f/u  
-encourage cessation  -Nicotine replacement as as needed  
As per primary team  
History of PNES and has previously followed with outpatient neurolog  She was seen in neurology office April 2024 and most recently 10/7/2024  She is on Trileptal 300 mg by mouth twice daily for bipolar disorder also helps against seizures      PLAN:  Continue trileptal 300 mg BID  
Mild intermittent  Not in acute exacerbation  Home meds: symbicort daily, albuterol PRN      PLAN:  Continue home meds  
Patient is a 39-year-old woman with past medical history significant for chronic migraine, asthma and past psychiatric history significant for PTSD, psychogenic seizures, and schizoaffective disorder bipolar type.  Patient was brought to the hospital by crisis team due to disorganized and agitated where patient voiced suicidal ideation.  Patient is currently admitted to  for further psychiatric stabilization.    At this time patient appears acutely decompensated secondary her to her schizoaffective disorder.  At this time I am able to appreciate more psychotic symptoms compared to the manic and more bipolar type symptoms that were exhibited in the ED.  Patient is significantly internally preoccupied with very poor eye contact and seems paranoid.  Is minimally able to give any significant history regarding what brought her to the hospital or what has been going on.  Although patient endorses compliance with medications, given her lack of willingness to follow-up with her ICM or outpatient established by Monika, I suspect there may be a component of noncompliance with oral medications.  Patient was last stabilized on Trileptal and Zyprexa and I will continue both of those medications at this time. Pt endorsing nausea, home meds reconciled and reordered and family med to pt as consult today    -Trileptal 300 twice daily  -Zyprexa 15 mg at bedtime, increased to 30 mg tomorrow or the day after  -SW to f/u regarding outpatient needs  -f/u family medicine recs  
Patient is medically clear for psych admission  
Smokes PPD    PLAN:  NRT   
Stable    PLAN:  Lidocaine patch PRN  Tylenol 650 mg PRN q8  
Stable  Home meds: bentyl, colace      PLAN:  Continue home meds bentyl 20mg PRN, colace 100mg PRN  
Still paranoid and irritable/accusatory of staff.  -Trileptal 300 twice daily  -Zyprexa 20 mg at bedtime    
Still paranoid though less irritable/accusatory of staff.  -Trileptal 300 twice daily  -Zyprexa 20 mg at bedtime    
MILD

## 2025-03-07 NOTE — NURSING NOTE
Pt visible on unit in milieu and dayroom. She is social and appropriate with her peers. Medication and meal compliant. She denies SI/HI/AH/VH. Pt requested PRN for anxiety about today's discharge. No unmet needs.

## 2025-03-07 NOTE — DISCHARGE INSTR - APPOINTMENTS
Behavioral Health Nurse Navigator, Marlyn or Randa will be calling you after your discharge, on the phone number that you provided.  They will be available as an additional support, if needed.   If you wish to speak with Marlyn, you may contact her at [phone].

## 2025-03-07 NOTE — NURSING NOTE
Pt withdrawn to room, visible on unit only once this evening to request snack. Pt is constricted in affect, guarded. Pt denies SI/HI/AH/VH and is medication adherent.       PRN bentyl 20mg given at 21:26 for c/o 9/10 abdominal pain shortly after pt presented on unit requesting snack. PRN bentyl appears effective, pt currently resting in bed with eyes closed.

## 2025-03-07 NOTE — SOCIAL WORK
"Cm spoke with Pt regarding after care and discharge planning. Pt reported that she [facility] to [facility]. Pt signed CALLIE for PM. CM discussed the importance of an ICM and Pt proceeded to refuse a referral. Pt reported she does not like \"too many people\" involved with her life.   Pt signed CALLIE for  and reported that  is going to pick him up Pt tomorrow at 12:30 pm.   Pt reported that she is feeling better and plans on taking her medications when she is home.   "

## 2025-03-07 NOTE — PROGRESS NOTES
03/07/25 0838   Team Meeting   Meeting Type Daily Rounds   Team Members Present   Team Members Present Physician;Nurse;   Physician Team Member Jarett   [facility] Melodie   Patient/Family Present   Patient Present No   Patient's Family Present No     Pt is scheduled for discharge today.

## 2025-03-08 PROBLEM — R11.2 NAUSEA VOMITING AND DIARRHEA: Status: RESOLVED | Noted: 2025-02-06 | Resolved: 2025-03-08

## 2025-03-08 PROBLEM — R19.7 NAUSEA VOMITING AND DIARRHEA: Status: RESOLVED | Noted: 2025-02-06 | Resolved: 2025-03-08

## 2025-03-10 ENCOUNTER — TELEPHONE (OUTPATIENT)
Dept: GASTROENTEROLOGY | Facility: MEDICAL CENTER | Age: 40
End: 2025-03-10

## 2025-03-10 NOTE — TELEPHONE ENCOUNTER
Patient called [facility] pharmacy told her that the pantoprazole, famotidine and bentyl were discontinued by her doctor.    Patient made aware the medications above medication was refilled on 03.06.2025 from the hospital to  pharmacy [place]. Patient instructed to contact the pharmacy to obtain refills of medication. Patient verbalized understanding.

## 2025-03-19 ENCOUNTER — APPOINTMENT (EMERGENCY)
Dept: RADIOLOGY | Facility: HOSPITAL | Age: 40
End: 2025-03-19
Payer: COMMERCIAL

## 2025-03-19 ENCOUNTER — HOSPITAL ENCOUNTER (EMERGENCY)
Facility: HOSPITAL | Age: 40
Discharge: HOME | End: 2025-03-19
Attending: EMERGENCY MEDICINE
Payer: COMMERCIAL

## 2025-03-19 VITALS
RESPIRATION RATE: 18 BRPM | TEMPERATURE: 98 F | SYSTOLIC BLOOD PRESSURE: 132 MMHG | HEART RATE: 101 BPM | DIASTOLIC BLOOD PRESSURE: 74 MMHG | OXYGEN SATURATION: 100 %

## 2025-03-19 DIAGNOSIS — R07.9 CHEST PAIN, UNSPECIFIED: Primary | ICD-10-CM

## 2025-03-19 LAB
ALBUMIN SERPL BCG-MCNC: 4.8 G/DL (ref 3.5–5)
ALP SERPL-CCNC: 60 U/L (ref 34–104)
ALT SERPL W P-5'-P-CCNC: 9 U/L (ref 7–52)
ANION GAP SERPL CALCULATED.3IONS-SCNC: 7 MMOL/L (ref 4–13)
AST SERPL W P-5'-P-CCNC: 13 U/L (ref 13–39)
ATRIAL RATE: 99 BPM
BASOPHILS # BLD AUTO: 0.05 THOUSANDS/ÂΜL (ref 0–0.1)
BASOPHILS NFR BLD AUTO: 1 % (ref 0–1)
BILIRUB SERPL-MCNC: 0.3 MG/DL (ref 0.2–1)
BILIRUB UR QL STRIP: NEGATIVE
BUN SERPL-MCNC: 4 MG/DL (ref 5–25)
CALCIUM SERPL-MCNC: 9.9 MG/DL (ref 8.4–10.2)
CARDIAC TROPONIN I PNL SERPL HS: <2 NG/L (ref ?–50)
CHLORIDE SERPL-SCNC: 102 MMOL/L (ref 96–108)
CLARITY UR: CLEAR
CO2 SERPL-SCNC: 29 MMOL/L (ref 21–32)
COLOR UR: ABNORMAL
CREAT SERPL-MCNC: 0.73 MG/DL (ref 0.6–1.3)
EOSINOPHIL # BLD AUTO: 0.13 THOUSAND/ÂΜL (ref 0–0.61)
EOSINOPHIL NFR BLD AUTO: 2 % (ref 0–6)
ERYTHROCYTE [DISTWIDTH] IN BLOOD BY AUTOMATED COUNT: 12.4 % (ref 11.6–15.1)
GFR SERPL CREATININE-BSD FRML MDRD: 105 ML/MIN/1.73SQ M
GLUCOSE SERPL-MCNC: 92 MG/DL (ref 65–140)
GLUCOSE UR STRIP-MCNC: NEGATIVE MG/DL
HCT VFR BLD AUTO: 46.7 % (ref 34.8–46.1)
HGB BLD-MCNC: 15 G/DL (ref 11.5–15.4)
HGB UR QL STRIP.AUTO: NEGATIVE
IMM GRANULOCYTES # BLD AUTO: 0.03 THOUSAND/UL (ref 0–0.2)
IMM GRANULOCYTES NFR BLD AUTO: 0 % (ref 0–2)
KETONES UR STRIP-MCNC: NEGATIVE MG/DL
LEUKOCYTE ESTERASE UR QL STRIP: NEGATIVE
LYMPHOCYTES # BLD AUTO: 2.2 THOUSANDS/ÂΜL (ref 0.6–4.47)
LYMPHOCYTES NFR BLD AUTO: 26 % (ref 14–44)
MCH RBC QN AUTO: 32.3 PG (ref 26.8–34.3)
MCHC RBC AUTO-ENTMCNC: 32.1 G/DL (ref 31.4–37.4)
MCV RBC AUTO: 100 FL (ref 82–98)
MONOCYTES # BLD AUTO: 0.78 THOUSAND/ÂΜL (ref 0.17–1.22)
MONOCYTES NFR BLD AUTO: 9 % (ref 4–12)
NEUTROPHILS # BLD AUTO: 5.45 THOUSANDS/ÂΜL (ref 1.85–7.62)
NEUTS SEG NFR BLD AUTO: 62 % (ref 43–75)
NITRITE UR QL STRIP: NEGATIVE
NRBC BLD AUTO-RTO: 0 /100 WBCS
P AXIS: 72 DEGREES
PH UR STRIP.AUTO: 7 [PH]
PLATELET # BLD AUTO: 236 THOUSANDS/UL (ref 149–390)
PMV BLD AUTO: 10.2 FL (ref 8.9–12.7)
POTASSIUM SERPL-SCNC: 3.7 MMOL/L (ref 3.5–5.3)
PR INTERVAL: 134 MS
PROT SERPL-MCNC: 7.5 G/DL (ref 6.4–8.4)
PROT UR STRIP-MCNC: NEGATIVE MG/DL
QRS AXIS: 86 DEGREES
QRSD INTERVAL: 80 MS
QT INTERVAL: 348 MS
QTC INTERVAL: 446 MS
RBC # BLD AUTO: 4.65 MILLION/UL (ref 3.81–5.12)
SODIUM SERPL-SCNC: 138 MMOL/L (ref 135–147)
SP GR UR STRIP.AUTO: 1 (ref 1–1.03)
T WAVE AXIS: 58 DEGREES
UROBILINOGEN UR STRIP-ACNC: <2 MG/DL
VENTRICULAR RATE: 99 BPM
WBC # BLD AUTO: 8.64 THOUSAND/UL (ref 4.31–10.16)

## 2025-03-19 PROCEDURE — 93005 ELECTROCARDIOGRAM TRACING: CPT

## 2025-03-19 PROCEDURE — 93010 ELECTROCARDIOGRAM REPORT: CPT

## 2025-03-19 PROCEDURE — 36415 COLL VENOUS BLD VENIPUNCTURE: CPT

## 2025-03-19 PROCEDURE — 81003 URINALYSIS AUTO W/O SCOPE: CPT

## 2025-03-19 PROCEDURE — 99285 EMERGENCY DEPT VISIT HI MDM: CPT

## 2025-03-19 PROCEDURE — 71046 X-RAY EXAM CHEST 2 VIEWS: CPT

## 2025-03-19 PROCEDURE — 85025 COMPLETE CBC W/AUTO DIFF WBC: CPT

## 2025-03-19 PROCEDURE — 84484 ASSAY OF TROPONIN QUANT: CPT

## 2025-03-19 PROCEDURE — 80053 COMPREHEN METABOLIC PANEL: CPT

## 2025-03-19 NOTE — ED NOTES
Pt transported to XR at this time. Pt ambulated with a steady gait, no outward signs of distress noted by this RN.      Johny Bernabe RN  03/19/25 3659
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
## 2025-03-20 NOTE — ED NOTES
Pt left before given discharge instructions. Charge nurse and provider made aware. Pt ambulated with a steady gait, no signs of distress.      Johny Bernabe RN  03/19/25 2020

## 2025-03-21 ENCOUNTER — RESULTS FOLLOW-UP (OUTPATIENT)
Dept: EMERGENCY DEPT | Facility: HOSPITAL | Age: 40
End: 2025-03-21

## 2025-03-22 RX ORDER — AMOXICILLIN 500 MG/1
1000 CAPSULE ORAL EVERY 8 HOURS SCHEDULED
Qty: 30 CAPSULE | Refills: 0 | Status: SHIPPED | OUTPATIENT
Start: 2025-03-22 | End: 2025-03-27

## 2025-04-11 ENCOUNTER — DOCUMENTATION (OUTPATIENT)
Dept: CASE MANAGEMENT | Facility: HOSPITAL | Age: 40
End: 2025-04-11
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
## 2025-04-22 DIAGNOSIS — K21.9 GASTROESOPHAGEAL REFLUX DISEASE WITHOUT ESOPHAGITIS: ICD-10-CM

## 2025-04-22 DIAGNOSIS — K58.2 IRRITABLE BOWEL SYNDROME WITH BOTH CONSTIPATION AND DIARRHEA: ICD-10-CM

## 2025-04-22 DIAGNOSIS — R10.84 GENERALIZED ABDOMINAL PAIN: ICD-10-CM

## 2025-04-22 RX ORDER — DOCUSATE SODIUM 100 MG/1
100 CAPSULE, LIQUID FILLED ORAL 2 TIMES DAILY
Qty: 180 CAPSULE | Refills: 0 | Status: SHIPPED | OUTPATIENT
Start: 2025-04-22

## 2025-04-22 RX ORDER — DICYCLOMINE HCL 20 MG
20 TABLET ORAL 2 TIMES DAILY
Qty: 180 TABLET | Refills: 0 | Status: SHIPPED | OUTPATIENT
Start: 2025-04-22

## 2025-04-22 RX ORDER — FAMOTIDINE 20 MG/1
20 TABLET, FILM COATED ORAL 2 TIMES DAILY
Qty: 180 TABLET | Refills: 0 | Status: SHIPPED | OUTPATIENT
Start: 2025-04-22

## 2025-04-22 RX ORDER — PANTOPRAZOLE SODIUM 40 MG/1
40 TABLET, DELAYED RELEASE ORAL
Qty: 90 TABLET | Refills: 0 | Status: SHIPPED | OUTPATIENT
Start: 2025-04-22

## 2025-04-22 NOTE — TELEPHONE ENCOUNTER
Reason for call:   [x] Refill   [] Prior Auth  [x] Other: pharmacy change , patient has an upcoming appt 6/11/2025, previously order by inpatient dr     Office:   [] PCP/Provider -   [x] Specialty/Provider - Erika Barnard / [facility]     Medication:     pantoprazole (PROTONIX) 40 mg tablet       Dose/Frequency:  Take 1 tablet (40 mg total) by mouth daily before breakfast     Quantity: 90      Medication:     famotidine (PEPCID) 20 mg tablet       Dose/Frequency:  Take 1 tablet (20 mg total) by mouth 2 (two) times a day,     Quantity: 180      Medication: docusate sodium (COLACE) 100 mg capsule     Dose/Frequency:  Take 1 capsule (100 mg total) by mouth 2 (two) times a day,     Quantity: 180      Medication:     dicyclomine (BENTYL) 20 mg tablet       Dose/Frequency: : Take 1 tablet (20 mg total) by mouth 2 (two) times a day,     Quantity: 180        Pharmacy: [facility] - [facility] Ruiz - [address] [place] [address] Pharmacy   Does the patient have enough for 3 days?   [] Yes   [x] No - Send as HP to POD

## 2025-05-10 ENCOUNTER — HOSPITAL ENCOUNTER (EMERGENCY)
Facility: HOSPITAL | Age: 40
Discharge: HOME | End: 2025-05-11
Attending: EMERGENCY MEDICINE | Admitting: EMERGENCY MEDICINE
Payer: COMMERCIAL

## 2025-05-10 ENCOUNTER — APPOINTMENT (EMERGENCY)
Dept: CT IMAGING | Facility: HOSPITAL | Age: 40
End: 2025-05-10
Payer: COMMERCIAL

## 2025-05-10 VITALS
TEMPERATURE: 98.3 F | DIASTOLIC BLOOD PRESSURE: 56 MMHG | OXYGEN SATURATION: 98 % | RESPIRATION RATE: 16 BRPM | WEIGHT: 145.06 LBS | HEART RATE: 94 BPM | BODY MASS INDEX: 26.53 KG/M2 | SYSTOLIC BLOOD PRESSURE: 115 MMHG

## 2025-05-10 DIAGNOSIS — R10.9 ABDOMINAL PAIN: ICD-10-CM

## 2025-05-10 DIAGNOSIS — R11.2 NAUSEA AND VOMITING: ICD-10-CM

## 2025-05-10 DIAGNOSIS — N39.0 UTI (URINARY TRACT INFECTION): Primary | ICD-10-CM

## 2025-05-10 LAB
ALBUMIN SERPL BCG-MCNC: 4.2 G/DL (ref 3.5–5)
ALP SERPL-CCNC: 58 U/L (ref 34–104)
ALT SERPL W P-5'-P-CCNC: 9 U/L (ref 7–52)
ANION GAP SERPL CALCULATED.3IONS-SCNC: 7 MMOL/L (ref 4–13)
AST SERPL W P-5'-P-CCNC: 13 U/L (ref 13–39)
BACTERIA UR QL AUTO: ABNORMAL /HPF
BASOPHILS # BLD AUTO: 0.02 THOUSANDS/ÂΜL (ref 0–0.1)
BASOPHILS NFR BLD AUTO: 0 % (ref 0–1)
BILIRUB SERPL-MCNC: 0.37 MG/DL (ref 0.2–1)
BILIRUB UR QL STRIP: ABNORMAL
BUN SERPL-MCNC: 7 MG/DL (ref 5–25)
CALCIUM SERPL-MCNC: 8.9 MG/DL (ref 8.4–10.2)
CHLORIDE SERPL-SCNC: 102 MMOL/L (ref 96–108)
CLARITY UR: CLEAR
CO2 SERPL-SCNC: 25 MMOL/L (ref 21–32)
COLOR UR: ABNORMAL
CREAT SERPL-MCNC: 0.66 MG/DL (ref 0.6–1.3)
EOSINOPHIL # BLD AUTO: 0.05 THOUSAND/ÂΜL (ref 0–0.61)
EOSINOPHIL NFR BLD AUTO: 1 % (ref 0–6)
ERYTHROCYTE [DISTWIDTH] IN BLOOD BY AUTOMATED COUNT: 12.1 % (ref 11.6–15.1)
EXT PREGNANCY TEST URINE: NEGATIVE
EXT. CONTROL: NORMAL
GFR SERPL CREATININE-BSD FRML MDRD: 113 ML/MIN/1.73SQ M
GLUCOSE SERPL-MCNC: 94 MG/DL (ref 65–140)
GLUCOSE UR STRIP-MCNC: ABNORMAL MG/DL
HCT VFR BLD AUTO: 40.8 % (ref 34.8–46.1)
HGB BLD-MCNC: 14.2 G/DL (ref 11.5–15.4)
HGB UR QL STRIP.AUTO: NEGATIVE
IMM GRANULOCYTES # BLD AUTO: 0.04 THOUSAND/UL (ref 0–0.2)
IMM GRANULOCYTES NFR BLD AUTO: 0 % (ref 0–2)
KETONES UR STRIP-MCNC: ABNORMAL MG/DL
LEUKOCYTE ESTERASE UR QL STRIP: ABNORMAL
LIPASE SERPL-CCNC: 20 U/L (ref 11–82)
LYMPHOCYTES # BLD AUTO: 2.57 THOUSANDS/ÂΜL (ref 0.6–4.47)
LYMPHOCYTES NFR BLD AUTO: 26 % (ref 14–44)
MCH RBC QN AUTO: 32.3 PG (ref 26.8–34.3)
MCHC RBC AUTO-ENTMCNC: 34.8 G/DL (ref 31.4–37.4)
MCV RBC AUTO: 93 FL (ref 82–98)
MONOCYTES # BLD AUTO: 0.64 THOUSAND/ÂΜL (ref 0.17–1.22)
MONOCYTES NFR BLD AUTO: 6 % (ref 4–12)
MUCOUS THREADS UR QL AUTO: ABNORMAL
NEUTROPHILS # BLD AUTO: 6.77 THOUSANDS/ÂΜL (ref 1.85–7.62)
NEUTS SEG NFR BLD AUTO: 67 % (ref 43–75)
NITRITE UR QL STRIP: POSITIVE
NON-SQ EPI CELLS URNS QL MICRO: ABNORMAL /HPF
NRBC BLD AUTO-RTO: 0 /100 WBCS
PH UR STRIP.AUTO: 5 [PH] (ref 4.5–8)
PLATELET # BLD AUTO: 186 THOUSANDS/UL (ref 149–390)
PMV BLD AUTO: 10.2 FL (ref 8.9–12.7)
POTASSIUM SERPL-SCNC: 3.3 MMOL/L (ref 3.5–5.3)
PROT SERPL-MCNC: 6.8 G/DL (ref 6.4–8.4)
PROT UR STRIP-MCNC: >=300 MG/DL
RBC # BLD AUTO: 4.4 MILLION/UL (ref 3.81–5.12)
RBC #/AREA URNS AUTO: ABNORMAL /HPF
SODIUM SERPL-SCNC: 134 MMOL/L (ref 135–147)
SP GR UR STRIP.AUTO: 1.01 (ref 1–1.03)
UROBILINOGEN UR QL STRIP.AUTO: >=8 E.U./DL
WBC # BLD AUTO: 10.09 THOUSAND/UL (ref 4.31–10.16)
WBC #/AREA URNS AUTO: ABNORMAL /HPF

## 2025-05-10 PROCEDURE — 36415 COLL VENOUS BLD VENIPUNCTURE: CPT | Performed by: PHYSICIAN ASSISTANT

## 2025-05-10 PROCEDURE — 81001 URINALYSIS AUTO W/SCOPE: CPT

## 2025-05-10 PROCEDURE — 85025 COMPLETE CBC W/AUTO DIFF WBC: CPT | Performed by: PHYSICIAN ASSISTANT

## 2025-05-10 PROCEDURE — 96365 THER/PROPH/DIAG IV INF INIT: CPT

## 2025-05-10 PROCEDURE — 96361 HYDRATE IV INFUSION ADD-ON: CPT

## 2025-05-10 PROCEDURE — 87086 URINE CULTURE/COLONY COUNT: CPT | Performed by: PHYSICIAN ASSISTANT

## 2025-05-10 PROCEDURE — 81025 URINE PREGNANCY TEST: CPT | Performed by: PHYSICIAN ASSISTANT

## 2025-05-10 PROCEDURE — 99284 EMERGENCY DEPT VISIT MOD MDM: CPT

## 2025-05-10 PROCEDURE — 83690 ASSAY OF LIPASE: CPT | Performed by: PHYSICIAN ASSISTANT

## 2025-05-10 PROCEDURE — 74177 CT ABD & PELVIS W/CONTRAST: CPT

## 2025-05-10 PROCEDURE — 96375 TX/PRO/DX INJ NEW DRUG ADDON: CPT

## 2025-05-10 PROCEDURE — 99285 EMERGENCY DEPT VISIT HI MDM: CPT | Performed by: PHYSICIAN ASSISTANT

## 2025-05-10 PROCEDURE — 80053 COMPREHEN METABOLIC PANEL: CPT | Performed by: PHYSICIAN ASSISTANT

## 2025-05-10 RX ORDER — DICYCLOMINE HCL 20 MG
20 TABLET ORAL ONCE
Status: COMPLETED | OUTPATIENT
Start: 2025-05-11 | End: 2025-05-10

## 2025-05-10 RX ORDER — ACETAMINOPHEN 10 MG/ML
1000 INJECTION, SOLUTION INTRAVENOUS ONCE
Status: COMPLETED | OUTPATIENT
Start: 2025-05-10 | End: 2025-05-10

## 2025-05-10 RX ORDER — ONDANSETRON 2 MG/ML
4 INJECTION INTRAMUSCULAR; INTRAVENOUS ONCE
Status: COMPLETED | OUTPATIENT
Start: 2025-05-10 | End: 2025-05-10

## 2025-05-10 RX ADMIN — IOHEXOL 100 ML: 350 INJECTION, SOLUTION INTRAVENOUS at 21:59

## 2025-05-10 RX ADMIN — ONDANSETRON 4 MG: 2 INJECTION INTRAMUSCULAR; INTRAVENOUS at 20:21

## 2025-05-10 RX ADMIN — SODIUM CHLORIDE 1000 ML: 0.9 INJECTION, SOLUTION INTRAVENOUS at 20:20

## 2025-05-10 RX ADMIN — DICYCLOMINE HYDROCHLORIDE 20 MG: 20 TABLET ORAL at 23:52

## 2025-05-10 RX ADMIN — ACETAMINOPHEN 1000 MG: 10 INJECTION INTRAVENOUS at 20:23

## 2025-05-10 RX ADMIN — CEPHALEXIN 500 MG: 250 CAPSULE ORAL at 23:48

## 2025-05-11 RX ORDER — ONDANSETRON 4 MG/1
4 TABLET, ORALLY DISINTEGRATING ORAL EVERY 6 HOURS PRN
Qty: 4 TABLET | Refills: 0 | Status: SHIPPED | OUTPATIENT
Start: 2025-05-11

## 2025-05-11 RX ORDER — CEPHALEXIN 500 MG/1
500 CAPSULE ORAL 2 TIMES DAILY
Qty: 14 CAPSULE | Refills: 0 | Status: SHIPPED | OUTPATIENT
Start: 2025-05-11 | End: 2025-05-18

## 2025-05-11 NOTE — DISCHARGE INSTRUCTIONS
"  Pt visible in milieu. Minimally social with one select peer, but mostly withdrawn. Refused to go to community meeting, stating \"No, I'm comfortable right here where I'm at.\" Made sarcastic remark about another patient getting more attention, but redirected and reminded that each patient has unique needs and encouarged to focus on her own wellness. Pt later did attend therapeutic recreation group and participated. Pt stated in conversation that \"my female psychiatrist dropped me today and gave me to a man, Dr. Ricardo\" although documentation reflects that she made the request herself. Pt also stated \"I might be having hallucinations\". When asked to elaborate, she denied the sensation of someone speaking in her ear or hearing a distinct voice, and explained that instead, \"my brain finishes my sentences for me, like if I said 'I'm going to fix that machine' my thoughts keep repeated 'fix the machine, fix the machine,' like that.\" Discussed with patient that perhaps this is more like an intrusive thought than a hallucination, and patient agreed. BP elevated 180/68. After administration of scheduled 2000 meds, it had dropped to 130/54. Med-complaint. Cooperative. Continue with current treatment plan and recommendations. Continue to monitor and reassess symptoms. Monitor response to medications. Monitor progress towards treatment goals. Encourage groups and participation.   " Follow-up with PCP, [facility] [phone].   Return to ED if symptoms worsen, fail to improve, or develop as listed in follow-up section.  Symptomatic care as discussed.  Take full course of antibiotics as prescribed.

## 2025-05-11 NOTE — ED CARE HANDOFF
Emergency Department Sign Out Note        Sign out and transfer of care from Ty Elam PA-C. See Separate Emergency Department note.     The patient, Antionette Downing, was evaluated by the previous provider for abdominal pain, N/V.    Workup Completed:  Results Reviewed       Procedure Component Value Units Date/Time    Urine culture [[id number]] Collected: 05/10/25 2147    Lab Status: In process Specimen: Urine, Clean Catch Updated: 05/10/25 2153    Urine Microscopic [[id number]]  (Abnormal) Collected: 05/10/25 2030    Lab Status: Final result Specimen: Urine, Clean Catch Updated: 05/10/25 2119     RBC, UA 4-10 /hpf      WBC, UA 2-4 /hpf      Epithelial Cells Occasional /hpf      Bacteria, UA None Seen /hpf      MUCUS THREADS Innumerable    Comprehensive metabolic panel [[id number]]  (Abnormal) Collected: 05/10/25 2020    Lab Status: Final result Specimen: Blood from Arm, Right Updated: 05/10/25 2059     Sodium 134 mmol/L      Potassium 3.3 mmol/L      Chloride 102 mmol/L      CO2 25 mmol/L      ANION GAP 7 mmol/L      BUN 7 mg/dL      Creatinine 0.66 mg/dL      Glucose 94 mg/dL      Calcium 8.9 mg/dL      AST 13 U/L      ALT 9 U/L      Alkaline Phosphatase 58 U/L      Total Protein 6.8 g/dL      Albumin 4.2 g/dL      Total Bilirubin 0.37 mg/dL      eGFR 113 ml/min/1.73sq m     Narrative:      National Kidney Disease Foundation guidelines for Chronic Kidney Disease (CKD):     Stage 1 with normal or high GFR (GFR > 90 mL/min/1.73 square meters)    Stage 2 Mild CKD (GFR = 60-89 mL/min/1.73 square meters)    Stage 3A Moderate CKD (GFR = 45-59 mL/min/1.73 square meters)    Stage 3B Moderate CKD (GFR = 30-44 mL/min/1.73 square meters)    Stage 4 Severe CKD (GFR = 15-29 mL/min/1.73 square meters)    Stage 5 End Stage CKD (GFR <15 mL/min/1.73 square meters)  Note: GFR calculation is accurate only with a steady state creatinine    Lipase [[id number]]  (Normal) Collected: 05/10/25 2020    Lab Status: Final result  "Specimen: Blood from Arm, Right Updated: 05/10/25 2059     Lipase 20 u/L     CBC and differential [[id number]] Collected: 05/10/25 2020    Lab Status: Final result Specimen: Blood from Arm, Right Updated: 05/10/25 2040     WBC 10.09 Thousand/uL      RBC 4.40 Million/uL      Hemoglobin 14.2 g/dL      Hematocrit 40.8 %      MCV 93 fL      MCH 32.3 pg      MCHC 34.8 g/dL      RDW 12.1 %      MPV 10.2 fL      Platelets 186 Thousands/uL      nRBC 0 /100 WBCs      Segmented % 67 %      Immature Grans % 0 %      Lymphocytes % 26 %      Monocytes % 6 %      Eosinophils Relative 1 %      Basophils Relative 0 %      Absolute Neutrophils 6.77 Thousands/µL      Absolute Immature Grans 0.04 Thousand/uL      Absolute Lymphocytes 2.57 Thousands/µL      Absolute Monocytes 0.64 Thousand/µL      Eosinophils Absolute 0.05 Thousand/µL      Basophils Absolute 0.02 Thousands/µL     POCT pregnancy, urine [[id number]]  (Normal) Collected: 05/10/25 2038    Lab Status: Final result Updated: 05/10/25 2038     EXT Preg Test, Ur Negative     Control Valid    Urine Macroscopic, POC [[id number]]  (Abnormal) Collected: 05/10/25 2030    Lab Status: Final result Specimen: Urine Updated: 05/10/25 2031     Color, UA Orange     Clarity, UA Clear     pH, UA 5.0     Leukocytes, UA Large     Nitrite, UA Positive     Protein, UA >=300 mg/dl      Glucose,  (1/4%) mg/dl      Ketones, UA 15 (1+) mg/dl      Urobilinogen, UA >=8.0 E.U./dl      Bilirubin, UA Moderate     Occult Blood, UA Negative     Specific Gravity, UA 1.015    Narrative:      CLINITEK RESULT              ED Course / Workup Pending (followup):  Pending CT abd/pelvis    Per previous provider: \"Several days of nausea, vomiting, and lower abdominal pain.  She has tenderness across her bilateral lower abdomen on exam.  She is otherwise well-appearing and nontoxic.  She denies urinary symptoms at this time.  She reports her last menstrual period ended about 2-3 days ago, no current vaginal " "bleeding or abnormal discharge.     Lab workup reveals mild hypokalemia.  Her urine dip does show positive nitrites and leukocytes suggesting UTI, although urine micro is equivocal.  Will order urine culture.  Patient signed out to Marlin Zimmer PA-C pending CT abd/pelvis, dispo accordingly.\"      No data recorded        CT abdomen pelvis with contrast   Final Result by Rosalinda Wilder MD (05/10 9088)      No acute findings in the abdomen or pelvis.         Workstation performed: [id number]           Will give patient dicyclomine in ED for pain.  Will give patient dose of Keflex in ED for UTI.    Patient presents with lower abdominal pain/pelvic pain. Abdominal exam without peritoneal signs. No evidence of acute abdomen at this time. Well appearing. CT revealed: \"No acute findings in the abdomen or pelvis.\" Given work up low suspicion for acute hepatobiliary disease (including acute cholecystitis), acute pancreatitis (neg lipase), PUD and gastric perforation, acute infectious processes (hepatitis, pyelonephritis), acute appendicitis, bowel obstruction or viscus perforation, diverticulitis. Presentation not consistent with other acute, emergent causes of abdominal pain at this time.  Will send Keflex to pharmacy for treatment of UTI.  Patient states she is feeling better.  Discussed strict return precautions.  Recommended close follow-up with PCP.    Discussed findings from the visit with the patient.  We had a conversation regarding supportive care and indications for return.  Recommended appropriate follow-up.  Patient and/or family understand and agree with plan.    Portions of the record may have been created with voice recognition software. Occasional use of the incorrect word or \"sound a like\" substitutions may have occurred due to the inherent limitations of voice recognition software. Read the chart carefully and recognize, using context, where substitutions have occurred.       ED Course as of 05/11/25 0527 "   Sat May 10, 2025   2150 Sign out LIZZETH CARTER, 5 days of abdominal pain, n/v. Lower abdominal pain. No flank pain. No urinary symptoms. LMP 2 days ago. Urine dip concerning for UTI. Pending CT abdomen/pelvis.      Procedures  Medical Decision Making  Amount and/or Complexity of Data Reviewed  Labs: ordered.  Radiology: ordered.    Risk  Prescription drug management.            Disposition  Final diagnoses:   UTI (urinary tract infection)   Nausea and vomiting   Abdominal pain     Time reflects when diagnosis was documented in both MDM as applicable and the Disposition within this note       Time User Action Codes Description Comment    5/11/2025 12:06 AM Marlin Zimmer Add [N39.0] UTI (urinary tract infection)     5/11/2025 12:06 AM Marlin Zimmer Add [R11.2] Nausea and vomiting     5/11/2025 12:06 AM Marlin Zimmer Add [R10.9] Abdominal pain           ED Disposition       ED Disposition   Discharge    Condition   Stable    Date/Time   Sun May 11, 2025 12:06 AM    Comment   Antionette Downing discharge to home/self care.                   Follow-up Information       Follow up With Specialties Details Why Contact Info Additional Information    [facility]   [address] [place] [address] [phone] [facility], [address] [place] [address], [place] [address], [place], [place], [address] [phone]    [facility] Emergency Department Emergency Medicine  Return to ED if develop symptoms such as fever, chills, sweats, intense back pain, intense abdominal pain, persistent vomiting, dehydration, lightheaded/dizziness, loss of consciousness, chest pain, shortness of breath, blood in the urine, headache, vision changes, etc. [address] [place] [id number] [phone] [facility] Emergency Department, [address] [place] [address], [id number]          Discharge Medication List  as of 5/11/2025 12:22 AM        START taking these medications    Details   cephalexin (KEFLEX) 500 mg capsule Take 1 capsule (500 mg total) by mouth 2 (two) times a day for 7 days, Starting Sun 5/11/2025, Until Sun 5/18/2025, Normal      ondansetron (ZOFRAN-ODT) 4 mg disintegrating tablet Take 1 tablet (4 mg total) by mouth every 6 (six) hours as needed for nausea or vomiting, Starting Sun 5/11/2025, Normal           CONTINUE these medications which have NOT CHANGED    Details   albuterol (ACCUNEB) 1.25 MG/3ML nebulizer solution Take 1.25 mg by nebulization every 6 (six) hours as needed for wheezing, Historical Med      budesonide-formoterol (SYMBICORT) 80-4.5 MCG/ACT inhaler Inhale 2 puffs 2 (two) times a day Rinse mouth after use., Starting Thu 3/6/2025, Normal      dicyclomine (BENTYL) 20 mg tablet Take 1 tablet (20 mg total) by mouth 2 (two) times a day, Starting Tue 4/22/2025, Normal      docusate sodium (COLACE) 100 mg capsule Take 1 capsule (100 mg total) by mouth 2 (two) times a day, Starting Tue 4/22/2025, Normal      famotidine (PEPCID) 20 mg tablet Take 1 tablet (20 mg total) by mouth 2 (two) times a day, Starting Tue 4/22/2025, Normal      OLANZapine (ZyPREXA) 15 mg tablet Take 2 tablets (30 mg total) by mouth daily at bedtime, Starting Thu 3/6/2025, Normal      OXcarbazepine (TRILEPTAL) 300 mg tablet Take 1 tablet (300 mg total) by mouth every 12 (twelve) hours, Starting Thu 3/6/2025, Until Mon 5/5/2025, Normal      pantoprazole (PROTONIX) 40 mg tablet Take 1 tablet (40 mg total) by mouth daily before breakfast, Starting Tue 4/22/2025, Normal      Ventolin  (90 Base) MCG/ACT inhaler Inhale 2 puffs every 4 (four) hours as needed for wheezing, Starting Fri 12/6/2024, Normal           No discharge procedures on file.       ED Provider  Electronically Signed by     Marlin Zimmer PA-C  05/11/25 0522

## 2025-05-11 NOTE — ED PROVIDER NOTES
ED Disposition       None          Assessment & Plan       Medical Decision Making  Several days of nausea, vomiting, and lower abdominal pain.  She has tenderness across her bilateral lower abdomen on exam.  She is otherwise well-appearing and nontoxic.  She denies urinary symptoms at this time.  She reports her last menstrual period ended about 2-3 days ago, no current vaginal bleeding or abnormal discharge.    Lab workup reveals mild hypokalemia.  Her urine dip does show positive nitrites and leukocytes suggesting UTI, although urine micro is equivocal.  Will order urine culture.  Patient signed out to Marlin Zimmer PA-C pending CT abd/pelvis, dispo accordingly.    Amount and/or Complexity of Data Reviewed  Labs: ordered.  Radiology: ordered.    Risk  Prescription drug management.             Medications   acetaminophen (Ofirmev) injection 1,000 mg (0 mg Intravenous Stopped 5/10/25 2146)   ondansetron (ZOFRAN) injection 4 mg (4 mg Intravenous Given 5/10/25 2021)   sodium chloride 0.9 % bolus 1,000 mL (1,000 mL Intravenous New Bag 5/10/25 2020)       ED Risk Strat Scores                    No data recorded                            History of Present Illness       Chief Complaint   Patient presents with    Vomiting     Began 5 days ago. (+) abd pain       Past Medical History:   Diagnosis Date    Abnormal Pap smear of cervix     ADHD (attention deficit hyperactivity disorder)     Alcohol abuse     Ankle fracture     Anxiety     Arthritis     back    Asthma     Bipolar disorder (HCC)     Cellulitis     right side fac in 2014    Chronic pain disorder     Depression     Diverticulitis     Flank pain 08/16/2016    Hallucination     Hepatitis C     Hip disease 2006    reports she had fluid removed from right hip and received treatment with antibiotic     History of abnormal cervical Pap smear     History of multiple miscarriages     IBS (irritable bowel syndrome)     Infantile idiopathic scoliosis     Joint pain      "Kidney stone     Lactose intolerance     Low back pain     Myofascial pain syndrome     Peripheral neuropathy 2024    Poor dentition     Psychiatric illness     Psychosis (HCC)     PTSD (post-traumatic stress disorder)     Pyelonephritis affecting pregnancy     Right hand paresthesia     Right ovarian cyst     Schizoaffective disorder, bipolar type ([facility]) r/o bipolar with psychotic features 2024    Scoliosis     Seizures ([facility])     Self-injurious behavior     Sleep difficulties     Slow transit constipation     Substance abuse ([facility])     Suicide attempt ([facility])       Past Surgical History:   Procedure Laterality Date     SECTION  2016     SECTION  2014    HIP SURGERY      ORTHOPEDIC SURGERY      UT  DELIVERY ONLY N/A 2016    Procedure:  SECTION () REPEAT;  Surgeon: Kurt Connor MD;  Location: [facility] ;  Service: Obstetrics    UT LIG/TRNSXJ FLP TUBE ABDL/VAG APPR UNI/BI Bilateral 2016    Procedure: LIGATION/COAGULATION TUBAL;  Surgeon: Kurt Connor MD;  Location: [facility];  Service: Obstetrics      Family History   Problem Relation Age of Onset    Heart disease Maternal Aunt     Cancer Maternal Aunt     Diabetes Paternal Aunt     No Known Problems Mother     No Known Problems Father     No Known Problems Sister       Social History     Tobacco Use    Smoking status: Every Day     Current packs/day: 0.50     Average packs/day: 0.4 packs/day for 30.4 years (11.4 ttl pk-yrs)     Types: Cigarettes     Start date:      Passive exposure: Never    Smokeless tobacco: Never    Tobacco comments:     pt refused smoking cessation teaching.    Vaping Use    Vaping status: Former   Substance Use Topics    Alcohol use: Not Currently     Comment: Rare Use    Drug use: Not Currently     Types: Marijuana, Cocaine, \"Crack\" cocaine      E-Cigarette/Vaping    E-Cigarette Use Former User       E-Cigarette/Vaping Substances    Nicotine No     THC No  "    CBD No     Flavoring No     Other No     Unknown No       I have reviewed and agree with the history as documented.     Antionette is a 38 yo F presenting with approximately 5 days of bilateral lower abdominal pain as well as nausea, vomiting.  She did have diarrhea initially but this is improved.  She denies fevers, does note some chills.  No radiation of this pain to her back or flanks.  Denies urinary symptoms.  No known sick contacts.      History provided by:  Patient   used: No        Review of Systems   Constitutional:  Negative for chills and fever.   HENT:  Negative for congestion, rhinorrhea and sore throat.    Eyes:  Negative for pain and visual disturbance.   Respiratory:  Negative for cough, shortness of breath and wheezing.    Cardiovascular:  Negative for chest pain and palpitations.   Gastrointestinal:  Positive for abdominal pain, nausea and vomiting. Negative for diarrhea.   Genitourinary:  Negative for dysuria, frequency and urgency.   Musculoskeletal:  Negative for back pain, neck pain and neck stiffness.   Skin:  Negative for rash and wound.   Neurological:  Negative for dizziness, weakness, light-headedness and numbness.           Objective       ED Triage Vitals [05/10/25 1856]   Temperature Pulse Blood Pressure Respirations SpO2 Patient Position - Orthostatic VS   98.3 °F (36.8 °C) 105 137/75 16 98 % Sitting      Temp Source Heart Rate Source BP Location FiO2 (%) Pain Score    Oral -- Right arm -- --      Vitals      Date and Time Temp Pulse SpO2 Resp BP Pain Score FACES Pain Rating User   05/10/25 1856 98.3 °F (36.8 °C) 105 98 % 16 137/75 -- -- NG            Physical Exam  Constitutional:       General: She is not in acute distress.     Appearance: She is well-developed. She is not diaphoretic.   HENT:      Head: Normocephalic and atraumatic.      Right Ear: External ear normal.      Left Ear: External ear normal.   Eyes:      Extraocular Movements:      Right eye: Normal  extraocular motion.      Left eye: Normal extraocular motion.      Conjunctiva/sclera: Conjunctivae normal.      Pupils: Pupils are equal, round, and reactive to light.   Cardiovascular:      Rate and Rhythm: Normal rate and regular rhythm.   Pulmonary:      Effort: Pulmonary effort is normal. No accessory muscle usage or respiratory distress.      Breath sounds: No wheezing.   Abdominal:      General: Abdomen is flat. There is no distension.      Tenderness: There is abdominal tenderness.      Comments: Tenderness to palpation to bilateral lower abdomen.  No rigidity, rebound, or guarding.  No CVA tenderness.  Negative Herndon's.   Musculoskeletal:      Cervical back: Normal range of motion. No rigidity.   Skin:     General: Skin is warm and dry.      Capillary Refill: Capillary refill takes less than 2 seconds.      Findings: No erythema or rash.   Neurological:      Mental Status: She is alert and oriented to person, place, and time.      Motor: No abnormal muscle tone.      Coordination: Coordination normal.   Psychiatric:         Behavior: Behavior normal.         Thought Content: Thought content normal.         Judgment: Judgment normal.         Results Reviewed       Procedure Component Value Units Date/Time    Urine culture [[id number]] Collected: 05/10/25 2147    Lab Status: In process Specimen: Urine, Clean Catch Updated: 05/10/25 2153    Urine Microscopic [[id number]]  (Abnormal) Collected: 05/10/25 2030    Lab Status: Final result Specimen: Urine, Clean Catch Updated: 05/10/25 2119     RBC, UA 4-10 /hpf      WBC, UA 2-4 /hpf      Epithelial Cells Occasional /hpf      Bacteria, UA None Seen /hpf      MUCUS THREADS Innumerable    Comprehensive metabolic panel [[id number]]  (Abnormal) Collected: 05/10/25 2020    Lab Status: Final result Specimen: Blood from Arm, Right Updated: 05/10/25 2059     Sodium 134 mmol/L      Potassium 3.3 mmol/L      Chloride 102 mmol/L      CO2 25 mmol/L      ANION GAP 7 mmol/L       BUN 7 mg/dL      Creatinine 0.66 mg/dL      Glucose 94 mg/dL      Calcium 8.9 mg/dL      AST 13 U/L      ALT 9 U/L      Alkaline Phosphatase 58 U/L      Total Protein 6.8 g/dL      Albumin 4.2 g/dL      Total Bilirubin 0.37 mg/dL      eGFR 113 ml/min/1.73sq m     Narrative:      National Kidney Disease Foundation guidelines for Chronic Kidney Disease (CKD):     Stage 1 with normal or high GFR (GFR > 90 mL/min/1.73 square meters)    Stage 2 Mild CKD (GFR = 60-89 mL/min/1.73 square meters)    Stage 3A Moderate CKD (GFR = 45-59 mL/min/1.73 square meters)    Stage 3B Moderate CKD (GFR = 30-44 mL/min/1.73 square meters)    Stage 4 Severe CKD (GFR = 15-29 mL/min/1.73 square meters)    Stage 5 End Stage CKD (GFR <15 mL/min/1.73 square meters)  Note: GFR calculation is accurate only with a steady state creatinine    Lipase [[id number]]  (Normal) Collected: 05/10/25 2020    Lab Status: Final result Specimen: Blood from Arm, Right Updated: 05/10/25 2059     Lipase 20 u/L     CBC and differential [[id number]] Collected: 05/10/25 2020    Lab Status: Final result Specimen: Blood from Arm, Right Updated: 05/10/25 2040     WBC 10.09 Thousand/uL      RBC 4.40 Million/uL      Hemoglobin 14.2 g/dL      Hematocrit 40.8 %      MCV 93 fL      MCH 32.3 pg      MCHC 34.8 g/dL      RDW 12.1 %      MPV 10.2 fL      Platelets 186 Thousands/uL      nRBC 0 /100 WBCs      Segmented % 67 %      Immature Grans % 0 %      Lymphocytes % 26 %      Monocytes % 6 %      Eosinophils Relative 1 %      Basophils Relative 0 %      Absolute Neutrophils 6.77 Thousands/µL      Absolute Immature Grans 0.04 Thousand/uL      Absolute Lymphocytes 2.57 Thousands/µL      Absolute Monocytes 0.64 Thousand/µL      Eosinophils Absolute 0.05 Thousand/µL      Basophils Absolute 0.02 Thousands/µL     POCT pregnancy, urine [[id number]]  (Normal) Collected: 05/10/25 2038    Lab Status: Final result Updated: 05/10/25 2038     EXT Preg Test, Ur Negative     Control Valid     Urine Macroscopic, POC [[id number]]  (Abnormal) Collected: 05/10/25 2030    Lab Status: Final result Specimen: Urine Updated: 05/10/25 2031     Color, UA Orange     Clarity, UA Clear     pH, UA 5.0     Leukocytes, UA Large     Nitrite, UA Positive     Protein, UA >=300 mg/dl      Glucose,  (1/4%) mg/dl      Ketones, UA 15 (1+) mg/dl      Urobilinogen, UA >=8.0 E.U./dl      Bilirubin, UA Moderate     Occult Blood, UA Negative     Specific Gravity, UA 1.015    Narrative:      CLINITEK RESULT            CT abdomen pelvis with contrast    (Results Pending)       Procedures    ED Medication and Procedure Management   Prior to Admission Medications   Prescriptions Last Dose Informant Patient Reported? Taking?   OLANZapine (ZyPREXA) 15 mg tablet   No No   Sig: Take 2 tablets (30 mg total) by mouth daily at bedtime   OXcarbazepine (TRILEPTAL) 300 mg tablet   No No   Sig: Take 1 tablet (300 mg total) by mouth every 12 (twelve) hours   Ventolin  (90 Base) MCG/ACT inhaler   No No   Sig: Inhale 2 puffs every 4 (four) hours as needed for wheezing   albuterol (ACCUNEB) 1.25 MG/3ML nebulizer solution   Yes No   Sig: Take 1.25 mg by nebulization every 6 (six) hours as needed for wheezing   budesonide-formoterol (SYMBICORT) 80-4.5 MCG/ACT inhaler   No No   Sig: Inhale 2 puffs 2 (two) times a day Rinse mouth after use.   dicyclomine (BENTYL) 20 mg tablet   No No   Sig: Take 1 tablet (20 mg total) by mouth 2 (two) times a day   docusate sodium (COLACE) 100 mg capsule   No No   Sig: Take 1 capsule (100 mg total) by mouth 2 (two) times a day   famotidine (PEPCID) 20 mg tablet   No No   Sig: Take 1 tablet (20 mg total) by mouth 2 (two) times a day   pantoprazole (PROTONIX) 40 mg tablet   No No   Sig: Take 1 tablet (40 mg total) by mouth daily before breakfast      Facility-Administered Medications: None     Patient's Medications   Discharge Prescriptions    No medications on file     No discharge procedures on file.  ED  SEPSIS DOCUMENTATION            Ty Elam PA-C  05/10/25 7709

## 2025-05-13 LAB — BACTERIA UR CULT: NORMAL

## 2025-05-30 ENCOUNTER — HOSPITAL ENCOUNTER (EMERGENCY)
Facility: HOSPITAL | Age: 40
Discharge: HOME | End: 2025-05-31
Attending: EMERGENCY MEDICINE | Admitting: EMERGENCY MEDICINE
Payer: COMMERCIAL

## 2025-05-30 ENCOUNTER — APPOINTMENT (EMERGENCY)
Dept: RADIOLOGY | Facility: HOSPITAL | Age: 40
End: 2025-05-30
Payer: COMMERCIAL

## 2025-05-30 VITALS
SYSTOLIC BLOOD PRESSURE: 112 MMHG | RESPIRATION RATE: 16 BRPM | OXYGEN SATURATION: 100 % | TEMPERATURE: 98 F | HEART RATE: 87 BPM | DIASTOLIC BLOOD PRESSURE: 59 MMHG

## 2025-05-30 DIAGNOSIS — R10.9 ABDOMINAL PAIN: Primary | ICD-10-CM

## 2025-05-30 DIAGNOSIS — E87.1 HYPONATREMIA: ICD-10-CM

## 2025-05-30 LAB
ALBUMIN SERPL BCG-MCNC: 4.2 G/DL (ref 3.5–5)
ALP SERPL-CCNC: 61 U/L (ref 34–104)
ALT SERPL W P-5'-P-CCNC: 10 U/L (ref 7–52)
ANION GAP SERPL CALCULATED.3IONS-SCNC: 7 MMOL/L (ref 4–13)
ANION GAP SERPL CALCULATED.3IONS-SCNC: 8 MMOL/L (ref 4–13)
AST SERPL W P-5'-P-CCNC: 13 U/L (ref 13–39)
BACTERIA UR QL AUTO: NORMAL /HPF
BASOPHILS # BLD AUTO: 0.03 THOUSANDS/ÂΜL (ref 0–0.1)
BASOPHILS NFR BLD AUTO: 1 % (ref 0–1)
BILIRUB SERPL-MCNC: 0.27 MG/DL (ref 0.2–1)
BILIRUB UR QL STRIP: NEGATIVE
BUN SERPL-MCNC: 3 MG/DL (ref 5–25)
BUN SERPL-MCNC: 3 MG/DL (ref 5–25)
CALCIUM SERPL-MCNC: 8.1 MG/DL (ref 8.4–10.2)
CALCIUM SERPL-MCNC: 8.9 MG/DL (ref 8.4–10.2)
CHLORIDE SERPL-SCNC: 102 MMOL/L (ref 96–108)
CHLORIDE SERPL-SCNC: 96 MMOL/L (ref 96–108)
CLARITY UR: CLEAR
CO2 SERPL-SCNC: 21 MMOL/L (ref 21–32)
CO2 SERPL-SCNC: 22 MMOL/L (ref 21–32)
COLOR UR: YELLOW
CREAT SERPL-MCNC: 0.55 MG/DL (ref 0.6–1.3)
CREAT SERPL-MCNC: 0.64 MG/DL (ref 0.6–1.3)
EOSINOPHIL # BLD AUTO: 0.05 THOUSAND/ÂΜL (ref 0–0.61)
EOSINOPHIL NFR BLD AUTO: 1 % (ref 0–6)
ERYTHROCYTE [DISTWIDTH] IN BLOOD BY AUTOMATED COUNT: 11.6 % (ref 11.6–15.1)
EXT PREGNANCY TEST URINE: NEGATIVE
EXT. CONTROL: NORMAL
GFR SERPL CREATININE-BSD FRML MDRD: 114 ML/MIN/1.73SQ M
GFR SERPL CREATININE-BSD FRML MDRD: 120 ML/MIN/1.73SQ M
GLUCOSE SERPL-MCNC: 157 MG/DL (ref 65–140)
GLUCOSE SERPL-MCNC: 86 MG/DL (ref 65–140)
GLUCOSE UR STRIP-MCNC: NEGATIVE MG/DL
HCT VFR BLD AUTO: 40.3 % (ref 34.8–46.1)
HGB BLD-MCNC: 14.7 G/DL (ref 11.5–15.4)
HGB UR QL STRIP.AUTO: ABNORMAL
IMM GRANULOCYTES # BLD AUTO: 0.01 THOUSAND/UL (ref 0–0.2)
IMM GRANULOCYTES NFR BLD AUTO: 0 % (ref 0–2)
KETONES UR STRIP-MCNC: NEGATIVE MG/DL
LEUKOCYTE ESTERASE UR QL STRIP: NEGATIVE
LIPASE SERPL-CCNC: 27 U/L (ref 11–82)
LYMPHOCYTES # BLD AUTO: 1.34 THOUSANDS/ÂΜL (ref 0.6–4.47)
LYMPHOCYTES NFR BLD AUTO: 26 % (ref 14–44)
MCH RBC QN AUTO: 32.3 PG (ref 26.8–34.3)
MCHC RBC AUTO-ENTMCNC: 36.5 G/DL (ref 31.4–37.4)
MCV RBC AUTO: 89 FL (ref 82–98)
MONOCYTES # BLD AUTO: 0.65 THOUSAND/ÂΜL (ref 0.17–1.22)
MONOCYTES NFR BLD AUTO: 12 % (ref 4–12)
NEUTROPHILS # BLD AUTO: 3.15 THOUSANDS/ÂΜL (ref 1.85–7.62)
NEUTS SEG NFR BLD AUTO: 60 % (ref 43–75)
NITRITE UR QL STRIP: NEGATIVE
NON-SQ EPI CELLS URNS QL MICRO: NORMAL /HPF
NRBC BLD AUTO-RTO: 0 /100 WBCS
PH UR STRIP.AUTO: 7 [PH] (ref 4.5–8)
PLATELET # BLD AUTO: 194 THOUSANDS/UL (ref 149–390)
PMV BLD AUTO: 10.2 FL (ref 8.9–12.7)
POTASSIUM SERPL-SCNC: 3.4 MMOL/L (ref 3.5–5.3)
POTASSIUM SERPL-SCNC: 3.8 MMOL/L (ref 3.5–5.3)
PROT SERPL-MCNC: 7 G/DL (ref 6.4–8.4)
PROT UR STRIP-MCNC: NEGATIVE MG/DL
RBC # BLD AUTO: 4.55 MILLION/UL (ref 3.81–5.12)
RBC #/AREA URNS AUTO: NORMAL /HPF
SODIUM SERPL-SCNC: 126 MMOL/L (ref 135–147)
SODIUM SERPL-SCNC: 130 MMOL/L (ref 135–147)
SP GR UR STRIP.AUTO: 1.01 (ref 1–1.03)
UROBILINOGEN UR QL STRIP.AUTO: 0.2 E.U./DL
WBC # BLD AUTO: 5.23 THOUSAND/UL (ref 4.31–10.16)
WBC #/AREA URNS AUTO: NORMAL /HPF

## 2025-05-30 PROCEDURE — 36000 PLACE NEEDLE IN VEIN: CPT | Performed by: EMERGENCY MEDICINE

## 2025-05-30 PROCEDURE — 96366 THER/PROPH/DIAG IV INF ADDON: CPT

## 2025-05-30 PROCEDURE — 74177 CT ABD & PELVIS W/CONTRAST: CPT

## 2025-05-30 PROCEDURE — 85025 COMPLETE CBC W/AUTO DIFF WBC: CPT

## 2025-05-30 PROCEDURE — 96361 HYDRATE IV INFUSION ADD-ON: CPT

## 2025-05-30 PROCEDURE — 36415 COLL VENOUS BLD VENIPUNCTURE: CPT

## 2025-05-30 PROCEDURE — 81025 URINE PREGNANCY TEST: CPT

## 2025-05-30 PROCEDURE — 96375 TX/PRO/DX INJ NEW DRUG ADDON: CPT

## 2025-05-30 PROCEDURE — 80048 BASIC METABOLIC PNL TOTAL CA: CPT

## 2025-05-30 PROCEDURE — 99285 EMERGENCY DEPT VISIT HI MDM: CPT | Performed by: EMERGENCY MEDICINE

## 2025-05-30 PROCEDURE — 80053 COMPREHEN METABOLIC PANEL: CPT

## 2025-05-30 PROCEDURE — 96365 THER/PROPH/DIAG IV INF INIT: CPT

## 2025-05-30 PROCEDURE — 81001 URINALYSIS AUTO W/SCOPE: CPT

## 2025-05-30 PROCEDURE — 83690 ASSAY OF LIPASE: CPT

## 2025-05-30 PROCEDURE — 76942 ECHO GUIDE FOR BIOPSY: CPT | Performed by: EMERGENCY MEDICINE

## 2025-05-30 PROCEDURE — 99284 EMERGENCY DEPT VISIT MOD MDM: CPT

## 2025-05-30 RX ORDER — POTASSIUM CHLORIDE 14.9 MG/ML
20 INJECTION INTRAVENOUS ONCE
Status: COMPLETED | OUTPATIENT
Start: 2025-05-30 | End: 2025-05-30

## 2025-05-30 RX ORDER — HYDROMORPHONE HCL/PF 1 MG/ML
0.5 SYRINGE (ML) INJECTION ONCE
Status: COMPLETED | OUTPATIENT
Start: 2025-05-30 | End: 2025-05-30

## 2025-05-30 RX ORDER — ONDANSETRON 2 MG/ML
4 INJECTION INTRAMUSCULAR; INTRAVENOUS ONCE
Status: COMPLETED | OUTPATIENT
Start: 2025-05-30 | End: 2025-05-30

## 2025-05-30 RX ORDER — ONDANSETRON 4 MG/1
4 TABLET, FILM COATED ORAL EVERY 6 HOURS
Qty: 12 TABLET | Refills: 0 | Status: SHIPPED | OUTPATIENT
Start: 2025-05-30

## 2025-05-30 RX ADMIN — ONDANSETRON 4 MG: 2 INJECTION, SOLUTION INTRAMUSCULAR; INTRAVENOUS at 21:15

## 2025-05-30 RX ADMIN — POTASSIUM CHLORIDE 20 MEQ: 14.9 INJECTION, SOLUTION INTRAVENOUS at 20:00

## 2025-05-30 RX ADMIN — IOHEXOL 85 ML: 350 INJECTION, SOLUTION INTRAVENOUS at 20:07

## 2025-05-30 RX ADMIN — HYDROMORPHONE HYDROCHLORIDE 0.5 MG: 1 INJECTION, SOLUTION INTRAMUSCULAR; INTRAVENOUS; SUBCUTANEOUS at 21:15

## 2025-05-30 RX ADMIN — SODIUM CHLORIDE 1000 ML: 0.9 INJECTION, SOLUTION INTRAVENOUS at 20:01

## 2025-05-30 NOTE — ED PROVIDER NOTES
Time reflects when diagnosis was documented in both MDM as applicable and the Disposition within this note       Time User Action Codes Description Comment    5/30/2025 10:47 PM Lili Frey Add [R10.9] Abdominal pain     5/30/2025 10:47 PM Lili Frey Add [E87.1] Hyponatremia           ED Disposition       ED Disposition   Discharge    Condition   Stable    Date/Time   Fri May 30, 2025 11:25 PM    Comment   Antionette Downing discharge to home/self care.                   Assessment & Plan       Medical Decision Making  Amount and/or Complexity of Data Reviewed  Labs: ordered. Decision-making details documented in ED Course.  Radiology: ordered. Decision-making details documented in ED Course.    Risk  Prescription drug management.        ED Course as of 05/31/25 1244   Fri May 30, 2025   1840 Patient seen and evaluated by me  DDx: Likely IBS flare or functional abdominal pain.  Doubt urinary pathology in the absence of urinary symptoms.  Doubt ovarian or uterine pathology in the absence of vaginal symptoms.  No point tenderness to suggest biliary disease or appendicitis.  In early disease of the above mentioned, pain to be difficult to localize.  However, with 4 days of symptoms, pain should be more localizable.  Have considered C. difficile given recent antibiotics, but patient does report semiformed stools.  No risk factors for bacterial diarrheal illness.  Workup and plan: First nurse labs obtained reveal hyponatremia of 126 and borderline hypokalemia.  Will add on urinalysis, pregnancy test, and CT abdomen and pelvis.  Will give fluids and potassium and plan to recheck BMP prior to discharge to assess for normalization of electrolyte imbalances.   1841 CBC and differential  wnl   1843 Sodium(!): 126  134 previous.   2142 CT abdomen pelvis with contrast  IMPRESSION:     No acute abnormality in the abdomen or pelvis.        2256 Patient signed out to Dr. Pedro pending repeat BMP and likely discharge.        Medications   potassium chloride 20 mEq IVPB (premix) (0 mEq Intravenous Stopped 5/30/25 2200)   sodium chloride 0.9 % bolus 1,000 mL (0 mL Intravenous Stopped 5/30/25 2235)   iohexol (OMNIPAQUE) 350 MG/ML injection (SINGLE-DOSE) 85 mL (85 mL Intravenous Given 5/30/25 2007)   HYDROmorphone (DILAUDID) injection 0.5 mg (0.5 mg Intravenous Given 5/30/25 2115)   ondansetron (ZOFRAN) injection 4 mg (4 mg Intravenous Given 5/30/25 2115)       ED Risk Strat Scores                    No data recorded                            History of Present Illness       Chief Complaint   Patient presents with    Abdominal Pain     Pt. C/o lower right sized abd pain that radiates into the back, that started 4 days ago, +n/v/d. Also reports not eating for about 3 days       Past Medical History[1]   Past Surgical History[2]   Family History[3]   Social History[4]   E-Cigarette/Vaping    E-Cigarette Use Former User       E-Cigarette/Vaping Substances    Nicotine No     THC No     CBD No     Flavoring No     Other No     Unknown No       I have reviewed and agree with the history as documented.     Patient is a 39-year-old female, past medical history significant for bipolar disorder, diverticulitis, IBS, functional abdominal pain, presenting today for evaluation of abdominal pain.  Patient reports 3 days of sharp LLQ pain with radiation to the back.  She has not been able to tolerate solid intake over the past 4 days, she has been tolerating solid small sips of liquids intermittently but sometimes even has nausea and vomiting with this.  She reports in addition to nausea vomiting, also several episodes of diarrhea which are semiformed.  She denies any recent travel or suspicious food intake.  She does report having a UTI over the past month and taking antibiotics.  Last bowel movement was earlier today.  She does report that her pain is worse with movement and is resolved somewhat with resting.  She denies any fevers or chills over  the past 4 days.  She denies any vaginal or urinary symptoms currently.  History of salpingectomy, so denies chance of pregnancy.          Review of Systems   Constitutional:  Negative for chills, fatigue and fever.   HENT:  Negative for congestion.    Respiratory:  Negative for cough, chest tightness and shortness of breath.    Cardiovascular:  Negative for chest pain.   Gastrointestinal:  Positive for abdominal pain, diarrhea, nausea and vomiting.   Genitourinary:  Negative for difficulty urinating.   Musculoskeletal:  Positive for back pain.   Skin:  Negative for color change.   Neurological:  Negative for dizziness, syncope, weakness, numbness and headaches.           Objective       ED Triage Vitals [05/30/25 1657]   Temperature Pulse Blood Pressure Respirations SpO2 Patient Position - Orthostatic VS   98 °F (36.7 °C) 105 157/83 18 98 % Sitting      Temp Source Heart Rate Source BP Location FiO2 (%) Pain Score    Temporal Monitor Left arm -- 8      Vitals      Date and Time Temp Pulse SpO2 Resp BP Pain Score FACES Pain Rating User   05/30/25 2121 -- 87 100 % 16 112/59 -- -- BL   05/30/25 2115 -- -- -- -- -- 10 - Worst Possible Pain -- BL   05/30/25 1842 -- 88 -- -- -- -- -- AS   05/30/25 1657 98 °F (36.7 °C) 105 98 % 18 157/83 8 -- BK            Physical Exam  Vitals and nursing note reviewed.   Constitutional:       General: She is not in acute distress.     Appearance: Normal appearance. She is not ill-appearing or toxic-appearing.   HENT:      Head: Normocephalic and atraumatic.     Eyes:      General: No scleral icterus.     Extraocular Movements: Extraocular movements intact.       Cardiovascular:      Rate and Rhythm: Normal rate and regular rhythm.      Pulses: Normal pulses.      Heart sounds: Normal heart sounds. No murmur heard.  Pulmonary:      Effort: Pulmonary effort is normal. No respiratory distress.      Breath sounds: Normal breath sounds. No wheezing or rhonchi.   Abdominal:      General:  Abdomen is flat. There is no distension.      Palpations: Abdomen is soft.      Tenderness: There is no abdominal tenderness. There is no guarding or rebound.      Comments: Initially tender upon palpation of the pelvis which is fatigable.     Musculoskeletal:         General: Normal range of motion.      Cervical back: Normal range of motion.     Skin:     General: Skin is warm.      Capillary Refill: Capillary refill takes less than 2 seconds.     Neurological:      General: No focal deficit present.      Mental Status: She is alert.     Psychiatric:         Mood and Affect: Mood normal.         Behavior: Behavior normal.         Results Reviewed       Procedure Component Value Units Date/Time    Basic metabolic panel [[id number]]  (Abnormal) Collected: 05/30/25 2235    Lab Status: Final result Specimen: Blood from Arm, Left Updated: 05/30/25 2314     Sodium 130 mmol/L      Potassium 3.8 mmol/L      Chloride 102 mmol/L      CO2 21 mmol/L      ANION GAP 7 mmol/L      BUN 3 mg/dL      Creatinine 0.55 mg/dL      Glucose 86 mg/dL      Calcium 8.1 mg/dL      eGFR 120 ml/min/1.73sq m     Narrative:      National Kid[employer] Foundation guidelines for Chronic Kidney Disease (CKD):     Stage 1 with normal or high GFR (GFR > 90 mL/min/1.73 square meters)    Stage 2 Mild CKD (GFR = 60-89 mL/min/1.73 square meters)    Stage 3A Moderate CKD (GFR = 45-59 mL/min/1.73 square meters)    Stage 3B Moderate CKD (GFR = 30-44 mL/min/1.73 square meters)    Stage 4 Severe CKD (GFR = 15-29 mL/min/1.73 square meters)    Stage 5 End Stage CKD (GFR <15 mL/min/1.73 square meters)  Note: GFR calculation is accurate only with a steady state creatinine    POCT pregnancy, urine [[id number]]  (Normal) Collected: 05/30/25 1953    Lab Status: Final result Updated: 05/30/25 1953     EXT Preg Test, Ur Negative     Control Valid    Urine Microscopic [[id number]]  (Normal) Collected: 05/30/25 1907    Lab Status: Final result Specimen: Urine, Other  Updated: 05/30/25 1944     RBC, UA 1-2 /hpf      WBC, UA 1-2 /hpf      Epithelial Cells Occasional /hpf      Bacteria, UA Occasional /hpf     Urine Macroscopic, POC [[id number]]  (Abnormal) Collected: 05/30/25 1907    Lab Status: Final result Specimen: Urine Updated: 05/30/25 1909     Color, UA Yellow     Clarity, UA Clear     pH, UA 7.0     Leukocytes, UA Negative     Nitrite, UA Negative     Protein, UA Negative mg/dl      Glucose, UA Negative mg/dl      Ketones, UA Negative mg/dl      Urobilinogen, UA 0.2 E.U./dl      Bilirubin, UA Negative     Occult Blood, UA Moderate     Specific Gravity, UA 1.015    CBC and differential [[id number]] Collected: 05/30/25 1729    Lab Status: Final result Specimen: Blood from Hand, Right Updated: 05/30/25 1820     WBC 5.23 Thousand/uL      RBC 4.55 Million/uL      Hemoglobin 14.7 g/dL      Hematocrit 40.3 %      MCV 89 fL      MCH 32.3 pg      MCHC 36.5 g/dL      RDW 11.6 %      MPV 10.2 fL      Platelets 194 Thousands/uL      nRBC 0 /100 WBCs      Segmented % 60 %      Immature Grans % 0 %      Lymphocytes % 26 %      Monocytes % 12 %      Eosinophils Relative 1 %      Basophils Relative 1 %      Absolute Neutrophils 3.15 Thousands/µL      Absolute Immature Grans 0.01 Thousand/uL      Absolute Lymphocytes 1.34 Thousands/µL      Absolute Monocytes 0.65 Thousand/µL      Eosinophils Absolute 0.05 Thousand/µL      Basophils Absolute 0.03 Thousands/µL     Comprehensive metabolic panel [[id number]]  (Abnormal) Collected: 05/30/25 1729    Lab Status: Final result Specimen: Blood from Hand, Left Updated: 05/30/25 1812     Sodium 126 mmol/L      Potassium 3.4 mmol/L      Chloride 96 mmol/L      CO2 22 mmol/L      ANION GAP 8 mmol/L      BUN 3 mg/dL      Creatinine 0.64 mg/dL      Glucose 157 mg/dL      Calcium 8.9 mg/dL      AST 13 U/L      ALT 10 U/L      Alkaline Phosphatase 61 U/L      Total Protein 7.0 g/dL      Albumin 4.2 g/dL      Total Bilirubin 0.27 mg/dL      eGFR 114  ml/min/1.73sq m     Narrative:      National Kidney Disease Foundation guidelines for Chronic Kidney Disease (CKD):     Stage 1 with normal or high GFR (GFR > 90 mL/min/1.73 square meters)    Stage 2 Mild CKD (GFR = 60-89 mL/min/1.73 square meters)    Stage 3A Moderate CKD (GFR = 45-59 mL/min/1.73 square meters)    Stage 3B Moderate CKD (GFR = 30-44 mL/min/1.73 square meters)    Stage 4 Severe CKD (GFR = 15-29 mL/min/1.73 square meters)    Stage 5 End Stage CKD (GFR <15 mL/min/1.73 square meters)  Note: GFR calculation is accurate only with a steady state creatinine    Lipase [[id number]]  (Normal) Collected: 05/30/25 1729    Lab Status: Final result Specimen: Blood from Hand, Left Updated: 05/30/25 1812     Lipase 27 u/L             CT abdomen pelvis with contrast   Final Interpretation by Arnulfo Ziegler MD (05/30 2049)      No acute abnormality in the abdomen or pelvis.         Workstation performed: [id number]             Complex Venous Access Line    Date/Time: 5/30/2025 7:00 PM    Performed by: Lili Frey MD  Authorized by: Lili Frey MD    Patient location:  ED  Consent:     Consent obtained:  Verbal    Consent given by:  Patient    Risks discussed:  Bleeding, incorrect placement and infection    Alternatives discussed:  No treatment  Pre-procedure details:     Hand hygiene: Hand hygiene performed prior to insertion      Sterile barrier technique: All elements of maximal sterile technique followed      Skin preparation:  Alcohol    Skin preparation agent: Skin preparation agent completely dried prior to procedure    Procedure details:     Complex Venous Access Line Type: US Guided Peripheral IV      Peripheral IV Indications comment:  Difficulty obtaining IV access    Orientation:  Left    Location:  Arm    Catheter size:  20 gauge    Patient evaluated for contraindications to access (i.e. fistula, thrombosis, etc): Yes      Approach: percutaneous technique used      Sterile ultrasound techniques:  Sterile gel and sterile probe covers were used      Number of attempts:  3    Successful placement: yes    Post-procedure details:     Post-procedure:  Dressing applied    Assessment:  Blood return through all ports and free fluid flow    Patient tolerance of procedure:  Tolerated well, no immediate complications      ED Medication and Procedure Management   Prior to Admission Medications   Prescriptions Last Dose Informant Patient Reported? Taking?   OLANZapine (ZyPREXA) 15 mg tablet   No No   Sig: Take 2 tablets (30 mg total) by mouth daily at bedtime   OXcarbazepine (TRILEPTAL) 300 mg tablet   No No   Sig: Take 1 tablet (300 mg total) by mouth every 12 (twelve) hours   Ventolin  (90 Base) MCG/ACT inhaler   No No   Sig: Inhale 2 puffs every 4 (four) hours as needed for wheezing   albuterol (ACCUNEB) 1.25 MG/3ML nebulizer solution   Yes No   Sig: Take 1.25 mg by nebulization every 6 (six) hours as needed for wheezing   budesonide-formoterol (SYMBICORT) 80-4.5 MCG/ACT inhaler   No No   Sig: Inhale 2 puffs 2 (two) times a day Rinse mouth after use.   dicyclomine (BENTYL) 20 mg tablet   No No   Sig: Take 1 tablet (20 mg total) by mouth 2 (two) times a day   docusate sodium (COLACE) 100 mg capsule   No No   Sig: Take 1 capsule (100 mg total) by mouth 2 (two) times a day   famotidine (PEPCID) 20 mg tablet   No No   Sig: Take 1 tablet (20 mg total) by mouth 2 (two) times a day   ondansetron (ZOFRAN-ODT) 4 mg disintegrating tablet   No No   Sig: Take 1 tablet (4 mg total) by mouth every 6 (six) hours as needed for nausea or vomiting   pantoprazole (PROTONIX) 40 mg tablet   No No   Sig: Take 1 tablet (40 mg total) by mouth daily before breakfast      Facility-Administered Medications: None     Discharge Medication List as of 5/30/2025 11:33 PM        START taking these medications    Details   ondansetron (ZOFRAN) 4 mg tablet Take 1 tablet (4 mg total) by mouth every 6 (six) hours, Starting Fri 5/30/2025, Normal            CONTINUE these medications which have NOT CHANGED    Details   albuterol (ACCUNEB) 1.25 MG/3ML nebulizer solution Take 1.25 mg by nebulization every 6 (six) hours as needed for wheezing, Historical Med      budesonide-formoterol (SYMBICORT) 80-4.5 MCG/ACT inhaler Inhale 2 puffs 2 (two) times a day Rinse mouth after use., Starting Thu 3/6/2025, Normal      dicyclomine (BENTYL) 20 mg tablet Take 1 tablet (20 mg total) by mouth 2 (two) times a day, Starting Tue 4/22/2025, Normal      docusate sodium (COLACE) 100 mg capsule Take 1 capsule (100 mg total) by mouth 2 (two) times a day, Starting Tue 4/22/2025, Normal      famotidine (PEPCID) 20 mg tablet Take 1 tablet (20 mg total) by mouth 2 (two) times a day, Starting Tue 4/22/2025, Normal      OLANZapine (ZyPREXA) 15 mg tablet Take 2 tablets (30 mg total) by mouth daily at bedtime, Starting Thu 3/6/2025, Normal      ondansetron (ZOFRAN-ODT) 4 mg disintegrating tablet Take 1 tablet (4 mg total) by mouth every 6 (six) hours as needed for nausea or vomiting, Starting Sun 5/11/2025, Normal      OXcarbazepine (TRILEPTAL) 300 mg tablet Take 1 tablet (300 mg total) by mouth every 12 (twelve) hours, Starting Thu 3/6/2025, Until Mon 5/5/2025, Normal      pantoprazole (PROTONIX) 40 mg tablet Take 1 tablet (40 mg total) by mouth daily before breakfast, Starting Tue 4/22/2025, Normal      Ventolin  (90 Base) MCG/ACT inhaler Inhale 2 puffs every 4 (four) hours as needed for wheezing, Starting Fri 12/6/2024, Normal           Outpatient Discharge Orders   Basic metabolic panel   Standing Status: Future Standing Exp. Date: 07/30/26     ED SEPSIS DOCUMENTATION   Time reflects when diagnosis was documented in both MDM as applicable and the Disposition within this note       Time User Action Codes Description Comment    5/30/2025 10:47 PM Lili Frey Add [R10.9] Abdominal pain     5/30/2025 10:47 PM Lili Frey Add [E87.1] Hyponatremia                    Lili  MD Shante  25 1233         [1]   Past Medical History:  Diagnosis Date    Abnormal Pap smear of cervix     ADHD (attention deficit hyperactivity disorder)     Alcohol abuse     Ankle fracture     Anxiety     Arthritis     back    Asthma     Bipolar disorder (HCC)     Cellulitis     right side fac in     Chronic pain disorder     Depression     Diverticulitis     Flank pain 2016    Hallucination     Hepatitis C     Hip disease 2006    reports she had fluid removed from right hip and received treatment with antibiotic     History of abnormal cervical Pap smear     History of multiple miscarriages     IBS (irritable bowel syndrome)     Infantile idiopathic scoliosis     Joint pain     Kidney stone     Lactose intolerance     Low back pain     Myofascial pain syndrome     Peripheral neuropathy 2024    Poor dentition     Psychiatric illness     Psychosis (HCC)     PTSD (post-traumatic stress disorder)     Pyelonephritis affecting pregnancy     Right hand paresthesia     Right ovarian cyst     Schizoaffective disorder, bipolar type ([facility]) r/o bipolar with psychotic features 2024    Scoliosis     Seizures ([facility])     Self-injurious behavior     Sleep difficulties     Slow transit constipation     Substance abuse (HCC)     Suicide attempt ([facility])    [2]   Past Surgical History:  Procedure Laterality Date     SECTION  2016     SECTION  2014    HIP SURGERY      ORTHOPEDIC SURGERY      MS  DELIVERY ONLY N/A 2016    Procedure:  SECTION () REPEAT;  Surgeon: Kurt Connor MD;  Location: [facility] ;  Service: Obstetrics    MS LIG/TRNSXJ FLP TUBE ABDL/VAG APPR UNI/BI Bilateral 2016    Procedure: LIGATION/COAGULATION TUBAL;  Surgeon: Kurt Connor MD;  Location: [facility];  Service: Obstetrics   [3]   Family History  Problem Relation Name Age of Onset    Heart disease Maternal Aunt      Cancer Maternal Aunt      Diabetes Paternal Aunt    "   No Known Problems Mother      No Known Problems Father      No Known Problems Sister     [4]   Social History  Tobacco Use    Smoking status: Every Day     Current packs/day: 0.50     Average packs/day: 0.4 packs/day for 30.4 years (11.5 ttl pk-yrs)     Types: Cigarettes     Start date: 2010     Passive exposure: Never    Smokeless tobacco: Never    Tobacco comments:     pt refused smoking cessation teaching.    Vaping Use    Vaping status: Former   Substance Use Topics    Alcohol use: Not Currently     Comment: Rare Use    Drug use: Not Currently     Types: Marijuana, Cocaine, \"Crack\" cocaine        Lili Frey MD  05/31/25 1244    "

## 2025-05-30 NOTE — ED NOTES
Attempts for IV by RN unsuccessful, provider aware of need for US IV.     Sumanth Mueller, TORRIE  05/30/25 1918

## 2025-05-31 NOTE — DISCHARGE INSTRUCTIONS
You have been seen in the emergency department for evaluation of pain.  Your workup today was normal.  Your labs did show a low sodium level of 126.  It did normalize after receiving fluids.  Please follow-up with your primary care doctor within the next few days regarding need for repeat testing.  Return to the emergency department should you experience worsening of condition or new worrisome symptoms.  Otherwise please follow-up with your GI doctor.

## 2025-06-03 NOTE — ED ATTENDING ATTESTATION
5/30/2025  I, Priti Munson DO, saw and evaluated the patient. I have discussed the patient with the resident/non-physician practitioner and agree with the resident's/non-physician practitioner's findings, Plan of Care, and MDM as documented in the resident's/non-physician practitioner's note, except where noted. All available labs and Radiology studies were reviewed.  I was present for key portions of any procedure(s) performed by the resident/non-physician practitioner and I was immediately available to provide assistance.       At this point I agree with the current assessment done in the Emergency Department.  I have conducted an independent evaluation of this patient a history and physical is as follows:    39-year-old female presents with abdominal pain.  Patient has had 3 days of sharp left lower quadrant pain that radiates to her back.  Has had diarrhea, nausea, vomiting.  No fevers or chills.  On exam-no acute distress, heart regular, no respiratory distress, abdomen soft with mild tenderness on the left side.  Plan-unclear etiology of the pain.  Has no urinary or GYN complaints.  Will do labs, check urine, CT abdomen and reassess    ED Course         Critical Care Time  Procedures

## 2025-06-09 ENCOUNTER — DOCUMENTATION (OUTPATIENT)
Dept: CASE MANAGEMENT | Facility: HOSPITAL | Age: 40
End: 2025-06-09
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
## 2025-06-11 ENCOUNTER — OFFICE VISIT (OUTPATIENT)
Dept: GASTROENTEROLOGY | Facility: MEDICAL CENTER | Age: 40
End: 2025-06-11
Payer: COMMERCIAL

## 2025-06-11 VITALS
HEART RATE: 85 BPM | BODY MASS INDEX: 27.11 KG/M2 | WEIGHT: 148.2 LBS | OXYGEN SATURATION: 98 % | DIASTOLIC BLOOD PRESSURE: 74 MMHG | SYSTOLIC BLOOD PRESSURE: 126 MMHG | TEMPERATURE: 96.5 F

## 2025-06-11 DIAGNOSIS — K58.2 IRRITABLE BOWEL SYNDROME WITH BOTH CONSTIPATION AND DIARRHEA: Primary | ICD-10-CM

## 2025-06-11 DIAGNOSIS — K21.9 GASTROESOPHAGEAL REFLUX DISEASE WITHOUT ESOPHAGITIS: ICD-10-CM

## 2025-06-11 PROCEDURE — 99214 OFFICE O/P EST MOD 30 MIN: CPT | Performed by: STUDENT IN AN ORGANIZED HEALTH CARE EDUCATION/TRAINING PROGRAM

## 2025-06-11 RX ORDER — OMEPRAZOLE 40 MG/1
40 CAPSULE, DELAYED RELEASE ORAL DAILY
Qty: 30 CAPSULE | Refills: 11 | Status: SHIPPED | OUTPATIENT
Start: 2025-06-11

## 2025-06-11 RX ORDER — WHEAT DEXTRIN/ASPARTAME 3 G/6 G
1 POWDER IN PACKET (EA) ORAL DAILY
Qty: 30 EACH | Refills: 11 | Status: SHIPPED | OUTPATIENT
Start: 2025-06-11

## 2025-06-11 RX ORDER — DICYCLOMINE HYDROCHLORIDE 10 MG/1
10 CAPSULE ORAL
Qty: 120 CAPSULE | Refills: 11 | Status: SHIPPED | OUTPATIENT
Start: 2025-06-11

## 2025-06-11 RX ORDER — CIMETIDINE 400 MG
400 TABLET ORAL 2 TIMES DAILY PRN
Qty: 60 TABLET | Refills: 11 | Status: SHIPPED | OUTPATIENT
Start: 2025-06-11

## 2025-06-13 NOTE — ASSESSMENT & PLAN NOTE
- Symptoms likely due to IBS flares rather than diverticulitis, as confirmed by recent CAT scans being negative for diverticulitis.  - Try benefiber to regulate bowels (doesn't like metamucil).  - Monitor diet for potential triggers such as lactose, gluten, and fatty or greasy foods, and avoid them.  - Consume smaller, more frequent meals.  - Maintain loose bowel movements to prevent constipation-related pain.  - If no improvement after a few weeks of daily use, discontinue Benefiber.  - Adjust dicyclomine dosage to 10 mg, up to 4 times daily, which can be taken 30 minutes before meals to manage postprandial pain.  Orders:    dicyclomine (BENTYL) 10 mg capsule; Take 1 capsule (10 mg total) by mouth 4 (four) times a day (before meals and at bedtime)    wheat dextrin (BENEFIBER); Take 1 packet by mouth daily

## 2025-06-13 NOTE — PROGRESS NOTES
Name: Antionette Downing      : 1985      MRN: [id number]  Encounter Provider: Erika Barnard MD  Encounter Date: 2025   Encounter department: [facility]  :  Assessment & Plan  Irritable bowel syndrome with both constipation and diarrhea  - Symptoms likely due to IBS flares rather than diverticulitis, as confirmed by recent CAT scans being negative for diverticulitis.  - Try benefiber to regulate bowels (doesn't like metamucil).  - Monitor diet for potential triggers such as lactose, gluten, and fatty or greasy foods, and avoid them.  - Consume smaller, more frequent meals.  - Maintain loose bowel movements to prevent constipation-related pain.  - If no improvement after a few weeks of daily use, discontinue Benefiber.  - Adjust dicyclomine dosage to 10 mg, up to 4 times daily, which can be taken 30 minutes before meals to manage postprandial pain.  Orders:    dicyclomine (BENTYL) 10 mg capsule; Take 1 capsule (10 mg total) by mouth 4 (four) times a day (before meals and at bedtime)    wheat dextrin (BENEFIBER); Take 1 packet by mouth daily    Gastroesophageal reflux disease without esophagitis  - Current medications, pantoprazole and famotidine, appear to be ineffective.  - Prescription for omeprazole, to be taken first thing in the morning on an empty stomach, 30 minutes before breakfast or coffee.  - Monitor diet for potential triggers such as lactose, gluten, and fatty or greasy foods, and avoid them.  - Consume smaller, more frequent meals.  - If omeprazole proves ineffective, consider an appeal to insurance for Nexium coverage.  - Replace famotidine with cimetidine, to be taken twice daily as needed.  Orders:    cimetidine (TAGAMET) 400 mg tablet; Take 1 tablet (400 mg total) by mouth 2 (two) times a day as needed (heartburn or reflux)    omeprazole (PriLOSEC) 40 MG capsule; Take 1 capsule (40 mg total) by mouth daily      History of Present Illness      History of Present Illness  The patient is a 39-year-old female who presents for evaluation of abdominal pain.    She reports experiencing significant abdominal discomfort, characterized by a sensation of pressure and stabbing pain in the left lower quadrant. This pain is often triggered by meals and is occasionally alleviated by passing gas or having a bowel movement. She has daily bowel movements, which are sometimes watery and other times soft. She reports no presence of blood in her stool or on wiping, and no fevers.     She has been managing her symptoms with famotidine and Protonix, but these medications do not seem to provide much relief. Missing a dose can sometimes exacerbate her symptoms. She also takes dicyclomine, a medication she has been on for an extended period. She recalls that during her stay in a psychiatric hospital, she was prescribed this medication four times daily. She reports no recent changes to her psychiatric medications and feels that they are effectively managing her condition.     She does not report any weight loss. She has tried Metamucil packets but found them unpalatable. She has been consuming black coffee and avoiding milk due to lactose intolerance. She occasionally eats cereal at night and finds that her stomach is calmer during this time. She has noticed that consuming fried chicken with canola oil causes her to experience squeezing sensations.    She also experiences heartburn after certain meals.      Review of Systems A complete review of systems is negative other than that noted above in the HPI.      Current Medications[1]  Objective   /74 (BP Location: Left arm)   Pulse 85   Temp (!) 96.5 °F (35.8 °C)   Wt 67.2 kg (148 lb 3.2 oz)   SpO2 98%   BMI 27.11 kg/m²     Physical Exam  Vitals and nursing note reviewed.   Constitutional:       General: She is not in acute distress.     Appearance: She is well-developed.   HENT:      Head: Normocephalic and  atraumatic.     Eyes:      Conjunctiva/sclera: Conjunctivae normal.       Cardiovascular:      Rate and Rhythm: Normal rate and regular rhythm.      Heart sounds: No murmur heard.  Pulmonary:      Effort: Pulmonary effort is normal. No respiratory distress.      Breath sounds: Normal breath sounds.   Abdominal:      Palpations: Abdomen is soft.      Tenderness: There is no abdominal tenderness.     Musculoskeletal:         General: No swelling.      Cervical back: Neck supple.     Skin:     General: Skin is warm and dry.      Capillary Refill: Capillary refill takes less than 2 seconds.     Neurological:      Mental Status: She is alert.     Psychiatric:         Mood and Affect: Mood normal.        Results  Imaging   - CAT scan: No diverticulitis  Lab Results: I personally reviewed relevant lab results. CBC/BMP: No new results in last 24 hours. , LFTs: No new results in last 24 hours.     Radiology Results Review: I personally reviewed the following image studies in PACS and associated radiology reports: CT abdomen/pelvis. My interpretation of the radiology images/reports is: no diverticulitis.  Results for orders placed during the hospital encounter of 12/08/22    EGD    Impression  Normal upper endoscopy.  Biopsies taken for celiac disease and HP as above.    RECOMMENDATION:  Follow up biopsies. Proceed to colonoscopy now.            Ruel Diaz MD             [1]   Current Outpatient Medications   Medication Sig Dispense Refill    albuterol (ACCUNEB) 1.25 MG/3ML nebulizer solution Take 1.25 mg by nebulization every 6 (six) hours as needed for wheezing      budesonide-formoterol (SYMBICORT) 80-4.5 MCG/ACT inhaler Inhale 2 puffs 2 (two) times a day Rinse mouth after use. 10.2 g 0    cimetidine (TAGAMET) 400 mg tablet Take 1 tablet (400 mg total) by mouth 2 (two) times a day as needed (heartburn or reflux) 60 tablet 11    dicyclomine (BENTYL) 10 mg capsule Take 1 capsule (10 mg total) by mouth 4 (four) times a day  (before meals and at bedtime) 120 capsule 11    docusate sodium (COLACE) 100 mg capsule Take 1 capsule (100 mg total) by mouth 2 (two) times a day 180 capsule 0    famotidine (PEPCID) 20 mg tablet Take 1 tablet (20 mg total) by mouth 2 (two) times a day 180 tablet 0    OLANZapine (ZyPREXA) 15 mg tablet Take 2 tablets (30 mg total) by mouth daily at bedtime 60 tablet 1    omeprazole (PriLOSEC) 40 MG capsule Take 1 capsule (40 mg total) by mouth daily 30 capsule 11    ondansetron (ZOFRAN) 4 mg tablet Take 1 tablet (4 mg total) by mouth every 6 (six) hours 12 tablet 0    ondansetron (ZOFRAN-ODT) 4 mg disintegrating tablet Take 1 tablet (4 mg total) by mouth every 6 (six) hours as needed for nausea or vomiting 4 tablet 0    Ventolin  (90 Base) MCG/ACT inhaler Inhale 2 puffs every 4 (four) hours as needed for wheezing 18 g 0    wheat dextrin (BENEFIBER) Take 1 packet by mouth daily 30 each 11    OXcarbazepine (TRILEPTAL) 300 mg tablet Take 1 tablet (300 mg total) by mouth every 12 (twelve) hours 60 tablet 1     No current facility-administered medications for this visit.

## 2025-07-04 ENCOUNTER — APPOINTMENT (EMERGENCY)
Dept: CT IMAGING | Facility: HOSPITAL | Age: 40
End: 2025-07-04
Payer: COMMERCIAL

## 2025-07-04 ENCOUNTER — HOSPITAL ENCOUNTER (EMERGENCY)
Facility: HOSPITAL | Age: 40
Discharge: HOME | End: 2025-07-05
Attending: EMERGENCY MEDICINE
Payer: COMMERCIAL

## 2025-07-04 DIAGNOSIS — Z65.8 DOMESTIC CONCERNS: ICD-10-CM

## 2025-07-04 DIAGNOSIS — R93.89 ABNORMAL CT SCAN: ICD-10-CM

## 2025-07-04 DIAGNOSIS — K44.9 HIATAL HERNIA: ICD-10-CM

## 2025-07-04 DIAGNOSIS — E87.1 HYPONATREMIA: ICD-10-CM

## 2025-07-04 DIAGNOSIS — R10.9 ABDOMINAL PAIN: Primary | ICD-10-CM

## 2025-07-04 LAB
ALBUMIN SERPL BCG-MCNC: 4.3 G/DL (ref 3.5–5)
ALP SERPL-CCNC: 54 U/L (ref 34–104)
ALT SERPL W P-5'-P-CCNC: 7 U/L (ref 7–52)
ANION GAP SERPL CALCULATED.3IONS-SCNC: 7 MMOL/L (ref 4–13)
AST SERPL W P-5'-P-CCNC: 12 U/L (ref 13–39)
BASOPHILS # BLD AUTO: 0.03 THOUSANDS/ÂΜL (ref 0–0.1)
BASOPHILS NFR BLD AUTO: 0 % (ref 0–1)
BILIRUB SERPL-MCNC: 0.36 MG/DL (ref 0.2–1)
BILIRUB UR QL STRIP: NEGATIVE
BUN SERPL-MCNC: 5 MG/DL (ref 5–25)
CALCIUM SERPL-MCNC: 8.5 MG/DL (ref 8.4–10.2)
CHLORIDE SERPL-SCNC: 96 MMOL/L (ref 96–108)
CLARITY UR: CLEAR
CO2 SERPL-SCNC: 22 MMOL/L (ref 21–32)
COLOR UR: NORMAL
CREAT SERPL-MCNC: 0.69 MG/DL (ref 0.6–1.3)
EOSINOPHIL # BLD AUTO: 0.09 THOUSAND/ÂΜL (ref 0–0.61)
EOSINOPHIL NFR BLD AUTO: 1 % (ref 0–6)
ERYTHROCYTE [DISTWIDTH] IN BLOOD BY AUTOMATED COUNT: 11.8 % (ref 11.6–15.1)
EXT PREGNANCY TEST URINE: NEGATIVE
EXT. CONTROL: NORMAL
GFR SERPL CREATININE-BSD FRML MDRD: 111 ML/MIN/1.73SQ M
GLUCOSE SERPL-MCNC: 85 MG/DL (ref 65–140)
GLUCOSE UR STRIP-MCNC: NEGATIVE MG/DL
HCT VFR BLD AUTO: 38.5 % (ref 34.8–46.1)
HGB BLD-MCNC: 13.9 G/DL (ref 11.5–15.4)
HGB UR QL STRIP.AUTO: NEGATIVE
IMM GRANULOCYTES # BLD AUTO: 0.05 THOUSAND/UL (ref 0–0.2)
IMM GRANULOCYTES NFR BLD AUTO: 1 % (ref 0–2)
KETONES UR STRIP-MCNC: NEGATIVE MG/DL
LEUKOCYTE ESTERASE UR QL STRIP: NEGATIVE
LIPASE SERPL-CCNC: 21 U/L (ref 11–82)
LYMPHOCYTES # BLD AUTO: 2.36 THOUSANDS/ÂΜL (ref 0.6–4.47)
LYMPHOCYTES NFR BLD AUTO: 24 % (ref 14–44)
MAGNESIUM SERPL-MCNC: 1.6 MG/DL (ref 1.9–2.7)
MCH RBC QN AUTO: 31.7 PG (ref 26.8–34.3)
MCHC RBC AUTO-ENTMCNC: 36.1 G/DL (ref 31.4–37.4)
MCV RBC AUTO: 88 FL (ref 82–98)
MONOCYTES # BLD AUTO: 0.7 THOUSAND/ÂΜL (ref 0.17–1.22)
MONOCYTES NFR BLD AUTO: 7 % (ref 4–12)
NEUTROPHILS # BLD AUTO: 6.67 THOUSANDS/ÂΜL (ref 1.85–7.62)
NEUTS SEG NFR BLD AUTO: 67 % (ref 43–75)
NITRITE UR QL STRIP: NEGATIVE
NRBC BLD AUTO-RTO: 0 /100 WBCS
PH UR STRIP.AUTO: 7 [PH]
PLATELET # BLD AUTO: 222 THOUSANDS/UL (ref 149–390)
PMV BLD AUTO: 10.2 FL (ref 8.9–12.7)
POTASSIUM SERPL-SCNC: 3.6 MMOL/L (ref 3.5–5.3)
PROT SERPL-MCNC: 6.7 G/DL (ref 6.4–8.4)
PROT UR STRIP-MCNC: NEGATIVE MG/DL
RBC # BLD AUTO: 4.38 MILLION/UL (ref 3.81–5.12)
SODIUM SERPL-SCNC: 125 MMOL/L (ref 135–147)
SP GR UR STRIP.AUTO: 1.01 (ref 1–1.03)
UROBILINOGEN UR STRIP-ACNC: <2 MG/DL
WBC # BLD AUTO: 9.9 THOUSAND/UL (ref 4.31–10.16)

## 2025-07-04 PROCEDURE — 96361 HYDRATE IV INFUSION ADD-ON: CPT

## 2025-07-04 PROCEDURE — 83690 ASSAY OF LIPASE: CPT

## 2025-07-04 PROCEDURE — 99284 EMERGENCY DEPT VISIT MOD MDM: CPT

## 2025-07-04 PROCEDURE — 87660 TRICHOMONAS VAGIN DIR PROBE: CPT

## 2025-07-04 PROCEDURE — 74177 CT ABD & PELVIS W/CONTRAST: CPT

## 2025-07-04 PROCEDURE — 99285 EMERGENCY DEPT VISIT HI MDM: CPT

## 2025-07-04 PROCEDURE — 85025 COMPLETE CBC W/AUTO DIFF WBC: CPT

## 2025-07-04 PROCEDURE — 36415 COLL VENOUS BLD VENIPUNCTURE: CPT

## 2025-07-04 PROCEDURE — 87510 GARDNER VAG DNA DIR PROBE: CPT

## 2025-07-04 PROCEDURE — 81025 URINE PREGNANCY TEST: CPT

## 2025-07-04 PROCEDURE — 80053 COMPREHEN METABOLIC PANEL: CPT

## 2025-07-04 PROCEDURE — 87480 CANDIDA DNA DIR PROBE: CPT

## 2025-07-04 PROCEDURE — 96375 TX/PRO/DX INJ NEW DRUG ADDON: CPT

## 2025-07-04 PROCEDURE — 96374 THER/PROPH/DIAG INJ IV PUSH: CPT

## 2025-07-04 PROCEDURE — 81003 URINALYSIS AUTO W/O SCOPE: CPT

## 2025-07-04 PROCEDURE — 83735 ASSAY OF MAGNESIUM: CPT

## 2025-07-04 RX ORDER — FAMOTIDINE 10 MG/ML
20 INJECTION, SOLUTION INTRAVENOUS ONCE
Status: COMPLETED | OUTPATIENT
Start: 2025-07-04 | End: 2025-07-04

## 2025-07-04 RX ORDER — HYDROMORPHONE HCL/PF 1 MG/ML
0.5 SYRINGE (ML) INJECTION ONCE
Status: COMPLETED | OUTPATIENT
Start: 2025-07-04 | End: 2025-07-04

## 2025-07-04 RX ORDER — DICYCLOMINE HCL 20 MG
20 TABLET ORAL ONCE
Status: COMPLETED | OUTPATIENT
Start: 2025-07-04 | End: 2025-07-04

## 2025-07-04 RX ORDER — ACETAMINOPHEN 10 MG/ML
1000 INJECTION, SOLUTION INTRAVENOUS ONCE
Status: COMPLETED | OUTPATIENT
Start: 2025-07-04 | End: 2025-07-04

## 2025-07-04 RX ORDER — MAGNESIUM HYDROXIDE/ALUMINUM HYDROXICE/SIMETHICONE 120; 1200; 1200 MG/30ML; MG/30ML; MG/30ML
30 SUSPENSION ORAL ONCE
Status: COMPLETED | OUTPATIENT
Start: 2025-07-04 | End: 2025-07-04

## 2025-07-04 RX ORDER — ONDANSETRON 2 MG/ML
4 INJECTION INTRAMUSCULAR; INTRAVENOUS ONCE
Status: COMPLETED | OUTPATIENT
Start: 2025-07-04 | End: 2025-07-04

## 2025-07-04 RX ADMIN — ACETAMINOPHEN 1000 MG: 10 INJECTION INTRAVENOUS at 22:41

## 2025-07-04 RX ADMIN — ALUMINUM HYDROXIDE, MAGNESIUM HYDROXIDE, AND SIMETHICONE 30 ML: 200; 200; 20 SUSPENSION ORAL at 21:14

## 2025-07-04 RX ADMIN — HYDROMORPHONE HYDROCHLORIDE 0.5 MG: 1 INJECTION, SOLUTION INTRAMUSCULAR; INTRAVENOUS; SUBCUTANEOUS at 23:58

## 2025-07-04 RX ADMIN — FAMOTIDINE 20 MG: 10 INJECTION, SOLUTION INTRAVENOUS at 20:54

## 2025-07-04 RX ADMIN — SODIUM CHLORIDE 1000 ML: 0.9 INJECTION, SOLUTION INTRAVENOUS at 20:50

## 2025-07-04 RX ADMIN — ONDANSETRON 4 MG: 2 INJECTION INTRAMUSCULAR; INTRAVENOUS at 20:51

## 2025-07-04 RX ADMIN — IOHEXOL 100 ML: 350 INJECTION, SOLUTION INTRAVENOUS at 23:42

## 2025-07-04 RX ADMIN — DICYCLOMINE HYDROCHLORIDE 20 MG: 20 TABLET ORAL at 21:13

## 2025-07-05 VITALS
HEART RATE: 74 BPM | OXYGEN SATURATION: 97 % | TEMPERATURE: 98.2 F | DIASTOLIC BLOOD PRESSURE: 64 MMHG | WEIGHT: 146.16 LBS | BODY MASS INDEX: 26.73 KG/M2 | SYSTOLIC BLOOD PRESSURE: 143 MMHG | RESPIRATION RATE: 16 BRPM

## 2025-07-05 LAB
CANDIDA RRNA VAG QL PROBE: NOT DETECTED
G VAGINALIS RRNA GENITAL QL PROBE: NOT DETECTED
T VAGINALIS RRNA GENITAL QL PROBE: NOT DETECTED

## 2025-07-05 RX ORDER — LANOLIN ALCOHOL/MO/W.PET/CERES
400 CREAM (GRAM) TOPICAL ONCE
Status: COMPLETED | OUTPATIENT
Start: 2025-07-05 | End: 2025-07-05

## 2025-07-05 RX ORDER — LANOLIN ALCOHOL/MO/W.PET/CERES
400 CREAM (GRAM) TOPICAL 2 TIMES DAILY
Status: DISCONTINUED | OUTPATIENT
Start: 2025-07-05 | End: 2025-07-05

## 2025-07-05 RX ORDER — ONDANSETRON 4 MG/1
4 TABLET, ORALLY DISINTEGRATING ORAL EVERY 6 HOURS PRN
Qty: 10 TABLET | Refills: 0 | Status: SHIPPED | OUTPATIENT
Start: 2025-07-05 | End: 2025-07-11 | Stop reason: ALTCHOICE

## 2025-07-05 RX ADMIN — Medication 400 MG: at 02:23
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
## 2025-07-05 NOTE — DISCHARGE INSTRUCTIONS
Close follow-up with PCP.  Follow-up with GI and OB/GYN.  Return to ED if symptoms worsen, fail to improve, or develop as listed in follow-up section or discussed.  Symptomatic care as discussed.    Follow-up with psychiatric provider at [facility] in [place] [phone].  Contact PA [facility]  [phone]. PCP at [facility] [phone].
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
## 2025-07-11 ENCOUNTER — HOSPITAL ENCOUNTER (OUTPATIENT)
Facility: HOSPITAL | Age: 40
Setting detail: OBSERVATION
Discharge: HOME | End: 2025-07-13
Attending: EMERGENCY MEDICINE | Admitting: HOSPITALIST
Payer: COMMERCIAL

## 2025-07-11 DIAGNOSIS — R10.9 ABDOMINAL PAIN: ICD-10-CM

## 2025-07-11 DIAGNOSIS — E87.1 HYPONATREMIA: Primary | ICD-10-CM

## 2025-07-11 DIAGNOSIS — N94.9 ADNEXAL CYST: ICD-10-CM

## 2025-07-11 DIAGNOSIS — R19.7 DIARRHEA: ICD-10-CM

## 2025-07-11 DIAGNOSIS — R11.2 NAUSEA AND VOMITING: ICD-10-CM

## 2025-07-11 LAB
ALBUMIN SERPL BCG-MCNC: 4.5 G/DL (ref 3.5–5)
ALP SERPL-CCNC: 50 U/L (ref 34–104)
ALT SERPL W P-5'-P-CCNC: 9 U/L (ref 7–52)
AMPHETAMINES SERPL QL SCN: NEGATIVE
ANION GAP SERPL CALCULATED.3IONS-SCNC: 8 MMOL/L (ref 4–13)
AST SERPL W P-5'-P-CCNC: 28 U/L (ref 13–39)
BARBITURATES UR QL: NEGATIVE
BASOPHILS # BLD AUTO: 0.03 THOUSANDS/ÂΜL (ref 0–0.1)
BASOPHILS NFR BLD AUTO: 0 % (ref 0–1)
BENZODIAZ UR QL: NEGATIVE
BILIRUB SERPL-MCNC: 0.43 MG/DL (ref 0.2–1)
BILIRUB UR QL STRIP: NEGATIVE
BUN SERPL-MCNC: 4 MG/DL (ref 5–25)
CALCIUM SERPL-MCNC: 8.3 MG/DL (ref 8.4–10.2)
CHLORIDE SERPL-SCNC: 95 MMOL/L (ref 96–108)
CLARITY UR: CLEAR
CO2 SERPL-SCNC: 19 MMOL/L (ref 21–32)
COCAINE UR QL: NEGATIVE
COLOR UR: YELLOW
CREAT SERPL-MCNC: 0.69 MG/DL (ref 0.6–1.3)
EOSINOPHIL # BLD AUTO: 0.05 THOUSAND/ÂΜL (ref 0–0.61)
EOSINOPHIL NFR BLD AUTO: 1 % (ref 0–6)
ERYTHROCYTE [DISTWIDTH] IN BLOOD BY AUTOMATED COUNT: 11.7 % (ref 11.6–15.1)
EXT PREGNANCY TEST URINE: NEGATIVE
EXT. CONTROL: NORMAL
FENTANYL UR QL SCN: NEGATIVE
GFR SERPL CREATININE-BSD FRML MDRD: 111 ML/MIN/1.73SQ M
GLUCOSE SERPL-MCNC: 96 MG/DL (ref 65–140)
GLUCOSE UR STRIP-MCNC: NEGATIVE MG/DL
HCT VFR BLD AUTO: 37.7 % (ref 34.8–46.1)
HGB BLD-MCNC: 14.1 G/DL (ref 11.5–15.4)
HGB UR QL STRIP.AUTO: NEGATIVE
HYDROCODONE UR QL SCN: NEGATIVE
IMM GRANULOCYTES # BLD AUTO: 0.04 THOUSAND/UL (ref 0–0.2)
IMM GRANULOCYTES NFR BLD AUTO: 0 % (ref 0–2)
KETONES UR STRIP-MCNC: NEGATIVE MG/DL
LACTATE SERPL-SCNC: 0.5 MMOL/L (ref 0.5–2)
LEUKOCYTE ESTERASE UR QL STRIP: NEGATIVE
LIPASE SERPL-CCNC: 25 U/L (ref 11–82)
LYMPHOCYTES # BLD AUTO: 1.87 THOUSANDS/ÂΜL (ref 0.6–4.47)
LYMPHOCYTES NFR BLD AUTO: 19 % (ref 14–44)
MCH RBC QN AUTO: 32.1 PG (ref 26.8–34.3)
MCHC RBC AUTO-ENTMCNC: 37.4 G/DL (ref 31.4–37.4)
MCV RBC AUTO: 86 FL (ref 82–98)
METHADONE UR QL: NEGATIVE
MONOCYTES # BLD AUTO: 0.76 THOUSAND/ÂΜL (ref 0.17–1.22)
MONOCYTES NFR BLD AUTO: 8 % (ref 4–12)
NEUTROPHILS # BLD AUTO: 7.13 THOUSANDS/ÂΜL (ref 1.85–7.62)
NEUTS SEG NFR BLD AUTO: 72 % (ref 43–75)
NITRITE UR QL STRIP: NEGATIVE
NRBC BLD AUTO-RTO: 0 /100 WBCS
OPIATES UR QL SCN: NEGATIVE
OXYCODONE+OXYMORPHONE UR QL SCN: NEGATIVE
PCP UR QL: NEGATIVE
PH UR STRIP.AUTO: 6.5 [PH] (ref 4.5–8)
PLATELET # BLD AUTO: 175 THOUSANDS/UL (ref 149–390)
PMV BLD AUTO: 9.6 FL (ref 8.9–12.7)
POTASSIUM SERPL-SCNC: 4.5 MMOL/L (ref 3.5–5.3)
PROT SERPL-MCNC: 6.9 G/DL (ref 6.4–8.4)
PROT UR STRIP-MCNC: NEGATIVE MG/DL
RBC # BLD AUTO: 4.39 MILLION/UL (ref 3.81–5.12)
SODIUM SERPL-SCNC: 122 MMOL/L (ref 135–147)
SP GR UR STRIP.AUTO: 1.01 (ref 1–1.03)
THC UR QL: NEGATIVE
UROBILINOGEN UR QL STRIP.AUTO: 0.2 E.U./DL
WBC # BLD AUTO: 9.88 THOUSAND/UL (ref 4.31–10.16)

## 2025-07-11 PROCEDURE — 99284 EMERGENCY DEPT VISIT MOD MDM: CPT

## 2025-07-11 PROCEDURE — 80048 BASIC METABOLIC PNL TOTAL CA: CPT | Performed by: HOSPITALIST

## 2025-07-11 PROCEDURE — 81025 URINE PREGNANCY TEST: CPT | Performed by: EMERGENCY MEDICINE

## 2025-07-11 PROCEDURE — 80307 DRUG TEST PRSMV CHEM ANLYZR: CPT | Performed by: EMERGENCY MEDICINE

## 2025-07-11 PROCEDURE — 96375 TX/PRO/DX INJ NEW DRUG ADDON: CPT

## 2025-07-11 PROCEDURE — 36415 COLL VENOUS BLD VENIPUNCTURE: CPT | Performed by: EMERGENCY MEDICINE

## 2025-07-11 PROCEDURE — 83735 ASSAY OF MAGNESIUM: CPT | Performed by: HOSPITALIST

## 2025-07-11 PROCEDURE — 99285 EMERGENCY DEPT VISIT HI MDM: CPT | Performed by: EMERGENCY MEDICINE

## 2025-07-11 PROCEDURE — 96365 THER/PROPH/DIAG IV INF INIT: CPT

## 2025-07-11 PROCEDURE — 84443 ASSAY THYROID STIM HORMONE: CPT | Performed by: HOSPITALIST

## 2025-07-11 PROCEDURE — 85025 COMPLETE CBC W/AUTO DIFF WBC: CPT | Performed by: EMERGENCY MEDICINE

## 2025-07-11 PROCEDURE — 96361 HYDRATE IV INFUSION ADD-ON: CPT

## 2025-07-11 PROCEDURE — 81003 URINALYSIS AUTO W/O SCOPE: CPT

## 2025-07-11 PROCEDURE — 80053 COMPREHEN METABOLIC PANEL: CPT | Performed by: EMERGENCY MEDICINE

## 2025-07-11 PROCEDURE — 83690 ASSAY OF LIPASE: CPT | Performed by: EMERGENCY MEDICINE

## 2025-07-11 PROCEDURE — 99222 1ST HOSP IP/OBS MODERATE 55: CPT | Performed by: HOSPITALIST

## 2025-07-11 PROCEDURE — 83605 ASSAY OF LACTIC ACID: CPT | Performed by: HOSPITALIST

## 2025-07-11 RX ORDER — DICYCLOMINE HCL 20 MG
20 TABLET ORAL ONCE
Status: COMPLETED | OUTPATIENT
Start: 2025-07-11 | End: 2025-07-11

## 2025-07-11 RX ORDER — ACETAMINOPHEN 325 MG/1
650 TABLET ORAL EVERY 6 HOURS PRN
Status: DISCONTINUED | OUTPATIENT
Start: 2025-07-11 | End: 2025-07-13 | Stop reason: HOSPADM

## 2025-07-11 RX ORDER — FAMOTIDINE 10 MG/ML
20 INJECTION, SOLUTION INTRAVENOUS ONCE
Status: COMPLETED | OUTPATIENT
Start: 2025-07-11 | End: 2025-07-11

## 2025-07-11 RX ORDER — MAGNESIUM HYDROXIDE/ALUMINUM HYDROXICE/SIMETHICONE 120; 1200; 1200 MG/30ML; MG/30ML; MG/30ML
30 SUSPENSION ORAL ONCE
Status: COMPLETED | OUTPATIENT
Start: 2025-07-11 | End: 2025-07-11

## 2025-07-11 RX ORDER — ACETAMINOPHEN 10 MG/ML
1000 INJECTION, SOLUTION INTRAVENOUS ONCE
Status: COMPLETED | OUTPATIENT
Start: 2025-07-11 | End: 2025-07-11

## 2025-07-11 RX ORDER — OXCARBAZEPINE 300 MG/1
300 TABLET, FILM COATED ORAL EVERY 12 HOURS SCHEDULED
Status: DISCONTINUED | OUTPATIENT
Start: 2025-07-11 | End: 2025-07-13 | Stop reason: HOSPADM

## 2025-07-11 RX ORDER — BUDESONIDE AND FORMOTEROL FUMARATE DIHYDRATE 80; 4.5 UG/1; UG/1
2 AEROSOL RESPIRATORY (INHALATION) 2 TIMES DAILY
Status: DISCONTINUED | OUTPATIENT
Start: 2025-07-11 | End: 2025-07-13 | Stop reason: HOSPADM

## 2025-07-11 RX ORDER — ALBUTEROL SULFATE 90 UG/1
2 INHALANT RESPIRATORY (INHALATION) EVERY 4 HOURS PRN
Status: DISCONTINUED | OUTPATIENT
Start: 2025-07-11 | End: 2025-07-13 | Stop reason: HOSPADM

## 2025-07-11 RX ORDER — HYDROXYZINE HYDROCHLORIDE 25 MG/1
25 TABLET, FILM COATED ORAL 3 TIMES DAILY
Status: DISCONTINUED | OUTPATIENT
Start: 2025-07-11 | End: 2025-07-13 | Stop reason: HOSPADM

## 2025-07-11 RX ORDER — PANTOPRAZOLE SODIUM 40 MG/10ML
40 INJECTION, POWDER, LYOPHILIZED, FOR SOLUTION INTRAVENOUS ONCE
Status: COMPLETED | OUTPATIENT
Start: 2025-07-11 | End: 2025-07-11

## 2025-07-11 RX ORDER — TRAMADOL HYDROCHLORIDE 50 MG/1
50 TABLET ORAL EVERY 6 HOURS PRN
Status: DISCONTINUED | OUTPATIENT
Start: 2025-07-11 | End: 2025-07-13 | Stop reason: HOSPADM

## 2025-07-11 RX ORDER — SODIUM CHLORIDE 9 MG/ML
125 INJECTION, SOLUTION INTRAVENOUS CONTINUOUS
Status: DISCONTINUED | OUTPATIENT
Start: 2025-07-11 | End: 2025-07-12

## 2025-07-11 RX ORDER — LIDOCAINE HYDROCHLORIDE 20 MG/ML
10 SOLUTION OROPHARYNGEAL ONCE
Status: COMPLETED | OUTPATIENT
Start: 2025-07-11 | End: 2025-07-11

## 2025-07-11 RX ORDER — DICYCLOMINE HCL 20 MG
20 TABLET ORAL
Status: DISCONTINUED | OUTPATIENT
Start: 2025-07-11 | End: 2025-07-13 | Stop reason: HOSPADM

## 2025-07-11 RX ORDER — PANTOPRAZOLE SODIUM 40 MG/10ML
40 INJECTION, POWDER, LYOPHILIZED, FOR SOLUTION INTRAVENOUS EVERY 12 HOURS SCHEDULED
Status: DISCONTINUED | OUTPATIENT
Start: 2025-07-12 | End: 2025-07-13 | Stop reason: HOSPADM

## 2025-07-11 RX ADMIN — OXCARBAZEPINE 300 MG: 300 TABLET, FILM COATED ORAL at 22:20

## 2025-07-11 RX ADMIN — SODIUM CHLORIDE 1000 ML: 0.9 INJECTION, SOLUTION INTRAVENOUS at 18:47

## 2025-07-11 RX ADMIN — HYDROXYZINE HYDROCHLORIDE 25 MG: 25 TABLET, FILM COATED ORAL at 22:19

## 2025-07-11 RX ADMIN — TRAMADOL HYDROCHLORIDE 50 MG: 50 TABLET, COATED ORAL at 22:39

## 2025-07-11 RX ADMIN — BUDESONIDE AND FORMOTEROL FUMARATE DIHYDRATE 2 PUFF: 80; 4.5 AEROSOL RESPIRATORY (INHALATION) at 22:42

## 2025-07-11 RX ADMIN — DICYCLOMINE HYDROCHLORIDE 20 MG: 20 TABLET ORAL at 18:52

## 2025-07-11 RX ADMIN — FAMOTIDINE 20 MG: 10 INJECTION, SOLUTION INTRAVENOUS at 18:58

## 2025-07-11 RX ADMIN — LIDOCAINE HYDROCHLORIDE 10 ML: 20 SOLUTION ORAL at 19:56

## 2025-07-11 RX ADMIN — OLANZAPINE 15 MG: 10 TABLET, FILM COATED ORAL at 22:20

## 2025-07-11 RX ADMIN — SODIUM CHLORIDE 125 ML/HR: 0.9 INJECTION, SOLUTION INTRAVENOUS at 22:22

## 2025-07-11 RX ADMIN — PANTOPRAZOLE SODIUM 40 MG: 40 INJECTION, POWDER, FOR SOLUTION INTRAVENOUS at 18:53

## 2025-07-11 RX ADMIN — ACETAMINOPHEN 1000 MG: 10 INJECTION INTRAVENOUS at 19:00

## 2025-07-11 RX ADMIN — DICYCLOMINE HYDROCHLORIDE 20 MG: 20 TABLET ORAL at 22:20

## 2025-07-11 RX ADMIN — ALUMINUM HYDROXIDE, MAGNESIUM HYDROXIDE, AND SIMETHICONE 30 ML: 200; 200; 20 SUSPENSION ORAL at 19:56

## 2025-07-11 NOTE — ED PROVIDER NOTES
Time reflects when diagnosis was documented in both MDM as applicable and the Disposition within this note       Time User Action Codes Description Comment    7/11/2025  8:46 PM Tabitha Pierre Add [E87.1] Hyponatremia     7/11/2025  8:47 PM Tabitha Pierre Add [R11.2] Nausea and vomiting     7/11/2025  8:47 PM Tabitah Pierre Add [R10.9] Abdominal pain           ED Disposition       ED Disposition   Admit    Condition   Stable    Date/Time   Fri Jul 11, 2025  8:46 PM    Comment   Case was discussed with Dr. Land and the patient's admission status was agreed to be Admission Status: observation status to the service of Dr. Land .               Assessment & Plan       Medical Decision Making  Chronic abd pain w/ recurrent n/v.  Will get labs to r/o acute life threatening metabolic abnl, pancreatitis, cholecystitis, significant leukocytosis or anemia.  will get UA to r/o occult infection.      Problems Addressed:  Abdominal pain: chronic illness or injury with exacerbation, progression, or side effects of treatment  Hyponatremia: acute illness or injury that poses a threat to life or bodily functions  Nausea and vomiting: chronic illness or injury with exacerbation, progression, or side effects of treatment    Amount and/or Complexity of Data Reviewed  Labs: ordered. Decision-making details documented in ED Course.    Risk  OTC drugs.  Prescription drug management.  Decision regarding hospitalization.        ED Course as of 07/11/25 2128 Fri Jul 11, 2025   1825 Pt has had 11 CT/CTA abdomen / pelvis studies in the last year   2005 Sodium(!): 122  Worsening hyponatremia over the last couple of weeks.       Medications   sodium chloride 0.9 % bolus 1,000 mL (0 mL Intravenous Stopped 7/11/25 2031)   pantoprazole (PROTONIX) injection 40 mg (40 mg Intravenous Given 7/11/25 1853)   Famotidine (PF) (PEPCID) injection 20 mg (20 mg Intravenous Given 7/11/25 1858)   dicyclomine (BENTYL) tablet 20 mg (20 mg Oral Given  7/11/25 1852)   acetaminophen (Ofirmev) injection 1,000 mg (0 mg Intravenous Stopped 7/11/25 1929)   aluminum-magnesium hydroxide-simethicone (MAALOX) oral suspension 30 mL (30 mL Oral Given 7/11/25 1956)   Lidocaine Viscous HCl (XYLOCAINE) 2 % mucosal solution 10 mL (10 mL Swish & Spit Given 7/11/25 1956)       ED Risk Strat Scores                    No data recorded                            History of Present Illness       Chief Complaint   Patient presents with    Abdominal Pain     Pt reports abd pain, urinating on herself and vomiting pt reports same pain as 7/4        Past Medical History[1]   Past Surgical History[2]   Family History[3]   Social History[4]   E-Cigarette/Vaping    E-Cigarette Use Former User       E-Cigarette/Vaping Substances    Nicotine No     THC No     CBD No     Flavoring No     Other No     Unknown No       I have reviewed and agree with the history as documented.     Patient presents with:  Abdominal Pain: Pt reports abd pain, urinating on herself and vomiting pt reports same pain as 7/4     39y F here for evaluation of recurrent abd pain. Seen here 7/4 for the same and pt reports persistent symptoms since that time.  States she was given rx for ondansetron, but only had enough for 2 days, however she also said it didn't help anyway.  States she is unable to keep anything down and reports she vomited again while in the bathroom giving a urine specimen - all nbnb emesis.  Pt reports some mucus / diarrhea w/ reported intermittent dark stools.  C/o pain in the periumbilical region and the llq region - same as previous visits.    Denies f/c/s, no cough/congestion.  Mentioned in triage she urinated on herself but didn't provide any of that history to me.   Hx of umbilical hernia.    Hx of colonoscopy - last one in 2022 w/ diverticulosis, no other findings. No further colonoscopies recommended until time for regular screening.  +FH IBD - maternal aunt.    No known hx of PUD, recurrent  pancreatitis    Of note, pt w/ frequent visits for same/similar complaints.  She has had 11 CT/CTA abdomen/pelvis studies done just in the past year along w/ CT scans of other regions      History provided by:  Patient and medical records   used: No    Abdominal Pain      Review of Systems   Gastrointestinal:  Positive for abdominal pain.   All other systems reviewed and are negative.          Objective       ED Triage Vitals   Temperature Pulse Blood Pressure Respirations SpO2 Patient Position - Orthostatic VS   07/11/25 1817 07/11/25 1817 07/11/25 1817 07/11/25 1817 07/11/25 1817 07/11/25 1817   98.4 °F (36.9 °C) (!) 112 156/74 14 95 % Sitting      Temp src Heart Rate Source BP Location FiO2 (%) Pain Score    -- 07/11/25 2052 07/11/25 1817 -- 07/11/25 2052     Monitor Right arm  10 - Worst Possible Pain      Vitals      Date and Time Temp Pulse SpO2 Resp BP Pain Score FACES Pain Rating User   07/11/25 2052 -- 78 98 % 16 126/68 10 - Worst Possible Pain -- KU   07/11/25 1817 98.4 °F (36.9 °C) 112 95 % 14 156/74 -- -- DIEGO            Physical Exam  Vitals and nursing note reviewed.   Constitutional:       Appearance: Normal appearance.   HENT:      Mouth/Throat:      Mouth: Mucous membranes are moist.     Eyes:      Conjunctiva/sclera: Conjunctivae normal.       Cardiovascular:      Rate and Rhythm: Regular rhythm. Tachycardia present.   Pulmonary:      Effort: Pulmonary effort is normal. No respiratory distress.   Abdominal:      General: Abdomen is flat.      Palpations: Abdomen is soft.      Tenderness: There is generalized abdominal tenderness. There is no guarding or rebound.     Musculoskeletal:      Cervical back: Normal range of motion.     Skin:     General: Skin is warm.     Neurological:      General: No focal deficit present.      Mental Status: She is alert.         Results Reviewed       Procedure Component Value Units Date/Time    Rapid drug screen, urine [[id number]] Collected:  07/11/25 2109    Lab Status: In process Specimen: Urine, Clean Catch Updated: 07/11/25 2115    Comprehensive metabolic panel [[id number]]  (Abnormal) Collected: 07/11/25 1847    Lab Status: Final result Specimen: Blood from Arm, Right Updated: 07/11/25 1908     Sodium 122 mmol/L      Potassium 4.5 mmol/L      Chloride 95 mmol/L      CO2 19 mmol/L      ANION GAP 8 mmol/L      BUN 4 mg/dL      Creatinine 0.69 mg/dL      Glucose 96 mg/dL      Calcium 8.3 mg/dL      AST 28 U/L      ALT 9 U/L      Alkaline Phosphatase 50 U/L      Total Protein 6.9 g/dL      Albumin 4.5 g/dL      Total Bilirubin 0.43 mg/dL      eGFR 111 ml/min/1.73sq m     Narrative:      Specimen is moderately hemolyzed, results may be affectede  National Kidney Disease Foundation guidelines for Chronic Kidney Disease (CKD):     Stage 1 with normal or high GFR (GFR > 90 mL/min/1.73 square meters)    Stage 2 Mild CKD (GFR = 60-89 mL/min/1.73 square meters)    Stage 3A Moderate CKD (GFR = 45-59 mL/min/1.73 square meters)    Stage 3B Moderate CKD (GFR = 30-44 mL/min/1.73 square meters)    Stage 4 Severe CKD (GFR = 15-29 mL/min/1.73 square meters)    Stage 5 End Stage CKD (GFR <15 mL/min/1.73 square meters)  Note: GFR calculation is accurate only with a steady state creatinine    Lipase [[id number]]  (Normal) Collected: 07/11/25 1847    Lab Status: Final result Specimen: Blood from Arm, Right Updated: 07/11/25 1908     Lipase 25 u/L     CBC and differential [[id number]] Collected: 07/11/25 1847    Lab Status: Final result Specimen: Blood from Arm, Right Updated: 07/11/25 1852     WBC 9.88 Thousand/uL      RBC 4.39 Million/uL      Hemoglobin 14.1 g/dL      Hematocrit 37.7 %      MCV 86 fL      MCH 32.1 pg      MCHC 37.4 g/dL      RDW 11.7 %      MPV 9.6 fL      Platelets 175 Thousands/uL      nRBC 0 /100 WBCs      Segmented % 72 %      Immature Grans % 0 %      Lymphocytes % 19 %      Monocytes % 8 %      Eosinophils Relative 1 %      Basophils Relative 0 %       Absolute Neutrophils 7.13 Thousands/µL      Absolute Immature Grans 0.04 Thousand/uL      Absolute Lymphocytes 1.87 Thousands/µL      Absolute Monocytes 0.76 Thousand/µL      Eosinophils Absolute 0.05 Thousand/µL      Basophils Absolute 0.03 Thousands/µL     POCT pregnancy, urine [[id number]]  (Normal) Collected: 07/11/25 1851    Lab Status: Final result Updated: 07/11/25 1852     EXT Preg Test, Ur Negative     Control Valid    Urine Macroscopic, POC [[id number]] Collected: 07/11/25 1849    Lab Status: Final result Specimen: Urine Updated: 07/11/25 1851     Color, UA Yellow     Clarity, UA Clear     pH, UA 6.5     Leukocytes, UA Negative     Nitrite, UA Negative     Protein, UA Negative mg/dl      Glucose, UA Negative mg/dl      Ketones, UA Negative mg/dl      Urobilinogen, UA 0.2 E.U./dl      Bilirubin, UA Negative     Occult Blood, UA Negative     Specific Gravity, UA 1.010    Narrative:      CLINITEK RESULT            No orders to display       Procedures    ED Medication and Procedure Management   Prior to Admission Medications   Prescriptions Last Dose Informant Patient Reported? Taking?   OLANZapine (ZyPREXA) 15 mg tablet   No Yes   Sig: Take 2 tablets (30 mg total) by mouth daily at bedtime   OXcarbazepine (TRILEPTAL) 300 mg tablet   No No   Sig: Take 1 tablet (300 mg total) by mouth every 12 (twelve) hours   Ventolin  (90 Base) MCG/ACT inhaler   No Yes   Sig: Inhale 2 puffs every 4 (four) hours as needed for wheezing   albuterol (ACCUNEB) 1.25 MG/3ML nebulizer solution   Yes Yes   Sig: Take 1.25 mg by nebulization every 6 (six) hours as needed for wheezing   budesonide-formoterol (SYMBICORT) 80-4.5 MCG/ACT inhaler   No Yes   Sig: Inhale 2 puffs 2 (two) times a day Rinse mouth after use.   wheat dextrin (BENEFIBER)   No Yes   Sig: Take 1 packet by mouth daily      Facility-Administered Medications: None     Current Discharge Medication List        CONTINUE these medications which have NOT CHANGED     Details   albuterol (ACCUNEB) 1.25 MG/3ML nebulizer solution Take 1.25 mg by nebulization every 6 (six) hours as needed for wheezing      budesonide-formoterol (SYMBICORT) 80-4.5 MCG/ACT inhaler Inhale 2 puffs 2 (two) times a day Rinse mouth after use.  Qty: 10.2 g, Refills: 0    Associated Diagnoses: Intermittent asthma without complication, unspecified asthma severity      OLANZapine (ZyPREXA) 15 mg tablet Take 2 tablets (30 mg total) by mouth daily at bedtime  Qty: 60 tablet, Refills: 1    Associated Diagnoses: Schizoaffective disorder, bipolar type (HCC)      Ventolin  (90 Base) MCG/ACT inhaler Inhale 2 puffs every 4 (four) hours as needed for wheezing  Qty: 18 g, Refills: 0    Comments: Substitution to a formulary equivalent within the same pharmaceutical class is authorized.  Associated Diagnoses: Intermittent asthma without complication, unspecified asthma severity      wheat dextrin (BENEFIBER) Take 1 packet by mouth daily  Qty: 30 each, Refills: 11    Associated Diagnoses: Irritable bowel syndrome with both constipation and diarrhea      OXcarbazepine (TRILEPTAL) 300 mg tablet Take 1 tablet (300 mg total) by mouth every 12 (twelve) hours  Qty: 60 tablet, Refills: 1    Associated Diagnoses: Bipolar I disorder, most recent episode manic, severe with psychotic features (HCC)           No discharge procedures on file.  ED SEPSIS DOCUMENTATION   Time reflects when diagnosis was documented in both MDM as applicable and the Disposition within this note       Time User Action Codes Description Comment    7/11/2025  8:46 PM Tabitha Pierre Add [E87.1] Hyponatremia     7/11/2025  8:47 PM Tabitha Pierre Add [R11.2] Nausea and vomiting     7/11/2025  8:47 PM Tabitha Pierre Add [R10.9] Abdominal pain                    [1]   Past Medical History:  Diagnosis Date    Abnormal Pap smear of cervix     ADHD (attention deficit hyperactivity disorder)     Alcohol abuse     Ankle fracture     Anxiety      Arthritis     back    Asthma     Bipolar disorder (HCC)     Cellulitis     right side fac in 2014    Chronic pain disorder     Depression     Diverticulitis     Flank pain 2016    Hallucination     Hepatitis C     Hip disease 2006    reports she had fluid removed from right hip and received treatment with antibiotic     History of abnormal cervical Pap smear     History of multiple miscarriages     IBS (irritable bowel syndrome)     Infantile idiopathic scoliosis     Joint pain     Kidney stone     Lactose intolerance     Low back pain     Myofascial pain syndrome     Peripheral neuropathy 2024    Poor dentition     Psychiatric illness     Psychosis ([facility])     PTSD (post-traumatic stress disorder)     Pyelonephritis affecting pregnancy     Right hand paresthesia     Right ovarian cyst     Schizoaffective disorder, bipolar type ([facility]) r/o bipolar with psychotic features 2024    Scoliosis     Seizures ([facility])     Self-injurious behavior     Sleep difficulties     Slow transit constipation     Substance abuse ([facility])     Suicide attempt ([facility])    [2]   Past Surgical History:  Procedure Laterality Date     SECTION  2016     SECTION  2014    HIP SURGERY      ORTHOPEDIC SURGERY      ND  DELIVERY ONLY N/A 2016    Procedure:  SECTION () REPEAT;  Surgeon: Kurt Connor MD;  [facility]: [facility];  Service: Obstetrics    ND LIG/TRNSXJ FLP TUBE ABDL/VAG APPR UNI/BI Bilateral 2016    Procedure: LIGATION/COAGULATION TUBAL;  Surgeon: Kurt Connor MD;  Location: [facility];  Service: Obstetrics   [3]   Family History  Problem Relation Name Age of Onset    Heart disease Maternal Aunt      Cancer Maternal Aunt      Diabetes Paternal Aunt      No Known Problems Mother      No Known Problems Father      No Known Problems Sister     [4]   Social History  Tobacco Use    Smoking status: Every Day     Current packs/day: 0.50     Average packs/day: 0.4  "packs/day for 30.5 years (11.5 ttl pk-yrs)     Types: Cigarettes     Start date: 2010     Passive exposure: Never    Smokeless tobacco: Never    Tobacco comments:     pt refused smoking cessation teaching.    Vaping Use    Vaping status: Former   Substance Use Topics    Alcohol use: Not Currently     Comment: Rare Use    Drug use: Not Currently     Types: Marijuana, Cocaine, \"Crack\" cocaine        Tabitha Pierre,   07/11/25 5529    "

## 2025-07-12 ENCOUNTER — APPOINTMENT (OUTPATIENT)
Dept: ULTRASOUND IMAGING | Facility: HOSPITAL | Age: 40
End: 2025-07-12
Payer: COMMERCIAL

## 2025-07-12 PROBLEM — R11.2 NAUSEA AND VOMITING: Status: ACTIVE | Noted: 2025-07-12

## 2025-07-12 LAB
ALBUMIN SERPL BCG-MCNC: 3.5 G/DL (ref 3.5–5)
ALP SERPL-CCNC: 46 U/L (ref 34–104)
ALT SERPL W P-5'-P-CCNC: 5 U/L (ref 7–52)
ANION GAP SERPL CALCULATED.3IONS-SCNC: 5 MMOL/L (ref 4–13)
ANION GAP SERPL CALCULATED.3IONS-SCNC: 6 MMOL/L (ref 4–13)
ANION GAP SERPL CALCULATED.3IONS-SCNC: 7 MMOL/L (ref 4–13)
APTT PPP: 31 SECONDS (ref 23–34)
AST SERPL W P-5'-P-CCNC: 11 U/L (ref 13–39)
BASOPHILS # BLD AUTO: 0.03 THOUSANDS/ÂΜL (ref 0–0.1)
BASOPHILS NFR BLD AUTO: 0 % (ref 0–1)
BILIRUB SERPL-MCNC: 0.29 MG/DL (ref 0.2–1)
BUN SERPL-MCNC: 2 MG/DL (ref 5–25)
BUN SERPL-MCNC: 5 MG/DL (ref 5–25)
BUN SERPL-MCNC: 7 MG/DL (ref 5–25)
CALCIUM SERPL-MCNC: 7.8 MG/DL (ref 8.4–10.2)
CALCIUM SERPL-MCNC: 7.9 MG/DL (ref 8.4–10.2)
CALCIUM SERPL-MCNC: 8.4 MG/DL (ref 8.4–10.2)
CALCIUM SERPL-MCNC: 8.5 MG/DL (ref 8.4–10.2)
CALCIUM SERPL-MCNC: 8.7 MG/DL (ref 8.4–10.2)
CHLORIDE SERPL-SCNC: 103 MMOL/L (ref 96–108)
CHLORIDE SERPL-SCNC: 104 MMOL/L (ref 96–108)
CHLORIDE SERPL-SCNC: 108 MMOL/L (ref 96–108)
CHLORIDE SERPL-SCNC: 109 MMOL/L (ref 96–108)
CHLORIDE SERPL-SCNC: 98 MMOL/L (ref 96–108)
CO2 SERPL-SCNC: 19 MMOL/L (ref 21–32)
CO2 SERPL-SCNC: 20 MMOL/L (ref 21–32)
CO2 SERPL-SCNC: 20 MMOL/L (ref 21–32)
CO2 SERPL-SCNC: 21 MMOL/L (ref 21–32)
CO2 SERPL-SCNC: 21 MMOL/L (ref 21–32)
CO2 SERPL-SCNC: 22 MMOL/L (ref 21–32)
CO2 SERPL-SCNC: 22 MMOL/L (ref 21–32)
CREAT SERPL-MCNC: 0.57 MG/DL (ref 0.6–1.3)
CREAT SERPL-MCNC: 0.59 MG/DL (ref 0.6–1.3)
CREAT SERPL-MCNC: 0.61 MG/DL (ref 0.6–1.3)
CREAT SERPL-MCNC: 0.62 MG/DL (ref 0.6–1.3)
CREAT SERPL-MCNC: 0.62 MG/DL (ref 0.6–1.3)
CREAT SERPL-MCNC: 0.67 MG/DL (ref 0.6–1.3)
CREAT SERPL-MCNC: 0.67 MG/DL (ref 0.6–1.3)
EOSINOPHIL # BLD AUTO: 0.18 THOUSAND/ÂΜL (ref 0–0.61)
EOSINOPHIL NFR BLD AUTO: 3 % (ref 0–6)
ERYTHROCYTE [DISTWIDTH] IN BLOOD BY AUTOMATED COUNT: 11.8 % (ref 11.6–15.1)
GFR SERPL CREATININE-BSD FRML MDRD: 112 ML/MIN/1.73SQ M
GFR SERPL CREATININE-BSD FRML MDRD: 112 ML/MIN/1.73SQ M
GFR SERPL CREATININE-BSD FRML MDRD: 115 ML/MIN/1.73SQ M
GFR SERPL CREATININE-BSD FRML MDRD: 115 ML/MIN/1.73SQ M
GFR SERPL CREATININE-BSD FRML MDRD: 116 ML/MIN/1.73SQ M
GFR SERPL CREATININE-BSD FRML MDRD: 117 ML/MIN/1.73SQ M
GFR SERPL CREATININE-BSD FRML MDRD: 118 ML/MIN/1.73SQ M
GLUCOSE SERPL-MCNC: 107 MG/DL (ref 65–140)
GLUCOSE SERPL-MCNC: 123 MG/DL (ref 65–140)
GLUCOSE SERPL-MCNC: 80 MG/DL (ref 65–140)
GLUCOSE SERPL-MCNC: 89 MG/DL (ref 65–140)
GLUCOSE SERPL-MCNC: 90 MG/DL (ref 65–140)
GLUCOSE SERPL-MCNC: 90 MG/DL (ref 65–140)
GLUCOSE SERPL-MCNC: 96 MG/DL (ref 65–140)
HCT VFR BLD AUTO: 33.8 % (ref 34.8–46.1)
HGB BLD-MCNC: 12.1 G/DL (ref 11.5–15.4)
IMM GRANULOCYTES # BLD AUTO: 0.02 THOUSAND/UL (ref 0–0.2)
IMM GRANULOCYTES NFR BLD AUTO: 0 % (ref 0–2)
INR PPP: 1.16 (ref 0.85–1.19)
LYMPHOCYTES # BLD AUTO: 2.7 THOUSANDS/ÂΜL (ref 0.6–4.47)
LYMPHOCYTES NFR BLD AUTO: 37 % (ref 14–44)
MAGNESIUM SERPL-MCNC: 1.5 MG/DL (ref 1.9–2.7)
MAGNESIUM SERPL-MCNC: 2.3 MG/DL (ref 1.9–2.7)
MCH RBC QN AUTO: 31.4 PG (ref 26.8–34.3)
MCHC RBC AUTO-ENTMCNC: 35.8 G/DL (ref 31.4–37.4)
MCV RBC AUTO: 88 FL (ref 82–98)
MONOCYTES # BLD AUTO: 0.64 THOUSAND/ÂΜL (ref 0.17–1.22)
MONOCYTES NFR BLD AUTO: 9 % (ref 4–12)
NEUTROPHILS # BLD AUTO: 3.69 THOUSANDS/ÂΜL (ref 1.85–7.62)
NEUTS SEG NFR BLD AUTO: 51 % (ref 43–75)
NRBC BLD AUTO-RTO: 0 /100 WBCS
OSMOLALITY UR/SERPL-RTO: 268 MMOL/KG (ref 282–298)
OSMOLALITY UR: 123 MMOL/KG (ref 250–900)
PHOSPHATE SERPL-MCNC: 2.8 MG/DL (ref 2.7–4.5)
PLATELET # BLD AUTO: 149 THOUSANDS/UL (ref 149–390)
PMV BLD AUTO: 9.7 FL (ref 8.9–12.7)
POTASSIUM SERPL-SCNC: 3.5 MMOL/L (ref 3.5–5.3)
POTASSIUM SERPL-SCNC: 3.6 MMOL/L (ref 3.5–5.3)
POTASSIUM SERPL-SCNC: 3.8 MMOL/L (ref 3.5–5.3)
POTASSIUM SERPL-SCNC: 4.1 MMOL/L (ref 3.5–5.3)
POTASSIUM SERPL-SCNC: 4.1 MMOL/L (ref 3.5–5.3)
PROT SERPL-MCNC: 5.4 G/DL (ref 6.4–8.4)
PROTHROMBIN TIME: 15 SECONDS (ref 12.3–15)
RBC # BLD AUTO: 3.85 MILLION/UL (ref 3.81–5.12)
SODIUM SERPL-SCNC: 126 MMOL/L (ref 135–147)
SODIUM SERPL-SCNC: 129 MMOL/L (ref 135–147)
SODIUM SERPL-SCNC: 129 MMOL/L (ref 135–147)
SODIUM SERPL-SCNC: 134 MMOL/L (ref 135–147)
SODIUM SERPL-SCNC: 136 MMOL/L (ref 135–147)
SODIUM SERPL-SCNC: 137 MMOL/L (ref 135–147)
SODIUM SERPL-SCNC: 138 MMOL/L (ref 135–147)
SODIUM UR-SCNC: 27 MMOL/L
TSH SERPL DL<=0.05 MIU/L-ACNC: 1.05 UIU/ML (ref 0.45–4.5)
WBC # BLD AUTO: 7.26 THOUSAND/UL (ref 4.31–10.16)

## 2025-07-12 PROCEDURE — 80048 BASIC METABOLIC PNL TOTAL CA: CPT | Performed by: HOSPITALIST

## 2025-07-12 PROCEDURE — 83935 ASSAY OF URINE OSMOLALITY: CPT | Performed by: HOSPITALIST

## 2025-07-12 PROCEDURE — 76830 TRANSVAGINAL US NON-OB: CPT

## 2025-07-12 PROCEDURE — 99213 OFFICE O/P EST LOW 20 MIN: CPT | Performed by: OBSTETRICS & GYNECOLOGY

## 2025-07-12 PROCEDURE — 84300 ASSAY OF URINE SODIUM: CPT | Performed by: HOSPITALIST

## 2025-07-12 PROCEDURE — 84100 ASSAY OF PHOSPHORUS: CPT | Performed by: HOSPITALIST

## 2025-07-12 PROCEDURE — 99232 SBSQ HOSP IP/OBS MODERATE 35: CPT | Performed by: STUDENT IN AN ORGANIZED HEALTH CARE EDUCATION/TRAINING PROGRAM

## 2025-07-12 PROCEDURE — 76856 US EXAM PELVIC COMPLETE: CPT

## 2025-07-12 PROCEDURE — 83735 ASSAY OF MAGNESIUM: CPT | Performed by: HOSPITALIST

## 2025-07-12 PROCEDURE — 85025 COMPLETE CBC W/AUTO DIFF WBC: CPT | Performed by: HOSPITALIST

## 2025-07-12 PROCEDURE — 85730 THROMBOPLASTIN TIME PARTIAL: CPT | Performed by: HOSPITALIST

## 2025-07-12 PROCEDURE — 83930 ASSAY OF BLOOD OSMOLALITY: CPT | Performed by: HOSPITALIST

## 2025-07-12 PROCEDURE — 99254 IP/OBS CNSLTJ NEW/EST MOD 60: CPT | Performed by: STUDENT IN AN ORGANIZED HEALTH CARE EDUCATION/TRAINING PROGRAM

## 2025-07-12 PROCEDURE — 85610 PROTHROMBIN TIME: CPT | Performed by: HOSPITALIST

## 2025-07-12 PROCEDURE — 80053 COMPREHEN METABOLIC PANEL: CPT | Performed by: HOSPITALIST

## 2025-07-12 PROCEDURE — 99214 OFFICE O/P EST MOD 30 MIN: CPT | Performed by: STUDENT IN AN ORGANIZED HEALTH CARE EDUCATION/TRAINING PROGRAM

## 2025-07-12 RX ORDER — MAGNESIUM SULFATE HEPTAHYDRATE 40 MG/ML
2 INJECTION, SOLUTION INTRAVENOUS ONCE
Status: COMPLETED | OUTPATIENT
Start: 2025-07-12 | End: 2025-07-12

## 2025-07-12 RX ORDER — ONDANSETRON 2 MG/ML
4 INJECTION INTRAMUSCULAR; INTRAVENOUS EVERY 6 HOURS PRN
Status: DISCONTINUED | OUTPATIENT
Start: 2025-07-12 | End: 2025-07-13 | Stop reason: HOSPADM

## 2025-07-12 RX ADMIN — DICYCLOMINE HYDROCHLORIDE 20 MG: 20 TABLET ORAL at 11:38

## 2025-07-12 RX ADMIN — SODIUM CHLORIDE 125 ML/HR: 0.9 INJECTION, SOLUTION INTRAVENOUS at 05:05

## 2025-07-12 RX ADMIN — ONDANSETRON 4 MG: 2 INJECTION INTRAMUSCULAR; INTRAVENOUS at 08:45

## 2025-07-12 RX ADMIN — TRAMADOL HYDROCHLORIDE 50 MG: 50 TABLET, COATED ORAL at 17:42

## 2025-07-12 RX ADMIN — TRAMADOL HYDROCHLORIDE 50 MG: 50 TABLET, COATED ORAL at 11:37

## 2025-07-12 RX ADMIN — TRAMADOL HYDROCHLORIDE 50 MG: 50 TABLET, COATED ORAL at 05:04

## 2025-07-12 RX ADMIN — OLANZAPINE 15 MG: 10 TABLET, FILM COATED ORAL at 08:45

## 2025-07-12 RX ADMIN — HYDROXYZINE HYDROCHLORIDE 25 MG: 25 TABLET, FILM COATED ORAL at 08:45

## 2025-07-12 RX ADMIN — OXCARBAZEPINE 300 MG: 300 TABLET, FILM COATED ORAL at 08:45

## 2025-07-12 RX ADMIN — HYDROXYZINE HYDROCHLORIDE 25 MG: 25 TABLET, FILM COATED ORAL at 16:49

## 2025-07-12 RX ADMIN — SODIUM CHLORIDE 125 ML/HR: 0.9 INJECTION, SOLUTION INTRAVENOUS at 15:29

## 2025-07-12 RX ADMIN — DICYCLOMINE HYDROCHLORIDE 20 MG: 20 TABLET ORAL at 06:15

## 2025-07-12 RX ADMIN — DICYCLOMINE HYDROCHLORIDE 20 MG: 20 TABLET ORAL at 21:08

## 2025-07-12 RX ADMIN — HYDROXYZINE HYDROCHLORIDE 25 MG: 25 TABLET, FILM COATED ORAL at 21:08

## 2025-07-12 RX ADMIN — TRAMADOL HYDROCHLORIDE 50 MG: 50 TABLET, COATED ORAL at 23:36

## 2025-07-12 RX ADMIN — OXCARBAZEPINE 300 MG: 300 TABLET, FILM COATED ORAL at 21:08

## 2025-07-12 RX ADMIN — OLANZAPINE 15 MG: 10 TABLET, FILM COATED ORAL at 17:42

## 2025-07-12 RX ADMIN — PANTOPRAZOLE SODIUM 40 MG: 40 INJECTION, POWDER, FOR SOLUTION INTRAVENOUS at 21:09

## 2025-07-12 RX ADMIN — DICYCLOMINE HYDROCHLORIDE 20 MG: 20 TABLET ORAL at 16:49

## 2025-07-12 RX ADMIN — MAGNESIUM SULFATE HEPTAHYDRATE 2 G: 40 INJECTION, SOLUTION INTRAVENOUS at 01:42

## 2025-07-12 RX ADMIN — BUDESONIDE AND FORMOTEROL FUMARATE DIHYDRATE 2 PUFF: 80; 4.5 AEROSOL RESPIRATORY (INHALATION) at 21:09

## 2025-07-12 RX ADMIN — PANTOPRAZOLE SODIUM 40 MG: 40 INJECTION, POWDER, FOR SOLUTION INTRAVENOUS at 08:46
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
## 2025-07-12 NOTE — ASSESSMENT & PLAN NOTE
39 y.o. female past medical history significant for schizoaffective disorder, bipolar type with prior suicide attempts, substance use disorder, IBS, history of diverticulitis, migraine, seizure disorder (epileptic versus nonepileptic?),  Asthma, history of hepatitis C, myofascial pain syndrome who presented to [facility] on 7/11 for concern of nausea, vomiting, and abdominal pain.  Fortunately, nausea and vomiting resolved with conservative management.  Left lower quadrant abdominal pain appears to be chronic in nature for which she follows with GI team in the outpatient setting.  She has had multiple presentations for same with negative imaging.  Her most recent CT scan did show small ovarian cyst for which transvaginal ultrasound has been ordered and is pending.   Unclear etiology of her acute worsening of her chronic GI complaints.  Fortunately, her nausea and vomiting have resolved and left lower quadrant abdominal pain appears to be close to her baseline  For now, continue conservative management for any recurrent nausea  Advance diet as tolerated -as long as tolerating patient can be discharged from GI perspective  Continue her home Bentyl 10 mg 4 times daily  Recommend fiber supplementation for IBS mixed  Agree with transvaginal ultrasound  Etiology of chronic lower quadrant abdominal pain could be related to potential adhesions given prior C-sections, endometriosis, ovarian pain.  Could consider outpatient SIBO testing for recurrence of nausea/vomiting/pain  No plan for endoscopic evaluation at this time  GI will sign off, but remain available.  Please reach out to on-call provider for changes in clinical status.
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
## 2025-07-12 NOTE — ASSESSMENT & PLAN NOTE
Resolved with hydration.   Recent Labs     07/11/25  2319 07/12/25  0455 07/12/25  0859 07/12/25  1250 07/12/25  1624   SODIUM 126* 129*  129* 134* 136 137     Results from last 7 days   Lab Units 07/12/25  0455 07/12/25  0147   OSMO UR mmol/KG  --  123*   SODIUM UR mmol/L  --  27.0   OSMOLALITY, SERUM mmol/*  --
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
## 2025-07-12 NOTE — ASSESSMENT & PLAN NOTE
39 y.o. female past medical history significant for schizoaffective disorder, bipolar type with prior suicide attempts, substance use disorder, IBS, history of diverticulitis, migraine, seizure disorder (epileptic versus nonepileptic?),  Asthma, history of hepatitis C, myofascial pain syndrome who presented to [facility] on 7/11 for concern of nausea, vomiting, and abdominal pain.  Fortunately, nausea and vomiting resolved with conservative management.  Left lower quadrant abdominal pain appears to be chronic in nature for which she follows with GI team in the outpatient setting.  She has had multiple presentations for same with negative imaging.  Her most recent CT scan did show small ovarian cyst for which transvaginal ultrasound has been ordered and is pending.   Unclear etiology of her acute worsening of her chronic GI complaints.  Fortunately, her nausea and vomiting have resolved and left lower quadrant abdominal pain appears to be close to her baseline  For now, continue conservative management for any recurrent nausea  Advance diet as tolerated -as long as tolerating patient can be discharged from GI perspective  Continue her home Bentyl 10 mg 4 times daily  Recommend fiber supplementation for IBS mixed  Agree with transvaginal ultrasound  Etiology of chronic lower quadrant abdominal pain could be related to potential adhesions given prior C-sections, endometriosis, ovarian pain.  Could consider outpatient SIBO testing for recurrence of nausea/vomiting/pain  No plan for endoscopic evaluation at this time  GI will sign off, but remain available.  Please reach out to on-call provider for changes in clinical status.

## 2025-07-12 NOTE — PLAN OF CARE
Problem: PAIN - ADULT  Goal: Verbalizes/displays adequate comfort level or baseline comfort level  Description: Interventions:  - Encourage patient to monitor pain and request assistance  - Assess pain using appropriate pain scale  - Administer analgesics as ordered based on type and severity of pain and evaluate response  - Implement non-pharmacological measures as appropriate and evaluate response  - Consider cultural and social influences on pain and pain management  - Notify physician/advanced practitioner if interventions unsuccessful or patient reports new pain  - Educate patient/family on pain management process including their role and importance of  reporting pain   - Provide non-pharmacologic/complimentary pain relief interventions  Outcome: Progressing     Problem: INFECTION - ADULT  Goal: Absence or prevention of progression during hospitalization  Description: INTERVENTIONS:  - Assess and monitor for signs and symptoms of infection  - Monitor lab/diagnostic results  - Monitor all insertion sites, i.e. indwelling lines, tubes, and drains  - Monitor endotracheal if appropriate and nasal secretions for changes in amount and color  - [place] appropriate cooling/warming therapies per order  - Administer medications as ordered  - Instruct and encourage patient and family to use good hand hygiene technique  - Identify and instruct in appropriate isolation precautions for identified infection/condition  Outcome: Progressing  Goal: Absence of fever/infection during neutropenic period  Description: INTERVENTIONS:  - Monitor WBC  - Perform strict hand hygiene  - Limit to healthy visitors only  - No plants, dried, fresh or silk flowers with savage in patient room  Outcome: Progressing     Problem: SAFETY ADULT  Goal: Patient will remain free of falls  Description: INTERVENTIONS:  - Educate patient/family on patient safety including physical limitations  - Instruct patient to call for assistance with activity   -  Consider consulting OT/PT to assist with strengthening/mobility based on AM PAC & JH-HLM score  - Consult OT/PT to assist with strengthening/mobility   - Keep Call bell within reach  - Keep bed low and locked with side rails adjusted as appropriate  - Keep care items and personal belongings within reach  - Initiate and maintain comfort rounds  - Make Fall Risk Sign visible to staff  - Apply yellow socks and bracelet for high fall risk patients  - Consider moving patient to room near nurses station  Outcome: Progressing  Goal: Maintain or return to baseline ADL function  Description: INTERVENTIONS:  -  Assess patient's ability to carry out ADLs; assess patient's baseline for ADL function and identify physical deficits which impact ability to perform ADLs (bathing, care of mouth/teeth, toileting, grooming, dressing, etc.)  - Assess/evaluate cause of self-care deficits   - Assess range of motion  - Assess patient's mobility; develop plan if impaired  - Assess patient's need for assistive devices and provide as appropriate  - Encourage maximum independence but intervene and supervise when necessary  - Involve family in performance of ADLs  - Assess for home care needs following discharge   - Consider OT consult to assist with ADL evaluation and planning for discharge  - Provide patient education as appropriate  - Monitor functional capacity and physical performance, use of AM PAC & JH-HLM   - Monitor gait, balance and fatigue with ambulation    Outcome: Progressing  Goal: Maintains/Returns to pre admission functional level  Description: INTERVENTIONS:  - Perform AM-PAC 6 Click Basic Mobility/ Daily Activity assessment daily.  - Set and communicate daily mobility goal to care team and patient/family/caregiver.   - Collaborate with rehabilitation services on mobility goals if consulted  - Out of bed for toileting  - Record patient progress and toleration of activity level   Outcome: Progressing     Problem: DISCHARGE  PLANNING  Goal: Discharge to home or other facility with appropriate resources  Description: INTERVENTIONS:  - Identify barriers to discharge w/patient and caregiver  - Arrange for needed discharge resources and transportation as appropriate  - Identify discharge learning needs (meds, wound care, etc.)  - Arrange for interpretive services to assist at discharge as needed  - Refer to Case Management Department for coordinating discharge planning if the patient needs post-hospital services based on physician/advanced practitioner order or complex needs related to functional status, cognitive ability, or social support system  Outcome: Progressing     Problem: Knowledge Deficit  Goal: Patient/family/caregiver demonstrates understanding of disease process, treatment plan, medications, and discharge instructions  Description: Complete learning assessment and assess knowledge base.  Interventions:  - Provide teaching at level of understanding  - Provide teaching via preferred learning methods  Outcome: Progressing     Problem: GASTROINTESTINAL - ADULT  Goal: Minimal or absence of nausea and/or vomiting  Description: INTERVENTIONS:  - Administer IV fluids if ordered to ensure adequate hydration  - Maintain NPO status until nausea and vomiting are resolved  - Nasogastric tube if ordered  - Administer ordered antiemetic medications as needed  - Provide nonpharmacologic comfort measures as appropriate  - Advance diet as tolerated, if ordered  - Consider nutrition services referral to assist patient with adequate nutrition and appropriate food choices  Outcome: Progressing  Goal: Maintains or returns to baseline bowel function  Description: INTERVENTIONS:  - Assess bowel function  - Encourage oral fluids to ensure adequate hydration  - Administer IV fluids if ordered to ensure adequate hydration  - Administer ordered medications as needed  - Encourage mobilization and activity  - Consider nutritional services referral to  assist patient with adequate nutrition and appropriate food choices  Outcome: Progressing  Goal: Maintains adequate nutritional intake  Description: INTERVENTIONS:  - Monitor percentage of each meal consumed  - Identify factors contributing to decreased intake, treat as appropriate  - Assist with meals as needed  - Monitor I&O, weight, and lab values if indicated  - Obtain nutrition services referral as needed  Outcome: Progressing  Goal: Establish and maintain optimal ostomy function  Description: INTERVENTIONS:  - Assess bowel function  - Encourage oral fluids to ensure adequate hydration  - Administer IV fluids if ordered to ensure adequate hydration   - Administer ordered medications as needed  - Encourage mobilization and activity  - Nutrition services referral to assist patient with appropriate food choices  - Assess stoma site  - Consider wound care consult   Outcome: Progressing  Goal: Oral mucous membranes remain intact  Description: INTERVENTIONS  - Assess oral mucosa and hygiene practices  - Implement preventative oral hygiene regimen  - Implement oral medicated treatments as ordered  - Initiate Nutrition services referral as needed  Outcome: Progressing

## 2025-07-12 NOTE — ASSESSMENT & PLAN NOTE
Generalized on exam  LLQ with rebound  Left adnexal cyst on CT--pelvic US ordered  Lactic acid  GI consult  Psych consult

## 2025-07-12 NOTE — ASSESSMENT & PLAN NOTE
39-year-old female presented with generalized abdominal pain.  Appears to have tenderness in her lower left quadrant and suprapubic area.  CT abdomen and pelvis last 7/4 showed a 29 mm left adnexal cyst.  Ultrasound pelvis pending  GI recommendations appreciated.  Recommended that we continue Bentyl 4 times daily dietary fiber.  Outpatient testing for possible SIBO.  No indication for endoscopic evaluation  Given adnexal cyst, will have OB/GYN evaluate her.

## 2025-07-12 NOTE — ASSESSMENT & PLAN NOTE
Suspect component of psychiatric medications in cause of hyponatremia  Further management per primary team

## 2025-07-12 NOTE — PLAN OF CARE
Problem: PAIN - ADULT  Goal: Verbalizes/displays adequate comfort level or baseline comfort level  Description: Interventions:  - Encourage patient to monitor pain and request assistance  - Assess pain using appropriate pain scale  - Administer analgesics as ordered based on type and severity of pain and evaluate response  - Implement non-pharmacological measures as appropriate and evaluate response  - Consider cultural and social influences on pain and pain management  - Notify physician/advanced practitioner if interventions unsuccessful or patient reports new pain  - Educate patient/family on pain management process including their role and importance of  reporting pain   - Provide non-pharmacologic/complimentary pain relief interventions  Outcome: Progressing     Problem: INFECTION - ADULT  Goal: Absence or prevention of progression during hospitalization  Description: INTERVENTIONS:  - Assess and monitor for signs and symptoms of infection  - Monitor lab/diagnostic results  - Monitor all insertion sites, i.e. indwelling lines, tubes, and drains  - Monitor endotracheal if appropriate and nasal secretions for changes in amount and color  - [place] appropriate cooling/warming therapies per order  - Administer medications as ordered  - Instruct and encourage patient and family to use good hand hygiene technique  - Identify and instruct in appropriate isolation precautions for identified infection/condition  Outcome: Progressing  Goal: Absence of fever/infection during neutropenic period  Description: INTERVENTIONS:  - Monitor WBC  - Perform strict hand hygiene  - Limit to healthy visitors only  - No plants, dried, fresh or silk flowers with savage in patient room  Outcome: Progressing     Goal: Maintain or return to baseline ADL function  Description: INTERVENTIONS:  -  Assess patient's ability to carry out ADLs; assess patient's baseline for ADL function and identify physical deficits which impact ability to  perform ADLs (bathing, care of mouth/teeth, toileting, grooming, dressing, etc.)  - Assess/evaluate cause of self-care deficits   - Assess range of motion  - Assess patient's mobility; develop plan if impaired  - Assess patient's need for assistive devices and provide as appropriate  - Encourage maximum independence but intervene and supervise when necessary  - Involve family in performance of ADLs  - Assess for home care needs following discharge   - Consider OT consult to assist with ADL evaluation and planning for discharge  - Provide patient education as appropriate  - Monitor functional capacity and physical performance, use of AM PAC & JH-HLM   - Monitor gait, balance and fatigue with ambulation    Outcome: Progressing     Problem: DISCHARGE PLANNING  Goal: Discharge to home or other facility with appropriate resources  Description: INTERVENTIONS:  - Identify barriers to discharge w/patient and caregiver  - Arrange for needed discharge resources and transportation as appropriate  - Identify discharge learning needs (meds, wound care, etc.)  - Arrange for interpretive services to assist at discharge as needed  - Refer to Case Management Department for coordinating discharge planning if the patient needs post-hospital services based on physician/advanced practitioner order or complex needs related to functional status, cognitive ability, or social support system  Outcome: Progressing     Problem: Knowledge Deficit  Goal: Patient/family/caregiver demonstrates understanding of disease process, treatment plan, medications, and discharge instructions  Description: Complete learning assessment and assess knowledge base.  Interventions:  - Provide teaching at level of understanding  - Provide teaching via preferred learning methods  Outcome: Progressing     Problem: GASTROINTESTINAL - ADULT  Goal: Minimal or absence of nausea and/or vomiting  Description: INTERVENTIONS:  - Administer IV fluids if ordered to ensure  adequate hydration  - Maintain NPO status until nausea and vomiting are resolved  - Nasogastric tube if ordered  - Administer ordered antiemetic medications as needed  - Provide nonpharmacologic comfort measures as appropriate  - Advance diet as tolerated, if ordered  - Consider nutrition services referral to assist patient with adequate nutrition and appropriate food choices  Outcome: Progressing  Goal: Maintains or returns to baseline bowel function  Description: INTERVENTIONS:  - Assess bowel function  - Encourage oral fluids to ensure adequate hydration  - Administer IV fluids if ordered to ensure adequate hydration  - Administer ordered medications as needed  - Encourage mobilization and activity  - Consider nutritional services referral to assist patient with adequate nutrition and appropriate food choices  Outcome: Progressing  Goal: Maintains adequate nutritional intake  Description: INTERVENTIONS:  - Monitor percentage of each meal consumed  - Identify factors contributing to decreased intake, treat as appropriate  - Assist with meals as needed  - Monitor I&O, weight, and lab values if indicated  - Obtain nutrition services referral as needed  Outcome: Progressing  Goal: Establish and maintain optimal ostomy function  Description: INTERVENTIONS:  - Assess bowel function  - Encourage oral fluids to ensure adequate hydration  - Administer IV fluids if ordered to ensure adequate hydration   - Administer ordered medications as needed  - Encourage mobilization and activity  - Nutrition services referral to assist patient with appropriate food choices  - Assess stoma site  - Consider wound care consult   Outcome: Progressing  Goal: Oral mucous membranes remain intact  Description: INTERVENTIONS  - Assess oral mucosa and hygiene practices  - Implement preventative oral hygiene regimen  - Implement oral medicated treatments as ordered  - Initiate Nutrition services referral as needed  Outcome: Progressing

## 2025-07-12 NOTE — ASSESSMENT & PLAN NOTE
Patient reports med changes and  started administering meds just before all this started. Changes reported include:  Oxcarbazepine increased from BID to TID  Hydroxyzine increased from BID to TID  Zyprexa was changed from 30mg QHS to 15mg BID

## 2025-07-12 NOTE — ASSESSMENT & PLAN NOTE
Cautious with pain control--concern expressed to floor nurse  Tramadol ordered prn (allergic to toradol, tylenol not working, psychogenic seizures)

## 2025-07-12 NOTE — UTILIZATION REVIEW
Initial Clinical Review    Admission: Date/Time/Statement:   Admission Orders (From admission, onward)       Ordered        07/11/25 2048  Place in Observation  Once                          Orders Placed This Encounter   Procedures    Place in Observation     Standing Status:   Standing     Number of Occurrences:   1     Level of Care:   Med Surg [16]     ED Arrival Information       Expected   -    Arrival   7/11/2025 18:14    Acuity   Urgent              Means of arrival   Walk-In    Escorted by   Family Member    Service   Hospitalist    Admission type   Emergency              Arrival complaint   Abd Pain             Chief Complaint   Patient presents with    Abdominal Pain     Pt reports abd pain, urinating on herself and vomiting pt reports same pain as 7/4        Initial Presentation: 39 y.o. female  presents to ED from home due to persistent nausea/vomiting and abdominal pain since July 4th.  Exam notes abdominal tenderness + rebound, slow speech response  PMH of psychiatric illness with suicide attempts, substance abuse, seizures, peripheral neuropathy, myofascial pain syndrome, LBP, lactose intolerance, scoliosis, IBS, Hep C, diverticulosis, asthma, anxiety, arthritis, alcohol abuse, ADHD  Left adnexal cyst seen on CT. Labs show  low sodium 122 abdominal pain, Admitted as observation to med surg for hyponatremia, abdominal pain Plan Normal saline, BMP, neurochecks q4h, Urine and serum ASM, urine Na, GI consult psych consult - ? Meds contributing to low sodium Check pelvic US    GI consult 7/12    Comorbidities likely contributing to some degree of irritable bowel syndrome. Visceral hyperalgesia given her chronic pain issues. Other etiologies include endometriosis or underlying adhesive disease. Lower suspicion for IBD given previous colonoscopy findings. These symptoms appear to be more chronic in nature with acute exacerbation possibly related to acute infectious illness. Fortunately, her symptoms do  appear to be improving.  Recommend Continue Bentyl 4 times daily, continue dietary fiber,agree with transvaginal US for eval of adnexal cyst  Consider outpatient hydrogen/methane breath testing for possible SIBO. Okay to advance diet from GI standpoint. No indication for endoscopic evaluation at this time. Continue supportive care per primary team. Outpatient GI follow-up.     ED Treatment-Medication Administration from 07/11/2025 1814 to 07/11/2025 2124         Date/Time Order Dose Route Action     07/11/2025 1847 sodium chloride 0.9 % bolus 1,000 mL 1,000 mL Intravenous New Bag     07/11/2025 1853 pantoprazole (PROTONIX) injection 40 mg 40 mg Intravenous Given     07/11/2025 1858 Famotidine (PF) (PEPCID) injection 20 mg 20 mg Intravenous Given     07/11/2025 1852 dicyclomine (BENTYL) tablet 20 mg 20 mg Oral Given     07/11/2025 1900 acetaminophen (Ofirmev) injection 1,000 mg 1,000 mg Intravenous New Bag     07/11/2025 1956 aluminum-magnesium hydroxide-simethicone (MAALOX) oral suspension 30 mL 30 mL Oral Given     07/11/2025 1956 Lidocaine Viscous HCl (XYLOCAINE) 2 % mucosal solution 10 mL 10 mL Swish & Spit Given            Scheduled Medications:  budesonide-formoterol, 2 puff, Inhalation, BID  dicyclomine, 20 mg, Oral, 4x Daily (AC & HS)  hydrOXYzine HCL, 25 mg, Oral, TID  nicotine, 1 patch, Transdermal, Daily  OLANZapine, 15 mg, Oral, BID  OXcarbazepine, 300 mg, Oral, Q12H OSIEL  pantoprazole, 40 mg, Intravenous, Q12H OSIEL      Continuous IV Infusions:  sodium chloride, 125 mL/hr, Intravenous, Continuous      PRN Meds:  acetaminophen, 650 mg, Oral, Q6H PRN  albuterol, 2 puff, Inhalation, Q4H PRN  ondansetron, 4 mg, Intravenous, Q6H PRN  traMADol, 50 mg, Oral, Q6H PRN      ED Triage Vitals   Temperature Pulse Respirations Blood Pressure SpO2 Pain Score   07/11/25 1817 07/11/25 1817 07/11/25 1817 07/11/25 1817 07/11/25 1817 07/11/25 2052   98.4 °F (36.9 °C) (!) 112 14 156/74 95 % 10 - Worst Possible Pain     Weight  (last 2 days)       Date/Time Weight    07/11/25 2239 64.9 (143)    07/11/25 1817 65.1 (143.52)            Vital Signs (last 3 days)       Date/Time Temp Pulse Resp BP MAP (mmHg) SpO2 O2 Device Patient Position - Orthostatic VS Pain    07/12/25 15:23:18 98.1 °F (36.7 °C) 81 -- 103/59 74 97 % -- -- --    07/12/25 1137 -- -- -- -- -- -- -- -- 10 - Worst Possible Pain    07/12/25 0900 -- -- -- -- -- -- -- -- 10 - Worst Possible Pain    07/12/25 07:25:18 97.8 °F (36.6 °C) 70 14 105/65 78 97 % None (Room air) Lying --    07/12/25 0504 -- -- -- -- -- -- -- -- 10 - Worst Possible Pain    07/11/25 2239 -- -- -- -- -- -- -- -- 10 - Worst Possible Pain    07/11/25 2141 -- -- -- -- -- -- -- -- 10 - Worst Possible Pain    07/11/25 2136 98.1 °F (36.7 °C) 81 -- 124/79 94 96 % -- -- --    07/11/25 2052 -- 78 16 126/68 -- 98 % None (Room air) Lying 10 - Worst Possible Pain    07/11/25 1817 98.4 °F (36.9 °C) 112 14 156/74 -- 95 % None (Room air) Sitting --              Pertinent Labs/Diagnostic Test Results:   Radiology:  US pelvis complete w transvaginal    (Results Pending)     Cardiology:  No orders to display     GI:  No orders to display           Results from last 7 days   Lab Units 07/12/25  0455 07/11/25  1847   WBC Thousand/uL 7.26 9.88   HEMOGLOBIN g/dL 12.1 14.1   HEMATOCRIT % 33.8* 37.7   PLATELETS Thousands/uL 149 175   TOTAL NEUT ABS Thousands/µL 3.69 7.13         Results from last 7 days   Lab Units 07/12/25  1250 07/12/25  0859 07/12/25  0455 07/11/25  2319 07/11/25  1847   SODIUM mmol/L 136 134* 129*  129* 126* 122*   POTASSIUM mmol/L 3.8 3.6 3.6  3.6 3.5 4.5   CHLORIDE mmol/L 109* 108 104  103 98 95*   CO2 mmol/L 20* 21 19*  20* 21 19*   ANION GAP mmol/L 7 5 6  6 7 8   BUN mg/dL 2* 2* 2*  2* 2* 4*   CREATININE mg/dL 0.67 0.61 0.57*  0.59* 0.62 0.69   EGFR ml/min/1.73sq m 112 116 118  117 115 111   CALCIUM mg/dL 8.5 7.9* 7.9*  7.8* 7.9* 8.3*   MAGNESIUM mg/dL  --   --  2.3 1.5*  --    PHOSPHORUS mg/dL  --    --  2.8  --   --      Results from last 7 days   Lab Units 07/12/25  0455 07/11/25  1847   AST U/L 11* 28   ALT U/L 5* 9   ALK PHOS U/L 46 50   TOTAL PROTEIN g/dL 5.4* 6.9   ALBUMIN g/dL 3.5 4.5   TOTAL BILIRUBIN mg/dL 0.29 0.43         Results from last 7 days   Lab Units 07/12/25  1250 07/12/25  0859 07/12/25  0455 07/11/25  2319 07/11/25  1847   GLUCOSE RANDOM mg/dL 123 89 90  90 80 96     Results from last 7 days   Lab Units 07/12/25  0455   OSMOLALITY, SERUM mmol/*           Results from last 7 days   Lab Units 07/12/25  0455   PROTIME seconds 15.0   INR  1.16   PTT seconds 31     Results from last 7 days   Lab Units 07/11/25  1847   TSH 3RD GENERATON uIU/mL 1.053         Results from last 7 days   Lab Units 07/11/25  2319   LACTIC ACID mmol/L 0.5           Results from last 7 days   Lab Units 07/11/25  1847   LIPASE u/L 25             Results from last 7 days   Lab Units 07/12/25  0455 07/12/25  0147   OSMOLALITY, SERUM mmol/*  --    OSMO UR mmol/KG  --  123*     Results from last 7 days   Lab Units 07/12/25  0147 07/11/25  1849   CLARITY UA   --  Clear   COLOR UA   --  Yellow   SPEC GRAV UA   --  1.010   PH UA   --  6.5   GLUCOSE UA mg/dl  --  Negative   KETONES UA mg/dl  --  Negative   BLOOD UA   --  Negative   PROTEIN UA mg/dl  --  Negative   NITRITE UA   --  Negative   BILIRUBIN UA   --  Negative   UROBILINOGEN UA E.U./dl  --  0.2   LEUKOCYTES UA   --  Negative   SODIUM UR mmol/L 27.0  --              Results from last 7 days   Lab Units 07/11/25  2109   AMPH/METH  Negative   BARBITURATE UR  Negative   BENZODIAZEPINE UR  Negative   COCAINE UR  Negative   METHADONE URINE  Negative   OPIATE UR  Negative   PCP UR  Negative   THC UR  Negative         Past Medical History[1]  Present on Admission:   Asthma   Schizoaffective disorder, bipolar type (HCC) r/o bipolar with psychotic features   Hyponatremia   Drug-seeking behavior   Psychogenic nonepileptic seizure   Abdominal pain      Admitting  Diagnosis: Hyponatremia [E87.1]  Abdominal pain [R10.9]  Nausea and vomiting [R11.2]  Age/Sex: 39 y.o. female    Network Utilization Review Department  ATTENTION: Please call with any questions or concerns to [phone] and carefully listen to the prompts so that you are directed to the right person. All voicemails are confidential.   For Discharge needs, contact Care Management DC Support Team at [phone] opt. [phone]  Send all requests for admission clinical reviews, approved or denied determinations and any other requests to dedicated fax number below belonging to the campus where the patient is receiving treatment. List of dedicated fax numbers for the Facilities:  FACILITY NAME UR FAX NUMBER   ADMISSION DENIALS (Administrative/Medical Necessity) [phone]   DISCHARGE SUPPORT TEAM (NETWORK) [phone]   PARENT CHILD HEALTH (Maternity/NICU/Pediatrics) [phone]   [facility] [phone]   [facility] [phone]   [facility] [phone]   [facility] [phone]   [facility] [phone]   [facility] [phone]   [facility] [phone]   [facility] [phone]   [facility] [phone]   [facility] [phone]   [facility] [phone]   [facility] [phone]              [1]   Past Medical History:  Diagnosis Date    Abnormal Pap smear of cervix     ADHD (attention deficit hyperactivity disorder)     Alcohol abuse     Ankle fracture     Anxiety     Arthritis     back    Asthma     Bipolar disorder (HCC)     Cellulitis     right side fac in 2014    Chronic pain disorder     Depression     Diverticulitis     Flank pain 08/16/2016    Hallucination      Hepatitis C     Hip disease 2006    reports she had fluid removed from right hip and received treatment with antibiotic     History of abnormal cervical Pap smear     History of multiple miscarriages     IBS (irritable bowel syndrome)     Infantile idiopathic scoliosis     Joint pain     Kidney stone     Lactose intolerance     Low back pain     Myofascial pain syndrome     Peripheral neuropathy 03/28/2024    Poor dentition     Psychiatric illness     Psychosis ([facility])     PTSD (post-traumatic stress disorder)     Pyelonephritis affecting pregnancy     Right hand paresthesia     Right ovarian cyst     Schizoaffective disorder, bipolar type ([facility]) r/o bipolar with psychotic features 8/16/2024    Scoliosis     Seizures ([facility])     Self-injurious behavior     Sleep difficulties     Slow transit constipation     Substance abuse ([facility])     Suicide attempt ([facility])

## 2025-07-12 NOTE — ASSESSMENT & PLAN NOTE
Patient reports med changes and  started administering meds just before all this started. Changes reported include:  Oxcarbazepine increased from BID to TID  Hydroxyzine increased from BID to TID  Zyprexa was changed from 30mg QHS to 15mg BID    Psych consult--? If med changes contributing to symptoms/low sodium

## 2025-07-13 VITALS
HEIGHT: 62 IN | DIASTOLIC BLOOD PRESSURE: 59 MMHG | HEART RATE: 82 BPM | RESPIRATION RATE: 16 BRPM | WEIGHT: 143 LBS | OXYGEN SATURATION: 97 % | TEMPERATURE: 98.1 F | BODY MASS INDEX: 26.31 KG/M2 | SYSTOLIC BLOOD PRESSURE: 107 MMHG

## 2025-07-13 LAB
ANION GAP SERPL CALCULATED.3IONS-SCNC: 6 MMOL/L (ref 4–13)
ANION GAP SERPL CALCULATED.3IONS-SCNC: 7 MMOL/L (ref 4–13)
BUN SERPL-MCNC: 8 MG/DL (ref 5–25)
BUN SERPL-MCNC: 8 MG/DL (ref 5–25)
CALCIUM SERPL-MCNC: 8.5 MG/DL (ref 8.4–10.2)
CALCIUM SERPL-MCNC: 8.6 MG/DL (ref 8.4–10.2)
CHLORIDE SERPL-SCNC: 106 MMOL/L (ref 96–108)
CHLORIDE SERPL-SCNC: 106 MMOL/L (ref 96–108)
CO2 SERPL-SCNC: 23 MMOL/L (ref 21–32)
CO2 SERPL-SCNC: 25 MMOL/L (ref 21–32)
CREAT SERPL-MCNC: 0.61 MG/DL (ref 0.6–1.3)
CREAT SERPL-MCNC: 0.72 MG/DL (ref 0.6–1.3)
GFR SERPL CREATININE-BSD FRML MDRD: 107 ML/MIN/1.73SQ M
GFR SERPL CREATININE-BSD FRML MDRD: 116 ML/MIN/1.73SQ M
GLUCOSE SERPL-MCNC: 107 MG/DL (ref 65–140)
GLUCOSE SERPL-MCNC: 129 MG/DL (ref 65–140)
POTASSIUM SERPL-SCNC: 3.7 MMOL/L (ref 3.5–5.3)
POTASSIUM SERPL-SCNC: 4.2 MMOL/L (ref 3.5–5.3)
SODIUM SERPL-SCNC: 136 MMOL/L (ref 135–147)
SODIUM SERPL-SCNC: 137 MMOL/L (ref 135–147)

## 2025-07-13 PROCEDURE — 80048 BASIC METABOLIC PNL TOTAL CA: CPT | Performed by: HOSPITALIST

## 2025-07-13 PROCEDURE — 99239 HOSP IP/OBS DSCHRG MGMT >30: CPT | Performed by: STUDENT IN AN ORGANIZED HEALTH CARE EDUCATION/TRAINING PROGRAM

## 2025-07-13 RX ORDER — HYDROXYZINE HYDROCHLORIDE 50 MG/1
50 TABLET, FILM COATED ORAL 3 TIMES DAILY
COMMUNITY

## 2025-07-13 RX ORDER — DICYCLOMINE HCL 20 MG
20 TABLET ORAL
Qty: 40 TABLET | Refills: 0 | Status: SHIPPED | OUTPATIENT
Start: 2025-07-13 | End: 2025-07-16 | Stop reason: SDUPTHER

## 2025-07-13 RX ORDER — POLYETHYLENE GLYCOL 3350 17 G/17G
17 POWDER, FOR SOLUTION ORAL DAILY
Status: DISCONTINUED | OUTPATIENT
Start: 2025-07-13 | End: 2025-07-13 | Stop reason: HOSPADM

## 2025-07-13 RX ORDER — AMOXICILLIN 250 MG
1 CAPSULE ORAL DAILY
Status: DISCONTINUED | OUTPATIENT
Start: 2025-07-13 | End: 2025-07-13 | Stop reason: HOSPADM

## 2025-07-13 RX ADMIN — DICYCLOMINE HYDROCHLORIDE 20 MG: 20 TABLET ORAL at 11:11

## 2025-07-13 RX ADMIN — POLYETHYLENE GLYCOL 3350 17 G: 17 POWDER, FOR SOLUTION ORAL at 11:11

## 2025-07-13 RX ADMIN — TRAMADOL HYDROCHLORIDE 50 MG: 50 TABLET, COATED ORAL at 12:50

## 2025-07-13 RX ADMIN — HYDROXYZINE HYDROCHLORIDE 25 MG: 25 TABLET, FILM COATED ORAL at 09:37

## 2025-07-13 RX ADMIN — OLANZAPINE 15 MG: 10 TABLET, FILM COATED ORAL at 09:37

## 2025-07-13 RX ADMIN — TRAMADOL HYDROCHLORIDE 50 MG: 50 TABLET, COATED ORAL at 06:04

## 2025-07-13 RX ADMIN — DICYCLOMINE HYDROCHLORIDE 20 MG: 20 TABLET ORAL at 06:04

## 2025-07-13 RX ADMIN — HYDROXYZINE HYDROCHLORIDE 25 MG: 25 TABLET, FILM COATED ORAL at 17:11

## 2025-07-13 RX ADMIN — OXCARBAZEPINE 300 MG: 300 TABLET, FILM COATED ORAL at 09:37

## 2025-07-13 RX ADMIN — DICYCLOMINE HYDROCHLORIDE 20 MG: 20 TABLET ORAL at 17:11

## 2025-07-13 RX ADMIN — PANTOPRAZOLE SODIUM 40 MG: 40 INJECTION, POWDER, FOR SOLUTION INTRAVENOUS at 09:37

## 2025-07-13 RX ADMIN — SENNOSIDES AND DOCUSATE SODIUM 1 TABLET: 50; 8.6 TABLET ORAL at 09:37

## 2025-07-13 NOTE — ASSESSMENT & PLAN NOTE
39-year-old female presented with generalized abdominal pain.  Appears to have tenderness in her lower left quadrant and suprapubic area.  CT abdomen and pelvis last 7/4 showed a 29 mm left adnexal cyst.  Ultrasound pelvis showing:     Left ovarian cyst as described above without evidence of ovarian torsion. Based on the ACR O-RADS system, this is a typical hemorrhagic corpus luteum cyst, O-RADS category 2 (almost certainly benign with <1% risk of malignancy). The management recommendation is no additional work-up or follow-up needed.    Cleared by OB-Gyne for discharge abdominal pain resolved.  GI recommendations appreciated.  Recommended that we continue Bentyl 4 times daily dietary fiber.  Outpatient testing for possible SIBO.  No indication for endoscopic evaluation
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
## 2025-07-13 NOTE — CONSULTS
TeleConsultation - Behavioral Health   Name: Antionette Downing 39 y.o. female I MRN: [id number]  Unit/Bed#: E5 -01 I Date of Admission: 7/11/2025   Date of Service: 7/12/2025 I Hospital Day: 0  Inpatient consult to Psychiatry  Consult performed by: Yayo Daugherty MD  Consult ordered by: Sarah Land DO      Physician Requesting Consult: Seamus Gonzalez MD  Principal Problem:Abdominal pain  Reason for Consult: Medication management    Assessment & Plan   Diagnosis adjustment disorder unspecified    TREATMENT PLAN RECOMMENDATIONS:  Medications: Recommend to decrease Zyprexa to 15 mg p.o. at bedtime, decrease Trileptal to 300 mg p.o. twice daily and continue hydroxyzine as prescribed.  PRN for sleep: Deferred  PRN for agitation: Deferred     Informed consent for the above medication has been obtained including discussion of the risks, benefits and alternatives: Yes    Disposition: The patient does not currently meet criteria for inpatient psychiatric hospitalization. They deny thoughts or plans for suicide and denies homicidal thoughts or intent. They are not unable to care for themselves due to a mental illness and/or acute psychosis. They are able to adequately participate in care planning with primary team. No criteria for an involuntary psychiatric commitment exists at this time.    Legal Status Recommendation: Voluntary    Multiple Antipsychotic Review: N/A    Psychotherapy/Psychoeducation: Provided to patient based on their needs and abilities in a manner that they could understand and accommodated their learning style.    Other/Medical Work Up and/or treatment modality recommendations: N/A to this case.    Patient Caregiver/Family Education: Provided education regarding relevant aspects of the treatment plan to identified patient support based on their needs and abilities in a manner that they could understand with the patient's express consent.    Follow-up: Outpatient psychiatry    Report  "regarding the above Assessment and Treatment plan was provided to: RN    History of Present Illness    39 y.o. female past medical history significant for schizoaffective disorder, bipolar type with prior suicide attempts, substance use disorder, IBS, history of diverticulitis, migraine, seizure disorder (epileptic versus nonepileptic?),  Asthma, history of hepatitis C, myofascial pain syndrome who presented to [facility] on 7/11 for concern of nausea, vomiting, and abdominal pain.     Psychiatry was consulted patient was seen and assessed and gave informed consent for telepsych eval she was awake alert oriented x 3 in no acute distress when asked for reason of visit she states \"abdominal pain and the low sodium\".  Patient reports recent change to her outpatient psychiatric medications because her  felt as if she was not doing well.  She reports being prescribed Zyprexa, hydroxyzine, Trileptal for mood disorder.  Recently her Zyprexa was changed from 30 mg p.o. at bedtime to 50 mg p.o. twice daily, Trileptal was increased to 300 mg p.o. 3 times daily.  Patient presented with low sodium.  Also with abdominal pain.  She adamantly denies SI HI and AVH at this time.  She did not appear manic or psychotic throughout interview.  She reports good sleep and appetite.  Denies feeling hopeless or helpless.  Denies anhedonia.  Denies substance abuse.  Denies access to firearms.  Fortunately, nausea and vomiting resolved with conservative management.         Psychiatric Review Of Systems:  sleep: no  appetite changes: no  weight changes: no  energy/anergy: no  interest/pleasure/anhedonia: no  somatic symptoms: yes  anxiety/panic: no  ganga: no  guilty/hopeless: no  self injurious behavior/risky behavior: no    Historical Information   Past Psychiatric History:   Psychiatric Hospitalizations:   Several past inpatient psychiatric admissions  Outpatient Treatment History:   Current  Suicide Attempts:   Yes, few " attempts by N/A      Substance Abuse History:  Denies      Meds/Allergies   Current Facility-Administered Medications   Medication Dose Route Frequency Provider Last Rate   • acetaminophen  650 mg Oral Q6H PRN Sarah Land DO     • albuterol  2 puff Inhalation Q4H PRN Sarah Land DO     • budesonide-formoterol  2 puff Inhalation BID Sarah Land, DO     • dicyclomine  20 mg Oral 4x Daily (AC & HS) Sarah Land DO     • hydrOXYzine HCL  25 mg Oral TID Sarah Land DO     • nicotine  1 patch Transdermal Daily Sarah Land, DO     • OLANZapine  15 mg Oral BID Sarah Land DO     • ondansetron  4 mg Intravenous Q6H PRN Seamus Gonzalez MD     • OXcarbazepine  300 mg Oral Q12H OSIEL Sarah Land, DO     • pantoprazole  40 mg Intravenous Q12H [facility] Sarah Land DO     • traMADol  50 mg Oral Q6H PRN Sarah Land DO        Allergies[1]    Objective :  Temp:  [97.8 °F (36.6 °C)-98.1 °F (36.7 °C)] 98.1 °F (36.7 °C)  HR:  [70-81] 81  BP: (103-105)/(59-65) 103/59  Resp:  [14] 14  SpO2:  [97 %] 97 %  O2 Device: None (Room air)    Mental Status Evaluation:  Appearance:  age appropriate   Behavior:  normal   Speech:  normal pitch and normal volume   Mood:  normal   Affect:  normal       Thought Process:  normal   Associations intact associations   Thought Content:  normal   Perceptual Disturbances: None   Risk Potential: Suicidal Ideations none  Homicidal Ideations none  Potential for Aggression No   Sensorium:  person, place, and time/date                       Insight:  age appropriate   Judgment: age appropriate                 Lab Results: I have reviewed the following lab results:   .     07/12/25  0455 07/12/25  0859 07/12/25 2013   WBC 7.26  --   --    HGB 12.1  --   --    HCT 33.8*  --   --      --   --    SODIUM 129*  129*   < > 138   K 3.6  3.6   < > 4.1     103   < > 109*   CO2 19*  20*   < > 22   BUN 2*  2*   < > 7    CREATININE 0.57*  0.59*   < > 0.67   GLUC 90  90   < > 96   MG 2.3  --   --    PHOS 2.8  --   --    AST 11*  --   --    ALT 5*  --   --    ALB 3.5  --   --    TBILI 0.29  --   --    ALKPHOS 46  --   --     < > = values in this interval not displayed.      Results from last 7 days   Lab Units 07/11/25  2109   BARBITURATE UR  Negative   BENZODIAZEPINE UR  Negative   THC UR  Negative   COCAINE UR  Negative   METHADONE URINE  Negative   OPIATE UR  Negative   PCP UR  Negative     Lipid Profile:   Lab Results   Component Value Date    CHOLESTEROL 163 02/27/2025    HDL 48 (L) 02/27/2025    TRIG 119 02/27/2025    LDLCALC 91 02/27/2025    NONHDLC 115 02/27/2025   Thyroid Studies:   Lab Results   Component Value Date    VYH3YLMSULMQ 1.053 07/11/2025    FREET4 0.85 02/27/2025    L1ITMNY 1.40 02/01/2023     Ammonia:   Lab Results   Component Value Date    AMMONIA 17 11/12/2019     Drug Levels:   Lab Results   Component Value Date    VALPROICTOT <10.0 (L) 02/12/2023    LITHIUM 0.63 02/01/2024       Imaging Results Review: No pertinent imaging studies reviewed.  Other Study Results Review: No additional pertinent studies reviewed.    Code Status: Level 1 - Full Code  Advance Directive and Living Will:      Power of : Yes  POLST:      Screenings:   1. Nutrition Assessment (completed by Staff):   Nutrition  Feeding: Able to feed self  Diet Type: Clear liquid  2. Pain Screening  Pain Assessment  Pain Assessment Tool: 0-10  Pain Score: 10  Pain Location/Orientation: Location: Abdomen  Pain Onset/Description: Frequency: Constant/Continuous, Onset: Ongoing  Patient's Stated Pain Goal: No pain  Hospital Pain Intervention(s): Medication (See MAR)  Multiple Pain Sites: No  3. Suicide Screening  ED Crisis Suicide Risk Assessment:      C-SSRS Screening (Nursing Assessment - recent):  no risk  C-SSRS Screening (Nursing Assessment - since last contact):      Suicide Risk Assessment completed by the Consultant: The following  ratings are based on assessment at the time of the interview.    Administrative Statements   VIRTUAL CARE DOCUMENTATION:     1. This service was provided via Telemedicine using Teams Virtual Rounding      2. Parties in the room with patient during teleconsult Patient only    3. Confidentiality My office door was closed     4. Participants No one else was in the room    5. Patient acknowledged consent and understanding of privacy and security of the  Telemedicine consult. I informed the patient that I have reviewed their record in Epic and presented the opportunity for them to ask any questions regarding the visit today.  The patient agreed to participate.    6. I have spent a total time of 60 minutes in caring for this patient on the day of the visit/encounter including Diagnostic results, Risks and benefits of tx options, Instructions for management, and Patient and family education, not including the time spent for establishing the audio/video connection.            [1]  Allergies  Allergen Reactions   • Toradol [Ketorolac Tromethamine] Anaphylaxis   • Aspirin      Pt given enteric coated 81 mg, when ask pt denies any allergy, listed under allergies on paperwork provided by berny   • Chocolate - Food Allergy Itching   • Naproxen    • Azithromycin Rash   • Latex Hives and Rash   • Neurontin [Gabapentin] Rash and GI Bleeding   • Ppd [Tuberculin Purified Protein Derivative] Rash

## 2025-07-13 NOTE — PLAN OF CARE
Problem: PAIN - ADULT  Goal: Verbalizes/displays adequate comfort level or baseline comfort level  Description: Interventions:  - Encourage patient to monitor pain and request assistance  - Assess pain using appropriate pain scale  - Administer analgesics as ordered based on type and severity of pain and evaluate response  - Implement non-pharmacological measures as appropriate and evaluate response  - Consider cultural and social influences on pain and pain management  - Notify physician/advanced practitioner if interventions unsuccessful or patient reports new pain  - Educate patient/family on pain management process including their role and importance of  reporting pain   - Provide non-pharmacologic/complimentary pain relief interventions  Outcome: Progressing     Problem: INFECTION - ADULT  Goal: Absence or prevention of progression during hospitalization  Description: INTERVENTIONS:  - Assess and monitor for signs and symptoms of infection  - Monitor lab/diagnostic results  - Monitor all insertion sites, i.e. indwelling lines, tubes, and drains  - Monitor endotracheal if appropriate and nasal secretions for changes in amount and color  - [place] appropriate cooling/warming therapies per order  - Administer medications as ordered  - Instruct and encourage patient and family to use good hand hygiene technique  - Identify and instruct in appropriate isolation precautions for identified infection/condition  Outcome: Progressing  Goal: Absence of fever/infection during neutropenic period  Description: INTERVENTIONS:  - Monitor WBC  - Perform strict hand hygiene  - Limit to healthy visitors only  - No plants, dried, fresh or silk flowers with savage in patient room  Outcome: Progressing     Problem: SAFETY ADULT  Goal: Patient will remain free of falls  Description: INTERVENTIONS:  - Educate patient/family on patient safety including physical limitations  - Instruct patient to call for assistance with activity   -  Consider consulting OT/PT to assist with strengthening/mobility based on AM PAC & JH-HLM score  - Consult OT/PT to assist with strengthening/mobility   - Keep Call bell within reach  - Keep bed low and locked with side rails adjusted as appropriate  - Keep care items and personal belongings within reach  - Initiate and maintain comfort rounds  - Make Fall Risk Sign visible to staff  - Offer Toileting every 2 Hours, in advance of need  - Initiate/Maintain bed alarm  - Obtain necessary fall risk management equipment:  socks  - Apply yellow socks and bracelet for high fall risk patients  - Consider moving patient to room near nurses station  Outcome: Progressing  Goal: Maintain or return to baseline ADL function  Description: INTERVENTIONS:  -  Assess patient's ability to carry out ADLs; assess patient's baseline for ADL function and identify physical deficits which impact ability to perform ADLs (bathing, care of mouth/teeth, toileting, grooming, dressing, etc.)  - Assess/evaluate cause of self-care deficits   - Assess range of motion  - Assess patient's mobility; develop plan if impaired  - Assess patient's need for assistive devices and provide as appropriate  - Encourage maximum independence but intervene and supervise when necessary  - Involve family in performance of ADLs  - Assess for home care needs following discharge   - Consider OT consult to assist with ADL evaluation and planning for discharge  - Provide patient education as appropriate  - Monitor functional capacity and physical performance, use of AM PAC & JH-HLM   - Monitor gait, balance and fatigue with ambulation    Outcome: Progressing     Problem: DISCHARGE PLANNING  Goal: Discharge to home or other facility with appropriate resources  Description: INTERVENTIONS:  - Identify barriers to discharge w/patient and caregiver  - Arrange for needed discharge resources and transportation as appropriate  - Identify discharge learning needs (meds, wound  care, etc.)  - Arrange for interpretive services to assist at discharge as needed  - Refer to Case Management Department for coordinating discharge planning if the patient needs post-hospital services based on physician/advanced practitioner order or complex needs related to functional status, cognitive ability, or social support system  Outcome: Progressing     Problem: Knowledge Deficit  Goal: Patient/family/caregiver demonstrates understanding of disease process, treatment plan, medications, and discharge instructions  Description: Complete learning assessment and assess knowledge base.  Interventions:  - Provide teaching at level of understanding  - Provide teaching via preferred learning methods  Outcome: Progressing     Problem: GASTROINTESTINAL - ADULT  Goal: Minimal or absence of nausea and/or vomiting  Description: INTERVENTIONS:  - Administer IV fluids if ordered to ensure adequate hydration  - Maintain NPO status until nausea and vomiting are resolved  - Nasogastric tube if ordered  - Administer ordered antiemetic medications as needed  - Provide nonpharmacologic comfort measures as appropriate  - Advance diet as tolerated, if ordered  - Consider nutrition services referral to assist patient with adequate nutrition and appropriate food choices  Outcome: Progressing  Goal: Maintains or returns to baseline bowel function  Description: INTERVENTIONS:  - Assess bowel function  - Encourage oral fluids to ensure adequate hydration  - Administer IV fluids if ordered to ensure adequate hydration  - Administer ordered medications as needed  - Encourage mobilization and activity  - Consider nutritional services referral to assist patient with adequate nutrition and appropriate food choices  Outcome: Progressing  Goal: Maintains adequate nutritional intake  Description: INTERVENTIONS:  - Monitor percentage of each meal consumed  - Identify factors contributing to decreased intake, treat as appropriate  - Assist with  meals as needed  - Monitor I&O, weight, and lab values if indicated  - Obtain nutrition services referral as needed  Outcome: Progressing  Goal: Establish and maintain optimal ostomy function  Description: INTERVENTIONS:  - Assess bowel function  - Encourage oral fluids to ensure adequate hydration  - Administer IV fluids if ordered to ensure adequate hydration   - Administer ordered medications as needed  - Encourage mobilization and activity  - Nutrition services referral to assist patient with appropriate food choices  - Assess stoma site  - Consider wound care consult   Outcome: Progressing  Goal: Oral mucous membranes remain intact  Description: INTERVENTIONS  - Assess oral mucosa and hygiene practices  - Implement preventative oral hygiene regimen  - Implement oral medicated treatments as ordered  - Initiate Nutrition services referral as needed  Outcome: Progressing

## 2025-07-13 NOTE — ASSESSMENT & PLAN NOTE
Resolved with hydration.   Recent Labs     07/12/25  1250 07/12/25  1624 07/12/25  2013 07/13/25  0008 07/13/25  0439   SODIUM 136 137 138 136 137     Results from last 7 days   Lab Units 07/12/25  0455 07/12/25  0147   OSMO UR mmol/KG  --  123*   SODIUM UR mmol/L  --  27.0   OSMOLALITY, SERUM mmol/*  --

## 2025-07-13 NOTE — PLAN OF CARE
Problem: PAIN - ADULT  Goal: Verbalizes/displays adequate comfort level or baseline comfort level  Description: Interventions:  - Encourage patient to monitor pain and request assistance  - Assess pain using appropriate pain scale  - Administer analgesics as ordered based on type and severity of pain and evaluate response  - Implement non-pharmacological measures as appropriate and evaluate response  - Consider cultural and social influences on pain and pain management  - Notify physician/advanced practitioner if interventions unsuccessful or patient reports new pain  - Educate patient/family on pain management process including their role and importance of  reporting pain   - Provide non-pharmacologic/complimentary pain relief interventions  Outcome: Progressing     Problem: INFECTION - ADULT  Goal: Absence or prevention of progression during hospitalization  Description: INTERVENTIONS:  - Assess and monitor for signs and symptoms of infection  - Monitor lab/diagnostic results  - Monitor all insertion sites, i.e. indwelling lines, tubes, and drains  - Monitor endotracheal if appropriate and nasal secretions for changes in amount and color  - [place] appropriate cooling/warming therapies per order  - Administer medications as ordered  - Instruct and encourage patient and family to use good hand hygiene technique  - Identify and instruct in appropriate isolation precautions for identified infection/condition  Outcome: Progressing  Goal: Absence of fever/infection during neutropenic period  Description: INTERVENTIONS:  - Monitor WBC  - Perform strict hand hygiene  - Limit to healthy visitors only  - No plants, dried, fresh or silk flowers with savage in patient room  Outcome: Progressing     Problem: SAFETY ADULT  Goal: Patient will remain free of falls  Description: INTERVENTIONS:  - Educate patient/family on patient safety including physical limitations  - Instruct patient to call for assistance with activity   -  Consider consulting OT/PT to assist with strengthening/mobility based on AM PAC & JH-HLM score  - Consult OT/PT to assist with strengthening/mobility   - Keep Call bell within reach  - Keep bed low and locked with side rails adjusted as appropriate  - Keep care items and personal belongings within reach  - Initiate and maintain comfort rounds  - Make Fall Risk Sign visible to staff  - Apply yellow socks and bracelet for high fall risk patients  - Consider moving patient to room near nurses station  Outcome: Progressing  Goal: Maintain or return to baseline ADL function  Description: INTERVENTIONS:  -  Assess patient's ability to carry out ADLs; assess patient's baseline for ADL function and identify physical deficits which impact ability to perform ADLs (bathing, care of mouth/teeth, toileting, grooming, dressing, etc.)  - Assess/evaluate cause of self-care deficits   - Assess range of motion  - Assess patient's mobility; develop plan if impaired  - Assess patient's need for assistive devices and provide as appropriate  - Encourage maximum independence but intervene and supervise when necessary  - Involve family in performance of ADLs  - Assess for home care needs following discharge   - Consider OT consult to assist with ADL evaluation and planning for discharge  - Provide patient education as appropriate  - Monitor functional capacity and physical performance, use of AM PAC & JH-HLM   - Monitor gait, balance and fatigue with ambulation    Outcome: Progressing  Goal: Maintains/Returns to pre admission functional level  Description: INTERVENTIONS:  - Perform AM-PAC 6 Click Basic Mobility/ Daily Activity assessment daily.  - Set and communicate daily mobility goal to care team and patient/family/caregiver.   - Collaborate with rehabilitation services on mobility goals if consulted  - Out of bed for toileting  - Record patient progress and toleration of activity level   Outcome: Progressing     Problem: DISCHARGE  PLANNING  Goal: Discharge to home or other facility with appropriate resources  Description: INTERVENTIONS:  - Identify barriers to discharge w/patient and caregiver  - Arrange for needed discharge resources and transportation as appropriate  - Identify discharge learning needs (meds, wound care, etc.)  - Arrange for interpretive services to assist at discharge as needed  - Refer to Case Management Department for coordinating discharge planning if the patient needs post-hospital services based on physician/advanced practitioner order or complex needs related to functional status, cognitive ability, or social support system  Outcome: Progressing     Problem: Knowledge Deficit  Goal: Patient/family/caregiver demonstrates understanding of disease process, treatment plan, medications, and discharge instructions  Description: Complete learning assessment and assess knowledge base.  Interventions:  - Provide teaching at level of understanding  - Provide teaching via preferred learning methods  Outcome: Progressing     Problem: GASTROINTESTINAL - ADULT  Goal: Minimal or absence of nausea and/or vomiting  Description: INTERVENTIONS:  - Administer IV fluids if ordered to ensure adequate hydration  - Maintain NPO status until nausea and vomiting are resolved  - Nasogastric tube if ordered  - Administer ordered antiemetic medications as needed  - Provide nonpharmacologic comfort measures as appropriate  - Advance diet as tolerated, if ordered  - Consider nutrition services referral to assist patient with adequate nutrition and appropriate food choices  Outcome: Progressing  Goal: Maintains or returns to baseline bowel function  Description: INTERVENTIONS:  - Assess bowel function  - Encourage oral fluids to ensure adequate hydration  - Administer IV fluids if ordered to ensure adequate hydration  - Administer ordered medications as needed  - Encourage mobilization and activity  - Consider nutritional services referral to  assist patient with adequate nutrition and appropriate food choices  Outcome: Progressing  Goal: Maintains adequate nutritional intake  Description: INTERVENTIONS:  - Monitor percentage of each meal consumed  - Identify factors contributing to decreased intake, treat as appropriate  - Assist with meals as needed  - Monitor I&O, weight, and lab values if indicated  - Obtain nutrition services referral as needed  Outcome: Progressing  Goal: Establish and maintain optimal ostomy function  Description: INTERVENTIONS:  - Assess bowel function  - Encourage oral fluids to ensure adequate hydration  - Administer IV fluids if ordered to ensure adequate hydration   - Administer ordered medications as needed  - Encourage mobilization and activity  - Nutrition services referral to assist patient with appropriate food choices  - Assess stoma site  - Consider wound care consult   Outcome: Progressing  Goal: Oral mucous membranes remain intact  Description: INTERVENTIONS  - Assess oral mucosa and hygiene practices  - Implement preventative oral hygiene regimen  - Implement oral medicated treatments as ordered  - Initiate Nutrition services referral as needed  Outcome: Progressing

## 2025-07-13 NOTE — CONSULTS
Consultation - Gynecology  Antionette Downing 39 y.o. female MRN: [id number]  Unit/Bed#: E5 -01 Encounter: [id number]    Inpatient consult to Obstetrics / Gynecology  Consult performed by: Trina Russell MD  Consult ordered by: Seamus Gonzalez MD        Chief Complaint   Patient presents with    Abdominal Pain     Pt reports abd pain, urinating on herself and vomiting pt reports same pain as       Assessment/Plan  * Abdominal pain  Assessment & Plan  Patient presenting with several days of abdominal pain; CT imaging notable for small left adnexal cyst. Pain possibly 2/2 to this finding; however, need further characterization of adnexal lesion via TVUS  - f/u final radiology read on TVUS  - continue pain management per primary team       History of Present Illness:  Physician Requesting Consult: Seamus Gonzalez MD  Reason for Consult / Principal Problem: abdominal pain    HPI: Antionette Downing is a 39 y.o.  with PMH significant for asthma, bipolar I disorder, hx cocaine use, current cigarette use, seizure disorder,  section x2, tubal ligation who initially presented to the ED on  for periumbilical and LLQ abdominal pain and vomiting. She was admitted to the Internal Medicine service. Of note, she had presented to the ED on  for the same concern, at which time CTAP w contrast was notable for a 29 mm left adnexal cyst and small amount of pelvic free fluid.     On evaluation, patient reports constant, stabbing periumbilical and left lower quadrant pain as well as vomiting since . Pain is not relieved by over the counter Tylenol. She has never experienced anything like this before. She denies recent trauma, recent new sexual partner(s), fevers/chills, abnormal vaginal discharge or odor, vaginal pruritus, history of sexually transmitted infection, or dysuria. She endorses urinary frequency and urgency since . She states she has not had a bowel movement since that date. Her  "LMP occurred within the last month, though she cannot remember the exact date.     Historical Information   Past Medical History[1]  Past Surgical History[2]  OB History    Para Term  AB Living   10 5 5 0 5 5   SAB IAB Ectopic Multiple Live Births   5 0 0  5      # Outcome Date GA Lbr Pieter/2nd Weight Sex Type Anes PTL Lv   10 Term 16 39w6d  3324 g (7 lb 5.3 oz) F CS-LTranv Spinal N FLORENCIO   9 Term 14    M CS-LTranv Spinal  FLORENCIO   8 2011           7 Term 02/19/10   3033 g (6 lb 11 oz) F Vag-Spont EPI  FLORENCIO   6 Term 09   2665 g (5 lb 14 oz) M Vag-Spont EPI  FLORENCIO   5 2009           4 2007           3 2007           2 Term 06   3204 g (7 lb 1 oz) F Vag-Spont EPI  FLORENCIO   1 SAB              Family History[3]  Social History   Social History     Substance and Sexual Activity   Alcohol Use Not Currently    Comment: Rare Use     Social History     Substance and Sexual Activity   Drug Use Not Currently    Types: Marijuana, Cocaine, \"Crack\" cocaine     Tobacco Use History[4]  Allergies[5]    Objective   Vitals: Blood pressure 96/56, pulse 72, temperature 98.1 °F (36.7 °C), temperature source Oral, resp. rate 16, height 5' 2\" (1.575 m), weight 64.9 kg (143 lb), SpO2 96%, not currently breastfeeding. Body mass index is 26.16 kg/m².    Invasive Devices       Peripheral Intravenous Line  Duration             Peripheral IV 25 Right;Ventral (anterior) Forearm 1 day                  Physical Exam  Vitals reviewed.   Constitutional:       General: She is not in acute distress.     Appearance: She is well-developed.   HENT:      Head: Normocephalic and atraumatic.     Cardiovascular:      Rate and Rhythm: Normal rate.   Pulmonary:      Effort: Pulmonary effort is normal. No respiratory distress.   Abdominal:      General: Abdomen is flat. There is no distension.      Palpations: Abdomen is soft.      Tenderness: There is abdominal tenderness (moderate, LLQ>RLQ). There is guarding. " There is no rebound.     Skin:     Findings: No rash (on exposed skin).     Neurological:      Mental Status: She is alert and oriented to person, place, and time.     Psychiatric:         Mood and Affect: Affect normal.         Speech: Speech normal.         Behavior: Behavior normal.       Trina Russell MD  OBGYN PGY-2  25  7:37 AM         [1]   Past Medical History:  Diagnosis Date    Abnormal Pap smear of cervix     ADHD (attention deficit hyperactivity disorder)     Alcohol abuse     Ankle fracture     Anxiety     Arthritis     back    Asthma     Bipolar disorder (HCC)     Cellulitis     right side fac in     Chronic pain disorder     Depression     Diverticulitis     Flank pain 2016    Hallucination     Hepatitis C     Hip disease 2006    reports she had fluid removed from right hip and received treatment with antibiotic     History of abnormal cervical Pap smear     History of multiple miscarriages     IBS (irritable bowel syndrome)     Infantile idiopathic scoliosis     Joint pain     Kidney stone     Lactose intolerance     Low back pain     Myofascial pain syndrome     Peripheral neuropathy 2024    Poor dentition     Psychiatric illness     Psychosis (HCC)     PTSD (post-traumatic stress disorder)     Pyelonephritis affecting pregnancy     Right hand paresthesia     Right ovarian cyst     Schizoaffective disorder, bipolar type ([facility]) r/o bipolar with psychotic features 2024    Scoliosis     Seizures ([facility])     Self-injurious behavior     Sleep difficulties     Slow transit constipation     Substance abuse (HCC)     Suicide attempt ([facility])    [2]   Past Surgical History:  Procedure Laterality Date     SECTION  2016     SECTION  2014    HIP SURGERY      ORTHOPEDIC SURGERY      [place]  DELIVERY ONLY N/A 2016    Procedure:  SECTION () REPEAT;  Surgeon: Kurt Connor MD;  Location: [facility];  Service: Obstetrics    [place]  LIG/TRNSXJ FLP TUBE ABDL/VAG APPR UNI/BI Bilateral 11/29/2016    Procedure: LIGATION/COAGULATION TUBAL;  Surgeon: Kurt Connor MD;  Location: [facility];  Service: Obstetrics   [3]   Family History  Problem Relation Name Age of Onset    Heart disease Maternal Aunt      Cancer Maternal Aunt      Diabetes Paternal Aunt      No Known Problems Mother      No Known Problems Father      No Known Problems Sister     [4]   Social History  Tobacco Use   Smoking Status Every Day    Current packs/day: 0.50    Average packs/day: 0.4 packs/day for 30.5 years (11.5 ttl pk-yrs)    Types: Cigarettes    Start date: 2010    Passive exposure: Never   Smokeless Tobacco Never   Tobacco Comments    pt refused smoking cessation teaching.    [5]   Allergies  Allergen Reactions    Toradol [Ketorolac Tromethamine] Anaphylaxis    Aspirin      Pt given enteric coated 81 mg, when ask pt denies any allergy, listed under allergies on paperwork provided by berny Hernández - Food Allergy Itching    Naproxen     Azithromycin Rash    Latex Hives and Rash    Neurontin [Gabapentin] Rash and GI Bleeding    Ppd [Tuberculin Purified Protein Derivative] Rash

## 2025-07-13 NOTE — ASSESSMENT & PLAN NOTE
Patient presenting with several days of abdominal pain; CT imaging notable for small left adnexal cyst. Pain possibly 2/2 to this finding; however, need further characterization of adnexal lesion via TVUS  - f/u final radiology read on TVUS  - continue pain management per primary team

## 2025-07-14 ENCOUNTER — TRANSITIONAL CARE MANAGEMENT (OUTPATIENT)
Dept: FAMILY MEDICINE CLINIC | Facility: CLINIC | Age: 40
End: 2025-07-14

## 2025-07-16 DIAGNOSIS — R10.9 ABDOMINAL PAIN: ICD-10-CM

## 2025-07-16 RX ORDER — DICYCLOMINE HCL 20 MG
20 TABLET ORAL
Qty: 120 TABLET | Refills: 0 | Status: SHIPPED | OUTPATIENT
Start: 2025-07-16 | End: 2025-07-22 | Stop reason: DRUGHIGH

## 2025-07-16 NOTE — TELEPHONE ENCOUNTER
Medication: Bentyl     Dose/Frequency: 20mg QID    Quantity: 30 days    Pharmacy: [facility]    Office:   [] PCP/Provider -   [x] Speciality/Provider - Dr. Barnard    Does the patient have enough for 3 days?   [] Yes   [x] No - Send as HP to POD      Pt's phone number updated in chart as well per her request.

## 2025-07-19 ENCOUNTER — HOSPITAL ENCOUNTER (EMERGENCY)
Facility: HOSPITAL | Age: 40
Discharge: HOME | End: 2025-07-20
Attending: EMERGENCY MEDICINE | Admitting: EMERGENCY MEDICINE
Payer: COMMERCIAL

## 2025-07-19 VITALS
HEART RATE: 84 BPM | DIASTOLIC BLOOD PRESSURE: 53 MMHG | BODY MASS INDEX: 25.6 KG/M2 | SYSTOLIC BLOOD PRESSURE: 103 MMHG | TEMPERATURE: 98.4 F | WEIGHT: 139.99 LBS | OXYGEN SATURATION: 98 % | RESPIRATION RATE: 17 BRPM

## 2025-07-19 DIAGNOSIS — R11.2 NAUSEA AND VOMITING: ICD-10-CM

## 2025-07-19 DIAGNOSIS — E87.1 HYPONATREMIA: ICD-10-CM

## 2025-07-19 DIAGNOSIS — R19.7 DIARRHEA: ICD-10-CM

## 2025-07-19 DIAGNOSIS — R10.9 ABDOMINAL PAIN: Primary | ICD-10-CM

## 2025-07-19 LAB
ALBUMIN SERPL BCG-MCNC: 4.3 G/DL (ref 3.5–5)
ALP SERPL-CCNC: 50 U/L (ref 34–104)
ALT SERPL W P-5'-P-CCNC: 7 U/L (ref 7–52)
ANION GAP SERPL CALCULATED.3IONS-SCNC: 5 MMOL/L (ref 4–13)
ANION GAP SERPL CALCULATED.3IONS-SCNC: 7 MMOL/L (ref 4–13)
AST SERPL W P-5'-P-CCNC: 12 U/L (ref 13–39)
BASOPHILS # BLD AUTO: 0.01 THOUSANDS/ÂΜL (ref 0–0.1)
BASOPHILS NFR BLD AUTO: 0 % (ref 0–1)
BILIRUB SERPL-MCNC: 0.33 MG/DL (ref 0.2–1)
BILIRUB UR QL STRIP: NEGATIVE
BUN SERPL-MCNC: 4 MG/DL (ref 5–25)
BUN SERPL-MCNC: 5 MG/DL (ref 5–25)
CALCIUM SERPL-MCNC: 8.1 MG/DL (ref 8.4–10.2)
CALCIUM SERPL-MCNC: 8.9 MG/DL (ref 8.4–10.2)
CHLORIDE SERPL-SCNC: 100 MMOL/L (ref 96–108)
CHLORIDE SERPL-SCNC: 102 MMOL/L (ref 96–108)
CLARITY UR: CLEAR
CO2 SERPL-SCNC: 22 MMOL/L (ref 21–32)
CO2 SERPL-SCNC: 22 MMOL/L (ref 21–32)
COLOR UR: ABNORMAL
CREAT SERPL-MCNC: 0.59 MG/DL (ref 0.6–1.3)
CREAT SERPL-MCNC: 0.67 MG/DL (ref 0.6–1.3)
EOSINOPHIL # BLD AUTO: 0.02 THOUSAND/ÂΜL (ref 0–0.61)
EOSINOPHIL NFR BLD AUTO: 0 % (ref 0–6)
ERYTHROCYTE [DISTWIDTH] IN BLOOD BY AUTOMATED COUNT: 11.9 % (ref 11.6–15.1)
EXT PREGNANCY TEST URINE: NEGATIVE
EXT. CONTROL: NORMAL
GFR SERPL CREATININE-BSD FRML MDRD: 112 ML/MIN/1.73SQ M
GFR SERPL CREATININE-BSD FRML MDRD: 117 ML/MIN/1.73SQ M
GLUCOSE SERPL-MCNC: 86 MG/DL (ref 65–140)
GLUCOSE SERPL-MCNC: 89 MG/DL (ref 65–140)
GLUCOSE UR STRIP-MCNC: NEGATIVE MG/DL
HCT VFR BLD AUTO: 34.8 % (ref 34.8–46.1)
HGB BLD-MCNC: 12.4 G/DL (ref 11.5–15.4)
HGB UR QL STRIP.AUTO: NEGATIVE
IMM GRANULOCYTES # BLD AUTO: 0.02 THOUSAND/UL (ref 0–0.2)
IMM GRANULOCYTES NFR BLD AUTO: 0 % (ref 0–2)
KETONES UR STRIP-MCNC: ABNORMAL MG/DL
LEUKOCYTE ESTERASE UR QL STRIP: NEGATIVE
LIPASE SERPL-CCNC: 20 U/L (ref 11–82)
LYMPHOCYTES # BLD AUTO: 2.12 THOUSANDS/ÂΜL (ref 0.6–4.47)
LYMPHOCYTES NFR BLD AUTO: 27 % (ref 14–44)
MAGNESIUM SERPL-MCNC: 1.6 MG/DL (ref 1.9–2.7)
MCH RBC QN AUTO: 31.5 PG (ref 26.8–34.3)
MCHC RBC AUTO-ENTMCNC: 35.6 G/DL (ref 31.4–37.4)
MCV RBC AUTO: 88 FL (ref 82–98)
MONOCYTES # BLD AUTO: 0.38 THOUSAND/ÂΜL (ref 0.17–1.22)
MONOCYTES NFR BLD AUTO: 5 % (ref 4–12)
NEUTROPHILS # BLD AUTO: 5.33 THOUSANDS/ÂΜL (ref 1.85–7.62)
NEUTS SEG NFR BLD AUTO: 68 % (ref 43–75)
NITRITE UR QL STRIP: NEGATIVE
NRBC BLD AUTO-RTO: 0 /100 WBCS
PH UR STRIP.AUTO: 7.5 [PH]
PLATELET # BLD AUTO: 198 THOUSANDS/UL (ref 149–390)
PMV BLD AUTO: 9.7 FL (ref 8.9–12.7)
POTASSIUM SERPL-SCNC: 3.3 MMOL/L (ref 3.5–5.3)
POTASSIUM SERPL-SCNC: 3.6 MMOL/L (ref 3.5–5.3)
PROT SERPL-MCNC: 6.5 G/DL (ref 6.4–8.4)
PROT UR STRIP-MCNC: NEGATIVE MG/DL
RBC # BLD AUTO: 3.94 MILLION/UL (ref 3.81–5.12)
SODIUM SERPL-SCNC: 129 MMOL/L (ref 135–147)
SODIUM SERPL-SCNC: 129 MMOL/L (ref 135–147)
SP GR UR STRIP.AUTO: 1.01 (ref 1–1.03)
UROBILINOGEN UR STRIP-ACNC: <2 MG/DL
WBC # BLD AUTO: 7.88 THOUSAND/UL (ref 4.31–10.16)

## 2025-07-19 PROCEDURE — 96365 THER/PROPH/DIAG IV INF INIT: CPT

## 2025-07-19 PROCEDURE — 36415 COLL VENOUS BLD VENIPUNCTURE: CPT

## 2025-07-19 PROCEDURE — 96375 TX/PRO/DX INJ NEW DRUG ADDON: CPT

## 2025-07-19 PROCEDURE — 85025 COMPLETE CBC W/AUTO DIFF WBC: CPT

## 2025-07-19 PROCEDURE — 83690 ASSAY OF LIPASE: CPT

## 2025-07-19 PROCEDURE — 81003 URINALYSIS AUTO W/O SCOPE: CPT

## 2025-07-19 PROCEDURE — 96368 THER/DIAG CONCURRENT INF: CPT

## 2025-07-19 PROCEDURE — 99284 EMERGENCY DEPT VISIT MOD MDM: CPT

## 2025-07-19 PROCEDURE — 81025 URINE PREGNANCY TEST: CPT

## 2025-07-19 PROCEDURE — 80053 COMPREHEN METABOLIC PANEL: CPT

## 2025-07-19 PROCEDURE — 96366 THER/PROPH/DIAG IV INF ADDON: CPT

## 2025-07-19 PROCEDURE — 80048 BASIC METABOLIC PNL TOTAL CA: CPT

## 2025-07-19 PROCEDURE — 83735 ASSAY OF MAGNESIUM: CPT

## 2025-07-19 RX ORDER — DIPHENHYDRAMINE HYDROCHLORIDE 50 MG/ML
25 INJECTION, SOLUTION INTRAMUSCULAR; INTRAVENOUS ONCE
Status: COMPLETED | OUTPATIENT
Start: 2025-07-19 | End: 2025-07-19

## 2025-07-19 RX ORDER — MAGNESIUM SULFATE HEPTAHYDRATE 40 MG/ML
2 INJECTION, SOLUTION INTRAVENOUS ONCE
Status: COMPLETED | OUTPATIENT
Start: 2025-07-19 | End: 2025-07-19

## 2025-07-19 RX ORDER — METOCLOPRAMIDE HYDROCHLORIDE 5 MG/ML
10 INJECTION INTRAMUSCULAR; INTRAVENOUS ONCE
Status: COMPLETED | OUTPATIENT
Start: 2025-07-19 | End: 2025-07-19

## 2025-07-19 RX ORDER — ACETAMINOPHEN 10 MG/ML
1000 INJECTION, SOLUTION INTRAVENOUS ONCE
Status: COMPLETED | OUTPATIENT
Start: 2025-07-19 | End: 2025-07-19

## 2025-07-19 RX ORDER — FAMOTIDINE 10 MG/ML
20 INJECTION, SOLUTION INTRAVENOUS ONCE
Status: COMPLETED | OUTPATIENT
Start: 2025-07-19 | End: 2025-07-19

## 2025-07-19 RX ORDER — MAGNESIUM HYDROXIDE/ALUMINUM HYDROXICE/SIMETHICONE 120; 1200; 1200 MG/30ML; MG/30ML; MG/30ML
30 SUSPENSION ORAL ONCE
Status: COMPLETED | OUTPATIENT
Start: 2025-07-19 | End: 2025-07-19

## 2025-07-19 RX ORDER — HYOSCYAMINE SULFATE 0.12 MG/1
0.12 TABLET SUBLINGUAL ONCE
Status: COMPLETED | OUTPATIENT
Start: 2025-07-19 | End: 2025-07-19

## 2025-07-19 RX ADMIN — HYOSCYAMINE SULFATE 0.12 MG: 0.12 TABLET SUBLINGUAL at 22:59

## 2025-07-19 RX ADMIN — FAMOTIDINE 20 MG: 10 INJECTION, SOLUTION INTRAVENOUS at 20:11

## 2025-07-19 RX ADMIN — MAGNESIUM SULFATE HEPTAHYDRATE 2 G: 40 INJECTION, SOLUTION INTRAVENOUS at 20:28

## 2025-07-19 RX ADMIN — SODIUM CHLORIDE 1000 ML: 0.9 INJECTION, SOLUTION INTRAVENOUS at 20:12

## 2025-07-19 RX ADMIN — ACETAMINOPHEN 1000 MG: 10 INJECTION INTRAVENOUS at 21:18

## 2025-07-19 RX ADMIN — METOCLOPRAMIDE 10 MG: 5 INJECTION, SOLUTION INTRAMUSCULAR; INTRAVENOUS at 20:11

## 2025-07-19 RX ADMIN — ALUMINUM HYDROXIDE, MAGNESIUM HYDROXIDE, AND SIMETHICONE 30 ML: 200; 200; 20 SUSPENSION ORAL at 20:10

## 2025-07-19 RX ADMIN — DIPHENHYDRAMINE HYDROCHLORIDE 25 MG: 50 INJECTION, SOLUTION INTRAMUSCULAR; INTRAVENOUS at 20:11

## 2025-07-19 NOTE — ED PROVIDER NOTES
Time reflects when diagnosis was documented in both MDM as applicable and the Disposition within this note       Time User Action Codes Description Comment    7/20/2025 12:42 AM Marlin Zimmer Add [R10.9] Abdominal pain     7/20/2025 12:42 AM Marlin Zimmre Add [R11.2] Nausea and vomiting     7/20/2025 12:42 AM Marlin Zimmer Add [R19.7] Diarrhea     7/20/2025 12:42 AM Marlin Zimmer Add [E87.1] Hyponatremia           ED Disposition       ED Disposition   Discharge    Condition   Stable    Date/Time   Sun Jul 20, 2025 12:42 AM    Comment   Antionette Garry Downing discharge to home/self care.                   Assessment & Plan       Medical Decision Making  Patient with history as below presenting to the ED for evaluation of LLQ abdominal pain, nausea, nonbloody emesis, nonbloody diarrhea x 3 days.  States that this is the exact same pain as her previous episodes that she has been seen for recently, denies any new or worsening symptoms.  Reports odor to her urine intermittently, same as reported in previous visits.  Reports migraine for the past 3 days as well that is the same as her typical migraines.  Denies any new or worsening features.  Patient otherwise without acute complaint.      On physical examination, patient is well-appearing in no acute distress.  Subjective LLQ abdominal tenderness to palpation.  Abdomen without evidence of peritonitis.  Patient completely neurologically intact.    Patient is well appearing and neurologically intact. Headache was not acute or maximal in onset. There are no high-risk variables including neck pain/stiffness, witnessed LOC, onset during exertion, thunderclap headache quality. There is no limited neck flexion on examination. History and physical exam not suggestive of a secondary headache.  Patient with history of migraines and reports this is the same as her typical migraines. Do not feel that further imaging or workup are warranted at this time.     Patient has been seen for  this abdominal pain multiple times and has had approximately 11 CT scans in the past year for this.  Was recently admitted on 07/11, seen by GI who suspected symptoms are multifactorial.  Suspect component of IBS, visceral hyperalgesia given chronic pain issues and other potential etiologies.  They have low suspicion for IBD given previous colonoscopy findings. Given patient states that this pain is the same as her previous episodes she has been evaluated for without any changes or worsening, reassuring abdominal exam, and recent CT scan 2 weeks ago, do not feel that further imaging is warranted at this time.     Plan: Will order CBC for eval of anemia and leukocytosis, CMP for eval of LFTs, kidney function and electrolyte abnormalities.  Lipase for eval of acute pancreatitis. U preg for eval of pregnancy status. UA for evaluation of UTI and hematuria. Initial therapeutics to include Maalox, Pepcid, Reglan, Benadryl, Tylenol, fluids, and magnesium.    CBC and lipase unremarkable.  CMP with sodium 129, patient with frequent hyponatremia likely secondary to vomiting/diarrhea.  Magnesium 1.6. urine pregnancy negative. UA unremarkable, no evidence of UTI.     Following NS IV fluids, BMP was repeated.  Sodium 129, potassium 3.3 and calcium 8.1, discussed repeat results with attededning, likely diluted from fluid administration and no indication for further workup or admission at this time.    Patient states that nausea/vomiting has completely subsided.   Will give Levsin and fentanyl for abdominal pain.    Following medications, patient states that abdominal pain has resolved.  Denies any nausea or vomiting.  P.o. challenge tolerated.  Tachycardia resolved following fluids.  Discussed case and results with attending, recommended discharge at this time and follow-up with GI. Patient agrees to set up follow up with GI. Strict return precautions discussed.     Discussed findings from the visit with the patient.  We had a  "conversation regarding supportive care and indications for return.  Recommended appropriate follow-up.  Patient and/or family understand and agree with plan.    Portions of the record may have been created with voice recognition software. Occasional use of the incorrect word or \"sound a like\" substitutions may have occurred due to the inherent limitations of voice recognition software. Read the chart carefully and recognize, using context, where substitutions have occurred.     Amount and/or Complexity of Data Reviewed  Labs: ordered.    Risk  OTC drugs.  Prescription drug management.             Medications   sodium chloride 0.9 % bolus 1,000 mL (0 mL Intravenous Stopped 7/19/25 2205)   magnesium sulfate 2 g/50 mL IVPB (premix) 2 g (0 g Intravenous Stopped 7/19/25 2223)   metoclopramide (REGLAN) injection 10 mg (10 mg Intravenous Given 7/19/25 2011)   diphenhydrAMINE (BENADRYL) injection 25 mg (25 mg Intravenous Given 7/19/25 2011)   Famotidine (PF) (PEPCID) injection 20 mg (20 mg Intravenous Given 7/19/25 2011)   aluminum-magnesium hydroxide-simethicone (MAALOX) oral suspension 30 mL (30 mL Oral Given 7/19/25 2010)   acetaminophen (Ofirmev) injection 1,000 mg (0 mg Intravenous Stopped 7/19/25 2136)   hyoscyamine (LEVSIN/SL) SL tablet 0.125 mg (0.125 mg Sublingual Given 7/19/25 2259)   fentaNYL injection 25 mcg (25 mcg Intravenous Given 7/20/25 0045)       ED Risk Strat Scores                    No data recorded                            History of Present Illness       Chief Complaint   Patient presents with    Abdominal Pain     With abd pain diarrhea and vomiting recently seen for same not feeling any better.        Past Medical History[1]   Past Surgical History[2]   Family History[3]   Social History[4]   E-Cigarette/Vaping    E-Cigarette Use Former User       E-Cigarette/Vaping Substances    Nicotine No     THC No     CBD No     Flavoring No     Other No     Unknown No       I have reviewed and agree with " the history as documented.     Patient is a 39-year-old female with PMH of diverticulosis, psychiatric illness with suicide attempts, substance abuse, seizures, hepatitis C, asthma, psychogenic nonepileptic seizures, and ADHD, presenting to the ED for evaluation of abdominal pain, nausea/vomiting/diarrhea x 3 days. Patient states that she has been having left lower quadrant abdominal pain, states that this pain is the exact same as her previous episodes of left lower quadrant abdominal pain that she has been seen for recently.  Denies any new or worsening symptoms.  Associated nausea, nonbloody emesis and nonbloody diarrhea.  Denies any dark tar-like stools or coffee-ground emesis.  States she is unable to keep food or water down.  Patient does state that she has an odor to her urine intermittently which is the same as when she has been seen previously.  Also reports that she is having of migraine for the past 3 days that is the same as her typical migraines, denies any new or worsening features.  Denies any chest pain, shortness of breath, lightheaded/dizziness, loss of consciousness, fever, chills, sweats, cough, congestion, sore throat, runny nose, numbness/tingling, dysuria, hematuria, urgency, frequency, vision changes, vaginal discharge, etc.      Of note patient was seen by GI on 06/11 for abdominal pain, per GI suspected that the symptoms are likely due to IBS flares rather than diverticulitis, as confirmed by recent CAT scan has been negative for diverticulitis.  Recommended try Benefiber to regulate BMs, monitoring diet, smaller more frequent meals, and adjustment of the dicyclomine dosage.  Was also seen by GI for GERD which patient was taking pantoprazole and famotidine but states that was ineffective so they switched to omeprazole.  Patient was seen in the ED for abdominal pain on 07/11 with left lower quadrant abdominal pain and odor to urine, at that time CT scan showed no acute findings in  "abdomen/pelvis, moderate-sized inguinal hernia, and 31-year-old male with left adnexal cyst.  There was no indication for ultrasound at that time, patient to follow-up with GI and OB/GYN discharge.  Patient then seen again on 07/11 and was admitted for observation due to N/V, abdominal pain, hyponatremia.  During admission patient had an ultrasound pelvis which showed left ovarian cyst that was deemed to be a typical hemorrhagic corpus luteum cyst with recommendation of management is no additional workup or follow-up needed.  Patient was cleared by OB/GYN for discharge.  GI recommended that they continue Bentyl 4 times daily dietary fiber outpatient testing for possible SIBO, there was no indication for endoscopic evaluation at that time.  Patient has been seen for this abdominal pain multiple times and has had approximately 11 CT scans in the past year.    Per recent inpatient notes \"GI was consulted who suspected that her symptoms are likely multifactorial.  They suspect there was a component of irritable bowel syndrome, visceral hyperalgesia given her chronic pain issues and other potential etiologies.  However, they have low suspicion for IBD given previous colonoscopy findings.\"      Abdominal Pain  Associated symptoms: diarrhea, nausea and vomiting    Associated symptoms: no chest pain, no chills, no constipation, no cough, no dysuria, no fever, no hematuria, no shortness of breath, no sore throat, no vaginal bleeding and no vaginal discharge        Review of Systems   Constitutional:  Negative for chills and fever.   HENT:  Negative for congestion, ear pain, rhinorrhea, sore throat, trouble swallowing and voice change.    Eyes:  Negative for pain and visual disturbance.   Respiratory:  Negative for cough and shortness of breath.    Cardiovascular:  Negative for chest pain and palpitations.   Gastrointestinal:  Positive for abdominal pain, diarrhea, nausea and vomiting. Negative for blood in stool and " constipation.   Genitourinary:  Negative for dysuria, flank pain, frequency, hematuria, urgency, vaginal bleeding, vaginal discharge and vaginal pain.   Musculoskeletal:  Negative for arthralgias and back pain.   Skin:  Negative for color change and rash.   Neurological:  Positive for headaches. Negative for dizziness, seizures, syncope, weakness, light-headedness and numbness.   All other systems reviewed and are negative.          Objective       ED Triage Vitals [07/19/25 1903]   Temperature Pulse Blood Pressure Respirations SpO2 Patient Position - Orthostatic VS   98.4 °F (36.9 °C) (!) 126 143/68 18 96 % Sitting      Temp Source Heart Rate Source BP Location FiO2 (%) Pain Score    Oral Monitor Right arm -- 10 - Worst Possible Pain      Vitals      Date and Time Temp Pulse SpO2 Resp BP Pain Score FACES Pain Rating User   07/20/25 0045 -- -- -- -- -- 10 - Worst Possible Pain --    07/19/25 2152 -- 84 98 % 17 103/53 -- --    07/19/25 1937 -- -- -- -- -- 10 - Worst Possible Pain --    07/19/25 1903 98.4 °F (36.9 °C) 126 96 % 18 143/68 10 - Worst Possible Pain -- LAP            Physical Exam  Vitals and nursing note reviewed.   Constitutional:       General: She is not in acute distress.     Appearance: She is well-developed. She is not ill-appearing, toxic-appearing or diaphoretic.   HENT:      Head: Normocephalic and atraumatic.      Right Ear: External ear normal.      Left Ear: External ear normal.      Nose: Nose normal. No congestion or rhinorrhea.      Mouth/Throat:      Mouth: Mucous membranes are moist.      Pharynx: No oropharyngeal exudate or posterior oropharyngeal erythema.     Eyes:      General: No visual field deficit or scleral icterus.        Right eye: No discharge.         Left eye: No discharge.      Extraocular Movements: Extraocular movements intact.      Conjunctiva/sclera: Conjunctivae normal.      Pupils: Pupils are equal, round, and reactive to light.       Cardiovascular:      Rate  and Rhythm: Normal rate and regular rhythm.      Pulses: Normal pulses.      Heart sounds: Normal heart sounds. No murmur heard.  Pulmonary:      Effort: Pulmonary effort is normal. No respiratory distress.      Breath sounds: Normal breath sounds. No stridor. No wheezing, rhonchi or rales.   Abdominal:      General: There is no distension.      Palpations: Abdomen is soft.      Tenderness: There is abdominal tenderness (subjective) in the left lower quadrant. There is no right CVA tenderness, left CVA tenderness, guarding or rebound.     Musculoskeletal:         General: No swelling.      Cervical back: Normal range of motion and neck supple.     Skin:     General: Skin is warm and dry.      Capillary Refill: Capillary refill takes less than 2 seconds.     Neurological:      General: No focal deficit present.      Mental Status: She is alert and oriented to person, place, and time.      GCS: GCS eye subscore is 4. GCS verbal subscore is 5. GCS motor subscore is 6.      Cranial Nerves: No cranial nerve deficit, dysarthria or facial asymmetry.      Sensory: Sensation is intact. No sensory deficit.      Motor: Motor function is intact. No weakness, tremor, seizure activity or pronator drift.      Coordination: Coordination is intact. Romberg sign negative. Coordination normal. Finger-Nose-Finger Test normal.      Gait: Gait is intact. Gait normal.     Psychiatric:         Mood and Affect: Mood normal.         Results Reviewed       Procedure Component Value Units Date/Time    Basic metabolic panel [[id number]]  (Abnormal) Collected: 07/19/25 2209    Lab Status: Final result Specimen: Blood from Hand, Left Updated: 07/19/25 2231     Sodium 129 mmol/L      Potassium 3.3 mmol/L      Chloride 102 mmol/L      CO2 22 mmol/L      ANION GAP 5 mmol/L      BUN 4 mg/dL      Creatinine 0.59 mg/dL      Glucose 86 mg/dL      Calcium 8.1 mg/dL      eGFR 117 ml/min/1.73sq m     Narrative:      [employer] Foundation  guidelines for Chronic Kidney Disease (CKD):     Stage 1 with normal or high GFR (GFR > 90 mL/min/1.73 square meters)    Stage 2 Mild CKD (GFR = 60-89 mL/min/1.73 square meters)    Stage 3A Moderate CKD (GFR = 45-59 mL/min/1.73 square meters)    Stage 3B Moderate CKD (GFR = 30-44 mL/min/1.73 square meters)    Stage 4 Severe CKD (GFR = 15-29 mL/min/1.73 square meters)    Stage 5 End Stage CKD (GFR <15 mL/min/1.73 square meters)  Note: GFR calculation is accurate only with a steady state creatinine    Lipase [[id number]]  (Normal) Collected: 07/19/25 2003    Lab Status: Final result Specimen: Blood from Hand, Right Updated: 07/19/25 2054     Lipase 20 u/L     Comprehensive metabolic panel [[id number]]  (Abnormal) Collected: 07/19/25 2003    Lab Status: Final result Specimen: Blood from Hand, Right Updated: 07/19/25 2053     Sodium 129 mmol/L      Potassium 3.6 mmol/L      Chloride 100 mmol/L      CO2 22 mmol/L      ANION GAP 7 mmol/L      BUN 5 mg/dL      Creatinine 0.67 mg/dL      Glucose 89 mg/dL      Calcium 8.9 mg/dL      AST 12 U/L      ALT 7 U/L      Alkaline Phosphatase 50 U/L      Total Protein 6.5 g/dL      Albumin 4.3 g/dL      Total Bilirubin 0.33 mg/dL      eGFR 112 ml/min/1.73sq m     Narrative:      National Kid[employer] Disease Foundation guidelines for Chronic Kidney Disease (CKD):     Stage 1 with normal or high GFR (GFR > 90 mL/min/1.73 square meters)    Stage 2 Mild CKD (GFR = 60-89 mL/min/1.73 square meters)    Stage 3A Moderate CKD (GFR = 45-59 mL/min/1.73 square meters)    Stage 3B Moderate CKD (GFR = 30-44 mL/min/1.73 square meters)    Stage 4 Severe CKD (GFR = 15-29 mL/min/1.73 square meters)    Stage 5 End Stage CKD (GFR <15 mL/min/1.73 square meters)  Note: GFR calculation is accurate only with a steady state creatinine    Magnesium [[id number]]  (Abnormal) Collected: 07/19/25 2003    Lab Status: Final result Specimen: Blood from Hand, Right Updated: 07/19/25 2053     Magnesium 1.6 mg/dL     UA w  Reflex to Microscopic w Reflex to Culture [[id number]]  (Abnormal) Collected: 07/19/25 1952    Lab Status: Final result Specimen: Urine, Clean Catch Updated: 07/19/25 2034     Color, UA Light Yellow     Clarity, UA Clear     Specific Gravity, UA 1.015     pH, UA 7.5     Leukocytes, UA Negative     Nitrite, UA Negative     Protein, UA Negative mg/dl      Glucose, UA Negative mg/dl      Ketones, UA Trace mg/dl      Urobilinogen, UA <2.0 mg/dl      Bilirubin, UA Negative     Occult Blood, UA Negative    CBC and differential [[id number]] Collected: 07/19/25 2003    Lab Status: Final result Specimen: Blood from Hand, Right Updated: 07/19/25 2032     WBC 7.88 Thousand/uL      RBC 3.94 Million/uL      Hemoglobin 12.4 g/dL      Hematocrit 34.8 %      MCV 88 fL      MCH 31.5 pg      MCHC 35.6 g/dL      RDW 11.9 %      MPV 9.7 fL      Platelets 198 Thousands/uL      nRBC 0 /100 WBCs      Segmented % 68 %      Immature Grans % 0 %      Lymphocytes % 27 %      Monocytes % 5 %      Eosinophils Relative 0 %      Basophils Relative 0 %      Absolute Neutrophils 5.33 Thousands/µL      Absolute Immature Grans 0.02 Thousand/uL      Absolute Lymphocytes 2.12 Thousands/µL      Absolute Monocytes 0.38 Thousand/µL      Eosinophils Absolute 0.02 Thousand/µL      Basophils Absolute 0.01 Thousands/µL     POCT pregnancy, urine [[id number]]  (Normal) Collected: 07/19/25 1952    Lab Status: Final result Updated: 07/19/25 1952     EXT Preg Test, Ur Negative     Control Valid            No orders to display       Procedures    ED Medication and Procedure Management   Prior to Admission Medications   Prescriptions Last Dose Informant Patient Reported? Taking?   OLANZapine (ZyPREXA) 15 mg tablet   No No   Sig: Take 2 tablets (30 mg total) by mouth daily at bedtime   OXcarbazepine (TRILEPTAL) 300 mg tablet   No No   Sig: Take 1 tablet (300 mg total) by mouth every 12 (twelve) hours   Ventolin  (90 Base) MCG/ACT inhaler   No No   Sig: Inhale 2  puffs every 4 (four) hours as needed for wheezing   albuterol (ACCUNEB) 1.25 MG/3ML nebulizer solution   Yes No   Sig: Take 1.25 mg by nebulization every 6 (six) hours as needed for wheezing   budesonide-formoterol (SYMBICORT) 80-4.5 MCG/ACT inhaler   No No   Sig: Inhale 2 puffs 2 (two) times a day Rinse mouth after use.   dicyclomine (BENTYL) 20 mg tablet   No No   Sig: Take 1 tablet (20 mg total) by mouth 4 (four) times a day (before meals and at bedtime)   hydrOXYzine HCL (ATARAX) 50 mg tablet   Yes No   Sig: Take 50 mg by mouth 3 (three) times a day   wheat dextrin (BENEFIBER)   No No   Sig: Take 1 packet by mouth daily      Facility-Administered Medications: None     Discharge Medication List as of 7/20/2025 12:55 AM        CONTINUE these medications which have NOT CHANGED    Details   albuterol (ACCUNEB) 1.25 MG/3ML nebulizer solution Take 1.25 mg by nebulization every 6 (six) hours as needed for wheezing, Historical Med      budesonide-formoterol (SYMBICORT) 80-4.5 MCG/ACT inhaler Inhale 2 puffs 2 (two) times a day Rinse mouth after use., Starting Thu 3/6/2025, Normal      dicyclomine (BENTYL) 20 mg tablet Take 1 tablet (20 mg total) by mouth 4 (four) times a day (before meals and at bedtime), Starting Wed 7/16/2025, Until Fri 8/15/2025, Normal      hydrOXYzine HCL (ATARAX) 50 mg tablet Take 50 mg by mouth 3 (three) times a day, Historical Med      OLANZapine (ZyPREXA) 15 mg tablet Take 2 tablets (30 mg total) by mouth daily at bedtime, Starting Thu 3/6/2025, Normal      OXcarbazepine (TRILEPTAL) 300 mg tablet Take 1 tablet (300 mg total) by mouth every 12 (twelve) hours, Starting Thu 3/6/2025, Until Mon 5/5/2025, Normal      Ventolin  (90 Base) MCG/ACT inhaler Inhale 2 puffs every 4 (four) hours as needed for wheezing, Starting Fri 12/6/2024, Normal      wheat dextrin (BENEFIBER) Take 1 packet by mouth daily, Starting Wed 6/11/2025, Normal           No discharge procedures on file.  ED SEPSIS  DOCUMENTATION   Time reflects when diagnosis was documented in both MDM as applicable and the Disposition within this note       Time User Action Codes Description Comment    2025 12:42 AM Marlin Zimmer Add [R10.9] Abdominal pain     2025 12:42 AM Marlin Zimmer Add [R11.2] Nausea and vomiting     2025 12:42 AM Marlin Zimmer Add [R19.7] Diarrhea     2025 12:42 AM Marlin Zimmer Add [E87.1] Hyponatremia                      [1]   Past Medical History:  Diagnosis Date    Abnormal Pap smear of cervix     ADHD (attention deficit hyperactivity disorder)     Alcohol abuse     Ankle fracture     Anxiety     Arthritis     back    Asthma     Bipolar disorder (HCC)     Cellulitis     right side fac in     Chronic pain disorder     Depression     Diverticulitis     Flank pain 2016    Hallucination     Hepatitis C     Hip disease     reports she had fluid removed from right hip and received treatment with antibiotic     History of abnormal cervical Pap smear     History of multiple miscarriages     IBS (irritable bowel syndrome)     Infantile idiopathic scoliosis     Joint pain     Kidney stone     Lactose intolerance     Low back pain     Myofascial pain syndrome     Peripheral neuropathy 2024    Poor dentition     Psychiatric illness     Psychosis (HCC)     PTSD (post-traumatic stress disorder)     Pyelonephritis affecting pregnancy     Right hand paresthesia     Right ovarian cyst     Schizoaffective disorder, bipolar type ([facility]) r/o bipolar with psychotic features 2024    Scoliosis     Seizures (HCC)     Self-injurious behavior     Sleep difficulties     Slow transit constipation     Substance abuse (HCC)     Suicide attempt (HCC)    [2]   Past Surgical History:  Procedure Laterality Date     SECTION  2016     SECTION  2014    HIP SURGERY      ORTHOPEDIC SURGERY      MS  DELIVERY ONLY N/A 2016    Procedure:  SECTION ()  "REPEAT;  Surgeon: Kurt Connor MD;  Location: [facility];  Service: Obstetrics    VT LIG/TRNSXJ FLP TUBE ABDL/VAG APPR UNI/BI Bilateral 11/29/2016    Procedure: LIGATION/COAGULATION TUBAL;  Surgeon: Kurt Connor MD;  Location: [facility];  Service: Obstetrics   [3]   Family History  Problem Relation Name Age of Onset    Heart disease Maternal Aunt      Cancer Maternal Aunt      Diabetes Paternal Aunt      No Known Problems Mother      No Known Problems Father      No Known Problems Sister     [4]   Social History  Tobacco Use    Smoking status: Every Day     Current packs/day: 0.50     Average packs/day: 0.4 packs/day for 30.5 years (11.5 ttl pk-yrs)     Types: Cigarettes     Start date: 2010     Passive exposure: Never    Smokeless tobacco: Never    Tobacco comments:     pt refused smoking cessation teaching.    Vaping Use    Vaping status: Former   Substance Use Topics    Alcohol use: Not Currently     Comment: Rare Use    Drug use: Not Currently     Types: Marijuana, Cocaine, \"Crack\" cocaine        Marlin Zimmer PA-C  07/20/25 0702    "

## 2025-07-20 DIAGNOSIS — R10.84 GENERALIZED ABDOMINAL PAIN: ICD-10-CM

## 2025-07-20 PROCEDURE — 96366 THER/PROPH/DIAG IV INF ADDON: CPT

## 2025-07-20 PROCEDURE — 96375 TX/PRO/DX INJ NEW DRUG ADDON: CPT

## 2025-07-20 RX ORDER — FENTANYL CITRATE 50 UG/ML
25 INJECTION, SOLUTION INTRAMUSCULAR; INTRAVENOUS ONCE
Refills: 0 | Status: COMPLETED | OUTPATIENT
Start: 2025-07-20 | End: 2025-07-20

## 2025-07-20 RX ORDER — HALOPERIDOL 5 MG/ML
5 INJECTION INTRAMUSCULAR ONCE
Status: DISCONTINUED | OUTPATIENT
Start: 2025-07-20 | End: 2025-07-20

## 2025-07-20 RX ADMIN — FENTANYL CITRATE 25 MCG: 50 INJECTION INTRAMUSCULAR; INTRAVENOUS at 00:45

## 2025-07-20 NOTE — DISCHARGE INSTRUCTIONS
Close follow-up with GI and PCP.  Return to ED if symptoms worsen, fail improve, or develop as listed in follow-up section or discussed.  Symptomatic cares discussed.

## 2025-07-21 RX ORDER — FAMOTIDINE 20 MG/1
20 TABLET, FILM COATED ORAL 2 TIMES DAILY
Qty: 180 TABLET | Refills: 0 | Status: SHIPPED | OUTPATIENT
Start: 2025-07-21

## 2025-07-22 ENCOUNTER — OFFICE VISIT (OUTPATIENT)
Dept: FAMILY MEDICINE CLINIC | Facility: CLINIC | Age: 40
End: 2025-07-22

## 2025-07-22 ENCOUNTER — APPOINTMENT (OUTPATIENT)
Dept: LAB | Facility: HOSPITAL | Age: 40
End: 2025-07-22
Payer: COMMERCIAL

## 2025-07-22 ENCOUNTER — DOCUMENTATION (OUTPATIENT)
Dept: CASE MANAGEMENT | Facility: HOSPITAL | Age: 40
End: 2025-07-22

## 2025-07-22 VITALS
DIASTOLIC BLOOD PRESSURE: 76 MMHG | RESPIRATION RATE: 18 BRPM | TEMPERATURE: 98.7 F | SYSTOLIC BLOOD PRESSURE: 128 MMHG | HEART RATE: 101 BPM | BODY MASS INDEX: 25.95 KG/M2 | HEIGHT: 62 IN | OXYGEN SATURATION: 98 % | WEIGHT: 141 LBS

## 2025-07-22 DIAGNOSIS — Z72.0 TOBACCO ABUSE: ICD-10-CM

## 2025-07-22 DIAGNOSIS — F31.2 BIPOLAR I DISORDER, MOST RECENT EPISODE MANIC, SEVERE WITH PSYCHOTIC FEATURES (HCC): Chronic | ICD-10-CM

## 2025-07-22 DIAGNOSIS — J45.20 INTERMITTENT ASTHMA WITHOUT COMPLICATION, UNSPECIFIED ASTHMA SEVERITY: ICD-10-CM

## 2025-07-22 DIAGNOSIS — F14.11 COCAINE ABUSE IN REMISSION (HCC): ICD-10-CM

## 2025-07-22 DIAGNOSIS — N83.202 LEFT OVARIAN CYST: ICD-10-CM

## 2025-07-22 DIAGNOSIS — E87.1 HYPONATREMIA: ICD-10-CM

## 2025-07-22 DIAGNOSIS — E87.1 HYPONATREMIA: Primary | ICD-10-CM

## 2025-07-22 DIAGNOSIS — R29.810 DROOPING OF MOUTH: ICD-10-CM

## 2025-07-22 DIAGNOSIS — R10.9 ABDOMINAL PAIN: ICD-10-CM

## 2025-07-22 PROBLEM — N64.4 MASTALGIA IN FEMALE: Status: RESOLVED | Noted: 2019-11-25 | Resolved: 2025-07-22

## 2025-07-22 LAB
ANION GAP SERPL CALCULATED.3IONS-SCNC: 11 MMOL/L (ref 4–13)
CHLORIDE SERPL-SCNC: 96 MMOL/L (ref 96–108)
CO2 SERPL-SCNC: 22 MMOL/L (ref 21–32)
POTASSIUM SERPL-SCNC: 3.9 MMOL/L (ref 3.5–5.3)
SODIUM SERPL-SCNC: 129 MMOL/L (ref 135–147)

## 2025-07-22 PROCEDURE — 99214 OFFICE O/P EST MOD 30 MIN: CPT | Performed by: FAMILY MEDICINE

## 2025-07-22 PROCEDURE — 80051 ELECTROLYTE PANEL: CPT

## 2025-07-22 PROCEDURE — 36415 COLL VENOUS BLD VENIPUNCTURE: CPT

## 2025-07-22 RX ORDER — ALBUTEROL SULFATE 90 UG/1
2 AEROSOL, METERED RESPIRATORY (INHALATION) EVERY 4 HOURS PRN
Qty: 18 G | Refills: 0 | Status: SHIPPED | OUTPATIENT
Start: 2025-07-22

## 2025-07-22 RX ORDER — DICYCLOMINE HCL 20 MG
20 TABLET ORAL 2 TIMES DAILY
Qty: 60 TABLET | Refills: 0 | Status: SHIPPED | OUTPATIENT
Start: 2025-07-22

## 2025-07-22 NOTE — PROGRESS NOTES
Name: Antionette Downing      : 1985      MRN: [id number]  Encounter Provider: Filomena Cline  Encounter Date: 2025   Encounter department: [facility]  :  Assessment & Plan  Abdominal pain  -Patient was recently seen on 2025 at the ED, for abdominal pain, nausea vomiting.  -Pt was discharged on Bentyl 20 mg to take up to 4 times a day before meals and at bedtime.  - Due to the anticholinergic side effects of Bentyl causing nausea and dizziness, and patient reporting feeling off balance with no improvement in nausea, Bentyl dose frequency was decreased to 2 times a day.    Orders:    dicyclomine (BENTYL) 20 mg tablet; Take 1 tablet (20 mg total) by mouth 2 (two) times a day Before breakfast and before supper.    Hyponatremia  -Patient has had frequent ED visits with complaints of weakness, fatigue, with nausea, vomiting, diarrhea; each time her visits have shown hyponatremia   -Patient is on Trileptal and Zyprexa, both have side effects of hyponatremia and SIADH,   -Patient recently had dose adjustments increasing these medications to 3 times a day when symptoms of fatigue nausea vomiting worsened.    -ED adjusted frequency of doses to 2 times a day for the Trileptal.  And 30 mg at night of the Zyprexa, on 25, recommended patient to follow the adjusted dose frequency on ED d/c and to follow-up with the regular psychiatrist to see if alternate medications would be appropriate.  - Unsure if patient's symptoms are neurologically related or secondary to her psychiatric medications, ordered trough on Trileptal levels, and made a referral to neurology  -Will continue to monitor patient's electrolytes  Orders:    Ambulatory Referral to Neurology; Future    Electrolyte panel; Future    Cocaine abuse in remission (HCC)  - Patient denies any recent cocaine use       Tobacco abuse  - Patient still smokes 1 pack a day  -Advised patient to stop smoking       Bipolar I  disorder, most recent episode manic, severe with psychotic features (HCC)  -Patient and  report Trileptal and Zyprexa combination is not helping her BPD-1, per  pt has frequent hallucinations as well as delusions of him cheating on her.  - Patient and  asked for neurological referral due to her frequent headaches, intermittent ataxia, and gait disturbances, and history of a traumatic brain injury  -Trileptal is noted to have common side effects of dizziness, nausea, vomiting, ataxia, fatigue, for this reason Trileptal trough levels have been ordered.    -Patient was advised to only take her Zyprexa at night and to continue her Trileptal twice a day (from the previous 3 times a day.)  -Patient and  are poor historians, it was unclear during the visit  whether the patient has a regular psychiatrist  - Per chart review patient has had behavioral health care provided through ED admissions  -In follow-up will recommend patient be set up with a regular outpatient psychiatrist to better manage her psychiatric medications.       Left ovarian cyst   - 7/12/25 transvaginal ultrasound in ED left ovarian hemorrhagic corpus luteum cyst as without evidence of ovarian torsion. The management recommendations are no additional work-up or follow-up needed.  - Patient has continued tenderness of her left lower quadrant.  -Continue to monitor.       Drooping of mouth  -Patient reports having a longstanding droop at the corner of her right mouth. Denies any drooling, states that she has had teeth infections and developed the droop after having a dental procedure. Patient unsure when droop started approximately a few months.   - Patient denied any history of ever being diagnosed with Bell's palsy.  - Neurology referral made.                History of Present Illness   HPI Ms. Downing is a 39-year-old female with a past medical history of ADHD, bipolar 1, schizoaffective disorder, tobacco use, psychogenic  seizures, IBS both diarrhea and constipation types, and left ovarian cyst.  This has been frequenting the ER and has had about 11 CT scans over the course of the last year for her GI complaints.  No abnormalities noted other than chronic diverticulosis.  Patient reports that GI complaints began when she was started on Trileptal a little over a year ago.  Recently her doses were increased to 3 times a day and that is when her abdominal pain and nausea became much worse, she started to notice periodic perriorbital edema around her eyes, and per chart review she is consistently had hyponatremia during her ER visits.     Patient is here as a follow-up appointment after her most recent ER visit on July 19, 2025 patient reports that her abdominal pain and nausea have not made much of an improvement.  Patient is here with her  who states that he is the one that takes her to her appointments and he would like to have a neurology referral due to her periodic ataxic gait.  Patient says that she reports feeling unsteady on her feet often, has frequent headaches. Patient and  report that their psychiatrist recommended that she follow-up with a neurologist    Review of Systems   HENT:  Positive for facial swelling (reports having periorbital swelling). Negative for drooling, ear discharge, ear pain, rhinorrhea and tinnitus.    Eyes:  Negative for photophobia, pain and visual disturbance.        Reports having swelling over upper eye lid and lower troughs of eyes.   Gastrointestinal:  Positive for abdominal pain (Patient has had IBS, D and C type, however symptoms per patient have been worse since beginning Trileptal. Pt has had 11 ABD CT scans over the course of a year.Recently seen in ER July 19th, reports having no improvement in abdomonal pain), constipation, diarrhea and nausea. Negative for blood in stool.   Endocrine: Negative for polydipsia.   Genitourinary:  Negative for difficulty urinating, dyspareunia,  "dysuria and urgency.   Musculoskeletal:  Positive for gait problem (Patient reports at times feeling off \"equilibrium\"). Negative for back pain and myalgias.       Objective   /76 (BP Location: Right arm, Patient Position: Sitting, Cuff Size: Standard)   Pulse 101   Temp 98.7 °F (37.1 °C) (Temporal)   Resp 18   Ht 5' 2\" (1.575 m)   Wt 64 kg (141 lb)   LMP 07/12/2025   SpO2 98%   Breastfeeding No   BMI 25.79 kg/m²      Physical Exam  Constitutional:       Appearance: Normal appearance.   HENT:      Head: Atraumatic.      Comments: Mild periorbital edema     Nose: No congestion or rhinorrhea.      Mouth/Throat:      Mouth: Mucous membranes are moist.     Eyes:      Extraocular Movements: Extraocular movements intact.      Pupils: Pupils are equal, round, and reactive to light.       Cardiovascular:      Rate and Rhythm: Normal rate and regular rhythm.      Heart sounds: Normal heart sounds.   Pulmonary:      Effort: Pulmonary effort is normal.      Breath sounds: Normal breath sounds.   Abdominal:      General: There is no distension.      Palpations: Abdomen is soft. There is no mass.      Tenderness: There is abdominal tenderness (Tenderness over left lower quadrant and left pelvic area). There is no guarding or rebound.      Hernia: No hernia is present.      Comments: No abnormal bowel sounds were noted, no bruits.      Musculoskeletal:         General: No tenderness. Normal range of motion.      Cervical back: Normal range of motion and neck supple.     Skin:     General: Skin is warm and dry.     Neurological:      Mental Status: She is alert and oriented to person, place, and time.      Cranial Nerves: No cranial nerve deficit.      Sensory: No sensory deficit.      Gait: Gait normal.      Comments: Right sided long standing facial droop. Flattening of the nasiolabial fold on right. Right corner of mouth pulls down, when patient smiles or is resting. No sensory or motor loss noted on face.  "   Psychiatric:         Mood and Affect: Mood normal.         Behavior: Behavior normal.

## 2025-07-22 NOTE — ASSESSMENT & PLAN NOTE
-Patient has had frequent ED visits with complaints of weakness, fatigue, with nausea, vomiting, diarrhea; each time her visits have shown hyponatremia   -Patient is on Trileptal and Zyprexa, both have side effects of hyponatremia and SIADH,   -Patient recently had dose adjustments increasing these medications to 3 times a day when symptoms of fatigue nausea vomiting worsened.    -ED adjusted frequency of doses to 2 times a day for the Trileptal.  And 30 mg at night of the Zyprexa, on 7/19/25, recommended patient to follow the adjusted dose frequency on ED d/c and to follow-up with the regular psychiatrist to see if alternate medications would be appropriate.  - Unsure if patient's symptoms are neurologically related or secondary to her psychiatric medications, ordered trough on Trileptal levels, and made a referral to neurology  -Will continue to monitor patient's electrolytes  Orders:    Ambulatory Referral to Neurology; Future    Electrolyte panel; Future
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
## 2025-07-22 NOTE — ASSESSMENT & PLAN NOTE
-Patient was recently seen on 7/19/2025 at the ED, for abdominal pain, nausea vomiting.  -Pt was discharged on Bentyl 20 mg to take up to 4 times a day before meals and at bedtime.  - Due to the anticholinergic side effects of Bentyl causing nausea and dizziness, and patient reporting feeling off balance with no improvement in nausea, Bentyl dose frequency was decreased to 2 times a day.    Orders:    dicyclomine (BENTYL) 20 mg tablet; Take 1 tablet (20 mg total) by mouth 2 (two) times a day Before breakfast and before supper.

## 2025-07-23 ENCOUNTER — APPOINTMENT (OUTPATIENT)
Dept: LAB | Facility: HOSPITAL | Age: 40
End: 2025-07-23
Payer: COMMERCIAL

## 2025-07-23 DIAGNOSIS — E87.1 HYPONATREMIA: ICD-10-CM

## 2025-07-23 DIAGNOSIS — R10.9 ABDOMINAL PAIN: ICD-10-CM

## 2025-07-23 PROCEDURE — 80183 DRUG SCRN QUANT OXCARBAZEPIN: CPT

## 2025-07-23 PROCEDURE — 36415 COLL VENOUS BLD VENIPUNCTURE: CPT

## 2025-07-24 PROBLEM — R29.810: Status: ACTIVE | Noted: 2025-07-24

## 2025-07-24 PROBLEM — N83.202 LEFT OVARIAN CYST: Status: ACTIVE | Noted: 2025-07-24

## 2025-07-24 NOTE — ASSESSMENT & PLAN NOTE
- 7/12/25 transvaginal ultrasound in ED left ovarian hemorrhagic corpus luteum cyst as without evidence of ovarian torsion. The management recommendations are no additional work-up or follow-up needed.  - Patient has continued tenderness of her left lower quadrant.  -Continue to monitor.

## 2025-07-24 NOTE — ASSESSMENT & PLAN NOTE
-Patient reports having a longstanding droop at the corner of her right mouth. Denies any drooling, states that she has had teeth infections and developed the droop after having a dental procedure. Patient unsure when droop started approximately a few months.   - Patient denied any history of ever being diagnosed with Bell's palsy.  - Neurology referral made.

## 2025-07-24 NOTE — ASSESSMENT & PLAN NOTE
-Patient and  report Trileptal and Zyprexa combination is not helping her BPD-1, per  pt has frequent hallucinations as well as delusions of him cheating on her.  - Patient and  asked for neurological referral due to her frequent headaches, intermittent ataxia, and gait disturbances, and history of a traumatic brain injury  -Trileptal is noted to have common side effects of dizziness, nausea, vomiting, ataxia, fatigue, for this reason Trileptal trough levels have been ordered.    -Patient was advised to only take her Zyprexa at night and to continue her Trileptal twice a day (from the previous 3 times a day.)  -Patient and  are poor historians, it was unclear during the visit  whether the patient has a regular psychiatrist  - Per chart review patient has had behavioral health care provided through ED admissions  -In follow-up will recommend patient be set up with a regular outpatient psychiatrist to better manage her psychiatric medications.

## 2025-07-27 LAB — OXCARBAZEPINE SERPL-MCNC: 12 UG/ML (ref 10–35)

## 2025-07-28 ENCOUNTER — NURSE TRIAGE (OUTPATIENT)
Age: 40
End: 2025-07-28

## 2025-08-02 ENCOUNTER — APPOINTMENT (EMERGENCY)
Dept: CT IMAGING | Facility: HOSPITAL | Age: 40
End: 2025-08-02
Payer: COMMERCIAL

## 2025-08-02 ENCOUNTER — HOSPITAL ENCOUNTER (EMERGENCY)
Facility: HOSPITAL | Age: 40
Discharge: LEFT BEFORE DISCHARGE | End: 2025-08-02
Attending: EMERGENCY MEDICINE
Payer: COMMERCIAL

## 2025-08-02 VITALS
RESPIRATION RATE: 16 BRPM | TEMPERATURE: 98.2 F | SYSTOLIC BLOOD PRESSURE: 132 MMHG | HEART RATE: 107 BPM | DIASTOLIC BLOOD PRESSURE: 64 MMHG | OXYGEN SATURATION: 99 %

## 2025-08-02 DIAGNOSIS — R10.9 ABDOMINAL PAIN: Primary | ICD-10-CM

## 2025-08-02 LAB
ALBUMIN SERPL BCG-MCNC: 4.8 G/DL (ref 3.5–5)
ALP SERPL-CCNC: 56 U/L (ref 34–104)
ALT SERPL W P-5'-P-CCNC: 12 U/L (ref 7–52)
ANION GAP SERPL CALCULATED.3IONS-SCNC: 8 MMOL/L (ref 4–13)
AST SERPL W P-5'-P-CCNC: 24 U/L (ref 13–39)
BASOPHILS # BLD AUTO: 0.02 THOUSANDS/ÂΜL (ref 0–0.1)
BASOPHILS NFR BLD AUTO: 0 % (ref 0–1)
BILIRUB SERPL-MCNC: 0.38 MG/DL (ref 0.2–1)
BILIRUB UR QL STRIP: NEGATIVE
BUN SERPL-MCNC: 5 MG/DL (ref 5–25)
CALCIUM SERPL-MCNC: 9.3 MG/DL (ref 8.4–10.2)
CHLORIDE SERPL-SCNC: 103 MMOL/L (ref 96–108)
CLARITY UR: CLEAR
CO2 SERPL-SCNC: 23 MMOL/L (ref 21–32)
COLOR UR: COLORLESS
CREAT SERPL-MCNC: 0.75 MG/DL (ref 0.6–1.3)
EOSINOPHIL # BLD AUTO: 0.05 THOUSAND/ÂΜL (ref 0–0.61)
EOSINOPHIL NFR BLD AUTO: 1 % (ref 0–6)
ERYTHROCYTE [DISTWIDTH] IN BLOOD BY AUTOMATED COUNT: 12.5 % (ref 11.6–15.1)
EXT PREGNANCY TEST URINE: NEGATIVE
EXT. CONTROL: NORMAL
GFR SERPL CREATININE-BSD FRML MDRD: 102 ML/MIN/1.73SQ M
GLUCOSE SERPL-MCNC: 94 MG/DL (ref 65–140)
GLUCOSE UR STRIP-MCNC: NEGATIVE MG/DL
HCT VFR BLD AUTO: 42.5 % (ref 34.8–46.1)
HGB BLD-MCNC: 14.5 G/DL (ref 11.5–15.4)
HGB UR QL STRIP.AUTO: NEGATIVE
IMM GRANULOCYTES # BLD AUTO: 0.01 THOUSAND/UL (ref 0–0.2)
IMM GRANULOCYTES NFR BLD AUTO: 0 % (ref 0–2)
KETONES UR STRIP-MCNC: NEGATIVE MG/DL
LEUKOCYTE ESTERASE UR QL STRIP: NEGATIVE
LIPASE SERPL-CCNC: 21 U/L (ref 11–82)
LYMPHOCYTES # BLD AUTO: 2.52 THOUSANDS/ÂΜL (ref 0.6–4.47)
LYMPHOCYTES NFR BLD AUTO: 35 % (ref 14–44)
MCH RBC QN AUTO: 31 PG (ref 26.8–34.3)
MCHC RBC AUTO-ENTMCNC: 34.1 G/DL (ref 31.4–37.4)
MCV RBC AUTO: 91 FL (ref 82–98)
MONOCYTES # BLD AUTO: 0.53 THOUSAND/ÂΜL (ref 0.17–1.22)
MONOCYTES NFR BLD AUTO: 7 % (ref 4–12)
NEUTROPHILS # BLD AUTO: 4.17 THOUSANDS/ÂΜL (ref 1.85–7.62)
NEUTS SEG NFR BLD AUTO: 57 % (ref 43–75)
NITRITE UR QL STRIP: NEGATIVE
NRBC BLD AUTO-RTO: 0 /100 WBCS
PH UR STRIP.AUTO: 6 [PH]
PLATELET # BLD AUTO: 201 THOUSANDS/UL (ref 149–390)
PMV BLD AUTO: 9.9 FL (ref 8.9–12.7)
POTASSIUM SERPL-SCNC: 3.9 MMOL/L (ref 3.5–5.3)
PROT SERPL-MCNC: 7.5 G/DL (ref 6.4–8.4)
PROT UR STRIP-MCNC: NEGATIVE MG/DL
RBC # BLD AUTO: 4.67 MILLION/UL (ref 3.81–5.12)
SODIUM SERPL-SCNC: 134 MMOL/L (ref 135–147)
SP GR UR STRIP.AUTO: 1 (ref 1–1.03)
UROBILINOGEN UR STRIP-ACNC: <2 MG/DL
WBC # BLD AUTO: 7.3 THOUSAND/UL (ref 4.31–10.16)

## 2025-08-02 PROCEDURE — 99284 EMERGENCY DEPT VISIT MOD MDM: CPT | Performed by: EMERGENCY MEDICINE

## 2025-08-02 PROCEDURE — 36415 COLL VENOUS BLD VENIPUNCTURE: CPT | Performed by: EMERGENCY MEDICINE

## 2025-08-02 PROCEDURE — 80053 COMPREHEN METABOLIC PANEL: CPT | Performed by: EMERGENCY MEDICINE

## 2025-08-02 PROCEDURE — 81025 URINE PREGNANCY TEST: CPT | Performed by: EMERGENCY MEDICINE

## 2025-08-02 PROCEDURE — 96375 TX/PRO/DX INJ NEW DRUG ADDON: CPT

## 2025-08-02 PROCEDURE — 96374 THER/PROPH/DIAG INJ IV PUSH: CPT

## 2025-08-02 PROCEDURE — 99284 EMERGENCY DEPT VISIT MOD MDM: CPT

## 2025-08-02 PROCEDURE — 85025 COMPLETE CBC W/AUTO DIFF WBC: CPT | Performed by: EMERGENCY MEDICINE

## 2025-08-02 PROCEDURE — 83690 ASSAY OF LIPASE: CPT | Performed by: EMERGENCY MEDICINE

## 2025-08-02 PROCEDURE — 96361 HYDRATE IV INFUSION ADD-ON: CPT

## 2025-08-02 PROCEDURE — 81003 URINALYSIS AUTO W/O SCOPE: CPT | Performed by: EMERGENCY MEDICINE

## 2025-08-02 RX ORDER — DROPERIDOL 2.5 MG/ML
0.62 INJECTION, SOLUTION INTRAMUSCULAR; INTRAVENOUS ONCE
Status: COMPLETED | OUTPATIENT
Start: 2025-08-02 | End: 2025-08-02

## 2025-08-02 RX ORDER — ACETAMINOPHEN 325 MG/1
650 TABLET ORAL ONCE
Status: DISCONTINUED | OUTPATIENT
Start: 2025-08-02 | End: 2025-08-03 | Stop reason: HOSPADM

## 2025-08-02 RX ORDER — ONDANSETRON 2 MG/ML
4 INJECTION INTRAMUSCULAR; INTRAVENOUS ONCE
Status: COMPLETED | OUTPATIENT
Start: 2025-08-02 | End: 2025-08-02

## 2025-08-02 RX ADMIN — DROPERIDOL 0.62 MG: 2.5 INJECTION, SOLUTION INTRAMUSCULAR; INTRAVENOUS at 20:02

## 2025-08-02 RX ADMIN — SODIUM CHLORIDE 1000 ML: 0.9 INJECTION, SOLUTION INTRAVENOUS at 19:35

## 2025-08-02 RX ADMIN — ONDANSETRON 4 MG: 2 INJECTION INTRAMUSCULAR; INTRAVENOUS at 19:36

## 2025-08-04 ENCOUNTER — HOSPITAL ENCOUNTER (EMERGENCY)
Facility: HOSPITAL | Age: 40
Discharge: HOME | End: 2025-08-04
Attending: EMERGENCY MEDICINE
Payer: COMMERCIAL

## 2025-08-04 ENCOUNTER — APPOINTMENT (EMERGENCY)
Dept: RADIOLOGY | Facility: HOSPITAL | Age: 40
End: 2025-08-04
Payer: COMMERCIAL

## 2025-08-05 ENCOUNTER — TELEPHONE (OUTPATIENT)
Dept: NEUROLOGY | Facility: CLINIC | Age: 40
End: 2025-08-05

## 2025-08-05 ENCOUNTER — TELEPHONE (OUTPATIENT)
Age: 40
End: 2025-08-05

## 2025-08-06 ENCOUNTER — TELEPHONE (OUTPATIENT)
Age: 40
End: 2025-08-06

## 2025-08-08 ENCOUNTER — VBI (OUTPATIENT)
Dept: ADMINISTRATIVE | Facility: OTHER | Age: 40
End: 2025-08-08

## 2025-08-08 DIAGNOSIS — J45.20 INTERMITTENT ASTHMA WITHOUT COMPLICATION, UNSPECIFIED ASTHMA SEVERITY: ICD-10-CM

## 2025-08-08 RX ORDER — ALBUTEROL SULFATE 90 UG/1
2 INHALANT RESPIRATORY (INHALATION) EVERY 4 HOURS PRN
Qty: 18 G | Refills: 0 | Status: SHIPPED | OUTPATIENT
Start: 2025-08-08

## 2025-08-17 DIAGNOSIS — R10.9 ABDOMINAL PAIN: ICD-10-CM

## 2025-08-18 RX ORDER — DICYCLOMINE HCL 20 MG
20 TABLET ORAL 2 TIMES DAILY
Qty: 60 TABLET | Refills: 0 | Status: SHIPPED | OUTPATIENT
Start: 2025-08-18